# Patient Record
Sex: MALE | Race: WHITE | NOT HISPANIC OR LATINO | Employment: OTHER | URBAN - METROPOLITAN AREA
[De-identification: names, ages, dates, MRNs, and addresses within clinical notes are randomized per-mention and may not be internally consistent; named-entity substitution may affect disease eponyms.]

---

## 2021-03-31 ENCOUNTER — TRANSFERRED RECORDS (OUTPATIENT)
Dept: HEALTH INFORMATION MANAGEMENT | Facility: CLINIC | Age: 66
End: 2021-03-31

## 2021-04-02 ENCOUNTER — TRANSFERRED RECORDS (OUTPATIENT)
Dept: HEALTH INFORMATION MANAGEMENT | Facility: CLINIC | Age: 66
End: 2021-04-02

## 2021-04-02 LAB
CREATININE (EXTERNAL): 6.93 MG/DL (ref 0.73–1.18)
GFR ESTIMATED (EXTERNAL): 8 ML/MIN/1.73M2
GLUCOSE (EXTERNAL): 125 MG/DL (ref 70–100)
POTASSIUM (EXTERNAL): 4.7 MMOL/L (ref 3.5–5.1)

## 2021-04-16 ENCOUNTER — TRANSFERRED RECORDS (OUTPATIENT)
Dept: HEALTH INFORMATION MANAGEMENT | Facility: CLINIC | Age: 66
End: 2021-04-16

## 2021-04-27 ENCOUNTER — TRANSFERRED RECORDS (OUTPATIENT)
Dept: HEALTH INFORMATION MANAGEMENT | Facility: CLINIC | Age: 66
End: 2021-04-27

## 2021-04-27 ENCOUNTER — TRANSFERRED RECORDS (OUTPATIENT)
Dept: MULTI SPECIALTY CLINIC | Facility: CLINIC | Age: 66
End: 2021-04-27

## 2021-04-27 LAB
EJECTION FRACTION: 60 %
EJECTION FRACTION: 60 %

## 2021-05-25 ENCOUNTER — REFERRAL (OUTPATIENT)
Dept: TRANSPLANT | Facility: CLINIC | Age: 66
End: 2021-05-25

## 2021-05-25 DIAGNOSIS — N18.6 END STAGE RENAL DISEASE (H): ICD-10-CM

## 2021-05-25 DIAGNOSIS — E11.9 DIABETES MELLITUS, TYPE 2 (H): Primary | ICD-10-CM

## 2021-05-25 DIAGNOSIS — Z01.818 ENCOUNTER FOR PRE-TRANSPLANT EVALUATION FOR KIDNEY AND PANCREAS TRANSPLANT: ICD-10-CM

## 2021-05-25 DIAGNOSIS — E78.5 HYPERLIPIDEMIA: ICD-10-CM

## 2021-05-25 DIAGNOSIS — E10.9 TYPE 1 DIABETES MELLITUS (H): ICD-10-CM

## 2021-05-25 DIAGNOSIS — I10 ESSENTIAL HYPERTENSION: ICD-10-CM

## 2021-05-25 DIAGNOSIS — Z76.82 ORGAN TRANSPLANT CANDIDATE: ICD-10-CM

## 2021-05-25 DIAGNOSIS — N18.4 CHRONIC KIDNEY DISEASE, STAGE 4, SEVERELY DECREASED GFR (H): ICD-10-CM

## 2021-05-25 NOTE — Clinical Note
Please see smart set orders, pt is a pre-K/P, on dialysis M/W/F, confirmed STD of 8/5/21  Thanks, Rebecca

## 2021-05-25 NOTE — LETTER
Avelina MUNIZ Sanam  [address] [place] [address]                June 2, 2021                                                                                        MEDICAL RECORDS REQUEST    [facility] Records Request                      Thank you for referring your patient to the [facility], in order to process the referral we will need the following information;    1. Demographics  2. 2728 form   3. Immunizations records  4. Providers Progress notes, (last 3 note)      Please call our office at [phone] if you have any questions or concerns.                Please fax all paper records to [phone] within 3-5 business days.      Thank you,   The SOT Referral Intake Team     [facility]  Solid Organ Transplant Office  [address] [place] [address], [address] [address]

## 2021-05-25 NOTE — LETTER
Avelina Marion  [address] [place] [address]      Dear Avelina,    Thank you for your interest in the Transplant Center at [facility]. We look forward to being a part of your care team and assisting you through the transplant process.    As we discussed, your transplant coordinator is Rebecca Christopher (Pancreas, Kidney).  You may call your coordinator at any time with questions or concerns call [phone].    Please complete the following.    1. Fill out and return the enclosed forms    Authorization for Electronic Communication    Authorization to Discuss Protected Health Information    Authorization for Release of Protected Health Information    Authorization for Care Everywhere Release of Information    2. Sign up for:    [place], access to your electronic medical record (see enclosed pamphlet)    [email or web address], a transplant education website    You can use these tools to learn more about your transplant, communicate with your care team, and track your medical details      Sincerely,  Solid Organ Transplant  [facility]    cc: Referring Physician and PCP

## 2021-06-02 ENCOUNTER — DOCUMENTATION ONLY (OUTPATIENT)
Dept: TRANSPLANT | Facility: CLINIC | Age: 66
End: 2021-06-02

## 2021-06-02 VITALS — HEIGHT: 72 IN | WEIGHT: 200 LBS | BODY MASS INDEX: 27.09 KG/M2

## 2021-06-02 SDOH — HEALTH STABILITY: MENTAL HEALTH: HOW OFTEN DO YOU HAVE A DRINK CONTAINING ALCOHOL?: NEVER

## 2021-06-02 SDOH — HEALTH STABILITY: MENTAL HEALTH: HOW OFTEN DO YOU HAVE 6 OR MORE DRINKS ON ONE OCCASION?: NEVER

## 2021-06-02 SDOH — HEALTH STABILITY: MENTAL HEALTH: HOW MANY STANDARD DRINKS CONTAINING ALCOHOL DO YOU HAVE ON A TYPICAL DAY?: NOT ASKED

## 2021-06-02 ASSESSMENT — MIFFLIN-ST. JEOR: SCORE: 1740.19

## 2021-06-02 NOTE — TELEPHONE ENCOUNTER
PCP: Amy Morse   Referring Provider: Dr. Patrice Mitchell   Referring Diagnosis: CKD/Type 2 DM, Insulin use     Is patient under the age of 65? Yes  Is patient diabetic? Yes  Is patient on insulin? Yes  Was patient offered a pancreas transplant referral? Yes    Is patient in a group home/assisted living? No  Does patient have a guardian? No    Referral intake process completed.  Patient is aware that after financial approval is received, medical records will be requested.   Patient confirmed for a callback from transplant coordinator on 6/22/21. (within 2 weeks)  Tentative evaluation date 8/5/21. (within 4 weeks)    Confirmed coordinator will discuss evaluation process in more detail at the time of their call.   Patient is aware of the need to arrange age appropriate cancer screening, vaccinations, and dental care.  Reminded patient to complete questionnaire, complete medical records release, and review packet prior to evaluation visit .  Assessed patient for special needs (ie--wheelchair, assistance, guardian, and ):  None   Patient instructed to call [phone] with questions.     Patient gave verbal consent during intake call to obtain medical records and documents outside of [facility]:  Yes     BRIANA Cote, LPN   Solid Organ Transplant

## 2021-06-04 ENCOUNTER — TRANSFERRED RECORDS (OUTPATIENT)
Dept: HEALTH INFORMATION MANAGEMENT | Facility: CLINIC | Age: 66
End: 2021-06-04

## 2021-06-22 NOTE — TELEPHONE ENCOUNTER
Left VM for pt to return my call for referral.     Contacted patient and introduced myself as their Transplant Coordinator, also introduced the role of the Transplant Coordinator in the transplant process.  Explained the purpose of this call including reviewing next steps and answering questions.    Confirmed Referring Provider, Dialysis Center, and Primary Care Physician. Notified patient of the importance of continued communication with referring providers and primary care physicians.    Reviewed components of transplant evaluation process including necessary appointments, tests, and procedures.    Answered questions for patient regarding evaluation, provided my name and contact information and requested they call with any additional questions.    Determined that patient would like additional information regarding transplant by:     Drop Down choices: Mail, Email, MyChart, Phone Call   Encourage MyChart   Notified patients that they will hear from a Transplant  to schedule evaluation.       Reviewed pt's chart for pre-kidney/pancreas transplant evaluation planning. Pt lives in [place], [place]. Pt has ESRD d/t diabetic nephropathy- recently initiated 4/2021 and follows w/ nephrologist Dr. Mitchell. Pt's chart notes T1 diabetes, however, pt thought he was type 2, he is 20 units long acting insulin x1/day.  He reports his BG levels are usually between 110s-130s.  Other hx includes HTN, HLD, and diabetic neuropathy.  No significant heart hx, but he did have chest tightness following dialysis in April and underwent a DSE (was normal).  No significant lung hx. Surgical hx includes cataracts surgery on both eyes.  BMI 26.  Last colonoscopy was in 2010, pt will work w/ his PCP to schedule updated colonoscopy.  Dental: not UTD.  Pt reports he smoked during the 1980s, but has since quit.  He does not consume alcohol, and does not use recreational drugs. Pt is independent w/ ADLs.  Pt lives w/ brother and sister,  believes he will have adequate support following transplant. Pt has no living donors at this time.     I also introduced [email or web address] and asked pt to create an account and view pre-kidney transplant videos for review with me following evaluation. Confirmed STD of 8/5/21. Informed pt they will hear from scheduling to arrange the evaluation. Smartset orders entered, chart routed to scheduling pool.

## 2021-06-27 ENCOUNTER — HEALTH MAINTENANCE LETTER (OUTPATIENT)
Age: 66
End: 2021-06-27

## 2021-08-02 ENCOUNTER — TELEPHONE (OUTPATIENT)
Dept: TRANSPLANT | Facility: CLINIC | Age: 66
End: 2021-08-02

## 2021-08-02 NOTE — TELEPHONE ENCOUNTER
I /Magdalena Esparza, RN BSN called patient to confirm /verify his coming for PKE on Thursday 8/5/2021  patient Busy line no other number to call could not leave message.  I called Dr. Mitchell's office nephrology they confirmed phone number [phone] is correct,  I called [facility] at [facility] left a voice message for them to confirm if pt is on PD at their unit.   NO Answer, I left a voice message asking them to call Magdalena back at [phone] and leave message.    Qing Fernandez and Ines

## 2021-08-03 NOTE — PROGRESS NOTES
[facility] Solid Organ Transplant  Outpatient MNT: Kidney Pancreas Transplant Evaluation    Current BMI: 27.5 (HT 72 in,  lbs/92 kg)  BMI is within recommendation of <35 for KP transplant    Frailty Assessment -- Not Frail (1/5 points) scored for low activity level      Time Spent: 15 minutes  Visit Type: Initial   Referring Physician: Finger  Pt accompanied by: self    History of previous txp: none   Frequency of BG checks: hasn't been checking as often; reports 'wnl'; barrier to checking more often is dislike of finger poke (has looked into cgm in past but reports issues with insurance coverage)-->encouraged him to look into options again  Dialysis: yes    Dialysis Info: HD 4/2021 MWF 1030 am   Protein supplement: none     Nutrition Assessment  Lives w/ brother and sister; takes turns cooking     Appetite: good/baseline    Vitamins, Supplements, Pertinent Meds: phoslo (takes all the time he eats)  Herbal Medicines/Supplements: none     Edema: none     Weight hx: stable     Food Security: any concerns about having enough money to buy food or access to grocery stores? No     Diet Recall  Breakfast None    Lunch Sweet and sour chicken and fried rice (Chinese food); [employer] or [employer] -->majority of lunch is eating out of the home; at home may have deli meat s/w    Dinner Spaghetti; chicken/steak/burgers + veggies    Snacks None    Beverages Diet clear soda (3/day), water, very occasional milk    Alcohol None    Dining out Several times/week (lunch mostly, less often dinner)     Physical Activity  Walks with friends or at mall- inconsistent schedule      Labs  Per HD records 7/2021 K 5 Phos 4.5      Nutrition Diagnosis  No nutrition diagnosis identified at this time.     Nutrition Intervention  Nutrition education provided:  Discussed sodium intake (low sodium foods and drinks, seasoning food without salt and tips for low sodium diet). Diet recall shows higher sodium intake.   Reviewed k/phos labs and  protein needs w/ dialysis.     Reviewed post txp diet guidelines in brief (will review in further detail post txp):  (1) Review of proper food safety measures d/t immunosuppressant therapy post-op and increased risk for food-borne illness    (2) Avoid the following post txp d/t risk for rejection, unknown effects on the organs, and/or potential interactions with immunosuppressants:  - Herbal, Chinese, holistic, chiropractic, natural, alternative medicines and supplements  - Detoxes and cleanses  - Weight loss pills  - Protein powders or other products with extracts or herbs (ie green tea extract)    (3) Med regimen and possible side effects    Patient Understanding: Pt verbalized understanding of education provided.  Expected Engagement: Good  Follow-Up Plans: PRN     Nutrition Goals  No nutrition goals identified at this time     Suha Stanley, RD, LD, CCTD

## 2021-08-04 ENCOUNTER — TELEPHONE (OUTPATIENT)
Dept: TRANSPLANT | Facility: CLINIC | Age: 66
End: 2021-08-04

## 2021-08-04 NOTE — TELEPHONE ENCOUNTER
[facility] called confirming dialzyed in center MWF. Confirmation complete.  Pt has PKE for 8/5/2021 IN PERSON at [facility]     I called Avelina Santillankert on 8/4/2021 to verify he is coming to [facility] in Person on 8/5/2021.  Phone rang multiple times and I /Magdalena Esparza     Avelina Marion returned call from dialysis on cell [phone].  He confirms he will attend in person to [facility] on 8/5/2021.    I called him and asked him to bring the maroon folder and to sign two forms and give to Coordinator on Thursday 8/5/2021.     jones Christopher

## 2021-08-04 NOTE — PROGRESS NOTES
TRANSPLANT NEPHROLOGY RECIPIENT EVALUATION NOTE    Assessment and Plan:  # Kidney/Pancreas Transplant Evaluation: Patient is a good candidate overall. Benefits of a living donor transplant were discussed.    # ESKD from Type 2 Diabetes: doing OK on dialysis since April 2021, but may benefit from a kidney transplant.    # Type 2 Diabetes: currently controlled with 20 units insulin daily. Given evidence of end organ damage, he may benefit from a pancreas transplant.    # Cardiac Risk: will need risk assessment. He had a normal DSE April 2021. He is asymptomatic with exertion.    # Health Maintenance: Colonoscopy: due and Dental: due    Discussed the risks and benefits of a transplant, including the risk of surgery and immunosuppression medications.  Patient's overall evaluation will be discussed in the Transplant Program's regular meeting with a final recommendation on the patients suitability for transplant to be made at that time.    Pending completion of the full evaluation, patient presently appears to be enough of an acceptable kidney transplant recipient candidate to have any potential kidney donors start the evaluation process.  Patient was seen in conjunction with Dr. Joseph Chung as part of a shared visit.    Evaluation:  Avelina Marion was seen in consultation at the request of Dr. Francesco Farnsworth for evaluation as a potential kidney/pancreas transplant recipient.    Reason for Visit:  Avelina Marion is a 66-year-old male with ESKD from diabetic nephropathy, who presents for kidney/pancreas transplant evaluation.    History of Present Illness:  Mr. Marion is a 66-year-old male with ESKD from presumed diabetic nephropathy on dialysis since April 2021, type 2 diabetes, and hypertension.          Kidney Disease Hx: longstanding CKD and proteinuria (0.5-2.5 g/g, negative SPEP and serum immunofixation) thought 2/2 DM since at least 2015. No kidney biopsy. Started dialysis April 2021, which is going well. Still has urine  output. No sensation of incomplete emptying. Of note, his brother previously underwent kidney/pancreas transplant for diabetic nephropathy, but has since passed away.       Biopsy Proven: No         Primary Nephrologist: Dr. Mitchell       H/o Kidney Stones: No       H/o Recurrent/Frequent UTI: No         Diabetic Hx: Type 2        Diagnosis Date: early 40's, weighed around 300 lbs at the time, since lost weight with diet and exercise, but has continued to need insulin for about 10 years       Medication History: currently on lantus 20 units nightly.       Diabetic Control: Controlled (HbA1c <7%)   Last HbA1c: 5.2%       Hypoglycemic Unawareness: No       End-Organ Damage due to DM: Retinopathy and Nephropathy          Cardiac/Vascular Disease Risk Factors:        - Normal DSE April 2021         Viral Serology Status       CMV IgG Antibody: Unknown       EBV IgG Antibody: Unknown         Volume Status/Weight:        Volume status: Euvolemic       Weight:  Acceptable BMI       BMI: Body mass index is 27.53 kg/m .         Functional Capacity/Frailty:        He doesn't do anything in particular for exercise, but can walk an unlimited distance and go up and down stairs without issue. He denies any physical limitations. No chest pain, SOB, or claudication.    Fatigue/Decreased Energy: [x] No [] Yes    Chest Pain or SOB with Exertion: [x] No [] Yes    Significant Weight Change: [x] No [] Yes    Nausea, Vomiting or Diarrhea: [x] No [] Yes    Fever, Sweats or Chills:  [x] No [] Yes    Leg Swelling [x] No [] Yes        Potential Living Kidney Donors: Not at this time, although he has not started to talking to family or friends about it    Review of Systems:  A comprehensive review of systems was obtained and negative, except as noted in the HPI or PMH.    Past Medical History:   Medical record was reviewed and PMH was discussed with patient and noted below.  Past Medical History:   Diagnosis Date     Anemia in chronic kidney  disease      Diabetic retinopathy of both eyes (H)      End stage renal disease (H)      Hypertension      Secondary renal hyperparathyroidism (H)      Type 2 diabetes mellitus (H)        Past Social History:   Past Surgical History:   Procedure Laterality Date     CREATE FISTULA ARTERIOVENOUS UPPER EXTREMITY Left      Personal history of bleeding or anesthesia problems: No    Family History:  Family History   Problem Relation Age of Onset     Diabetes Brother      Kidney Disease Brother        Personal History:   Social History     Socioeconomic History     Marital status: Single     Spouse name: Not on file     Number of children: Not on file     Years of education: Not on file     Highest education level: Not on file   Occupational History     Not on file   Tobacco Use     Smoking status: Former Smoker     Types: Cigarettes     Smokeless tobacco: Never Used     Tobacco comment: Quit 1984   Substance and Sexual Activity     Alcohol use: Never     Drug use: Never     Sexual activity: Not on file   Other Topics Concern     Parent/sibling w/ CABG, MI or angioplasty before 65F 55M? Not Asked   Social History Narrative     Not on file     Social Determinants of Health     Financial Resource Strain:      Difficulty of Paying Living Expenses:    Food Insecurity:      Worried About Running Out of Food in the Last Year:      Ran Out of Food in the Last Year:    Transportation Needs:      Lack of Transportation (Medical):      Lack of Transportation (Non-Medical):    Physical Activity:      Days of Exercise per Week:      Minutes of Exercise per Session:    Stress:      Feeling of Stress :    Social Connections:      Frequency of Communication with Friends and Family:      Frequency of Social Gatherings with Friends and Family:      Attends Sabianism Services:      Active Member of Clubs or Organizations:      Attends Club or Organization Meetings:      Marital Status:    Intimate Partner Violence:      Fear of Current or  Ex-Partner:      Emotionally Abused:      Physically Abused:      Sexually Abused:        Allergies:  No Known Allergies    Medications:  Current Outpatient Medications   Medication Sig     amLODIPine (NORVASC) 5 MG tablet Take 10 mg by mouth     aspirin (ASA) 81 MG chewable tablet chew and swallow one tablet by mouth every day     atorvastatin (LIPITOR) 20 MG tablet TAKE 1 TABLET BY MOUTH DAILY     Blood Glucose Monitoring Suppl (ACCU-CHEK GUIDE) w/Device KIT Use to test 4 times a day. Pharmacy to dispense brand based on insurance.     calcitRIOL (ROCALTROL) 0.25 MCG capsule Take 0.25 mcg by mouth daily     calcium acetate (PHOSLO) 667 MG CAPS capsule Take 667 mg by mouth     insulin glargine (LANTUS PEN) 100 UNIT/ML pen Inject Subcutaneous At Bedtime     meclizine 25 MG CHEW Take 25 mg by mouth every 6 hours as needed for dizziness     sodium bicarbonate 650 MG tablet      No current facility-administered medications for this visit.       Vitals:  /69   Pulse 74   Ht 1.829 m (6')   Wt 92.1 kg (203 lb)   SpO2 97%   BMI 27.53 kg/m      Exam:  GENERAL APPEARANCE: alert and no distress  HENT: mouth without ulcers or lesions, fair dentition  LYMPHATICS: no cervical or supraclavicular nodes  RESP: lungs clear to auscultation - no rales, rhonchi or wheezes  CV: regular rhythm, normal rate, no rub, no murmur  EDEMA: no LE edema bilaterally  ABDOMEN: soft, nondistended, nontender  MS: extremities normal - no gross deformities noted, no evidence of inflammation in joints, no muscle tenderness  SKIN: no rash    Results:   Recent Results (from the past 336 hour(s))   EKG 12-lead, tracing only [EKG1]    Collection Time: 08/05/21  1:00 PM   Result Value Ref Range    Systolic Blood Pressure  mmHg    Diastolic Blood Pressure  mmHg    Ventricular Rate 63 BPM    Atrial Rate 63 BPM    WA Interval 132 ms    QRS Duration 90 ms     ms    QTc 440 ms    P Axis 51 degrees    R AXIS 2 degrees    T Axis 37 degrees     Interpretation ECG       Sinus rhythm  Normal ECG  No previous ECGs available       Patient was seen by myself, Dr. Joseph Chung, in conjunction with Evelyn Rico PA-C as part of a shared visit.    I personally reviewed past medical and surgical history, vital signs, medications and labs.  Present and past medical history, along with significant physical exam findings were all reviewed with VIVI.    My weathers findings:  Avelina Marion is 66 year old, who presents for Kidney transplant evaluation.    Key management decisions made by me and discussed with VIVI:  1.  End-stage kidney disease secondary to longstanding diabetes.  2.  Diabetes with complication.  3.  Simultaneous kidney and pancreas candidacy evaluation.  4.  Cardiovascular risk stratification.  5.  Age-appropriate cancer screening.  6.  Anatomical assessment.  7.  Formal psychosocial assessment.  Discussion:  Overall Mr. Marion is a 66-year-old gentleman who is interested in simultaneous kidney pancreas transplantation.  He appears to be reasonable candidate need to complete his work-up as delineated above.  We will discuss the case during our multidisciplinary meeting for final candidacy decision thank you for the consultation.

## 2021-08-04 NOTE — TELEPHONE ENCOUNTER
Pt called to let me know he has completed the online learning on MTP.  He did not have any further questions.  Documented on checklist.

## 2021-08-05 ENCOUNTER — DOCUMENTATION ONLY (OUTPATIENT)
Dept: TRANSPLANT | Facility: CLINIC | Age: 66
End: 2021-08-05

## 2021-08-05 ENCOUNTER — ALLIED HEALTH/NURSE VISIT (OUTPATIENT)
Dept: TRANSPLANT | Facility: CLINIC | Age: 66
End: 2021-08-05
Attending: PHYSICIAN ASSISTANT
Payer: COMMERCIAL

## 2021-08-05 ENCOUNTER — ANCILLARY PROCEDURE (OUTPATIENT)
Dept: CARDIOLOGY | Facility: CLINIC | Age: 66
End: 2021-08-05
Attending: PHYSICIAN ASSISTANT
Payer: COMMERCIAL

## 2021-08-05 ENCOUNTER — ANCILLARY PROCEDURE (OUTPATIENT)
Dept: GENERAL RADIOLOGY | Facility: CLINIC | Age: 66
End: 2021-08-05
Attending: PHYSICIAN ASSISTANT
Payer: COMMERCIAL

## 2021-08-05 ENCOUNTER — LAB (OUTPATIENT)
Dept: LAB | Facility: CLINIC | Age: 66
End: 2021-08-05
Attending: PHYSICIAN ASSISTANT
Payer: COMMERCIAL

## 2021-08-05 ENCOUNTER — LAB (OUTPATIENT)
Dept: LAB | Facility: CLINIC | Age: 66
End: 2021-08-05
Payer: COMMERCIAL

## 2021-08-05 VITALS
DIASTOLIC BLOOD PRESSURE: 69 MMHG | SYSTOLIC BLOOD PRESSURE: 136 MMHG | WEIGHT: 203 LBS | BODY MASS INDEX: 27.5 KG/M2 | HEIGHT: 72 IN | OXYGEN SATURATION: 97 % | HEART RATE: 74 BPM

## 2021-08-05 VITALS
WEIGHT: 203 LBS | OXYGEN SATURATION: 97 % | BODY MASS INDEX: 27.5 KG/M2 | DIASTOLIC BLOOD PRESSURE: 69 MMHG | SYSTOLIC BLOOD PRESSURE: 136 MMHG | HEIGHT: 72 IN | HEART RATE: 74 BPM

## 2021-08-05 DIAGNOSIS — E11.9 DIABETES MELLITUS, TYPE 2 (H): ICD-10-CM

## 2021-08-05 DIAGNOSIS — Z76.82 ORGAN TRANSPLANT CANDIDATE: ICD-10-CM

## 2021-08-05 DIAGNOSIS — N18.4 CHRONIC KIDNEY DISEASE, STAGE 4, SEVERELY DECREASED GFR (H): ICD-10-CM

## 2021-08-05 DIAGNOSIS — E78.5 HYPERLIPIDEMIA, UNSPECIFIED HYPERLIPIDEMIA TYPE: ICD-10-CM

## 2021-08-05 DIAGNOSIS — N18.6 END STAGE RENAL DISEASE (H): ICD-10-CM

## 2021-08-05 DIAGNOSIS — E10.9 TYPE 1 DIABETES MELLITUS (H): ICD-10-CM

## 2021-08-05 DIAGNOSIS — Z01.818 ENCOUNTER FOR PRE-TRANSPLANT EVALUATION FOR KIDNEY AND PANCREAS TRANSPLANT: ICD-10-CM

## 2021-08-05 DIAGNOSIS — I10 ESSENTIAL HYPERTENSION: ICD-10-CM

## 2021-08-05 DIAGNOSIS — E11.21 TYPE 2 DIABETES MELLITUS WITH DIABETIC NEPHROPATHY, WITH LONG-TERM CURRENT USE OF INSULIN (H): ICD-10-CM

## 2021-08-05 DIAGNOSIS — E78.5 HYPERLIPIDEMIA: ICD-10-CM

## 2021-08-05 DIAGNOSIS — Z99.2 TYPE 2 DIABETES MELLITUS WITH CHRONIC KIDNEY DISEASE ON CHRONIC DIALYSIS, WITH LONG-TERM CURRENT USE OF INSULIN (H): ICD-10-CM

## 2021-08-05 DIAGNOSIS — Z79.4 TYPE 2 DIABETES MELLITUS WITH DIABETIC NEPHROPATHY, WITH LONG-TERM CURRENT USE OF INSULIN (H): ICD-10-CM

## 2021-08-05 DIAGNOSIS — I10 HYPERTENSION, UNSPECIFIED TYPE: ICD-10-CM

## 2021-08-05 DIAGNOSIS — E11.22 TYPE 2 DIABETES MELLITUS WITH CHRONIC KIDNEY DISEASE ON CHRONIC DIALYSIS, WITH LONG-TERM CURRENT USE OF INSULIN (H): ICD-10-CM

## 2021-08-05 DIAGNOSIS — N25.81 SECONDARY RENAL HYPERPARATHYROIDISM (H): ICD-10-CM

## 2021-08-05 DIAGNOSIS — N18.6 TYPE 2 DIABETES MELLITUS WITH CHRONIC KIDNEY DISEASE ON CHRONIC DIALYSIS, WITH LONG-TERM CURRENT USE OF INSULIN (H): ICD-10-CM

## 2021-08-05 DIAGNOSIS — Z79.4 TYPE 2 DIABETES MELLITUS WITH CHRONIC KIDNEY DISEASE ON CHRONIC DIALYSIS, WITH LONG-TERM CURRENT USE OF INSULIN (H): ICD-10-CM

## 2021-08-05 DIAGNOSIS — Z01.818 ENCOUNTER FOR PRE-TRANSPLANT EVALUATION FOR KIDNEY AND PANCREAS TRANSPLANT: Primary | ICD-10-CM

## 2021-08-05 LAB
A1 AG RBC QL: POSITIVE
ABO/RH(D): NORMAL
ABO/RH(D): NORMAL
ALBUMIN SERPL-MCNC: 4.4 G/DL (ref 3.4–5)
ALBUMIN UR-MCNC: 100 MG/DL
ALP SERPL-CCNC: 71 U/L (ref 40–150)
ALT SERPL W P-5'-P-CCNC: 17 U/L (ref 0–70)
ANION GAP SERPL CALCULATED.3IONS-SCNC: 5 MMOL/L (ref 3–14)
ANTIBODY SCREEN: NEGATIVE
APPEARANCE UR: CLEAR
APTT PPP: 29 SECONDS (ref 22–38)
AST SERPL W P-5'-P-CCNC: 13 U/L (ref 0–45)
BACTERIA #/AREA URNS HPF: ABNORMAL /HPF
BASOPHILS # BLD AUTO: 0.1 10E3/UL (ref 0–0.2)
BASOPHILS NFR BLD AUTO: 1 %
BILIRUB SERPL-MCNC: 0.5 MG/DL (ref 0.2–1.3)
BILIRUB UR QL STRIP: NEGATIVE
BUN SERPL-MCNC: 44 MG/DL (ref 7–30)
CALCIUM SERPL-MCNC: 8.6 MG/DL (ref 8.5–10.1)
CHLORIDE BLD-SCNC: 101 MMOL/L (ref 94–109)
CO2 SERPL-SCNC: 30 MMOL/L (ref 20–32)
COLOR UR AUTO: YELLOW
CREAT SERPL-MCNC: 6.36 MG/DL (ref 0.66–1.25)
EOSINOPHIL # BLD AUTO: 0.1 10E3/UL (ref 0–0.7)
EOSINOPHIL NFR BLD AUTO: 1 %
ERYTHROCYTE [DISTWIDTH] IN BLOOD BY AUTOMATED COUNT: 12.3 % (ref 10–15)
FACTOR 2 INTERPRETATION: NORMAL
FACTOR V INTERPRETATION: NORMAL
GFR SERPL CREATININE-BSD FRML MDRD: 8 ML/MIN/1.73M2
GLUCOSE BLD-MCNC: 136 MG/DL (ref 70–99)
GLUCOSE UR STRIP-MCNC: 50 MG/DL
HBA1C MFR BLD: 6.1 % (ref 0–5.6)
HCT VFR BLD AUTO: 38.3 % (ref 40–53)
HGB BLD-MCNC: 12.8 G/DL (ref 13.3–17.7)
HGB UR QL STRIP: NEGATIVE
HOLD SPECIMEN: NORMAL
HYALINE CASTS: 1 /LPF
IMM GRANULOCYTES # BLD: 0 10E3/UL
IMM GRANULOCYTES NFR BLD: 0 %
INR PPP: 1.07 (ref 0.85–1.15)
KETONES UR STRIP-MCNC: NEGATIVE MG/DL
LAB DIRECTOR COMMENTS: NORMAL
LAB DIRECTOR DISCLAIMER: NORMAL
LAB DIRECTOR INTERPRETATION: NORMAL
LAB DIRECTOR METHODOLOGY: NORMAL
LAB DIRECTOR RESULTS: NORMAL
LEUKOCYTE ESTERASE UR QL STRIP: NEGATIVE
LVEF ECHO: NORMAL
LYMPHOCYTES # BLD AUTO: 2.3 10E3/UL (ref 0.8–5.3)
LYMPHOCYTES NFR BLD AUTO: 25 %
MCH RBC QN AUTO: 32.7 PG (ref 26.5–33)
MCHC RBC AUTO-ENTMCNC: 33.4 G/DL (ref 31.5–36.5)
MCV RBC AUTO: 98 FL (ref 78–100)
MDL NUMBER: NORMAL
MONOCYTES # BLD AUTO: 0.6 10E3/UL (ref 0–1.3)
MONOCYTES NFR BLD AUTO: 6 %
NEUTROPHILS # BLD AUTO: 6 10E3/UL (ref 1.6–8.3)
NEUTROPHILS NFR BLD AUTO: 67 %
NITRATE UR QL: NEGATIVE
NRBC # BLD AUTO: 0 10E3/UL
NRBC BLD AUTO-RTO: 0 /100
PH UR STRIP: 7 [PH] (ref 5–7)
PLATELET # BLD AUTO: 213 10E3/UL (ref 150–450)
POTASSIUM BLD-SCNC: 3.9 MMOL/L (ref 3.4–5.3)
PROT SERPL-MCNC: 8.3 G/DL (ref 6.8–8.8)
PSA SERPL-MCNC: 1.17 UG/L (ref 0–4)
RBC # BLD AUTO: 3.92 10E6/UL (ref 4.4–5.9)
RBC URINE: 2 /HPF
SODIUM SERPL-SCNC: 136 MMOL/L (ref 133–144)
SP GR UR STRIP: 1.02 (ref 1–1.03)
SPECIMEN DESCRIPTION: NORMAL
SPECIMEN EXPIRATION DATE: NORMAL
UROBILINOGEN UR STRIP-MCNC: NORMAL MG/DL
WBC # BLD AUTO: 9.1 10E3/UL (ref 4–11)
WBC URINE: 1 /HPF

## 2021-08-05 PROCEDURE — 83036 HEMOGLOBIN GLYCOSYLATED A1C: CPT | Performed by: PATHOLOGY

## 2021-08-05 PROCEDURE — 86704 HEP B CORE ANTIBODY TOTAL: CPT | Mod: 90 | Performed by: PATHOLOGY

## 2021-08-05 PROCEDURE — 85610 PROTHROMBIN TIME: CPT | Performed by: PATHOLOGY

## 2021-08-05 PROCEDURE — 84681 ASSAY OF C-PEPTIDE: CPT | Mod: 90 | Performed by: PATHOLOGY

## 2021-08-05 PROCEDURE — 71046 X-RAY EXAM CHEST 2 VIEWS: CPT | Mod: GC | Performed by: RADIOLOGY

## 2021-08-05 PROCEDURE — 87340 HEPATITIS B SURFACE AG IA: CPT | Mod: 90 | Performed by: PATHOLOGY

## 2021-08-05 PROCEDURE — 86803 HEPATITIS C AB TEST: CPT | Mod: 90 | Performed by: PATHOLOGY

## 2021-08-05 PROCEDURE — 86665 EPSTEIN-BARR CAPSID VCA: CPT | Mod: 90 | Performed by: PATHOLOGY

## 2021-08-05 PROCEDURE — 86900 BLOOD TYPING SEROLOGIC ABO: CPT

## 2021-08-05 PROCEDURE — 85025 COMPLETE CBC W/AUTO DIFF WBC: CPT | Performed by: PATHOLOGY

## 2021-08-05 PROCEDURE — 81241 F5 GENE: CPT | Mod: 90 | Performed by: PATHOLOGY

## 2021-08-05 PROCEDURE — 93306 TTE W/DOPPLER COMPLETE: CPT | Performed by: INTERNAL MEDICINE

## 2021-08-05 PROCEDURE — 86901 BLOOD TYPING SEROLOGIC RH(D): CPT | Mod: 90 | Performed by: PATHOLOGY

## 2021-08-05 PROCEDURE — 86481 TB AG RESPONSE T-CELL SUSP: CPT | Mod: 90 | Performed by: PATHOLOGY

## 2021-08-05 PROCEDURE — 85670 THROMBIN TIME PLASMA: CPT | Mod: 90 | Performed by: PATHOLOGY

## 2021-08-05 PROCEDURE — 86832 HLA CLASS I HIGH DEFIN QUAL: CPT | Performed by: PATHOLOGY

## 2021-08-05 PROCEDURE — 85730 THROMBOPLASTIN TIME PARTIAL: CPT | Mod: 90 | Performed by: PATHOLOGY

## 2021-08-05 PROCEDURE — 86886 COOMBS TEST INDIRECT TITER: CPT

## 2021-08-05 PROCEDURE — 86787 VARICELLA-ZOSTER ANTIBODY: CPT | Mod: 90 | Performed by: PATHOLOGY

## 2021-08-05 PROCEDURE — 86833 HLA CLASS II HIGH DEFIN QUAL: CPT | Performed by: PATHOLOGY

## 2021-08-05 PROCEDURE — 86900 BLOOD TYPING SEROLOGIC ABO: CPT | Mod: 90 | Performed by: PATHOLOGY

## 2021-08-05 PROCEDURE — G0103 PSA SCREENING: HCPCS | Performed by: PATHOLOGY

## 2021-08-05 PROCEDURE — G0452 MOLECULAR PATHOLOGY INTERPR: HCPCS | Mod: 26 | Performed by: PATHOLOGY

## 2021-08-05 PROCEDURE — 99205 OFFICE O/P NEW HI 60 MIN: CPT

## 2021-08-05 PROCEDURE — 86147 CARDIOLIPIN ANTIBODY EA IG: CPT | Mod: 90 | Performed by: PATHOLOGY

## 2021-08-05 PROCEDURE — 86706 HEP B SURFACE ANTIBODY: CPT | Mod: 90 | Performed by: PATHOLOGY

## 2021-08-05 PROCEDURE — 86644 CMV ANTIBODY: CPT | Mod: 90 | Performed by: PATHOLOGY

## 2021-08-05 PROCEDURE — 81382 HLA II TYPING 1 LOC HR: CPT | Mod: 59 | Performed by: PATHOLOGY

## 2021-08-05 PROCEDURE — 85613 RUSSELL VIPER VENOM DILUTED: CPT | Mod: 90 | Performed by: PATHOLOGY

## 2021-08-05 PROCEDURE — 36415 COLL VENOUS BLD VENIPUNCTURE: CPT | Performed by: PATHOLOGY

## 2021-08-05 PROCEDURE — 81378 HLA I & II TYPING HR: CPT | Performed by: PATHOLOGY

## 2021-08-05 PROCEDURE — 81240 F2 GENE: CPT | Mod: 90 | Performed by: PATHOLOGY

## 2021-08-05 PROCEDURE — 86850 RBC ANTIBODY SCREEN: CPT | Mod: 90 | Performed by: PATHOLOGY

## 2021-08-05 PROCEDURE — 85390 FIBRINOLYSINS SCREEN I&R: CPT | Mod: 26 | Performed by: PATHOLOGY

## 2021-08-05 PROCEDURE — 80053 COMPREHEN METABOLIC PANEL: CPT | Performed by: PATHOLOGY

## 2021-08-05 PROCEDURE — 36415 COLL VENOUS BLD VENIPUNCTURE: CPT

## 2021-08-05 PROCEDURE — 85730 THROMBOPLASTIN TIME PARTIAL: CPT | Mod: 91 | Performed by: PATHOLOGY

## 2021-08-05 PROCEDURE — 86905 BLOOD TYPING RBC ANTIGENS: CPT | Mod: 90 | Performed by: PATHOLOGY

## 2021-08-05 PROCEDURE — 99203 OFFICE O/P NEW LOW 30 MIN: CPT | Performed by: TRANSPLANT SURGERY

## 2021-08-05 PROCEDURE — 81001 URINALYSIS AUTO W/SCOPE: CPT | Performed by: PATHOLOGY

## 2021-08-05 PROCEDURE — 86780 TREPONEMA PALLIDUM: CPT | Mod: 90 | Performed by: PATHOLOGY

## 2021-08-05 RX ORDER — SODIUM BICARBONATE 650 MG/1
TABLET ORAL
Status: ON HOLD | COMMUNITY
End: 2022-08-09

## 2021-08-05 RX ORDER — ASPIRIN 81 MG/1
TABLET, CHEWABLE ORAL
Status: ON HOLD | COMMUNITY
Start: 2019-10-11 | End: 2023-02-20

## 2021-08-05 RX ORDER — MECLIZINE HCL 25MG 25 MG/1
25 TABLET, CHEWABLE ORAL EVERY 6 HOURS PRN
COMMUNITY
End: 2022-10-18

## 2021-08-05 RX ORDER — CALCITRIOL 0.25 UG/1
0.25 CAPSULE, LIQUID FILLED ORAL DAILY
Status: ON HOLD | COMMUNITY
Start: 2021-04-24 | End: 2023-02-07

## 2021-08-05 RX ORDER — BLOOD-GLUCOSE METER
EACH MISCELLANEOUS
COMMUNITY
Start: 2021-04-02

## 2021-08-05 RX ORDER — AMLODIPINE BESYLATE 5 MG/1
5 TABLET ORAL
Status: ON HOLD | COMMUNITY
Start: 2021-04-02 | End: 2023-02-20

## 2021-08-05 RX ORDER — ATORVASTATIN CALCIUM 20 MG/1
TABLET, FILM COATED ORAL
Status: ON HOLD | COMMUNITY
Start: 2021-07-19 | End: 2023-02-20

## 2021-08-05 RX ORDER — CALCIUM ACETATE 667 MG/1
667 CAPSULE ORAL
Status: ON HOLD | COMMUNITY
Start: 2021-01-05 | End: 2023-09-21

## 2021-08-05 ASSESSMENT — MIFFLIN-ST. JEOR
SCORE: 1748.8
SCORE: 1748.8

## 2021-08-05 NOTE — LETTER
2021         RE: Avelina Marion  [facility] [place] [address]        Dear Colleague,    Thank you for referring your patient, Avelina Marion, to the [facility]. Please see a copy of my visit note below.    Transplant Surgery Consult Note    Medical record number: [id number]  YOB: 1955,   Consult requested for evaluation of kidney and pancreas transplant candidacy.    Assessment and Recommendations: Mr. Marion is a good candidate for transplantation and has a good understanding of the risks and benefits of this approach to management of renal failure and diabetes. The following issues should be addressed prior to transplant:     Mr. Marion has End stage renal failure due to diabetes mellitus type 2 whose condition is not expected to resolve, is expected to progress, and is expected to continue to develop related comorbid conditions.  Cardiology consult for cardiac risk stratification to be ordered: Yes  CT abdomen and pelvis without contrast to be ordered for assessment of vascular targets: Yes and iliac US  Transplant listing labs ordered to include HLA, ABOx2, Cr, etc.  Dietician consult ordered: Yes  Social work consult ordered: Yes  Imaging reports reviewed:  CXR, dobutamine stress ECHO  Radiology images reviewed:non avail  Recipient suitable to move forward with work up of living donors:  Yes      The majority of our visit was spent in counselling, discussing the medical and surgical risks of living or  donor kidney and pancreas transplantation, either in a simultaneous or sequential fashion. I contrasted approximate wait time for SPK vs living vs  donor kidneys from normal (0-85%) or higher (%) kidney donor profile index (KDPI) donors and their associated outcomes. I would not recommend this individual to consider kidneys from high KDPI donors. The reason for this decision is best summarized as: multi-organ transplant candidate.   Access to transplant will be impacted by living donor availability and overall candidacy for SPK, as well as the influence of blood type and degree of sensitization. We discussed advantages of preemptive transplant as well as living donor kidney transplant, and graft and patient survival outcomes associated with these options. Potential surgical complications of kidney and pancreas transplantation include bleeding, clotting, infection, wound complications, anastomotic failure and other issues such as cardiac complications, pneumonia, deep venous thrombosis, pulmonary embolism, post transplant diabetes and death. We discussed the need for protocol biopsy of the kidney and the possible need for a ureteral stent (and subsequent removal). We discussed benefits and risks associated with different approaches to exocrine drainage of pancreatic secretions. We also discussed differences in the average length of stay, recovery process, and posttransplant lab and monitoring protocol. We discussed the risk of graft rejection and recurrent diabetic nephropathy in the setting of poor glycemic control. I emphasized the need for strict immunosuppression adherence and the potential for complications of immunosuppression such as skin cancer or lymphoma, as well as a very low but not zero risk of donor-derived disease transmission risks (infection, cancer). Mr. Marion asked good questions and the patient's candidacy will be reviewed at our Multidisciplinary Selection Committee. Thank you for the opportunity to participate in Mr. Marion's care.    Total time: 30 minutes  Counselling time: 20 minutes    Francesco Farnsworth MD, PhD  Associate Professor of Surgery      ---------------------------------------------------------------------------------------------------    HPI: Mr. Marion has End stage renal failure due to diabetes mellitus type 2. The patient has had diabetes for many years. Management is by 20 U insulin.day  Daily blood  glucoses range typically around 110-120.  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  He had a low blood sugar at night and required ambulance/ER visit.   .  The diabetes is controlled.    Complications of diabetes include:    Retinopathy:  Yes   Neuropathy: No  Gastroparesis:  No    The patient is on dialysis.    Has potential kidney donors:  Doesn't know .  Interested in participation in paired exchange if a donor is willing: Doesn't know     ROS:    -  Had one episode of chest tightness at initiation of dialysis.  -      The patient has the following pertinent history:       No    Yes  Dialysis:    []      [x] via:       Blood Transfusion                  [x]      []  Number of units:   Most recently:  Pregnancy:    [x]      [] Number:       Previous Transplant:  [x]      [] Details:    Cancer    [x]      [] Comment:   Kidney stones   [x]      [] Comment:      Recurrent infections  [x]      []  Type:                  Bladder dysfunction  [x]      [] Cause:    Claudication   [x]      [] Distance:    Previous Amputation  [x]      [] Cause:     Chronic anticoagulation  [x]      [] Indication:  Uatsdin  []      []     Past Medical History:   Diagnosis Date     Diabetes (H)     Type 2 DM, Insulin Use      Hypertension      No past surgical history on file.  No family history on file.  Social History     Socioeconomic History     Marital status: Single     Spouse name: Not on file     Number of children: Not on file     Years of education: Not on file     Highest education level: Not on file   Occupational History     Not on file   Tobacco Use     Smoking status: Former Smoker     Types: Cigarettes     Smokeless tobacco: Never Used     Tobacco comment: Quit 1984   Substance and Sexual Activity     Alcohol use: Never     Drug use: Never     Sexual activity: Not on file   Other Topics Concern     Parent/sibling w/ CABG, MI or angioplasty before 65F 55M? Not Asked   Social History Narrative     Not on file     Social  Determinants of Health     Financial Resource Strain:      Difficulty of Paying Living Expenses:    Food Insecurity:      Worried About Running Out of Food in the Last Year:      Ran Out of Food in the Last Year:    Transportation Needs:      Lack of Transportation (Medical):      Lack of Transportation (Non-Medical):    Physical Activity:      Days of Exercise per Week:      Minutes of Exercise per Session:    Stress:      Feeling of Stress :    Social Connections:      Frequency of Communication with Friends and Family:      Frequency of Social Gatherings with Friends and Family:      Attends Zoroastrianism Services:      Active Member of Clubs or Organizations:      Attends Club or Organization Meetings:      Marital Status:    Intimate Partner Violence:      Fear of Current or Ex-Partner:      Emotionally Abused:      Physically Abused:      Sexually Abused:        ROS:   CONSTITUTIONAL:  No fevers or chills  EYES: negative for icterus  ENT:  negative for hearing loss, tinnitus and sore throat  RESPIRATORY:  negative for cough, sputum, dyspnea  CARDIOVASCULAR:  negative for chest pain  GASTROINTESTINAL:  negative for nausea, vomiting, diarrhea or constipation  GENITOURINARY:  negative for incontinence, dysuria, bladder emptying problems  HEME:  No easy bruising  INTEGUMENT:  negative for rash and pruritus  NEURO:  Negative for headache, seizure disorder    Allergies:   No Known Allergies    Medications:  Prescription Medications as of 8/5/2021       Rx Number Disp Refills Start End Last Dispensed Date Next Fill Date Owning Pharmacy    amLODIPine (NORVASC) 5 MG tablet    4/2/2021 4/2/2022   [id number] [place], [place] - [address] AT [facility]    Sig: Take 10 mg by mouth    Class: Historical    Route: Oral    aspirin (ASA) 81 MG chewable tablet    10/11/2019    [facility] #[id number] - [place], [place] - [address] AT [address] [facility]    Sig: chew and swallow one  tablet by mouth every day    Class: Historical    atorvastatin (LIPITOR) 20 MG tablet    7/19/2021    [facility] STORE #[id number] - [place], [place] - [address] AT [address]    Sig: TAKE 1 TABLET BY MOUTH DAILY    Class: Historical    Blood Glucose Monitoring Suppl (ACCU-CHEK GUIDE) w/Device KIT    4/2/2021    [facility] STORE #[id number] - [place], [place] - [address] AT [address]    Sig: Use to test 4 times a day. Pharmacy to dispense brand based on insurance.    Class: Historical    calcitRIOL (ROCALTROL) 0.25 MCG capsule    4/24/2021    [facility] STORE #[id number] - [place], [place] - [address] AT [address]    Sig: Take 0.25 mcg by mouth daily    Class: Historical    Route: Oral    calcium acetate (PHOSLO) 667 MG CAPS capsule    1/5/2021    [facility] #[id number] - [place], [place] - [address] AT [address] [address]    Sig: Take 667 mg by mouth    Class: Historical    Route: Oral    insulin glargine (LANTUS PEN) 100 UNIT/ML pen        [facility] #[id number] - [place], [place] - [address] AT [address]    Sig: Inject Subcutaneous At Bedtime    Class: Historical    Route: Subcutaneous    meclizine 25 MG CHEW        [facility] #[id number] - [place], [place] - [address] AT [address]    Sig: Take 25 mg by mouth every 6 hours as needed for dizziness    Class: Historical    Route: Oral    sodium bicarbonate 650 MG tablet        [facility] #[id number] - [place], [place] - [address] AT [address]    Class: Historical          Exam:   Pulse:  [74] 74  BP: (136)/(69) 136/69  SpO2:  [97 %] 97 %  Appearance: in no apparent distress.   Skin: normal  Head and Neck: Normal, no rashes or jaundice  Respiratory: easy respirations, no audible wheezing.  Abdomen: rounded, No distention   Edema, none  Neuro: without deficit grossly  Psyche:  Normal mentation and affect    Diagnostics:   No results found  for this or any previous visit (from the past 672 hour(s)).  No results found for: CPRA        Again, thank you for allowing me to participate in the care of your patient.        Sincerely,        TY

## 2021-08-05 NOTE — PROGRESS NOTES
Transplant Surgery Consult Note    Medical record number: [id number]  YOB: 1955,   Consult requested for evaluation of kidney and pancreas transplant candidacy.    Assessment and Recommendations: Mr. Marion is a good candidate for transplantation and has a good understanding of the risks and benefits of this approach to management of renal failure and diabetes. The following issues should be addressed prior to transplant:     Mr. Marion has End stage renal failure due to diabetes mellitus type 2 whose condition is not expected to resolve, is expected to progress, and is expected to continue to develop related comorbid conditions.  Cardiology consult for cardiac risk stratification to be ordered: Yes  CT abdomen and pelvis without contrast to be ordered for assessment of vascular targets: Yes and iliac US  Transplant listing labs ordered to include HLA, ABOx2, Cr, etc.  Dietician consult ordered: Yes  Social work consult ordered: Yes  Imaging reports reviewed:  CXR, dobutamine stress ECHO  Radiology images reviewed:non avail  Recipient suitable to move forward with work up of living donors:  Yes      The majority of our visit was spent in counselling, discussing the medical and surgical risks of living or  donor kidney and pancreas transplantation, either in a simultaneous or sequential fashion. I contrasted approximate wait time for SPK vs living vs  donor kidneys from normal (0-85%) or higher (%) kidney donor profile index (KDPI) donors and their associated outcomes. I would not recommend this individual to consider kidneys from high KDPI donors. The reason for this decision is best summarized as: multi-organ transplant candidate.  Access to transplant will be impacted by living donor availability and overall candidacy for SPK, as well as the influence of blood type and degree of sensitization. We discussed advantages of preemptive transplant as well as living donor kidney  transplant, and graft and patient survival outcomes associated with these options. Potential surgical complications of kidney and pancreas transplantation include bleeding, clotting, infection, wound complications, anastomotic failure and other issues such as cardiac complications, pneumonia, deep venous thrombosis, pulmonary embolism, post transplant diabetes and death. We discussed the need for protocol biopsy of the kidney and the possible need for a ureteral stent (and subsequent removal). We discussed benefits and risks associated with different approaches to exocrine drainage of pancreatic secretions. We also discussed differences in the average length of stay, recovery process, and posttransplant lab and monitoring protocol. We discussed the risk of graft rejection and recurrent diabetic nephropathy in the setting of poor glycemic control. I emphasized the need for strict immunosuppression adherence and the potential for complications of immunosuppression such as skin cancer or lymphoma, as well as a very low but not zero risk of donor-derived disease transmission risks (infection, cancer). Mr. Marion asked good questions and the patient's candidacy will be reviewed at our Multidisciplinary Selection Committee. Thank you for the opportunity to participate in Mr. Marion's care.    Total time: 30 minutes  Counselling time: 20 minutes    Francesco Farnsworth MD, PhD  Associate Professor of Surgery      ---------------------------------------------------------------------------------------------------    HPI: Mr. Marion has End stage renal failure due to diabetes mellitus type 2. The patient has had diabetes for many years. Management is by 20 U insulin.day  Daily blood glucoses range typically around 110-120.  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  He had a low blood sugar at night and required ambulance/ER visit.   .  The diabetes is controlled.    Complications of diabetes include:    Retinopathy:  Yes   Neuropathy:  No  Gastroparesis:  No    The patient is on dialysis.    Has potential kidney donors:  Doesn't know .  Interested in participation in paired exchange if a donor is willing: Doesn't know     ROS:    -  Had one episode of chest tightness at initiation of dialysis.  -      The patient has the following pertinent history:       No    Yes  Dialysis:    []      [x] via:       Blood Transfusion                  [x]      []  Number of units:   Most recently:  Pregnancy:    [x]      [] Number:       Previous Transplant:  [x]      [] Details:    Cancer    [x]      [] Comment:   Kidney stones   [x]      [] Comment:      Recurrent infections  [x]      []  Type:                  Bladder dysfunction  [x]      [] Cause:    Claudication   [x]      [] Distance:    Previous Amputation  [x]      [] Cause:     Chronic anticoagulation  [x]      [] Indication:  Yazidism  []      []     Past Medical History:   Diagnosis Date     Diabetes (H)     Type 2 DM, Insulin Use      Hypertension      No past surgical history on file.  No family history on file.  Social History     Socioeconomic History     Marital status: Single     Spouse name: Not on file     Number of children: Not on file     Years of education: Not on file     Highest education level: Not on file   Occupational History     Not on file   Tobacco Use     Smoking status: Former Smoker     Types: Cigarettes     Smokeless tobacco: Never Used     Tobacco comment: Quit 1984   Substance and Sexual Activity     Alcohol use: Never     Drug use: Never     Sexual activity: Not on file   Other Topics Concern     Parent/sibling w/ CABG, MI or angioplasty before 65F 55M? Not Asked   Social History Narrative     Not on file     Social Determinants of Health     Financial Resource Strain:      Difficulty of Paying Living Expenses:    Food Insecurity:      Worried About Running Out of Food in the Last Year:      Ran Out of Food in the Last Year:    Transportation Needs:      Lack of  Transportation (Medical):      Lack of Transportation (Non-Medical):    Physical Activity:      Days of Exercise per Week:      Minutes of Exercise per Session:    Stress:      Feeling of Stress :    Social Connections:      Frequency of Communication with Friends and Family:      Frequency of Social Gatherings with Friends and Family:      Attends Jew Services:      Active Member of Clubs or Organizations:      Attends Club or Organization Meetings:      Marital Status:    Intimate Partner Violence:      Fear of Current or Ex-Partner:      Emotionally Abused:      Physically Abused:      Sexually Abused:        ROS:   CONSTITUTIONAL:  No fevers or chills  EYES: negative for icterus  ENT:  negative for hearing loss, tinnitus and sore throat  RESPIRATORY:  negative for cough, sputum, dyspnea  CARDIOVASCULAR:  negative for chest pain  GASTROINTESTINAL:  negative for nausea, vomiting, diarrhea or constipation  GENITOURINARY:  negative for incontinence, dysuria, bladder emptying problems  HEME:  No easy bruising  INTEGUMENT:  negative for rash and pruritus  NEURO:  Negative for headache, seizure disorder    Allergies:   No Known Allergies    Medications:  Prescription Medications as of 8/5/2021       Rx Number Disp Refills Start End Last Dispensed Date Next Fill Date Owning Pharmacy    amLODIPine (NORVASC) 5 MG tablet    4/2/2021 4/2/2022   [facility] #[id number] - [place], [place] - [address] AT [address] [facility]    Sig: Take 10 mg by mouth    Class: Historical    Route: Oral    aspirin (ASA) 81 MG chewable tablet    10/11/2019    [facility] #[id number] - [place], [place] - [address] AT [address] [place] [address]    Sig: chew and swallow one tablet by mouth every day    Class: Historical    atorvastatin (LIPITOR) 20 MG tablet    7/19/2021    [facility] #[id number] - [place], [place] - [address] AT [address] [place] [address]    Sig: TAKE 1 TABLET BY MOUTH DAILY    Class: Historical     Blood Glucose Monitoring Suppl (ACCU-CHEK GUIDE) w/Device KIT    4/2/2021    [facility] STORE #[id number] - [place], [place] - [address] AT [address] [facility]    Sig: Use to test 4 times a day. Pharmacy to dispense brand based on insurance.    Class: Historical    calcitRIOL (ROCALTROL) 0.25 MCG capsule    4/24/2021    [facility] #[id number] - [place], [place] - [address] AT [address]    Sig: Take 0.25 mcg by mouth daily    Class: Historical    Route: Oral    calcium acetate (PHOSLO) 667 MG CAPS capsule    1/5/2021    [facility] #[id number] - [place], [place] - [address] AT [address] [facility]    Sig: Take 667 mg by mouth    Class: Historical    Route: Oral    insulin glargine (LANTUS PEN) 100 UNIT/ML pen        [facility] #[id number] - [place], [place] - [address] AT [address] [facility]    Sig: Inject Subcutaneous At Bedtime    Class: Historical    Route: Subcutaneous    meclizine 25 MG CHEW        [facility] #[id number] - [place], [place] - [address] AT [address] [facility]    Sig: Take 25 mg by mouth every 6 hours as needed for dizziness    Class: Historical    Route: Oral    sodium bicarbonate 650 MG tablet        [facility] #[id number] - [place], [place] - [address] AT [address] [facility]    Class: Historical          Exam:   Pulse:  [74] 74  BP: (136)/(69) 136/69  SpO2:  [97 %] 97 %  Appearance: in no apparent distress.   Skin: normal  Head and Neck: Normal, no rashes or jaundice  Respiratory: easy respirations, no audible wheezing.  Abdomen: rounded, No distention   Edema, none  Neuro: without deficit grossly  Psyche:  Normal mentation and affect    Diagnostics:   No results found for this or any previous visit (from the past 672 hour(s)).  No results found for: CPRA

## 2021-08-05 NOTE — LETTER
8/5/2021         RE: Avelina Marion  [address] [place] [address]        Dear Colleague,    Thank you for referring your patient, Avelina Marion, to the [facility]. Please see a copy of my visit note below.    TRANSPLANT NEPHROLOGY RECIPIENT EVALUATION NOTE    Assessment and Plan:  # Kidney/Pancreas Transplant Evaluation: Patient is a good candidate overall. Benefits of a living donor transplant were discussed.    # ESKD from Type 2 Diabetes: doing OK on dialysis since April 2021, but may benefit from a kidney transplant.    # Type 2 Diabetes: currently controlled with 20 units insulin daily. Given evidence of end organ damage, he may benefit from a pancreas transplant.    # Cardiac Risk: will need risk assessment. He had a normal DSE April 2021. He is asymptomatic with exertion.    # Health Maintenance: Colonoscopy: due and Dental: due    Discussed the risks and benefits of a transplant, including the risk of surgery and immunosuppression medications.  Patient's overall evaluation will be discussed in the Transplant Program's regular meeting with a final recommendation on the patients suitability for transplant to be made at that time.    Pending completion of the full evaluation, patient presently appears to be enough of an acceptable kidney transplant recipient candidate to have any potential kidney donors start the evaluation process.  Patient was seen in conjunction with Dr. Joseph Chung as part of a shared visit.    Evaluation:  Avelina Marion was seen in consultation at the request of Dr. Francesco Farnsworth for evaluation as a potential kidney/pancreas transplant recipient.    Reason for Visit:  Avelina Marion is a 66-year-old male with ESKD from diabetic nephropathy, who presents for kidney/pancreas transplant evaluation.    History of Present Illness:  Mr. Marion is a 66-year-old male with ESKD from presumed diabetic nephropathy on dialysis since April 2021, type 2 diabetes, and  hypertension.          Kidney Disease Hx: longstanding CKD and proteinuria (0.5-2.5 g/g, negative SPEP and serum immunofixation) thought 2/2 DM since at least 2015. No kidney biopsy. Started dialysis April 2021, which is going well. Still has urine output. No sensation of incomplete emptying. Of note, his brother previously underwent kidney/pancreas transplant for diabetic nephropathy, but has since passed away.       Biopsy Proven: No         Primary Nephrologist: Dr. Mitchell       H/o Kidney Stones: No       H/o Recurrent/Frequent UTI: No         Diabetic Hx: Type 2        Diagnosis Date: early 40's, weighed around 300 lbs at the time, since lost weight with diet and exercise, but has continued to need insulin for about 10 years       Medication History: currently on lantus 20 units nightly.       Diabetic Control: Controlled (HbA1c <7%)   Last HbA1c: 5.2%       Hypoglycemic Unawareness: No       End-Organ Damage due to DM: Retinopathy and Nephropathy          Cardiac/Vascular Disease Risk Factors:        - Normal DSE April 2021         Viral Serology Status       CMV IgG Antibody: Unknown       EBV IgG Antibody: Unknown         Volume Status/Weight:        Volume status: Euvolemic       Weight:  Acceptable BMI       BMI: Body mass index is 27.53 kg/m .         Functional Capacity/Frailty:        He doesn't do anything in particular for exercise, but can walk an unlimited distance and go up and down stairs without issue. He denies any physical limitations. No chest pain, SOB, or claudication.    Fatigue/Decreased Energy: [x] No [] Yes    Chest Pain or SOB with Exertion: [x] No [] Yes    Significant Weight Change: [x] No [] Yes    Nausea, Vomiting or Diarrhea: [x] No [] Yes    Fever, Sweats or Chills:  [x] No [] Yes    Leg Swelling [x] No [] Yes        Potential Living Kidney Donors: Not at this time, although he has not started to talking to family or friends about it    Review of Systems:  A comprehensive review  of systems was obtained and negative, except as noted in the HPI or PMH.    Past Medical History:   Medical record was reviewed and PMH was discussed with patient and noted below.  Past Medical History:   Diagnosis Date     Anemia in chronic kidney disease      Diabetic retinopathy of both eyes (H)      End stage renal disease (H)      Hypertension      Secondary renal hyperparathyroidism (H)      Type 2 diabetes mellitus (H)        Past Social History:   Past Surgical History:   Procedure Laterality Date     CREATE FISTULA ARTERIOVENOUS UPPER EXTREMITY Left      Personal history of bleeding or anesthesia problems: No    Family History:  Family History   Problem Relation Age of Onset     Diabetes Brother      Kidney Disease Brother        Personal History:   Social History     Socioeconomic History     Marital status: Single     Spouse name: Not on file     Number of children: Not on file     Years of education: Not on file     Highest education level: Not on file   Occupational History     Not on file   Tobacco Use     Smoking status: Former Smoker     Types: Cigarettes     Smokeless tobacco: Never Used     Tobacco comment: Quit 1984   Substance and Sexual Activity     Alcohol use: Never     Drug use: Never     Sexual activity: Not on file   Other Topics Concern     Parent/sibling w/ CABG, MI or angioplasty before 65F 55M? Not Asked   Social History Narrative     Not on file     Social Determinants of Health     Financial Resource Strain:      Difficulty of Paying Living Expenses:    Food Insecurity:      Worried About Running Out of Food in the Last Year:      Ran Out of Food in the Last Year:    Transportation Needs:      Lack of Transportation (Medical):      Lack of Transportation (Non-Medical):    Physical Activity:      Days of Exercise per Week:      Minutes of Exercise per Session:    Stress:      Feeling of Stress :    Social Connections:      Frequency of Communication with Friends and Family:       Frequency of Social Gatherings with Friends and Family:      Attends Restoration Services:      Active Member of Clubs or Organizations:      Attends Club or Organization Meetings:      Marital Status:    Intimate Partner Violence:      Fear of Current or Ex-Partner:      Emotionally Abused:      Physically Abused:      Sexually Abused:        Allergies:  No Known Allergies    Medications:  Current Outpatient Medications   Medication Sig     amLODIPine (NORVASC) 5 MG tablet Take 10 mg by mouth     aspirin (ASA) 81 MG chewable tablet chew and swallow one tablet by mouth every day     atorvastatin (LIPITOR) 20 MG tablet TAKE 1 TABLET BY MOUTH DAILY     Blood Glucose Monitoring Suppl (ACCU-CHEK GUIDE) w/Device KIT Use to test 4 times a day. Pharmacy to dispense brand based on insurance.     calcitRIOL (ROCALTROL) 0.25 MCG capsule Take 0.25 mcg by mouth daily     calcium acetate (PHOSLO) 667 MG CAPS capsule Take 667 mg by mouth     insulin glargine (LANTUS PEN) 100 UNIT/ML pen Inject Subcutaneous At Bedtime     meclizine 25 MG CHEW Take 25 mg by mouth every 6 hours as needed for dizziness     sodium bicarbonate 650 MG tablet      No current facility-administered medications for this visit.       Vitals:  /69   Pulse 74   Ht 1.829 m (6')   Wt 92.1 kg (203 lb)   SpO2 97%   BMI 27.53 kg/m      Exam:  GENERAL APPEARANCE: alert and no distress  HENT: mouth without ulcers or lesions, fair dentition  LYMPHATICS: no cervical or supraclavicular nodes  RESP: lungs clear to auscultation - no rales, rhonchi or wheezes  CV: regular rhythm, normal rate, no rub, no murmur  EDEMA: no LE edema bilaterally  ABDOMEN: soft, nondistended, nontender  MS: extremities normal - no gross deformities noted, no evidence of inflammation in joints, no muscle tenderness  SKIN: no rash    Results:   Recent Results (from the past 336 hour(s))   EKG 12-lead, tracing only [EKG1]    Collection Time: 08/05/21  1:00 PM   Result Value Ref Range     Systolic Blood Pressure  mmHg    Diastolic Blood Pressure  mmHg    Ventricular Rate 63 BPM    Atrial Rate 63 BPM    CO Interval 132 ms    QRS Duration 90 ms     ms    QTc 440 ms    P Axis 51 degrees    R AXIS 2 degrees    T Axis 37 degrees    Interpretation ECG       Sinus rhythm  Normal ECG  No previous ECGs available       Patient was seen by myself, Dr. Joseph Chung, in conjunction with Evelyn Rico PA-C as part of a shared visit.    I personally reviewed past medical and surgical history, vital signs, medications and labs.  Present and past medical history, along with significant physical exam findings were all reviewed with VIVI.    My weathers findings:  Avelina Marion is 66 year old, who presents for Kidney transplant evaluation.    Key management decisions made by me and discussed with VIVI:  1.  End-stage kidney disease secondary to longstanding diabetes.  2.  Diabetes with complication.  3.  Simultaneous kidney and pancreas candidacy evaluation.  4.  Cardiovascular risk stratification.  5.  Age-appropriate cancer screening.  6.  Anatomical assessment.  7.  Formal psychosocial assessment.  Discussion:  Overall Mr. Marion is a 66-year-old gentleman who is interested in simultaneous kidney pancreas transplantation.  He appears to be reasonable candidate need to complete his work-up as delineated above.  We will discuss the case during our multidisciplinary meeting for final candidacy decision thank you for the consultation.        Again, thank you for allowing me to participate in the care of your patient.        Sincerely,        TY

## 2021-08-05 NOTE — NURSING NOTE
Chief Complaint   Patient presents with     Consult     PKE Eval     Blood pressure 136/69, pulse 74, height 1.829 m (6'), weight 92.1 kg (203 lb), SpO2 97 %.    Ines Carrera on 8/5/2021 at 7:50 AM

## 2021-08-05 NOTE — PROGRESS NOTES
Kidney, Pancreas Transplant Referral - 5/25/2021  Patient completed all provider appointments. Afternoon appointments time and location reviewed with patient. Patient stated understanding.    Summary    Team s concerns/comments:   1) Cardiac risk assessment  2) PAD assessment  3) Health maintenance    Candidacy category: Yellow    Action/Plan:   1) EKG, Echo today.   Jorge reviewed. Cardiology consult  2) A/P CT, Abd/Il US  3) Colonoscopy, Dental due    Expected Selection Meeting Discussion: 8/11/21

## 2021-08-05 NOTE — PROGRESS NOTES
"Psychosocial Assessment For Kidney/Pancreas Transplantation  Patient Name/ Age: Avelina Marion 66 year old   Medical Record #: [id number]  Duration of Interview: 30 min  Process:   Face-to-Face Interview                (counseling < 50%)   Present at Appointment: Avelina        : VISHAL Antonio LICSW Date:  2021        Type of transplant: Kidney/Pancreas    Donor type:      Cadaver   Prior Transplants:    No Status of Transplant: N/A           Current Living Situation    Location:   [address] [place] [address]  With Whom: lives with their family- His brother Al, his sister Chelsie, and Chelsie's teenage children.        Family/ Social Support:    Avelina has no children. He has seven living siblings. All live in the [facility] area. He reported two of his sisters are not apart of his support system but the others are. His parents are .    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    Income   SS residential   Insurance      At this time, patient can afford medication costs:  Yes  [facility] [place]       Medical Status    Current Mode of Treatment for ESRD Dialysis 2021   Complications- Type 2 diabetes Eyes and Neuropathy       Behavioral    Tobacco Use No Chemical Dependency No   Avelina denied any tobacco use.  Avelina denied any current alcohol or substance use. Avelina reported he stopped drinking alcohol on his own in  as he reported he felt his was \"having too much fun\". He reported he previously smoked marijuana but stopped in . No history of chemical dependency treatment.      Psychiatric Impairment " No  Avelina denied any current or history of anxiety, depression, or other mental health concerns.     Reading Ability: Good  Education Level: GED  Recent Legal History No      Coping Style/Strategies: Talk with family and friends       Ability to Adhere to Complex Medical Regime: Yes     Adherence History: Avelina reported he follows his physician's recommendations, takes his medications as prescribed, and attends his appointments as scheduled.         Education  _X_ Medicare  _X_ Rehabilitation  _X_ Donor issues  _X_ Community resources  _X_ Post discharge housing  _X_ Financial resources  _X_ Medical insurance options  _X_ Psych adjustment  _X_ Family adjustment  _X_ Health Care Directive Provided Education and Declined Completing    Psychosocial Risks of Transplant Reviewed and Discussed:  _X_ Increased stress related to emotional,            family, social, employment or financial           situation  _X_ Effect on work and/or disability benefits  _X_ Effect on future health and life           insurance  _X_ Transplant outcome expectations may           not be met  _X_ Mental Health Risks: anxiety,           depression, PTSD, guilt, grief and           chronic fatigue     Notable Items:   None noted.       Final Evaluation/Assessment   Patient seemed to process information well. Appeared well informed, motivated and able to follow post transplant requirements. Behavior was appropriate during interview. Has adequate income and insurance coverage. Adequate social support. No major contraindications noted for transplant.  At this time patient appears to understand the risks and benefits of transplant.      Recommendation  Acceptable    Selection Criteria Met:  Plan for support Yes   Chemical Dependence Yes   Smoking Yes   Mental Health Yes   Adequate Finances Yes    Signature: VISHAL Antonio LICSW   Title: Clinical

## 2021-08-06 LAB
C PEPTIDE SERPL-MCNC: 7.7 NG/ML (ref 0.9–6.9)
CARDIOLIPIN IGG SER IA-ACNC: <2 GPL-U/ML
CARDIOLIPIN IGG SER IA-ACNC: NEGATIVE
CARDIOLIPIN IGM SER IA-ACNC: <2 MPL-U/ML
CARDIOLIPIN IGM SER IA-ACNC: NEGATIVE
CMV IGG SERPL IA-ACNC: <0.2 U/ML
CMV IGG SERPL IA-ACNC: NORMAL
DRVVT SCREEN RATIO: 0.98
EBV VCA IGG SER IA-ACNC: 68.8 U/ML
EBV VCA IGG SER IA-ACNC: POSITIVE
GAMMA INTERFERON BACKGROUND BLD IA-ACNC: 0 IU/ML
HBV CORE AB SERPL QL IA: NONREACTIVE
HBV SURFACE AB SERPL IA-ACNC: 3.22 M[IU]/ML
HBV SURFACE AG SERPL QL IA: NONREACTIVE
HCV AB SERPL QL IA: NONREACTIVE
HIV 1+2 AB+HIV1 P24 AG SERPL QL IA: NONREACTIVE
LA PPP-IMP: NEGATIVE
LUPUS INTERPRETATION: NORMAL
M TB IFN-G BLD-IMP: NEGATIVE
M TB IFN-G CD4+ BCKGRND COR BLD-ACNC: 10 IU/ML
MITOGEN IGNF BCKGRD COR BLD-ACNC: 0 IU/ML
MITOGEN IGNF BCKGRD COR BLD-ACNC: 0 IU/ML
PTT RATIO: 1
QUANTIFERON MITOGEN: 10 IU/ML
QUANTIFERON NIL TUBE: 0 IU/ML
QUANTIFERON TB1 TUBE: 0 IU/ML
QUANTIFERON TB2 TUBE: 0
T PALLIDUM AB SER QL: NONREACTIVE
THROMBIN TIME: 16.9 SECONDS (ref 13–19)
VZV IGG SER QL IA: >4000 INDEX
VZV IGG SER QL IA: POSITIVE

## 2021-08-09 LAB — XXX BLOOD GROUP AB TITR SERPL: NORMAL {TITER}

## 2021-08-10 LAB
A*: NORMAL
A*LOCUS SEROLOGIC EQUIVALENT: 1
A*LOCUS: NORMAL
A*SEROLOGIC EQUIVALENT: 31
ABTEST METHOD: NORMAL
B*: NORMAL
B*LOCUS SEROLOGIC EQUIVALENT: 8
B*LOCUS: NORMAL
B*SEROLOGIC EQUIVALENT: 56
BW-1: NORMAL
C*: NORMAL
C*LOCUS SEROLOGIC EQUIVALENT: 1
C*LOCUS: NORMAL
C*SEROLOGIC EQUIVALENT: 7
DPA1*: NORMAL
DPB1*: NORMAL
DPB1*LOCUS NMDP: NORMAL
DPB1*LOCUS: NORMAL
DPB1*NMDP: NORMAL
DQA1*: NORMAL
DQA1*LOCUS: NORMAL
DQB1*: NORMAL
DQB1*LOCUS SEROLOGIC EQUIVALENT: 2
DQB1*LOCUS: NORMAL
DQB1*SEROLOGIC EQUIVALENT: 5
DRB1*: NORMAL
DRB1*LOCUS SEROLOGIC EQUIVALENT: 1
DRB1*LOCUS: NORMAL
DRB1*SEROLOGIC EQUIVALENT: 17
DRB3*LOCUS SEROLOGIC EQUIVALENT: 52
DRB3*LOCUS: NORMAL
DRSSO TEST METHOD: NORMAL

## 2021-08-11 ENCOUNTER — COMMITTEE REVIEW (OUTPATIENT)
Dept: TRANSPLANT | Facility: CLINIC | Age: 66
End: 2021-08-11

## 2021-08-11 NOTE — COMMITTEE REVIEW
Abdominal Committee Review Note     Evaluation Date:   Committee Review Date: 8/11/2021    Organ being evaluated for: Kidney/Pancreas    Transplant Phase: Referral  Transplant Status: Active    Transplant Coordinator: Rebecca Christopher  Transplant Surgeon: Dr. Francesco Farnsworth       Referring Physician: Referred Self    Primary Diagnosis: DM1  Secondary Diagnosis: ESRD    Committee Review Members:  Jenniitian, Registered Amy Taveras RD, LD   Nephrology Jose Carlos Moreno, APRN CNP, Joseph Chung MD   Nurse Rebecca Christopher, KARINE, Miki Hill, KARINE, Dedra Luo, RN, Ro Gomez, KARINE   Pharmacist Robson Fountain, [facility]    - Clinical Kady Gama MSJUAN, Faby Hunter, [facility]   Transplant Evelyn Rico PA-C, Gege Altamirano, KARINE, Claudine Stearns, RN   Transplant Surgery Shanon Reeves MD       Transplant Eligibility: Insulin-dependent diabetes mellitus, Irreversible chronic kidney disease treated w/dialysis or expected need for dialysis    Committee Review Decision: Approved    Relative Contraindications: None    Absolute Contraindications: None    Committee Chair Shanon Reeves MD verbally attested to the committee's decision.    Committee Discussion Details: Reviewed pt's medical status and evaluation results to date.  Pt is determined to be an approved candidate for kidney and simultaneous kidney/pancreas transplant. Dr. Reeves recommends the following items be completed as part of the pt's evaluation: cardiac clearance (to include LHC, Chest/Ab/Pelv CT, iliac US, and health maintenance UTD .  Will not list the pt at this time as he is on dialysis.  Pt will be reviewed upon successful completion of eval items.  Pt will be called and summary letter generated.

## 2021-08-12 ENCOUNTER — TELEPHONE (OUTPATIENT)
Dept: TRANSPLANT | Facility: CLINIC | Age: 66
End: 2021-08-12

## 2021-08-12 DIAGNOSIS — Z76.82 ORGAN TRANSPLANT CANDIDATE: ICD-10-CM

## 2021-08-12 DIAGNOSIS — I10 ESSENTIAL HYPERTENSION: ICD-10-CM

## 2021-08-12 DIAGNOSIS — Z01.818 ENCOUNTER FOR PRE-TRANSPLANT EVALUATION FOR KIDNEY AND PANCREAS TRANSPLANT: ICD-10-CM

## 2021-08-12 DIAGNOSIS — E11.9 DIABETES MELLITUS, TYPE 2 (H): ICD-10-CM

## 2021-08-12 DIAGNOSIS — N18.6 END STAGE RENAL DISEASE (H): ICD-10-CM

## 2021-08-12 NOTE — Clinical Note
PLEASE DISREGARD MY LAST MESSAGE.  My apologies- my last message said the pt will do cardiology with his provider- I goofed up and he does not have an established provider. So, he will need the following: cardiac risk assessment, CT Chest/Ab/Pelv, and iliac US. Please let me know if you have questions.  Thanks, Rebecca (who may or may not be losing her mind)

## 2021-08-12 NOTE — LETTER
08/12/21        Avelina Marion  [address] [place] [address]        Dear Avelina,    It was a pleasure to see you recently for consideration of kidney and pancreas transplantation. Your pre-transplant evaluation results were reviewed at our Multidisciplinary Selection Committee on 8/11/21. The Committee has approved you as a candidate and is requesting the following items are completed as part of your evaluation:    1. Chest/Abdomen/Pelvic CT and iliac ultrasound - our schedulers will call you to arrange these tests    2. Cardiac risk assessment likely to include Left Heart Catheterization - our schedulers will call you to arrange this appointment    3. Colonoscopy up to date - please work with your primary care provider to arrange this test    4. Please have all recommended dental work completed, please call me when you have completed this    For any questions, please contact the Transplant Office at [phone] or you may reach me directly at [phone].      Sincerely,  Rebecca Christopher RN, Pre-Kidney/Pancreas Transplant Coordinator    Solid Organ Transplant  [facility], [facility]    CC: Dr. Amy Truong, Dr. Patrice Mitchell

## 2021-08-12 NOTE — TELEPHONE ENCOUNTER
Spoke with the patient and confirmed Illiac Ultrasound, Chest/Abdomen/Pelvis CT on 8/17/21 and Cardiology  appointments on 8/19/21.  Informed patient an itinerary can be accessed on GetQuikt.

## 2021-08-12 NOTE — Clinical Note
Please see orders- pt needs CT Chest/Ab/Pelv, and iliac US. (there is an order for cards but he is going to his established provider).   Thanks, Rebecca

## 2021-08-12 NOTE — TELEPHONE ENCOUNTER
Called pt to review outcome of selection committee.  Explained that he is approved for both K and SPK upon completion of his eval items.  He would like to continue with his cardiologist at [facility] so we will send orders to her for his cardiac work up.  He is seeing his PCP on Tuesday and will be discussing getting a colonoscopy arranged then.  He will call me when his dental is UTD.  He had no further questions for me, orders entered and routed to scheduling.

## 2021-08-13 NOTE — TELEPHONE ENCOUNTER
Action    Action Taken 8-13: Requested echo 4-27-21 from [facility]  8-13: Requested 4 most recent EKGs from   8-16: sent EKGs to scanning  8-16: resolved echo in PACS

## 2021-08-16 PROBLEM — E83.39 HYPERPHOSPHATEMIA: Status: ACTIVE | Noted: 2019-06-04

## 2021-08-16 PROBLEM — N18.5 CKD (CHRONIC KIDNEY DISEASE) STAGE 5, GFR LESS THAN 15 ML/MIN (H): Status: ACTIVE | Noted: 2019-06-04

## 2021-08-16 PROBLEM — N18.6 END STAGE RENAL DISEASE (H): Status: ACTIVE | Noted: 2018-09-20

## 2021-08-16 PROBLEM — N19 UREMIA: Status: ACTIVE | Noted: 2021-03-31

## 2021-08-16 PROBLEM — Z98.42 STATUS POST CATARACT EXTRACTION OF BOTH EYES WITH INSERTION OF INTRAOCULAR LENS: Status: ACTIVE | Noted: 2018-03-23

## 2021-08-16 PROBLEM — Z98.41 STATUS POST CATARACT EXTRACTION OF BOTH EYES WITH INSERTION OF INTRAOCULAR LENS: Status: ACTIVE | Noted: 2018-03-23

## 2021-08-16 PROBLEM — Z96.1 STATUS POST CATARACT EXTRACTION OF BOTH EYES WITH INSERTION OF INTRAOCULAR LENS: Status: ACTIVE | Noted: 2018-03-23

## 2021-08-16 PROBLEM — H40.001 GLAUCOMA SUSPECT OF RIGHT EYE: Status: ACTIVE | Noted: 2018-03-23

## 2021-08-16 PROBLEM — E11.3512: Status: ACTIVE | Noted: 2018-02-08

## 2021-08-16 PROBLEM — Z79.4: Status: ACTIVE | Noted: 2018-02-08

## 2021-08-16 PROBLEM — E87.20 NORMAL ANION GAP METABOLIC ACIDOSIS: Status: ACTIVE | Noted: 2018-09-18

## 2021-08-16 PROBLEM — I95.1 ORTHOSTATIC HYPOTENSION: Status: ACTIVE | Noted: 2019-06-04

## 2021-08-16 PROBLEM — I49.9 CARDIAC ARRHYTHMIA: Status: ACTIVE | Noted: 2021-04-16

## 2021-08-16 PROBLEM — N25.81 SECONDARY HYPERPARATHYROIDISM OF RENAL ORIGIN (H): Status: ACTIVE | Noted: 2017-12-27

## 2021-08-16 LAB
PROTOCOL CUTOFF: NORMAL
SA 1 CELL: NORMAL
SA 1 TEST METHOD: NORMAL
SA 2 CELL: NORMAL
SA 2 TEST METHOD: NORMAL
SA1 HI RISK ABY: NORMAL
SA1 MOD RISK ABY: NORMAL
SA2 HI RISK ABY: NORMAL
SA2 MOD RISK ABY: NORMAL
UNACCEPTABLE ANTIGENS: NORMAL
UNOS CPRA: 0
ZZZSA 1  COMMENTS: NORMAL
ZZZSA 2 COMMENTS: NORMAL

## 2021-08-16 RX ORDER — LISINOPRIL 20 MG/1
20 TABLET ORAL
COMMUNITY
End: 2022-12-19

## 2021-08-17 ENCOUNTER — ANCILLARY PROCEDURE (OUTPATIENT)
Dept: ULTRASOUND IMAGING | Facility: CLINIC | Age: 66
End: 2021-08-17
Attending: PHYSICIAN ASSISTANT
Payer: COMMERCIAL

## 2021-08-17 ENCOUNTER — ANCILLARY PROCEDURE (OUTPATIENT)
Dept: CT IMAGING | Facility: CLINIC | Age: 66
End: 2021-08-17
Attending: PHYSICIAN ASSISTANT
Payer: COMMERCIAL

## 2021-08-17 DIAGNOSIS — Z76.82 ORGAN TRANSPLANT CANDIDATE: ICD-10-CM

## 2021-08-17 DIAGNOSIS — E11.9 DIABETES MELLITUS, TYPE 2 (H): ICD-10-CM

## 2021-08-17 DIAGNOSIS — N18.6 END STAGE RENAL DISEASE (H): ICD-10-CM

## 2021-08-17 DIAGNOSIS — I10 ESSENTIAL HYPERTENSION: ICD-10-CM

## 2021-08-17 DIAGNOSIS — Z01.818 ENCOUNTER FOR PRE-TRANSPLANT EVALUATION FOR KIDNEY AND PANCREAS TRANSPLANT: ICD-10-CM

## 2021-08-17 PROCEDURE — 93978 VASCULAR STUDY: CPT | Performed by: RADIOLOGY

## 2021-08-17 PROCEDURE — 74176 CT ABD & PELVIS W/O CONTRAST: CPT | Performed by: STUDENT IN AN ORGANIZED HEALTH CARE EDUCATION/TRAINING PROGRAM

## 2021-08-17 PROCEDURE — 71250 CT THORAX DX C-: CPT | Performed by: STUDENT IN AN ORGANIZED HEALTH CARE EDUCATION/TRAINING PROGRAM

## 2021-08-18 DIAGNOSIS — J84.9 INTERSTITIAL LUNG DISEASE (H): Primary | ICD-10-CM

## 2021-08-18 DIAGNOSIS — Z76.82 ORGAN TRANSPLANT CANDIDATE: ICD-10-CM

## 2021-08-18 NOTE — PROGRESS NOTES
"Electrophysiology Clinic Video Virtual Visit    Avelina Marion is a 66 year old male who is being evaluated via a billable video visit.      The patient has been notified of following:     \"This video visit will be conducted via a call between you and your physician/provider. We have found that certain health care needs can be provided without the need for an in-person physical exam.  This service lets us provide the care you need with a video conversation.  If a prescription is necessary we can send it directly to your pharmacy.  If lab work is needed we can place an order for that and you can then stop by our lab to have the test done at a later time.    If during the course of the call the physician/provider feels a video visit is not appropriate, you will not be charged for this service.\"     Physician has received verbal consent for a Video Visit from the patient? Yes    Patient would like the video invitation sent by:     Video Start Time: 1:30 pm    Video Stop Time: 1:50 pm    Mode of Communication:  Video Conference via Bizak    Originating Location (pt. Location): Home    Distant Location (provider location): [facility]    Mode of Communication:  Video Conference via Bizak      HPI:   67 yo referred for cardiovascular evaluation prior to possible kidney/pancreas transplant.  He has ESRD due to HTN and DM, on dialysis since April 2021, tolerating well without dizziness or syncope.  He lives at home with his brother, sister and her kids. He is retired, continues to be active, able to do all household activities including climbing stairs without chest discomfort, dyspnea, palpitations. Denies orthopnea/PND/lower extremity swelling.    Cardiac risk factors: +HTN, +DM, +HL, no premature CAD, former smoker quit 1984    Vital signs 8/5/2021: 136/69, 74 bpm, BMI 27.53, 92.1 kg    PAST MEDICAL HISTORY:  ESRD due to HTN and DM, on dialysis since April 2021, AV fistula left arm, " MWF  Hypertension  Type II diabetes       CURRENT MEDICATIONS:  Amlodipine 10 every day  Aspirin 81 every day  Atorvastatin 20 every day  (previously on lisinopril which was stopped due to hyperkalemia)    Meclizine  Sodium bicarb  Calcium  Insulin Lantus 20 U at bedtime     ALLERGIES:   No Known Allergies    FAMILY HISTORY:  Family History   Problem Relation Age of Onset     Diabetes Brother      Kidney Disease Brother        SOCIAL HISTORY:  Social History     Tobacco Use     Smoking status: Former Smoker     Types: Cigarettes     Smokeless tobacco: Never Used     Tobacco comment: Quit 1984   Substance Use Topics     Alcohol use: Never     Drug use: Never       ROS:  10 point ROS neg other than the symptoms noted above in the HPI.    I have personally reviewed the following:    LABS 8/5/2021: cr 6.36, A1C 6.1, Hgb 12.8, plt 213K    ECHOCARDIOGRAM 8/5/2021: EF 55-60%, mild to moderate concentric LVH,  normal RV/IVC, RENALDO 34.7    DOBUTAMINE STRESS ECHO 4/27/2021 at [facility]:  EF 60%, no ischemia    EKG 8/5/2021: sinus 63 bpm, normal intervals      Exam:  The rest of a comprehensive physical examination is deferred due to public health emergency video visit restrictions.  CONSITUTIONAL: no acute distress  HEENT: no icterus, no redness or discharge, neck supple  CV: no visible edema of visualized extremities. No JVD.   RESPIRATORY: respirations nonlabored, no cough  NEURO: AA&Ox3, speech fluent/appropriate, motor grossly nonfocal  PSYCH: cooperative, affect appropriate  DERM: no rashes on visualized face/neck/upper extremities      Assessment and Plan:   Cardiovascular evaluation prior to possible kidney/pancreas transplant.  No prior cardiac history, no ischemic symptoms at a good level of physical activity.  Dobutamine stress echo done at [facility] April 2021 normal.  He is at relatively low risk for major cardiovascular events during surgery.      In addition to video time documented above, I spent an additional 25  minutes today on data review and documentation.      I have reviewed the note as documented above.  This accurately captures the substance of my virtual visit with the patient. The patient states understanding and is agreeable with the plan.     Stephanie Campoverde MD  Cardiology        CC  AMARJIT BAUMANN    Addendum:  Coronary angiogram requested by transplant team, performed 12/30/2021:  Nonobstructive CAD.  No new recommendations.    CAL  1/4/2022

## 2021-08-19 ENCOUNTER — PRE VISIT (OUTPATIENT)
Dept: CARDIOLOGY | Facility: CLINIC | Age: 66
End: 2021-08-19

## 2021-08-19 ENCOUNTER — VIRTUAL VISIT (OUTPATIENT)
Dept: CARDIOLOGY | Facility: CLINIC | Age: 66
End: 2021-08-19
Attending: INTERNAL MEDICINE
Payer: COMMERCIAL

## 2021-08-19 DIAGNOSIS — E11.3512 TYPE 2 DIABETES MELLITUS WITH LEFT EYE AFFECTED BY PROLIFERATIVE RETINOPATHY AND MACULAR EDEMA, WITH LONG-TERM CURRENT USE OF INSULIN (H): ICD-10-CM

## 2021-08-19 DIAGNOSIS — I10 ESSENTIAL HYPERTENSION: ICD-10-CM

## 2021-08-19 DIAGNOSIS — Z01.810 PRE-OPERATIVE CARDIOVASCULAR EXAMINATION: Primary | ICD-10-CM

## 2021-08-19 DIAGNOSIS — N18.6 END STAGE RENAL DISEASE (H): ICD-10-CM

## 2021-08-19 DIAGNOSIS — Z79.4 TYPE 2 DIABETES MELLITUS WITH LEFT EYE AFFECTED BY PROLIFERATIVE RETINOPATHY AND MACULAR EDEMA, WITH LONG-TERM CURRENT USE OF INSULIN (H): ICD-10-CM

## 2021-08-19 PROCEDURE — 99204 OFFICE O/P NEW MOD 45 MIN: CPT | Mod: GT | Performed by: INTERNAL MEDICINE

## 2021-08-19 NOTE — NURSING NOTE
No changes made at this appointment. Follow up with Dr. Almodovar as needed.     Abhi Freed, RN   Cardiology Nurse Coordinator

## 2021-08-19 NOTE — LETTER
"8/19/2021      RE: Avelina Marion  [address] [place] [address]       Dear Colleague,    Thank you for the opportunity to participate in the care of your patient, Avelina Marion, at the [facility]. Please see a copy of my visit note below.    Electrophysiology Clinic Video Virtual Visit    Avelina Marion is a 66 year old male who is being evaluated via a billable video visit.      The patient has been notified of following:     \"This video visit will be conducted via a call between you and your physician/provider. We have found that certain health care needs can be provided without the need for an in-person physical exam.  This service lets us provide the care you need with a video conversation.  If a prescription is necessary we can send it directly to your pharmacy.  If lab work is needed we can place an order for that and you can then stop by our lab to have the test done at a later time.    If during the course of the call the physician/provider feels a video visit is not appropriate, you will not be charged for this service.\"     Physician has received verbal consent for a Video Visit from the patient? Yes    Patient would like the video invitation sent by:     Video Start Time: 1:30 pm    Video Stop Time: 1:50 pm    Mode of Communication:  Video Conference via [employer]    Originating Location (pt. Location): Home    Distant Location (provider location): [facility]    Mode of Communication:  Video Conference via [employer]      HPI:   67 yo referred for cardiovascular evaluation prior to possible kidney/pancreas transplant.  He has ESRD due to HTN and DM, on dialysis since April 2021, tolerating well without dizziness or syncope.  He lives at home with his brother, sister and her kids. He is retired, continues to be active, able to do all household activities including climbing stairs without chest " discomfort, dyspnea, palpitations. Denies orthopnea/PND/lower extremity swelling.    Cardiac risk factors: +HTN, +DM, +HL, no premature CAD, former smoker quit 1984    Vital signs 8/5/2021: 136/69, 74 bpm, BMI 27.53, 92.1 kg    PAST MEDICAL HISTORY:  ESRD due to HTN and DM, on dialysis since April 2021, AV fistula left arm, MWF  Hypertension  Type II diabetes       CURRENT MEDICATIONS:  Amlodipine 10 every day  Aspirin 81 every day  Atorvastatin 20 every day  (previously on lisinopril which was stopped due to hyperkalemia)    Meclizine  Sodium bicarb  Calcium  Insulin Lantus 20 U at bedtime     ALLERGIES:   No Known Allergies    FAMILY HISTORY:  Family History   Problem Relation Age of Onset     Diabetes Brother      Kidney Disease Brother        SOCIAL HISTORY:  Social History     Tobacco Use     Smoking status: Former Smoker     Types: Cigarettes     Smokeless tobacco: Never Used     Tobacco comment: Quit 1984   Substance Use Topics     Alcohol use: Never     Drug use: Never       ROS:  10 point ROS neg other than the symptoms noted above in the HPI.    I have personally reviewed the following:    LABS 8/5/2021: cr 6.36, A1C 6.1, Hgb 12.8, plt 213K    ECHOCARDIOGRAM 8/5/2021: EF 55-60%, mild to moderate concentric LVH,  normal RV/IVC, RENALDO 34.7    DOBUTAMINE STRESS ECHO 4/27/2021 at [facility]:  EF 60%, no ischemia    EKG 8/5/2021: sinus 63 bpm, normal intervals      Exam:  The rest of a comprehensive physical examination is deferred due to public health emergency video visit restrictions.  CONSITUTIONAL: no acute distress  HEENT: no icterus, no redness or discharge, neck supple  CV: no visible edema of visualized extremities. No JVD.   RESPIRATORY: respirations nonlabored, no cough  NEURO: AA&Ox3, speech fluent/appropriate, motor grossly nonfocal  PSYCH: cooperative, affect appropriate  DERM: no rashes on visualized face/neck/upper extremities      Assessment and Plan:   Cardiovascular evaluation prior to possible  kidney/pancreas transplant.  No prior cardiac history, no ischemic symptoms at a good level of physical activity.  Dobutamine stress echo done at [facility] April 2021 normal.  He is at relatively low risk for major cardiovascular events during surgery.      In addition to video time documented above, I spent an additional 25 minutes today on data review and documentation.      I have reviewed the note as documented above.  This accurately captures the substance of my virtual visit with the patient. The patient states understanding and is agreeable with the plan.     Stephanie Campoverde MD  Cardiology        CC  AMARJIT BAUMANN

## 2021-08-19 NOTE — PROGRESS NOTES
Avelina is a 66 year old who is being evaluated via a billable video visit.      How would you like to obtain your AVS? MyChart  If the video visit is dropped, the invitation should be resent by: Text to cell phone: [phone]  Will anyone else be joining your video visit? No        Vitals - Patient Reported  Weight (Patient Reported): 91.2 kg (201 lb)  Height (Patient Reported): 182.9 cm (6')  BMI (Based on Pt Reported Ht/Wt): 27.26  Pain Score: No Pain (0) (No SOB)      Vitals were taken and medications where reconciled. Candido Dejesus, EMT  1:09 PM

## 2021-08-20 ENCOUNTER — TELEPHONE (OUTPATIENT)
Dept: PULMONOLOGY | Facility: CLINIC | Age: 66
End: 2021-08-20

## 2021-08-20 NOTE — TELEPHONE ENCOUNTER
Pt returned my phone call about scheduling pulmonary referral. Next available isn't until October. Pt would like to be put on wait list. Pt has already completed imaging and is scheduled for a full PFT prior to visit with Dr. Caal on 10/29. Details confirmed.

## 2021-08-20 NOTE — TELEPHONE ENCOUNTER
LVM for pt about receiving communication about a referral for pulmonary clinic at [facility]. Dx is interstitial lung diease but per ILD nurse and Dr Perlman, CT was mild and pt can start in general. Left direct call back number to schedule. Pt has already completed imaging, will also need to schedule a full PFT.

## 2021-08-24 NOTE — TELEPHONE ENCOUNTER
RECORDS RECEIVED FROM: Internal    DATE RECEIVED: 10.29.21    NOTES STATUS DETAILS   OFFICE NOTE from referring provider Internal  Jose Carlos Moreno   OFFICE NOTE from other specialist Internal  ILD   SOT   DISCHARGE SUMMARY from hospital na    DISCHARGE REPORT from the ER na    MEDICATION LIST Internal     IMAGING  (NEED IMAGES AND REPORTS)     CT SCAN Internal  8.17.21    CHEST XRAY (CXR) Internal  8/17/21, 8.5.21     TESTS     PULMONARY FUNCTION TESTING (PFT) Internal  Scheduled 10.28.21

## 2021-08-31 ENCOUNTER — TEAM CONFERENCE (OUTPATIENT)
Dept: TRANSPLANT | Facility: CLINIC | Age: 66
End: 2021-08-31

## 2021-08-31 DIAGNOSIS — Z76.82 AWAITING ORGAN TRANSPLANT: Primary | ICD-10-CM

## 2021-08-31 NOTE — TELEPHONE ENCOUNTER
Image Review Meeting    ATTENDEES: Dr. Ascencio    IMAGES REVIEWED: Ab/Pelv CT 8/17/21 and iliac US 8/17/21    DECISION: Vessels suitable to proceed w/ K/P

## 2021-10-15 ASSESSMENT — ENCOUNTER SYMPTOMS
EYE REDNESS: 0
DOUBLE VISION: 0
EYE WATERING: 0
EYE IRRITATION: 0
EYE PAIN: 0

## 2021-10-17 ENCOUNTER — HEALTH MAINTENANCE LETTER (OUTPATIENT)
Age: 66
End: 2021-10-17

## 2021-10-26 ENCOUNTER — TELEPHONE (OUTPATIENT)
Dept: TRANSPLANT | Facility: CLINIC | Age: 66
End: 2021-10-26

## 2021-10-28 ENCOUNTER — LAB (OUTPATIENT)
Dept: LAB | Facility: CLINIC | Age: 66
End: 2021-10-28

## 2021-10-28 DIAGNOSIS — N18.5 CKD (CHRONIC KIDNEY DISEASE) STAGE 5, GFR LESS THAN 15 ML/MIN (H): Primary | ICD-10-CM

## 2021-10-28 DIAGNOSIS — Z76.82 AWAITING ORGAN TRANSPLANT: ICD-10-CM

## 2021-10-28 DIAGNOSIS — R91.8 ABNORMAL CT LUNG SCREENING: ICD-10-CM

## 2021-10-28 PROCEDURE — 94726 PLETHYSMOGRAPHY LUNG VOLUMES: CPT | Performed by: INTERNAL MEDICINE

## 2021-10-28 PROCEDURE — 86832 HLA CLASS I HIGH DEFIN QUAL: CPT | Performed by: PATHOLOGY

## 2021-10-28 PROCEDURE — 86833 HLA CLASS II HIGH DEFIN QUAL: CPT | Performed by: PATHOLOGY

## 2021-10-28 PROCEDURE — 94375 RESPIRATORY FLOW VOLUME LOOP: CPT | Performed by: INTERNAL MEDICINE

## 2021-10-28 PROCEDURE — 94729 DIFFUSING CAPACITY: CPT | Performed by: INTERNAL MEDICINE

## 2021-10-28 PROCEDURE — 36415 COLL VENOUS BLD VENIPUNCTURE: CPT | Performed by: PATHOLOGY

## 2021-10-29 ENCOUNTER — PRE VISIT (OUTPATIENT)
Dept: PULMONOLOGY | Facility: CLINIC | Age: 66
End: 2021-10-29

## 2021-10-29 ENCOUNTER — OFFICE VISIT (OUTPATIENT)
Dept: PULMONOLOGY | Facility: CLINIC | Age: 66
End: 2021-10-29
Attending: NURSE PRACTITIONER
Payer: COMMERCIAL

## 2021-10-29 ENCOUNTER — LAB (OUTPATIENT)
Dept: LAB | Facility: CLINIC | Age: 66
End: 2021-10-29
Payer: COMMERCIAL

## 2021-10-29 VITALS
SYSTOLIC BLOOD PRESSURE: 119 MMHG | BODY MASS INDEX: 27.5 KG/M2 | HEART RATE: 72 BPM | WEIGHT: 203 LBS | OXYGEN SATURATION: 99 % | DIASTOLIC BLOOD PRESSURE: 73 MMHG | HEIGHT: 72 IN

## 2021-10-29 DIAGNOSIS — J84.9 INTERSTITIAL LUNG DISEASE (H): ICD-10-CM

## 2021-10-29 DIAGNOSIS — Z76.82 ORGAN TRANSPLANT CANDIDATE: ICD-10-CM

## 2021-10-29 LAB
CK SERPL-CCNC: 118 U/L (ref 30–300)
CRP SERPL-MCNC: <2.9 MG/L (ref 0–8)
ERYTHROCYTE [SEDIMENTATION RATE] IN BLOOD BY WESTERGREN METHOD: 17 MM/HR (ref 0–20)

## 2021-10-29 PROCEDURE — 86140 C-REACTIVE PROTEIN: CPT | Performed by: PATHOLOGY

## 2021-10-29 PROCEDURE — 86200 CCP ANTIBODY: CPT | Mod: 90 | Performed by: PATHOLOGY

## 2021-10-29 PROCEDURE — 86331 IMMUNODIFFUSION OUCHTERLONY: CPT | Mod: 90 | Performed by: PATHOLOGY

## 2021-10-29 PROCEDURE — 82085 ASSAY OF ALDOLASE: CPT | Mod: 90 | Performed by: PATHOLOGY

## 2021-10-29 PROCEDURE — 99204 OFFICE O/P NEW MOD 45 MIN: CPT | Mod: GC | Performed by: INTERNAL MEDICINE

## 2021-10-29 PROCEDURE — 82550 ASSAY OF CK (CPK): CPT | Performed by: PATHOLOGY

## 2021-10-29 PROCEDURE — 86038 ANTINUCLEAR ANTIBODIES: CPT | Mod: 90 | Performed by: PATHOLOGY

## 2021-10-29 PROCEDURE — 86606 ASPERGILLUS ANTIBODY: CPT | Mod: 90 | Performed by: PATHOLOGY

## 2021-10-29 PROCEDURE — 86256 FLUORESCENT ANTIBODY TITER: CPT | Mod: 90 | Performed by: PATHOLOGY

## 2021-10-29 PROCEDURE — 36415 COLL VENOUS BLD VENIPUNCTURE: CPT | Performed by: PATHOLOGY

## 2021-10-29 PROCEDURE — 85652 RBC SED RATE AUTOMATED: CPT | Performed by: PATHOLOGY

## 2021-10-29 PROCEDURE — 82784 ASSAY IGA/IGD/IGG/IGM EACH: CPT | Mod: 90 | Performed by: PATHOLOGY

## 2021-10-29 PROCEDURE — 86235 NUCLEAR ANTIGEN ANTIBODY: CPT | Mod: 90 | Performed by: PATHOLOGY

## 2021-10-29 PROCEDURE — 82787 IGG 1 2 3 OR 4 EACH: CPT | Mod: 90 | Performed by: PATHOLOGY

## 2021-10-29 PROCEDURE — 86431 RHEUMATOID FACTOR QUANT: CPT | Mod: 90 | Performed by: PATHOLOGY

## 2021-10-29 PROCEDURE — 86255 FLUORESCENT ANTIBODY SCREEN: CPT | Mod: 90 | Performed by: PATHOLOGY

## 2021-10-29 ASSESSMENT — PAIN SCALES - GENERAL: PAINLEVEL: NO PAIN (0)

## 2021-10-29 ASSESSMENT — MIFFLIN-ST. JEOR: SCORE: 1740.86

## 2021-10-29 NOTE — PATIENT INSTRUCTIONS
1. Obtain ILD panel  2. 6 minute walk test  3. Provided these are normal, no further pulmonary evaluation is needed

## 2021-10-29 NOTE — PROGRESS NOTES
"Initial pulmonary evaluation    DOS: 10/29/2021     Reason for consult: pre-transplant evaluation    HPI: 66 year-old male who is currently in the transplant evaluation process. Plan is for kidney or kidney/pancreas due to underlying type 1 diabetes mellitus with stage V  CKD. As part of evaluation had CT chest/abdomen/pelvis which showed interstitial changes. Referred to pulmonary for further evaluation.    ROS:   -- could walk a couple of miles without dyspnea  -- could easily walk several flights of stairs  -- no chest pain  -- no iritis/keratitis  -- no recurrent sinus infections/nose bleeds  -- no dry mouth or dry eyes  -- no aphthous ulcerations  -- no pleuritis  -- no abdominal pain  -- no testicular pain  -- no rashes  -- no myalgias/arthralgias  -- no neuropathy   -- no weight loss, fever, night sweats  -- no exposure to amiodarone, immunosuppression, or treatment TB    PMH/PSH:   1. Type 1 diabetes mellitus  2. Stage V CKD, dialysis-dependent  3. Coronary artery disease (based on imaging)  4. Essential hypertension    Medications:   1. ASA  2. Atorvastatin  3. Amlodipine  4. Calcitriol  5. Insulin glargine  6. 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 Always in urban areas.   -- prior smoker, quit several decades ago    FH: both brothers had type 1 diabetes mellitus. No other autoimmune disease. No lung disease.    Allergies: reviewed    Exam:  /73 (BP Location: Right arm, Patient Position: Chair, Cuff Size: Adult Regular)   Pulse 72   Ht 1.816 m (5' 11.5\")   Wt 92.1 kg (203 lb)   SpO2 99%   BMI 27.92 kg/m     General: no distress  HEENT: " conjunctivae white, no oral lesions  Pulmonary: breathing comfortably on room air, no crackles  Heart: regular rhythm, normal rate  Abdomen: soft  Skin: warm, dry, no rash, no fingernail pitting  MSK: no signs of synovitis  Neurologic: alert and oriented    CT chest reviewed: peripheral reticulations without clear apical or basilar predominance.     PFT: normal spirometry, normal lung volumes, normal diffusing capcity    Assessment: 66 year-old male with type 1 diabetes mellitus found to have fine peripheral reticulations on chest CT as part of kidney transplant evaluation. From pulmonary standpoint is asymptomatic. Review of systems, family history, social history, physical examination, and PFTs are not suggestive of ILD. Radiographic findings may simply be scarring from prior unknown exposure.     Plan: Will complete ILD evaluation with 6-minute walk test and ILD panel. Provided these are both normal do not feel that additional pulmonary evaluation is needed.     Patient seen and discussed with Dr. Marke Caal MD   Pulmonary fellow

## 2021-10-29 NOTE — LETTER
10/29/2021         RE: Avelina Marion  [address] [place] [address]        Dear Colleague,    Thank you for referring your patient, Avelina Marion, to the [facility] [place]. Please see a copy of my visit note below.    Initial pulmonary evaluation    DOS: 10/29/2021     Reason for consult: pre-transplant evaluation    HPI: 66 year-old male who is currently in the transplant evaluation process. Plan is for kidney or kidney/pancreas due to underlying type 1 diabetes mellitus with stage V  CKD. As part of evaluation had CT chest/abdomen/pelvis which showed interstitial changes. Referred to pulmonary for further evaluation.    ROS:   -- could walk a couple of miles without dyspnea  -- could easily walk several flights of stairs  -- no chest pain  -- no iritis/keratitis  -- no recurrent sinus infections/nose bleeds  -- no dry mouth or dry eyes  -- no aphthous ulcerations  -- no pleuritis  -- no abdominal pain  -- no testicular pain  -- no rashes  -- no myalgias/arthralgias  -- no neuropathy   -- no weight loss, fever, night sweats  -- no exposure to amiodarone, immunosuppression, or treatment TB    PMH/PSH:   1. Type 1 diabetes mellitus  2. Stage V CKD, dialysis-dependent  3. Coronary artery disease (based on imaging)  4. Essential hypertension    Medications:   1. ASA  2. Atorvastatin  3. Amlodipine  4. Calcitriol  5. Insulin glargine  6. 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 Always in urban areas.   -- prior smoker, quit several decades ago    FH: both brothers had type 1 diabetes mellitus. No other  "autoimmune disease. No lung disease.    Allergies: reviewed    Exam:  /73 (BP Location: Right arm, Patient Position: Chair, Cuff Size: Adult Regular)   Pulse 72   Ht 1.816 m (5' 11.5\")   Wt 92.1 kg (203 lb)   SpO2 99%   BMI 27.92 kg/m     General: no distress  HEENT: conjunctivae white, no oral lesions  Pulmonary: breathing comfortably on room air, no crackles  Heart: regular rhythm, normal rate  Abdomen: soft  Skin: warm, dry, no rash, no fingernail pitting  MSK: no signs of synovitis  Neurologic: alert and oriented    CT chest reviewed: peripheral reticulations without clear apical or basilar predominance.     PFT: normal spirometry, normal lung volumes, normal diffusing capcity    Assessment: 66 year-old male with type 1 diabetes mellitus found to have fine peripheral reticulations on chest CT as part of kidney transplant evaluation. From pulmonary standpoint is asymptomatic. Review of systems, family history, social history, physical examination, and PFTs are not suggestive of ILD. Radiographic findings may simply be scarring from prior unknown exposure.     Plan: Will complete ILD evaluation with 6-minute walk test and ILD panel. Provided these are both normal do not feel that additional pulmonary evaluation is needed.     Patient seen and discussed with Dr. Marek Caal MD   Pulmonary fellow            Attestation signed by Alyson Jara MD at 11/2/2021  1:13 PM:  Pulmonary Staff:  I have discussed Mr. Marion's case with Dr. Caal-Pulmonary/Critical Care Fellow- reviewed the patient's available radiographic imaging and PFT data and met with him.  I agree with the findings, assessment and recommendations as outlined below by Dr. Caal.      Alyson aJra MD  #[id number]  10/29/2021      Again, thank you for allowing me to participate in the care of your patient.        Sincerely,        Amaury Caal MD    "

## 2021-11-01 LAB
ANA SER QL IF: NEGATIVE
ANCA AB PATTERN SER IF-IMP: NORMAL
C-ANCA TITR SER IF: NORMAL {TITER}
CCP AB SER IA-ACNC: 3.2 U/ML
DLCOUNC-%PRED-PRE: 78 %
DLCOUNC-PRE: 22.18 ML/MIN/MMHG
DLCOUNC-PRED: 28.19 ML/MIN/MMHG
ENA SS-A AB SER IA-ACNC: 0.6 U/ML
ENA SS-A AB SER IA-ACNC: NEGATIVE
ENA SS-B IGG SER IA-ACNC: <0.6 U/ML
ENA SS-B IGG SER IA-ACNC: NEGATIVE
ERV-%PRED-PRE: 46 %
ERV-PRE: 0.57 L
ERV-PRED: 1.22 L
EXPTIME-PRE: 5.99 SEC
FEF2575-%PRED-PRE: 212 %
FEF2575-PRE: 6.09 L/SEC
FEF2575-PRED: 2.86 L/SEC
FEFMAX-%PRED-PRE: 162 %
FEFMAX-PRE: 15.05 L/SEC
FEFMAX-PRED: 9.25 L/SEC
FEV1-%PRED-PRE: 115 %
FEV1-PRE: 4.18 L
FEV1FEV6-PRE: 90 %
FEV1FEV6-PRED: 78 %
FEV1FVC-PRE: 90 %
FEV1FVC-PRED: 77 %
FEV1SVC-PRE: 93 %
FEV1SVC-PRED: 70 %
FIFMAX-PRE: 6.99 L/SEC
FRCPLETH-%PRED-PRE: 58 %
FRCPLETH-PRE: 2.17 L
FRCPLETH-PRED: 3.74 L
FVC-%PRED-PRE: 98 %
FVC-PRE: 4.64 L
FVC-PRED: 4.72 L
IC-%PRED-PRE: 99 %
IC-PRE: 3.92 L
IC-PRED: 3.94 L
RHEUMATOID FACT SER NEPH-ACNC: 10 IU/ML
RVPLETH-%PRED-PRE: 62 %
RVPLETH-PRE: 1.6 L
RVPLETH-PRED: 2.56 L
TLCPLETH-%PRED-PRE: 81 %
TLCPLETH-PRE: 6.09 L
TLCPLETH-PRED: 7.43 L
VA-%PRED-PRE: 87 %
VA-PRE: 5.99 L
VC-%PRED-PRE: 86 %
VC-PRE: 4.49 L
VC-PRED: 5.16 L

## 2021-11-02 LAB
ALDOLASE SERPL-CCNC: 2.8 U/L
ENA JO1 AB SER IA-ACNC: <0.5 U/ML
ENA JO1 IGG SER-ACNC: NEGATIVE
ENA SCL70 IGG SER IA-ACNC: 0.8 U/ML
ENA SCL70 IGG SER IA-ACNC: NEGATIVE
IGG SERPL-MCNC: 1374 MG/DL (ref 610–1616)
IGG1 SER-MCNC: 554 MG/DL (ref 382–929)
IGG2 SER-MCNC: 667 MG/DL (ref 242–700)
IGG3 SER-MCNC: 112 MG/DL (ref 22–176)
IGG4 SER-MCNC: 21 MG/DL (ref 4–86)
SUBCLASSES, PERCENT: 99 %

## 2021-11-07 LAB
A FLAVUS AB SER QL ID: NORMAL
A FUMIGATUS1 AB SER QL ID: ABNORMAL
A FUMIGATUS2 AB SER QL ID: NORMAL
A FUMIGATUS3 AB SER QL ID: NORMAL
A FUMIGATUS6 AB SER QL ID: ABNORMAL
A PULLULANS AB SER QL ID: ABNORMAL
PIGEON SERUM AB QL ID: ABNORMAL
S RECTIVIRGULA AB SER QL ID: ABNORMAL
S VIRIDIS AB SER QL ID: NORMAL
T CANDIDUS AB SER QL: NORMAL
T VULGARIS1 AB SER QL ID: DETECTED

## 2021-11-15 ENCOUNTER — MYC MEDICAL ADVICE (OUTPATIENT)
Dept: PULMONOLOGY | Facility: CLINIC | Age: 66
End: 2021-11-15
Payer: MEDICARE

## 2021-11-15 ENCOUNTER — TEAM CONFERENCE (OUTPATIENT)
Dept: PULMONOLOGY | Facility: CLINIC | Age: 66
End: 2021-11-15
Payer: MEDICARE

## 2021-11-15 DIAGNOSIS — J84.9 ILD (INTERSTITIAL LUNG DISEASE) (H): Primary | ICD-10-CM

## 2021-11-15 NOTE — NURSING NOTE
ILD Conference      Patient Name: Avelina Marion    Pertinent Clinical History: Pt is in the process of kidney and pancreas transplant work-up; had recent CT with ILD changes. No pulmonary sx, no exposure hx, PFTs normal. ILD panel was sent; HP panel (+) for Thermoact Vulgaris 1.    Reason for conference discussion/Specific Question:  CT review; OK to continue txp work-up?    Radiology Interpretation: CT (8/17/21): calcification in lung; periph nodules. Has fibrosis; some (periseptal) emphysema. Fibrosis fine, peripheral. May be burned out DIP/LIP. Slight subpleural sparing appearance. Not a lot of mosaic. Pattern: Indeterm for UIP vs Altern dx.   Sofia Coker MD (radiology)        There was a consensus recommendation for the following actions:     -Ok to continue transplant work-up.  -Should follow up in pulmonary clinic with PFTs in 12 months.    Pulmonary/ILD Provider: Amaury Caal MD (pulm)

## 2021-11-15 NOTE — TELEPHONE ENCOUNTER
Rudy Quan,    I hope you are well. The results of your testing came back and we discussed your case in our interstitial lung disease conference today. The findings on your CT may be chronic changes related to former smoking, although it is impossible to say with certainty. There is a small chance it could be a progressive disease, but we do not feel this should preclude transplant (ie, suspicion for this is lower).     We would like to see you in pulmonary clinic in 12 months with repeat pulmonary function testing and another chest CT to make sure there is no progression.     I will notify your transplant team.     Amaury Caal

## 2021-11-24 ENCOUNTER — COMMITTEE REVIEW (OUTPATIENT)
Dept: TRANSPLANT | Facility: CLINIC | Age: 66
End: 2021-11-24
Payer: MEDICARE

## 2021-11-24 NOTE — COMMITTEE REVIEW
Abdominal Patient Discussion Note Transplant Coordinator: Rebecca Christopher  Transplant Surgeon: Dr. Francesco Farnsworth       Referring Physician: Referred Self    Committee Review Members:  Dietitian, Oscar Stanley, JOSE ANGEL   Nephrology Jeffrey Chaparro MD, Jesu Mosquera MD   Pharmacist Gretchen Ramirez, [facility]    - Clinical Fabyalejandro Hunter, [facility]   Transplant Evelyn Rico PA-C, Rebecca Christopher, KARINE, Claudine Stearns RN, Dedra Luo RN, Ro Gomez RN   Transplant Surgery Louise Barros MD, MD       Additional Discussion Notes and Findings: Reviewed pt's cardiac work up to date- DSE done at [facility] 4/2021, due to diagnosis of diabetes being in 2012 the Committee agrees the pt should undergo a coronary angiogram prior to being listed for a kidney/pancreas transplant.  Will coordinate testing w/ cardiology.

## 2021-11-30 ENCOUNTER — TELEPHONE (OUTPATIENT)
Dept: TRANSPLANT | Facility: CLINIC | Age: 66
End: 2021-11-30
Payer: MEDICARE

## 2021-11-30 ENCOUNTER — TELEPHONE (OUTPATIENT)
Dept: CARDIOLOGY | Facility: CLINIC | Age: 66
End: 2021-11-30
Payer: MEDICARE

## 2021-11-30 DIAGNOSIS — Z01.810 PRE-OPERATIVE CARDIOVASCULAR EXAMINATION: Primary | ICD-10-CM

## 2021-11-30 DIAGNOSIS — I10 ESSENTIAL HYPERTENSION: ICD-10-CM

## 2021-11-30 DIAGNOSIS — E11.9 DIABETES MELLITUS, TYPE 2 (H): ICD-10-CM

## 2021-11-30 DIAGNOSIS — N18.6 END STAGE RENAL DISEASE (H): ICD-10-CM

## 2021-11-30 RX ORDER — ASPIRIN 81 MG/1
243 TABLET, CHEWABLE ORAL ONCE
Status: CANCELLED | OUTPATIENT
Start: 2021-11-30

## 2021-11-30 RX ORDER — POTASSIUM CHLORIDE 1500 MG/1
20 TABLET, EXTENDED RELEASE ORAL
Status: CANCELLED | OUTPATIENT
Start: 2021-11-30

## 2021-11-30 RX ORDER — SODIUM CHLORIDE 9 MG/ML
INJECTION, SOLUTION INTRAVENOUS CONTINUOUS
Status: CANCELLED | OUTPATIENT
Start: 2021-11-30

## 2021-11-30 RX ORDER — ASPIRIN 325 MG
325 TABLET ORAL ONCE
Status: CANCELLED | OUTPATIENT
Start: 2021-11-30 | End: 2021-11-30

## 2021-11-30 RX ORDER — LIDOCAINE 40 MG/G
CREAM TOPICAL
Status: CANCELLED | OUTPATIENT
Start: 2021-11-30

## 2021-11-30 RX ORDER — POTASSIUM CHLORIDE 1500 MG/1
40 TABLET, EXTENDED RELEASE ORAL
Status: CANCELLED | OUTPATIENT
Start: 2021-11-30

## 2021-11-30 NOTE — TELEPHONE ENCOUNTER
Left  for pt explaining that we would like a coronary angiogram as part of his pre-kidney/pancreas transplant evaluation. I let him know cardiology called and left a mychart note, he will need to call them to arrange the angio. Left my direct line if he has questions regarding his transplant work up.

## 2021-11-30 NOTE — TELEPHONE ENCOUNTER
Attempted to call pt to discuss pre procedure instructions and scheduling for coronary angiogram. Pt did not answer but left VM stating he should call us back to discuss. I will also send pt a Homu[employer] message with instructions.     Abhi Freed, RN   Cardiology Nurse Coordinator

## 2021-12-12 ENCOUNTER — HEALTH MAINTENANCE LETTER (OUTPATIENT)
Age: 66
End: 2021-12-12

## 2021-12-14 ENCOUNTER — ANCILLARY PROCEDURE (OUTPATIENT)
Dept: CT IMAGING | Facility: CLINIC | Age: 66
End: 2021-12-14
Payer: COMMERCIAL

## 2021-12-14 DIAGNOSIS — J84.9 ILD (INTERSTITIAL LUNG DISEASE) (H): ICD-10-CM

## 2021-12-14 DIAGNOSIS — J84.9 ILD (INTERSTITIAL LUNG DISEASE) (H): Primary | ICD-10-CM

## 2021-12-14 LAB
6 MIN WALK (FT): 1350 FT
6 MIN WALK (M): 411 M

## 2021-12-14 PROCEDURE — 94618 PULMONARY STRESS TESTING: CPT | Performed by: INTERNAL MEDICINE

## 2021-12-14 PROCEDURE — 94375 RESPIRATORY FLOW VOLUME LOOP: CPT | Performed by: INTERNAL MEDICINE

## 2021-12-14 PROCEDURE — 94729 DIFFUSING CAPACITY: CPT | Performed by: INTERNAL MEDICINE

## 2021-12-14 PROCEDURE — 94726 PLETHYSMOGRAPHY LUNG VOLUMES: CPT | Performed by: INTERNAL MEDICINE

## 2021-12-14 PROCEDURE — 71250 CT THORAX DX C-: CPT | Performed by: RADIOLOGY

## 2021-12-15 LAB
DLCOUNC-%PRED-PRE: 74 %
DLCOUNC-PRE: 20.92 ML/MIN/MMHG
DLCOUNC-PRED: 28.19 ML/MIN/MMHG
ERV-%PRED-PRE: 61 %
ERV-PRE: 0.76 L
ERV-PRED: 1.22 L
EXPTIME-PRE: 8.3 SEC
FEF2575-%PRED-PRE: 176 %
FEF2575-PRE: 5.05 L/SEC
FEF2575-PRED: 2.86 L/SEC
FEFMAX-%PRED-PRE: 158 %
FEFMAX-PRE: 14.65 L/SEC
FEFMAX-PRED: 9.25 L/SEC
FEV1-%PRED-PRE: 109 %
FEV1-PRE: 3.95 L
FEV1FEV6-PRE: 85 %
FEV1FEV6-PRED: 78 %
FEV1FVC-PRE: 86 %
FEV1FVC-PRED: 77 %
FEV1SVC-PRE: 84 %
FEV1SVC-PRED: 70 %
FIFMAX-PRE: 6.14 L/SEC
FRCPLETH-%PRED-PRE: 63 %
FRCPLETH-PRE: 2.37 L
FRCPLETH-PRED: 3.74 L
FVC-%PRED-PRE: 97 %
FVC-PRE: 4.61 L
FVC-PRED: 4.72 L
IC-%PRED-PRE: 100 %
IC-PRE: 3.97 L
IC-PRED: 3.94 L
RVPLETH-%PRED-PRE: 63 %
RVPLETH-PRE: 1.61 L
RVPLETH-PRED: 2.56 L
TLCPLETH-%PRED-PRE: 85 %
TLCPLETH-PRE: 6.34 L
TLCPLETH-PRED: 7.43 L
VA-%PRED-PRE: 81 %
VA-PRE: 5.58 L
VC-%PRED-PRE: 91 %
VC-PRE: 4.73 L
VC-PRED: 5.16 L

## 2021-12-28 ENCOUNTER — LAB (OUTPATIENT)
Dept: LAB | Facility: CLINIC | Age: 66
End: 2021-12-28
Attending: INTERNAL MEDICINE
Payer: COMMERCIAL

## 2021-12-28 DIAGNOSIS — Z11.59 ENCOUNTER FOR SCREENING FOR OTHER VIRAL DISEASES: ICD-10-CM

## 2021-12-28 PROCEDURE — U0003 INFECTIOUS AGENT DETECTION BY NUCLEIC ACID (DNA OR RNA); SEVERE ACUTE RESPIRATORY SYNDROME CORONAVIRUS 2 (SARS-COV-2) (CORONAVIRUS DISEASE [COVID-19]), AMPLIFIED PROBE TECHNIQUE, MAKING USE OF HIGH THROUGHPUT TECHNOLOGIES AS DESCRIBED BY CMS-2020-01-R: HCPCS

## 2021-12-28 PROCEDURE — U0005 INFEC AGEN DETEC AMPLI PROBE: HCPCS

## 2021-12-29 ENCOUNTER — TELEPHONE (OUTPATIENT)
Dept: CARDIOLOGY | Facility: CLINIC | Age: 66
End: 2021-12-29
Payer: MEDICARE

## 2021-12-29 LAB — SARS-COV-2 RNA RESP QL NAA+PROBE: NEGATIVE

## 2021-12-29 NOTE — TELEPHONE ENCOUNTER
Left voicemail for patient to complete Travel Screen for Cardiac Cath Lab appointment on 12/30 and inform patient of updated Visitor Policy.       covid in process

## 2021-12-30 ENCOUNTER — APPOINTMENT (OUTPATIENT)
Dept: LAB | Facility: CLINIC | Age: 66
End: 2021-12-30
Attending: INTERNAL MEDICINE
Payer: COMMERCIAL

## 2021-12-30 ENCOUNTER — HOSPITAL ENCOUNTER (OUTPATIENT)
Facility: CLINIC | Age: 66
Discharge: HOME | End: 2021-12-30
Attending: INTERNAL MEDICINE | Admitting: INTERNAL MEDICINE
Payer: COMMERCIAL

## 2021-12-30 ENCOUNTER — APPOINTMENT (OUTPATIENT)
Dept: MEDSURG UNIT | Facility: CLINIC | Age: 66
End: 2021-12-30
Attending: INTERNAL MEDICINE
Payer: COMMERCIAL

## 2021-12-30 VITALS
RESPIRATION RATE: 15 BRPM | WEIGHT: 204 LBS | HEART RATE: 63 BPM | BODY MASS INDEX: 27.63 KG/M2 | OXYGEN SATURATION: 97 % | HEIGHT: 72 IN | TEMPERATURE: 98.1 F | DIASTOLIC BLOOD PRESSURE: 75 MMHG | SYSTOLIC BLOOD PRESSURE: 139 MMHG

## 2021-12-30 DIAGNOSIS — I25.10 CORONARY ARTERY DISEASE INVOLVING NATIVE CORONARY ARTERY OF NATIVE HEART WITHOUT ANGINA PECTORIS: Primary | ICD-10-CM

## 2021-12-30 DIAGNOSIS — E11.9 DIABETES MELLITUS, TYPE 2 (H): ICD-10-CM

## 2021-12-30 DIAGNOSIS — N18.6 END STAGE RENAL DISEASE (H): ICD-10-CM

## 2021-12-30 DIAGNOSIS — I10 ESSENTIAL HYPERTENSION: ICD-10-CM

## 2021-12-30 DIAGNOSIS — Z01.810 PRE-OPERATIVE CARDIOVASCULAR EXAMINATION: ICD-10-CM

## 2021-12-30 LAB
ANION GAP SERPL CALCULATED.3IONS-SCNC: 4 MMOL/L (ref 3–14)
ATRIAL RATE - MUSE: 74 BPM
BUN SERPL-MCNC: 30 MG/DL (ref 7–30)
CALCIUM SERPL-MCNC: 8.4 MG/DL (ref 8.5–10.1)
CHLORIDE BLD-SCNC: 105 MMOL/L (ref 94–109)
CO2 SERPL-SCNC: 28 MMOL/L (ref 20–32)
CREAT SERPL-MCNC: 5.5 MG/DL (ref 0.66–1.25)
DIASTOLIC BLOOD PRESSURE - MUSE: NORMAL MMHG
ERYTHROCYTE [DISTWIDTH] IN BLOOD BY AUTOMATED COUNT: 12.2 % (ref 10–15)
GFR SERPL CREATININE-BSD FRML MDRD: 11 ML/MIN/1.73M2
GLUCOSE BLD-MCNC: 151 MG/DL (ref 70–99)
GLUCOSE BLDC GLUCOMTR-MCNC: 83 MG/DL (ref 70–99)
HCT VFR BLD AUTO: 39.3 % (ref 40–53)
HGB BLD-MCNC: 12.9 G/DL (ref 13.3–17.7)
INTERPRETATION ECG - MUSE: NORMAL
MCH RBC QN AUTO: 32.8 PG (ref 26.5–33)
MCHC RBC AUTO-ENTMCNC: 32.8 G/DL (ref 31.5–36.5)
MCV RBC AUTO: 100 FL (ref 78–100)
P AXIS - MUSE: 7 DEGREES
PLATELET # BLD AUTO: 198 10E3/UL (ref 150–450)
POTASSIUM BLD-SCNC: 4.4 MMOL/L (ref 3.4–5.3)
PR INTERVAL - MUSE: 100 MS
QRS DURATION - MUSE: 82 MS
QT - MUSE: 388 MS
QTC - MUSE: 430 MS
R AXIS - MUSE: 2 DEGREES
RBC # BLD AUTO: 3.93 10E6/UL (ref 4.4–5.9)
SODIUM SERPL-SCNC: 137 MMOL/L (ref 133–144)
SYSTOLIC BLOOD PRESSURE - MUSE: NORMAL MMHG
T AXIS - MUSE: 24 DEGREES
VENTRICULAR RATE- MUSE: 74 BPM
WBC # BLD AUTO: 7.7 10E3/UL (ref 4–11)

## 2021-12-30 PROCEDURE — 93454 CORONARY ARTERY ANGIO S&I: CPT | Performed by: INTERNAL MEDICINE

## 2021-12-30 PROCEDURE — 82310 ASSAY OF CALCIUM: CPT | Performed by: INTERNAL MEDICINE

## 2021-12-30 PROCEDURE — 258N000003 HC RX IP 258 OP 636: Performed by: INTERNAL MEDICINE

## 2021-12-30 PROCEDURE — C1894 INTRO/SHEATH, NON-LASER: HCPCS | Performed by: INTERNAL MEDICINE

## 2021-12-30 PROCEDURE — 250N000011 HC RX IP 250 OP 636: Performed by: INTERNAL MEDICINE

## 2021-12-30 PROCEDURE — 272N000001 HC OR GENERAL SUPPLY STERILE: Performed by: INTERNAL MEDICINE

## 2021-12-30 PROCEDURE — 999N000054 HC STATISTIC EKG NON-CHARGEABLE

## 2021-12-30 PROCEDURE — 999N000142 HC STATISTIC PROCEDURE PREP ONLY

## 2021-12-30 PROCEDURE — 82962 GLUCOSE BLOOD TEST: CPT

## 2021-12-30 PROCEDURE — 93010 ELECTROCARDIOGRAM REPORT: CPT | Mod: 59 | Performed by: INTERNAL MEDICINE

## 2021-12-30 PROCEDURE — 999N000133 HC STATISTIC PP CARE STAGE 2

## 2021-12-30 PROCEDURE — 250N000013 HC RX MED GY IP 250 OP 250 PS 637: Performed by: INTERNAL MEDICINE

## 2021-12-30 PROCEDURE — 36415 COLL VENOUS BLD VENIPUNCTURE: CPT | Performed by: INTERNAL MEDICINE

## 2021-12-30 PROCEDURE — 250N000009 HC RX 250: Performed by: INTERNAL MEDICINE

## 2021-12-30 PROCEDURE — 99152 MOD SED SAME PHYS/QHP 5/>YRS: CPT | Performed by: INTERNAL MEDICINE

## 2021-12-30 PROCEDURE — 85027 COMPLETE CBC AUTOMATED: CPT | Performed by: INTERNAL MEDICINE

## 2021-12-30 RX ORDER — POTASSIUM CHLORIDE 750 MG/1
20 TABLET, EXTENDED RELEASE ORAL
Status: DISCONTINUED | OUTPATIENT
Start: 2021-12-30 | End: 2021-12-30 | Stop reason: HOSPADM

## 2021-12-30 RX ORDER — IOPAMIDOL 755 MG/ML
INJECTION, SOLUTION INTRAVASCULAR
Status: DISCONTINUED | OUTPATIENT
Start: 2021-12-30 | End: 2021-12-30 | Stop reason: HOSPADM

## 2021-12-30 RX ORDER — FENTANYL CITRATE 50 UG/ML
25 INJECTION, SOLUTION INTRAMUSCULAR; INTRAVENOUS
Status: DISCONTINUED | OUTPATIENT
Start: 2021-12-30 | End: 2021-12-30 | Stop reason: HOSPADM

## 2021-12-30 RX ORDER — NALOXONE HYDROCHLORIDE 0.4 MG/ML
0.4 INJECTION, SOLUTION INTRAMUSCULAR; INTRAVENOUS; SUBCUTANEOUS
Status: DISCONTINUED | OUTPATIENT
Start: 2021-12-30 | End: 2021-12-30 | Stop reason: HOSPADM

## 2021-12-30 RX ORDER — SODIUM CHLORIDE 9 MG/ML
INJECTION, SOLUTION INTRAVENOUS CONTINUOUS
Status: DISCONTINUED | OUTPATIENT
Start: 2021-12-30 | End: 2021-12-30 | Stop reason: HOSPADM

## 2021-12-30 RX ORDER — LIDOCAINE 40 MG/G
CREAM TOPICAL
Status: DISCONTINUED | OUTPATIENT
Start: 2021-12-30 | End: 2021-12-30 | Stop reason: HOSPADM

## 2021-12-30 RX ORDER — OXYCODONE HYDROCHLORIDE 5 MG/1
10 TABLET ORAL EVERY 4 HOURS PRN
Status: DISCONTINUED | OUTPATIENT
Start: 2021-12-30 | End: 2021-12-30 | Stop reason: HOSPADM

## 2021-12-30 RX ORDER — FENTANYL CITRATE 50 UG/ML
INJECTION, SOLUTION INTRAMUSCULAR; INTRAVENOUS
Status: DISCONTINUED | OUTPATIENT
Start: 2021-12-30 | End: 2021-12-30 | Stop reason: HOSPADM

## 2021-12-30 RX ORDER — NALOXONE HYDROCHLORIDE 0.4 MG/ML
0.2 INJECTION, SOLUTION INTRAMUSCULAR; INTRAVENOUS; SUBCUTANEOUS
Status: DISCONTINUED | OUTPATIENT
Start: 2021-12-30 | End: 2021-12-30 | Stop reason: HOSPADM

## 2021-12-30 RX ORDER — ATROPINE SULFATE 0.1 MG/ML
0.5 INJECTION INTRAVENOUS
Status: DISCONTINUED | OUTPATIENT
Start: 2021-12-30 | End: 2021-12-30 | Stop reason: HOSPADM

## 2021-12-30 RX ORDER — POTASSIUM CHLORIDE 750 MG/1
40 TABLET, EXTENDED RELEASE ORAL
Status: DISCONTINUED | OUTPATIENT
Start: 2021-12-30 | End: 2021-12-30 | Stop reason: HOSPADM

## 2021-12-30 RX ORDER — ASPIRIN 81 MG/1
243 TABLET, CHEWABLE ORAL ONCE
Status: COMPLETED | OUTPATIENT
Start: 2021-12-30 | End: 2021-12-30

## 2021-12-30 RX ORDER — ASPIRIN 325 MG
325 TABLET ORAL ONCE
Status: COMPLETED | OUTPATIENT
Start: 2021-12-30 | End: 2021-12-30

## 2021-12-30 RX ORDER — FLUMAZENIL 0.1 MG/ML
0.2 INJECTION, SOLUTION INTRAVENOUS
Status: DISCONTINUED | OUTPATIENT
Start: 2021-12-30 | End: 2021-12-30 | Stop reason: HOSPADM

## 2021-12-30 RX ORDER — OXYCODONE HYDROCHLORIDE 5 MG/1
5 TABLET ORAL EVERY 4 HOURS PRN
Status: DISCONTINUED | OUTPATIENT
Start: 2021-12-30 | End: 2021-12-30 | Stop reason: HOSPADM

## 2021-12-30 RX ADMIN — SODIUM CHLORIDE 500 ML: 9 INJECTION, SOLUTION INTRAVENOUS at 12:26

## 2021-12-30 RX ADMIN — ASPIRIN 325 MG ORAL TABLET 325 MG: 325 PILL ORAL at 12:29

## 2021-12-30 ASSESSMENT — MIFFLIN-ST. JEOR: SCORE: 1753.34

## 2021-12-30 NOTE — H&P
History and Physical: Cardiology Cath Lab Service    Avelina Marion MRN# [id number]   YOB: 1955 Age: 66 year old         Assessment and plan:   66 year old male with PMH of ESRD due to HTN and DM, on dialysis since April 2021 here for coronary angiogram as part of kidney/pancreas transplant evaluation.    # Pre-kidney/pancreas transplant evaluation  # DM  # Hypertension.    - No contraindications noted, will move forward with coronary angiography.  - Patient will be admitted as OP to Obs unit post procedure, with plans to discharge home after post procedure orders have been met    Patient seen and discussed with Dr. Tutu Lamb MD  Cardiovascular disease fellow  [facility]  [phone]  12/30/2021 11:44 AM       HPI: 66 year old male with PMH of ESRD due to HTN and DM, on dialysis since April 2021 here for coronary angiogram as part of kidney/pancreas transplant evaluation.  Patient reports feeling well today, denies recent illness, chest pain, shortness of breath, abdominal pain, headache, vision change, or weakness. No major changes in health history since previous visit.     Past Medical History:   Diagnosis Date     Anemia in chronic kidney disease      Diabetic retinopathy of both eyes (H)      End stage renal disease (H)      Hypertension      Secondary renal hyperparathyroidism (H)      Type 2 diabetes mellitus (H)        Past Surgical History:   Procedure Laterality Date     CREATE FISTULA ARTERIOVENOUS UPPER EXTREMITY Left        No current facility-administered medications on file prior to encounter.  amLODIPine (NORVASC) 5 MG tablet, Take 10 mg by mouth  aspirin (ASA) 81 MG chewable tablet, chew and swallow one tablet by mouth every day  atorvastatin (LIPITOR) 20 MG tablet, TAKE 1 TABLET BY MOUTH DAILY  Blood Glucose Monitoring Suppl (ACCU-CHEK GUIDE) w/Device KIT, Use to test 4 times a day. Pharmacy to dispense brand based on insurance.  calcitRIOL  (ROCALTROL) 0.25 MCG capsule, Take 0.25 mcg by mouth daily  calcium acetate (PHOSLO) 667 MG CAPS capsule, Take 667 mg by mouth  insulin glargine (LANTUS PEN) 100 UNIT/ML pen, Inject Subcutaneous At Bedtime  lisinopril (ZESTRIL) 20 MG tablet, Take 20 mg by mouth  meclizine 25 MG CHEW, Take 25 mg by mouth every 6 hours as needed for dizziness  sodium bicarbonate 650 MG tablet,         Family History   Problem Relation Age of Onset     Diabetes Brother      Kidney Disease Brother        Social History     Tobacco Use     Smoking status: Former Smoker     Types: Cigarettes     Smokeless tobacco: Never Used     Tobacco comment: Quit 1984   Substance Use Topics     Alcohol use: Never       No Known Allergies      ROS:   Skin: negative  Respiratory: No shortness of breath, dyspnea on exertion, cough, or hemoptysis  Cardiovascular: negative besides HPI  Gastrointestinal: negative besides HPI  Genitourinary: negative besides HPI  Musculoskeletal: negative besides HPI  Neurologic: negative besides HPI  Hematologic/Lymphatic/Immunologic:negative besides HPI  Endocrine: negative besides HPI    Physical Examination:  Vitals: BP (!) 150/101 (BP Location: Right arm, Cuff Size: Adult Regular)   Pulse 61   Temp 98  F (36.7  C) (Oral)   Resp 20   Ht 1.829 m (6')   Wt 92.5 kg (204 lb)   SpO2 97%   BMI 27.67 kg/m    BMI= Body mass index is 27.67 kg/m .    GENERAL APPEARANCE: healthy, alert and no distress  HEENT: no icterus, no xanthelasmas, normal pupil size and reaction, normal palate, mucosa moist  NECK: JVP 8 cm H2O, brisk carotid upstroke bilaterally  CHEST: lungs clear to auscultation without rales, rhonchi or wheezes, no use of accessory muscles, no retractions  CARDIOVASCULAR: regular rhythm, normal S1 and S2, no S3 or S4 and no murmur, click or rub.  ABDOMEN: soft, non tender, without hepatosplenomegaly, no masses palpable, bowel sounds normal  EXTREMITIES: warm, no edema, DP/PT pulses 2+ bilaterally, no clubbing or  cyanosis   NEURO: alert and oriented to person/place/time, normal speech, gait and affect  SKIN: no ecchymoses, no rashes      Laboratory:  CMP  Recent Labs   Lab 12/30/21  1002      POTASSIUM 4.4   CHLORIDE 105   CO2 28   ANIONGAP 4   *   BUN 30   CR 5.50*   GFRESTIMATED 11*   DAVID 8.4*     CBC  Recent Labs   Lab 12/30/21  1002   WBC 7.7   RBC 3.93*   HGB 12.9*   HCT 39.3*      MCH 32.8   MCHC 32.8   RDW 12.2          No results found for: TROPI, TROPONIN, TROPR, TROPN      EKG 12/30/2021: sinus rhythm      TTE 8/5/2021:  Mild to moderate concentric wall thickening consistent with left ventricular  hypertrophy is present. Global and regional left ventricular function is  normal with an EF of 55-60%.  Right ventricular function, chamber size, wall motion, and thickness are  normal.  No significant valvular abnormalities were noted.  Pulmonary artery systolic pressure is normal.  Previous study not available for comparison.

## 2021-12-30 NOTE — PRE-PROCEDURE
GENERAL PRE-PROCEDURE:   Procedure:  Coronary angiography, possible percutaneous coronary intervention  Date/Time:  12/30/2021 11:40 AM    Written consent obtained?: Yes    Risks and benefits: Risks, benefits and alternatives were discussed    Consent given by:  Patient  Patient states understanding of procedure being performed: Yes    Patient's understanding of procedure matches consent: Yes    Procedure consent matches procedure scheduled: Yes    Expected level of sedation:  Moderate  Appropriately NPO:  Yes  ASA Class:  2  Mallampati  :  Grade 2- soft palate, base of uvula, tonsillar pillars, and portion of posterior pharyngeal wall visible  Lungs:  Lungs clear with good breath sounds bilaterally  Heart:  Normal heart sounds and rate  History & Physical reviewed:  History and physical reviewed and no updates needed  Statement of review:  I have reviewed the lab findings, diagnostic data, medications, and the plan for sedation

## 2021-12-30 NOTE — DISCHARGE INSTRUCTIONS
Going Home after an Angiogram (Cardiac)  ______________________________________________      After you go home:    Have an adult stay with you for 24 hours.    Drink plenty of fluids.    You may eat your normal diet, unless your doctor tells you otherwise.    For 24 hours:    Relax and take it easy.    Do NOT smoke.    Do NOT make any important or legal decisions.    Do NOT drive or operate machines at home or at work.    Do NOT drink alcohol.    Remove the Band-Aid after 24 hours. If there is minor oozing, apply another Band-aid and remove it after 12 hours.    For 2 days, do NOT have sex or do any heavy exercise.    Do NOT take a bath, or use a hot tub or pool for at least 3 days. You may shower.    Care of groin site  It is normal to have a small bruise or lump at the site.    Do not scrub the site.    For the first 2 days: Do not stoop or squat. When you cough, sneeze or move your bowels, hold your hand over the puncture site and press gently.    Do not lift more than 10 pounds for at least 3 to 5 days.    Do not use lotion or powder near the puncture site for 3 days.    If you start bleeding from the site in your groin, lie down flat and press firmly  on the site. Call your doctor as soon as you can.    Medicines    If you have started taking Plavix or Effient, do not stop taking it until you talk to your heart doctor (cardiologist).    If you are on metformin (Glucophage), do not restart it until you have blood tests (within 2 to 3 days after discharge). When your doctor tells you it is safe, you may restart the metformin.    If you have stopped any other medicines, check with your nurse or provider about when to restart them.    Call 911 right away if you have bleeding that is heavy or does not stop.    Call your doctor if:    You have a large or growing hard lump around the site.    The site is red, swollen, hot or tender.    Blood or fluid is draining from the site.    You have chills or a fever greater  than 101 F (38 C).    Your leg or arm feels numb or cool.    You have hives, a rash or unusual itching.      [facility] [place]:  [phone] (7 days a week)      IP PRE-PROCEDURE - ASSESSMENT

## 2021-12-30 NOTE — Clinical Note
dry, intact, no bleeding and no hematoma. 4fr RFA sheath removed, manual pressure applied, hemostasis achieved, bandage placed.

## 2021-12-30 NOTE — PROGRESS NOTES
D/I/A: Pt roomed on 2a in bay 3.  Arrived via litter and accompanied by RN On/Off: Off monitor.  VSSA.  Rhythm upon arrival SR on monitor.  Denies pain or sob.  Reviewed activity restrictions and when to notify RN, ie-changes to breathing or increased chest pressure or chest pain.  CCL access:  RFA.  P: Continue to monitor status.  Discharge to home once meeting criteria.

## 2021-12-31 ENCOUNTER — TELEPHONE (OUTPATIENT)
Dept: TRANSPLANT | Facility: CLINIC | Age: 66
End: 2021-12-31
Payer: MEDICARE

## 2021-12-31 ENCOUNTER — DOCUMENTATION ONLY (OUTPATIENT)
Dept: HEALTH INFORMATION MANAGEMENT | Facility: CLINIC | Age: 66
End: 2021-12-31
Payer: MEDICARE

## 2021-12-31 NOTE — TELEPHONE ENCOUNTER
Called pt to let him know we need electronic signature of KDPI and KARLIE form.  Also explained that I am awaiting the official report on his heart cath (done yesterday).  The pt reported the cardiologist verbally cleared him, he understands we will need to look at the report.  The pt also needed dental clearance - he goes to [facility] clinic, he has been referred to an oral surgeon to fix a chipped tooth but believes he has no medical concerns with his dental status- just cosmetic.  I will fax them a dental clearance letter.  I explained we will review his case at the next selection committee meeting and follow up with him, he had no further questions.

## 2022-01-04 ENCOUNTER — TELEPHONE (OUTPATIENT)
Dept: TRANSPLANT | Facility: CLINIC | Age: 67
End: 2022-01-04
Payer: MEDICARE

## 2022-01-04 NOTE — TELEPHONE ENCOUNTER
Spoke w/ Angelica at  Dental clinic in the [place].  I explained that the pt will need dental clearance for K/P transplant.  She stated that he is coming in for a cleaning today and the dentist will let me know his thoughts.  The pt has an upcoming appt w/ a dental surgeon on 2/11/22 to have multiple teeth extracted.  She will have his dentist f/u regarding dental clearance.  Provided my direct line for follow up.

## 2022-01-05 ENCOUNTER — COMMITTEE REVIEW (OUTPATIENT)
Dept: TRANSPLANT | Facility: CLINIC | Age: 67
End: 2022-01-05
Payer: MEDICARE

## 2022-01-05 NOTE — COMMITTEE REVIEW
Abdominal Committee Review Note     Evaluation Date: 8/5/2021  Committee Review Date: 1/5/2022    Organ being evaluated for: Kidney/Pancreas    Transplant Phase: Evaluation  Transplant Status: Active    Transplant Coordinator: Rebecca Christopher  Transplant Surgeon: Dr. Francesco Farnsworth       Referring Physician: Referred Self    Primary Diagnosis: ESKD  Secondary Diagnosis: DM2/HTN    Committee Review Members:  Nephrology Jose Carlos Moreno, APRN CNP, Sharath Cottrell MD, Joseph Chung MD   Pharmacy Robson Fountain, [facility]    - Clinical Fabyalejandro Hunter, [facility], Elana Colón, [facility]   Transplant Evelyn Rico PA-C, Francesco Farnsworth MD, Rebecca Christopher, RN, Elana Brar, RN, Claudine Stearns, RN, Dedra Luo, RN, Ro Gomez, RN   Transplant Surgery Francesco Farnsworth MD, Louise Barros MD, MD       Transplant Eligibility: Insulin-dependent diabetes mellitus, Irreversible chronic kidney disease treated w/dialysis or expected need for dialysis    Committee Review Decision: Approved    Relative Contraindications: None    Absolute Contraindications: None    Committee Chair Francesco Farnsworth MD verbally attested to the committee's decision.    Committee Discussion Details: Reviewed pt's medical status and evaluation results to date.  Pt is determined to be an approved candidate for SPK transplant. The committee reviewed the pt's cardiac w/u and angio done on 12/30/21, he is cleared to proceed from a cardiac stand point.  Also reviewed the pt's pulmonary w/u, pt is cleared and is to continue w/ annual f/u w/ pulm per their recommendation.  The pt's abdominal/pelvic CT and iliac US have been approved by surgery. The pt has an upcoming dental extraction in February, the Committee agree he can be listed following the extraction.  Will list the pt Active status once dental clearance is obtained. Pt will be called and summary letter generated.

## 2022-01-06 ENCOUNTER — TELEPHONE (OUTPATIENT)
Dept: TRANSPLANT | Facility: CLINIC | Age: 67
End: 2022-01-06
Payer: MEDICARE

## 2022-01-06 DIAGNOSIS — Z76.82 AWAITING ORGAN TRANSPLANT: Primary | ICD-10-CM

## 2022-01-06 NOTE — TELEPHONE ENCOUNTER
Called pt and left him a VM stating that he is approved listing following his upcoming extraction in February.  Also mentioned I will send a PRA order to dialysis.  I requested he let me know who his oral surgeon is so that I can get a note of clearance.  Provided direct line for call back. Will send summary letter.

## 2022-01-06 NOTE — TELEPHONE ENCOUNTER
Pt returned my call, he will be seeing an oral surgeon on 2/10/22 at  [facility] [place] clinic: [phone].  Will send letter of clearance to his provider.  The pt understood once we have dental clearance he will be ready for listing and had no further questions for me at this time.

## 2022-01-06 NOTE — LETTER
01/06/22        Avelina Marion  [address] [place] [address]        Dear Avelina,    It is a pleasure to work with you toward the goal of kidney and pancreas transplantation. Your pre-transplant evaluation results were reviewed at our Multidisciplinary Selection Committee on 1/5/22. The Committee has approved you for active status on the wait list once the following items are completed:    1. Dental clearance from your oral surgeon- please let me know where you will be seen for your extraction so that I may request of letter of clearance from your oral surgeon    2. Updated PRA/ALA antibody level - I will send an order and lab kit to your dialysis unit for collection, we will need an updated sample to place you on the wait list    For any questions, please contact the Transplant Office at [phone] or you may reach me directly at [phone].      Sincerely,  Rebecca Christopher RN, Pre-Kidney/Pancreas Transplant Coordinator  Solid Organ Transplant  [facility], [facility]    CC: Dr. Amy Truong, [place]

## 2022-01-06 NOTE — LETTER
PHYSICIAN ORDER   ALA/PRA BLOOD    DATE & TIME ISSUED: 2022 9:43 AM  PATIENT NAME: Avelina Marion   : 1955     [facility] MR# [if applicable]: [id number]     DIAGNOSIS/ICD-10 CODE: Awaiting Organ transplant [Z76.82}  EXPIRES: (1 YEAR AFTER DATE ISSUED)  Every 3 months: Due next on 22, then every 3 months    1. Please draw 20ml of blood in red top (plain) tube for Antileukocyte Antibody (ALA or PRA).  2. Label tubes with the patient s name, complete lab slip.    3. Mailers, lab slips with instructions are sent to patient separately.  4. Call the Outreach Lab at [phone] to reorder mailers.  5. Mail blood to (this address is also on the mailers):    IMMUNOLOGY LABORATORY   [facility]    [address] [address]      .

## 2022-02-18 ENCOUNTER — TELEPHONE (OUTPATIENT)
Dept: TRANSPLANT | Facility: CLINIC | Age: 67
End: 2022-02-18
Payer: MEDICARE

## 2022-02-18 NOTE — TELEPHONE ENCOUNTER
Received call from pt, he completed his dental work up and states his dentist will be sending a letter of clearance.  He is going to dialysis today and will get a new PRA sample drawn.  I informed him we will obtain PA from his insurance and he will be ready for active listing.

## 2022-02-18 NOTE — LETTER
PHYSICIAN ORDER   ALA/PRA BLOOD    DATE & TIME ISSUED: 2022 9:43 AM  PATIENT NAME: Avelina Marion   : 1955     [facility] MR# [if applicable]: [id number]     DIAGNOSIS/ICD-10 CODE: Awaiting Organ transplant [Z76.82}  EXPIRES: (1 YEAR AFTER DATE ISSUED)  Every 3 months: Due immediately, then every 3 months    1. Please draw 20ml of blood in red top (plain) tube for Antileukocyte Antibody (ALA or PRA).  2. Label tubes with the patient s name, complete lab slip.    3. Mailers, lab slips with instructions are sent to patient separately.  4. Call the Outreach Lab at [phone] to reorder mailers.  5. Mail blood to (this address is also on the mailers):    IMMUNOLOGY LABORATORY   [facility]    [address] [place] [address]      .

## 2022-02-18 NOTE — LETTER
PHYSICIAN ORDERS      PHYSICIAN ORDER   ALA/PRA BLOOD    DATE & TIME ISSUED: 2022 9:43 AM  PATIENT NAME: Avelina Marion   : 1955     [facility] MR# [if applicable]: [id number]     DIAGNOSIS/ICD-10 CODE: Awaiting Organ transplant [Z76.82}  EXPIRES: (1 YEAR AFTER DATE ISSUED)  Every 3 months: Due immediately, then every 3 months    1. Please draw 20ml of blood in red top (plain) tube for Antileukocyte Antibody (ALA or PRA).  2. Label tubes with the patient s name, complete lab slip.    3. Mailers, lab slips with instructions are sent to patient separately.  4. Call the Outreach Lab at [phone] to reorder mailers.  5. Mail blood to (this address is also on the mailers):    IMMUNOLOGY LABORATORY   [facility]    [address] [address]      .

## 2022-02-21 ENCOUNTER — LAB (OUTPATIENT)
Dept: LAB | Facility: CLINIC | Age: 67
End: 2022-02-21
Payer: MEDICARE

## 2022-02-21 DIAGNOSIS — Z76.82 AWAITING ORGAN TRANSPLANT: ICD-10-CM

## 2022-02-21 PROCEDURE — 86833 HLA CLASS II HIGH DEFIN QUAL: CPT

## 2022-02-21 PROCEDURE — 86832 HLA CLASS I HIGH DEFIN QUAL: CPT

## 2022-02-22 ENCOUNTER — TELEPHONE (OUTPATIENT)
Dept: TRANSPLANT | Facility: CLINIC | Age: 67
End: 2022-02-22
Payer: MEDICARE

## 2022-02-22 NOTE — LETTER
2022    Avelina Marion  [address] [place] [address]      Dear Mr. Mairon,    This letter is sent to confirm that you have completed your transplant work-up and you are a candidate in the kidney and pancreas transplant program at the [facility].  You were placed on the kidney and pancreas active waitlist on 22.      When you are active on the waitlist and an organ becomes available, a coordinator will need to speak to you immediately.  You could be contacted at any time during the day or night as an organ could become available at any time.  Please make certain our office always has your current telephone numbers and address.      Items we will need from you:      We have received approval from your insurance company for the transplant procedure.  It is critical that you notify us if there is any change in your insurance.  It is also important that you familiarize yourself with the details of your specific insurance policy.  Our patient  is available to assist you if you should have any questions regarding your coverage.      An ALA or PRA blood sample will need to be sent here every 3 months to match you with  donors or any potential living donors.  Special mailing boxes (called mailers) have been sent to your dialysis unit directly from the Outreach Department.  Additional mailers can be obtained by calling the Transplant Office and asking to speak to a kidney and pancreas .  Your next sample is due on 22.       During this waiting period, we may request additional periodic laboratory tests with your primary physician.  It will be your responsibility to remind your physician to forward your results to the Transplant Office.      We need to be kept informed of any changes in your medical condition such as:    o changes in your medications,   o significant changes in your  health  o significant infections (such as pneumonia or abscesses)  o blood transfusions  o any condition which requires hospitalization  o any surgery      Remember to complete any routine cancer screening tests required before your transplant.  This includes colonoscopy; prostate screening for men, and mammogram and gynecologic testing for women, as well as dental work.  Your primary care clinic can assist you with arranging for these exams.  Remind your caregivers to forward copies of the records and final reports.    We want you to know that our program has physician and surgeon coverage 24 hours a day, 365 days a year. If this coverage changes or there are substantial program changes, you will be notified in writing by letter.     Attached is a letter from the United Network for Organ Sharing (UNOS). It describes the services and information offered to patients by UNOS and the Organ Procurement and Transplantation Network.    Per our normal office workflow, your new wait list coordinator will be Gege Altamirano RN with the assistance of Alanna Mcdermott LPN.  Gege can be reached at [phone]. We appreciate having had the opportunity to participate in your care.  If you have questions, please feel free to call the Transplant Office at [phone] or [phone].      Sincerely,  Rebecca Christopher RN, Pre-Kidney/Pancreas Transplant Coordinator   Solid Organ Transplant  [facility], [facility]      Enclosures: ALA/PRA Physician Order, Telephone Contact List, Travel Resources, UNOS Letter, Waitlist Information Update and While You Are Waiting  cc: Care Team                          The Organ Procurement and Transplantation Network  Toll-free patient services line:     Your resource for organ transplant information    If you have a question regarding your own medical care, you always should call your transplant hospital first.  However, for general organ transplant-related information, you can call the Organ Procurement and Transplantation Network (OPTN) toll-free patient services line at [phone]- [phone]. Anyone, including potential transplant candidates, candidates, recipients, family members, friends, living donors, and donor family members, can call this number to:          Talk about organ donation, living donation, the transplant process, the donation process, and transplant policies.    Get a free patient information kit with helpful booklets, waiting list and transplant information, and a list of all transplant hospitals.    Ask questions about the OPTN website (https://optn.transplant.hrsa.gov/), the United Network for Organ Sharing s (UNOS) website (https://unos.org/), or the UNOS website for living donors and transplant recipients. (https://www.transplantliving.org/).    Learn how the OPTN can help you.    Talk about any concerns that you may have with a transplant hospital.    The [facility] transplant system, the OPTN, is managed under federal contract by the [employer] Network for Organ Sharing (UNOS), which is a non-profit charitable organization. The OPTN helps create and define organ sharing policies that make the best use of donated organs. This process continuously evaluating new advances and discoveries so policies can be adapted to best serve patients waiting for transplants. To do so, the OPTN works closely with transplant professionals, transplant patients, transplant candidates, donor families, living donors, and the public. All transplant programs and organ procurement organizations throughout the country are OPTN members and are obligated to follow the policies the OPTN creates for allocating organs.    The OPTN also is responsible for:      Providing educational material for patients, the public, and professionals.    Raising awareness of the need for donated organs and tissue.    Coordinating organ procurement,  matching, and placement.    Collecting information about every organ transplant and donation that occurs in the [place].    Remember, you should contact your transplant hospital directly if you have questions or concerns about your own medical care including medical records, work-up progress, and test results.    We are not your transplant hospital, and our staff will not be able to answer questions about your case, so please keep your transplant hospital s phone number handy.    However, while you research your transplant needs and learn as much as you can about transplantation and donation, we welcome your call to our toll-free patient services line at [phone].          Updated 4/1/2019

## 2022-02-22 NOTE — TELEPHONE ENCOUNTER
Called pt and informed him that he will be listed ACTIVE status on the SPK and K alone wait list as of today (2/22/22).  Verified pt's contact information, confirmed and updated phone contact priorities.  Explained to pt they will need to keep their phone on, charged, and answer calls from 24/7 (even if the number is not familiar).  Explained the pre-transplant process from [facility] to receiving an organ offer.  Informed the pt their new coordinator will be Gege Altamirano RN and provided her contact information.  Informed the pt to notify the [facility] coordinator if they plan to travel internationally, or are having any symptoms of an active infection.  Explained that the pt will need to provide PRA samples every 3 months, generated PRA order, letter, and routed to admin pool to send mailers to pt.  Pt had no further questions for me and expressed good understanding of the plan.

## 2022-02-23 ENCOUNTER — DOCUMENTATION ONLY (OUTPATIENT)
Dept: TRANSPLANT | Facility: CLINIC | Age: 67
End: 2022-02-23

## 2022-02-24 ENCOUNTER — TELEPHONE (OUTPATIENT)
Dept: TRANSPLANT | Facility: CLINIC | Age: 67
End: 2022-02-24
Payer: MEDICARE

## 2022-03-21 ENCOUNTER — LAB (OUTPATIENT)
Dept: LAB | Facility: CLINIC | Age: 67
End: 2022-03-21
Payer: MEDICARE

## 2022-03-21 ENCOUNTER — ORGAN (OUTPATIENT)
Dept: TRANSPLANT | Facility: CLINIC | Age: 67
End: 2022-03-21

## 2022-03-21 DIAGNOSIS — Z76.82 AWAITING ORGAN TRANSPLANT: ICD-10-CM

## 2022-03-21 DIAGNOSIS — Z76.82 AWAITING ORGAN TRANSPLANT: Primary | ICD-10-CM

## 2022-03-21 LAB — SARS-COV-2 RNA RESP QL NAA+PROBE: NEGATIVE

## 2022-03-21 PROCEDURE — 86833 HLA CLASS II HIGH DEFIN QUAL: CPT | Performed by: SURGERY

## 2022-03-21 PROCEDURE — 86825 HLA X-MATH NON-CYTOTOXIC: CPT | Performed by: SURGERY

## 2022-03-21 PROCEDURE — U0003 INFECTIOUS AGENT DETECTION BY NUCLEIC ACID (DNA OR RNA); SEVERE ACUTE RESPIRATORY SYNDROME CORONAVIRUS 2 (SARS-COV-2) (CORONAVIRUS DISEASE [COVID-19]), AMPLIFIED PROBE TECHNIQUE, MAKING USE OF HIGH THROUGHPUT TECHNOLOGIES AS DESCRIBED BY CMS-2020-01-R: HCPCS | Performed by: SURGERY

## 2022-03-21 PROCEDURE — 36415 COLL VENOUS BLD VENIPUNCTURE: CPT | Performed by: PATHOLOGY

## 2022-03-21 PROCEDURE — 86832 HLA CLASS I HIGH DEFIN QUAL: CPT | Performed by: SURGERY

## 2022-03-22 NOTE — TELEPHONE ENCOUNTER
Organ Offer Encounter Information    Organ Offer Information  Organ offer date & time: 3/21/2022  9:52 AM  Coordinator/Fellow/Attending name: Lore Eldridge RN   Organ(s):  Organ UNOS ID Match Run ID Comment Organ Laterality   Pancreas [id number] [place]OP    Kidney [id number] MNOP       Recent infections?: No    New medications?: No Recent pregnancy?: No   Angicoagulation medications?: Yes (Comment: ASA 81mg) Recent vaccinations?: No   Recent blood transfusions?: No Recent hospitalizations?: No   Has your insurance changed in the last 6-12 months?: Neg    Patient last dialyzed: 3/21/2022 10:00 AM  Dialysis type: Hemo  Discussed organ offer with: Patient  Patient/Caregiver name: Avelina  Discussed risk category with Patient/Other: DCD  Did not understand donor criteria, questions answered, verbalized understanding  Patient/Other asked to speak to a surgeon?: No  Discussed program-specific outcomes: Did not have questions regarding SRTR  Right to decline organ offer without penalty, Patient/Other: Aware of option to decline without penalty  Organ offer decision status Patient/Other: Accepted Offer  Organ disposition: Case Cancelled - Other (specify)  Additional Comments: 3/21/2022 9:54 AM   Called patient to discuss possible KP offer.  No answer, LM requesting call back ASAP.  Jamee Eldridge RN   Transplant Coordinator      March 21, 2022. 10:10 AM  KP/Panc: KP, Local, DCD  MD: Silva  OPO Contact: Sierra BOUDREAUX Results: Bernard - Compatible, no DSA  XM Plan (FXM must be done with serum no older than 10 days from transplant):  Will come to [facility] now for draw  Plan (Admission, NPO, Donor OR): Called patient for possible KP offer.  Health assessment as above, has HD this morning.  Discussed donor details, including DCD status.  Will come to [facility] lab now for XM and COVID (on his way to HD).  Orders placed.  Appt made, Evelyn in immunology aware.  Donor blood ETA 1030.  - - -   COVID Screening  In the past month,  have you had:  Any close contact with a suspect or laboratory-confirmed COVID-19 patient: No  Travel anywhere: No  COVID Symptoms (Fever, Cough, Short of Breath, Loss of Taste/Smell, Rash): No  Jamee Eldridge RN   Transplant Coordinator      March 21, 2022 3:49 PM  Admissions: 1549 - [place], ETA 1730  Unit: 1650 - Bri 7A, has bed available  - - -   Donor OR Time: 1800 CST   Procuring MD: Richard/Lucian   Contact in the OR: Sierra/Noemi   Organs Being Procured: HR, ADINA MORLEY, KI   Flush Solution: UW   Biopsy: No   Pump: N/A   Special Requests (Special blood tubes, nodes, waivers):  N/A   MD for Visualization: Angel   Transportation Details:  at 1515 at ER, tail #[id number]   Jamee Eldridge RN   Transplant Coordinator      March 21, 2022 5:09 PM  Final XM negative.  Reviewed with Dr. Ascencio, will have patient at home until WIT determined.  Updated patient.  He remains NPO at home.  Jamee Eldridge RN   Transplant Coordinator      March 21, 2022 6:54 PM  Case cancelled, updated patient.  Updated Bri on 7A and admission cancelled (Jaleesa).    Jamee Eldridge RN   Transplant Coordinator          Attestation I have discussed all of the above with the Patient/Legal Guardian/Caregiver regarding this organ offer.: Yes  Coordinator/Fellow/Attending name: Lore Eldridge RN

## 2022-03-23 LAB
CELL TYPE ALLO: NORMAL
CELL TYPE AUTO: NORMAL
CHANNELSHIFTALLOB1: -18
CHANNELSHIFTALLOT1: -6
CHANNELSHIFTAUTOB1: -12
CHANNELSHIFTAUTOT1: -13
CROSSMATCHDATEALLO: NORMAL
CROSSMATCHDATEAUTO: NORMAL
DONOR ALLO: NORMAL
DONOR AUTO: NORMAL
DONORCELLDATE ALLO: NORMAL
DONORCELLDATE AUTO: NORMAL
POS CUT OFF ALLO B: >93
POS CUT OFF ALLO T: >79
POS CUT OFF AUTO B: >93
POS CUT OFF AUTO T: >79
RESULT ALLO B1: NORMAL
RESULT ALLO T1: NORMAL
RESULT AUTO B1: NORMAL
RESULT AUTO T1: NORMAL
SERUM DATE ALLO B1: NORMAL
SERUM DATE ALLO T1: NORMAL
SERUM DATE AUTO B1: NORMAL
SERUM DATE AUTO T1: NORMAL
TESTMETHODALLO: NORMAL
TESTMETHODAUTO: NORMAL
TREATMENT ALLO B1: NORMAL
TREATMENT ALLO T1: NORMAL
TREATMENT AUTO B1: NORMAL
TREATMENT AUTO T1: NORMAL
ZZZCOMMENT ALLOB1: NORMAL

## 2022-03-28 DIAGNOSIS — Z76.82 AWAITING ORGAN TRANSPLANT: Primary | ICD-10-CM

## 2022-03-30 ENCOUNTER — ORGAN (OUTPATIENT)
Dept: TRANSPLANT | Facility: CLINIC | Age: 67
End: 2022-03-30
Payer: MEDICARE

## 2022-03-30 DIAGNOSIS — Z76.82 AWAITING ORGAN TRANSPLANT: Primary | ICD-10-CM

## 2022-03-31 LAB
CELL TYPE ALLO: NORMAL
CHANNELSHIFTALLOB1: -22
CHANNELSHIFTALLOT1: -50
CROSSMATCHDATEALLO: NORMAL
DONOR ALLO: NORMAL
DONORCELLDATE ALLO: NORMAL
POS CUT OFF ALLO B: >93
POS CUT OFF ALLO T: >79
RESULT ALLO B1: NORMAL
RESULT ALLO T1: NORMAL
SERUM DATE ALLO B1: NORMAL
SERUM DATE ALLO T1: NORMAL
TESTMETHODALLO: NORMAL
TREATMENT ALLO B1: NORMAL
TREATMENT ALLO T1: NORMAL
ZZZCOMMENT ALLOB1: NORMAL

## 2022-03-31 NOTE — TELEPHONE ENCOUNTER
Organ Offer Encounter Information    Organ Offer Information  Organ offer date & time: 3/30/2022  2:53 PM  Coordinator/Fellow/Attending name: Haylee Silav RN   Organ(s):  Organ UNOS ID Match Run ID Comment Organ Laterality   Pancreas [id number] MNOP    Kidney [id number] MNOP       Recent infections?: No    New medications?: No Recent pregnancy?: No   Angicoagulation medications?: Yes (Comment: 81mg ) Recent vaccinations?: No   Recent blood transfusions?: No Recent hospitalizations?: No   Has your insurance changed in the last 6-12 months?: Neg    Patient last dialyzed: 3/30/2022  3:00 PM  Dialysis type: Hemo  Discussed organ offer with: Patient  Discussed risk category with Patient/Other: N/A  Understood donor criteria, verbalized understanding  Patient/Other asked to speak to a surgeon?: No  Discussed program-specific outcomes: Did not have questions regarding SRTR  Right to decline organ offer without penalty, Patient/Other: Aware of option to decline without penalty  Organ offer decision status Patient/Other: Accepted Offer  Organ disposition: Case Cancelled - Donor quality - Other (specify)  Additional Comments: 3/30/2022 2:57 PM  KP/Panc: KP offer, local  MD: Dr. Reeves  OPO Contact: Delicia [phone]  VXM Results: Compatible, no DSA per Dr. Wagner  XM Plan (FXM must be done with serum no older than 10 days from transplant): Will use in lab serum from 3/21, per Dr. Ramos.  Plan (Admission, NPO, Donor OR): Donor OR set for 0430, no need for NPO at this time. Called patient at 1505 with no answer, left voicemail. Pt called back at 1510, discussed the offer, he is interested in moving forward. Will do FXM using in-lab serum dated on 3/21 and then keep Avelina NPO at midnight. All questions answered. Pt knows to call anytime with questions, leave his phone on and he will hear from our team early tomorrow morning.    - - -   COVID Screening  In the past month, have you had:  Any close contact with  a suspect or laboratory-confirmed COVID-19 patient: No  Travel anywhere: No  COVID Symptoms (Fever, Cough, Short of Breath, Loss of Taste/Smell, Rash): No    Donor OR Time: 0430 3/31  Procuring MD: Dr. Carvajal  Contact in the OR: Danae  Organs Being Procured: Heart, liver, panc & kidneys  Flush Solution: UW  Biopsy: On standby for liver, not requested for Kidneys (doesn't meet criteria)  Pump: Depends on timing, not requested at this time  Special Requests (Special blood tubes, nodes, waivers): N/A  MD for Visualization: Dr. Reeves  Transportation Details: Surgeons will be driven to and from [place], pickup time at the [facility] @ 3am    3/31/2022 6:08 AM:   Per Danae at  the pancreas is not going to be used, confirmed with Dr. Ramos. Called patient to notify him, answered all questions.   Haylee Silva  Transplant Coordinator    Attestation I have discussed all of the above with the Patient/Legal Guardian/Caregiver regarding this organ offer.: Yes  Coordinator/Fellow/Attending name: Halyee Silva RN

## 2022-04-03 ENCOUNTER — HEALTH MAINTENANCE LETTER (OUTPATIENT)
Age: 67
End: 2022-04-03

## 2022-04-11 ENCOUNTER — LAB (OUTPATIENT)
Dept: LAB | Facility: CLINIC | Age: 67
End: 2022-04-11
Payer: MEDICARE

## 2022-04-11 DIAGNOSIS — Z76.82 AWAITING ORGAN TRANSPLANT: ICD-10-CM

## 2022-04-11 PROCEDURE — 86832 HLA CLASS I HIGH DEFIN QUAL: CPT

## 2022-04-11 PROCEDURE — 86833 HLA CLASS II HIGH DEFIN QUAL: CPT

## 2022-04-14 LAB
PROTOCOL CUTOFF: NORMAL
SA 1 CELL: NORMAL
SA 1 TEST METHOD: NORMAL
SA 2 CELL: NORMAL
SA 2 TEST METHOD: NORMAL
SA1 HI RISK ABY: NORMAL
SA1 MOD RISK ABY: NORMAL
SA2 HI RISK ABY: NORMAL
SA2 MOD RISK ABY: NORMAL
UNACCEPTABLE ANTIGENS: NORMAL
UNOS CPRA: 7
ZZZSA 1  COMMENTS: NORMAL
ZZZSA 2 COMMENTS: NORMAL

## 2022-05-12 DIAGNOSIS — Z76.82 AWAITING ORGAN TRANSPLANT: Primary | ICD-10-CM

## 2022-05-13 ENCOUNTER — ORGAN (OUTPATIENT)
Dept: TRANSPLANT | Facility: CLINIC | Age: 67
End: 2022-05-13
Payer: MEDICARE

## 2022-06-03 DIAGNOSIS — Z76.82 AWAITING ORGAN TRANSPLANT: Primary | ICD-10-CM

## 2022-06-06 DIAGNOSIS — Z76.82 AWAITING ORGAN TRANSPLANT: Primary | ICD-10-CM

## 2022-06-07 DIAGNOSIS — Z76.82 AWAITING ORGAN TRANSPLANT: Primary | ICD-10-CM

## 2022-07-01 ENCOUNTER — DOCUMENTATION ONLY (OUTPATIENT)
Dept: TRANSPLANT | Facility: CLINIC | Age: 67
End: 2022-07-01

## 2022-07-01 DIAGNOSIS — Z76.82 AWAITING ORGAN TRANSPLANT: Primary | ICD-10-CM

## 2022-07-01 NOTE — PROGRESS NOTES
PATHOLOGY HLA CROSSMATCH CONSULTATION: DONOR/RECIPIENT  VIRTUAL CROSSMATCH-Kidney-pancreas  Consultation Date: 2022  Consultation Requested by: Dr. Jay  Regarding: Compatibility of  donor organ UNOS #[id number] with Avelina Marion    Findings: Regarding a virtual crossmatch between Avelina Marion and  donor listed above (match ID [id number]):  The most recent and historic antisera  were analyzed.  The patient has no antibodies listed with specificity against the donor organ.  Moderate risk antibodies against the C locus were not considered as these are not usually clinically relevant.       Record Review Indicates: I personally reviewed the most recent serum and historic  sera.  The patient has no antibodies against the donor organ.  Based on historical data from this \[facility]\""'s histocompatibility lab, the probability of an incompatible B cell crossmatch is near 0%.     The results of this virtual XM are:   -most recent serum: compatible   -peak #1: compatible          Disclaimer: Clinical judgement must take into account other factors, such as non-HLA antibodies not detected in the assay. The VXM gives probabilities only.  The probability does not account for the potential for auto-antibodies that may be present in the patient's serum.  These autoantibodies may render the physical crossmatch falsely positive, and would be detected by an autologous crossmatch.  When possible, confirm findings with prospective allogeneic and autologous flow crossmatches before going to transplant.         Mary Lou Peralta MD, PhD  Transfusion Medicine Attending  Medical Director, Blood Bank Laboratory  Associate Medical Director, HLA Lab  Pager [phone]

## 2022-07-10 DIAGNOSIS — Z76.82 AWAITING ORGAN TRANSPLANT: Primary | ICD-10-CM

## 2022-07-11 ENCOUNTER — ORGAN (OUTPATIENT)
Dept: TRANSPLANT | Facility: CLINIC | Age: 67
End: 2022-07-11

## 2022-07-11 ENCOUNTER — LAB (OUTPATIENT)
Dept: LAB | Facility: CLINIC | Age: 67
End: 2022-07-11
Payer: MEDICARE

## 2022-07-11 DIAGNOSIS — Z76.82 AWAITING ORGAN TRANSPLANT: ICD-10-CM

## 2022-07-11 DIAGNOSIS — N18.6 ESRD (END STAGE RENAL DISEASE) (H): ICD-10-CM

## 2022-07-11 DIAGNOSIS — E11.9 DIABETES MELLITUS, TYPE 2 (H): ICD-10-CM

## 2022-07-11 DIAGNOSIS — Z01.810 PRE-OPERATIVE CARDIOVASCULAR EXAMINATION: ICD-10-CM

## 2022-07-11 DIAGNOSIS — Z12.5 PROSTATE CANCER SCREENING: ICD-10-CM

## 2022-07-11 DIAGNOSIS — Z76.82 ORGAN TRANSPLANT CANDIDATE: ICD-10-CM

## 2022-07-11 PROCEDURE — 86833 HLA CLASS II HIGH DEFIN QUAL: CPT

## 2022-07-11 PROCEDURE — 86832 HLA CLASS I HIGH DEFIN QUAL: CPT

## 2022-07-11 NOTE — TELEPHONE ENCOUNTER
Organ Offer Encounter Information    Organ Offer Information  Organ offer date & time: 7/11/2022  9:27 AM  Coordinator/Fellow/Attending name: Lucero Griffin RN   Organ(s):  Organ UNOS ID Match Run ID Comment Organ Laterality   Pancreas [id number] [facility]OP ([facility] [place])    Kidney [id number] MNOP ([facility])       Recent infections?: No    New medications?: No Recent pregnancy?: No   Angicoagulation medications?: Yes (Comment: 81mg asa) Recent vaccinations?: Yes (Comment: COVID booster 7/1, pnuemonia 7/1)   Recent blood transfusions?: No Recent hospitalizations?: No   Has your insurance changed in the last 6-12 months?: Neg    Patient last dialyzed: 7/11/2022  9:39 AM  Dialysis type: Hemo  Discussed organ offer with: Patient  Patient/Caregiver name: Avelina Marion  Discussed risk category with Patient/Other: N/A  Understood donor criteria, verbalized understanding  Patient/Other asked to speak to a surgeon?: No  Discussed program-specific outcomes: Did not have questions regarding SRTR  Right to decline organ offer without penalty, Patient/Other: Aware of option to decline without penalty  Organ offer decision status Patient/Other: Accepted Offer  Organ disposition: Case Cancelled - Other (specify)  Additional Comments: 7/11/2022 9:29 AM- py called & left message  KP/Panc: KP backup  MD: Fiorella/Richard  OPO Contact: Danae [phone]  VXM Results: neg  XM Plan (FXM must be done with serum no older than 10 days from transplant): TBD  Plan (Admission, NPO, Donor OR): Pt called with back up offer. Aware that he is NOT primary at this time. Pt is going to HD now & should be done by 1400. Pt is aware of donor OR at 1300 & he will be contacted this afternoon with more information. Pt to be NPO now. All questions answered. Pt has Donor  contact information for any questions.   - - -   COVID Screening  In the past month, have you:  Or anyone close to you had a positive COVID test or suspected to  have COVID: no  Had any COVID symptoms (Fever, Cough, Short of Breath, Loss of Taste/Smell, Rash): no      7/11/2022 3:36 PM  Per MNOP Lucy, KP is placed before [facility]NUM. Dr. Ramos and Pt updated. Pt understanding and will await the next offer.   Lucero Griffin RN  Donor     Attestation I have discussed all of the above with the Patient/Legal Guardian/Caregiver regarding this organ offer.: Yes  Coordinator/Fellow/Attending name: Lucero Griffin, RN

## 2022-07-15 ENCOUNTER — DOCUMENTATION ONLY (OUTPATIENT)
Dept: TRANSPLANT | Facility: CLINIC | Age: 67
End: 2022-07-15

## 2022-07-15 NOTE — CONFIDENTIAL NOTE
PATHOLOGY HLA CROSSMATCH CONSULTATION: DONOR/RECIPIENT  VIRTUAL CROSSMATCH - Kidney-Pancreas  Consultation Date: 07/10/2022  Consultation Requested by: Dr. Ramos    Regarding: Compatibility of  donor organ UNOS #[id number] from OPO: MNOP  with Avelina Marion    Findings: Regarding a virtual crossmatch between Avelina Marion and  donor listed above (match ID [id number]):  The most recent (2022) and 4 additional patient sera  were analyzed.  The patient has no antibodies listed with HLA specificity against the donor organ.      Record Review Indicates: I personally reviewed the most recent serum, the historic peak sera, and all other sera with solid-phase HLA Single Antigen test results:  The patient has no HLA antibodies against the donor organ.     The results of this virtual XM are:   -most recent serum: compatible   -peak #1: compatible  -peak #2: compatible    Disclaimer: Clinical judgement must take into account other factors, such as non-HLA antibodies not detected in the assay. The VXM gives probabilities only.  The probability does not account for the potential for auto-antibodies that may be present in the patient's serum.  These autoantibodies may render the physical crossmatch falsely positive, and would be detected by an autologous crossmatch.  When possible, confirm findings with prospective allogeneic and autologous flow crossmatches before going to transplant as clinically indicated.     This virtual crossmatch took 20 minutes for data collection, analysis and charting time      Stan Bullock, PhD, DABCC, A([facility])  Immunology/Histocompatibility Laboratory  Pager [phone]

## 2022-07-16 DIAGNOSIS — Z76.82 AWAITING ORGAN TRANSPLANT: Primary | ICD-10-CM

## 2022-07-24 ENCOUNTER — HEALTH MAINTENANCE LETTER (OUTPATIENT)
Age: 67
End: 2022-07-24

## 2022-08-07 DIAGNOSIS — Z76.82 AWAITING ORGAN TRANSPLANT: Primary | ICD-10-CM

## 2022-08-08 ENCOUNTER — ORGAN (OUTPATIENT)
Dept: TRANSPLANT | Facility: CLINIC | Age: 67
End: 2022-08-08

## 2022-08-08 ENCOUNTER — LAB (OUTPATIENT)
Dept: LAB | Facility: CLINIC | Age: 67
End: 2022-08-08
Payer: MEDICARE

## 2022-08-08 DIAGNOSIS — Z76.82 AWAITING ORGAN TRANSPLANT: ICD-10-CM

## 2022-08-08 DIAGNOSIS — Z76.82 AWAITING ORGAN TRANSPLANT: Primary | ICD-10-CM

## 2022-08-08 LAB — SARS-COV-2 RNA RESP QL NAA+PROBE: NEGATIVE

## 2022-08-08 PROCEDURE — 36415 COLL VENOUS BLD VENIPUNCTURE: CPT | Performed by: PATHOLOGY

## 2022-08-08 PROCEDURE — 86825 HLA X-MATH NON-CYTOTOXIC: CPT | Performed by: SURGERY

## 2022-08-08 PROCEDURE — U0005 INFEC AGEN DETEC AMPLI PROBE: HCPCS | Performed by: SURGERY

## 2022-08-08 PROCEDURE — 86833 HLA CLASS II HIGH DEFIN QUAL: CPT | Performed by: SURGERY

## 2022-08-08 PROCEDURE — 86832 HLA CLASS I HIGH DEFIN QUAL: CPT | Performed by: SURGERY

## 2022-08-09 ENCOUNTER — ANESTHESIA (OUTPATIENT)
Dept: MEDSURG UNIT | Facility: CLINIC | Age: 67
End: 2022-08-09
Payer: MEDICARE

## 2022-08-09 ENCOUNTER — APPOINTMENT (OUTPATIENT)
Dept: GENERAL RADIOLOGY | Facility: CLINIC | Age: 67
End: 2022-08-09
Attending: SURGERY
Payer: MEDICARE

## 2022-08-09 ENCOUNTER — ANESTHESIA EVENT (OUTPATIENT)
Dept: MEDSURG UNIT | Facility: CLINIC | Age: 67
End: 2022-08-09
Payer: MEDICARE

## 2022-08-09 ENCOUNTER — DOCUMENTATION ONLY (OUTPATIENT)
Dept: TRANSPLANT | Facility: CLINIC | Age: 67
End: 2022-08-09

## 2022-08-09 ENCOUNTER — HOSPITAL ENCOUNTER (OUTPATIENT)
Facility: CLINIC | Age: 67
Discharge: HOME | End: 2022-08-09
Attending: SURGERY | Admitting: SURGERY
Payer: MEDICARE

## 2022-08-09 VITALS
BODY MASS INDEX: 28 KG/M2 | OXYGEN SATURATION: 99 % | TEMPERATURE: 98.9 F | HEART RATE: 62 BPM | SYSTOLIC BLOOD PRESSURE: 129 MMHG | HEIGHT: 72 IN | RESPIRATION RATE: 18 BRPM | DIASTOLIC BLOOD PRESSURE: 69 MMHG | WEIGHT: 206.7 LBS

## 2022-08-09 PROBLEM — C44.310 BCC (BASAL CELL CARCINOMA), FACE: Status: ACTIVE | Noted: 2022-08-09

## 2022-08-09 PROBLEM — Z76.82 PANCREAS TRANSPLANT CANDIDATE: Status: ACTIVE | Noted: 2022-08-09

## 2022-08-09 LAB
ABO/RH(D): NORMAL
ALBUMIN MFR UR ELPH: 89.7 MG/DL
ALBUMIN SERPL BCG-MCNC: 4.5 G/DL (ref 3.5–5.2)
ALBUMIN UR-MCNC: 70 MG/DL
ALP SERPL-CCNC: 64 U/L (ref 40–129)
ALT SERPL W P-5'-P-CCNC: 7 U/L (ref 10–50)
AMYLASE SERPL-CCNC: 65 U/L (ref 28–100)
ANION GAP SERPL CALCULATED.3IONS-SCNC: 13 MMOL/L (ref 7–15)
ANTIBODY SCREEN: NEGATIVE
APPEARANCE UR: CLEAR
APTT PPP: 29 SECONDS (ref 22–38)
AST SERPL W P-5'-P-CCNC: 24 U/L (ref 10–50)
ATRIAL RATE - MUSE: 80 BPM
BILIRUB SERPL-MCNC: 0.5 MG/DL
BILIRUB UR QL STRIP: NEGATIVE
BLD PROD TYP BPU: NORMAL
BLD PROD TYP BPU: NORMAL
BLOOD COMPONENT TYPE: NORMAL
BLOOD COMPONENT TYPE: NORMAL
BUN SERPL-MCNC: 34.2 MG/DL (ref 8–23)
CALCIUM SERPL-MCNC: 8.3 MG/DL (ref 8.8–10.2)
CHLORIDE SERPL-SCNC: 97 MMOL/L (ref 98–107)
CMV IGG SERPL IA-ACNC: <0.2 U/ML
CMV IGG SERPL IA-ACNC: NORMAL
CODING SYSTEM: NORMAL
CODING SYSTEM: NORMAL
COLOR UR AUTO: ABNORMAL
CREAT SERPL-MCNC: 5.66 MG/DL (ref 0.67–1.17)
CREAT UR-MCNC: 164 MG/DL
CROSSMATCH: NORMAL
CROSSMATCH: NORMAL
DEPRECATED HCO3 PLAS-SCNC: 27 MMOL/L (ref 22–29)
DIASTOLIC BLOOD PRESSURE - MUSE: NORMAL MMHG
EBV VCA IGG SER IA-ACNC: 59.4 U/ML
EBV VCA IGG SER IA-ACNC: POSITIVE
ERYTHROCYTE [DISTWIDTH] IN BLOOD BY AUTOMATED COUNT: 12.7 % (ref 10–15)
GFR SERPL CREATININE-BSD FRML MDRD: 10 ML/MIN/1.73M2
GLUCOSE BLDC GLUCOMTR-MCNC: 121 MG/DL (ref 70–99)
GLUCOSE BLDC GLUCOMTR-MCNC: 147 MG/DL (ref 70–99)
GLUCOSE SERPL-MCNC: 153 MG/DL (ref 70–99)
GLUCOSE UR STRIP-MCNC: 70 MG/DL
HBA1C MFR BLD: 7 %
HBV CORE AB SERPL QL IA: NONREACTIVE
HBV SURFACE AB SERPL IA-ACNC: 93.38 M[IU]/ML
HBV SURFACE AG SERPL QL IA: NONREACTIVE
HCT VFR BLD AUTO: 41 % (ref 40–53)
HCV AB SERPL QL IA: NONREACTIVE
HGB BLD-MCNC: 13.7 G/DL (ref 13.3–17.7)
HGB UR QL STRIP: ABNORMAL
HIV 1+2 AB+HIV1 P24 AG SERPL QL IA: NONREACTIVE
HYALINE CASTS: 1 /LPF
INR PPP: 0.99 (ref 0.85–1.15)
INTERPRETATION ECG - MUSE: NORMAL
KETONES UR STRIP-MCNC: NEGATIVE MG/DL
LEUKOCYTE ESTERASE UR QL STRIP: NEGATIVE
LIPASE SERPL-CCNC: 38 U/L (ref 13–60)
MCH RBC QN AUTO: 32.1 PG (ref 26.5–33)
MCHC RBC AUTO-ENTMCNC: 33.4 G/DL (ref 31.5–36.5)
MCV RBC AUTO: 96 FL (ref 78–100)
NITRATE UR QL: NEGATIVE
P AXIS - MUSE: -1 DEGREES
PH UR STRIP: 6.5 [PH] (ref 5–7)
PLATELET # BLD AUTO: 230 10E3/UL (ref 150–450)
POTASSIUM SERPL-SCNC: 3.6 MMOL/L (ref 3.4–5.3)
PR INTERVAL - MUSE: 162 MS
PROT SERPL-MCNC: 7.7 G/DL (ref 6.4–8.3)
PROT/CREAT 24H UR: 0.55 MG/MG CR (ref 0–0.2)
PROTOCOL CUTOFF: NORMAL
QRS DURATION - MUSE: 96 MS
QT - MUSE: 416 MS
QTC - MUSE: 445 MS
R AXIS - MUSE: -5 DEGREES
RBC # BLD AUTO: 4.27 10E6/UL (ref 4.4–5.9)
RBC URINE: 6 /HPF
SA 1 CELL: NORMAL
SA 1 TEST METHOD: NORMAL
SA 2 CELL: NORMAL
SA 2 TEST METHOD: NORMAL
SA1 HI RISK ABY: NORMAL
SA1 MOD RISK ABY: NORMAL
SA2 HI RISK ABY: NORMAL
SA2 MOD RISK ABY: NORMAL
SODIUM SERPL-SCNC: 137 MMOL/L (ref 136–145)
SP GR UR STRIP: 1.02 (ref 1–1.03)
SPECIMEN EXPIRATION DATE: NORMAL
SPERM #/AREA URNS HPF: PRESENT /HPF
SQUAMOUS EPITHELIAL: <1 /HPF
SYSTOLIC BLOOD PRESSURE - MUSE: NORMAL MMHG
T AXIS - MUSE: 32 DEGREES
UNACCEPTABLE ANTIGENS: NORMAL
UNIT ABO/RH: NORMAL
UNIT ABO/RH: NORMAL
UNIT NUMBER: NORMAL
UNIT NUMBER: NORMAL
UNIT STATUS: NORMAL
UNIT STATUS: NORMAL
UNIT TYPE ISBT: 600
UNIT TYPE ISBT: 600
UNOS CPRA: 7
UROBILINOGEN UR STRIP-MCNC: NORMAL MG/DL
VENTRICULAR RATE- MUSE: 69 BPM
WBC # BLD AUTO: 8.2 10E3/UL (ref 4–11)
WBC URINE: 1 /HPF
ZZZSA 1  COMMENTS: NORMAL
ZZZSA 2 COMMENTS: NORMAL

## 2022-08-09 PROCEDURE — 83036 HEMOGLOBIN GLYCOSYLATED A1C: CPT | Performed by: NURSE PRACTITIONER

## 2022-08-09 PROCEDURE — 82962 GLUCOSE BLOOD TEST: CPT

## 2022-08-09 PROCEDURE — 86665 EPSTEIN-BARR CAPSID VCA: CPT | Performed by: STUDENT IN AN ORGANIZED HEALTH CARE EDUCATION/TRAINING PROGRAM

## 2022-08-09 PROCEDURE — 83690 ASSAY OF LIPASE: CPT | Performed by: STUDENT IN AN ORGANIZED HEALTH CARE EDUCATION/TRAINING PROGRAM

## 2022-08-09 PROCEDURE — 87389 HIV-1 AG W/HIV-1&-2 AB AG IA: CPT | Performed by: STUDENT IN AN ORGANIZED HEALTH CARE EDUCATION/TRAINING PROGRAM

## 2022-08-09 PROCEDURE — 82150 ASSAY OF AMYLASE: CPT | Performed by: STUDENT IN AN ORGANIZED HEALTH CARE EDUCATION/TRAINING PROGRAM

## 2022-08-09 PROCEDURE — 87340 HEPATITIS B SURFACE AG IA: CPT | Performed by: STUDENT IN AN ORGANIZED HEALTH CARE EDUCATION/TRAINING PROGRAM

## 2022-08-09 PROCEDURE — 93010 ELECTROCARDIOGRAM REPORT: CPT | Mod: 59 | Performed by: INTERNAL MEDICINE

## 2022-08-09 PROCEDURE — 86645 CMV ANTIBODY IGM: CPT | Performed by: STUDENT IN AN ORGANIZED HEALTH CARE EDUCATION/TRAINING PROGRAM

## 2022-08-09 PROCEDURE — 71046 X-RAY EXAM CHEST 2 VIEWS: CPT | Mod: 26 | Performed by: RADIOLOGY

## 2022-08-09 PROCEDURE — 86706 HEP B SURFACE ANTIBODY: CPT | Performed by: STUDENT IN AN ORGANIZED HEALTH CARE EDUCATION/TRAINING PROGRAM

## 2022-08-09 PROCEDURE — 81001 URINALYSIS AUTO W/SCOPE: CPT | Performed by: STUDENT IN AN ORGANIZED HEALTH CARE EDUCATION/TRAINING PROGRAM

## 2022-08-09 PROCEDURE — 87521 HEPATITIS C PROBE&RVRS TRNSC: CPT | Performed by: STUDENT IN AN ORGANIZED HEALTH CARE EDUCATION/TRAINING PROGRAM

## 2022-08-09 PROCEDURE — 86901 BLOOD TYPING SEROLOGIC RH(D): CPT | Performed by: STUDENT IN AN ORGANIZED HEALTH CARE EDUCATION/TRAINING PROGRAM

## 2022-08-09 PROCEDURE — 85610 PROTHROMBIN TIME: CPT | Performed by: STUDENT IN AN ORGANIZED HEALTH CARE EDUCATION/TRAINING PROGRAM

## 2022-08-09 PROCEDURE — 85027 COMPLETE CBC AUTOMATED: CPT | Performed by: STUDENT IN AN ORGANIZED HEALTH CARE EDUCATION/TRAINING PROGRAM

## 2022-08-09 PROCEDURE — 86803 HEPATITIS C AB TEST: CPT | Performed by: STUDENT IN AN ORGANIZED HEALTH CARE EDUCATION/TRAINING PROGRAM

## 2022-08-09 PROCEDURE — 80053 COMPREHEN METABOLIC PANEL: CPT | Performed by: STUDENT IN AN ORGANIZED HEALTH CARE EDUCATION/TRAINING PROGRAM

## 2022-08-09 PROCEDURE — 86850 RBC ANTIBODY SCREEN: CPT | Performed by: STUDENT IN AN ORGANIZED HEALTH CARE EDUCATION/TRAINING PROGRAM

## 2022-08-09 PROCEDURE — 86644 CMV ANTIBODY: CPT | Performed by: STUDENT IN AN ORGANIZED HEALTH CARE EDUCATION/TRAINING PROGRAM

## 2022-08-09 PROCEDURE — 93005 ELECTROCARDIOGRAM TRACING: CPT

## 2022-08-09 PROCEDURE — 99207 PR NO BILLABLE SERVICE THIS VISIT: CPT | Mod: GC | Performed by: DERMATOLOGY

## 2022-08-09 PROCEDURE — 999N000128 HC STATISTIC PERIPHERAL IV START W/O US GUIDANCE

## 2022-08-09 PROCEDURE — 85730 THROMBOPLASTIN TIME PARTIAL: CPT | Performed by: STUDENT IN AN ORGANIZED HEALTH CARE EDUCATION/TRAINING PROGRAM

## 2022-08-09 PROCEDURE — 36415 COLL VENOUS BLD VENIPUNCTURE: CPT | Performed by: STUDENT IN AN ORGANIZED HEALTH CARE EDUCATION/TRAINING PROGRAM

## 2022-08-09 PROCEDURE — 84156 ASSAY OF PROTEIN URINE: CPT | Performed by: STUDENT IN AN ORGANIZED HEALTH CARE EDUCATION/TRAINING PROGRAM

## 2022-08-09 PROCEDURE — 86790 VIRUS ANTIBODY NOS: CPT | Performed by: STUDENT IN AN ORGANIZED HEALTH CARE EDUCATION/TRAINING PROGRAM

## 2022-08-09 PROCEDURE — 86923 COMPATIBILITY TEST ELECTRIC: CPT | Performed by: SURGERY

## 2022-08-09 PROCEDURE — 71046 X-RAY EXAM CHEST 2 VIEWS: CPT

## 2022-08-09 PROCEDURE — 86704 HEP B CORE ANTIBODY TOTAL: CPT | Performed by: STUDENT IN AN ORGANIZED HEALTH CARE EDUCATION/TRAINING PROGRAM

## 2022-08-09 RX ORDER — PIPERACILLIN SODIUM, TAZOBACTAM SODIUM 3; .375 G/15ML; G/15ML
3.38 INJECTION, POWDER, LYOPHILIZED, FOR SOLUTION INTRAVENOUS ONCE
Status: DISCONTINUED | OUTPATIENT
Start: 2022-08-09 | End: 2022-08-09

## 2022-08-09 RX ORDER — FENTANYL CITRATE 50 UG/ML
25 INJECTION, SOLUTION INTRAMUSCULAR; INTRAVENOUS EVERY 5 MIN PRN
Status: CANCELLED | OUTPATIENT
Start: 2022-08-09

## 2022-08-09 RX ORDER — SODIUM CHLORIDE, SODIUM LACTATE, POTASSIUM CHLORIDE, CALCIUM CHLORIDE 600; 310; 30; 20 MG/100ML; MG/100ML; MG/100ML; MG/100ML
INJECTION, SOLUTION INTRAVENOUS CONTINUOUS
Status: CANCELLED | OUTPATIENT
Start: 2022-08-09

## 2022-08-09 RX ORDER — ONDANSETRON 2 MG/ML
4 INJECTION INTRAMUSCULAR; INTRAVENOUS EVERY 30 MIN PRN
Status: CANCELLED | OUTPATIENT
Start: 2022-08-09

## 2022-08-09 RX ORDER — OXYCODONE HYDROCHLORIDE 5 MG/1
5 TABLET ORAL EVERY 4 HOURS PRN
Status: CANCELLED | OUTPATIENT
Start: 2022-08-09

## 2022-08-09 RX ORDER — ACETAMINOPHEN 325 MG/1
975 TABLET ORAL ONCE
Status: CANCELLED | OUTPATIENT
Start: 2022-08-09 | End: 2022-08-09

## 2022-08-09 RX ORDER — KETOROLAC TROMETHAMINE 15 MG/ML
15 INJECTION, SOLUTION INTRAMUSCULAR; INTRAVENOUS EVERY 6 HOURS PRN
Status: CANCELLED | OUTPATIENT
Start: 2022-08-09 | End: 2022-08-14

## 2022-08-09 RX ORDER — NICOTINE POLACRILEX 4 MG
15-30 LOZENGE BUCCAL
Status: DISCONTINUED | OUTPATIENT
Start: 2022-08-09 | End: 2022-08-09 | Stop reason: HOSPADM

## 2022-08-09 RX ORDER — HYDROMORPHONE HCL IN WATER/PF 6 MG/30 ML
0.2 PATIENT CONTROLLED ANALGESIA SYRINGE INTRAVENOUS EVERY 5 MIN PRN
Status: CANCELLED | OUTPATIENT
Start: 2022-08-09

## 2022-08-09 RX ORDER — FENTANYL CITRATE 50 UG/ML
25 INJECTION, SOLUTION INTRAMUSCULAR; INTRAVENOUS
Status: CANCELLED | OUTPATIENT
Start: 2022-08-09

## 2022-08-09 RX ORDER — ONDANSETRON 4 MG/1
4 TABLET, ORALLY DISINTEGRATING ORAL EVERY 30 MIN PRN
Status: CANCELLED | OUTPATIENT
Start: 2022-08-09

## 2022-08-09 RX ORDER — DEXTROSE MONOHYDRATE 25 G/50ML
25-50 INJECTION, SOLUTION INTRAVENOUS
Status: DISCONTINUED | OUTPATIENT
Start: 2022-08-09 | End: 2022-08-09 | Stop reason: HOSPADM

## 2022-08-09 RX ORDER — LIDOCAINE 40 MG/G
CREAM TOPICAL
Status: DISCONTINUED | OUTPATIENT
Start: 2022-08-09 | End: 2022-08-09 | Stop reason: HOSPADM

## 2022-08-09 RX ORDER — OCTREOTIDE ACETATE 100 UG/ML
100 INJECTION, SOLUTION INTRAVENOUS; SUBCUTANEOUS ONCE
Status: DISCONTINUED | OUTPATIENT
Start: 2022-08-09 | End: 2022-08-09

## 2022-08-09 ASSESSMENT — ACTIVITIES OF DAILY LIVING (ADL)
ADLS_ACUITY_SCORE: 35

## 2022-08-09 ASSESSMENT — LIFESTYLE VARIABLES: TOBACCO_USE: 1

## 2022-08-09 NOTE — LETTER
August 9, 2022    Avelina Marion  [address] [place] [address]      Dear Mr. Marion,    This letter is sent to notify you that your listing status was changed from Active to Inactive on the kidneyand kidney and pancreas transplant waitlists at the [facility].  Your status was changed because you need to have a skin cancer lesion completely removed.    During this waiting period, we may still request periodic laboratory tests with your primary physician.  It will be your responsibility to remind your physician to forward your results to the Transplant Office.    We still need to be kept informed of any changes such as:    o changes in your insurance coverage  o change in your phone number, address or emergency contact  o significant changes in your health  o significant infections (such as pneumonia or abscesses)  o blood transfusions  o any condition which requires hospitalization or surgery    If you have any questions regarding this letter, please contact your transplant coordinator directly at [phone] or the Transplant Office at [phone].    Sincerely,      Kidney and Pancreas Transplant Program  [facility]/[place]    Enclosures: UNOS Letter    CC: Care Team                        The Organ Procurement and Transplantation Network  Toll-free patient services line:     Your resource for organ transplant information    If you have a question regarding your own medical care, you always should call your transplant hospital first. However, for general organ transplant-related information, you can call the Organ Procurement and Transplantation Network (OPTN) toll-free patient services line at [phone]- [phone]. Anyone, including potential transplant candidates, candidates, recipients, family members, friends, living donors, and donor family members, can call this number to:          Talk about organ donation, living donation, the transplant process, the donation process, and transplant  policies.    Get a free patient information kit with helpful booklets, waiting list and transplant information, and a list of all transplant hospitals.    Ask questions about the OPTN website (https://optn.transplant.hrsa.gov/), the United Network for Organ Sharing s (UNOS) website (https://unos.org/), or the UNOS website for living donors and transplant recipients. (https://www.transplantliving.org/).    Learn how the OPTN can help you.    Talk about any concerns that you may have with a transplant hospital.    The [facility] transplant system, the OPTN, is managed under federal contract by the United Network for Organ Sharing (UNOS), which is a non-profit charitable organization. The OPTN helps create and define organ sharing policies that make the best use of donated organs. This process continuously evaluating new advances and discoveries so policies can be adapted to best serve patients waiting for transplants. To do so, the OPTN works closely with transplant professionals, transplant patients, transplant candidates, donor families, living donors, and the public. All transplant programs and organ procurement organizations throughout the country are OPTN members and are obligated to follow the policies the OPTN creates for allocating organs.    The OPTN also is responsible for:      Providing educational material for patients, the public, and professionals.    Raising awareness of the need for donated organs and tissue.    Coordinating organ procurement, matching, and placement.    Collecting information about every organ transplant and donation that occurs in the [place].    Remember, you should contact your transplant hospital directly if you have questions or concerns about your own medical care including medical records, work-up progress, and test results.    We are not your transplant hospital, and our staff will not be able to answer questions about your case, so please keep your transplant hospital s  phone number handy.    However, while you research your transplant needs and learn as much as you can about transplantation and donation, we welcome your call to our toll-free patient services line at [phone].          Updated 4/1/2019

## 2022-08-09 NOTE — Clinical Note
Rudy Huitron, Just changed patient from Active to Inactive on the K and KP lists r/t incomplete work-up .  Was admitted today for organ offer, needs basal cell skin lesion removed before can move forward with transplant.  Please verify status changes in Epic and UNOS.  Thanks, Gege

## 2022-08-09 NOTE — PLAN OF CARE
/69 (BP Location: Right arm)   Pulse 62   Temp 98.9  F (37.2  C)   Resp 18   Ht 1.829 m (6')   Wt 93.8 kg (206 lb 11.2 oz)   SpO2 99%   BMI 28.03 kg/m       Neuro: A/Ox3  VS: VSS on RA  Pain: Denies  GI: NPO most of shift now Regular diet. Denies nausea. No BM  : Oliguric on HD  BG: WNL  Skin: Intact  PIV removed  Activity - Denies  Education - Discharge instruction reviewed and signed by pt  Plan of Care - Discharge to his home, no transplant today

## 2022-08-09 NOTE — TELEPHONE ENCOUNTER
Organ Offer Encounter Information    Organ Offer Information  Organ offer date & time: 8/8/2022  8:07 AM  Coordinator/Fellow/Attending name: Lore Eldridge RN   Organ(s):  Organ UNOS ID Match Run ID Comment Organ Laterality   Pancreas [id number] [facility]OP    Kidney [id number] [facility]OP       Recent infections?: No    New medications?: No Recent pregnancy?: No   Angicoagulation medications?: No Recent vaccinations?: Yes (Comment: COVID booster, Pneumonia vaccine)   Recent blood transfusions?: No Recent hospitalizations?: No   Has your insurance changed in the last 6-12 months?: Neg    Patient last dialyzed: 8/8/2022  8:09 AM  Dialysis type: Hemo  Discussed organ offer with: Patient  Discussed risk category with Patient/Other: N/A  Understood donor criteria, verbalized understanding  Patient/Other asked to speak to a surgeon?: No  Discussed program-specific outcomes: Did not have questions regarding SRTR  Right to decline organ offer without penalty, Patient/Other: Aware of option to decline without penalty  Organ offer decision status Patient/Other: Accepted Offer  Organ disposition: Case Cancelled - Recipient medical condition  Additional Comments: 8/8/2022 8:14 AM  KP/Panc: Local, DBD  MD: Lala/Richard  OPO Contact:   VXM Results: Bernard  XM Plan (FXM must be done with serum no older than 10 days from transplant): Come to [facility] for blood draw this AM   Plan (Admission, NPO, Donor OR): Called patient with possible KP offer, going to dialysis today at 0930.  Donor OR likely tomorrow mid morning; patient will come to [facility] for blood draw and COVID test this AM.  Orders placed, lab appointment made, Chery in immunology notified.  - - -   COVID Screening  In the past month, have you:  Or anyone close to you had a positive COVID test or suspected to have COVID: No   Had any COVID symptoms (Fever, Cough, Short of Breath, Loss of Taste/Smell, Rash): No  Jamee Eldridge RN   Transplant Coordinator      August 8, 2022  5:23 PM  Final XM negative, reported to Dr. Ramos.  Will admit at 0700 for pre-op per Dr. Ramos.  Updated patient, admission instructions given.  Admissions: 1738 - [place], ETA 0600  Unit: [place]3 - Madai FONSECA notified  Immunology: 1746 - Evelyn notified of admission  Inpatient Lab (COVID Testing [phone], Option 2): Done outpatient  - - -   Donor OR Time: 1100 (8/9)  Procuring MD: Richard  Contact in the OR: Abby [phone]  Organs Being Procured: HR/LI/PA/KI  Flush Solution: UW  Biopsy: No  Pump: N/A  Special Requests (Special blood tubes, nodes, waivers): No  MD for Visualization: Lala  Transportation Details:  at 1040, Richard aware  Jamee Eldridge, KARINE   Transplant Coordinator      August 8, 2022 9:01 PM  Update Provider Entering Orders (XM Plan & COVID Testing):  2151 - BARAK LAINEZ [ Msg Id [id number] ] - does NOT need final XM or COVID testing  Book OR: 2103 - Ramona, booked for 1300  Vessel Storage Confirmation (PA/KP/LI): Ok to Bank, confirmed with Ramona  Blood Bank: 2110 - Zyad notified, need to update with  laterality  Research: ON HOLD  Jamee Eldridge RN   Transplant Coordinator      TBD  Update OR and Blood Bank with  laterality  TransNet/ABO Verification: 0805- LKI printed- verified by OR Ramona  Add Organ:     8/9/2022 1:09 PM  Case cancelled r/t BCC. OR MARIAN Evans , Blood Bank Patrice Leonard Immunology aware.   Lucero Griffin RN  Donor           Attestation I have discussed all of the above with the Patient/Legal Guardian/Caregiver regarding this organ offer.: Yes  Coordinator/Fellow/Attending name: Lore Eldridge, KARINE

## 2022-08-09 NOTE — CONSULTS
Dermatology Consult Note    67 M admitted for SPK transplant. 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 Transplant team wondering if this is a contraindication for surgery. From our perspective this is NOT a contraindication and they can proceed with SPK as planned. It is also okay to delay tx of this BCC for a few months after transplant if necessary. Follow up with  dermatologist for treatment.    Case discussed with on-call attending Dr. Hung and dermatologic surgeon Dr. Nixon.      Eunice Medina MD  Dermatology Resident PGY3    Plan of care discussed with me.    Panda Hung MD  Dermatology Attending

## 2022-08-09 NOTE — TELEPHONE ENCOUNTER
"TRANSPLANT OR REPORT    Organ: KP  Laterality (if known): TBD  Organ Location: Local    UNOS ID: [id number]  Donor OR Time: 1100  Expected/Actual Cross Clamp Time: 1230  Expected Organ Arrival Time: 1300    Surgeon: Lala  Time in OR: 1300  Time in 3C (N/A for MILLI): 1200    Recipient Details  Admission ETA: 0600  Unit: 7A  Isolation: No   Latex Allergy: No  : No  Diagnosis: ESRD/DM    Liver Transplants  Bypass/Perfusion: N/A  Cellsaver: N/A  Hemodialysis: N/A  ~ \"RENAL STAFF TEACHING SERVICE MEDICINE\" : Remind LI Fellow to discuss with nephrology on call.  ~ CRRT Resource Nurse: N/A  (Telephone Number for CRRT [phone])    Kidney/Panc Transplants  XM Status (Need to wait for XM?): Done, Negative    Liver or KP/PA Recipients - Vessel Banking:  Donor has positive serologies for HIV/HCV/HBV: No  Donor has risk criteria for HIV/HCV/HBV: No      Transplant Coordinator Contact Info: Jamee Morales      Vessel Bank Information  Transplant hospitals must not store a donor s extra vessels if the donor has tested positive for any of the following:   - HIV by antibody, antigen, or nucleic acid test (DON)   - Hepatitis B surface antigen (HBsAg)   - Hepatitis B (HBV) by DON   - Hepatitis C (HCV) by antibody or DON     Extra vessels from donors that do not test positive for HIV, HBV, or HCV as above may be stored      "

## 2022-08-09 NOTE — DISCHARGE SUMMARY
[facility]    Discharge Summary  Transplant Surgery    Date of Admission:  2022  Date of Discharge:  2022  Discharging Provider: Mary Perez NP / Shanon Reeves MD    Discharge Diagnoses   Active Problems:    Pancreas transplant candidate    BCC (basal cell carcinoma), face    History of Present Illness   Aevlina Marion is a 67 year old male with a past medical history of Type II DM, CKD, recent skin biopsy +BCC who presents for possible SPK.  Patient was notified as an acceptable  donor organ became available and presented for further pre-operative work-up.  Patient was informed of the risks and benefits regarding  donor organ transplantation, and has elected to proceed with possible SPK transplant.      Hospital Course   Avelina Marion was admitted on 2022.  The following problems were addressed during his hospitalization:    Patient admitted for possible simultaneous kidney pancreas transplant. On exam, noted to have skin lesion on nasal supratip. Patient was seen by dermatology at [facility] in July. Positive pathology of basal cell carcinoma involving margins from nasal skin biopsy on 22. Dermatology consulted. Plan to put patient on hold on the transplant list until complete excision and wound has healed. Patient in agreement with plan. Transplant coordinator notified.     Transplant coordinator: [facility] [phone]    Discharge Disposition   Discharged to home   Condition at discharge: Stable    Pending Results   These results will be followed up by transplant team  Unresulted Labs Ordered in the Past 30 Days of this Admission     Date and Time Order Name Status Description    2022  8:02 AM Prepare red blood cells (unit) Preliminary     2022  8:02 AM Prepare red blood cells (unit) Preliminary     2022  6:15 AM Hepatitis B surface antigen In process     2022  6:15 AM HIV Antigen Antibody Combo In  process     8/9/2022  6:15 AM HBV HCV HIV by DON In process     8/9/2022  6:15 AM Hepatitis B Surface Antibody In process     8/9/2022  6:15 AM EBV Capsid Antibody IgM In process     8/9/2022  6:15 AM BK Virus IgG Antibody In process     8/9/2022  6:15 AM CMV antibody IgM In process         Primary Care Physician   Amy Truong    Physical Exam   Temp: 98.9  F (37.2  C) Temp src: Oral BP: 129/69 Pulse: 62   Resp: 18 SpO2: 99 % O2 Device: None (Room air)    Vitals:    08/09/22 0622   Weight: 93.8 kg (206 lb 11.2 oz)     Vital Signs with Ranges  Temp:  [97.5  F (36.4  C)-98.9  F (37.2  C)] 98.9  F (37.2  C)  Pulse:  [62-82] 62  Resp:  [18] 18  BP: (129-142)/(69-72) 129/69  SpO2:  [98 %-99 %] 99 %  No intake/output data recorded.    Constitutional: awake, alert, sitting up in bed, NAD  HEENT: EOMI, MMM, no cervical lymphadenopathy  Respiratory: nonlabored breathing on room air, CTABL  Cardiovascular: RRR, no murmur.   GI: abdomen soft, nondistended, nontender  Skin: No rash  Musculoskeletal: moving all extremities; Femoral pulses +2/2  Neurologic: AOx3, no focal deficits  Neuropsychiatric: appropriate mood and affect    Consultations This Hospital Stay   NEPHROLOGY KIDNEY/PANCREAS TRANSPLANT ADULT IP CONSULT  DERMATOLOGY IP CONSULT  NURSING TO CONSULT FOR VASCULAR ACCESS CARE IP CONSULT    Time Spent on this Encounter   I have spent greater than 30 minutes on this discharge.    Discharge Orders   Discharge Medications   Current Discharge Medication List      CONTINUE these medications which have NOT CHANGED    Details   amLODIPine (NORVASC) 5 MG tablet Take 10 mg by mouth      aspirin (ASA) 81 MG chewable tablet chew and swallow one tablet by mouth every day      atorvastatin (LIPITOR) 20 MG tablet TAKE 1 TABLET BY MOUTH DAILY      Blood Glucose Monitoring Suppl (ACCU-CHEK GUIDE) w/Device KIT Use to test 4 times a day. Pharmacy to dispense brand based on insurance.      calcitRIOL (ROCALTROL) 0.25 MCG capsule Take  0.25 mcg by mouth daily      calcium acetate (PHOSLO) 667 MG CAPS capsule Take 667 mg by mouth      insulin glargine (LANTUS PEN) 100 UNIT/ML pen Inject Subcutaneous At Bedtime      lisinopril (ZESTRIL) 20 MG tablet Take 20 mg by mouth      meclizine 25 MG CHEW Take 25 mg by mouth every 6 hours as needed for dizziness         STOP taking these medications       sodium bicarbonate 650 MG tablet Comments:   Reason for Stopping:                  Reason for your hospital stay    You were admitted for a possible pancreas/kidney transplant.  Unfortunately, given your recent diagnoses on basal cell carcinoma of nasal skin, we discussed putting you on hold on the transplant list until this is removed and wound has healed.     Activity    Your activity upon discharge: activity as tolerated     When to contact your care team    Notify your coordinator when basal cell carcinoma is excised and wound has healed.     Transplant coordinator: Gege Altamirano [phone]     Follow Up and recommended labs and tests    Follow up with [facility] for ongoing management of basal cell carcinoma.     Diet    Follow this diet upon discharge: Dialysis diet         Data   Most Recent 3 CBC's:  Recent Labs   Lab Test 08/09/22  0626 12/30/21  1002 08/05/21 0223   WBC 8.2 7.7 9.1   HGB 13.7 12.9* 12.8*   MCV 96 100 98    198 213      Most Recent 3 BMP's:  Recent Labs   Lab Test 08/09/22  1115 08/09/22  0626 08/09/22  0616 12/30/21  1528 12/30/21  1002 08/05/21 0223   NA  --  137  --   --  137 136   POTASSIUM  --  3.6  --   --  4.4 3.9   CHLORIDE  --  97*  --   --  105 101   CO2  --  27  --   --  28 30   BUN  --  34.2*  --   --  30 44*   CR  --  5.66*  --   --  5.50* 6.36*   ANIONGAP  --  13  --   --  4 5   DAVID  --  8.3*  --   --  8.4* 8.6   * 153* 147*   < > 151* 136*    < > = values in this interval not displayed.     Most Recent 2 LFT's:  Recent Labs   Lab Test 08/09/22 0626 08/05/21 0223   AST 24 13   ALT 7*  17   ALKPHOS 64 71   BILITOTAL 0.5 0.5     Most Recent INR's and Anticoagulation Dosing History:  Anticoagulation Dose History     Recent Dosing and Labs Latest Ref Rng & Units 8/5/2021 8/9/2022    INR 0.85 - 1.15 1.07 0.99        Most Recent 3 Troponin's:No lab results found.  Most Recent Cholesterol Panel:No lab results found.  Most Recent 6 Bacteria Isolates From Any Culture (See EPIC Reports for Culture Details):No lab results found.  Most Recent TSH, T4 and A1c Labs:  Recent Labs   Lab Test 08/09/22  0626   A1C 7.0*     Results for orders placed or performed during the hospital encounter of 08/09/22   XR Chest 2 Views    Narrative    Exam:  Chest 2 view    History: End-stage renal disease, diabetes, hypertension, anemia.  Hemodialysis.    Indication: Preoperative image for kidney pancreas transplant.998    Comparison: CT chest 12/14/2021.    Findings: PA and lateral views of the chest. The trachea is midline.  Cardiac mediastinal silhouette is partially obscured by hilar  markings. Pulmonary vasculature is indistinct. Diffuse bilateral  interstitial thickening most prominent in the bilateral bases. No  focal airspace opacity or areas of consolidation. No pleural effusion,  no convincing pneumothorax. Known right upper lobe pulmonary nodule  identified on prior chest CT is not well-visualized on this  radiograph. The visualized bones and upper abdomen are unremarkable.      Impression    Impression:    1. No acute focal airspace disease  2. Redemonstration of diffuse bilateral interstitial fibrosis, most  significant in the bilateral lung bases, as seen on chest CT  12/14/2021,  mildly worsened as compared to prior chest x-ray dated  5/20/2021.    I have personally reviewed the examination and initial interpretation  and I agree with the findings.    KATHY BALLARD MD         SYSTEM ID:  [id number]   I have reviewed history, examined patient and discussed plan with the fellow/resident/VIVI.    I concur with the  findings in this note.    Time spent on discharge activities: 45 minutes.

## 2022-08-09 NOTE — PLAN OF CARE
DISCHARGE:  Patient with orders to discharge to his home   Pt left 7A around 1500 to meet with his ride at the [facility]

## 2022-08-09 NOTE — PLAN OF CARE
Admitted/transferred from: Home  Time of arrival on unit 6am  2 RN skin assessment completed by Jenn ALMANZA  Skin assessment finding: skin intact   Interventions/actions: n/a     Will continue to monitor.

## 2022-08-09 NOTE — PLAN OF CARE
VS: BP (!) 142/72 (BP Location: Right arm, Patient Position: Sitting, Cuff Size: Adult Regular)   Pulse 82   Temp 97.5  F (36.4  C) (Oral)   Resp 18   Ht 1.829 m (6')   Wt 93.8 kg (206 lb 11.2 oz)   SpO2 98%   BMI 28.03 kg/m      Cares: 0600 - 0730     Current condition: VSS and sitting on bed   Neuro: Aox4   Cardio: BP slightly elevated 142/72  Respiratory: WDL on RA   GI/: pt hasnt voided or had a BM since arriving   Skin: WDL  Diet: NPO for surgery today   Labs: waiting on morning lab results  BG: none (143 on arrival)   LDA:  no IV access  Mobility: Ind.   Pain: denies pain or nausea   PRN medications: none given   Plan of Care: plan OR time for 1300, continue with current POC and update MD with any changes

## 2022-08-09 NOTE — ANESTHESIA PREPROCEDURE EVALUATION
Anesthesia Pre-Procedure Evaluation    Patient: Avelina Marion   MRN: [id number] : 1955        Procedure : Procedure(s):  TRANSPLANT, KIDNEY AND PANCREAS,  DONOR          Past Medical History:   Diagnosis Date     Anemia in chronic kidney disease      Diabetic retinopathy of both eyes (H)      End stage renal disease (H)      Hypertension      Secondary renal hyperparathyroidism (H)      Type 2 diabetes mellitus (H)       Past Surgical History:   Procedure Laterality Date     CREATE FISTULA ARTERIOVENOUS UPPER EXTREMITY Left      CV CORONARY ANGIOGRAM N/A 2021    Procedure: CV CORONARY ANGIOGRAM;  Surgeon: Milton Cam MD;  Location:  [facility]      No Known Allergies   Social History     Tobacco Use     Smoking status: Former Smoker     Types: Cigarettes     Smokeless tobacco: Never Used     Tobacco comment: Quit    Substance Use Topics     Alcohol use: Never      Wt Readings from Last 1 Encounters:   21 92.5 kg (204 lb)        Anesthesia Evaluation   Pt has had prior anesthetic.         ROS/MED HX  ENT/Pulmonary:     (+) tobacco use (quit ), Past use,     Neurologic: Comment: Right-sided hearing loss  Right eye glaucoma       Cardiovascular:     (+) Dyslipidemia hypertension-----Previous cardiac testing   Echo: Date: 2021 Results:  Mild to moderate concentric wall thickening consistent with left ventricular hypertrophy is present. Global and regional left ventricular function is normal with an EF of 55-60%.  Right ventricular function, chamber size, wall motion, and thickness are normal.  No significant valvular abnormalities were noted.  Pulmonary artery systolic pressure is normal.  Stress Test: Date: Results:    ECG Reviewed: Date: Results:    Cath: Date: 2021 Results:  Mild non obstructive coronary artery disease.  - Prox Cx 20% / Mid Cx 40%  - Prox RCA 30%  - Ramus 30%  - Prox LAD 30%    METS/Exercise Tolerance:     Hematologic:     (+) anemia,      Musculoskeletal:       GI/Hepatic:  - neg GI/hepatic ROS     Renal/Genitourinary:     (+) renal disease, type: ESRD, Pt requires dialysis, type: Hemodialysis,     Endo: Comment: Secondary renal hyperparathyroidism    (+) type II DM, Last HgA1c: 6.1, date: 8/5/2022, Using insulin, Diabetic complications: nephropathy retinopathy.     Psychiatric/Substance Use:  - neg psychiatric ROS     Infectious Disease:  - neg infectious disease ROS     Malignancy:  - neg malignancy ROS     Other:               OUTSIDE LABS:  CBC:   Lab Results   Component Value Date    WBC 7.7 12/30/2021    WBC 9.1 08/05/2021    HGB 12.9 (L) 12/30/2021    HGB 12.8 (L) 08/05/2021    HCT 39.3 (L) 12/30/2021    HCT 38.3 (L) 08/05/2021     12/30/2021     08/05/2021     BMP:   Lab Results   Component Value Date     12/30/2021     08/05/2021    POTASSIUM 4.4 12/30/2021    POTASSIUM 3.9 08/05/2021    CHLORIDE 105 12/30/2021    CHLORIDE 101 08/05/2021    CO2 28 12/30/2021    CO2 30 08/05/2021    BUN 30 12/30/2021    BUN 44 (H) 08/05/2021    CR 5.50 (H) 12/30/2021    CR 6.36 (H) 08/05/2021    GLC 83 12/30/2021     (H) 12/30/2021     COAGS:   Lab Results   Component Value Date    PTT 29 08/05/2021    INR 1.07 08/05/2021     POC: No results found for: BGM, HCG, HCGS  HEPATIC:   Lab Results   Component Value Date    ALBUMIN 4.4 08/05/2021    PROTTOTAL 8.3 08/05/2021    ALT 17 08/05/2021    AST 13 08/05/2021    ALKPHOS 71 08/05/2021    BILITOTAL 0.5 08/05/2021     OTHER:   Lab Results   Component Value Date    A1C 6.1 (H) 08/05/2021    DAVID 8.4 (L) 12/30/2021    CRP <2.9 10/29/2021    SED 17 10/29/2021     ESRD due to hypertension and diabetes, diabetes on insulin, dialysis since April 2021, EF 55 to 60%, Hemoglobin 12.9, diabetic neuropathy cardiac cath on 12/20/2021 shows mild nonobstructive coronary disease.      Plan    Artery line in the contralateral side of AV graft/fistula for strict control of blood pressure  Central  line for immunosuppressant and CVP measurement  Insulin drip for strict blood glucose control  CMAC for intubation            Anesthesia Plan    ASA Status:  3   NPO Status:  NPO Appropriate    Anesthesia Type: General.     - Airway: ETT      Maintenance: Balanced.   Techniques and Equipment:     - Airway: Video-Laryngoscope     - Lines/Monitors: 2nd IV, Arterial Line, Central Line     Drips/Meds: Insulin.     Consents            Postoperative Care    Pain management: Multi-modal analgesia.   PONV prophylaxis: Ondansetron (or other 5HT-3)     Comments:                Marcus Egan MD

## 2022-08-09 NOTE — H&P
"[facility]    History and Physical  Solid Organ Transplant     Date of Admission:  2022    Assessment & Plan   Avelina Marion is a 67 year old male with a past medical history of Type II DM, CKD, recent skin biopsy +BCC who presents for possible SPK.  Patient was notified as an acceptable  donor organ became available and presented for further pre-operative work-up.  Patient was informed of the risks and benefits regarding  donor organ transplantation, and has elected to proceed with possible SPK transplant.      Plan:   -Pre-op labs including final crossmatch  -CXR  -EKG  -NPO status  -Surgeon to obtain formal consent  -COVID PCR  -[facility] Dermatology consult for +BCC on nasal skin biopsy    Mary Perez, NP    Code Status   Full Code     Primary Care Physician   Amy Truong    Chief Complaint   Potential kidney and pancreas transplant    History is obtained from the patient    History of Present Illness   Avelina Marion is a 67 year old male with PMH significant for HTN, Type II DM c/b retinopathy, neuropathy, and nephropathy who presents for a potential kidney/pancreas transplant on Insulin. Last QqmI9c=3.9 in . He currently dialyzes via a fistula on MWF with last session 22. He does make \"normal amounts\" of urine.      Preop evaluation CT chest  with interstitial fibrotic changes. He was seen by Pulmonology 10/21 who felt CT findings were related to chronic interstitial lung disease related to smoking. Recommend follow up in a year. Cardiac evaluation Dobutamine stress echo 2021, LVEF 60%, no WMA, negative stress.  Angio 21 Mild non obstructive coronary artery disease. Colonscopy 2021 (CE)Multiple small and large-mouthed diverticula were found in the sigmoid colon, descending colon and ascending colon. Repeat in 5 yrs. Last COVID-19 vaccine booster 2022.     At presentation, he reports he feels he is in his usual state " of health. Denies recent illness. Denies sore throat, loss of taste/smell, chest pain, SOB, N/V/D, constipation, swelling, open sores, unhealing wounds.     Recent skin biopsy 7/5/22 of skin on nasal supratip with pathology positive for basal cell carcinoma for which he is receiving treatment in September through [facility]. 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  He is scheduled for excision in September.    Past Medical History    I have reviewed this patient's medical history and updated it with pertinent information if needed.   Past Medical History:   Diagnosis Date     Anemia in chronic kidney disease      Diabetic retinopathy of both eyes (H)      End stage renal disease (H)      Hypertension      Secondary renal hyperparathyroidism (H)      Type 2 diabetes mellitus (H)        Past Surgical History   I have reviewed this patient's surgical history and updated it with pertinent information if needed.  Past Surgical History:   Procedure Laterality Date     CREATE FISTULA ARTERIOVENOUS UPPER EXTREMITY Left      CV CORONARY ANGIOGRAM N/A 12/30/2021    Procedure: CV CORONARY ANGIOGRAM;  Surgeon: Milton Cam MD;  Location:  [facility]       Prior to Admission Medications   Prior to Admission Medications   Prescriptions Last Dose Informant Patient Reported? Taking?   Blood Glucose Monitoring Suppl (ACCU-CHEK GUIDE) w/Device KIT   Yes No   Sig: Use to test 4 times a day. Pharmacy to dispense brand based on insurance.   amLODIPine (NORVASC) 5 MG tablet   Yes No   Sig: Take 10 mg by mouth   aspirin (ASA) 81 MG chewable tablet   Yes No   Sig: chew and swallow one tablet by mouth every day   atorvastatin (LIPITOR) 20 MG tablet   Yes No   Sig: TAKE 1 TABLET BY MOUTH DAILY   calcitRIOL (ROCALTROL) 0.25 MCG capsule   Yes No   Sig: Take 0.25 mcg by mouth daily   calcium acetate (PHOSLO) 667 MG CAPS capsule   Yes No   Sig:  Take 667 mg by mouth   insulin glargine (LANTUS PEN) 100 UNIT/ML pen   Yes No   Sig: Inject Subcutaneous At Bedtime   lisinopril (ZESTRIL) 20 MG tablet   Yes No   Sig: Take 20 mg by mouth   meclizine 25 MG CHEW   Yes No   Sig: Take 25 mg by mouth every 6 hours as needed for dizziness   sodium bicarbonate 650 MG tablet   Yes No   Patient not taking: Reported on 8/19/2021      Facility-Administered Medications: None     Allergies   No Known Allergies    Social History   I have reviewed this patient's social history and updated it with pertinent information if needed. Avelina Marion  reports that he has quit smoking. His smoking use included cigarettes. He has never used smokeless tobacco. He reports that he does not drink alcohol and does not use drugs.    Family History   I have reviewed this patient's family history and updated it with pertinent information if needed.   Family History   Problem Relation Age of Onset     Diabetes Brother      Kidney Disease Brother        Review of Systems   The 10 point Review of Systems is negative other than noted in the HPI.    Physical Exam   Temp: 97.5  F (36.4  C) Temp src: Oral BP: (!) 142/72 Pulse: 82   Resp: 18 SpO2: 98 % O2 Device: None (Room air)    Vital Signs with Ranges  Temp:  [97.5  F (36.4  C)] 97.5  F (36.4  C)  Pulse:  [82] 82  Resp:  [18] 18  BP: (142)/(72) 142/72  SpO2:  [98 %] 98 %  206 lbs 11.2 oz    Constitutional: awake, alert, sitting up in bed, NAD  HEENT: EOMI, MMM, no cervical lymphadenopathy  Respiratory: nonlabored breathing on room air, CTABL  Cardiovascular: RRR, no murmur.   GI: abdomen soft, nondistended, nontender  Skin: No rash  Musculoskeletal: moving all extremities; Femoral pulses +2/2  Neurologic: AOx3, no focal deficits  Neuropsychiatric: appropriate mood and affect    Data   I personally reviewed the EKG tracing showing NSR with occasional PVCs.  Results for orders placed or performed during the hospital encounter of 08/09/22 (from the past  24 hour(s))   CBC with platelets   Result Value Ref Range    WBC Count 8.2 4.0 - 11.0 10e3/uL    RBC Count 4.27 (L) 4.40 - 5.90 10e6/uL    Hemoglobin 13.7 13.3 - 17.7 g/dL    Hematocrit 41.0 40.0 - 53.0 %    MCV 96 78 - 100 fL    MCH 32.1 26.5 - 33.0 pg    MCHC 33.4 31.5 - 36.5 g/dL    RDW 12.7 10.0 - 15.0 %    Platelet Count 230 150 - 450 10e3/uL   INR   Result Value Ref Range    INR 0.99 0.85 - 1.15   Partial thromboplastin time   Result Value Ref Range    aPTT 29 22 - 38 Seconds   Comprehensive metabolic panel   Result Value Ref Range    Sodium 137 136 - 145 mmol/L    Potassium 3.6 3.4 - 5.3 mmol/L    Creatinine 5.66 (H) 0.67 - 1.17 mg/dL    Urea Nitrogen 34.2 (H) 8.0 - 23.0 mg/dL    Chloride 97 (L) 98 - 107 mmol/L    Carbon Dioxide (CO2) 27 22 - 29 mmol/L    Anion Gap 13 7 - 15 mmol/L    Glucose 153 (H) 70 - 99 mg/dL    Calcium 8.3 (L) 8.8 - 10.2 mg/dL    Protein Total 7.7 6.4 - 8.3 g/dL    Albumin 4.5 3.5 - 5.2 g/dL    Bilirubin Total 0.5 <=1.2 mg/dL    Alkaline Phosphatase 64 40 - 129 U/L    AST 24 10 - 50 U/L    ALT 7 (L) 10 - 50 U/L    GFR Estimate 10 (L) >60 mL/min/1.73m2   Amylase   Result Value Ref Range    Amylase 65 28 - 100 U/L   Lipase   Result Value Ref Range    Lipase 38 13 - 60 U/L   ABO/Rh type and screen    Narrative    The following orders were created for panel order ABO/Rh type and screen.  Procedure                               Abnormality         Status                     ---------                               -----------         ------                     Adult Type and Screen[[id number]]                            Final result                 Please view results for these tests on the individual orders.   Adult Type and Screen   Result Value Ref Range    ABO/RH(D) A NEG     Antibody Screen Negative Negative    SPECIMEN EXPIRATION DATE [id number]    Hemoglobin A1c   Result Value Ref Range    Hemoglobin A1C 7.0 (H) <5.7 %   EKG 12-lead, tracing only   Result Value Ref Range    Systolic  Blood Pressure  mmHg    Diastolic Blood Pressure  mmHg    Ventricular Rate 69 BPM    Atrial Rate 80 BPM    NC Interval 162 ms    QRS Duration 96 ms     ms    QTc 445 ms    P Axis -1 degrees    R AXIS -5 degrees    T Axis 32 degrees    Interpretation ECG       Sinus rhythm with marked sinus arrhythmia with occasional Premature ventricular complexes  Otherwise normal ECG  When compared with ECG of 30-DEC-2021 11:30,  No significant change was found     Prepare red blood cells (unit)   Result Value Ref Range    CROSSMATCH Compatible     UNIT ABO/RH A Neg     Unit Number [id number]     Unit Status Ready     Blood Component Type Red Blood Cells     Product Code M4981D41     CODING SYSTEM [id number]     UNIT TYPE ISBT 0600    Prepare red blood cells (unit)   Result Value Ref Range    CROSSMATCH Compatible     UNIT ABO/RH A Neg     Unit Number [id number]     Unit Status Ready     Blood Component Type Red Blood Cells     Product Code X1351R30     CODING SYSTEM [id number]     UNIT TYPE ISBT 0600    I have reviewed history, examined patient and discussed plan with the fellow/resident/VIVI.    I concur with the findings in this note.    Time spent on admission activities: 45 minutes.

## 2022-08-10 ENCOUNTER — COMMITTEE REVIEW (OUTPATIENT)
Dept: TRANSPLANT | Facility: CLINIC | Age: 67
End: 2022-08-10

## 2022-08-10 LAB
CMV IGM SERPL IA-ACNC: <8 AU/ML
CMV IGM SERPL IA-ACNC: NEGATIVE
EBV VCA IGM SER IA-ACNC: <10 U/ML
EBV VCA IGM SER IA-ACNC: NORMAL

## 2022-08-10 NOTE — PROGRESS NOTES
Verified pts listing is inactive on kidney pancrease and isolated kidney waitlist in Epic and [facility]. -Elana ALMANZA RN

## 2022-08-11 LAB
CELL TYPE ALLO: NORMAL
CELL TYPE AUTO: NORMAL
CHANNELSHIFTALLOB1: -15
CHANNELSHIFTALLOB2: -10
CHANNELSHIFTALLOT1: -46
CHANNELSHIFTALLOT2: -42
CHANNELSHIFTAUTOB1: -21
CHANNELSHIFTAUTOB2: -15
CHANNELSHIFTAUTOT1: -24
CHANNELSHIFTAUTOT2: -21
CROSSMATCHDATEALLO: NORMAL
CROSSMATCHDATEAUTO: NORMAL
DONOR ALLO: NORMAL
DONOR AUTO: NORMAL
DONORCELLDATE ALLO: NORMAL
DONORCELLDATE AUTO: NORMAL
HBV DNA SERPL QL NAA+PROBE: NORMAL
HCV RNA SERPL QL NAA+PROBE: NORMAL
HIV1+2 RNA SERPL QL NAA+PROBE: NORMAL
POS CUT OFF ALLO B: >93
POS CUT OFF ALLO T: >79
POS CUT OFF AUTO B: >93
POS CUT OFF AUTO T: >79
RESULT ALLO B1: NORMAL
RESULT ALLO B2: NORMAL
RESULT ALLO T1: NORMAL
RESULT ALLO T2: NORMAL
RESULT AUTO B1: NORMAL
RESULT AUTO B2: NORMAL
RESULT AUTO T1: NORMAL
RESULT AUTO T2: NORMAL
SERUM DATE ALLO B1: NORMAL
SERUM DATE ALLO B2: NORMAL
SERUM DATE ALLO T1: NORMAL
SERUM DATE ALLO T2: NORMAL
SERUM DATE AUTO B1: NORMAL
SERUM DATE AUTO B2: NORMAL
SERUM DATE AUTO T1: NORMAL
SERUM DATE AUTO T2: NORMAL
TESTMETHODALLO: NORMAL
TESTMETHODAUTO: NORMAL
TREATMENT ALLO B1: NORMAL
TREATMENT ALLO B2: NORMAL
TREATMENT ALLO T1: NORMAL
TREATMENT ALLO T2: NORMAL
TREATMENT AUTO B1: NORMAL
TREATMENT AUTO B2: NORMAL
TREATMENT AUTO T1: NORMAL
TREATMENT AUTO T2: NORMAL
ZZZCOMMENT ALLOB2: NORMAL

## 2022-08-11 NOTE — COMMITTEE REVIEW
Abdominal Patient Discussion Note Transplant Coordinator: Gege Altamirano  Transplant Surgeon:       Referring Physician: Referred Self    Committee Review Members:  Nephrology Jose Carlos Moreno, APRN CNP, Sharath Cottrell MD, Leia Ruiz MD, Jesu Mosquera MD   Pharmacist Gretchen Ramirez, [facility]    - Clinical Fabyalejandro Hunter, [facility], Elana Zamora, [facility]   Transplant Evelyn Rico PA-C, Gege Altamirano, KARINE, Rebecca Christopher RN, Tk Ascencio MD, Adia Mccormack, FORREST, Miki Hill, KARINE, Claudine Stearns, RN, Dedra Luo, RN, Ro Gomez, KARINE       Additional Discussion Notes and Findings:   Patient remains inactive status on the kidney and kidney/pancreas; need complete removal of skin cancer lesion (nose).

## 2022-08-17 LAB — BK VIRUS IGG ANTIBODY: ABNORMAL

## 2022-09-16 ENCOUNTER — TELEPHONE (OUTPATIENT)
Dept: TRANSPLANT | Facility: CLINIC | Age: 67
End: 2022-09-16

## 2022-09-16 NOTE — TELEPHONE ENCOUNTER
Pt called and stated he had the cancerous mole on his nose removed and his dermatologist has deemed him cancer-free.  He was seen by Dr. Chirag Jones at  on 9/9/22.  I explained that his coordinator is now KARINE De Guzman but that I will let her know and if she needs any further information/records she will reach out.  He had no further questions. Will alert Gege via staff message.

## 2022-09-19 ENCOUNTER — TELEPHONE (OUTPATIENT)
Dept: TRANSPLANT | Facility: CLINIC | Age: 67
End: 2022-09-19

## 2022-09-22 ENCOUNTER — TELEPHONE (OUTPATIENT)
Dept: TRANSPLANT | Facility: CLINIC | Age: 67
End: 2022-09-22

## 2022-09-22 NOTE — TELEPHONE ENCOUNTER
Update to patient that I have sent a communication to have patient contacted as soon as possible to schedule return waitlist appointments.  Requested patient contact me the middle of next week if he hasn't received a call from our scheduling team.  Patient verbalizes agreement with this plan.

## 2022-10-03 ENCOUNTER — HEALTH MAINTENANCE LETTER (OUTPATIENT)
Age: 67
End: 2022-10-03

## 2022-10-10 ENCOUNTER — LAB (OUTPATIENT)
Dept: LAB | Facility: CLINIC | Age: 67
End: 2022-10-10
Payer: MEDICARE

## 2022-10-10 DIAGNOSIS — Z76.82 AWAITING ORGAN TRANSPLANT: ICD-10-CM

## 2022-10-10 PROCEDURE — 86833 HLA CLASS II HIGH DEFIN QUAL: CPT

## 2022-10-10 PROCEDURE — 86832 HLA CLASS I HIGH DEFIN QUAL: CPT

## 2022-10-13 ENCOUNTER — TELEPHONE (OUTPATIENT)
Dept: PULMONOLOGY | Facility: CLINIC | Age: 67
End: 2022-10-13

## 2022-10-13 DIAGNOSIS — J84.9 INTERSTITIAL LUNG DISEASE (H): Primary | ICD-10-CM

## 2022-10-18 ENCOUNTER — OFFICE VISIT (OUTPATIENT)
Dept: TRANSPLANT | Facility: CLINIC | Age: 67
End: 2022-10-18
Attending: PHYSICIAN ASSISTANT
Payer: COMMERCIAL

## 2022-10-18 ENCOUNTER — OFFICE VISIT (OUTPATIENT)
Dept: NEPHROLOGY | Facility: CLINIC | Age: 67
End: 2022-10-18
Attending: PHYSICIAN ASSISTANT
Payer: MEDICARE

## 2022-10-18 ENCOUNTER — ANCILLARY PROCEDURE (OUTPATIENT)
Dept: CT IMAGING | Facility: CLINIC | Age: 67
End: 2022-10-18
Attending: INTERNAL MEDICINE
Payer: MEDICARE

## 2022-10-18 ENCOUNTER — ALLIED HEALTH/NURSE VISIT (OUTPATIENT)
Dept: TRANSPLANT | Facility: CLINIC | Age: 67
End: 2022-10-18
Attending: PHYSICIAN ASSISTANT
Payer: MEDICARE

## 2022-10-18 ENCOUNTER — LAB (OUTPATIENT)
Dept: LAB | Facility: CLINIC | Age: 67
End: 2022-10-18
Payer: MEDICARE

## 2022-10-18 VITALS
HEART RATE: 75 BPM | WEIGHT: 205.9 LBS | OXYGEN SATURATION: 97 % | SYSTOLIC BLOOD PRESSURE: 131 MMHG | HEIGHT: 71 IN | BODY MASS INDEX: 28.82 KG/M2 | DIASTOLIC BLOOD PRESSURE: 71 MMHG

## 2022-10-18 VITALS
BODY MASS INDEX: 28.41 KG/M2 | WEIGHT: 205.14 LBS | OXYGEN SATURATION: 97 % | SYSTOLIC BLOOD PRESSURE: 131 MMHG | TEMPERATURE: 98.9 F | HEART RATE: 75 BPM | DIASTOLIC BLOOD PRESSURE: 71 MMHG

## 2022-10-18 DIAGNOSIS — N18.6 ESRD (END STAGE RENAL DISEASE) (H): ICD-10-CM

## 2022-10-18 DIAGNOSIS — Z76.82 ORGAN TRANSPLANT CANDIDATE: ICD-10-CM

## 2022-10-18 DIAGNOSIS — E11.22 TYPE 2 DIABETES MELLITUS WITH CHRONIC KIDNEY DISEASE ON CHRONIC DIALYSIS, WITH LONG-TERM CURRENT USE OF INSULIN (H): ICD-10-CM

## 2022-10-18 DIAGNOSIS — J84.9 INTERSTITIAL LUNG DISEASE (H): ICD-10-CM

## 2022-10-18 DIAGNOSIS — E11.21 TYPE 2 DIABETES MELLITUS WITH DIABETIC NEPHROPATHY, WITH LONG-TERM CURRENT USE OF INSULIN (H): ICD-10-CM

## 2022-10-18 DIAGNOSIS — Z12.5 PROSTATE CANCER SCREENING: ICD-10-CM

## 2022-10-18 DIAGNOSIS — Z01.810 PRE-OPERATIVE CARDIOVASCULAR EXAMINATION: ICD-10-CM

## 2022-10-18 DIAGNOSIS — Z79.4 TYPE 2 DIABETES MELLITUS WITH DIABETIC NEPHROPATHY, WITH LONG-TERM CURRENT USE OF INSULIN (H): ICD-10-CM

## 2022-10-18 DIAGNOSIS — N18.6 TYPE 2 DIABETES MELLITUS WITH CHRONIC KIDNEY DISEASE ON CHRONIC DIALYSIS, WITH LONG-TERM CURRENT USE OF INSULIN (H): ICD-10-CM

## 2022-10-18 DIAGNOSIS — Z79.4 TYPE 2 DIABETES MELLITUS WITH CHRONIC KIDNEY DISEASE ON CHRONIC DIALYSIS, WITH LONG-TERM CURRENT USE OF INSULIN (H): ICD-10-CM

## 2022-10-18 DIAGNOSIS — Z99.2 TYPE 2 DIABETES MELLITUS WITH CHRONIC KIDNEY DISEASE ON CHRONIC DIALYSIS, WITH LONG-TERM CURRENT USE OF INSULIN (H): ICD-10-CM

## 2022-10-18 DIAGNOSIS — Z76.82 ORGAN TRANSPLANT CANDIDATE: Primary | ICD-10-CM

## 2022-10-18 DIAGNOSIS — E11.9 DIABETES MELLITUS, TYPE 2 (H): ICD-10-CM

## 2022-10-18 LAB — PSA SERPL-MCNC: 1.09 NG/ML (ref 0–4.5)

## 2022-10-18 PROCEDURE — 99204 OFFICE O/P NEW MOD 45 MIN: CPT | Performed by: NURSE PRACTITIONER

## 2022-10-18 PROCEDURE — 71250 CT THORAX DX C-: CPT | Mod: MG | Performed by: RADIOLOGY

## 2022-10-18 PROCEDURE — G1010 CDSM STANSON: HCPCS | Performed by: RADIOLOGY

## 2022-10-18 PROCEDURE — G0103 PSA SCREENING: HCPCS | Performed by: PHYSICIAN ASSISTANT

## 2022-10-18 PROCEDURE — 99215 OFFICE O/P EST HI 40 MIN: CPT | Performed by: SURGERY

## 2022-10-18 PROCEDURE — G0463 HOSPITAL OUTPT CLINIC VISIT: HCPCS

## 2022-10-18 PROCEDURE — 36415 COLL VENOUS BLD VENIPUNCTURE: CPT | Performed by: PATHOLOGY

## 2022-10-18 ASSESSMENT — PAIN SCALES - GENERAL: PAINLEVEL: NO PAIN (0)

## 2022-10-18 NOTE — PROGRESS NOTES
Transplant Surgery Consult Note    Medical record number: [id number]  YOB: 1955,   Consult requested by Dr. Mitchell for evaluation of kidney and pancreas transplant candidacy.    Assessment and Recommendations: Mr. Marion is a good candidate for transplantation and has a good understanding of the risks and benefits of this approach to management of renal failure and diabetes. The following issues should be addressed prior to transplant:     Mr. Marion has End stage renal failure due to diabetes mellitus type 2 whose condition is not expected to resolve, is expected to progress, and is expected to continue to develop related comorbid conditions.  Cardiology consult for cardiac risk stratification to be ordered: Yes. [facility] 2021 with mild non-obstructive dz  CT abdomen and pelvis without contrast to be ordered for assessment of vascular targets: No  Transplant listing labs ordered to include HLA, ABOx2, Cr, etc.  Dietician consult ordered: Yes  Social work consult ordered: Yes  Imaging reports reviewed:   CT form 2021  IMPRESSION:   1. No significant calcifications of the iliac vasculature.  2. Moderate coronary artery calcifications.  3. Chronic-appearing pulmonary opacities suggesting interstitial lung  disease versus scarring.   Radiology images reviewed: B iliac system suitable for KP  Recipient suitable to move forward with work up of living donors:  Yes  Repeat chest CT done today. Will review read  Had BCC in July (made inactive due to this) s/p Mohs. Follow up scheduled 10/25/2022    The majority of our visit was spent in counselling, discussing the medical and surgical risks of living or  donor kidney and pancreas transplantation, either in a simultaneous or sequential fashion. I contrasted approximate wait time for SPK vs living vs  donor kidneys from normal (0-85%) or higher (%) kidney donor profile index (KDPI) donors and their associated outcomes. I would not recommend  this individual to consider kidneys from high KDPI donors. The reason for this decision is best summarized as: multi-organ transplant candidate.  Access to transplant will be impacted by living donor availability and overall candidacy for SPK, as well as the influence of blood type and degree of sensitization. We discussed advantages of preemptive transplant as well as living donor kidney transplant, and graft and patient survival outcomes associated with these options. Potential surgical complications of kidney and pancreas transplantation include bleeding, clotting, infection, wound complications, anastomotic failure and other issues such as cardiac complications, pneumonia, deep venous thrombosis, pulmonary embolism, post transplant diabetes and death. We discussed the need for protocol biopsy of the kidney and the possible need for a ureteral stent (and subsequent removal). We discussed benefits and risks associated with different approaches to exocrine drainage of pancreatic secretions. We also discussed differences in the average length of stay, recovery process, and posttransplant lab and monitoring protocol. We discussed the risk of graft rejection and recurrent diabetic nephropathy in the setting of poor glycemic control. I emphasized the need for strict immunosuppression adherence and the potential for complications of immunosuppression such as skin cancer or lymphoma, as well as a very low but not zero risk of donor-derived disease transmission risks (infection, cancer). Mr. Marion asked good questions and the patient's candidacy will be reviewed at our Multidisciplinary Selection Committee. Thank you for the opportunity to participate in Mr. Marion's care.    Total time: 60 minutes        Louise Barros MD FACS  Assistant Professor of Surgery  Director, Living Kidney Donor Program.  ---------------------------------------------------------------------------------------------------    HPI:   Sanam has End stage renal failure due to diabetes mellitus type 2. The patient has had diabetes for 25 years. Management is by Lantus 20 units at bedtime. The patient usually checks his blood sugar 0 times/day.   Hypoglyemic unawareness is not an issue.  The diabetes is controlled.   Last hemoglobin A1c was 7% in august 2022.  Complications of diabetes include:    Retinopathy:  Yes   Neuropathy: No  Gastroparesis:  No    The patient is on dialysis since 4/2021    Has potential kidney donors:  No.  Interested in participation in paired exchange if a donor is willing: Doesn't know     The patient has the following pertinent history:       No    Yes  Dialysis:    []      [x] via:       Blood Transfusion                  [x]      []  Number of units:   Most recently:  Pregnancy:    [x]      [] Number:       Previous Transplant:  [x]      [] Details:    Cancer    []      [x] Comment: BCC on nose s/p Mohs  Kidney stones   [x]      [] Comment:      Recurrent infections  [x]      []  Type:                  Bladder dysfunction  [x]      [] Cause:    Claudication   [x]      [] Distance:    Previous Amputation  [x]      [] Cause:     Chronic anticoagulation  [x]      [] Indication:  Congregational  [x]      []     Past Medical History:   Diagnosis Date     Anemia in chronic kidney disease      BCC (basal cell carcinoma), face 8/9/2022     Diabetic retinopathy of both eyes (H)      End stage renal disease (H)      Hypertension      Secondary renal hyperparathyroidism (H)      Type 2 diabetes mellitus (H)      Past Surgical History:   Procedure Laterality Date     CREATE FISTULA ARTERIOVENOUS UPPER EXTREMITY Left      CV CORONARY ANGIOGRAM N/A 12/30/2021    Procedure: CV CORONARY ANGIOGRAM;  Surgeon: Milton Cam MD;  Location:  [facility]     Family History   Problem Relation Age of Onset     Diabetes Brother      Kidney Disease Brother      Social History     Socioeconomic History     Marital status:  Single     Spouse name: Not on file     Number of children: Not on file     Years of education: Not on file     Highest education level: Not on file   Occupational History     Not on file   Tobacco Use     Smoking status: Former     Types: Cigarettes     Smokeless tobacco: Never     Tobacco comments:     Quit 1984   Substance and Sexual Activity     Alcohol use: Never     Drug use: Never     Sexual activity: Not on file   Other Topics Concern     Parent/sibling w/ CABG, MI or angioplasty before 65F 55M? Not Asked   Social History Narrative     Not on file     Social Determinants of Health     Financial Resource Strain: Not on file   Food Insecurity: Not on file   Transportation Needs: Not on file   Physical Activity: Not on file   Stress: Not on file   Social Connections: Not on file   Intimate Partner Violence: Not on file   Housing Stability: Not on file       ROS:   CONSTITUTIONAL:  No fevers or chills  EYES: negative for icterus  ENT:  negative for hearing loss, tinnitus and sore throat  RESPIRATORY:  negative for cough, sputum, dyspnea  CARDIOVASCULAR:  negative for chest pain Fatigue  GASTROINTESTINAL:  negative for nausea, vomiting, diarrhea or constipation  GENITOURINARY:  negative for incontinence, dysuria, bladder emptying problems  HEME:  No easy bruising  INTEGUMENT:  negative for rash and pruritus  NEURO:  Negative for headache, seizure disorder    Allergies:   No Known Allergies    Medications:  Prescription Medications as of 10/18/2022       Rx Number Disp Refills Start End Last Dispensed Date Next Fill Date Owning Pharmacy    amLODIPine (NORVASC) 5 MG tablet    4/2/2021 10/18/2022   [facility] #[id number] - [place], [place] - [address] [facility]    Sig: Take 10 mg by mouth    Class: Historical    Route: Oral    aspirin (ASA) 81 MG chewable tablet    10/11/2019    [facility] #[id number] - [place], [place] [address] [facility]    Sig: chew and  swallow one tablet by mouth every day    Class: Historical    atorvastatin (LIPITOR) 20 MG tablet    7/19/2021    [facility] #[id number] - [place], [place] - [address] AT [address]    Sig: TAKE 1 TABLET BY MOUTH DAILY    Class: Historical    Blood Glucose Monitoring Suppl (ACCU-CHEK GUIDE) w/Device KIT    4/2/2021    [facility] ST[facility] #[id number] - [place], [place] - [address]    Sig: Use to test 4 times a day. Pharmacy to dispense brand based on insurance.    Class: Historical    calcitRIOL (ROCALTROL) 0.25 MCG capsule    4/24/2021    [facility] #[id number] - [place], [place] - [address]    Sig: Take 0.25 mcg by mouth daily    Class: Historical    Route: Oral    calcium acetate (PHOSLO) 667 MG CAPS capsule    1/5/2021    [facility] #[id number] - [place], [place] - [address]    Sig: Take 667 mg by mouth    Class: Historical    Route: Oral    insulin glargine (LANTUS PEN) 100 UNIT/ML pen        [facility] #[id number] - [place], [place] - [address]    Sig: Inject Subcutaneous At Bedtime    Class: Historical    Route: Subcutaneous    lisinopril (ZESTRIL) 20 MG tablet            Sig: Take 20 mg by mouth    Class: Historical    Route: Oral    meclizine 25 MG CHEW        [facility] #[id number] - [place], [place] - [address]    Sig: Take 25 mg by mouth every 6 hours as needed for dizziness    Class: Historical    Route: Oral          Exam:   Pulse:  [75] 75  BP: (131)/(71) 131/71  SpO2:  [97 %] 97 %  Appearance: in no apparent distress.   Skin: normal  Head and Neck: Normal, no rashes or jaundice  Respiratory: easy respirations, no audible wheezing.  Cardiovascular: RRR  Abdomen: rounded and obese, No distention and no surgical scars. ASIS palpable with slight effort.  Extremeties: femoral 2+/2+, Edema, none  Neuro: without  deficit     Diagnostics:   Recent Results (from the past 672 hour(s))   HLA Олег Class I, Single Antigen    Collection Time: 10/10/22 11:00 AM   Result Value Ref Range    SA 1 TEST METHOD SA EDTA FCS     SA 1 CELL Class I     SA1 HI RISK ОЛЕГ None     SA1 MOD RISK ОЛЕГ None     SA 1  COMMENTS        HLA PRA Test performed by modified testing procedure that may also include pretreatment of serum. Pretreatment may be the addition of fetal calf serum, EDTA, and/or adsorption.  High-risk, MFI > 3,000.  Mod-risk, -3,000.   HLA Олег Class II, Single Antigen    Collection Time: 10/10/22 11:00 AM   Result Value Ref Range    SA 2 TEST METHOD SA EDTA FCS     SA 2 CELL Class II     SA2 HI RISK ОЛЕГ None     SA2 MOD RISK ОЛЕГ None     SA 2 COMMENTS        HLA PRA Test performed by modified testing procedure that may also include pretreatment of serum. Pretreatment may be the addition of fetal calf serum, EDTA, and/or adsorption.  High-risk, MFI > 3,000.  Mod-risk, -3,000.   HLA Олег, CPRA    Collection Time: 10/10/22 11:00 AM   Result Value Ref Range    PROTOCOL CUTOFF Plan A, 500 mfi cumulative     UNOS CPRA 7     UNACCEPTABLE ANTIGENS A:23      No results found for: CPRA

## 2022-10-18 NOTE — PROGRESS NOTES
TRANSPLANT NEPHROLOGY WAITLIST VISIT    Assessment and Plan:  # Kidney/Pancreas Transplant Wait List Evaluation: Patient is a good candidate overall. Patient should be inactive on the wait list.    # ESKD from Type 2 Diabetes: doing OK on dialysis since 2021, but may benefit from a kidney transplant.    # Type 2 Diabetes: currently controlled with 20 units insulin daily. Given evidence of end organ damage, he may benefit from a pancreas transplant.    # Cardiac Risk: S/p LHC (2021) with nonobstructive disease.    # PAD: CT and iliacs reviewed by surgery in 2021 and vessels suitable for transplant.     # Evidence of interstitial changes: On  CT c/a/p. Thought to possibly be scarring from prior unknown exposure, per last pulmonary visit in 10/2021. PFTs normal. Updated CT chest today pending.    # BCC s/p Mohs (2022): derm clearance pending.      # Health Maintenance: 2021 Colonoscopy (repeat in 5 years): Up to date and Dental: should be UTD.      Discussed the risks and benefits of a transplant, including the risk of surgery and immunosuppression medications.    KDPI: We discussed approximate remaining wait time and how that is influenced by issues such as blood type and sensitization (PRA) and access to a living donor. I contrasted potential waiting time for living vs  donor kidneys from  normal (0-85%) or higher (%) kidney donor profile index (KDPI) donors and their associated outcomes. I would not recommend Mr. Marion to consider kidneys from high KDPI donors. The reason for this decision is best summarized as: multi-organ transplant candidate.    Patient presently appears to be enough of an acceptable kidney transplant recipient candidate to have any potential kidney donors start the evaluation process.  Patient s overall re-evaluation may require further discussion in the Transplant Program s multidisciplinary selection committee for a final recommendation on the patient s  suitability for transplant.      Reason for Visit:  Avelina Marion is a 67 year old male with ESKD from diabetes mellitus type 2, who presents for kidney/pancreas transplant wait list evaluation.     Date of Initial Transplant Evaluation:  8/2021        Current Transplant Phase: Waitlist: Inactive  Official UNOS Listing Date: 4/1/2021  Blood Type: A        cPRA: 7%       Date of cPRA: 10/2022  Transplant Coordinator: Gege Altamirano Transplant Office phone number [phone]          History of Present Illness:   Avelina Marion is a 67-year-old gentleman with history of ESRD secondary to type 2 diabetes, on dialysis since April 2021, CAD, BCC.            Interim Events:        Patient was active status and called in for organ offer but declined due to recent basal cell cancer diagnosed in July 2022.  He later underwent Mohs procedure in 8/2022 and has since had still has derm clearance pending.          Kidney Disease:        Kidney Disease Dx: Diabetes mellitus type 2        On Dialysis: Yes, Date initiated: April 2021 and Dialysis Type: [facility];       Primary Nephrologist: Dr. Mitchell          Diabetes: 20 units Lantus HS       Diabetic Control: Borderline control (HbA1c 7-9%)      Last HbA1c: 7.0%       Hypoglycemic Unawareness: No, not checking blood sugars, working on getting CGM,        New Issues: No           Cardiac/Vascular Disease History:       Known CAD: Yes S/p LHC (12/2021) with nonobstructive disease.       Last Cardiac Risk Assessment: 8/2021        Last Cardiac Stress Test/Coronary Angiogram: 12/2021       Known PAD/Claudication Symptoms: No significant calcifications of the iliac vasculature.on 8/2021 CT        Known Heart Failure: No       Arrhythmia:  No       Pulmonary Hypertension: No        Valvular Disease: No       Other: None       New Cardiac/Vascular Events: No         Functional Capacity/Frailty:        Stays active with yard work (cutting grass), walking and shopping with his nephew.  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  Ableto do stairs without chest pain or shortness of breath.       Allergy Testing Questions:   Medication that caused a reaction None   Antibiotics used that didn't give an allergic reaction?  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, with long-term current use of insulin (H)     Hypertension     Proliferative diabetic retinopathy (H)     Anemia in stage 5 chronic kidney disease, not on chronic dialysis (H)     Secondary hyperparathyroidism of renal origin (H)     Cardiac arrhythmia     CKD (chronic kidney disease) stage 5, GFR less than 15 ml/min (H)     Diabetes mellitus type II, uncontrolled     Glaucoma suspect of right eye     Hearing loss on right     Hyperlipidemia with target LDL less than 100     Hyperphosphatemia     Normal anion gap metabolic acidosis     Orthostatic hypotension     Status post cataract extraction of both eyes with insertion of intraocular lens     Type II diabetes mellitus with neurological manifestations (H)     Uremia     Vitamin D deficiency     Status post coronary angiogram     Pancreas transplant candidate     BCC (basal cell carcinoma), face       Personal History:  Former smoker      Allergies:  No Known Allergies     Medications:  Current Outpatient Medications   Medication Sig     amLODIPine (NORVASC) 5 MG tablet Take 10 mg by mouth     aspirin (ASA) 81 MG chewable tablet chew and swallow one tablet by mouth every day     atorvastatin (LIPITOR) 20 MG tablet TAKE 1 TABLET  BY MOUTH DAILY     Blood Glucose Monitoring Suppl (ACCU-CHEK GUIDE) w/Device KIT Use to test 4 times a day. Pharmacy to dispense brand based on insurance.     calcitRIOL (ROCALTROL) 0.25 MCG capsule Take 0.25 mcg by mouth daily     calcium acetate (PHOSLO) 667 MG CAPS capsule Take 667 mg by mouth     insulin glargine (LANTUS PEN) 100 UNIT/ML pen Inject Subcutaneous At Bedtime     lisinopril (ZESTRIL) 20 MG tablet Take 20 mg by mouth     meclizine 25 MG CHEW Take 25 mg by mouth every 6 hours as needed for dizziness     No current facility-administered medications for this visit.        Vitals:  There were no vitals taken for this visit.     Exam:  GENERAL APPEARANCE: alert and no distress  HENT: mouth without ulcers or lesions  LYMPHATICS: no cervical or supraclavicular nodes  RESP: lungs clear to auscultation - no rales, rhonchi or wheezes  CV: regular rhythm, normal rate, no rub, no murmur  FEMORAL PULSES: normal  EDEMA: no LE edema bilaterally  ABDOMEN: soft, nondistended, nontender, bowel sounds normal  MS: extremities normal - no gross deformities noted, no evidence of inflammation in joints, no muscle tenderness  SKIN: no rash

## 2022-10-18 NOTE — LETTER
10/18/2022         RE: Avelina Marion  [facility], [facility] [place] [address]        Dear Colleague,    Thank you for referring your patient, Avelina Marion, to the [facility] TRANSPLANT [facility]INIC. Please see a copy of my visit note below.    Transplant Surgery Consult Note    Medical record number: [id number]  YOB: 1955,   Consult requested by Dr. Mitchell for evaluation of kidney and pancreas transplant candidacy.    Assessment and Recommendations: Mr. Marion is a good candidate for transplantation and has a good understanding of the risks and benefits of this approach to management of renal failure and diabetes. The following issues should be addressed prior to transplant:     Mr. Marion has End stage renal failure due to diabetes mellitus type 2 whose condition is not expected to resolve, is expected to progress, and is expected to continue to develop related comorbid conditions.  Cardiology consult for cardiac risk stratification to be ordered: Yes. [facility] 2021 with mild non-obstructive dz  CT abdomen and pelvis without contrast to be ordered for assessment of vascular targets: No  Transplant listing labs ordered to include HLA, ABOx2, Cr, etc.  Dietician consult ordered: Yes  Social work consult ordered: Yes  Imaging reports reviewed:   CT form 2021  IMPRESSION:   1. No significant calcifications of the iliac vasculature.  2. Moderate coronary artery calcifications.  3. Chronic-appearing pulmonary opacities suggesting interstitial lung  disease versus scarring.   Radiology images reviewed: B iliac system suitable for KP  Recipient suitable to move forward with work up of living donors:  Yes  Repeat chest CT done today. Will review read  Had BCC in July (made inactive due to this) s/p Mohs. Follow up scheduled 10/25/2022    The majority of our visit was spent in counselling, discussing the medical and surgical risks of living or  donor kidney and pancreas transplantation, either in  a simultaneous or sequential fashion. I contrasted approximate wait time for SPK vs living vs  donor kidneys from normal (0-85%) or higher (%) kidney donor profile index (KDPI) donors and their associated outcomes. I would not recommend this individual to consider kidneys from high KDPI donors. The reason for this decision is best summarized as: multi-organ transplant candidate.  Access to transplant will be impacted by living donor availability and overall candidacy for SPK, as well as the influence of blood type and degree of sensitization. We discussed advantages of preemptive transplant as well as living donor kidney transplant, and graft and patient survival outcomes associated with these options. Potential surgical complications of kidney and pancreas transplantation include bleeding, clotting, infection, wound complications, anastomotic failure and other issues such as cardiac complications, pneumonia, deep venous thrombosis, pulmonary embolism, post transplant diabetes and death. We discussed the need for protocol biopsy of the kidney and the possible need for a ureteral stent (and subsequent removal). We discussed benefits and risks associated with different approaches to exocrine drainage of pancreatic secretions. We also discussed differences in the average length of stay, recovery process, and posttransplant lab and monitoring protocol. We discussed the risk of graft rejection and recurrent diabetic nephropathy in the setting of poor glycemic control. I emphasized the need for strict immunosuppression adherence and the potential for complications of immunosuppression such as skin cancer or lymphoma, as well as a very low but not zero risk of donor-derived disease transmission risks (infection, cancer). Mr. Marion asked good questions and the patient's candidacy will be reviewed at our Multidisciplinary Selection Committee. Thank you for the opportunity to participate in Mr. Marion's  care.    Total time: 60 minutes        Louise Barros MD FACS  Assistant Professor of Surgery  Director, Living Kidney Donor Program.  ---------------------------------------------------------------------------------------------------    HPI: Mr. Marion has End stage renal failure due to diabetes mellitus type 2. The patient has had diabetes for 25 years. Management is by Lantus 20 units at bedtime. The patient usually checks his blood sugar 0 times/day.   Hypoglyemic unawareness is not an issue.  The diabetes is controlled.   Last hemoglobin A1c was 7% in august 2022.  Complications of diabetes include:    Retinopathy:  Yes   Neuropathy: No  Gastroparesis:  No    The patient is on dialysis since 4/2021    Has potential kidney donors:  No.  Interested in participation in paired exchange if a donor is willing: Doesn't know     The patient has the following pertinent history:       No    Yes  Dialysis:    []      [x] via:       Blood Transfusion                  [x]      []  Number of units:   Most recently:  Pregnancy:    [x]      [] Number:       Previous Transplant:  [x]      [] Details:    Cancer    []      [x] Comment: BCC on nose s/p Mohs  Kidney stones   [x]      [] Comment:      Recurrent infections  [x]      []  Type:                  Bladder dysfunction  [x]      [] Cause:    Claudication   [x]      [] Distance:    Previous Amputation  [x]      [] Cause:     Chronic anticoagulation  [x]      [] Indication:  [place]avian  [x]      []     Past Medical History:   Diagnosis Date     Anemia in chronic kidney disease      BCC (basal cell carcinoma), face 8/9/2022     Diabetic retinopathy of both eyes (H)      End stage renal disease (H)      Hypertension      Secondary renal hyperparathyroidism (H)      Type 2 diabetes mellitus (H)      Past Surgical History:   Procedure Laterality Date     CREATE FISTULA ARTERIOVENOUS UPPER EXTREMITY Left      CV CORONARY ANGIOGRAM N/A 12/30/2021    Procedure: CV  CORONARY ANGIOGRAM;  Surgeon: Milton Cam MD;  Location:  [facility]     Family History   Problem Relation Age of Onset     Diabetes Brother      Kidney Disease Brother      Social History     Socioeconomic History     Marital status: Single     Spouse name: Not on file     Number of children: Not on file     Years of education: Not on file     Highest education level: Not on file   Occupational History     Not on file   Tobacco Use     Smoking status: Former     Types: Cigarettes     Smokeless tobacco: Never     Tobacco comments:     Quit 1984   Substance and Sexual Activity     Alcohol use: Never     Drug use: Never     Sexual activity: Not on file   Other Topics Concern     Parent/sibling w/ CABG, MI or angioplasty before 65F 55M? Not Asked   Social History Narrative     Not on file     Social Determinants of Health     Financial Resource Strain: Not on file   Food Insecurity: Not on file   Transportation Needs: Not on file   Physical Activity: Not on file   Stress: Not on file   Social Connections: Not on file   Intimate Partner Violence: Not on file   Housing Stability: Not on file       ROS:   CONSTITUTIONAL:  No fevers or chills  EYES: negative for icterus  ENT:  negative for hearing loss, tinnitus and sore throat  RESPIRATORY:  negative for cough, sputum, dyspnea  CARDIOVASCULAR:  negative for chest pain Fatigue  GASTROINTESTINAL:  negative for nausea, vomiting, diarrhea or constipation  GENITOURINARY:  negative for incontinence, dysuria, bladder emptying problems  HEME:  No easy bruising  INTEGUMENT:  negative for rash and pruritus  NEURO:  Negative for headache, seizure disorder    Allergies:   No Known Allergies    Medications:  Prescription Medications as of 10/18/2022       Rx Number Disp Refills Start End Last Dispensed Date Next Fill Date Owning Pharmacy    amLODIPine (NORVASC) 5 MG tablet    4/2/2021 10/18/2022   [id number] - [place], [place] - [address] AT [address] [facility]    Sig: Take 10 mg by mouth    Class: Historical    Route: Oral    aspirin (ASA) 81 MG chewable tablet    10/11/2019    [facility] #[id number] - [place], [place] - [address] AT [address] [facility]    Sig: chew and swallow one tablet by mouth every day    Class: Historical    atorvastatin (LIPITOR) 20 MG tablet    7/19/2021    [facility] #[id number] - [place], [place] - [address] AT [address] [facility]    Sig: TAKE 1 TABLET BY MOUTH DAILY    Class: Historical    Blood Glucose Monitoring Suppl (ACCU-CHEK GUIDE) w/Device KIT    4/2/2021    [facility] #[id number] - [place], [place] - [address] AT [address] [facility]    Sig: Use to test 4 times a day. Pharmacy to dispense brand based on insurance.    Class: Historical    calcitRIOL (ROCALTROL) 0.25 MCG capsule    4/24/2021    [facility] #[id number] - [place], [place] - [address] AT [address] [facility]    Sig: Take 0.25 mcg by mouth daily    Class: Historical    Route: Oral    calcium acetate (PHOSLO) 667 MG CAPS capsule    1/5/2021    [facility] #[id number] - [place], [place] - [address] AT [address] [facility]    Sig: Take 667 mg by mouth    Class: Historical    Route: Oral    insulin glargine (LANTUS PEN) 100 UNIT/ML pen        [facility] #[id number] - [place], [place] - [address] AT [address] [facility]    Sig: Inject Subcutaneous At Bedtime    Class: Historical    Route: Subcutaneous    lisinopril (ZESTRIL) 20 MG tablet            Sig: Take 20 mg by mouth    Class: Historical    Route: Oral    meclizine 25 MG CHEW        [facility] #[id number] - [place], [place] - [address] AT [address] [facility]    Sig: Take 25 mg by mouth every 6 hours as needed for dizziness    Class: Historical    Route: Oral          Exam:   Pulse:  [75] 75  BP: (131)/(71) 131/71  SpO2:  [97 %] 97 %  Appearance: in no apparent distress.   Skin: normal  Head and Neck: Normal, no rashes or  jaundice  Respiratory: easy respirations, no audible wheezing.  Cardiovascular: RRR  Abdomen: rounded and obese, No distention and no surgical scars. ASIS palpable with slight effort.  Extremeties: femoral 2+/2+, Edema, none  Neuro: without deficit     Diagnostics:   Recent Results (from the past 672 hour(s))   HLA Олег Class I, Single Antigen    Collection Time: 10/10/22 11:00 AM   Result Value Ref Range    SA 1 TEST METHOD SA EDTA FCS     SA 1 CELL Class I     SA1 HI RISK ОЛЕГ None     SA1 MOD RISK ОЛЕГ None     SA 1  COMMENTS        HLA PRA Test performed by modified testing procedure that may also include pretreatment of serum. Pretreatment may be the addition of fetal calf serum, EDTA, and/or adsorption.  High-risk, MFI > 3,000.  Mod-risk, -3,000.   HLA Олег Class II, Single Antigen    Collection Time: 10/10/22 11:00 AM   Result Value Ref Range    SA 2 TEST METHOD SA EDTA FCS     SA 2 CELL Class II     SA2 HI RISK ОЛЕГ None     SA2 MOD RISK ОЛЕГ None     SA 2 COMMENTS        HLA PRA Test performed by modified testing procedure that may also include pretreatment of serum. Pretreatment may be the addition of fetal calf serum, EDTA, and/or adsorption.  High-risk, MFI > 3,000.  Mod-risk, -3,000.   HLA Олег, CPRA    Collection Time: 10/10/22 11:00 AM   Result Value Ref Range    PROTOCOL CUTOFF Plan A, 500 mfi cumulative     UNOS CPRA 7     UNACCEPTABLE ANTIGENS A:23      No results found for: CPRA          Sincerely,    Louise Barros MD, MD

## 2022-10-18 NOTE — NURSING NOTE
Chief Complaint   Patient presents with     RECHECK       /71   Pulse 75   Temp 98.9  F (37.2  C) (Oral)   Wt 93.1 kg (205 lb 2.2 oz)   SpO2 97%   BMI 28.41 kg/m      Ludin Rodríguez on 10/18/2022 at 1:36 PM

## 2022-10-18 NOTE — LETTER
10/18/2022       RE: Avelina Marion  [address] [place] [address]     Dear Colleague,    Thank you for referring your patient, Avelina Marion, to the [facility] NEPHROLOGY [facility]IC [place] [facility]. Please see a copy of my visit note below.    TRANSPLANT NEPHROLOGY WAITLIST VISIT    Assessment and Plan:  # Kidney/Pancreas Transplant Wait List Evaluation: Patient is a good candidate overall. Patient should be inactive on the wait list.    # ESKD from Type 2 Diabetes: doing OK on dialysis since 2021, but may benefit from a kidney transplant.    # Type 2 Diabetes: currently controlled with 20 units insulin daily. Given evidence of end organ damage, he may benefit from a pancreas transplant.    # Cardiac Risk: S/p LHC (2021) with nonobstructive disease.    # PAD: CT and iliacs reviewed by surgery in 2021 and vessels suitable for transplant.     # Evidence of interstitial changes: On  CT c/a/p. Thought to possibly be scarring from prior unknown exposure, per last pulmonary visit in 10/2021. PFTs normal. Updated CT chest today pending.    # BCC s/p Mohs (2022): derm clearance pending.      # Health Maintenance: 2021 Colonoscopy (repeat in 5 years): Up to date and Dental: should be UTD.      Discussed the risks and benefits of a transplant, including the risk of surgery and immunosuppression medications.    KDPI: We discussed approximate remaining wait time and how that is influenced by issues such as blood type and sensitization (PRA) and access to a living donor. I contrasted potential waiting time for living vs  donor kidneys from  normal (0-85%) or higher (%) kidney donor profile index (KDPI) donors and their associated outcomes. I would not recommend Mr. Marion to consider kidneys from high KDPI donors. The reason for this decision is best summarized as: multi-organ transplant candidate.    Patient presently appears  to be enough of an acceptable kidney transplant recipient candidate to have any potential kidney donors start the evaluation process.  Patient s overall re-evaluation may require further discussion in the Transplant Program s multidisciplinary selection committee for a final recommendation on the patient s suitability for transplant.      Reason for Visit:  Avelina Marion is a 67 year old male with ESKD from diabetes mellitus type 2, who presents for kidney/pancreas transplant wait list evaluation.     Date of Initial Transplant Evaluation:  8/2021        Current Transplant Phase: Waitlist: Inactive  Official UNOS Listing Date: 4/1/2021  Blood Type: A        cPRA: 7%       Date of cPRA: 10/2022  Transplant Coordinator: Gege Altamirano Transplant Office phone number [phone]          History of Present Illness:   Avelina Marion is a 67-year-old gentleman with history of ESRD secondary to type 2 diabetes, on dialysis since April 2021, CAD, BCC.            Interim Events:        Patient was active status and called in for organ offer but declined due to recent basal cell cancer diagnosed in July 2022.  He later underwent Mohs procedure in 8/2022 and has since had still has derm clearance pending.          Kidney Disease:        Kidney Disease Dx: Diabetes mellitus type 2        On Dialysis: Yes, Date initiated: April 2021 and Dialysis Type: [facility];       Primary Nephrologist: Dr. Mitchell          Diabetes: 20 units Lantus HS       Diabetic Control: Borderline control (HbA1c 7-9%)      Last HbA1c: 7.0%       Hypoglycemic Unawareness: No, not checking blood sugars, working on getting CGM,        New Issues: No           Cardiac/Vascular Disease History:       Known CAD: Yes S/p LHC (12/2021) with nonobstructive disease.       Last Cardiac Risk Assessment: 8/2021        Last Cardiac Stress Test/Coronary Angiogram: 12/2021       Known PAD/Claudication Symptoms: No significant calcifications of the iliac vasculature.on  8/2021 CT        Known Heart Failure: No       Arrhythmia:  No       Pulmonary Hypertension: No        Valvular Disease: No       Other: None       New Cardiac/Vascular Events: No         Functional Capacity/Frailty:        Stays active with yard work (cutting grass), walking and shopping with his nephew. 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  Ableto do stairs without chest pain or shortness of breath.       Allergy Testing Questions:   Medication that caused a reaction None   Antibiotics used that didn't give an allergic reaction?  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, with long-term current use of insulin (H)     Hypertension     Proliferative diabetic retinopathy (H)     Anemia in stage 5 chronic kidney disease, not on chronic dialysis (H)     Secondary hyperparathyroidism of renal origin (H)     Cardiac arrhythmia     CKD (chronic kidney disease) stage 5, GFR less than 15 ml/min (H)     Diabetes mellitus type II, uncontrolled     Glaucoma suspect of right eye     Hearing loss on right     Hyperlipidemia with target LDL less than 100     Hyperphosphatemia     Normal anion gap metabolic acidosis     Orthostatic hypotension     Status post cataract extraction of both eyes with insertion of intraocular lens     Type II diabetes mellitus with neurological manifestations (H)     Uremia     Vitamin D deficiency     Status post coronary angiogram     Pancreas transplant candidate     BCC (basal cell carcinoma), face       Personal History:  Former  smoker      Allergies:  No Known Allergies     Medications:  Current Outpatient Medications   Medication Sig     amLODIPine (NORVASC) 5 MG tablet Take 10 mg by mouth     aspirin (ASA) 81 MG chewable tablet chew and swallow one tablet by mouth every day     atorvastatin (LIPITOR) 20 MG tablet TAKE 1 TABLET BY MOUTH DAILY     Blood Glucose Monitoring Suppl (ACCU-CHEK GUIDE) w/Device KIT Use to test 4 times a day. Pharmacy to dispense brand based on insurance.     calcitRIOL (ROCALTROL) 0.25 MCG capsule Take 0.25 mcg by mouth daily     calcium acetate (PHOSLO) 667 MG CAPS capsule Take 667 mg by mouth     insulin glargine (LANTUS PEN) 100 UNIT/ML pen Inject Subcutaneous At Bedtime     lisinopril (ZESTRIL) 20 MG tablet Take 20 mg by mouth     meclizine 25 MG CHEW Take 25 mg by mouth every 6 hours as needed for dizziness     No current facility-administered medications for this visit.        Vitals:  There were no vitals taken for this visit.     Exam:  GENERAL APPEARANCE: alert and no distress  HENT: mouth without ulcers or lesions  LYMPHATICS: no cervical or supraclavicular nodes  RESP: lungs clear to auscultation - no rales, rhonchi or wheezes  CV: regular rhythm, normal rate, no rub, no murmur  FEMORAL PULSES: normal  EDEMA: no LE edema bilaterally  ABDOMEN: soft, nondistended, nontender, bowel sounds normal  MS: extremities normal - no gross deformities noted, no evidence of inflammation in joints, no muscle tenderness  SKIN: no rash    Sincerely,    KWASI Oakes CNP

## 2022-10-18 NOTE — PROGRESS NOTES
"Patient Name: Avelina Marion  : 1955  Age: 67 year old  MRN: [id number]  Date of Initial Social Work Evaluation: 2021    Patient on transplant kidney/pancreas wait list inactive due to incomplete work up.  Saw today to update psychosocial assessment.      Presenting Information   Living Situation: in [place], [place] with sister Jasmyn, brother Al and Jasmyn's teenage children.  If not local, plans for short term stay:  N/A  Functional Status: Independent  Cultural/Language/Spiritual Considerations: None indicated at this time.    Support System  Primary Support Person Jasmyn  Other support:  Al  Plan for support in immediate post-transplant period: Jasmyn    Health Care Directive  Decision Maker: Self  Alternate Decision Maker: Siblings  Health Care Directive: Provided education and Declined completing    Mental Health/Coping:   History of Mental Health: Avelina denied any current or history of mental health issues.  History of Chemical Health: Avelina denied any current alcohol or substance use. Avelina reported he stopped drinking alcohol on his own in  as he reported he felt his was \"having too much fun\". He reported he previously smoked marijuana but stopped in . No history of chemical dependency treatment.   Current status: Appropriate  Coping: Avelina indicated when under stress he will talk with family and friends.  Services Needed/Recommended: None indicated at this time.    Financial   Income: sliding scale correction and works at the [employer] every year.  Impact of transplant on income: N/A  Insurance and medication coverage: Medicare Parts A and B and [employer] [place]  Financial concerns: None indicated at this time.  Resources needed: None indicated at this time.    Assessment and recommendations and plan:  Avelina continues to be on dialysis.  He continues to be an appropriate transplant candidate.  Reviewed transplant education (Medicare, rehabilitation, donor issues, community/financial resources, and " psych/family adjustment) as well as psychosocial risks of transplant. Provided patient with a copy of post-transplant informational sheet that includes information on potential costs of medications, Medicare ESRD, post-transplant lodging, etc. Patient seemed to process information well. Appeared well informed, motivated, and able to follow post transplant requirements. Behavior was appropriate during interview. Has adequate income and insurance coverage. Adequate social support. No major contraindications noted for transplant. At this time, patient appears to understand the risks and benefits of transplant.

## 2022-10-19 ENCOUNTER — TELEPHONE (OUTPATIENT)
Dept: TRANSPLANT | Facility: CLINIC | Age: 67
End: 2022-10-19

## 2022-10-19 ENCOUNTER — TELEPHONE (OUTPATIENT)
Dept: PULMONOLOGY | Facility: CLINIC | Age: 67
End: 2022-10-19

## 2022-10-19 NOTE — TELEPHONE ENCOUNTER
Called pt 2x no answer lvm sent my chart to scheduled with any avail Doctor via call center [phone]

## 2022-10-19 NOTE — TELEPHONE ENCOUNTER
Patient reports he received a message from Dr. Caal that chest CT was unchanged, but reports he is unable to schedule a clinic appointment until 12/28/2022 and does not want to hold up being activated on the transplant waitlist.  Noted once 10/25/22 dermatology assessment notes available, will plan to review the notes and Chest CT report with multi-disciplinary transplant committee meeting for consideration to activate patient on the kidney and kidney/pancreas lists .  Noted patient may send Exploretrip message to Dr. Caal to review his concerns.  Patient verbalizes agreement with current plan.

## 2022-10-24 ENCOUNTER — TELEPHONE (OUTPATIENT)
Dept: PULMONOLOGY | Facility: CLINIC | Age: 67
End: 2022-10-24

## 2022-10-24 NOTE — TELEPHONE ENCOUNTER
Called pt 2x lvm sent my chart no response to schedule follow up per in basket request from nurse DEANDRE mailed lung letter

## 2022-10-26 ENCOUNTER — TELEPHONE (OUTPATIENT)
Dept: TRANSPLANT | Facility: CLINIC | Age: 67
End: 2022-10-26

## 2022-10-26 NOTE — TELEPHONE ENCOUNTER
Left voice message for patient the transplant committee has okayed patient to move forward with Active status on the transplant waitlists; that I am awaiting insurance/financial clearance; also noted patient should schedule the return appointment with [facility] lung specialist and patient will need to have a heart stress test in December 2022, but this will not delay patient being activated on the transplant waitlists.  Will contact patient after insurance clearance is in place.  Requested return call for questions.

## 2022-10-27 ENCOUNTER — TELEPHONE (OUTPATIENT)
Dept: TRANSPLANT | Facility: CLINIC | Age: 67
End: 2022-10-27

## 2022-10-27 ENCOUNTER — DOCUMENTATION ONLY (OUTPATIENT)
Dept: TRANSPLANT | Facility: CLINIC | Age: 67
End: 2022-10-27

## 2022-10-27 NOTE — TELEPHONE ENCOUNTER
Called patient to inform them of status change to ACTIVE  on 10/27/22. Status change letter and PRA orders to be mailed. Patient is in agreement with listing ACTIVE status.      Discussed:   - Pt is eligible for  donor offers and pt can receive calls 24 hours a day, 7 days a week, and on call staff need to be able to reach pt or one of emergency contacts within one hour or they might skip over to next recipient on list.   - Reviewed organ offer process including request to accept or deny offer, without penalty  - Expected length of surgical procedure, expected inpatient length of stay post transplant, and potential to stay locally post transplant.    - Verified contact information as documented in Epic   -Instructed patient about importance of having PRAs drawn every 3 months via: (Mailers/[facility]).   -Reminded pt to stay up on health maintenance and to call with any updates on their health status, insurance or contact information.   -Reminded pt to inform RNCC as soon as possible if they test positive for COVID.     -Provided name and contact information for additional questions or concerns.  Pt expressed excellent understanding of all and was in good agreement with the plan.     10/31/2022 Verified Kidney and Kidney/Pancreas list status as ACTIVE in UNOS And Epic.  Gege Altamirano RN

## 2022-11-01 DIAGNOSIS — Z76.82 AWAITING ORGAN TRANSPLANT: Primary | ICD-10-CM

## 2022-11-02 ENCOUNTER — ORGAN (OUTPATIENT)
Dept: TRANSPLANT | Facility: CLINIC | Age: 67
End: 2022-11-02

## 2022-11-02 NOTE — TELEPHONE ENCOUNTER
Some donors have risk factors or behaviors that increase their chance of having an infection such as human immunodeficiency virus (HIV), hepatitis B virus (HBV), and/or hepatitis C virus (HCV). This donor has met one or more of the risk criteria in the last 30 days for these infections.  This donor was tested for HIV, HBV, and HCV, which resulted as negative, but as with every test, there is a small chance that the test result was negative even if the virus is present.     A negative test might happen if the donor had a very recent infection.  For this reason, we identify and discuss donors who have potential exposures for HIV, HBV, and/or HCV in the last month that might be missed by testing. The risk for undetected infections is very low but not zero.      Studies show that transplant candidates may have a higher chance of survival by accepting organs from donors with risk factors for these infections compared to waiting for an organ from a donor without these risk factors.      All transplant recipients are tested for HIV, HCV, and HBV after transplant.  In the very rare event that transmission would occur, there are effective therapies available, your medical team would collaborate with a team of infectious disease experts to treat you accordingly.    List of Risk Criteria:    Person who has had sex (including vaginal, anal, and oral) with a person known or suspected to have HIV, HBV, or HCV infections in the preceding 30 days.    Men ho have had sex with men in the preceding 30 days    Per who has had sex in exchange for money or drugs in the preceding 30 days    Person who has had sex with a person who has sex in exchange for money or drugs in the preceding 30 days    Person who has injected drugs for non-medical reasons in the preceding 30 days    Person who has had sex with a person that has injected drugs for non-medical reasons in the preceding 30 days    Person who has been incarcerated (confined in shelter,  FPC, or juvenile correctional facility) for greater than/equal to 72 consecutive hours in the preceding 30 days    Child who has been  within the preceding 30 days by a mother with HIV infection    Child born in the preceding 30 days to a mother known to be infected with HIV, HBV, or HCV    Person with an unknown medical or social history for the preceding 30 days (a Donor Risk Assessment Interview - DRAI - cannot be obtained or risk factors cannot be determined)    Dedra Ashby RN

## 2022-11-02 NOTE — TELEPHONE ENCOUNTER
Organ Offer Encounter Information    Organ Offer Information  Organ offer date & time: 11/2/2022 12:51 AM  Coordinator/Fellow/Attending name: Dedra Ashby RN   Organ(s):  Organ UNOS ID Match Run ID Comment Organ Laterality   Kidney [id number] TXSB    Pancreas [id number] TXSB       Recent infections?: No    New medications?: No Recent pregnancy?: No   Angicoagulation medications?: No Recent vaccinations?: Yes (Comment: Flu vaccine 2 weeks ago)   Recent blood transfusions?: No Recent hospitalizations?: No   Has your insurance changed in the last 6-12 months?: Pos (Comment: additional medicare added, part C)    Patient last dialyzed: 10/31/2022 10:00 AM  Dialysis type: Hemo  Discussed organ offer with: Patient  Patient/Caregiver name: Avelina  Discussed risk category with Patient/Other: PHS Risk Criteria Met  Understood donor criteria, verbalized understanding  Patient/Other asked to speak to a surgeon?: No  Discussed program-specific outcomes: Asked questions regarding SRTR, verbalized understanding  Right to decline organ offer without penalty, Patient/Other: Aware of option to decline without penalty  Organ offer decision status Patient/Other: Declined Offer  UNOS decline reason: 801 - Candidate ill, unavailable, refused, or temporarily unsuitable  Detail: declined due to multiple factors  Organ disposition: Case Cancelled - Other (specify) (Comment: recipient declined)  Additional Comments: 11/2/2022 12:53 AM  Kidney/pancreas: primary import KP offer  Called to discuss primary KP offer. Donor details and offer discussed along with PHS risk factors. Patient has not been feeling well with a cold. He also has several life events in the next week. He also was slightly concerned with the increased risk factors. Due to all of these things, patient ultimately decided to decline offer.   MD: Silva  OPO Contact:   VXM Results: no DSA, compatible  XM Plan (FXM must be done with serum no older than 10 days from  transplant): pending organ arrival  Plan (Admission, NPO, Donor OR): N/a, patient declined  - - -   COVID Screening  In the past month, have you:  Or anyone close to you had a positive COVID test or suspected to have COVID: no  Had any COVID symptoms (Fever, Cough, Short of Breath, Loss of Taste/Smell, Rash): cough and dry throat x 4-5 days. Patient has not taken covid test.       Attestation I have discussed all of the above with the Patient/Legal Guardian/Caregiver regarding this organ offer.: Yes  Coordinator/Fellow/Attending name: Dedra Ashby, RN       Dedra Ashby RN

## 2022-11-08 DIAGNOSIS — Z76.82 ORGAN TRANSPLANT CANDIDATE: ICD-10-CM

## 2022-11-08 DIAGNOSIS — Z01.810 PRE-OPERATIVE CARDIOVASCULAR EXAMINATION: ICD-10-CM

## 2022-11-08 DIAGNOSIS — N18.6 ESRD (END STAGE RENAL DISEASE) (H): ICD-10-CM

## 2022-11-08 DIAGNOSIS — E11.9 DIABETES MELLITUS, TYPE 2 (H): ICD-10-CM

## 2022-12-16 NOTE — PROGRESS NOTES
[place] [place]      Referring provider: Jose Carlos Moreno APRN CNP    HPI: Mr. Avelina Marion is a 67 year old  male with PMH significant for    -Diabetes type 2 on dialysis since 4/2021 (on insulin)  -Nonobstructive CAD (coronary angiogram 12/2021)  -Former smoker    Patient is being seen today for preoperative cardiovascular evaluation prior to kidney/pancreas transplantation.    Patient reports feeling well.  No chest pain, shortness of breath, dizziness, syncope, palpitations or lower extremity edema.  He tells me that he can showel his driveway with no exertional symptoms.  He walks up the stairs almost all day in his house.  He quit smoking in 1985.  Patient has been diabetic for at least 20 years.  Patient underwent coronary angiogram in December 2021 which showed mild CAD with no obstructive disease.  Echocardiogram a year ago showed normal biventricular function and LVH.  No significant valve disease.    Current cardiac medications amlodipine 10 mg, aspirin 81, atorvastatin 20 mg    Medications, personal, family, and social history reviewed with patient and revised.    PAST MEDICAL HISTORY:  Past Medical History:   Diagnosis Date     Anemia in chronic kidney disease      BCC (basal cell carcinoma), face 8/9/2022     Diabetic retinopathy of both eyes (H)      End stage renal disease (H)      Hypertension      Secondary renal hyperparathyroidism (H)      Type 2 diabetes mellitus (H)        CURRENT MEDICATIONS:  Current Outpatient Medications   Medication Sig Dispense Refill     amLODIPine (NORVASC) 5 MG tablet Take 10 mg by mouth       aspirin (ASA) 81 MG chewable tablet chew and swallow one tablet by mouth every day       atorvastatin (LIPITOR) 20 MG tablet TAKE 1 TABLET BY MOUTH DAILY       Blood Glucose Monitoring Suppl (ACCU-CHEK GUIDE) w/Device KIT Use to       [facility] Cardiology IP Progress Note    Pat Morel  1939  [id number]  02/12/20    Subjective:   Mrs. Pat Morel is a 81 y.o. female seen in follow-up for multivessel coronary artery disease and acute on chronic diastolic heart failure.  No acute overnight events.  No recurrent episodes of RVR on review of telemetry.  This morning, the patient reports her shortness of breath has improved.  Her supplemental O2 has been weaned to 2 L nasal cannula.  Appetite is improving.  Continues to have bilateral lower extremity swelling.  Reports she is currently contemplating dialysis.  Denies chest pain or chest discomfort.  No other new complaints.    Review of Systems:   Review of Systems   Constitutional: Negative for chills, diaphoresis, fatigue, fever and unexpected weight loss.   Respiratory: Positive for shortness of breath. Negative for cough, chest tightness and wheezing.    Cardiovascular: Positive for leg swelling. Negative for chest pain and palpitations.   Gastrointestinal: Negative for abdominal pain and GERD.   Neurological: Negative for dizziness, syncope, weakness and light-headedness.       I have reviewed and/or updated the patient's past medical, past surgical, family history, social history, problem list and allergies as appropriate in the chart.     Physical Exam:  Vital Signs:   Vitals:    02/12/20 0545 02/12/20 0654 02/12/20 0722 02/12/20 0939   BP: 107/58 106/68  113/97   BP Location:    Right arm   Patient Position:    Lying   Pulse: 82 73 80 74   Resp:   17 18   Temp:    97.8 °F (36.6 °C)   TempSrc:    Oral   SpO2:   98% 93%   Weight:       Height:           Physical Exam   Constitutional: She is oriented to person, place, and time. She appears well-developed and well-nourished.   Lying in bed in no acute distress.   HENT:   Head: Normocephalic and atraumatic.   Moist Mucous Membranes.    Eyes: Pupils are equal, round, and reactive to light. EOM are normal. No  "test 4 times a day. Pharmacy to dispense brand based on insurance.       calcitRIOL (ROCALTROL) 0.25 MCG capsule Take 0.25 mcg by mouth daily       calcium acetate (PHOSLO) 667 MG CAPS capsule Take 667 mg by mouth       insulin glargine (LANTUS PEN) 100 UNIT/ML pen Inject Subcutaneous At Bedtime       lisinopril (ZESTRIL) 20 MG tablet Take 20 mg by mouth         PAST SURGICAL HISTORY:  Past Surgical History:   Procedure Laterality Date     CREATE FISTULA ARTERIOVENOUS UPPER EXTREMITY Left      CV CORONARY ANGIOGRAM N/A 12/30/2021    Procedure: CV CORONARY ANGIOGRAM;  Surgeon: Milton Cam MD;  Location:  [facility]       ALLERGIES:   No Known Allergies    FAMILY HISTORY:  Family History   Problem Relation Age of Onset     Diabetes Brother      Kidney Disease Brother          SOCIAL HISTORY:  Social History     Tobacco Use     Smoking status: Former     Types: Cigarettes     Smokeless tobacco: Never     Tobacco comments:     Quit 1984   Substance Use Topics     Alcohol use: Never     Drug use: Never       ROS:   Constitutional: No fever, chills, or sweats. Weight stable.   Cardiovascular: As per HPI.       Exam:  /75 (BP Location: Right arm, Patient Position: Chair, Cuff Size: Adult Regular)   Pulse 72   Ht 1.8 m (5' 10.87\")   Wt 93.2 kg (205 lb 8 oz)   SpO2 99%   BMI 28.77 kg/m    GENERAL APPEARANCE: alert and no distress  HEENT: no icterus, no central cyanosis  LYMPH/NECK: no adenopathy, no asymmetry, JVP not elevated, no carotid bruits.  RESPIRATORY: lungs clear to auscultation - no rales, rhonchi or wheezes, no use of accessory muscles, no retractions, respirations are unlabored, normal respiratory rate  CARDIOVASCULAR: regular rhythm, normal S1, S2, no S3 or S4 and no murmur, click or rub, precordium quiet with normal PMI.  GI: soft, non tender  EXTREMITIES: no edema  NEURO: alert, normal speech,and affect  SKIN: no ecchymoses, no rashes     I have reviewed the labs and personally " scleral icterus.   Cardiovascular: Normal rate, normal heart sounds and intact distal pulses. Exam reveals no gallop and no friction rub.   No murmur heard.  Irregularly irregular rhythm.  1+ bilateral lower extremity pitting edema.   Pulmonary/Chest: Effort normal and breath sounds normal. No stridor. No respiratory distress. She has no wheezes. She has no rales. She exhibits no tenderness.   On 2 L nasal cannula.   Abdominal: Soft. Bowel sounds are normal. She exhibits no distension. There is no tenderness. There is no rebound and no guarding.   Musculoskeletal: Normal range of motion. She exhibits edema.   Neurological: She is alert and oriented to person, place, and time.   Nursing note and vitals reviewed.      Results Review:   Results from last 7 days   Lab Units 02/12/20  0558   SODIUM mmol/L 139   POTASSIUM mmol/L 4.2   CHLORIDE mmol/L 97*   CO2 mmol/L 21.2*   BUN mg/dL 119*   CREATININE mg/dL 3.93*   CALCIUM mg/dL 9.0   BILIRUBIN mg/dL 1.5*   ALK PHOS U/L 275*   ALT (SGPT) U/L 129*   AST (SGOT) U/L 102*   GLUCOSE mg/dL 133*     Results from last 7 days   Lab Units 02/08/20  0624 02/07/20  0546 02/06/20  1148   TROPONIN T ng/mL 0.100* 0.103* 0.106*     Results from last 7 days   Lab Units 02/12/20  0558 02/11/20  0640 02/10/20  0625   WBC 10*3/mm3 8.34 8.22 7.88   HEMOGLOBIN g/dL 7.8* 8.5* 8.0*   HEMATOCRIT % 25.6* 27.0* 25.5*   PLATELETS 10*3/mm3 109* 119* 120*     Results from last 7 days   Lab Units 02/07/20  0546   INR  1.71*     Results from last 7 days   Lab Units 02/12/20  0558   MAGNESIUM mg/dL 2.8*           I personally viewed and interpreted the patient's EKG/Telemetry data     Medications:   )  Current Facility-Administered Medications:   •  acetaminophen (TYLENOL) tablet 650 mg, 650 mg, Oral, Q4H PRN, 650 mg at 02/07/20 2302 **OR** acetaminophen (TYLENOL) 160 MG/5ML solution 650 mg, 650 mg, Oral, Q4H PRN **OR** acetaminophen (TYLENOL) suppository 650 mg, 650 mg, Rectal, Q4H PRN, Denis Mercado,  MD  •  aspirin EC tablet 81 mg, 81 mg, Oral, Daily, Yomi Higgins MD, 81 mg at 02/12/20 0937  •  atorvastatin (LIPITOR) tablet 40 mg, 40 mg, Oral, Nightly, Denis Mercado MD, 40 mg at 02/11/20 2106  •  bumetanide (BUMEX) injection 2 mg, 2 mg, Intravenous, Q6H, Sanford Tsang MD, FASN, 2 mg at 02/12/20 0545  •  [START ON 2/13/2020] bumetanide (BUMEX) tablet 2 mg, 2 mg, Oral, BID, Sanford Tsang MD, FASN  •  calcium acetate (PHOS BINDER)) capsule 1,334 mg, 1,334 mg, Oral, TID With Meals, Sanford Tsang MD, FASN, 1,334 mg at 02/12/20 0805  •  dilTIAZem (CARDIZEM) tablet 30 mg, 30 mg, Oral, Q6H, Yomi Higgins MD, 30 mg at 02/12/20 0654  •  ipratropium-albuterol (DUO-NEB) nebulizer solution 3 mL, 3 mL, Nebulization, Q4H - RT, Tania Gupta DO, 3 mL at 02/12/20 0722  •  iron polysaccharides (NIFEREX) capsule 150 mg, 150 mg, Oral, Daily, Sanford Tsang MD, FASN, 150 mg at 02/12/20 0936  •  isosorbide mononitrate (IMDUR) 24 hr tablet 60 mg, 60 mg, Oral, Daily, Denis Mercado MD, 60 mg at 02/12/20 0941  •  levothyroxine (SYNTHROID, LEVOTHROID) tablet 75 mcg, 75 mcg, Oral, Daily, Denis Mercado MD, 75 mcg at 02/12/20 0939  •  metoprolol succinate XL (TOPROL-XL) 24 hr tablet 25 mg, 25 mg, Oral, Q24H, Yomi Higgins MD, 25 mg at 02/12/20 0939  •  ondansetron (ZOFRAN) injection 4 mg, 4 mg, Intravenous, Q6H PRN, Denis Mercado MD  •  pantoprazole (PROTONIX) EC tablet 40 mg, 40 mg, Oral, Daily, Denis Mercado MD, 40 mg at 02/12/20 0941  •  Pharmacy to Dose Zosyn, , Does not apply, Continuous PRN, Tamara Tomlin DO  •  piperacillin-tazobactam (ZOSYN) 3.375 g in iso-osmotic dextrose 50 ml (premix), 3.375 g, Intravenous, Q12H, Tamara Tomlin DO, 3.375 g at 02/12/20 0140  •  predniSONE (DELTASONE) tablet 10 mg, 10 mg, Oral, Daily, Tamara Tomlin DO, 10 mg at 02/12/20 0941  •  ranolazine (RANEXA) 12 hr tablet 500 mg, 500 mg, Oral, Q12H, Breeding, Nahid MODI MD, 500 mg at 02/12/20 0942  •  sodium chloride 0.9  reviewed the imaging below and made my comment in the assessment and plan.    Labs:  CBC RESULTS:   Lab Results   Component Value Date    WBC 8.2 08/09/2022    RBC 4.27 (L) 08/09/2022    HGB 13.7 08/09/2022    HCT 41.0 08/09/2022    MCV 96 08/09/2022    MCH 32.1 08/09/2022    MCHC 33.4 08/09/2022    RDW 12.7 08/09/2022     08/09/2022       BMP RESULTS:  Lab Results   Component Value Date     08/09/2022    POTASSIUM 3.6 08/09/2022    POTASSIUM 4.4 12/30/2021    CHLORIDE 97 (L) 08/09/2022    CHLORIDE 105 12/30/2021    CO2 27 08/09/2022    CO2 28 12/30/2021    ANIONGAP 13 08/09/2022    ANIONGAP 4 12/30/2021     (H) 08/09/2022     (H) 08/09/2022     (H) 12/30/2021    BUN 34.2 (H) 08/09/2022    BUN 30 12/30/2021    CR 5.66 (H) 08/09/2022    GFRESTIMATED 10 (L) 08/09/2022    DAVID 8.3 (L) 08/09/2022      Coronary angiogram 12/30/2021  Mild non obstructive coronary artery disease.    Echocardiogram 8/5/2021  Mild to moderate concentric wall thickening consistent with left ventricular  hypertrophy is present. Global and regional left ventricular function is  normal with an EF of 55-60%.  Right ventricular function, chamber size, wall motion, and thickness are  normal.  No significant valvular abnormalities were noted.  Pulmonary artery systolic pressure is normal.  Previous study not available for comparison.      EKG 8/9/2022 sinus rhythm, PVC          Assessment and Plan:     #End-stage renal disease on dialysis  #Diabetes type 2 on insulin  #Kidney transplant candidate  #Preoperative cardiovascular examination  #Mild nonobstructive CAD by coronary angiogram 12/2021  -Patient is asymptomatic from cardiac standpoint.  He reports normal functional capacity.   -Recommended exercise stress echocardiogram for cardiac evaluation  -Continue current medications with aspirin, atorvastatin 20 and amlodipine 10 mg.    #Hyperlipidemia  -Patient has mild nonobstructive CAD.  He is on atorvastatin 20 mg.   We will recheck lipids.  If LDL is over 70 he will benefit from higher dose of statin.    Return to clinic as needed.    Total time spent today for this visit is 40 minutes including precharting, face-to-face clinic visit, review of labs/imaging and medical documentation.    Please donot hesitate to contact me if you have any questions or concerns. Again, thank you for allowing me to participate in the care of your patient.    Bertha PATEL MD  [facility] Division of Cardiology  Pager [phone]    Addendum note 12/29/2022    Exercise stress is abnormal (primarily severe ECG changes rather than WMA). Will do CTA.        EXERCISE ECHO 12/27/2022  Abnormal exercise echocardiogram. Extensive (inferior and anterolateral 1mm ST  depressions with aVR ST elevation) EKG changes without any obvious echo  abnormality with stress. Consider further evaluation for CAD.  Target heart rate was achieved. Heart rate and blood pressure response to  exercise were normal. Normal LV function and wall motion at rest. With stress,  the left ventricular ejection fraction increased from 55-60% to greater than  65% and the left ventricular size decreased appropriately. No regional wall  motion abnormality with stress.  Intermediate functional capacity. No subjective symptoms to suggest ischemia.  No significant valve disease on screening doppler evaluation. The aortic root  and visualized ascending aorta are normal.           % flush 10 mL, 10 mL, Intravenous, PRN, Denis Mercado MD  •  sodium chloride 0.9 % flush 10 mL, 10 mL, Intravenous, Q12H, Denis Mercado MD, 10 mL at 02/12/20 0900  •  sodium chloride 0.9 % flush 10 mL, 10 mL, Intravenous, PRN, Denis Mercado MD    Assessment / Plan:       1.  Acute on chronic diastolic heart failure   --Echocardiogram  menstruates 2 diastolic dysfunction with left atrial enlargement and elevated left atrial pressures  --Remains volume overloaded  --BP is well controlled  --Poor response to IV Bumex yesterday, with worsening renal function after attempted diuresis  --Continuing on oral Bumex today to attempt further diuresis  --Nephrology discussing initiation of HD given worsening renal function and persistent volume overload    2.  Multivessel coronary artery disease  --History of three-vessel CABG 2012 including LIMA-LAD, SVG-diagonal, and SVG-PDA  --Known occlusion of the SVG-diagonal graft with severe in-stent stenosis in the native diagonal branch, unattractive for PCI due to small caliber vessel  --Echocardiogram shows preserved LV systolic function  --Anginal symptoms on presentation likely exacerbation of chronic stable angina secondary to anemia, volume overload, etc.  --Plan to continue medical management, with no plans for additional invasive evaluation at this time  --Continue bisoprolol, isosorbide, and Ranexa as antianginals  --Continue daily aspirin    3.  Persistent atrial fibrillation  --Known history of atrial fibrillation, previously failed several antiarrhythmics and declined Tikosyn initiation  --Poor candidate for amiodarone due to interstitial lung disease  --Will continue to pursue rate control rather than rhythm control strategy  --Continue current dose of metoprolol, and will change diltiazem to daily dosing tomorrow morning  --Anticoagulation being held due to concern for possible GI bleed     4.  Pulmonary arterial hypertension   --In the setting of interstitial lung  disease  --RVSP on echocardiogram 60 mmHg  --Monitor hemodynamics closely with diuresis as the patient is somewhat preload dependent     5.  Acute on chronic kidney disease  --Management per nephrology  --Considering possible initiation of HD     6.  Dyslipidemia  --Continue statin       Thank you for allowing me to participate in the care of your patient. Please to not hesitate to contact me with additional questions or concerns.     LA NENA Higgins MD  Interventional Cardiology   02/12/20  12:57 PM

## 2022-12-19 ENCOUNTER — LAB (OUTPATIENT)
Dept: LAB | Facility: CLINIC | Age: 67
End: 2022-12-19
Payer: COMMERCIAL

## 2022-12-19 ENCOUNTER — OFFICE VISIT (OUTPATIENT)
Dept: CARDIOLOGY | Facility: CLINIC | Age: 67
End: 2022-12-19
Attending: NURSE PRACTITIONER
Payer: COMMERCIAL

## 2022-12-19 VITALS
BODY MASS INDEX: 28.77 KG/M2 | OXYGEN SATURATION: 99 % | SYSTOLIC BLOOD PRESSURE: 132 MMHG | WEIGHT: 205.5 LBS | HEIGHT: 71 IN | HEART RATE: 72 BPM | DIASTOLIC BLOOD PRESSURE: 75 MMHG

## 2022-12-19 DIAGNOSIS — Z99.2 TYPE 2 DIABETES MELLITUS WITH CHRONIC KIDNEY DISEASE ON CHRONIC DIALYSIS, WITH LONG-TERM CURRENT USE OF INSULIN (H): ICD-10-CM

## 2022-12-19 DIAGNOSIS — N18.6 ESRD (END STAGE RENAL DISEASE) (H): ICD-10-CM

## 2022-12-19 DIAGNOSIS — Z76.82 ORGAN TRANSPLANT CANDIDATE: ICD-10-CM

## 2022-12-19 DIAGNOSIS — Z01.810 PRE-OPERATIVE CARDIOVASCULAR EXAMINATION: Primary | ICD-10-CM

## 2022-12-19 DIAGNOSIS — I25.10 MILD CORONARY ARTERY DISEASE: ICD-10-CM

## 2022-12-19 DIAGNOSIS — E11.22 TYPE 2 DIABETES MELLITUS WITH CHRONIC KIDNEY DISEASE ON CHRONIC DIALYSIS, WITH LONG-TERM CURRENT USE OF INSULIN (H): ICD-10-CM

## 2022-12-19 DIAGNOSIS — Z79.4 TYPE 2 DIABETES MELLITUS WITH CHRONIC KIDNEY DISEASE ON CHRONIC DIALYSIS, WITH LONG-TERM CURRENT USE OF INSULIN (H): ICD-10-CM

## 2022-12-19 DIAGNOSIS — N18.6 TYPE 2 DIABETES MELLITUS WITH CHRONIC KIDNEY DISEASE ON CHRONIC DIALYSIS, WITH LONG-TERM CURRENT USE OF INSULIN (H): ICD-10-CM

## 2022-12-19 LAB
CHOLEST SERPL-MCNC: 116 MG/DL
HDLC SERPL-MCNC: 51 MG/DL
LDLC SERPL CALC-MCNC: 43 MG/DL
NONHDLC SERPL-MCNC: 65 MG/DL
TRIGL SERPL-MCNC: 109 MG/DL

## 2022-12-19 PROCEDURE — 80061 LIPID PANEL: CPT | Performed by: PATHOLOGY

## 2022-12-19 PROCEDURE — G0463 HOSPITAL OUTPT CLINIC VISIT: HCPCS

## 2022-12-19 PROCEDURE — 99215 OFFICE O/P EST HI 40 MIN: CPT | Performed by: INTERNAL MEDICINE

## 2022-12-19 PROCEDURE — 36415 COLL VENOUS BLD VENIPUNCTURE: CPT | Performed by: PATHOLOGY

## 2022-12-19 RX ORDER — CALCIUM CARBONATE 750 MG/1
1500 TABLET, CHEWABLE ORAL 2 TIMES DAILY
Status: ON HOLD | COMMUNITY
End: 2023-09-21

## 2022-12-19 ASSESSMENT — PAIN SCALES - GENERAL: PAINLEVEL: NO PAIN (0)

## 2022-12-19 NOTE — LETTER
12/19/2022      RE: Avelina Marion  [address] [place] [address]       Dear Colleague,    Thank you for the opportunity to participate in the care of your patient, Avelina Marion, at the [facility]. Please see a copy of my visit note below.                                                                                                                                          General Cardiology Clinic-[facility] [place]      Referring provider: Jose Carlos Moreno APRN CNP    HPI: Mr. Avelina Marion is a 67 year old  male with PMH significant for    -Diabetes type 2 on dialysis since 4/2021 (on insulin)  -Nonobstructive CAD (coronary angiogram 12/2021)  -Former smoker    Patient is being seen today for preoperative cardiovascular evaluation prior to kidney/pancreas transplantation.    Patient reports feeling well.  No chest pain, shortness of breath, dizziness, syncope, palpitations or lower extremity edema.  He tells me that he can showel his driveway with no exertional symptoms.  He walks up the stairs almost all day in his house.  He quit smoking in 1985.  Patient has been diabetic for at least 20 years.  Patient underwent coronary angiogram in December 2021 which showed mild CAD with no obstructive disease.  Echocardiogram a year ago showed normal biventricular function and LVH.  No significant valve disease.    Current cardiac medications amlodipine 10 mg, aspirin 81, atorvastatin 20 mg    Medications, personal, family, and social history reviewed with patient and revised.    PAST MEDICAL HISTORY:  Past Medical History:   Diagnosis Date     Anemia in chronic kidney disease      BCC (basal cell carcinoma), face 8/9/2022     Diabetic retinopathy of both eyes (H)      End stage renal disease (H)      Hypertension      Secondary renal hyperparathyroidism (H)      Type 2 diabetes mellitus (H)        CURRENT MEDICATIONS:  Current  "Outpatient Medications   Medication Sig Dispense Refill     amLODIPine (NORVASC) 5 MG tablet Take 10 mg by mouth       aspirin (ASA) 81 MG chewable tablet chew and swallow one tablet by mouth every day       atorvastatin (LIPITOR) 20 MG tablet TAKE 1 TABLET BY MOUTH DAILY       Blood Glucose Monitoring Suppl (ACCU-CHEK GUIDE) w/Device KIT Use to test 4 times a day. Pharmacy to dispense brand based on insurance.       calcitRIOL (ROCALTROL) 0.25 MCG capsule Take 0.25 mcg by mouth daily       calcium acetate (PHOSLO) 667 MG CAPS capsule Take 667 mg by mouth       insulin glargine (LANTUS PEN) 100 UNIT/ML pen Inject Subcutaneous At Bedtime       lisinopril (ZESTRIL) 20 MG tablet Take 20 mg by mouth         PAST SURGICAL HISTORY:  Past Surgical History:   Procedure Laterality Date     CREATE FISTULA ARTERIOVENOUS UPPER EXTREMITY Left      CV CORONARY ANGIOGRAM N/A 12/30/2021    Procedure: CV CORONARY ANGIOGRAM;  Surgeon: Milton Cam MD;  Location:  HEART CARDIAC CATH LAB       ALLERGIES:   No Known Allergies    FAMILY HISTORY:  Family History   Problem Relation Age of Onset     Diabetes Brother      Kidney Disease Brother          SOCIAL HISTORY:  Social History     Tobacco Use     Smoking status: Former     Types: Cigarettes     Smokeless tobacco: Never     Tobacco comments:     Quit 1984   Substance Use Topics     Alcohol use: Never     Drug use: Never       ROS:   Constitutional: No fever, chills, or sweats. Weight stable.   Cardiovascular: As per HPI.       Exam:  /75 (BP Location: Right arm, Patient Position: Chair, Cuff Size: Adult Regular)   Pulse 72   Ht 1.8 m (5' 10.87\")   Wt 93.2 kg (205 lb 8 oz)   SpO2 99%   BMI 28.77 kg/m    GENERAL APPEARANCE: alert and no distress  HEENT: no icterus, no central cyanosis  LYMPH/NECK: no adenopathy, no asymmetry, JVP not elevated, no carotid bruits.  RESPIRATORY: lungs clear to auscultation - no rales, rhonchi or wheezes, no use of accessory muscles, no " retractions, respirations are unlabored, normal respiratory rate  CARDIOVASCULAR: regular rhythm, normal S1, S2, no S3 or S4 and no murmur, click or rub, precordium quiet with normal PMI.  GI: soft, non tender  EXTREMITIES: no edema  NEURO: alert, normal speech,and affect  SKIN: no ecchymoses, no rashes     I have reviewed the labs and personally reviewed the imaging below and made my comment in the assessment and plan.    Labs:  CBC RESULTS:   Lab Results   Component Value Date    WBC 8.2 08/09/2022    RBC 4.27 (L) 08/09/2022    HGB 13.7 08/09/2022    HCT 41.0 08/09/2022    MCV 96 08/09/2022    MCH 32.1 08/09/2022    MCHC 33.4 08/09/2022    RDW 12.7 08/09/2022     08/09/2022       BMP RESULTS:  Lab Results   Component Value Date     08/09/2022    POTASSIUM 3.6 08/09/2022    POTASSIUM 4.4 12/30/2021    CHLORIDE 97 (L) 08/09/2022    CHLORIDE 105 12/30/2021    CO2 27 08/09/2022    CO2 28 12/30/2021    ANIONGAP 13 08/09/2022    ANIONGAP 4 12/30/2021     (H) 08/09/2022     (H) 08/09/2022     (H) 12/30/2021    BUN 34.2 (H) 08/09/2022    BUN 30 12/30/2021    CR 5.66 (H) 08/09/2022    GFRESTIMATED 10 (L) 08/09/2022    DAVID 8.3 (L) 08/09/2022      Coronary angiogram 12/30/2021  Mild non obstructive coronary artery disease.    Echocardiogram 8/5/2021  Mild to moderate concentric wall thickening consistent with left ventricular  hypertrophy is present. Global and regional left ventricular function is  normal with an EF of 55-60%.  Right ventricular function, chamber size, wall motion, and thickness are  normal.  No significant valvular abnormalities were noted.  Pulmonary artery systolic pressure is normal.  Previous study not available for comparison.      EKG 8/9/2022 sinus rhythm, PVC          Assessment and Plan:     #End-stage renal disease on dialysis  #Diabetes type 2 on insulin  #Kidney transplant candidate  #Preoperative cardiovascular examination  #Mild nonobstructive CAD by coronary  angiogram 12/2021  -Patient is asymptomatic from cardiac standpoint.  He reports normal functional capacity.   -Recommended exercise stress echocardiogram for cardiac evaluation  -Continue current medications with aspirin, atorvastatin 20 and amlodipine 10 mg.    #Hyperlipidemia  -Patient has mild nonobstructive CAD.  He is on atorvastatin 20 mg.  We will recheck lipids.  If LDL is over 70 he will benefit from higher dose of statin.    Return to clinic as needed.    Total time spent today for this visit is 40 minutes including precharting, face-to-face clinic visit, review of labs/imaging and medical documentation.    Please donot hesitate to contact me if you have any questions or concerns. Again, thank you for allowing me to participate in the care of your patient.    Bertha PATEL MD  [facility] Division of Cardiology  Pager [phone]

## 2022-12-19 NOTE — NURSING NOTE
Chief Complaint   Patient presents with     New Patient     kidney transplant eval        Vitals were taken and medications reconciled.    Handy Edwards, EMT  5:01 PM

## 2022-12-20 DIAGNOSIS — Z76.82 AWAITING ORGAN TRANSPLANT: Primary | ICD-10-CM

## 2022-12-21 ENCOUNTER — VIRTUAL VISIT (OUTPATIENT)
Dept: PULMONOLOGY | Facility: CLINIC | Age: 67
End: 2022-12-21
Attending: INTERNAL MEDICINE
Payer: COMMERCIAL

## 2022-12-21 DIAGNOSIS — Z76.82 ORGAN TRANSPLANT CANDIDATE: Primary | ICD-10-CM

## 2022-12-21 PROCEDURE — G0463 HOSPITAL OUTPT CLINIC VISIT: HCPCS | Mod: PN,GT | Performed by: INTERNAL MEDICINE

## 2022-12-21 PROCEDURE — 99213 OFFICE O/P EST LOW 20 MIN: CPT | Mod: 95 | Performed by: INTERNAL MEDICINE

## 2022-12-21 NOTE — LETTER
12/21/2022         RE: Avelina Marion  [address] [place] [address]        Dear Colleague,    Thank you for referring your patient, Avelina Marion, to the [facility] AND [facility]. Please see a copy of my visit note below.    Avelina is a 67 year old who is being evaluated via a billable video visit.      How would you like to obtain your AVS? MyChart  If the video visit is dropped, the invitation should be resent by: Send to e-mail at: [email or web address]  Will anyone else be joining your video visit? No     Video-Visit Details    Type of service:  Video Visit   Video Start Time: 2:45  Video End Time: 3:15     Originating Location (pt. Location): Home    Distant Location (provider location):  On-site  Platform used for Video Visit: [employer]     Pulmonary follow-up    DOS: 12/21/2022     Reason for consult: pre-transplant evaluation    HPI: 66 year-old male who is currently on the transplant wait list. Plan is for kidney or kidney/pancreas due to underlying type 1 diabetes mellitus with stage V  CKD. As part of evaluation had CT chest/abdomen/pelvis in 2021 which showed interstitial changes. Referred to pulmonary for further evaluation in October of 2021. At that time he had no pulmonary symptoms and pulmonary function testing was normal. ILD panel was negative. Discussed case in ILD conference and findings were felt to be potentially consistent with changes of prior DIP or RB-ILD. Recommended repeat CT in 12 months, which has been performed and was normal.     Since that time has been doing well. No dyspnea. No cough. Recovering from Mohs surgery on his nose.     Exam: Today's visit was by video. Patient appeared comfortable and was speaking in complete sentences.     CT chest from October 2022 reviewed and compared to August 2021: subpleural reticulations with more basilar predominance. No honeycombing or traction bronchiectasis. No nodules or ground-glass.  Monitor.     PFT from 2021: normal spirometry, normal lung volumes (mildly reduced RV), normal diffusing capcity    Assessment: 66 year-old male with type 1 diabetes mellitus found to have fine subpleural reticulations on chest CT as part of kidney transplant evaluation. Pulmonary function testing is reassuring and he is asymptomatic. Findings may be scarring from prior exposure--given peripheral and basilar predominance cannot rule-out UIP, but no evidence of radiographic progression over 14 month period.    Will repeat pulmonary function testing, but provided there has been no significant changes do not feel this is a contraindication to transplant. Will notify his transplant team.     Patient seen and discussed with Dr. Prabhjot Caal MD   Pulmonary fellow      Attending note:  Patient seen, examined, and discussed with Dr. Caal.  All data reviewed. Agree with the assessment and plan as outlined in the above note.  Abnormal CT chest but no progression by imaging or symptoms. Will repeat full PFT's to assess for restriction.  No contraindication for renal transplant from a pulmonary prespecitve.    Megha Rick MD  [phone]

## 2022-12-21 NOTE — PROGRESS NOTES
Avelina is a 67 year old who is being evaluated via a billable video visit.      How would you like to obtain your AVS? MyChart  If the video visit is dropped, the invitation should be resent by: Send to e-mail at: [email or web address]  Will anyone else be joining your video visit? No     Video-Visit Details    Type of service:  Video Visit   Video Start Time: 2:45  Video End Time: 3:15     Originating Location (pt. Location): Home    Distant Location (provider location):  On-site  Platform used for Video Visit: [facility]     Pulmonary follow-up    DOS: 12/21/2022     Reason for consult: pre-transplant evaluation    HPI: 66 year-old male who is currently on the transplant wait list. Plan is for kidney or kidney/pancreas due to underlying type 1 diabetes mellitus with stage V  CKD. As part of evaluation had CT chest/abdomen/pelvis in 2021 which showed interstitial changes. Referred to pulmonary for further evaluation in October of 2021. At that time he had no pulmonary symptoms and pulmonary function testing was normal. ILD panel was negative. Discussed case in ILD conference and findings were felt to be potentially consistent with changes of prior DIP or RB-ILD. Recommended repeat CT in 12 months, which has been performed and was normal.     Since that time has been doing well. No dyspnea. No cough. Recovering from Mohs surgery on his nose.     Exam: Today's visit was by video. Patient appeared comfortable and was speaking in complete sentences.     CT chest from October 2022 reviewed and compared to August 2021: subpleural reticulations with more basilar predominance. No honeycombing or traction bronchiectasis. No nodules or ground-glass.     PFT from 2021: normal spirometry, normal lung volumes (mildly reduced RV), normal diffusing capcity    Assessment: 66 year-old male with type 1 diabetes mellitus found to have fine subpleural reticulations on chest CT as part of kidney transplant evaluation. Pulmonary function  testing is reassuring and he is asymptomatic. Findings may be scarring from prior exposure--given peripheral and basilar predominance cannot rule-out UIP, but no evidence of radiographic progression over 14 month period.    Will repeat pulmonary function testing, but provided there has been no significant changes do not feel this is a contraindication to transplant. Will notify his transplant team.     Patient seen and discussed with Dr. Prabhjot Caal MD   Pulmonary fellow      Attending note:  Patient seen, examined, and discussed with Dr. Caal.  All data reviewed. Agree with the assessment and plan as outlined in the above note.  Abnormal CT chest but no progression by imaging or symptoms. Will repeat full PFT's to assess for restriction.  No contraindication for renal transplant from a pulmonary prespecitve.    Megha Rick MD  [phone]

## 2022-12-26 DIAGNOSIS — Z76.82 AWAITING ORGAN TRANSPLANT: Primary | ICD-10-CM

## 2022-12-27 ENCOUNTER — HOSPITAL ENCOUNTER (OUTPATIENT)
Dept: CARDIOLOGY | Facility: CLINIC | Age: 67
Discharge: HOME | End: 2022-12-27
Attending: INTERNAL MEDICINE | Admitting: INTERNAL MEDICINE
Payer: COMMERCIAL

## 2022-12-27 DIAGNOSIS — Z01.810 PRE-OPERATIVE CARDIOVASCULAR EXAMINATION: ICD-10-CM

## 2022-12-27 PROCEDURE — 93016 CV STRESS TEST SUPVJ ONLY: CPT | Performed by: INTERNAL MEDICINE

## 2022-12-27 PROCEDURE — 93350 STRESS TTE ONLY: CPT | Mod: TC

## 2022-12-27 PROCEDURE — 93325 DOPPLER ECHO COLOR FLOW MAPG: CPT | Mod: TC

## 2022-12-27 PROCEDURE — 93350 STRESS TTE ONLY: CPT | Mod: 26 | Performed by: INTERNAL MEDICINE

## 2022-12-27 PROCEDURE — 93018 CV STRESS TEST I&R ONLY: CPT | Performed by: INTERNAL MEDICINE

## 2022-12-27 PROCEDURE — 93321 DOPPLER ECHO F-UP/LMTD STD: CPT | Mod: 26 | Performed by: INTERNAL MEDICINE

## 2022-12-27 PROCEDURE — 255N000002 HC RX 255 OP 636: Performed by: INTERNAL MEDICINE

## 2022-12-27 PROCEDURE — 93017 CV STRESS TEST TRACING ONLY: CPT

## 2022-12-27 PROCEDURE — 93325 DOPPLER ECHO COLOR FLOW MAPG: CPT | Mod: 26 | Performed by: INTERNAL MEDICINE

## 2022-12-27 RX ADMIN — PERFLUTREN 5 ML: 6.52 INJECTION, SUSPENSION INTRAVENOUS at 13:14

## 2022-12-29 DIAGNOSIS — R94.39 ABNORMAL CARDIOVASCULAR STRESS TEST: Primary | ICD-10-CM

## 2023-01-03 ENCOUNTER — TELEPHONE (OUTPATIENT)
Dept: TRANSPLANT | Facility: CLINIC | Age: 68
End: 2023-01-03

## 2023-01-03 NOTE — LETTER
January 3, 2023    Avelina Marion  [address] [place] [address]      Dear Mr. Marion,    This letter is sent to notify you that your listing status was changed from Active to Inactive on the kidney and kidney and pancreas transplant waitlists at the [facility].  Your status was changed because had an abnormal heart stress test result and are scheduled for further testing on January 26, 2023.     During this waiting period, we may still request periodic laboratory tests with your primary physician.  It will be your responsibility to remind your physician to forward your results to the Transplant Office.    We still need to be kept informed of any changes such as:    o changes in your insurance coverage  o change in your phone number, address or emergency contact  o significant changes in your health  o significant infections (such as pneumonia or abscesses)  o blood transfusions  o any condition which requires hospitalization or surgery    We want you to know that our program has physician and surgeon coverage 24 hours a day, 365 days a year. In addition, our transplant surgeons and physicians will not be on call for two or more transplant programs more than 30 miles apart unless the circumstances have been reviewed and approved by the [employer]aring (UN[employer]) Membership and Professional Standards Committee (MPSC). Finally, our primary physician and primary surgeons are not designated as the primary surgeon or primary physician at more than 1 transplant hospital. If this coverage changes or there are substantial program changes, you will be notified in writing by letter.     Attached is a letter from UNOS that describes the services and information offered to patients by UNOS and the Organ Procurement and Transplantation Network (OPTN).    Should have any questions, please contact me at [phone] or the Transplant Office at [phone].      Sincerely,    Gege Altamirano RN,  BRIANAN  Transplant Coordinator   On Behalf of the Kidney and Pancreas Transplant Program      Enclosures: [facility] Letter    CC: Care Team                                                                                      The Organ Procurement and Transplantation Network  Toll-free patient services line:     Your resource for organ transplant information    If you have a question regarding your own medical care, you always should call your transplant hospital first. However, for general organ transplant-related information, you can call the Organ Procurement and Transplantation Network (OPTN) toll-free patient services line at [phone]. Anyone, including potential transplant candidates, candidates, recipients, family members, friends, living donors, and donor family members, can call this number to:          Talk about organ donation, living donation, the transplant process, the donation process, and transplant policies.    Get a free patient information kit with helpful booklets, waiting list and transplant information, and a list of all transplant hospitals.    Ask questions about the OPTN website (https://optn.transplant.hrsa.gov/), the [employer] for Organ [employer]aring s (UNOS) website (https://unos.org/), or the UNOS website for living donors and transplant recipients. (https://www.transplantliving.org/).    Learn how the OPTN can help you.    Talk about any concerns that you may have with a transplant hospital.    The nation s transplant system, the OPTN, is managed under federal contract by the [employer]aring (UNOS), which is a non-profit charitable organization. The OPTN helps create and define organ sharing policies that make the best use of donated organs. This process continuously evaluating new advances and discoveries so policies can be adapted to best serve patients waiting for transplants. To do so, the OPTN works closely with transplant professionals, transplant patients,  transplant candidates, donor families, living donors, and the public. All transplant programs and organ procurement organizations throughout the country are OPTN members and are obligated to follow the policies the OPTN creates for allocating organs.    The OPTN also is responsible for:      Providing educational material for patients, the public, and professionals.    Raising awareness of the need for donated organs and tissue.    Coordinating organ procurement, matching, and placement.    Collecting information about every organ transplant and donation that occurs in the [place].    Remember, you should contact your transplant hospital directly if you have questions or concerns about your own medical care including medical records, work-up progress, and test results.    We are not your transplant hospital, and our staff will not be able to answer questions about your case, so please keep your transplant hospital s phone number handy.    However, while you research your transplant needs and learn as much as you can about transplantation and donation, we welcome your call to our toll-free patient services line at [phone].          Updated 4/1/2019

## 2023-01-03 NOTE — TELEPHONE ENCOUNTER
Reviewed with patient since had abnormal stress test result from 12/30/2022 and is scheduled for CTA on 01/26/2023, I am changing his status to inactive on the kidney and kidney/pancreas lists.  This means he will continue to accumulate wait time, but not be eligible for organ offer calls.  Explained I will watch for the test results and plan to contact patient the week of 01/30/2023 for an update.  Sending inactive status letter to patient and to his providers.  Patient verbalizes he understands the information presented and agrees with this plan.     1/3/23 addendum: [facility] and [facility]. -Elana ALMANZA RN

## 2023-01-16 ENCOUNTER — LAB (OUTPATIENT)
Dept: LAB | Facility: CLINIC | Age: 68
End: 2023-01-16
Payer: COMMERCIAL

## 2023-01-16 DIAGNOSIS — Z76.82 AWAITING ORGAN TRANSPLANT: ICD-10-CM

## 2023-01-16 PROCEDURE — 86833 HLA CLASS II HIGH DEFIN QUAL: CPT

## 2023-01-16 PROCEDURE — 86832 HLA CLASS I HIGH DEFIN QUAL: CPT

## 2023-01-26 ENCOUNTER — HOSPITAL ENCOUNTER (OUTPATIENT)
Dept: CT IMAGING | Facility: CLINIC | Age: 68
Discharge: HOME | End: 2023-01-26
Attending: INTERNAL MEDICINE
Payer: COMMERCIAL

## 2023-01-26 VITALS — SYSTOLIC BLOOD PRESSURE: 129 MMHG | DIASTOLIC BLOOD PRESSURE: 65 MMHG | RESPIRATION RATE: 17 BRPM | HEART RATE: 65 BPM

## 2023-01-26 DIAGNOSIS — I49.9 CARDIAC ARRHYTHMIA, UNSPECIFIED CARDIAC ARRHYTHMIA TYPE: Primary | ICD-10-CM

## 2023-01-26 DIAGNOSIS — I49.9 CARDIAC ARRHYTHMIA: Primary | ICD-10-CM

## 2023-01-26 DIAGNOSIS — R94.39 ABNORMAL CARDIOVASCULAR STRESS TEST: ICD-10-CM

## 2023-01-26 PROCEDURE — 75574 CT ANGIO HRT W/3D IMAGE: CPT

## 2023-01-26 PROCEDURE — 250N000013 HC RX MED GY IP 250 OP 250 PS 637: Performed by: INTERNAL MEDICINE

## 2023-01-26 PROCEDURE — 250N000011 HC RX IP 250 OP 636: Performed by: INTERNAL MEDICINE

## 2023-01-26 PROCEDURE — 75574 CT ANGIO HRT W/3D IMAGE: CPT | Mod: 26 | Performed by: INTERNAL MEDICINE

## 2023-01-26 PROCEDURE — 93000 ELECTROCARDIOGRAM COMPLETE: CPT | Performed by: INTERNAL MEDICINE

## 2023-01-26 RX ORDER — DIPHENHYDRAMINE HYDROCHLORIDE 50 MG/ML
25-50 INJECTION INTRAMUSCULAR; INTRAVENOUS
Status: DISCONTINUED | OUTPATIENT
Start: 2023-01-26 | End: 2023-01-27 | Stop reason: HOSPADM

## 2023-01-26 RX ORDER — ACYCLOVIR 200 MG/1
0-1 CAPSULE ORAL
Status: DISCONTINUED | OUTPATIENT
Start: 2023-01-26 | End: 2023-01-27 | Stop reason: HOSPADM

## 2023-01-26 RX ORDER — ONDANSETRON 2 MG/ML
4 INJECTION INTRAMUSCULAR; INTRAVENOUS
Status: DISCONTINUED | OUTPATIENT
Start: 2023-01-26 | End: 2023-01-27 | Stop reason: HOSPADM

## 2023-01-26 RX ORDER — IVABRADINE 5 MG/1
5-15 TABLET, FILM COATED ORAL
Status: DISCONTINUED | OUTPATIENT
Start: 2023-01-26 | End: 2023-01-27 | Stop reason: HOSPADM

## 2023-01-26 RX ORDER — DIPHENHYDRAMINE HCL 25 MG
25 CAPSULE ORAL
Status: DISCONTINUED | OUTPATIENT
Start: 2023-01-26 | End: 2023-01-27 | Stop reason: HOSPADM

## 2023-01-26 RX ORDER — NITROGLYCERIN 0.4 MG/1
.4-.8 TABLET SUBLINGUAL
Status: DISCONTINUED | OUTPATIENT
Start: 2023-01-26 | End: 2023-01-27 | Stop reason: HOSPADM

## 2023-01-26 RX ORDER — IOPAMIDOL 755 MG/ML
120 INJECTION, SOLUTION INTRAVASCULAR ONCE
Status: COMPLETED | OUTPATIENT
Start: 2023-01-26 | End: 2023-01-26

## 2023-01-26 RX ORDER — METHYLPREDNISOLONE SODIUM SUCCINATE 125 MG/2ML
125 INJECTION, POWDER, LYOPHILIZED, FOR SOLUTION INTRAMUSCULAR; INTRAVENOUS
Status: DISCONTINUED | OUTPATIENT
Start: 2023-01-26 | End: 2023-01-27 | Stop reason: HOSPADM

## 2023-01-26 RX ORDER — METOPROLOL TARTRATE 25 MG/1
25-100 TABLET, FILM COATED ORAL
Status: COMPLETED | OUTPATIENT
Start: 2023-01-26 | End: 2023-01-26

## 2023-01-26 RX ORDER — METOPROLOL TARTRATE 1 MG/ML
5-15 INJECTION, SOLUTION INTRAVENOUS
Status: DISCONTINUED | OUTPATIENT
Start: 2023-01-26 | End: 2023-01-27 | Stop reason: HOSPADM

## 2023-01-26 RX ADMIN — METOPROLOL TARTRATE 50 MG: 50 TABLET, FILM COATED ORAL at 12:02

## 2023-01-26 RX ADMIN — IOPAMIDOL 120 ML: 755 INJECTION, SOLUTION INTRAVENOUS at 13:03

## 2023-01-26 RX ADMIN — NITROGLYCERIN 0.8 MG: 0.4 TABLET SUBLINGUAL at 13:08

## 2023-01-26 NOTE — PROGRESS NOTES
Pt arrived for Coronary CT angiogram. Test, meds and side effects reviewed with pt.  Resting HR 65 bpm. Given 25 mg PO Metoprolol per verbal order. Administered 0.8 mg SL nitro on CTA table per order. CTA completed.  Patient tolerated procedure well and denies symptoms of allergic reaction.  Post monitoring completed and VSS.  D/C instructions reviewed with pt whom verbalized understanding of need to increase PO fluids today. D/C to gold waiting room accompanied by staff.

## 2023-01-27 ENCOUNTER — MYC MEDICAL ADVICE (OUTPATIENT)
Dept: CARDIOLOGY | Facility: CLINIC | Age: 68
End: 2023-01-27
Payer: COMMERCIAL

## 2023-01-27 DIAGNOSIS — Z01.810 PRE-OPERATIVE CARDIOVASCULAR EXAMINATION: Primary | ICD-10-CM

## 2023-01-27 DIAGNOSIS — Z76.82 ORGAN TRANSPLANT CANDIDATE: ICD-10-CM

## 2023-01-27 DIAGNOSIS — Z01.818 ENCOUNTER FOR PRE-TRANSPLANT EVALUATION FOR KIDNEY AND PANCREAS TRANSPLANT: ICD-10-CM

## 2023-01-27 LAB
ATRIAL RATE - MUSE: 58 BPM
DIASTOLIC BLOOD PRESSURE - MUSE: NORMAL MMHG
INTERPRETATION ECG - MUSE: NORMAL
P AXIS - MUSE: 27 DEGREES
PR INTERVAL - MUSE: 150 MS
QRS DURATION - MUSE: 92 MS
QT - MUSE: 416 MS
QTC - MUSE: 408 MS
R AXIS - MUSE: 10 DEGREES
SYSTOLIC BLOOD PRESSURE - MUSE: NORMAL MMHG
T AXIS - MUSE: 20 DEGREES
VENTRICULAR RATE- MUSE: 58 BPM

## 2023-01-27 RX ORDER — SODIUM CHLORIDE 9 MG/ML
INJECTION, SOLUTION INTRAVENOUS CONTINUOUS
Status: CANCELLED | OUTPATIENT
Start: 2023-01-27

## 2023-01-27 RX ORDER — ASPIRIN 325 MG
325 TABLET ORAL ONCE
Status: CANCELLED | OUTPATIENT
Start: 2023-01-27 | End: 2023-01-27

## 2023-01-27 RX ORDER — POTASSIUM CHLORIDE 1500 MG/1
40 TABLET, EXTENDED RELEASE ORAL
Status: CANCELLED | OUTPATIENT
Start: 2023-01-27

## 2023-01-27 RX ORDER — ASPIRIN 81 MG/1
243 TABLET, CHEWABLE ORAL ONCE
Status: CANCELLED | OUTPATIENT
Start: 2023-01-27

## 2023-01-27 RX ORDER — POTASSIUM CHLORIDE 1500 MG/1
20 TABLET, EXTENDED RELEASE ORAL
Status: CANCELLED | OUTPATIENT
Start: 2023-01-27

## 2023-01-27 RX ORDER — LIDOCAINE 40 MG/G
CREAM TOPICAL
Status: CANCELLED | OUTPATIENT
Start: 2023-01-27

## 2023-02-07 ENCOUNTER — APPOINTMENT (OUTPATIENT)
Dept: CT IMAGING | Facility: CLINIC | Age: 68
DRG: 233 | End: 2023-02-07
Attending: SURGERY
Payer: COMMERCIAL

## 2023-02-07 ENCOUNTER — APPOINTMENT (OUTPATIENT)
Dept: ULTRASOUND IMAGING | Facility: CLINIC | Age: 68
DRG: 233 | End: 2023-02-07
Attending: SURGERY
Payer: COMMERCIAL

## 2023-02-07 ENCOUNTER — APPOINTMENT (OUTPATIENT)
Dept: LAB | Facility: CLINIC | Age: 68
DRG: 233 | End: 2023-02-07
Attending: INTERNAL MEDICINE
Payer: COMMERCIAL

## 2023-02-07 ENCOUNTER — APPOINTMENT (OUTPATIENT)
Dept: MEDSURG UNIT | Facility: CLINIC | Age: 68
DRG: 233 | End: 2023-02-07
Attending: INTERNAL MEDICINE
Payer: COMMERCIAL

## 2023-02-07 ENCOUNTER — TRANSFERRED RECORDS (OUTPATIENT)
Dept: HEALTH INFORMATION MANAGEMENT | Facility: CLINIC | Age: 68
End: 2023-02-07

## 2023-02-07 ENCOUNTER — PREP FOR PROCEDURE (OUTPATIENT)
Dept: CARDIOLOGY | Facility: CLINIC | Age: 68
End: 2023-02-07

## 2023-02-07 ENCOUNTER — HOSPITAL ENCOUNTER (INPATIENT)
Facility: CLINIC | Age: 68
LOS: 13 days | Discharge: HOME | DRG: 233 | End: 2023-02-20
Attending: INTERNAL MEDICINE | Admitting: INTERNAL MEDICINE
Payer: COMMERCIAL

## 2023-02-07 DIAGNOSIS — Z01.818 ENCOUNTER FOR PRE-TRANSPLANT EVALUATION FOR KIDNEY AND PANCREAS TRANSPLANT: ICD-10-CM

## 2023-02-07 DIAGNOSIS — I25.10 2-VESSEL CORONARY ARTERY DISEASE: Primary | ICD-10-CM

## 2023-02-07 DIAGNOSIS — Z95.1 S/P CABG (CORONARY ARTERY BYPASS GRAFT): ICD-10-CM

## 2023-02-07 DIAGNOSIS — Z01.810 PRE-OPERATIVE CARDIOVASCULAR EXAMINATION: ICD-10-CM

## 2023-02-07 DIAGNOSIS — R94.39 ABNORMAL CARDIOVASCULAR STRESS TEST: Primary | ICD-10-CM

## 2023-02-07 DIAGNOSIS — I25.84 CORONARY ATHEROSCLEROSIS DUE TO SEVERELY CALCIFIED CORONARY LESION: ICD-10-CM

## 2023-02-07 DIAGNOSIS — I25.10 CAD (CORONARY ARTERY DISEASE): ICD-10-CM

## 2023-02-07 DIAGNOSIS — Z76.82 ORGAN TRANSPLANT CANDIDATE: ICD-10-CM

## 2023-02-07 PROBLEM — Z98.890 STATUS POST CORONARY ANGIOGRAM: Status: ACTIVE | Noted: 2021-12-30

## 2023-02-07 LAB
ABO/RH(D): NORMAL
ACT BLD: 288 SECONDS (ref 74–150)
ANION GAP SERPL CALCULATED.3IONS-SCNC: 13 MMOL/L (ref 7–15)
ANTIBODY SCREEN: NEGATIVE
APTT PPP: 27 SECONDS (ref 22–38)
BUN SERPL-MCNC: 38.9 MG/DL (ref 8–23)
CALCIUM SERPL-MCNC: 9 MG/DL (ref 8.8–10.2)
CHLORIDE SERPL-SCNC: 101 MMOL/L (ref 98–107)
CREAT SERPL-MCNC: 5.77 MG/DL (ref 0.67–1.17)
DEPRECATED HCO3 PLAS-SCNC: 26 MMOL/L (ref 22–29)
ERYTHROCYTE [DISTWIDTH] IN BLOOD BY AUTOMATED COUNT: 12.5 % (ref 10–15)
GFR SERPL CREATININE-BSD FRML MDRD: 10 ML/MIN/1.73M2
GLUCOSE BLDC GLUCOMTR-MCNC: 164 MG/DL (ref 70–99)
GLUCOSE BLDC GLUCOMTR-MCNC: 169 MG/DL (ref 70–99)
GLUCOSE BLDC GLUCOMTR-MCNC: 202 MG/DL (ref 70–99)
GLUCOSE SERPL-MCNC: 197 MG/DL (ref 70–99)
HBA1C MFR BLD: 7.1 %
HCT VFR BLD AUTO: 38.9 % (ref 40–53)
HGB BLD-MCNC: 13 G/DL (ref 13.3–17.7)
INR PPP: 1.03 (ref 0.85–1.15)
MCH RBC QN AUTO: 32.5 PG (ref 26.5–33)
MCHC RBC AUTO-ENTMCNC: 33.4 G/DL (ref 31.5–36.5)
MCV RBC AUTO: 97 FL (ref 78–100)
PLATELET # BLD AUTO: 209 10E3/UL (ref 150–450)
POTASSIUM SERPL-SCNC: 4.4 MMOL/L (ref 3.4–5.3)
RBC # BLD AUTO: 4 10E6/UL (ref 4.4–5.9)
SODIUM SERPL-SCNC: 140 MMOL/L (ref 136–145)
SPECIMEN EXPIRATION DATE: NORMAL
WBC # BLD AUTO: 9.5 10E3/UL (ref 4–11)

## 2023-02-07 PROCEDURE — 99153 MOD SED SAME PHYS/QHP EA: CPT | Performed by: INTERNAL MEDICINE

## 2023-02-07 PROCEDURE — 86923 COMPATIBILITY TEST ELECTRIC: CPT | Performed by: PHYSICIAN ASSISTANT

## 2023-02-07 PROCEDURE — 85610 PROTHROMBIN TIME: CPT | Performed by: INTERNAL MEDICINE

## 2023-02-07 PROCEDURE — 99152 MOD SED SAME PHYS/QHP 5/>YRS: CPT | Performed by: INTERNAL MEDICINE

## 2023-02-07 PROCEDURE — 86901 BLOOD TYPING SEROLOGIC RH(D): CPT | Performed by: ANESTHESIOLOGY

## 2023-02-07 PROCEDURE — 86850 RBC ANTIBODY SCREEN: CPT | Performed by: ANESTHESIOLOGY

## 2023-02-07 PROCEDURE — 82962 GLUCOSE BLOOD TEST: CPT

## 2023-02-07 PROCEDURE — 93454 CORONARY ARTERY ANGIO S&I: CPT | Mod: 26 | Performed by: INTERNAL MEDICINE

## 2023-02-07 PROCEDURE — 214N000001 HC R&B CCU UMMC

## 2023-02-07 PROCEDURE — 93970 EXTREMITY STUDY: CPT

## 2023-02-07 PROCEDURE — B2111ZZ FLUOROSCOPY OF MULTIPLE CORONARY ARTERIES USING LOW OSMOLAR CONTRAST: ICD-10-PCS | Performed by: INTERNAL MEDICINE

## 2023-02-07 PROCEDURE — 93880 EXTRACRANIAL BILAT STUDY: CPT | Mod: 26 | Performed by: RADIOLOGY

## 2023-02-07 PROCEDURE — 92979 ENDOLUMINL IVUS OCT C EA: CPT | Mod: LM | Performed by: INTERNAL MEDICINE

## 2023-02-07 PROCEDURE — C1894 INTRO/SHEATH, NON-LASER: HCPCS | Performed by: INTERNAL MEDICINE

## 2023-02-07 PROCEDURE — 93571 IV DOP VEL&/PRESS C FLO 1ST: CPT | Mod: 26 | Performed by: INTERNAL MEDICINE

## 2023-02-07 PROCEDURE — 90937 HEMODIALYSIS REPEATED EVAL: CPT

## 2023-02-07 PROCEDURE — 85730 THROMBOPLASTIN TIME PARTIAL: CPT | Performed by: INTERNAL MEDICINE

## 2023-02-07 PROCEDURE — 71250 CT THORAX DX C-: CPT | Mod: 26 | Performed by: RADIOLOGY

## 2023-02-07 PROCEDURE — 999N000134 HC STATISTIC PP CARE STAGE 3

## 2023-02-07 PROCEDURE — 250N000013 HC RX MED GY IP 250 OP 250 PS 637

## 2023-02-07 PROCEDURE — 93571 IV DOP VEL&/PRESS C FLO 1ST: CPT | Performed by: INTERNAL MEDICINE

## 2023-02-07 PROCEDURE — 71250 CT THORAX DX C-: CPT

## 2023-02-07 PROCEDURE — C1769 GUIDE WIRE: HCPCS | Performed by: INTERNAL MEDICINE

## 2023-02-07 PROCEDURE — 93799 UNLISTED CV SVC/PROCEDURE: CPT | Mod: RC | Performed by: INTERNAL MEDICINE

## 2023-02-07 PROCEDURE — 96372 THER/PROPH/DIAG INJ SC/IM: CPT

## 2023-02-07 PROCEDURE — 96372 THER/PROPH/DIAG INJ SC/IM: CPT | Performed by: CASE MANAGER/CARE COORDINATOR

## 2023-02-07 PROCEDURE — 250N000012 HC RX MED GY IP 250 OP 636 PS 637: Performed by: CASE MANAGER/CARE COORDINATOR

## 2023-02-07 PROCEDURE — C1887 CATHETER, GUIDING: HCPCS | Performed by: INTERNAL MEDICINE

## 2023-02-07 PROCEDURE — 93005 ELECTROCARDIOGRAM TRACING: CPT

## 2023-02-07 PROCEDURE — 99222 1ST HOSP IP/OBS MODERATE 55: CPT | Performed by: SURGERY

## 2023-02-07 PROCEDURE — 92978 ENDOLUMINL IVUS OCT C 1ST: CPT | Mod: 26 | Performed by: INTERNAL MEDICINE

## 2023-02-07 PROCEDURE — 93010 ELECTROCARDIOGRAM REPORT: CPT | Mod: 59 | Performed by: INTERNAL MEDICINE

## 2023-02-07 PROCEDURE — 36415 COLL VENOUS BLD VENIPUNCTURE: CPT | Performed by: ANESTHESIOLOGY

## 2023-02-07 PROCEDURE — 258N000003 HC RX IP 258 OP 636: Performed by: INTERNAL MEDICINE

## 2023-02-07 PROCEDURE — 250N000011 HC RX IP 250 OP 636: Performed by: INTERNAL MEDICINE

## 2023-02-07 PROCEDURE — C1753 CATH, INTRAVAS ULTRASOUND: HCPCS | Performed by: INTERNAL MEDICINE

## 2023-02-07 PROCEDURE — 93970 EXTREMITY STUDY: CPT | Mod: 26 | Performed by: RADIOLOGY

## 2023-02-07 PROCEDURE — C1760 CLOSURE DEV, VASC: HCPCS | Performed by: INTERNAL MEDICINE

## 2023-02-07 PROCEDURE — 250N000009 HC RX 250: Performed by: INTERNAL MEDICINE

## 2023-02-07 PROCEDURE — 80048 BASIC METABOLIC PNL TOTAL CA: CPT | Performed by: INTERNAL MEDICINE

## 2023-02-07 PROCEDURE — 99222 1ST HOSP IP/OBS MODERATE 55: CPT | Mod: 25

## 2023-02-07 PROCEDURE — 272N000001 HC OR GENERAL SUPPLY STERILE: Performed by: INTERNAL MEDICINE

## 2023-02-07 PROCEDURE — 5A1D70Z PERFORMANCE OF URINARY FILTRATION, INTERMITTENT, LESS THAN 6 HOURS PER DAY: ICD-10-PCS | Performed by: CLINICAL NURSE SPECIALIST

## 2023-02-07 PROCEDURE — 999N000142 HC STATISTIC PROCEDURE PREP ONLY

## 2023-02-07 PROCEDURE — 85027 COMPLETE CBC AUTOMATED: CPT | Performed by: INTERNAL MEDICINE

## 2023-02-07 PROCEDURE — 83036 HEMOGLOBIN GLYCOSYLATED A1C: CPT | Performed by: CASE MANAGER/CARE COORDINATOR

## 2023-02-07 PROCEDURE — 85347 COAGULATION TIME ACTIVATED: CPT

## 2023-02-07 PROCEDURE — 93880 EXTRACRANIAL BILAT STUDY: CPT

## 2023-02-07 PROCEDURE — 36415 COLL VENOUS BLD VENIPUNCTURE: CPT | Performed by: INTERNAL MEDICINE

## 2023-02-07 PROCEDURE — 93454 CORONARY ARTERY ANGIO S&I: CPT | Performed by: INTERNAL MEDICINE

## 2023-02-07 PROCEDURE — 258N000003 HC RX IP 258 OP 636: Performed by: STUDENT IN AN ORGANIZED HEALTH CARE EDUCATION/TRAINING PROGRAM

## 2023-02-07 PROCEDURE — 99152 MOD SED SAME PHYS/QHP 5/>YRS: CPT | Mod: GC | Performed by: INTERNAL MEDICINE

## 2023-02-07 PROCEDURE — 250N000012 HC RX MED GY IP 250 OP 636 PS 637

## 2023-02-07 PROCEDURE — 250N000013 HC RX MED GY IP 250 OP 250 PS 637: Performed by: INTERNAL MEDICINE

## 2023-02-07 DEVICE — CLOSURE ANGIOSEAL 6FR 610130: Type: IMPLANTABLE DEVICE | Status: FUNCTIONAL

## 2023-02-07 RX ORDER — SODIUM CHLORIDE 9 MG/ML
INJECTION, SOLUTION INTRAVENOUS CONTINUOUS
Status: DISCONTINUED | OUTPATIENT
Start: 2023-02-07 | End: 2023-02-07 | Stop reason: HOSPADM

## 2023-02-07 RX ORDER — ACETAMINOPHEN 325 MG/1
975 TABLET ORAL ONCE
Status: CANCELLED | OUTPATIENT
Start: 2023-02-07 | End: 2023-02-07

## 2023-02-07 RX ORDER — NALOXONE HYDROCHLORIDE 0.4 MG/ML
0.2 INJECTION, SOLUTION INTRAMUSCULAR; INTRAVENOUS; SUBCUTANEOUS
Status: CANCELLED | OUTPATIENT
Start: 2023-02-07

## 2023-02-07 RX ORDER — NALOXONE HYDROCHLORIDE 0.4 MG/ML
0.4 INJECTION, SOLUTION INTRAMUSCULAR; INTRAVENOUS; SUBCUTANEOUS
Status: ACTIVE | OUTPATIENT
Start: 2023-02-07 | End: 2023-02-07

## 2023-02-07 RX ORDER — FLUMAZENIL 0.1 MG/ML
0.2 INJECTION, SOLUTION INTRAVENOUS
Status: ACTIVE | OUTPATIENT
Start: 2023-02-07 | End: 2023-02-07

## 2023-02-07 RX ORDER — MAGNESIUM HYDROXIDE/ALUMINUM HYDROXICE/SIMETHICONE 120; 1200; 1200 MG/30ML; MG/30ML; MG/30ML
30 SUSPENSION ORAL EVERY 4 HOURS PRN
Status: DISCONTINUED | OUTPATIENT
Start: 2023-02-07 | End: 2023-02-13

## 2023-02-07 RX ORDER — PROCHLORPERAZINE MALEATE 5 MG
5 TABLET ORAL EVERY 6 HOURS PRN
Status: DISCONTINUED | OUTPATIENT
Start: 2023-02-07 | End: 2023-02-13

## 2023-02-07 RX ORDER — ACETAMINOPHEN 650 MG/1
650 SUPPOSITORY RECTAL EVERY 4 HOURS PRN
Status: DISCONTINUED | OUTPATIENT
Start: 2023-02-07 | End: 2023-02-13

## 2023-02-07 RX ORDER — ASPIRIN 81 MG/1
81 TABLET, CHEWABLE ORAL DAILY
Status: DISCONTINUED | OUTPATIENT
Start: 2023-02-08 | End: 2023-02-13

## 2023-02-07 RX ORDER — PROCHLORPERAZINE 25 MG
12.5 SUPPOSITORY, RECTAL RECTAL EVERY 12 HOURS PRN
Status: DISCONTINUED | OUTPATIENT
Start: 2023-02-07 | End: 2023-02-13

## 2023-02-07 RX ORDER — ATORVASTATIN CALCIUM 10 MG/1
20 TABLET, FILM COATED ORAL AT BEDTIME
Status: DISCONTINUED | OUTPATIENT
Start: 2023-02-07 | End: 2023-02-15

## 2023-02-07 RX ORDER — IOPAMIDOL 755 MG/ML
INJECTION, SOLUTION INTRAVASCULAR
Status: DISCONTINUED | OUTPATIENT
Start: 2023-02-07 | End: 2023-02-07 | Stop reason: HOSPADM

## 2023-02-07 RX ORDER — BISACODYL 5 MG
5 TABLET, DELAYED RELEASE (ENTERIC COATED) ORAL DAILY PRN
Status: DISCONTINUED | OUTPATIENT
Start: 2023-02-07 | End: 2023-02-13

## 2023-02-07 RX ORDER — NALOXONE HYDROCHLORIDE 0.4 MG/ML
0.4 INJECTION, SOLUTION INTRAMUSCULAR; INTRAVENOUS; SUBCUTANEOUS
Status: CANCELLED | OUTPATIENT
Start: 2023-02-07

## 2023-02-07 RX ORDER — LIDOCAINE 40 MG/G
CREAM TOPICAL
Status: CANCELLED | OUTPATIENT
Start: 2023-02-07

## 2023-02-07 RX ORDER — FAMOTIDINE 20 MG/1
20 TABLET, FILM COATED ORAL
Status: CANCELLED | OUTPATIENT
Start: 2023-02-07

## 2023-02-07 RX ORDER — POTASSIUM CHLORIDE 750 MG/1
40 TABLET, EXTENDED RELEASE ORAL
Status: DISCONTINUED | OUTPATIENT
Start: 2023-02-07 | End: 2023-02-07 | Stop reason: HOSPADM

## 2023-02-07 RX ORDER — CALCIUM CARBONATE 500 MG/1
1500 TABLET, CHEWABLE ORAL 2 TIMES DAILY
Status: DISCONTINUED | OUTPATIENT
Start: 2023-02-07 | End: 2023-02-13

## 2023-02-07 RX ORDER — CEFAZOLIN SODIUM 2 G/100ML
2 INJECTION, SOLUTION INTRAVENOUS SEE ADMIN INSTRUCTIONS
Status: CANCELLED | OUTPATIENT
Start: 2023-02-07

## 2023-02-07 RX ORDER — DEXTROSE MONOHYDRATE 25 G/50ML
25-50 INJECTION, SOLUTION INTRAVENOUS
Status: DISCONTINUED | OUTPATIENT
Start: 2023-02-07 | End: 2023-02-13

## 2023-02-07 RX ORDER — HEPARIN SODIUM 1000 [USP'U]/ML
INJECTION, SOLUTION INTRAVENOUS; SUBCUTANEOUS
Status: DISCONTINUED | OUTPATIENT
Start: 2023-02-07 | End: 2023-02-07 | Stop reason: HOSPADM

## 2023-02-07 RX ORDER — POLYETHYLENE GLYCOL 3350 17 G/17G
17 POWDER, FOR SOLUTION ORAL DAILY
Status: DISCONTINUED | OUTPATIENT
Start: 2023-02-08 | End: 2023-02-13

## 2023-02-07 RX ORDER — CEFAZOLIN SODIUM 2 G/100ML
2 INJECTION, SOLUTION INTRAVENOUS
Status: CANCELLED | OUTPATIENT
Start: 2023-02-07

## 2023-02-07 RX ORDER — DIPHENHYDRAMINE HYDROCHLORIDE 50 MG/ML
INJECTION INTRAMUSCULAR; INTRAVENOUS
Status: DISCONTINUED | OUTPATIENT
Start: 2023-02-07 | End: 2023-02-07 | Stop reason: HOSPADM

## 2023-02-07 RX ORDER — ACETAMINOPHEN 325 MG/1
650 TABLET ORAL EVERY 4 HOURS PRN
Status: DISCONTINUED | OUTPATIENT
Start: 2023-02-07 | End: 2023-02-13

## 2023-02-07 RX ORDER — FENTANYL CITRATE 50 UG/ML
25-50 INJECTION, SOLUTION INTRAMUSCULAR; INTRAVENOUS
Status: CANCELLED | OUTPATIENT
Start: 2023-02-07

## 2023-02-07 RX ORDER — NICOTINE POLACRILEX 4 MG
15-30 LOZENGE BUCCAL
Status: DISCONTINUED | OUTPATIENT
Start: 2023-02-07 | End: 2023-02-13

## 2023-02-07 RX ORDER — NALOXONE HYDROCHLORIDE 0.4 MG/ML
0.2 INJECTION, SOLUTION INTRAMUSCULAR; INTRAVENOUS; SUBCUTANEOUS
Status: ACTIVE | OUTPATIENT
Start: 2023-02-07 | End: 2023-02-07

## 2023-02-07 RX ORDER — ONDANSETRON 4 MG/1
4 TABLET, ORALLY DISINTEGRATING ORAL EVERY 6 HOURS PRN
Status: DISCONTINUED | OUTPATIENT
Start: 2023-02-07 | End: 2023-02-13

## 2023-02-07 RX ORDER — FLUMAZENIL 0.1 MG/ML
0.2 INJECTION, SOLUTION INTRAVENOUS
Status: CANCELLED | OUTPATIENT
Start: 2023-02-07

## 2023-02-07 RX ORDER — FENTANYL CITRATE 50 UG/ML
25 INJECTION, SOLUTION INTRAMUSCULAR; INTRAVENOUS
Status: DISCONTINUED | OUTPATIENT
Start: 2023-02-07 | End: 2023-02-13

## 2023-02-07 RX ORDER — ASPIRIN 81 MG/1
324 TABLET, CHEWABLE ORAL ONCE
Status: DISCONTINUED | OUTPATIENT
Start: 2023-02-07 | End: 2023-02-07

## 2023-02-07 RX ORDER — ATROPINE SULFATE 0.1 MG/ML
0.5 INJECTION INTRAVENOUS
Status: ACTIVE | OUTPATIENT
Start: 2023-02-07 | End: 2023-02-07

## 2023-02-07 RX ORDER — ONDANSETRON 2 MG/ML
4 INJECTION INTRAMUSCULAR; INTRAVENOUS EVERY 6 HOURS PRN
Status: DISCONTINUED | OUTPATIENT
Start: 2023-02-07 | End: 2023-02-13

## 2023-02-07 RX ORDER — PHENYLEPHRINE HCL IN 0.9% NACL 50MG/250ML
.1-6 PLASTIC BAG, INJECTION (ML) INTRAVENOUS CONTINUOUS
Status: CANCELLED | OUTPATIENT
Start: 2023-02-08

## 2023-02-07 RX ORDER — CHLORHEXIDINE GLUCONATE ORAL RINSE 1.2 MG/ML
10 SOLUTION DENTAL ONCE
Status: CANCELLED | OUTPATIENT
Start: 2023-02-07 | End: 2023-02-07

## 2023-02-07 RX ORDER — CALCIUM ACETATE 667 MG/1
667 CAPSULE ORAL
Status: DISCONTINUED | OUTPATIENT
Start: 2023-02-07 | End: 2023-02-13

## 2023-02-07 RX ORDER — NITROGLYCERIN 0.4 MG/1
0.4 TABLET SUBLINGUAL EVERY 5 MIN PRN
Status: DISCONTINUED | OUTPATIENT
Start: 2023-02-07 | End: 2023-02-13

## 2023-02-07 RX ORDER — POTASSIUM CHLORIDE 750 MG/1
20 TABLET, EXTENDED RELEASE ORAL
Status: DISCONTINUED | OUTPATIENT
Start: 2023-02-07 | End: 2023-02-07 | Stop reason: HOSPADM

## 2023-02-07 RX ORDER — BISACODYL 5 MG
15 TABLET, DELAYED RELEASE (ENTERIC COATED) ORAL DAILY PRN
Status: DISCONTINUED | OUTPATIENT
Start: 2023-02-07 | End: 2023-02-13

## 2023-02-07 RX ORDER — ASPIRIN 81 MG/1
243 TABLET, CHEWABLE ORAL ONCE
Status: COMPLETED | OUTPATIENT
Start: 2023-02-07 | End: 2023-02-07

## 2023-02-07 RX ORDER — OXYCODONE HYDROCHLORIDE 10 MG/1
10 TABLET ORAL EVERY 4 HOURS PRN
Status: DISCONTINUED | OUTPATIENT
Start: 2023-02-07 | End: 2023-02-13

## 2023-02-07 RX ORDER — FENTANYL CITRATE 50 UG/ML
INJECTION, SOLUTION INTRAMUSCULAR; INTRAVENOUS
Status: DISCONTINUED | OUTPATIENT
Start: 2023-02-07 | End: 2023-02-07 | Stop reason: HOSPADM

## 2023-02-07 RX ORDER — AMLODIPINE BESYLATE 10 MG/1
10 TABLET ORAL DAILY
Status: DISCONTINUED | OUTPATIENT
Start: 2023-02-08 | End: 2023-02-08

## 2023-02-07 RX ORDER — NICOTINE POLACRILEX 4 MG
15-30 LOZENGE BUCCAL
Status: DISCONTINUED | OUTPATIENT
Start: 2023-02-07 | End: 2023-02-07

## 2023-02-07 RX ORDER — LIDOCAINE 40 MG/G
CREAM TOPICAL
Status: DISCONTINUED | OUTPATIENT
Start: 2023-02-07 | End: 2023-02-07 | Stop reason: HOSPADM

## 2023-02-07 RX ORDER — ASPIRIN 81 MG/1
162 TABLET, CHEWABLE ORAL
Status: CANCELLED | OUTPATIENT
Start: 2023-02-07

## 2023-02-07 RX ORDER — BISACODYL 10 MG
10 SUPPOSITORY, RECTAL RECTAL DAILY PRN
Status: DISCONTINUED | OUTPATIENT
Start: 2023-02-07 | End: 2023-02-13

## 2023-02-07 RX ORDER — BISACODYL 5 MG
10 TABLET, DELAYED RELEASE (ENTERIC COATED) ORAL DAILY PRN
Status: DISCONTINUED | OUTPATIENT
Start: 2023-02-07 | End: 2023-02-13

## 2023-02-07 RX ORDER — OXYCODONE HYDROCHLORIDE 5 MG/1
5 TABLET ORAL EVERY 4 HOURS PRN
Status: DISCONTINUED | OUTPATIENT
Start: 2023-02-07 | End: 2023-02-13

## 2023-02-07 RX ORDER — LIDOCAINE 40 MG/G
CREAM TOPICAL
Status: DISCONTINUED | OUTPATIENT
Start: 2023-02-07 | End: 2023-02-08

## 2023-02-07 RX ORDER — GABAPENTIN 100 MG/1
100 CAPSULE ORAL
Status: CANCELLED | OUTPATIENT
Start: 2023-02-07

## 2023-02-07 RX ORDER — CALCITRIOL 0.25 UG/1
0.25 CAPSULE, LIQUID FILLED ORAL DAILY
Status: DISCONTINUED | OUTPATIENT
Start: 2023-02-08 | End: 2023-02-13

## 2023-02-07 RX ORDER — ASPIRIN 81 MG/1
81 TABLET, CHEWABLE ORAL
Status: CANCELLED | OUTPATIENT
Start: 2023-02-07

## 2023-02-07 RX ORDER — DEXMEDETOMIDINE HYDROCHLORIDE 4 UG/ML
.1-1.2 INJECTION, SOLUTION INTRAVENOUS CONTINUOUS
Status: CANCELLED | OUTPATIENT
Start: 2023-02-08

## 2023-02-07 RX ORDER — LIDOCAINE 40 MG/G
CREAM TOPICAL
Status: DISCONTINUED | OUTPATIENT
Start: 2023-02-07 | End: 2023-02-13

## 2023-02-07 RX ORDER — NOREPINEPHRINE BITARTRATE 0.06 MG/ML
.01-.1 INJECTION, SOLUTION INTRAVENOUS CONTINUOUS
Status: CANCELLED | OUTPATIENT
Start: 2023-02-08

## 2023-02-07 RX ORDER — ASPIRIN 325 MG
325 TABLET ORAL ONCE
Status: COMPLETED | OUTPATIENT
Start: 2023-02-07 | End: 2023-02-07

## 2023-02-07 RX ORDER — DEXTROSE MONOHYDRATE 25 G/50ML
25-50 INJECTION, SOLUTION INTRAVENOUS
Status: DISCONTINUED | OUTPATIENT
Start: 2023-02-07 | End: 2023-02-07

## 2023-02-07 RX ADMIN — CALCIUM CARBONATE (ANTACID) CHEW TAB 500 MG 1500 MG: 500 CHEW TAB at 21:54

## 2023-02-07 RX ADMIN — SODIUM CHLORIDE 300 ML: 9 INJECTION, SOLUTION INTRAVENOUS at 18:49

## 2023-02-07 RX ADMIN — SODIUM CHLORIDE: 9 INJECTION, SOLUTION INTRAVENOUS at 08:38

## 2023-02-07 RX ADMIN — Medication 12.5 MG: at 21:55

## 2023-02-07 RX ADMIN — ATORVASTATIN CALCIUM 20 MG: 20 TABLET, FILM COATED ORAL at 21:55

## 2023-02-07 RX ADMIN — CALCIUM ACETATE 667 MG: 667 CAPSULE ORAL at 23:17

## 2023-02-07 RX ADMIN — INSULIN ASPART 2 UNITS: 100 INJECTION, SOLUTION INTRAVENOUS; SUBCUTANEOUS at 18:03

## 2023-02-07 RX ADMIN — CALCIUM ACETATE 667 MG: 667 CAPSULE ORAL at 18:02

## 2023-02-07 RX ADMIN — ASPIRIN 325 MG ORAL TABLET 325 MG: 325 PILL ORAL at 08:41

## 2023-02-07 RX ADMIN — INSULIN GLARGINE 20 UNITS: 100 INJECTION, SOLUTION SUBCUTANEOUS at 23:18

## 2023-02-07 RX ADMIN — SODIUM CHLORIDE 250 ML: 9 INJECTION, SOLUTION INTRAVENOUS at 18:49

## 2023-02-07 ASSESSMENT — COLUMBIA-SUICIDE SEVERITY RATING SCALE - C-SSRS
3. HAVE YOU BEEN THINKING ABOUT HOW YOU MIGHT KILL YOURSELF?: NO
1. IN THE PAST MONTH, HAVE YOU WISHED YOU WERE DEAD OR WISHED YOU COULD GO TO SLEEP AND NOT WAKE UP?: NO
4. HAVE YOU HAD THESE THOUGHTS AND HAD SOME INTENTION OF ACTING ON THEM?: NO
6. HAVE YOU EVER DONE ANYTHING, STARTED TO DO ANYTHING, OR PREPARED TO DO ANYTHING TO END YOUR LIFE?: NO
2. HAVE YOU ACTUALLY HAD ANY THOUGHTS OF KILLING YOURSELF IN THE PAST MONTH?: NO
5. HAVE YOU STARTED TO WORK OUT OR WORKED OUT THE DETAILS OF HOW TO KILL YOURSELF? DO YOU INTEND TO CARRY OUT THIS PLAN?: NO

## 2023-02-07 ASSESSMENT — ACTIVITIES OF DAILY LIVING (ADL)
ADLS_ACUITY_SCORE: 35

## 2023-02-07 NOTE — H&P
[facility]   Cardiology Service  History and Physical      Avelina Marion MRN# [id number]   YOB: 1955 Age: 67 year old       Admission Date: 2/7/2023      Assessment and Plan:   Avelina Marion is a 67 year old male with a PMH significant for DM2, HTN, HLD, ESRD on dialysis, coronary artery disease, and tobacco use who is admitted after planned angiogram for evaluation for of CABG.     # Coronary Artery Disease  # Hypertension   # Hyperlipidemia  Patient underwent coronary angiogram in December 2021 which showed mild CAD with no obstructive disease.  Echocardiogram a year ago showed normal biventricular function and LVH. He underwent stress testing on 12/27 as part of transplant work up which was positive for ST depression. EF was normal 55-60% with no significant valvular disease or WMA's.  Planned outpatient coronary angiogram on 2/7/23 showed 80% LM disease. Most recent lipids on 12/19/22 LDL 43, total 116.  - Echocardiogram  - CVTS consult for consideration of CABG  - Continue PTA Amlodipine 10mg for now   - Continue PTA Lipitor 20mg q HS     # Diabetes Mellitus Type 2  Insulin dependent. Most recent A1c 7.0% on 8/9/22. Home regimen includes Lantus. Under consideration for pancreatic transplant (and renal).   - Continue PTA Lantus 20 units at bedtime  - QID AC&HS blood sugar checks  - Hypoglycemia protocol     # End Stage Renal Disease  # Kidney Transplant Candidate  On dialysis since April 2021. Currently on transplant list. Primary nephrologist Dr. Mitchell. Was offered a kidney in 7/2022 but had to decline due to BCC diagnosis. Underwent Mohs procedure 8/2022 and now has derm clearance.   - Nephrology consult for dialysis and transplant; appreciate recommendations   - Continue PTA Calcitrol  - Continue PTA PhosLo  - Continue PTA CaCarb    # Hx of Tobacco Use   Former smoker. Quit in 1985.   - Encourage ongoing cessation     Present on Admission:    Pre-operative  cardiovascular examination    CAD (coronary artery disease)    Abnormal cardiovascular stress test    Organ transplant candidate    Coronary atherosclerosis due to severely calcified coronary lesion    Encounter for pre-transplant evaluation for kidney and pancreas transplant    Status post coronary angiogram      FEN: Low Sat Fat  Code Status: Full  DVT Prophylaxis:  Mechanical  Isolation: None  Disposition: Pending surgery    Patient discussed with Dr. Sena Bhatia, APRN, CNP  [facility] Cardiology      Chief Complaint: Abnormal coronary angiogram    HPI: Avelina Marion is a 67 year old male with a PMH significant for DM2, HTN, HLD, ESRD on HD, coronary artery disease, and tobacco use. Patient underwent coronary angiogram in December 2021 which showed mild CAD with no obstructive disease. His echocardiogram a year ago showed normal biventricular function and LVH. He underwent stress testing on 12/27 as part of kidney/pancreas transplant work up which was positive for ST depression with EF normal 55-60% and no significant valvular disease or WMA's. He came in for planned outpatient coronary angiogram on 2/7/23 which demonstrated 80% ostial left main disease.      Past Medical History:   Diagnosis Date     Anemia in chronic kidney disease      BCC (basal cell carcinoma), face 8/9/2022     Diabetic retinopathy of both eyes (H)      End stage renal disease (H)      Hypertension      Secondary renal hyperparathyroidism (H)      Type 2 diabetes mellitus (H)        Past Surgical History:   Procedure Laterality Date     CREATE FISTULA ARTERIOVENOUS UPPER EXTREMITY Left      CV CORONARY ANGIOGRAM N/A 12/30/2021    Procedure: CV CORONARY ANGIOGRAM;  Surgeon: Milton Cam MD;  Location:  HEART CARDIAC CATH LAB       No current facility-administered medications on file prior to encounter.  aspirin (ASA) 81 MG chewable tablet, chew and swallow one tablet by mouth every day  atorvastatin (LIPITOR) 20 MG tablet,  "TAKE 1 TABLET BY MOUTH DAILY  calcitRIOL (ROCALTROL) 0.25 MCG capsule, Take 0.25 mcg by mouth daily  calcium acetate (PHOSLO) 667 MG CAPS capsule, Take 667 mg by mouth  calcium carbonate 750 MG CHEW, Take 750 mg by mouth 4 times daily  insulin glargine (LANTUS PEN) 100 UNIT/ML pen, Inject Subcutaneous At Bedtime  amLODIPine (NORVASC) 5 MG tablet, Take 10 mg by mouth  Blood Glucose Monitoring Suppl (ACCU-CHEK GUIDE) w/Device KIT, Use to test 4 times a day. Pharmacy to dispense brand based on insurance.        Family History   Problem Relation Age of Onset     Diabetes Brother      Kidney Disease Brother        Social History     Tobacco Use     Smoking status: Former     Types: Cigarettes     Smokeless tobacco: Never     Tobacco comments:     Quit 1984   Substance Use Topics     Alcohol use: Never       No Known Allergies      ROS:   CONSTITUTIONAL: No report of fevers or chills  RESPIRATORY: No cough, wheezing, SOB, or hemoptysis  CARDIOVASCULAR: see HPI  MUSCULO-SKELETAL: No joint pain/swelling, no muscle pain  NEURO: No paresthesias, syncope, pre-syncope, lightheadness, dizziness or vertigo  ENDOCRINE: No temperature intolerance, no skin/hair changes  PSYCHIATRIC: No change in mood, feeling down/anxious, no change in sleep or appetite  GI: no melena or hematochezia, no change in bowel habits  : no hematuria or dysuria, no hesitancy, dribbling or incontinence  HEME: no easy bruising or bleeding, no history of anemia, no history of blood clots  SKIN: no rashes or sores, no unusual itching      Physical Examination:  Vitals: BP (!) 148/70 (BP Location: Right arm, Cuff Size: Adult Regular)   Pulse 68   Temp 98.2  F (36.8  C) (Oral)   Resp 16   Ht 1.803 m (5' 11\")   Wt 93.1 kg (205 lb 4 oz)   SpO2 99%   BMI 28.63 kg/m    BMI= Body mass index is 28.63 kg/m .    GENERAL APPEARANCE: healthy, alert and no distress  HEENT: no icterus, no xanthelasmas, normal pupil size and reaction, normal palate, mucosa " moist  NECK: JVP not elevated, brisk carotid upstroke bilaterally  CHEST: lungs clear to auscultation without rales, rhonchi or wheezes, no use of accessory muscles, no retractions  CARDIOVASCULAR: regular rhythm, normal S1 and S2, no S3 or S4 and no murmur, click or rub.  ABDOMEN: soft, non tender, without hepatosplenomegaly, no masses palpable, bowel sounds normal  EXTREMITIES: warm, no edema, DP/PT pulses 2+ bilaterally, no clubbing or cyanosis   NEURO: alert and oriented to person/place/time, normal speech, gait and affect  SKIN: no ecchymoses, no rashes      Laboratory:  CMP  Recent Labs   Lab 23  0747      POTASSIUM 4.4   CHLORIDE 101   CO2 26   ANIONGAP 13   *   BUN 38.9*   CR 5.77*   GFRESTIMATED 10*   DAVID 9.0     CBC  Recent Labs   Lab 23  0747   WBC 9.5   RBC 4.00*   HGB 13.0*   HCT 38.9*   MCV 97   MCH 32.5   MCHC 33.4   RDW 12.5          No results found for: TROPI, TROPONIN, TROPR, TROPN      23 EK/7/23 Coronary Angiogram:  Pending    Medical Decision Making     55 MINUTES SPENT BY ME on the date of service doing chart review, history, exam, documentation & further activities per the note.       Jessica Bhatia, APRN CNP on 2023 at 12:56 PM    Physician Attestation   I have reviewed and discussed with the advanced practice provider their history, physical and plan for Avelina Marion. I did not participate in a shared visit by interviewing or examining the patient and this should be billed as an advanced practice provider only visit.    Lefty Shetty MD  Interventional Cardiology

## 2023-02-07 NOTE — Clinical Note
Potential access sites were evaluated for patency using ultrasound.   The right femoral artery was selected. Access was obtained under with Sonosite and Fluoroscopic guidance using a standard 18 guage needle with direct visualization of needle entry.

## 2023-02-07 NOTE — PROGRESS NOTES
Brief Nephrology Note:    Pt is being admitted s/p angiogram for CABG evaluation. The patient dialyzes MWF at [facility] [place] unit and had full run there yesterday. Plan to continue per MWF schedule. Full consult tomorrow. Please page with any concerns. Thanks much.     Bharati Toscano PA-C  [phone]

## 2023-02-07 NOTE — PROGRESS NOTES
Transferred to: 6C  Via:   Reason for transfer: CAB eval  Belongings: Sent with pt  Chart: Sent with pt  Medications: N/a  Report called to: KARINE Wright

## 2023-02-07 NOTE — PROGRESS NOTES
Pt arrived via cart with RN from cath lab s/p CORS. VSS. R groin site CDI, no hematoma. Patient denied pain. Flat bedrest until 1:15PM per MD orders

## 2023-02-07 NOTE — Clinical Note
Report called to Catherine MILTON on 3C. VSS. Pt to be admitted from 3C to inpatient bed due to findings in CORS. All paperwork and belongings sent up with pt. Patient is aware of inpatient status

## 2023-02-07 NOTE — PROGRESS NOTES
CVTS   Patient seen and examined.  Cath shows critical left main lesion on angio for transplant workup.  Plan is CABG x 2 tomorrow.    Patrice Yepze  Cardiothoracic surgery  [phone]

## 2023-02-07 NOTE — PROGRESS NOTES
Got call of from Cardiac thoracic surgery that they plan to do a CABG on him tomorrow morning and would like to do dialysis on him tonight as he would be made  cardioplegia for cabg and potassium might increase

## 2023-02-07 NOTE — Clinical Note
dry, intact, no bleeding and no hematoma. 6Fr angioseal placed in RFA. CDI with tegaderm intact. X2 hours bedrest

## 2023-02-07 NOTE — PROGRESS NOTES
Pt arrived for CORS. VSS on RA ex HTN. Denies pain. PIV infusing NS @ 10 mL/hr due to dialysis. Fistula to Left arm. H&P current. Labs resulted. Consent signed. Groin area prepped. Pedal pulses marked. 325 mg of ASA given this AM. Sister Jasmyn available for  post-procedure.

## 2023-02-07 NOTE — CONSULTS
Cardiothoracic Surgery Consult Note    Consult Reason: CAD with left main disease    HPI: Avelina Marion is a very pleasant gentleman who was referred to the CVTS service following his coronary angio that showed severe left main disease. His PMH is notable for DM2, HTN, and ESRD on iHD.  He has had ongoing monitoring of his CAD in order to be considered for renal transplant and as such, has not had anginal or other symptoms.    Pertinent Positives:  Hx of DM2, ESRD on iHD via LUE AV fistula, does make some urine daily  Pertinent Negatives:  No hx of bleeding or clotting disorders, no diabetic neuropathy, no LE wounds, no hx of chest trauma or surgery, no hx of abdominal surgery, no dizziness/lightheadedness/CP, no hx of COPD/asthma/SCARLET  Activity:  Tolerates activity >4 mets, reports working every other day at the [place] [employer], walking about the grounds    PMH:  Past Medical History:   Diagnosis Date     Anemia in chronic kidney disease      BCC (basal cell carcinoma), face 8/9/2022     Diabetic retinopathy of both eyes (H)      End stage renal disease (H)      Hypertension      Secondary renal hyperparathyroidism (H)      Type 2 diabetes mellitus (H)        PSH:  Past Surgical History:   Procedure Laterality Date     CREATE FISTULA ARTERIOVENOUS UPPER EXTREMITY Left      CV CORONARY ANGIOGRAM N/A 12/30/2021    Procedure: CV CORONARY ANGIOGRAM;  Surgeon: Milton Cam MD;  Location:  HEART CARDIAC CATH LAB       FH:  Family History   Problem Relation Age of Onset     Diabetes Brother      Kidney Disease Brother        SH:  Social History     Socioeconomic History     Marital status: Single     Spouse name: None     Number of children: None     Years of education: None     Highest education level: None   Tobacco Use     Smoking status: Former     Types: Cigarettes     Smokeless tobacco: Never     Tobacco comments:     Quit 1984   Substance and Sexual Activity     Alcohol use: Never     Drug use: Never        Home Meds:  Medications Prior to Admission   Medication Sig Dispense Refill Last Dose     aspirin (ASA) 81 MG chewable tablet chew and swallow one tablet by mouth every day   2/6/2023 at 2130     atorvastatin (LIPITOR) 20 MG tablet TAKE 1 TABLET BY MOUTH DAILY   Unknown     calcitRIOL (ROCALTROL) 0.25 MCG capsule Take 0.25 mcg by mouth daily   2/6/2023 at 2130     calcium acetate (PHOSLO) 667 MG CAPS capsule Take 667 mg by mouth   2/6/2023 at 2130     calcium carbonate 750 MG CHEW Take 750 mg by mouth 4 times daily   2/6/2023 at 2130     insulin glargine (LANTUS PEN) 100 UNIT/ML pen Inject Subcutaneous At Bedtime   2/6/2023 at 2130     amLODIPine (NORVASC) 5 MG tablet Take 10 mg by mouth        Blood Glucose Monitoring Suppl (ACCU-CHEK GUIDE) w/Device KIT Use to test 4 times a day. Pharmacy to dispense brand based on insurance.          Allergies:  No Known Allergies    ROS: Negative except as noted above, under HPI.    Physical Exam:  Temp:  [98.2  F (36.8  C)-98.3  F (36.8  C)] 98.3  F (36.8  C)  Pulse:  [58-68] 64  Resp:  [14-20] 16  BP: (121-154)/(62-78) 145/74  SpO2:  [90 %-99 %] 96 %    Gen: NAD, resting comfortably in bed, conversational, pleasant, calm, cooperative on exam  HEENT: normocephalic, atraumatic cranium, EOMI, sclerae anicteric, midline trachea  Lungs: unlabored breathing on RA  CV: WWP, no dependent edema  Abd: no scars, SNDNT, no obvious hernias/masses/guarding/rebound tenderness.   Musculoskeletal: grossly intact, strength 5/5 upper and lower extremities  Neuro: A&O, CN II-VII grossly intact, KENNEDY  Mental: normal mood and affect, regular rate of speech    Labs:  ABG No lab results found in last 7 days.  CBC  Recent Labs   Lab 02/07/23  0747   WBC 9.5   HGB 13.0*        BMP  Recent Labs   Lab 02/07/23  1119 02/07/23  0747   NA  --  140   POTASSIUM  --  4.4   CHLORIDE  --  101   CO2  --  26   BUN  --  38.9*   CR  --  5.77*   * 197*     LFT  Recent Labs   Lab 02/07/23  0707    INR 1.03     PancreasNo lab results found in last 7 days.    Imaging:  Cardiac Catheterization         US Lower Extremity Venous Mapping Bilateral    (Results Pending)   CT Chest w/o Contrast    (Results Pending)   US Carotid Bilateral    (Results Pending)       A/P: Patient is a 67 year old male with HTN, DM2, ESRD on HD via LUE AV fistuala, and CAD including tight LM disease.    - Plan for surgery this admission; depending on timing to surgery, may need to plan extra HD run ahead of surgery so agree with early involvement of Nephrology.  Anticipate he will likely need temporary HD line post-operatively for CRRT until stable enough to return to running iHD.  - Pre-op labs ordered:    * Type & screen  - Pre-op imaging ordered as follows:    * US bilat carotid    * US BLE vein mapping    * CT chest non-con  - Thank you for the opportunity to participate in the care of this patient.    Patient and plan discussed with attending, Dr. Yepez.     Jose A Contreras, CNP, [facility] [place]-  Cardiothoracic Surgery  Pager [phone]    3:38 PM  February 7, 2023    Patient seen and examined. Investigations reviewed. I agree with the findings outlined in the VIVI note. Will plan CABG this admission. Risks and benefits of surgery discussed with patient who understands and is willing to proceed with surgery.

## 2023-02-08 ENCOUNTER — PREP FOR PROCEDURE (OUTPATIENT)
Dept: CARDIOLOGY | Facility: CLINIC | Age: 68
End: 2023-02-08
Payer: COMMERCIAL

## 2023-02-08 DIAGNOSIS — I25.10 CAD (CORONARY ARTERY DISEASE): Primary | ICD-10-CM

## 2023-02-08 LAB
ANION GAP SERPL CALCULATED.3IONS-SCNC: 11 MMOL/L (ref 7–15)
ATRIAL RATE - MUSE: 69 BPM
BUN SERPL-MCNC: 33.4 MG/DL (ref 8–23)
CALCIUM SERPL-MCNC: 8.4 MG/DL (ref 8.8–10.2)
CHLORIDE SERPL-SCNC: 99 MMOL/L (ref 98–107)
CREAT SERPL-MCNC: 5.35 MG/DL (ref 0.67–1.17)
DEPRECATED HCO3 PLAS-SCNC: 27 MMOL/L (ref 22–29)
DIASTOLIC BLOOD PRESSURE - MUSE: NORMAL MMHG
ERYTHROCYTE [DISTWIDTH] IN BLOOD BY AUTOMATED COUNT: 12.6 % (ref 10–15)
GFR SERPL CREATININE-BSD FRML MDRD: 11 ML/MIN/1.73M2
GLUCOSE BLDC GLUCOMTR-MCNC: 117 MG/DL (ref 70–99)
GLUCOSE BLDC GLUCOMTR-MCNC: 134 MG/DL (ref 70–99)
GLUCOSE BLDC GLUCOMTR-MCNC: 174 MG/DL (ref 70–99)
GLUCOSE BLDC GLUCOMTR-MCNC: 192 MG/DL (ref 70–99)
GLUCOSE BLDC GLUCOMTR-MCNC: 221 MG/DL (ref 70–99)
GLUCOSE SERPL-MCNC: 153 MG/DL (ref 70–99)
HCT VFR BLD AUTO: 37.2 % (ref 40–53)
HGB BLD-MCNC: 12.8 G/DL (ref 13.3–17.7)
INTERPRETATION ECG - MUSE: NORMAL
MCH RBC QN AUTO: 33.5 PG (ref 26.5–33)
MCHC RBC AUTO-ENTMCNC: 34.4 G/DL (ref 31.5–36.5)
MCV RBC AUTO: 97 FL (ref 78–100)
P AXIS - MUSE: -4 DEGREES
PLATELET # BLD AUTO: 204 10E3/UL (ref 150–450)
POTASSIUM SERPL-SCNC: 4.3 MMOL/L (ref 3.4–5.3)
PR INTERVAL - MUSE: 128 MS
QRS DURATION - MUSE: 84 MS
QT - MUSE: 398 MS
QTC - MUSE: 426 MS
R AXIS - MUSE: -2 DEGREES
RBC # BLD AUTO: 3.82 10E6/UL (ref 4.4–5.9)
SARS-COV-2 RNA RESP QL NAA+PROBE: NEGATIVE
SODIUM SERPL-SCNC: 137 MMOL/L (ref 136–145)
SYSTOLIC BLOOD PRESSURE - MUSE: NORMAL MMHG
T AXIS - MUSE: 14 DEGREES
VENTRICULAR RATE- MUSE: 69 BPM
WBC # BLD AUTO: 9.2 10E3/UL (ref 4–11)

## 2023-02-08 PROCEDURE — U0005 INFEC AGEN DETEC AMPLI PROBE: HCPCS

## 2023-02-08 PROCEDURE — 99232 SBSQ HOSP IP/OBS MODERATE 35: CPT

## 2023-02-08 PROCEDURE — 250N000013 HC RX MED GY IP 250 OP 250 PS 637

## 2023-02-08 PROCEDURE — 214N000001 HC R&B CCU UMMC

## 2023-02-08 PROCEDURE — 80048 BASIC METABOLIC PNL TOTAL CA: CPT

## 2023-02-08 PROCEDURE — 99221 1ST HOSP IP/OBS SF/LOW 40: CPT | Mod: 24 | Performed by: CLINICAL NURSE SPECIALIST

## 2023-02-08 PROCEDURE — 36415 COLL VENOUS BLD VENIPUNCTURE: CPT

## 2023-02-08 PROCEDURE — 85027 COMPLETE CBC AUTOMATED: CPT

## 2023-02-08 PROCEDURE — 82962 GLUCOSE BLOOD TEST: CPT

## 2023-02-08 RX ORDER — METOPROLOL SUCCINATE 25 MG/1
25 TABLET, EXTENDED RELEASE ORAL DAILY
Status: DISCONTINUED | OUTPATIENT
Start: 2023-02-09 | End: 2023-02-10

## 2023-02-08 RX ORDER — AMLODIPINE BESYLATE 2.5 MG/1
5 TABLET ORAL DAILY
Status: DISCONTINUED | OUTPATIENT
Start: 2023-02-09 | End: 2023-02-13

## 2023-02-08 RX ADMIN — CALCIUM ACETATE 667 MG: 667 CAPSULE ORAL at 11:18

## 2023-02-08 RX ADMIN — Medication 12.5 MG: at 07:54

## 2023-02-08 RX ADMIN — Medication 12.5 MG: at 20:10

## 2023-02-08 RX ADMIN — CALCIUM CARBONATE (ANTACID) CHEW TAB 500 MG 1500 MG: 500 CHEW TAB at 20:10

## 2023-02-08 RX ADMIN — ASPIRIN 81 MG: 81 TABLET, CHEWABLE ORAL at 07:53

## 2023-02-08 RX ADMIN — CALCIUM CARBONATE (ANTACID) CHEW TAB 500 MG 1500 MG: 500 CHEW TAB at 07:53

## 2023-02-08 RX ADMIN — CALCIUM ACETATE 667 MG: 667 CAPSULE ORAL at 07:54

## 2023-02-08 RX ADMIN — CALCITRIOL CAPSULES 0.25 MCG 0.25 MCG: 0.25 CAPSULE ORAL at 10:01

## 2023-02-08 RX ADMIN — ATORVASTATIN CALCIUM 20 MG: 20 TABLET, FILM COATED ORAL at 21:26

## 2023-02-08 RX ADMIN — CALCIUM ACETATE 667 MG: 667 CAPSULE ORAL at 17:00

## 2023-02-08 RX ADMIN — INSULIN ASPART 1 UNITS: 100 INJECTION, SOLUTION INTRAVENOUS; SUBCUTANEOUS at 11:18

## 2023-02-08 RX ADMIN — INSULIN GLARGINE 20 UNITS: 100 INJECTION, SOLUTION SUBCUTANEOUS at 21:25

## 2023-02-08 ASSESSMENT — ACTIVITIES OF DAILY LIVING (ADL)
DRESSING/BATHING_DIFFICULTY: NO
ADLS_ACUITY_SCORE: 31
CONCENTRATING,_REMEMBERING_OR_MAKING_DECISIONS_DIFFICULTY: NO
ADLS_ACUITY_SCORE: 35
ADLS_ACUITY_SCORE: 18
ADLS_ACUITY_SCORE: 31
TOILETING_ISSUES: NO
ADLS_ACUITY_SCORE: 18
ADLS_ACUITY_SCORE: 18
FALL_HISTORY_WITHIN_LAST_SIX_MONTHS: NO
WALKING_OR_CLIMBING_STAIRS_DIFFICULTY: NO
ADLS_ACUITY_SCORE: 35
ADLS_ACUITY_SCORE: 35
DOING_ERRANDS_INDEPENDENTLY_DIFFICULTY: NO
WEAR_GLASSES_OR_BLIND: NO
CHANGE_IN_FUNCTIONAL_STATUS_SINCE_ONSET_OF_CURRENT_ILLNESS/INJURY: NO
DIFFICULTY_EATING/SWALLOWING: NO
ADLS_ACUITY_SCORE: 35
ADLS_ACUITY_SCORE: 18
ADLS_ACUITY_SCORE: 18
ADLS_ACUITY_SCORE: 31

## 2023-02-08 NOTE — CONSULTS
Nephrology Initial Consult  February 8, 2023      Avelina Marion MRN:[id number] YOB: 1955  Date of Admission:2/7/2023  Primary care provider: Amy Truong  Requesting physician: Lefty Shetty MD    ASSESSMENT AND RECOMMENDATIONS:   ESRD-Since 4/2021, runs MWF under care of Dr Kummer [facility].  L arm fistula access.  Accepted for renal tx and actually has had some offers but turned down due to higher risk then needed mohs surgery and now CABG as part of transplant workup.  Had HD yesterday in anticipation of CABG today but it has been delayed until tomorrow, no need for a run today (would be 3rd day in a row) particularly as he still makes UOP so should not have a volume or K issue going into surgery.  I did let him know that there is a chance we need to place catheter for CRRT should he be unstable and need RRT post-op which he understood and agreed with.      -Holding on run today, still makes UOP.   -Access is L arm fistula, would need catheter if CRRT is needed post-op.    -No need for new dialysis consent as he is ESRD on HD as outpt.          Volume-Appears euvolemic, still makes UOP and had 1.5L of UF yesterday.     Electrolytes-K 4.3, bicarb 27, no issues.     BMD-Ca 8.4, Mg and Phos not checked today.     Anemia-Hgb 12.8, holding MCKENNA unless Hgb <11.      Nutrition-No issues, good appetite.      Time spent: 20 minutes on this date of encounter for chart review, physical exam, medical decision making and co-ordination of care.     Discussed with Dr Swann    Recommendations were communicated to primary team via note      KWASI Lara CNS  Clinical Nurse Specialist  [phone]    REASON FOR CONSULT: Requested to evaluate 67 yom with ESRD for management of dialysis.      HISTORY OF PRESENT ILLNESS:  Avelina Marion is a 67 yom with hx of ESRD since 4/2021 who is admitted to [facility] for CABG as part of kidney transplant workup.  Had angiogram with only mild CAD 12/2021 but had  stress test in Dec 2022 showing ST depression, updated angio showed 80% LM (unclear why such rapid progression) and is now planned for CABG. Still makes UOP, runs MWF dialysis and had run 2/6 prior to admission.  Nephrology consulted for management of RRT with plan for CABG on 2/9.      PAST MEDICAL HISTORY:    Past Medical History:   Diagnosis Date     Anemia in chronic kidney disease      BCC (basal cell carcinoma), face 8/9/2022     Diabetic retinopathy of both eyes (H)      End stage renal disease (H)      Hypertension      Secondary renal hyperparathyroidism (H)      Type 2 diabetes mellitus (H)        Past Surgical History:   Procedure Laterality Date     CREATE FISTULA ARTERIOVENOUS UPPER EXTREMITY Left      CV CORONARY ANGIOGRAM N/A 12/30/2021    Procedure: CV CORONARY ANGIOGRAM;  Surgeon: Milton Cam MD;  Location: [facility] CARDIAC CATH LAB     CV CORONARY ANGIOGRAM N/A 2/7/2023    Procedure: Coronary Angiogram;  Surgeon: Milton Cam MD;  Location: [facility] CARDIAC CATH LAB     CV PCI N/A 2/7/2023    Procedure: Percutaneous Coronary Intervention;  Surgeon: Milton Cam MD;  Location: [facility] CARDIAC CATH LAB        MEDICATIONS:  PTA Meds  Prior to Admission medications    Medication Sig Last Dose Taking? Auth Provider Long Term End Date   amLODIPine (NORVASC) 5 MG tablet Take 5 mg by mouth 2/6/2023 at 2130 Yes Reported, Patient Yes 10/18/22   aspirin (ASA) 81 MG chewable tablet chew and swallow one tablet by mouth every day 2/6/2023 at 2130 Yes Reported, Patient     atorvastatin (LIPITOR) 20 MG tablet TAKE 1 TABLET BY MOUTH DAILY 2/6/2023 at 2130 Yes Reported, Patient Yes    calcium acetate (PHOSLO) 667 MG CAPS capsule Take 667 mg by mouth 3 times daily (with meals) 2/6/2023 at 2130 Yes Reported, Patient     calcium carbonate 750 MG CHEW Take 750 mg by mouth 4 times daily 2/6/2023 at 2130 Yes Reported, Patient     insulin glargine (LANTUS PEN) 100 UNIT/ML pen Inject 20 Units Subcutaneous At  Bedtime 2/6/2023 at 2130 Yes Reported, Patient Yes    Blood Glucose Monitoring Suppl (ACCU-CHEK GUIDE) w/Device KIT Use to test 4 times a day. Pharmacy to dispense brand based on insurance.   Reported, Patient        Current Meds    [START ON 2/9/2023] amLODIPine  5 mg Oral Daily     aspirin  81 mg Oral Daily     atorvastatin  20 mg Oral At Bedtime     calcitRIOL  0.25 mcg Oral Daily     calcium acetate  667 mg Oral TID w/meals     calcium carbonate  1,500 mg Oral BID     insulin aspart  1-7 Units Subcutaneous TID AC     insulin aspart  1-5 Units Subcutaneous At Bedtime     insulin glargine  20 Units Subcutaneous At Bedtime     metoprolol tartrate  12.5 mg Oral BID     polyethylene glycol  17 g Oral Daily     sodium chloride (PF)  3 mL Intracatheter Q8H     Infusion Meds    - MEDICATION INSTRUCTIONS -         ALLERGIES:    No Known Allergies    REVIEW OF SYSTEMS:  A 10 point review of systems was negative except as noted above.    SOCIAL HISTORY:   Social History     Socioeconomic History     Marital status: Single     Spouse name: Not on file     Number of children: Not on file     Years of education: Not on file     Highest education level: Not on file   Occupational History     Not on file   Tobacco Use     Smoking status: Former     Types: Cigarettes     Smokeless tobacco: Never     Tobacco comments:     Quit 1984   Substance and Sexual Activity     Alcohol use: Never     Drug use: Never     Sexual activity: Not on file   Other Topics Concern     Parent/sibling w/ CABG, MI or angioplasty before 65F 55M? Not Asked   Social History Narrative     Not on file     Social Determinants of Health     Financial Resource Strain: Not on file   Food Insecurity: Not on file   Transportation Needs: Not on file   Physical Activity: Not on file   Stress: Not on file   Social Connections: Not on file   Intimate Partner Violence: Not on file   Housing Stability: Not on file     Reviewed with patient, noncontributory.     FAMILY  "MEDICAL HISTORY:   Family History   Problem Relation Age of Onset     Diabetes Brother      Kidney Disease Brother      Reviewed with patient, noncontributory.     PHYSICAL EXAM:   Temp  Av.9  F (36.6  C)  Min: 97.6  F (36.4  C)  Max: 98.3  F (36.8  C)      Pulse  Av.3  Min: 58  Max: 87 Resp  Avg: 15.9  Min: 9  Max: 25  SpO2  Av.7 %  Min: 90 %  Max: 99 %       /61 (BP Location: Right arm)   Pulse 64   Temp 97.9  F (36.6  C) (Oral)   Resp 12   Ht 1.803 m (5' 11\")   Wt 91.4 kg (201 lb 9.6 oz)   SpO2 96%   BMI 28.12 kg/m     Date 23 0700 - 23 0659   Shift 2658-0725 4325-6733 0622-1463 24 Hour Total   INTAKE   P.O. 880   880   Shift Total(mL/kg) 880(9.62)   880(9.62)   OUTPUT   Urine 125   125   Shift Total(mL/kg) 125(1.37)   125(1.37)   Weight (kg) 91.44 91.44 91.44 91.44      Admit Weight: 93.1 kg (205 lb 4 oz)     GENERAL APPEARANCE: alert and no distress  EYES: no scleral icterus  NECK:  no JVD  Pulmonary: lungs clear to auscultation with equal breath sounds bilaterally, no clubbing or cyanosis  CV: Regular rhythm, normal rate, no rub   - Edema none  GI: soft, nontender, normal bowel sounds  MS: no evidence of inflammation in joints, no muscle tenderness  :  Uribe  SKIN: no rash, warm, dry  NEURO: mentation intact and speech normal  ACCESS: L arm fistula    LABS:   CMP  Recent Labs   Lab 23  1102 23  0733 23  0625 23  0254 23  1119 23  0747   NA  --   --  137  --   --  140   POTASSIUM  --   --  4.3  --   --  4.4   CHLORIDE  --   --  99  --   --  101   CO2  --   --  27  --   --  26   ANIONGAP  --   --  11  --   --  13   * 117* 153* 192*   < > 197*   BUN  --   --  33.4*  --   --  38.9*   CR  --   --  5.35*  --   --  5.77*   GFRESTIMATED  --   --  11*  --   --  10*   DAVID  --   --  8.4*  --   --  9.0    < > = values in this interval not displayed.     CBC  Recent Labs   Lab 23  0625 23  0747   HGB 12.8* 13.0*   WBC 9.2 9.5 "   RBC 3.82* 4.00*   HCT 37.2* 38.9*   MCV 97 97   MCH 33.5* 32.5   MCHC 34.4 33.4   RDW 12.6 12.5    209     INR  Recent Labs   Lab 02/07/23  0747   INR 1.03   PTT 27     ABGNo lab results found in last 7 days.   URINE STUDIES  Recent Labs   Lab Test 08/09/22  1017 08/05/21  1426   COLOR Light Yellow Yellow   APPEARANCE Clear Clear   URINEGLC 70* 50*   URINEBILI Negative Negative   URINEKETONE Negative Negative   SG 1.017 1.016   UBLD Small* Negative   URINEPH 6.5 7.0   PROTEIN 70* 100*   NITRITE Negative Negative   LEUKEST Negative Negative   RBCU 6* 2   WBCU 1 1     No lab results found.  PTH  No lab results found.  IRON STUDIES  No lab results found.

## 2023-02-08 NOTE — PROGRESS NOTES
HEMODIALYSIS TREATMENT NOTE    Date: 2/7/2023  Time: 9:11 PM    Data:  Pre Wt:   93.3 Kg  Desired Wt: 91.8 kg   Post Wt:  91.8 kg  Ultrafiltration - Post Run Net Total Removed (mL): 1500 mL  Vascular Access Status: patent  Dialyzer Rinse: Clear  Total Blood Volume Processed: 44.83 L Liters  Total Dialysis (Treatment) Time: 2hrs Hours  Haparin: none  Dialysate bath: 3K/2.25cal      Lab:   No    Assessment/Interventions:  Patient ran 2hrs via AVF with BFR of 400ml/min as ordered. Net fluid removal 1.5kg. stable HD run. Vital signs stable. Post dialysis hand off report given to primary floor RN.        Plan:    Next HD run per renal team.

## 2023-02-08 NOTE — PROGRESS NOTES
Interventional Cardiology Progress Note      Assessment & Plan:  Avelina Marion is a 67 year old male with a PMH significant for DM2, HTN, HLD, ESRD on dialysis, coronary artery disease, and tobacco use who is admitted after planned angiogram for evaluation for of CABG.     Interval History: No acute events overnight. Patient hemodynamically stable, on room air. Had 2 hour dialysis run yesterday after procedure; 1.5L pulled. Patient tolerated well without issue. Carotid US, vein mapping, and chest CT all completed yesterday. Surgery date pending per CVTS. Patient denies chest pain, shortness of breath, edema, orthopnea. Groin site flat, c/d/i. Denies pain.     Changes Today:  - Carotid US, vein mapping, chest CT done yesterday per CVTS  - Awaiting surgery date     # Coronary Artery Disease  # Hypertension   # Hyperlipidemia  Patient underwent coronary angiogram in December 2021 which showed mild CAD with no obstructive disease.  Echocardiogram a year ago showed normal biventricular function and LVH. He underwent stress testing on 12/27 as part of transplant work up which was positive for ST depression. EF was normal 55-60% with no significant valvular disease or WMA's.  Planned outpatient coronary angiogram on 2/7/23 showed 80% LM disease. Most recent lipids on 12/19/22 LDL 43, total 116.  - CVTS consult for consideration of CABG; vein mapping, chest CT, carotid US ordered and complete  - Continue PTA Amlodipine 5mg for now   - Continue PTA Lipitor 20mg q HS   - Lopressor 12.5mg BID started 2/7; continue      # Diabetes Mellitus Type 2  Insulin dependent. Most recent A1c 7.0% on 8/9/22. Home regimen includes Lantus. Under consideration for pancreatic transplant (and renal).   - Continue PTA Lantus 20 units at bedtime  - QID AC&HS blood sugar checks  - Hypoglycemia protocol      # End Stage Renal Disease  # Kidney Transplant Candidate  On dialysis since April 2021 (M-W-F). Currently on transplant list. Primary  "nephrologist Dr. Mitchell. Was offered a kidney in 7/2022 but had to decline due to BCC diagnosis. Underwent Mohs procedure 8/2022 and now has derm clearance.   - Nephrology consult for dialysis and transplant; appreciate recommendations.   - Continue dialysis M-W-F schedule per nephrology  - Continue PTA Calcitrol  - Continue PTA PhosLo  - Continue PTA CaCarb     # Hx of Tobacco Use   Former smoker. Quit in 1985.   - Encourage ongoing cessation     FEN: Low Sat Fat  Code Status: Full  DVT Prophylaxis:  Mechanical  Isolation: None  Disposition: Pending surgery    Patient seen and discussed w/ Dr. Shetty.      Jessica VILLALPANDO, CNP  [facility]  Interventional Cardiology      Most recent vital signs:  /63 (BP Location: Right arm)   Pulse 58   Temp 97.8  F (36.6  C) (Oral)   Resp 12   Ht 1.803 m (5' 11\")   Wt 91.4 kg (201 lb 9.6 oz)   SpO2 94%   BMI 28.12 kg/m    Temp:  [97.6  F (36.4  C)-98.3  F (36.8  C)] 97.8  F (36.6  C)  Pulse:  [58-87] 58  Resp:  [9-25] 12  BP: (112-162)/(59-92) 121/63  SpO2:  [90 %-99 %] 94 %  Wt Readings from Last 2 Encounters:   02/08/23 91.4 kg (201 lb 9.6 oz)   12/19/22 93.2 kg (205 lb 8 oz)       Intake/Output Summary (Last 24 hours) at 2/8/2023 0743  Last data filed at 2/8/2023 0600  Gross per 24 hour   Intake 240 ml   Output 2325 ml   Net -2085 ml       Physical exam:  GENERAL APPEARANCE: healthy, alert and no distress  HEENT: no icterus, no xanthelasmas, normal pupil size and reaction, normal palate, mucosa moist  NECK: JVP not elevated, brisk carotid upstroke bilaterally  CHEST: lungs clear to auscultation without rales, rhonchi or wheezes, no use of accessory muscles, no retractions  CARDIOVASCULAR: regular rhythm, normal S1 and S2, no S3 or S4 and no murmur, click or rub.  ABDOMEN: soft, non tender, without hepatosplenomegaly, no masses palpable, bowel sounds normal  EXTREMITIES: warm, no edema, DP/PT pulses 2+ bilaterally, no clubbing " or cyanosis   NEURO: alert and oriented to person/place/time, normal speech, gait and affect  SKIN: no ecchymoses, no rashes. Right groin site flat, clear dressing in place; c/d/i.     Labs (Past three days):  CBC  Recent Labs   Lab 02/08/23  0625 02/07/23  0747   WBC 9.2 9.5   RBC 3.82* 4.00*   HGB 12.8* 13.0*   HCT 37.2* 38.9*   MCV 97 97   MCH 33.5* 32.5   MCHC 34.4 33.4   RDW 12.6 12.5    209     BMP  Recent Labs   Lab 02/08/23  0733 02/08/23  0625 02/08/23  0254 02/07/23  2315 02/07/23  1119 02/07/23  0747   NA  --  137  --   --   --  140   POTASSIUM  --  4.3  --   --   --  4.4   CHLORIDE  --  99  --   --   --  101   CO2  --  27  --   --   --  26   ANIONGAP  --  11  --   --   --  13   * 153* 192* 169*   < > 197*   BUN  --  33.4*  --   --   --  38.9*   CR  --  5.35*  --   --   --  5.77*   GFRESTIMATED  --  11*  --   --   --  10*   DAVID  --  8.4*  --   --   --  9.0    < > = values in this interval not displayed.     Troponins: No results found for: CTROPT, TROPONINIS     INR  Recent Labs   Lab 02/07/23  0747   INR 1.03     Liver panelNo lab results found in last 7 days.    Imaging/procedure results:    2/7/23 EKG 12 Lead:       PTA 12/27/22 Stress Echocardiogram:  Abnormal exercise echocardiogram. Extensive (inferior and anterolateral 1mm ST  depressions with aVR ST elevation) EKG changes without any obvious echo  abnormality with stress. Consider further evaluation for CAD.  Target heart rate was achieved. Heart rate and blood pressure response to  exercise were normal. Normal LV function and wall motion at rest. With stress,  the left ventricular ejection fraction increased from 55-60% to greater than  65% and the left ventricular size decreased appropriately. No regional wall  motion abnormality with stress.  Intermediate functional capacity. No subjective symptoms to suggest ischemia.  No significant valve disease on screening doppler evaluation. The aortic root  and visualized ascending aorta  are normal.    2/7/23 Coronary Angiogram:  1. Severe 2 vessel obstructive coronary artery disease with left main involvement.   2. IVUS of Ostial LM disease is 80% stenosed (MLA: 5.0 mm^2). Mid to distal LM has 50% stenosis.   3. Proximal RCA not hemodynamically significant as assessed by dPR (0.95).  4. Recommend CVTS evaluation for consideration of CABG (LIMA to LAD and SVG to OM).  5. Hemostatis of RCFA was achieved with 6Fr angioseal.     2/7/23 Carotid US:  1. RIGHT ICA: Less than 50% diameter narrowing by grayscale imaging  and sonographic velocity criteria.     2. LEFT ICA:  Less than 50% diameter narrowing by grayscale imaging  and sonographic velocity criteria.    2/7/23 Chest CT:  1. Atherosclerosis including multivessel coronary artery  calcification.  2. Mildly prominent subcarinal lymph node measuring 13 x 12 mm  significance uncertain. Other benign-appearing mediastinal lymph  nodes.  3. Subpleural fibrotic-type interstitial lung disease.    Medical Decision Making     35 MINUTES SPENT BY ME on the date of service doing chart review, history, exam, documentation & further activities per the note.      Jessica Bhatia, KWASI CNP on 2/8/2023 at 9:22 AM

## 2023-02-08 NOTE — PLAN OF CARE
Temp: 97.9  F (36.6  C) Temp src: Oral BP: 115/59 Pulse: 65   Resp: 12 SpO2: 96 % O2 Device: None (Room air)      This shift: Pt stable, awaiting CABG.      I: Monitored vitals and assessed pt status.      Neuro: A&O x4. Able to make needs known.  Cardiac: Tele in place, SR-sinus yaa. Afebrile. Denies chest pain.  Resp: VSS on RA overnight. Denies SOB.   GI/: Able to ambulate to toilet. Uses urinal to allow measurement. No BM this shift.    Skin: R groin site c/d/i   LDA's: R PIV in place SL. L fistula in place & bruit auscultated, thrill palpated.   Pain: Denies.      Plan: Continue to monitor and follow POC. Notify with changes. CABG expected date Thursday.

## 2023-02-08 NOTE — PLAN OF CARE
"Care from: 1500 - 1930    Admitted: following his coronary angio that showed severe left main disease. Planned CABGx2 tomorrow 2/8  PmHx: DM2, HTN, and ESRD on iHD    Pt is A&Ox4. VSS except for SBPS - 150s - 160s - orders to notify provider for SBPS >180. Independent ad juan daniel. Sats >% on RA, denies SOB/chest pains. LS clear. Strict I/Os with adequate urine out put and LBM prior to admission. Tolerates cardiac diet, eating well.  BG ACHS with insulin coverage. Denies pain. 1 R PIV - SL. R HD - dialysis MWF - has dialysis today - left at 1840.    BP (!) 159/76   Pulse 64   Temp 97.6  F (36.4  C) (Oral)   Resp 25   Ht 1.803 m (5' 11\")   Wt 93.3 kg (205 lb 11 oz)   SpO2 99%   BMI 28.69 kg/m       Plan: Continue to monitor Pt status and report changes to CSI treatment team. Plan for CABG x2 tomorrow - NPO at midnight 2/8. Currently down in dialysis  "

## 2023-02-08 NOTE — UTILIZATION REVIEW
[facility]  Admission Status; Secondary Review Determination   Admission date:  2/7/2023  7:35 AM    Under the authority of the Utilization Management Committee, the utilization review process indicated a secondary review on the above patient. The review outcome is based on review of the medical records, discussions with staff, and applying clinical experience noted on the date of the review.     (x) Inpatient Status Appropriate - This patient's medical care is consistent with medical management for inpatient care and reasonable inpatient medical practice.     RATIONALE FOR DETERMINATION   67-year-old male with a history of diabetes, hypertension, end-stage renal disease on dialysis, and coronary artery disease was admitted for a coronary bypass.  Patient had an outpatient coronary angiography on 2/7 showing 80% LM disease.  He has been admitted and will be seen by CV surgery and is being worked up for his bypass which will happen during this hospitalization.  Nephrology is involved for dialysis arrangements.  This patient is complicated, has advanced and unstable coronary disease requiring CABG, will be hospice several days, it is clearly appropriate for inpatient hospitalization.  Case was discussed with his primary team.  The severity of illness, intensity of service provided, expected LOS and risk for adverse outcome make the care complex, high risk and appropriate for hospital admission.    At the time of admission with the information available to the attending physician more than 2 nights Hospital complex care was anticipated, based on patient risk of adverse outcome if treated as outpatient and complex care required.  Inpatient admission is appropriate based on the Medicare guidelines.      The information on this document is developed by the utilization review team in order for the business office to ensure compliance. This only denotes the appropriateness of proper admission status and does not reflect the  quality of care rendered.   The definitions of Inpatient Status and Observation Status used in making the determination above are those provided in the CMS Coverage Manual, Chapter 1 and Chapter 6, section 70.4.     Sincerely,   Amaury Rahman DO, MPH   Physician Advisor  Utilization Review  [facility]

## 2023-02-08 NOTE — PHARMACY-ADMISSION MEDICATION HISTORY
Admission Medication History Completed by Pharmacy    See [facility] Admission Navigator for allergy information, preferred outpatient pharmacy, prior to admission medications and immunization status.     Medication History Sources:     Patient, dispense report    Changes made to PTA medication list (reason):    Added: None    Deleted:   o Calcitriol 0.25 mcg capsule. Take 1 capsule (0.25 mcg) by mouth daily (Patient reported no longer taking, no recent dispenses)    Changed:   o Amlodipine 5 mg tablet. Take 10 mg by mouth daily -> Take 5 mg by mouth daily (patient reported, confirmed in dispense report)  o Calcium acetate 667 mg capsules. Take 667 mg by mouth -> Take 1 capsule by mouth 3 times daily (with meals) (patient reported, confirmed in dispense report)  o Insulin glargine. Inject subQ at bedtime-> Inject 20 units subQ at bedtime (patient reported, confirmed in dispense report)    Additional Information:    None    Prior to Admission medications    Medication Sig Last Dose Taking? Auth Provider Long Term End Date   amLODIPine (NORVASC) 5 MG tablet Take 5 mg by mouth 2/6/2023 at 2130 Yes Reported, Patient Yes 10/18/22   aspirin (ASA) 81 MG chewable tablet chew and swallow one tablet by mouth every day 2/6/2023 at 2130 Yes Reported, Patient     atorvastatin (LIPITOR) 20 MG tablet TAKE 1 TABLET BY MOUTH DAILY 2/6/2023 at 2130 Yes Reported, Patient Yes    calcium acetate (PHOSLO) 667 MG CAPS capsule Take 667 mg by mouth 3 times daily (with meals) 2/6/2023 at 2130 Yes Reported, Patient     calcium carbonate 750 MG CHEW Take 750 mg by mouth 4 times daily 2/6/2023 at 2130 Yes Reported, Patient     insulin glargine (LANTUS PEN) 100 UNIT/ML pen Inject 20 Units Subcutaneous At Bedtime 2/6/2023 at 2130 Yes Reported, Patient Yes    Blood Glucose Monitoring Suppl (ACCU-CHEK GUIDE) w/Device KIT Use to test 4 times a day. Pharmacy to dispense brand based on insurance.   Reported, Patient         Date completed:  02/07/23    Medication history completed by: Tobias Renee, Pharmacy Intern

## 2023-02-09 LAB
ANION GAP SERPL CALCULATED.3IONS-SCNC: 13 MMOL/L (ref 7–15)
BUN SERPL-MCNC: 54.1 MG/DL (ref 8–23)
CALCIUM SERPL-MCNC: 8.9 MG/DL (ref 8.8–10.2)
CHLORIDE SERPL-SCNC: 100 MMOL/L (ref 98–107)
CREAT SERPL-MCNC: 7.17 MG/DL (ref 0.67–1.17)
DEPRECATED HCO3 PLAS-SCNC: 23 MMOL/L (ref 22–29)
GFR SERPL CREATININE-BSD FRML MDRD: 8 ML/MIN/1.73M2
GLUCOSE BLDC GLUCOMTR-MCNC: 129 MG/DL (ref 70–99)
GLUCOSE BLDC GLUCOMTR-MCNC: 141 MG/DL (ref 70–99)
GLUCOSE BLDC GLUCOMTR-MCNC: 218 MG/DL (ref 70–99)
GLUCOSE BLDC GLUCOMTR-MCNC: 233 MG/DL (ref 70–99)
GLUCOSE SERPL-MCNC: 149 MG/DL (ref 70–99)
MAGNESIUM SERPL-MCNC: 1.8 MG/DL (ref 1.7–2.3)
PHOSPHATE SERPL-MCNC: 3.5 MG/DL (ref 2.5–4.5)
POTASSIUM SERPL-SCNC: 5.1 MMOL/L (ref 3.4–5.3)
SODIUM SERPL-SCNC: 136 MMOL/L (ref 136–145)

## 2023-02-09 PROCEDURE — 84100 ASSAY OF PHOSPHORUS: CPT | Performed by: INTERNAL MEDICINE

## 2023-02-09 PROCEDURE — U0003 INFECTIOUS AGENT DETECTION BY NUCLEIC ACID (DNA OR RNA); SEVERE ACUTE RESPIRATORY SYNDROME CORONAVIRUS 2 (SARS-COV-2) (CORONAVIRUS DISEASE [COVID-19]), AMPLIFIED PROBE TECHNIQUE, MAKING USE OF HIGH THROUGHPUT TECHNOLOGIES AS DESCRIBED BY CMS-2020-01-R: HCPCS | Performed by: STUDENT IN AN ORGANIZED HEALTH CARE EDUCATION/TRAINING PROGRAM

## 2023-02-09 PROCEDURE — 250N000013 HC RX MED GY IP 250 OP 250 PS 637: Performed by: CLINICAL NURSE SPECIALIST

## 2023-02-09 PROCEDURE — 214N000001 HC R&B CCU UMMC

## 2023-02-09 PROCEDURE — 99232 SBSQ HOSP IP/OBS MODERATE 35: CPT | Mod: 24 | Performed by: CLINICAL NURSE SPECIALIST

## 2023-02-09 PROCEDURE — 250N000013 HC RX MED GY IP 250 OP 250 PS 637

## 2023-02-09 PROCEDURE — 99232 SBSQ HOSP IP/OBS MODERATE 35: CPT

## 2023-02-09 PROCEDURE — 83735 ASSAY OF MAGNESIUM: CPT | Performed by: INTERNAL MEDICINE

## 2023-02-09 PROCEDURE — 36415 COLL VENOUS BLD VENIPUNCTURE: CPT

## 2023-02-09 PROCEDURE — 80048 BASIC METABOLIC PNL TOTAL CA: CPT

## 2023-02-09 RX ORDER — NALOXONE HYDROCHLORIDE 0.4 MG/ML
0.4 INJECTION, SOLUTION INTRAMUSCULAR; INTRAVENOUS; SUBCUTANEOUS
Status: DISCONTINUED | OUTPATIENT
Start: 2023-02-09 | End: 2023-02-13

## 2023-02-09 RX ORDER — FUROSEMIDE 40 MG
80 TABLET ORAL ONCE
Status: COMPLETED | OUTPATIENT
Start: 2023-02-09 | End: 2023-02-09

## 2023-02-09 RX ORDER — NALOXONE HYDROCHLORIDE 0.4 MG/ML
0.2 INJECTION, SOLUTION INTRAMUSCULAR; INTRAVENOUS; SUBCUTANEOUS
Status: DISCONTINUED | OUTPATIENT
Start: 2023-02-09 | End: 2023-02-13

## 2023-02-09 RX ORDER — FUROSEMIDE 40 MG
80 TABLET ORAL DAILY
Status: DISCONTINUED | OUTPATIENT
Start: 2023-02-09 | End: 2023-02-09

## 2023-02-09 RX ADMIN — ATORVASTATIN CALCIUM 20 MG: 20 TABLET, FILM COATED ORAL at 22:11

## 2023-02-09 RX ADMIN — CALCIUM ACETATE 667 MG: 667 CAPSULE ORAL at 18:04

## 2023-02-09 RX ADMIN — CALCIUM CARBONATE (ANTACID) CHEW TAB 500 MG 1500 MG: 500 CHEW TAB at 08:29

## 2023-02-09 RX ADMIN — INSULIN GLARGINE 20 UNITS: 100 INJECTION, SOLUTION SUBCUTANEOUS at 22:11

## 2023-02-09 RX ADMIN — ASPIRIN 81 MG: 81 TABLET, CHEWABLE ORAL at 08:29

## 2023-02-09 RX ADMIN — CALCIUM CARBONATE (ANTACID) CHEW TAB 500 MG 1500 MG: 500 CHEW TAB at 18:04

## 2023-02-09 RX ADMIN — METOPROLOL SUCCINATE 25 MG: 25 TABLET, EXTENDED RELEASE ORAL at 08:29

## 2023-02-09 RX ADMIN — INSULIN ASPART 2 UNITS: 100 INJECTION, SOLUTION INTRAVENOUS; SUBCUTANEOUS at 18:04

## 2023-02-09 RX ADMIN — CALCITRIOL CAPSULES 0.25 MCG 0.25 MCG: 0.25 CAPSULE ORAL at 08:30

## 2023-02-09 RX ADMIN — AMLODIPINE BESYLATE 5 MG: 5 TABLET ORAL at 08:30

## 2023-02-09 RX ADMIN — CALCIUM ACETATE 667 MG: 667 CAPSULE ORAL at 12:17

## 2023-02-09 RX ADMIN — CALCIUM ACETATE 667 MG: 667 CAPSULE ORAL at 08:29

## 2023-02-09 RX ADMIN — FUROSEMIDE 80 MG: 40 TABLET ORAL at 12:17

## 2023-02-09 RX ADMIN — INSULIN ASPART 2 UNITS: 100 INJECTION, SOLUTION INTRAVENOUS; SUBCUTANEOUS at 12:17

## 2023-02-09 ASSESSMENT — ACTIVITIES OF DAILY LIVING (ADL)
ADLS_ACUITY_SCORE: 18

## 2023-02-09 NOTE — PROGRESS NOTES
CVTS update:    Tentative OR date for CABG of 2/13/23 with Dr. Yepez. Would ask that patient undergo dialysis prior to surgery, either Saturday or Sunday this coming weekend. Nephrology can reach out to us if they have questions.

## 2023-02-09 NOTE — PROGRESS NOTES
[id number] 2/9/2023    Patient is a 67 year old male admitted for CAD with a PMH of DM2, HTN, HLD, ESRD on dialysis (MWF), coronary artery disease, and tobacco use who is admitted after planned angiogram for evaluation for of CABG. Team is CVTS.    Neuro: A&Ox4  Cardiac: Sinus rhythm; denies chest pain  Respiratory: WDL; on room air; clear lungs; no cough  GI/: WDL; BM today; produces urine still; abdomen flat  Endocrine: BS ACHS  Diet/Appetite: Low fat low sodium diet  Skin: Right groin access site  LDA: Left arm fistula; right PIV  Activity: Independent  Pain: No complaints of pain  Plan: Awaiting CABG, planned for Monday 2/13 (signed and held orders in)

## 2023-02-09 NOTE — PROGRESS NOTES
Nephrology Progress Note  02/09/2023       Avelina Marion is a 67 yom with hx of ESRD since 4/2021 who is admitted to [facility] for CABG as part of kidney transplant workup.  Had angiogram with only mild CAD 12/2021 but had stress test in Dec 2022 showing ST depression, updated angio showed 80% LM (unclear why such rapid progression) and is now planned for CABG. Still makes UOP, runs MWF dialysis and had run 2/6 prior to admission.  Nephrology consulted for management of RRT with plan for CABG on 2/9.      Interval History :   Mr Marion had day off of HD yesterday, had been planned for CABG today but due to OR logistics it has been delayed and now planned for 2/13.  Considered run today but should work better logistically to run tomorrow and 2/12 to be ready for CABG the next day.  Labs only notable for K of 5.1, will give 80mg lasix to excrete more K as he makes robust UOP.      Assessment & Recommendations:   ESRD-Since 4/2021, runs MWF under care of Dr Kummer Davita Phalen.  L arm fistula access.  Accepted for renal tx and actually has had some offers but turned down due to higher risk then needed mohs surgery and now CABG as part of transplant workup.  Had HD 2/7 in anticipation of CABG on 2/8 but has been delayed and now planned for 2/13.  I did let him know that there is a chance we need to place catheter for CRRT should he be unstable and need RRT post-op which he understood and agreed with.                   -Holding on run today to logistically run tomorrow and 2/12 (day before CABG), still makes UOP.                -Access is L arm fistula, would need catheter if CRRT is needed post-op.                 -No need for new dialysis consent as he is ESRD on HD as outpt.           Volume-Appears euvolemic, still makes UOP and had 1.4L of UOP yesterday, 850cc so far today.  Giving some PO lasix to help with K excretion.       Electrolytes-K 5.1, bicarb 23, no issues.      BMD-Ca 8.9, Mg and Phos not checked today.  "     Anemia-Hgb 12.8, holding MCKENNA unless Hgb <11.       Nutrition-No issues, good appetite.       Time spent: 20 minutes on this date of encounter for chart review, physical exam, medical decision making and co-ordination of care.      Discussed with Dr Swann     Recommendations were communicated to primary team via note         KWASI Lara CNS  Clinical Nurse Specialist  [phone]    Review of Systems:   I reviewed the following systems:  Gen: No fevers or chills  CV: No CP at rest  Resp: No SOB at rest  GI: No N/V    Physical Exam:   I/O last 3 completed shifts:  In: 1600 [P.O.:1600]  Out: 1225 [Urine:1225]   BP (!) 146/78 (BP Location: Right arm, Cuff Size: Adult Regular)   Pulse 63   Temp 97.8  F (36.6  C) (Oral)   Resp 18   Ht 1.803 m (5' 11\")   Wt 91.9 kg (202 lb 9.6 oz)   SpO2 96%   BMI 28.26 kg/m       GENERAL APPEARANCE: alert and no distress  EYES: no scleral icterus  NECK:  no JVD  Pulmonary: lungs clear to auscultation with equal breath sounds bilaterally, no clubbing or cyanosis  CV: Regular rhythm, normal rate, no rub   - Edema none  GI: soft, nontender, normal bowel sounds  MS: no evidence of inflammation in joints, no muscle tenderness  :  Uribe  SKIN: no rash, warm, dry  NEURO: mentation intact and speech normal  ACCESS: L arm fistula    Labs:   All labs reviewed by me  Electrolytes/Renal - Recent Labs   Lab Test 02/09/23  0834 02/09/23  0656 02/08/23  2115 02/08/23  0733 02/08/23  0625 02/07/23  1119 02/07/23  0747   NA  --  136  --   --  137  --  140   POTASSIUM  --  5.1  --   --  4.3  --  4.4   CHLORIDE  --  100  --   --  99  --  101   CO2  --  23  --   --  27  --  26   BUN  --  54.1*  --   --  33.4*  --  38.9*   CR  --  7.17*  --   --  5.35*  --  5.77*   * 149* 221*   < > 153*   < > 197*   DAVID  --  8.9  --   --  8.4*  --  9.0   MAG  --  1.8  --   --   --   --   --    PHOS  --  3.5  --   --   --   --   --     < > = values in this interval not displayed.       CBC - "   Recent Labs   Lab Test 02/08/23  0625 02/07/23  0747 08/09/22  0626   WBC 9.2 9.5 8.2   HGB 12.8* 13.0* 13.7    209 230       LFTs -   Recent Labs   Lab Test 08/09/22  0626 08/05/21  0223   ALKPHOS 64 71   BILITOTAL 0.5 0.5   ALT 7* 17   AST 24 13   PROTTOTAL 7.7 8.3   ALBUMIN 4.5 4.4       Iron Panel - No lab results found.        Current Medications:    amLODIPine  5 mg Oral Daily     aspirin  81 mg Oral Daily     atorvastatin  20 mg Oral At Bedtime     calcitRIOL  0.25 mcg Oral Daily     calcium acetate  667 mg Oral TID w/meals     calcium carbonate  1,500 mg Oral BID     insulin aspart  1-7 Units Subcutaneous TID AC     insulin aspart  1-5 Units Subcutaneous At Bedtime     insulin glargine  20 Units Subcutaneous At Bedtime     metoprolol succinate ER  25 mg Oral Daily     polyethylene glycol  17 g Oral Daily     sodium chloride (PF)  3 mL Intracatheter Q8H       - MEDICATION INSTRUCTIONS -

## 2023-02-09 NOTE — PLAN OF CARE
"/70 (BP Location: Right arm)   Pulse 64   Temp 97.9  F (36.6  C) (Oral)   Resp 12   Ht 1.803 m (5' 11\")   Wt 91.4 kg (201 lb 9.6 oz)   SpO2 96%   BMI 28.12 kg/m      Shift: [id number]  Reason for Admission: Coronary angio showed severe left main artery disease. Awaiting surgery date.  VS/Tele: VSS. Afebrile. SR to SB  Neuros: A/Ox4, able to make needs known.  Respiratory: Sats >95% on RA.   GI/: 1 BM this shift, voiding adequately   Nutrition: Tolerating cardiac diet  Drains/Lines: R. PIV SL. L. AVF- on HD MWF schedule  Activity: Pt up ad juan daniel  Pain/Nausea: Denies both  Skin: Intact. R. Groin site- CDI  Labs: BG ACHS  Plan of care: Awaiting surgery (CABG) date. Will continue to monitor and follow POC.     "

## 2023-02-09 NOTE — PLAN OF CARE
"Neuro: A&Ox4.Calls appropritely  Cardiac: SB/SR. VSS.Blood pressure 137/81, pulse 80, temperature 97.6  F (36.4  C), temperature source Oral, resp. rate 16, height 1.803 m (5' 11\"), weight 91.9 kg (202 lb 9.6 oz), SpO2 95 %.  Respiratory: Sating 95% on RA.  GI/: Voiding spontaneously without difficulty  Diet/appetite: low fat  NA < 2400 Mg diet.   Activity:  Independent  Pain: At acceptable level on current regimen.   Skin: No new deficits noted.  LDA's: Right PIV SL    Plan: Continue with POC. Notify primary team with changes.    Problem: Plan of Care - These are the overarching goals to be used throughout the patient stay.    Goal: Plan of Care Review  Description: The Plan of Care Review/Shift note should be completed every shift.  The Outcome Evaluation is a brief statement about your assessment that the patient is improving, declining, or no change.  This information will be displayed automatically on your shift note.  Outcome: Not Progressing  Goal: Patient-Specific Goal (Individualized)  Description: You can add care plan individualizations to a care plan. Examples of Individualization might be:  \"Parent requests to be called daily at 9am for status\", \"I have a hard time hearing out of my right ear\", or \"Do not touch me to wake me up as it startles me\".  Outcome: Not Progressing  Goal: Absence of Hospital-Acquired Illness or Injury  Outcome: Not Progressing  Intervention: Identify and Manage Fall Risk  Recent Flowsheet Documentation  Taken 2/9/2023 0012 by Daniel Sena, RN  Safety Promotion/Fall Prevention:    assistive device/personal items within reach    clutter free environment maintained    fall prevention program maintained    lighting adjusted    mobility aid in reach    nonskid shoes/slippers when out of bed    patient and family education    room door open    room near nurse's station    room organization consistent  Taken 2/8/2023 2112 by Daniel Sena, RN  Safety " Promotion/Fall Prevention:    assistive device/personal items within reach    clutter free environment maintained    fall prevention program maintained    lighting adjusted    mobility aid in reach    nonskid shoes/slippers when out of bed    patient and family education    room door open    room near nurse's station    room organization consistent  Intervention: Prevent Skin Injury  Recent Flowsheet Documentation  Taken 2/9/2023 0012 by Daniel Sena RN  Body Position: position changed independently  Taken 2/8/2023 2112 by Daniel Sena RN  Body Position: position changed independently  Intervention: Prevent and Manage VTE (Venous Thromboembolism) Risk  Recent Flowsheet Documentation  Taken 2/9/2023 0012 by Daniel Sena RN  VTE Prevention/Management:    SCDs (sequential compression devices) off    patient refused intervention  Taken 2/8/2023 2112 by Daniel Sena RN  VTE Prevention/Management:    SCDs (sequential compression devices) off    patient refused intervention  Goal: Optimal Comfort and Wellbeing  Outcome: Not Progressing  Goal: Readiness for Transition of Care  Outcome: Not Progressing     Problem: Diabetes Comorbidity  Goal: Blood Glucose Level Within Targeted Range  Outcome: Not Progressing     Problem: Hypertension Comorbidity  Goal: Blood Pressure in Desired Range  Outcome: Not Progressing  Intervention: Maintain Blood Pressure Management  Recent Flowsheet Documentation  Taken 2/9/2023 0012 by Daniel Sena RN  Medication Review/Management: medications reviewed  Taken 2/8/2023 2112 by Daniel Sena RN  Medication Review/Management: medications reviewed     Problem: Heart Failure Comorbidity  Goal: Maintenance of Heart Failure Symptom Control  Outcome: Not Progressing  Intervention: Maintain Heart Failure Management  Recent Flowsheet Documentation  Taken 2/9/2023 0012 by Daniel Sena RN  Medication Review/Management:  medications reviewed  Taken 2/8/2023 2112 by Daniel Sena RN  Medication Review/Management: medications reviewed     Problem: Cardiovascular Surgery  Goal: Improved Activity Tolerance  Outcome: Not Progressing  Goal: Optimal Coping with Heart Surgery  Outcome: Not Progressing  Goal: Absence of Bleeding  Outcome: Not Progressing  Goal: Effective Bowel Elimination  Outcome: Not Progressing  Goal: Effective Cardiac Function  Outcome: Not Progressing  Goal: Optimal Cerebral Tissue Perfusion  Outcome: Not Progressing  Intervention: Protect and Optimize Cerebral Perfusion  Recent Flowsheet Documentation  Taken 2/9/2023 0012 by Daniel Sena RN  Head of Bed (HOB) Positioning: HOB at 20-30 degrees  Taken 2/8/2023 2112 by Daniel Sena RN  Head of Bed (HOB) Positioning: HOB at 20-30 degrees  Goal: Fluid and Electrolyte Balance  Outcome: Not Progressing  Goal: Blood Glucose Level Within Targeted Range  Outcome: Not Progressing  Goal: Absence of Infection Signs and Symptoms  Outcome: Not Progressing  Goal: Anesthesia/Sedation Recovery  Outcome: Not Progressing  Intervention: Optimize Anesthesia Recovery  Recent Flowsheet Documentation  Taken 2/9/2023 0012 by Daniel Sena RN  Safety Promotion/Fall Prevention:    assistive device/personal items within reach    clutter free environment maintained    fall prevention program maintained    lighting adjusted    mobility aid in reach    nonskid shoes/slippers when out of bed    patient and family education    room door open    room near nurse's station    room organization consistent  Taken 2/8/2023 2112 by Daniel Sena RN  Safety Promotion/Fall Prevention:    assistive device/personal items within reach    clutter free environment maintained    fall prevention program maintained    lighting adjusted    mobility aid in reach    nonskid shoes/slippers when out of bed    patient and family education    room door open    room near  nurse's station    room organization consistent  Goal: Acceptable Pain Control  Outcome: Not Progressing  Goal: Nausea and Vomiting Relief  Outcome: Not Progressing  Goal: Effective Urinary Elimination  Outcome: Not Progressing  Goal: Effective Oxygenation and Ventilation  Outcome: Not Progressing  Intervention: Promote Airway Secretion Clearance  Recent Flowsheet Documentation  Taken 2/9/2023 0012 by Daniel Sena RN  Cough And Deep Breathing: done independently per patient  Taken 2/8/2023 2112 by Daniel Sena RN  Cough And Deep Breathing: done independently per patient   Goal Outcome Evaluation:

## 2023-02-09 NOTE — PROGRESS NOTES
Interventional Cardiology Progress Note      Assessment & Plan:  Avelina Marion is a 67 year old male with a PMH significant for DM2, HTN, HLD, ESRD on dialysis, coronary artery disease, and tobacco use who is admitted after planned angiogram for evaluation for of CABG.     Interval History: No acute events overnight. Hemodynamically stable, on room air. Denies chest pain, shortness of breath, dizziness, or lightheadedness. Ambulating in room and hallway without symptoms. Plan for surgery Monday morning. Awaiting to hear from CVTS is IABP is needed prior to surgery.     Changes Today:  - Transition to Toprol XL 25mg    # Coronary Artery Disease  # Hypertension   # Hyperlipidemia  Patient underwent coronary angiogram in December 2021 which showed mild CAD with no obstructive disease.  Echocardiogram a year ago showed normal biventricular function and LVH. He underwent stress testing on 12/27 as part of transplant work up which was positive for ST depression. EF was normal 55-60% with no significant valvular disease or WMA's.  Planned outpatient coronary angiogram on 2/7/23 showed 80% LM disease. Most recent lipids on 12/19/22 LDL 43, total 116.  - CVTS consult for consideration of CABG; vein mapping, chest CT, carotid US ordered and complete  - Continue PTA Amlodipine 5mg for now   - Continue PTA Lipitor 20mg q HS   - Transition to Toprol XL 25mg       # Diabetes Mellitus Type 2  Insulin dependent. Most recent A1c 7.0% on 8/9/22. Home regimen includes Lantus. Under consideration for pancreatic transplant (and renal).   - Continue PTA Lantus 20 units at bedtime  - QID AC&HS blood sugar checks  - Hypoglycemia protocol      # End Stage Renal Disease  # Kidney Transplant Candidate  On dialysis since April 2021 (M-W-F). Currently on transplant list. Primary nephrologist Dr. Mitchell. Was offered a kidney in 7/2022 but had to decline due to BCC diagnosis. Underwent Mohs procedure 8/2022 and now has derm clearance.   -  "Nephrology consult for dialysis and transplant; appreciate recommendations.   - Dialysis days determined by nephrology   - Continue PTA Calcitrol  - Continue PTA PhosLo  - Continue PTA CaCarb     # Hx of Tobacco Use   Former smoker. Quit in 1985.   - Encourage ongoing cessation      FEN: Low Sat Fat  Code Status: Full  DVT Prophylaxis:  Mechanical  Isolation: None  Disposition: Pending surgery     Patient seen and discussed w/ Dr. Cam.       Jessica VILLALPANDO, CNP  [facility]  Interventional Cardiology      Most recent vital signs:  BP (!) 146/78 (BP Location: Right arm, Cuff Size: Adult Regular)   Pulse 63   Temp 97.8  F (36.6  C) (Oral)   Resp 18   Ht 1.803 m (5' 11\")   Wt 91.9 kg (202 lb 9.6 oz)   SpO2 96%   BMI 28.26 kg/m    Temp:  [97.6  F (36.4  C)-98.1  F (36.7  C)] 97.8  F (36.6  C)  Pulse:  [60-80] 63  Resp:  [12-18] 18  BP: (120-147)/(61-81) 146/78  SpO2:  [95 %-96 %] 96 %  Wt Readings from Last 2 Encounters:   02/09/23 91.9 kg (202 lb 9.6 oz)   12/19/22 93.2 kg (205 lb 8 oz)       Intake/Output Summary (Last 24 hours) at 2/9/2023 0910  Last data filed at 2/9/2023 0412  Gross per 24 hour   Intake 1080 ml   Output 1225 ml   Net -145 ml       Physical exam:  GENERAL APPEARANCE: healthy, alert and no distress  HEENT: no icterus, no xanthelasmas, normal pupil size and reaction, normal palate, mucosa moist  NECK: JVP not elevated, brisk carotid upstroke bilaterally  CHEST: lungs clear to auscultation without rales, rhonchi or wheezes, no use of accessory muscles, no retractions  CARDIOVASCULAR: regular rhythm, normal S1 and S2, no S3 or S4 and no murmur, click or rub.  ABDOMEN: soft, non tender, without hepatosplenomegaly, no masses palpable, bowel sounds normal  EXTREMITIES: warm, no edema, DP/PT pulses 2+ bilaterally, no clubbing or cyanosis   NEURO: alert and oriented to person/place/time, normal speech, gait and affect  SKIN: no ecchymoses, no rashes. Right groin " site flat, clear dressing in place; c/d/i.     Labs (Past three days):  CBC  Recent Labs   Lab 02/08/23  0625 02/07/23  0747   WBC 9.2 9.5   RBC 3.82* 4.00*   HGB 12.8* 13.0*   HCT 37.2* 38.9*   MCV 97 97   MCH 33.5* 32.5   MCHC 34.4 33.4   RDW 12.6 12.5    209     BMP  Recent Labs   Lab 02/09/23  0834 02/09/23  0656 02/08/23  2115 02/08/23  1644 02/08/23  0733 02/08/23  0625 02/07/23  1119 02/07/23  0747   NA  --  136  --   --   --  137  --  140   POTASSIUM  --  5.1  --   --   --  4.3  --  4.4   CHLORIDE  --  100  --   --   --  99  --  101   CO2  --  23  --   --   --  27  --  26   ANIONGAP  --  13  --   --   --  11  --  13   * 149* 221* 134*   < > 153*   < > 197*   BUN  --  54.1*  --   --   --  33.4*  --  38.9*   CR  --  7.17*  --   --   --  5.35*  --  5.77*   GFRESTIMATED  --  8*  --   --   --  11*  --  10*   DAVID  --  8.9  --   --   --  8.4*  --  9.0   MAG  --  1.8  --   --   --   --   --   --    PHOS  --  3.5  --   --   --   --   --   --     < > = values in this interval not displayed.     Troponins: No results found for: CTROPT, TROPONINIS     INR  Recent Labs   Lab 02/07/23  0747   INR 1.03     Liver panelNo lab results found in last 7 days.    Imaging/procedure results:    2/7/23 EKG 12 Lead:      PTA 12/27/22 Stress Echocardiogram:  Abnormal exercise echocardiogram. Extensive (inferior and anterolateral 1mm ST  depressions with aVR ST elevation) EKG changes without any obvious echo  abnormality with stress. Consider further evaluation for CAD.  Target heart rate was achieved. Heart rate and blood pressure response to  exercise were normal. Normal LV function and wall motion at rest. With stress,  the left ventricular ejection fraction increased from 55-60% to greater than  65% and the left ventricular size decreased appropriately. No regional wall  motion abnormality with stress.  Intermediate functional capacity. No subjective symptoms to suggest ischemia.  No significant valve disease on  screening doppler evaluation. The aortic root  and visualized ascending aorta are normal.     2/7/23 Coronary Angiogram:  1. Severe 2 vessel obstructive coronary artery disease with left main involvement.   2. IVUS of Ostial LM disease is 80% stenosed (MLA: 5.0 mm^2). Mid to distal LM has 50% stenosis.   3. Proximal RCA not hemodynamically significant as assessed by dPR (0.95).  4. Recommend CVTS evaluation for consideration of CABG (LIMA to LAD and SVG to OM).  5. Hemostatis of RCFA was achieved with 6Fr angioseal.      2/7/23 Carotid US:  1. RIGHT ICA: Less than 50% diameter narrowing by grayscale imaging  and sonographic velocity criteria.     2. LEFT ICA:  Less than 50% diameter narrowing by grayscale imaging  and sonographic velocity criteria.     2/7/23 Chest CT:  1. Atherosclerosis including multivessel coronary artery  calcification.  2. Mildly prominent subcarinal lymph node measuring 13 x 12 mm  significance uncertain. Other benign-appearing mediastinal lymph  nodes.  3. Subpleural fibrotic-type interstitial lung disease.    Medical Decision Making     35 MINUTES SPENT BY ME on the date of service doing chart review, history, exam, documentation & further activities per the note.      {Jessica Bhatia, APRN CNP on 2/9/2023 at 9:17 AM

## 2023-02-10 ENCOUNTER — ANESTHESIA EVENT (OUTPATIENT)
Dept: SURGERY | Facility: CLINIC | Age: 68
DRG: 233 | End: 2023-02-10
Payer: COMMERCIAL

## 2023-02-10 ENCOUNTER — ANCILLARY PROCEDURE (OUTPATIENT)
Dept: ULTRASOUND IMAGING | Facility: CLINIC | Age: 68
End: 2023-02-10
Payer: COMMERCIAL

## 2023-02-10 LAB
ANION GAP SERPL CALCULATED.3IONS-SCNC: 15 MMOL/L (ref 7–15)
BLD PROD TYP BPU: NORMAL
BLOOD COMPONENT TYPE: NORMAL
BUN SERPL-MCNC: 66.5 MG/DL (ref 8–23)
CALCIUM SERPL-MCNC: 8.7 MG/DL (ref 8.8–10.2)
CHLORIDE SERPL-SCNC: 100 MMOL/L (ref 98–107)
CODING SYSTEM: NORMAL
CREAT SERPL-MCNC: 7.96 MG/DL (ref 0.67–1.17)
CROSSMATCH: NORMAL
DEPRECATED HCO3 PLAS-SCNC: 22 MMOL/L (ref 22–29)
GFR SERPL CREATININE-BSD FRML MDRD: 7 ML/MIN/1.73M2
GLUCOSE BLDC GLUCOMTR-MCNC: 126 MG/DL (ref 70–99)
GLUCOSE BLDC GLUCOMTR-MCNC: 141 MG/DL (ref 70–99)
GLUCOSE BLDC GLUCOMTR-MCNC: 214 MG/DL (ref 70–99)
GLUCOSE SERPL-MCNC: 142 MG/DL (ref 70–99)
HOLD SPECIMEN: NORMAL
POTASSIUM SERPL-SCNC: 4.7 MMOL/L (ref 3.4–5.3)
SARS-COV-2 RNA RESP QL NAA+PROBE: NEGATIVE
SODIUM SERPL-SCNC: 137 MMOL/L (ref 136–145)
UNIT ABO/RH: NORMAL
UNIT NUMBER: NORMAL
UNIT STATUS: NORMAL
UNIT TYPE ISBT: 600

## 2023-02-10 PROCEDURE — 634N000001 HC RX 634: Performed by: CLINICAL NURSE SPECIALIST

## 2023-02-10 PROCEDURE — 80048 BASIC METABOLIC PNL TOTAL CA: CPT

## 2023-02-10 PROCEDURE — 250N000011 HC RX IP 250 OP 636: Performed by: CLINICAL NURSE SPECIALIST

## 2023-02-10 PROCEDURE — 258N000003 HC RX IP 258 OP 636: Performed by: CLINICAL NURSE SPECIALIST

## 2023-02-10 PROCEDURE — 90937 HEMODIALYSIS REPEATED EVAL: CPT

## 2023-02-10 PROCEDURE — 250N000013 HC RX MED GY IP 250 OP 250 PS 637

## 2023-02-10 PROCEDURE — 36415 COLL VENOUS BLD VENIPUNCTURE: CPT

## 2023-02-10 PROCEDURE — 214N000001 HC R&B CCU UMMC

## 2023-02-10 PROCEDURE — 99232 SBSQ HOSP IP/OBS MODERATE 35: CPT | Mod: 24 | Performed by: CLINICAL NURSE SPECIALIST

## 2023-02-10 PROCEDURE — 99232 SBSQ HOSP IP/OBS MODERATE 35: CPT

## 2023-02-10 RX ORDER — METOPROLOL SUCCINATE 25 MG/1
25 TABLET, EXTENDED RELEASE ORAL DAILY
Status: COMPLETED | OUTPATIENT
Start: 2023-02-11 | End: 2023-02-12

## 2023-02-10 RX ORDER — HEPARIN SODIUM 1000 [USP'U]/ML
500 INJECTION, SOLUTION INTRAVENOUS; SUBCUTANEOUS CONTINUOUS
Status: DISCONTINUED | OUTPATIENT
Start: 2023-02-10 | End: 2023-02-11

## 2023-02-10 RX ADMIN — INSULIN ASPART 1 UNITS: 100 INJECTION, SOLUTION INTRAVENOUS; SUBCUTANEOUS at 08:45

## 2023-02-10 RX ADMIN — ASPIRIN 81 MG: 81 TABLET, CHEWABLE ORAL at 08:44

## 2023-02-10 RX ADMIN — CALCIUM CARBONATE (ANTACID) CHEW TAB 500 MG 1500 MG: 500 CHEW TAB at 19:58

## 2023-02-10 RX ADMIN — SODIUM CHLORIDE 250 ML: 9 INJECTION, SOLUTION INTRAVENOUS at 13:20

## 2023-02-10 RX ADMIN — HEPARIN SODIUM 500 UNITS/HR: 1000 INJECTION INTRAVENOUS; SUBCUTANEOUS at 13:35

## 2023-02-10 RX ADMIN — ATORVASTATIN CALCIUM 20 MG: 20 TABLET, FILM COATED ORAL at 19:59

## 2023-02-10 RX ADMIN — HEPARIN SODIUM 500 UNITS: 1000 INJECTION INTRAVENOUS; SUBCUTANEOUS at 13:35

## 2023-02-10 RX ADMIN — CALCIUM CARBONATE (ANTACID) CHEW TAB 500 MG 1500 MG: 500 CHEW TAB at 08:44

## 2023-02-10 RX ADMIN — SODIUM CHLORIDE 300 ML: 9 INJECTION, SOLUTION INTRAVENOUS at 13:20

## 2023-02-10 RX ADMIN — CALCITRIOL CAPSULES 0.25 MCG 0.25 MCG: 0.25 CAPSULE ORAL at 08:44

## 2023-02-10 RX ADMIN — EPOETIN ALFA-EPBX 6000 UNITS: 10000 INJECTION, SOLUTION INTRAVENOUS; SUBCUTANEOUS at 13:35

## 2023-02-10 RX ADMIN — CALCIUM ACETATE 667 MG: 667 CAPSULE ORAL at 08:44

## 2023-02-10 RX ADMIN — CALCIUM ACETATE 667 MG: 667 CAPSULE ORAL at 18:38

## 2023-02-10 ASSESSMENT — ACTIVITIES OF DAILY LIVING (ADL)
ADLS_ACUITY_SCORE: 18

## 2023-02-10 NOTE — PROGRESS NOTES
HEMODIALYSIS TREATMENT NOTE    Date: 2/10/2023  Time: 3:28 PM    Data:  Pre Wt:   91.9 kg  Desired Wt:  To be established  Post Wt:   89.9 kg (estimated)  Weight change:  - 2.0 kg  Ultrafiltration - Post Run Net Total Removed (mL): 2000 mL  Vascular Access Status: patent  Dialyzer Rinse: mild streaks  Total Blood Volume Processed: 70.2 Liters  Total Dialysis (Treatment) Time: 3.5 Hours    Lab:   No    Interventions:  Left upper AVF cannulated using 15g dialysis needles  Dialyzed for 3.5 Hrs w UF of 2.3 Liters  K2, Ca2.5  ,   EPO 6000 units given  Heparin 500 units bolus, 500 units hourly for 3.5 Hrs run  Handover report given to PCN     Assessment:  A/O x4, asymptomatic. Pt was calm and cooperative  Left upper arm AV Fistula +T/B  Lung sounds clear, diminished BLL, no c/o SOB     Plan:    Next HD per renal team

## 2023-02-10 NOTE — PLAN OF CARE
Temp: 98.6  F (37  C) Temp src: Oral BP: 135/83 Pulse: 66   Resp: 20 SpO2: 100 % O2 Device: None (Room air)       D: 67 year old male with a PMH significant for DM2, HTN, HLD, ESRD on dialysis, coronary artery disease, and tobacco use who is admitted 2/7 after planned angiogram for evaluation for of CABG.     I/A: Avelina (he/him) is A/Ox4. Tele in place, SR. VSS on RA. PIV and fistula in place SL. Currently in dialysis. Up independently.     P: Continue to monitor and follow POC. Plan for CABG on Monday. Notify CSI with changes.     Louise Rivero RN

## 2023-02-10 NOTE — PROGRESS NOTES
Nephrology Progress Note  02/11/2023       Avelina Marion is a 67 yom with hx of ESRD since 4/2021 who is admitted to [facility] for CABG as part of kidney transplant workup.  Had angiogram with only mild CAD 12/2021 but had stress test in Dec 2022 showing ST depression, updated angio showed 80% LM (unclear why such rapid progression) and is now planned for CABG. Still makes UOP, runs MWF dialysis and had run 2/6 prior to admission.  Nephrology consulted for management of RRT with plan for CABG on 2/9.      Interval History :   HD session yesterday w/ 2L UF  Planning for CABG on Monday  Next HD session planned for 2/12/2023 in preparation of CABG Monday  K 3.9, Bicarb 22, Calcium 8.6  Phos 3.5 at last check    Assessment & Recommendations:   ESRD-Since 4/2021, runs MWF under care of Dr Kummer Davita Phalen.  L arm fistula access.  Accepted for renal tx and actually has had some offers but turned down due to higher risk then needed mohs surgery and now CABG as part of transplant workup.  Had HD 2/7 in anticipation of CABG on 2/8 but has been delayed and now planned for 2/13.  I did let him know that there is a chance we need to place catheter for CRRT should he be unstable and need RRT post-op which he understood and agreed with.                   -Next HD session planned for 2/12 in preparation for CABG.                  -Access is L arm fistula, would need catheter if CRRT is needed post-op.                 -No need for new dialysis consent as he is ESRD on HD as outpt.           Volume- Appears euvolemic, still makes UOP and had 0.8L of UOP yesterday.  BP's stable so pulling 1-2L of UF on run today.       Electrolytes-K 3.9, bicarb 22, no issues.      BMD-Ca 8.6, Mg and Phos not checked today.      Anemia-Hgb 12.8, holding MCKENNA unless Hgb <11.       Nutrition-No issues, good appetite.       Time spent: 20 minutes on this date of encounter for chart review, physical exam, medical decision making and co-ordination of  "care.     Review of Systems:   I reviewed the following systems:  Gen: No fevers or chills  CV: No CP at rest  Resp: No SOB at rest  GI: No N/V    Physical Exam:   I/O last 3 completed shifts:  In: 400 [P.O.:400]  Out: 2675 [Urine:375; Other:2300]   /67   Pulse 63   Temp 97.4  F (36.3  C) (Oral)   Resp 18   Ht 1.803 m (5' 11\")   Wt 88.2 kg (194 lb 7.1 oz)   SpO2 95%   BMI 27.12 kg/m       GENERAL APPEARANCE: alert and no distress  EYES: no scleral icterus  NECK:  no JVD  Pulmonary: lungs clear to auscultation with equal breath sounds bilaterally, no clubbing or cyanosis  CV: Regular rhythm, normal rate, no rub   - Edema none  GI: soft, nontender, normal bowel sounds  MS: no evidence of inflammation in joints, no muscle tenderness  :  [place]  SKIN: no rash, warm, dry  NEURO: mentation intact and speech normal  ACCESS: L arm fistula    Labs:   All labs reviewed by me  Electrolytes/Renal -   Recent Labs   Lab Test 02/11/23  0705 02/10/23  2225 02/10/23  1751 02/10/23  0841 02/10/23  0606 02/09/23  0834 02/09/23  0656     --   --   --  137  --  136   POTASSIUM 3.9  --   --   --  4.7  --  5.1   CHLORIDE 97*  --   --   --  100  --  100   CO2 22  --   --   --  22 -- 23   BUN 42.4*  --   --   --  66.5*  --  54.1*   CR 6.16*  --   --   --  7.96*  --  7.17*   * 214* 126*   < > 142*   < > 149*   DAVID 8.6*  --   --   --  8.7*  --  8.9   MAG  --   --   --   --   --   --  1.8   PHOS  --   --   --   --   --   --  3.5    < > = values in this interval not displayed.       CBC -   Recent Labs   Lab Test 02/11/23  0705 02/08/23  0625 02/07/23  0747   WBC 10.0 9.2 9.5   HGB 13.0* 12.8* 13.0*    204 209       LFTs -   Recent Labs   Lab Test 08/09/22  0626 08/05/21  0223   ALKPHOS 64 71   BILITOTAL 0.5 0.5   ALT 7* 17   AST 24 13   PROTTOTAL 7.7 8.3   ALBUMIN 4.5 4.4       Iron Panel - No lab results found.        Current Medications:    amLODIPine  5 mg Oral Daily     aspirin  81 mg Oral Daily     " atorvastatin  20 mg Oral At Bedtime     calcitRIOL  0.25 mcg Oral Daily     calcium acetate  667 mg Oral TID w/meals     calcium carbonate  1,500 mg Oral BID     insulin aspart  1-7 Units Subcutaneous TID AC     insulin aspart  1-5 Units Subcutaneous At Bedtime     [START ON 2/12/2023] insulin glargine  10 Units Subcutaneous At Bedtime     insulin glargine  20 Units Subcutaneous At Bedtime     metoprolol succinate ER  25 mg Oral Daily     [START ON 2/13/2023] metoprolol tartrate  12.5 mg Oral Pre-Op/Pre-procedure x 1 dose     polyethylene glycol  17 g Oral Daily     sodium chloride (PF)  3 mL Intracatheter Q8H       - MEDICATION INSTRUCTIONS -

## 2023-02-10 NOTE — PROGRESS NOTES
Nephrology Progress Note  02/10/2023       Avelina Marion is a 67 yom with hx of ESRD since 4/2021 who is admitted to [facility] for CABG as part of kidney transplant workup.  Had angiogram with only mild CAD 12/2021 but had stress test in Dec 2022 showing ST depression, updated angio showed 80% LM (unclear why such rapid progression) and is now planned for CABG. Still makes UOP, runs MWF dialysis and had run 2/6 prior to admission.  Nephrology consulted for management of RRT with plan for CABG on 2/9.      Interval History :   Mr Marion had past 2 days off of HD, running today then will run 2/12 to be ready for CABG on 2/13.  Still makes significant UOP but pulling 1-2L on run to challenge EDW.      Assessment & Recommendations:   ESRD-Since 4/2021, runs MWF under care of Dr Kummer Davita Phalen.  L arm fistula access.  Accepted for renal tx and actually has had some offers but turned down due to higher risk then needed mohs surgery and now CABG as part of transplant workup.  Had HD 2/7 in anticipation of CABG on 2/8 but has been delayed and now planned for 2/13.  I did let him know that there is a chance we need to place catheter for CRRT should he be unstable and need RRT post-op which he understood and agreed with.                   -HD today, next run planned for 2/12 in preparation for CABG.                  -Access is L arm fistula, would need catheter if CRRT is needed post-op.                 -No need for new dialysis consent as he is ESRD on HD as outpt.           Volume-Appears euvolemic, still makes UOP and had 1.8L of UOP yesterday.  BP's stable so pulling 1-2L of UF on run today.       Electrolytes-K 5.1, bicarb 23, no issues.      BMD-Ca 8.7, Mg and Phos not checked today.      Anemia-Hgb 12.8, holding MCKENNA unless Hgb <11.       Nutrition-No issues, good appetite.       Time spent: 20 minutes on this date of encounter for chart review, physical exam, medical decision making and co-ordination of  "care.      Discussed with Dr Swann     Recommendations were communicated to primary team via note         KWASI Lara CNS  Clinical Nurse Specialist  [phone]    Review of Systems:   I reviewed the following systems:  Gen: No fevers or chills  CV: No CP at rest  Resp: No SOB at rest  GI: No N/V    Physical Exam:   I/O last 3 completed shifts:  In: 480 [P.O.:480]  Out: 1950 [Urine:1950]   /70 (BP Location: Right arm)   Pulse 62   Temp 97.9  F (36.6  C) (Oral)   Resp 18   Ht 1.803 m (5' 11\")   Wt 91.9 kg (202 lb 11.2 oz)   SpO2 94%   BMI 28.27 kg/m       GENERAL APPEARANCE: alert and no distress  EYES: no scleral icterus  NECK:  no JVD  Pulmonary: lungs clear to auscultation with equal breath sounds bilaterally, no clubbing or cyanosis  CV: Regular rhythm, normal rate, no rub   - Edema none  GI: soft, nontender, normal bowel sounds  MS: no evidence of inflammation in joints, no muscle tenderness  :  Uribe  SKIN: no rash, warm, dry  NEURO: mentation intact and speech normal  ACCESS: L arm fistula    Labs:   All labs reviewed by me  Electrolytes/Renal -   Recent Labs   Lab Test 02/10/23  0841 02/10/23  0606 02/09/23 2119 02/09/23  0834 02/09/23  0656 02/08/23  0733 02/08/23  0625   NA  --  137  --   --  136  --  137   POTASSIUM  --  4.7  --   --  5.1  --  4.3   CHLORIDE  --  100  --   --  100  --  99   CO2  --  22  --   --  23  --  27   BUN  --  66.5*  --   --  54.1*  --  33.4*   CR  --  7.96*  --   --  7.17*  --  5.35*   * 142* 141*   < > 149*   < > 153*   DAVID  --  8.7*  --   --  8.9  --  8.4*   MAG  --   --   --   --  1.8  --   --    PHOS  --   --   --   --  3.5  --   --     < > = values in this interval not displayed.       CBC -   Recent Labs   Lab Test 02/08/23  0625 02/07/23  0747 08/09/22  0626   WBC 9.2 9.5 8.2   HGB 12.8* 13.0* 13.7    209 230       LFTs -   Recent Labs   Lab Test 08/09/22  0626 08/05/21  0223   ALKPHOS 64 71   BILITOTAL 0.5 0.5   ALT 7* 17   AST 24 13 "   PROTTOTAL 7.7 8.3   ALBUMIN 4.5 4.4       Iron Panel - No lab results found.        Current Medications:    sodium chloride 0.9%  250 mL Intravenous Once in dialysis/CRRT     sodium chloride 0.9%  300 mL Hemodialysis Machine Once     amLODIPine  5 mg Oral Daily     aspirin  81 mg Oral Daily     atorvastatin  20 mg Oral At Bedtime     calcitRIOL  0.25 mcg Oral Daily     calcium acetate  667 mg Oral TID w/meals     calcium carbonate  1,500 mg Oral BID     epoetin martin-epbx  6,000 Units Intravenous Once in dialysis/CRRT     heparin  500 Units Hemodialysis Machine OR IV Push Once in dialysis/CRRT     insulin aspart  1-7 Units Subcutaneous TID AC     insulin aspart  1-5 Units Subcutaneous At Bedtime     insulin glargine  20 Units Subcutaneous At Bedtime     [START ON 2/11/2023] metoprolol succinate ER  25 mg Oral Daily     [START ON 2/13/2023] metoprolol tartrate  12.5 mg Oral Pre-Op/Pre-procedure x 1 dose     polyethylene glycol  17 g Oral Daily     sodium chloride (PF)  3 mL Intracatheter Q8H       heparin (porcine)       - MEDICATION INSTRUCTIONS -       - MEDICATION INSTRUCTIONS -

## 2023-02-10 NOTE — PROGRESS NOTES
Interventional Cardiology Progress Note         Assessment & Plan:  Avelina Marion is a 67 year old male with a PMH significant for DM2, HTN, HLD, ESRD on dialysis, coronary artery disease, and tobacco use who is admitted after planned angiogram for evaluation for of CABG.      Interval History: No acute events overnight. Hemodynamically stable, on room air. Denies any chest pain, shortness of breath, dizziness, or palpitations. Plan for dialysis today and Sunday in preparation for surgery on Monday.      Changes Today:  - None; pending surgery Monday      # Coronary Artery Disease  # Hypertension   # Hyperlipidemia  Patient underwent coronary angiogram in December 2021 which showed mild CAD with no obstructive disease.  Echocardiogram a year ago showed normal biventricular function and LVH. He underwent stress testing on 12/27 as part of transplant work up which was positive for ST depression. EF was normal 55-60% with no significant valvular disease or WMA's.  Planned outpatient coronary angiogram on 2/7/23 showed 80% LM disease. Most recent lipids on 12/19/22 LDL 43, total 116.  - CVTS consult for consideration of CABG; surgery Monday   - Continue PTA Amlodipine 5mg for now   - Continue PTA Lipitor 20mg q HS   - ContinueToprol XL 25mg       # Diabetes Mellitus Type 2  Insulin dependent. Most recent A1c 7.0% on 8/9/22. Home regimen includes Lantus. Under consideration for pancreatic transplant (and renal).   - Continue PTA Lantus 20 units at bedtime  - QID AC&HS blood sugar checks  - Hypoglycemia protocol      # End Stage Renal Disease  # Kidney Transplant Candidate  On dialysis since April 2021 (M-W-F). Currently on transplant list. Primary nephrologist Dr. Mitchell. Was offered a kidney in 7/2022 but had to decline due to BCC diagnosis. Underwent Mohs procedure 8/2022 and now has derm clearance.   - Nephrology consult for dialysis and transplant; appreciate recommendations.   - Dialysis days determined by  "nephrology; plan for run today and Sunday.    - Continue PTA Calcitrol  - Continue PTA PhosLo  - Continue PTA CaCarb     # Hx of Tobacco Use   Former smoker. Quit in 1985.   - Encourage ongoing cessation      FEN: Low Sat Fat  Code Status: Full  DVT Prophylaxis:  Mechanical  Isolation: None  Disposition: Pending surgery     Patient seen and discussed w/ Dr. Shetty.      Jessica VILLALPANDO, CNP  [facility]  Interventional Cardiology      Most recent vital signs:  /61 (BP Location: Right arm)   Pulse 67   Temp 98  F (36.7  C) (Oral)   Resp 18   Ht 1.803 m (5' 11\")   Wt 91.9 kg (202 lb 11.2 oz)   SpO2 94%   BMI 28.27 kg/m    Temp:  [97  F (36.1  C)-98  F (36.7  C)] 98  F (36.7  C)  Pulse:  [59-67] 67  Resp:  [17-20] 18  BP: (127-136)/(61-73) 132/61  SpO2:  [94 %-99 %] 94 %  Wt Readings from Last 2 Encounters:   02/10/23 91.9 kg (202 lb 11.2 oz)   12/19/22 93.2 kg (205 lb 8 oz)       Intake/Output Summary (Last 24 hours) at 2/10/2023 0820  Last data filed at 2/10/2023 0500  Gross per 24 hour   Intake 480 ml   Output 1950 ml   Net -1470 ml       Physical exam:  General: In bed, in NAD  HEENT: EOMI, PERRLA, no scleral icterus or injection  CARDIAC: RRR, no m/r/g appreciated. Peripheral pulses 2+  RESP: CTAB, no wheezes, rhonchi or crackles appreciated.  GI: NABS, NT/ND, no guarding or rebound  EXTREMITIES: NO LE edema, pulses 2+. Femoral access site w/o bleeding. No bruits appreciated.   SKIN: No acute lesions appreciated  NEURO: Alert and oriented X3, CN II-XII grossly intact, no focal neurological deficits noted    Labs (Past three days):  CBC  Recent Labs   Lab 02/08/23  0625 02/07/23  0747   WBC 9.2 9.5   RBC 3.82* 4.00*   HGB 12.8* 13.0*   HCT 37.2* 38.9*   MCV 97 97   MCH 33.5* 32.5   MCHC 34.4 33.4   RDW 12.6 12.5    209     BMP  Recent Labs   Lab 02/10/23  0606 02/09/23  2119 02/09/23  1803 02/09/23  1155 02/09/23  0834 02/09/23  0656 02/08/23  0733 " 02/08/23  0625 02/07/23  1119 02/07/23  0747     --   --   --   --  136  --  137  --  140   POTASSIUM 4.7  --   --   --   --  5.1  --  4.3  --  4.4   CHLORIDE 100  --   --   --   --  100  --  99  --  101   CO2 22  --   --   --   --  23  --  27  --  26   ANIONGAP 15  --   --   --   --  13  --  11  --  13   * 141* 218* 233*   < > 149*   < > 153*   < > 197*   BUN 66.5*  --   --   --   --  54.1*  --  33.4*  --  38.9*   CR 7.96*  --   --   --   --  7.17*  --  5.35*  --  5.77*   GFRESTIMATED 7*  --   --   --   --  8*  --  11*  --  10*   DAVID 8.7*  --   --   --   --  8.9  --  8.4*  --  9.0   MAG  --   --   --   --   --  1.8  --   --   --   --    PHOS  --   --   --   --   --  3.5  --   --   --   --     < > = values in this interval not displayed.     Troponins: No results found for: CTROPT, TROPONINIS     INR  Recent Labs   Lab 02/07/23  0747   INR 1.03     Liver panelNo lab results found in last 7 days.    Imaging/procedure results:    2/7/23 EKG 12 Lead:      PTA 12/27/22 Stress Echocardiogram:  Abnormal exercise echocardiogram. Extensive (inferior and anterolateral 1mm ST  depressions with aVR ST elevation) EKG changes without any obvious echo  abnormality with stress. Consider further evaluation for CAD.  Target heart rate was achieved. Heart rate and blood pressure response to  exercise were normal. Normal LV function and wall motion at rest. With stress,  the left ventricular ejection fraction increased from 55-60% to greater than  65% and the left ventricular size decreased appropriately. No regional wall  motion abnormality with stress.  Intermediate functional capacity. No subjective symptoms to suggest ischemia.  No significant valve disease on screening doppler evaluation. The aortic root  and visualized ascending aorta are normal.     2/7/23 Coronary Angiogram:  1. Severe 2 vessel obstructive coronary artery disease with left main involvement.   2. IVUS of Ostial LM disease is 80% stenosed (MLA: 5.0  mm^2). Mid to distal LM has 50% stenosis.   3. Proximal RCA not hemodynamically significant as assessed by dPR (0.95).  4. Recommend CVTS evaluation for consideration of CABG (LIMA to LAD and SVG to OM).  5. Hemostatis of RCFA was achieved with 6Fr angioseal.      2/7/23 Carotid US:  1. RIGHT ICA: Less than 50% diameter narrowing by grayscale imaging  and sonographic velocity criteria.     2. LEFT ICA:  Less than 50% diameter narrowing by grayscale imaging  and sonographic velocity criteria.     2/7/23 Chest CT:  1. Atherosclerosis including multivessel coronary artery  calcification.  2. Mildly prominent subcarinal lymph node measuring 13 x 12 mm  significance uncertain. Other benign-appearing mediastinal lymph  nodes.  3. Subpleural fibrotic-type interstitial lung disease.    Medical Decision Making     35 MINUTES SPENT BY ME on the date of service doing chart review, history, exam, documentation & further activities per the note.      Jessica Bhatia, KWASI CNP on 2/10/2023 at 8:22 AM

## 2023-02-11 LAB
ANION GAP SERPL CALCULATED.3IONS-SCNC: 17 MMOL/L (ref 7–15)
BUN SERPL-MCNC: 42.4 MG/DL (ref 8–23)
CALCIUM SERPL-MCNC: 8.6 MG/DL (ref 8.8–10.2)
CHLORIDE SERPL-SCNC: 97 MMOL/L (ref 98–107)
CREAT SERPL-MCNC: 6.16 MG/DL (ref 0.67–1.17)
DEPRECATED HCO3 PLAS-SCNC: 22 MMOL/L (ref 22–29)
ERYTHROCYTE [DISTWIDTH] IN BLOOD BY AUTOMATED COUNT: 12.3 % (ref 10–15)
GFR SERPL CREATININE-BSD FRML MDRD: 9 ML/MIN/1.73M2
GLUCOSE BLDC GLUCOMTR-MCNC: 139 MG/DL (ref 70–99)
GLUCOSE BLDC GLUCOMTR-MCNC: 161 MG/DL (ref 70–99)
GLUCOSE BLDC GLUCOMTR-MCNC: 188 MG/DL (ref 70–99)
GLUCOSE BLDC GLUCOMTR-MCNC: 197 MG/DL (ref 70–99)
GLUCOSE BLDC GLUCOMTR-MCNC: 250 MG/DL (ref 70–99)
GLUCOSE SERPL-MCNC: 145 MG/DL (ref 70–99)
HCT VFR BLD AUTO: 38.1 % (ref 40–53)
HGB BLD-MCNC: 13 G/DL (ref 13.3–17.7)
MCH RBC QN AUTO: 32.3 PG (ref 26.5–33)
MCHC RBC AUTO-ENTMCNC: 34.1 G/DL (ref 31.5–36.5)
MCV RBC AUTO: 95 FL (ref 78–100)
PLATELET # BLD AUTO: 184 10E3/UL (ref 150–450)
POTASSIUM SERPL-SCNC: 3.9 MMOL/L (ref 3.4–5.3)
RBC # BLD AUTO: 4.02 10E6/UL (ref 4.4–5.9)
SODIUM SERPL-SCNC: 136 MMOL/L (ref 136–145)
WBC # BLD AUTO: 10 10E3/UL (ref 4–11)

## 2023-02-11 PROCEDURE — 80048 BASIC METABOLIC PNL TOTAL CA: CPT

## 2023-02-11 PROCEDURE — 85027 COMPLETE CBC AUTOMATED: CPT

## 2023-02-11 PROCEDURE — 99232 SBSQ HOSP IP/OBS MODERATE 35: CPT | Performed by: STUDENT IN AN ORGANIZED HEALTH CARE EDUCATION/TRAINING PROGRAM

## 2023-02-11 PROCEDURE — 250N000013 HC RX MED GY IP 250 OP 250 PS 637

## 2023-02-11 PROCEDURE — 214N000001 HC R&B CCU UMMC

## 2023-02-11 PROCEDURE — 99232 SBSQ HOSP IP/OBS MODERATE 35: CPT | Performed by: NURSE PRACTITIONER

## 2023-02-11 PROCEDURE — 250N000013 HC RX MED GY IP 250 OP 250 PS 637: Performed by: PHYSICIAN ASSISTANT

## 2023-02-11 PROCEDURE — 36415 COLL VENOUS BLD VENIPUNCTURE: CPT

## 2023-02-11 RX ADMIN — CALCIUM CARBONATE (ANTACID) CHEW TAB 500 MG 1500 MG: 500 CHEW TAB at 07:58

## 2023-02-11 RX ADMIN — CALCIUM ACETATE 667 MG: 667 CAPSULE ORAL at 18:13

## 2023-02-11 RX ADMIN — CALCIUM ACETATE 667 MG: 667 CAPSULE ORAL at 07:58

## 2023-02-11 RX ADMIN — ATORVASTATIN CALCIUM 20 MG: 20 TABLET, FILM COATED ORAL at 20:04

## 2023-02-11 RX ADMIN — AMLODIPINE BESYLATE 5 MG: 5 TABLET ORAL at 07:59

## 2023-02-11 RX ADMIN — INSULIN ASPART 3 UNITS: 100 INJECTION, SOLUTION INTRAVENOUS; SUBCUTANEOUS at 16:51

## 2023-02-11 RX ADMIN — CALCIUM ACETATE 667 MG: 667 CAPSULE ORAL at 11:37

## 2023-02-11 RX ADMIN — CALCITRIOL CAPSULES 0.25 MCG 0.25 MCG: 0.25 CAPSULE ORAL at 07:59

## 2023-02-11 RX ADMIN — CALCIUM CARBONATE (ANTACID) CHEW TAB 500 MG 1500 MG: 500 CHEW TAB at 20:04

## 2023-02-11 RX ADMIN — POLYETHYLENE GLYCOL 3350 17 G: 17 POWDER, FOR SOLUTION ORAL at 07:58

## 2023-02-11 RX ADMIN — METOPROLOL SUCCINATE 25 MG: 25 TABLET, EXTENDED RELEASE ORAL at 07:59

## 2023-02-11 RX ADMIN — INSULIN ASPART 1 UNITS: 100 INJECTION, SOLUTION INTRAVENOUS; SUBCUTANEOUS at 11:37

## 2023-02-11 RX ADMIN — ASPIRIN 81 MG: 81 TABLET, CHEWABLE ORAL at 07:58

## 2023-02-11 ASSESSMENT — ACTIVITIES OF DAILY LIVING (ADL)
ADLS_ACUITY_SCORE: 18

## 2023-02-11 NOTE — PROGRESS NOTES
D/at dialysis, ate supper shortly after return, voided post dialysis  A/okay for now  P/monitor for changes

## 2023-02-11 NOTE — PROGRESS NOTES
Interventional Cardiology Progress Note      Assessment & Plan:  Avelina Marion is a 67 year old male with a history of DM2, HTN, HLD, ESRD on dialysis, coronary artery disease, and tobacco use who is admitted after planned angiogram for evaluation for of CABG.    Today's Update:  - Awaiting surgery 2/13/23    # Coronary Artery Disease  # Hypertension   # Hyperlipidemia  Patient underwent coronary angiogram in December 2021 which showed mild CAD with no obstructive disease.  Echocardiogram a year ago showed normal biventricular function and LVH. He underwent stress testing on 12/27 as part of transplant work up which was positive for ST depression. EF was normal 55-60% with no significant valvular disease or WMA's.  Planned outpatient coronary angiogram on 2/7/23 showed 80% LM disease. Most recent lipids on 12/19/22 LDL 43, total 116.  - CVTS consult for consideration of CABG; surgery Monday   - Continue PTA Amlodipine 5mg for now   - Continue PTA Lipitor 20mg q HS   - ContinueToprol XL 25mg       # Diabetes Mellitus Type 2  Insulin dependent. Most recent A1c 7.0% on 8/9/22. Home regimen includes Lantus. Under consideration for pancreatic transplant (and renal).   - Continue PTA Lantus 20 units at bedtime  - QID AC&HS blood sugar checks  - Hypoglycemia protocol      # End Stage Renal Disease  # Kidney Transplant Candidate  On dialysis since April 2021 (M-W-F). Currently on transplant list. Primary nephrologist Dr. Mitchell. Was offered a kidney in 7/2022 but had to decline due to BCC diagnosis. Underwent Mohs procedure 8/2022 and now has derm clearance.   - Nephrology consult for dialysis and transplant; appreciate recommendations.   - Dialysis days determined by nephrology; plan for run today and Sunday.    - Continue PTA Calcitrol  - Continue PTA PhosLo  - Continue PTA CaCarb     # Hx of Tobacco Use   Former smoker. Quit in 1985.   - Encourage ongoing cessation      FEN: Low Sat Fat  Code Status: Full  DVT  "Prophylaxis:  Mechanical  Isolation: None  Disposition: Pending surgery    KWASI Bonilla, CNP  [facility]  Interventional Cardiology  [phone]    Medical Decision Making       40 MINUTES SPENT BY ME on the date of service doing chart review, history, exam, documentation & further activities per the note.      Interval History:  NAEO  Private room due to roommate testing positive for COVID  Patient denies any questions regarding upcoming surgery plans.    Most recent vital signs:  /61 (BP Location: Right arm)   Pulse 63   Temp 97.6  F (36.4  C) (Oral)   Resp 16   Ht 1.803 m (5' 11\")   Wt 88.2 kg (194 lb 7.1 oz)   SpO2 95%   BMI 27.12 kg/m    Temp:  [97.4  F (36.3  C)-98.6  F (37  C)] 97.6  F (36.4  C)  Pulse:  [60-74] 63  Resp:  [15-20] 16  BP: (111-142)/(60-96) 112/61  Cuff Mean (mmHg):  [78-87] 78  SpO2:  [94 %-100 %] 95 %  Wt Readings from Last 2 Encounters:   02/11/23 88.2 kg (194 lb 7.1 oz)   12/19/22 93.2 kg (205 lb 8 oz)       Intake/Output Summary (Last 24 hours) at 2/11/2023 0656  Last data filed at 2/11/2023 0030  Gross per 24 hour   Intake 400 ml   Output 2675 ml   Net -2275 ml       Physical exam:  General: Pleasant elderly male. Appears comfortable and in no acute distress. Alert and interactive  HEENT: Normocephalic, atraumatic. No scleral icterus or injection  Neck: JVP not elevated  CARDIAC: Regular rate and rhythm, no m/r/g appreciated. Peripheral pulses 2+  RESP: Normal work of breathing on room air without use of accessory breathing muscles. Clear to auscultation in all fields. No wheezes, rhonchi or crackles appreciated.  GI: No abdominal distention. Soft and nontender.   EXTREMITIES: Without lower extremity edema. No cyanosis or clubbing. Warm and well perfused. No venous stasis changes.  SKIN: No acute lesions appreciated. Warm and dry to touch  NEURO: Alert and oriented X3, CN II-XII grossly intact, no focal neurological deficits noted, " normal speech  PSYCH: Mood and affect are appropriate    Labs (Past three days):  CBC  Recent Labs   Lab 02/08/23  0625 02/07/23  0747   WBC 9.2 9.5   RBC 3.82* 4.00*   HGB 12.8* 13.0*   HCT 37.2* 38.9*   MCV 97 97   MCH 33.5* 32.5   MCHC 34.4 33.4   RDW 12.6 12.5    209     BMP  Recent Labs   Lab 02/10/23  2225 02/10/23  1751 02/10/23  0841 02/10/23  0606 02/09/23  0834 02/09/23  0656 02/08/23  0733 02/08/23  0625 02/07/23  1119 02/07/23  0747   NA  --   --   --  137  --  136  --  137  --  140   POTASSIUM  --   --   --  4.7  --  5.1  --  4.3  --  4.4   CHLORIDE  --   --   --  100  --  100  --  99  --  101   CO2  --   --   --  22  --  23  --  27  --  26   ANIONGAP  --   --   --  15  --  13  --  11  --  13   * 126* 141* 142*   < > 149*   < > 153*   < > 197*   BUN  --   --   --  66.5*  --  54.1*  --  33.4*  --  38.9*   CR  --   --   --  7.96*  --  7.17*  --  5.35*  --  5.77*   GFRESTIMATED  --   --   --  7*  --  8*  --  11*  --  10*   DAVID  --   --   --  8.7*  --  8.9  --  8.4*  --  9.0   MAG  --   --   --   --   --  1.8  --   --   --   --    PHOS  --   --   --   --   --  3.5  --   --   --   --     < > = values in this interval not displayed.     Troponins: No results found for: TROPI, TROPONIN, TROPR, TROPN     INR  Recent Labs   Lab 02/07/23  0747   INR 1.03     2/7/23 EKG 12 Lead:      PTA 12/27/22 Stress Echocardiogram:  Abnormal exercise echocardiogram. Extensive (inferior and anterolateral 1mm ST  depressions with aVR ST elevation) EKG changes without any obvious echo  abnormality with stress. Consider further evaluation for CAD.  Target heart rate was achieved. Heart rate and blood pressure response to  exercise were normal. Normal LV function and wall motion at rest. With stress,  the left ventricular ejection fraction increased from 55-60% to greater than  65% and the left ventricular size decreased appropriately. No regional wall  motion abnormality with stress.  Intermediate functional  capacity. No subjective symptoms to suggest ischemia.  No significant valve disease on screening doppler evaluation. The aortic root  and visualized ascending aorta are normal.     2/7/23 Coronary Angiogram:  1. Severe 2 vessel obstructive coronary artery disease with left main involvement.   2. IVUS of Ostial LM disease is 80% stenosed (MLA: 5.0 mm^2). Mid to distal LM has 50% stenosis.   3. Proximal RCA not hemodynamically significant as assessed by dPR (0.95).  4. Recommend CVTS evaluation for consideration of CABG (LIMA to LAD and SVG to OM).  5. Hemostatis of RCFA was achieved with 6Fr angioseal.      2/7/23 Carotid US:  1. RIGHT ICA: Less than 50% diameter narrowing by grayscale imaging  and sonographic velocity criteria.     2. LEFT ICA:  Less than 50% diameter narrowing by grayscale imaging  and sonographic velocity criteria.     2/7/23 Chest CT:  1. Atherosclerosis including multivessel coronary artery  calcification.  2. Mildly prominent subcarinal lymph node measuring 13 x 12 mm  significance uncertain. Other benign-appearing mediastinal lymph  nodes.  3. Subpleural fibrotic-type interstitial lung disease.

## 2023-02-12 LAB
ABO/RH(D): NORMAL
ALBUMIN UR-MCNC: 100 MG/DL
ANION GAP SERPL CALCULATED.3IONS-SCNC: 17 MMOL/L (ref 7–15)
ANTIBODY SCREEN: NEGATIVE
APPEARANCE UR: ABNORMAL
BACTERIA #/AREA URNS HPF: ABNORMAL /HPF
BILIRUB UR QL STRIP: NEGATIVE
BUN SERPL-MCNC: 61.8 MG/DL (ref 8–23)
CALCIUM SERPL-MCNC: 8.9 MG/DL (ref 8.8–10.2)
CHLORIDE SERPL-SCNC: 98 MMOL/L (ref 98–107)
COLOR UR AUTO: YELLOW
CREAT SERPL-MCNC: 7.64 MG/DL (ref 0.67–1.17)
DEPRECATED HCO3 PLAS-SCNC: 20 MMOL/L (ref 22–29)
GFR SERPL CREATININE-BSD FRML MDRD: 7 ML/MIN/1.73M2
GLUCOSE BLDC GLUCOMTR-MCNC: 144 MG/DL (ref 70–99)
GLUCOSE BLDC GLUCOMTR-MCNC: 184 MG/DL (ref 70–99)
GLUCOSE BLDC GLUCOMTR-MCNC: 207 MG/DL (ref 70–99)
GLUCOSE BLDC GLUCOMTR-MCNC: 211 MG/DL (ref 70–99)
GLUCOSE SERPL-MCNC: 111 MG/DL (ref 70–99)
GLUCOSE UR STRIP-MCNC: 100 MG/DL
HGB UR QL STRIP: ABNORMAL
HYALINE CASTS: 7 /LPF
KETONES UR STRIP-MCNC: NEGATIVE MG/DL
LEUKOCYTE ESTERASE UR QL STRIP: NEGATIVE
MUCOUS THREADS #/AREA URNS LPF: PRESENT /LPF
NITRATE UR QL: NEGATIVE
PH UR STRIP: 5.5 [PH] (ref 5–7)
POTASSIUM SERPL-SCNC: 4.4 MMOL/L (ref 3.4–5.3)
RBC URINE: 3 /HPF
SODIUM SERPL-SCNC: 135 MMOL/L (ref 136–145)
SP GR UR STRIP: 1.01 (ref 1–1.03)
SPECIMEN EXPIRATION DATE: NORMAL
SQUAMOUS EPITHELIAL: <1 /HPF
UROBILINOGEN UR STRIP-MCNC: NORMAL MG/DL
WBC URINE: 2 /HPF

## 2023-02-12 PROCEDURE — 86923 COMPATIBILITY TEST ELECTRIC: CPT | Performed by: STUDENT IN AN ORGANIZED HEALTH CARE EDUCATION/TRAINING PROGRAM

## 2023-02-12 PROCEDURE — 86850 RBC ANTIBODY SCREEN: CPT | Performed by: PHYSICIAN ASSISTANT

## 2023-02-12 PROCEDURE — 214N000001 HC R&B CCU UMMC

## 2023-02-12 PROCEDURE — 86901 BLOOD TYPING SEROLOGIC RH(D): CPT | Performed by: PHYSICIAN ASSISTANT

## 2023-02-12 PROCEDURE — 250N000011 HC RX IP 250 OP 636: Performed by: CLINICAL NURSE SPECIALIST

## 2023-02-12 PROCEDURE — 90937 HEMODIALYSIS REPEATED EVAL: CPT

## 2023-02-12 PROCEDURE — 81001 URINALYSIS AUTO W/SCOPE: CPT | Performed by: PHYSICIAN ASSISTANT

## 2023-02-12 PROCEDURE — 99232 SBSQ HOSP IP/OBS MODERATE 35: CPT | Performed by: STUDENT IN AN ORGANIZED HEALTH CARE EDUCATION/TRAINING PROGRAM

## 2023-02-12 PROCEDURE — 250N000013 HC RX MED GY IP 250 OP 250 PS 637: Performed by: PHYSICIAN ASSISTANT

## 2023-02-12 PROCEDURE — 258N000003 HC RX IP 258 OP 636: Performed by: CLINICAL NURSE SPECIALIST

## 2023-02-12 PROCEDURE — 36415 COLL VENOUS BLD VENIPUNCTURE: CPT | Performed by: PHYSICIAN ASSISTANT

## 2023-02-12 PROCEDURE — 99232 SBSQ HOSP IP/OBS MODERATE 35: CPT | Performed by: NURSE PRACTITIONER

## 2023-02-12 PROCEDURE — 250N000013 HC RX MED GY IP 250 OP 250 PS 637

## 2023-02-12 PROCEDURE — 80048 BASIC METABOLIC PNL TOTAL CA: CPT

## 2023-02-12 RX ORDER — HEPARIN SODIUM 1000 [USP'U]/ML
500 INJECTION, SOLUTION INTRAVENOUS; SUBCUTANEOUS CONTINUOUS
Status: DISCONTINUED | OUTPATIENT
Start: 2023-02-12 | End: 2023-02-12

## 2023-02-12 RX ORDER — CHLORHEXIDINE GLUCONATE ORAL RINSE 1.2 MG/ML
10 SOLUTION DENTAL ONCE
Status: DISCONTINUED | OUTPATIENT
Start: 2023-02-12 | End: 2023-02-13 | Stop reason: HOSPADM

## 2023-02-12 RX ADMIN — CALCIUM ACETATE 667 MG: 667 CAPSULE ORAL at 12:36

## 2023-02-12 RX ADMIN — INSULIN ASPART 2 UNITS: 100 INJECTION, SOLUTION INTRAVENOUS; SUBCUTANEOUS at 17:56

## 2023-02-12 RX ADMIN — SODIUM CHLORIDE 300 ML: 9 INJECTION, SOLUTION INTRAVENOUS at 09:05

## 2023-02-12 RX ADMIN — SODIUM CHLORIDE 250 ML: 9 INJECTION, SOLUTION INTRAVENOUS at 09:05

## 2023-02-12 RX ADMIN — INSULIN ASPART 1 UNITS: 100 INJECTION, SOLUTION INTRAVENOUS; SUBCUTANEOUS at 08:29

## 2023-02-12 RX ADMIN — HEPARIN SODIUM 500 UNITS: 1000 INJECTION INTRAVENOUS; SUBCUTANEOUS at 09:06

## 2023-02-12 RX ADMIN — AMLODIPINE BESYLATE 5 MG: 5 TABLET ORAL at 12:36

## 2023-02-12 RX ADMIN — ATORVASTATIN CALCIUM 20 MG: 20 TABLET, FILM COATED ORAL at 20:43

## 2023-02-12 RX ADMIN — CALCIUM CARBONATE (ANTACID) CHEW TAB 500 MG 1500 MG: 500 CHEW TAB at 08:28

## 2023-02-12 RX ADMIN — CALCIUM ACETATE 667 MG: 667 CAPSULE ORAL at 17:57

## 2023-02-12 RX ADMIN — ASPIRIN 81 MG: 81 TABLET, CHEWABLE ORAL at 12:36

## 2023-02-12 RX ADMIN — INSULIN ASPART 2 UNITS: 100 INJECTION, SOLUTION INTRAVENOUS; SUBCUTANEOUS at 13:51

## 2023-02-12 RX ADMIN — CALCIUM CARBONATE (ANTACID) CHEW TAB 500 MG 1500 MG: 500 CHEW TAB at 20:43

## 2023-02-12 RX ADMIN — CALCITRIOL CAPSULES 0.25 MCG 0.25 MCG: 0.25 CAPSULE ORAL at 12:35

## 2023-02-12 RX ADMIN — HEPARIN SODIUM 500 UNITS/HR: 1000 INJECTION INTRAVENOUS; SUBCUTANEOUS at 09:06

## 2023-02-12 RX ADMIN — METOPROLOL SUCCINATE 25 MG: 25 TABLET, EXTENDED RELEASE ORAL at 12:36

## 2023-02-12 RX ADMIN — CALCIUM ACETATE 667 MG: 667 CAPSULE ORAL at 08:38

## 2023-02-12 ASSESSMENT — ACTIVITIES OF DAILY LIVING (ADL)
ADLS_ACUITY_SCORE: 18

## 2023-02-12 NOTE — PROGRESS NOTES
D: pt admitted on 2/7/2023 afterplanned angiogram for evaluation for of CABG. PMH of of DM2, HTN, HLD, ESRD on dialysis, coronary artery disease, and tobacco      I: Monitored vitals and assessed pt status.   Changes during this shift:   Pt went to dialysis this morning, total 1.5L removed.        Vitals:  Temp: 97.6  F (36.4  C) Temp src: Oral BP: (!) 115/90 Pulse: 69   Resp: 16 SpO2: 99 % O2 Device: None (Room air)      A:   Neuro: A&O x 4. Neurologically intact.  Denies Headache, dizziness, lightheadedness,  numbness and tingling.  calls appropriately   Vitals/Cardiac: Vital sign stable, Afebrile. Sinus Rhythm.    Denies Chest pain.   Respiratory: sating > 90 %  on room air. denies SOB. LS clear.  Diet/appetite: Cardiac diet . good appetite  Endocrine: BS check AC&HS  GI/:  No BM this shift. Denies abdominal pain and nausea. Good  urine output, Voids without difficulties  Activity:  independent in the room   Pain: denies pain  Skin: no new skin deficit noted, dialysis site covered, dressing CDI  LDAs: Right PIV        P: Continue to monitor Pt status and report changes to treatment team.

## 2023-02-12 NOTE — PROGRESS NOTES
Nephrology Progress Note  02/12/2023       Avelina Marion is a 67 yom with hx of ESRD since 4/2021 who is admitted to [facility] for CABG as part of kidney transplant workup.  Had angiogram with only mild CAD 12/2021 but had stress test in Dec 2022 showing ST depression, updated angio showed 80% LM (unclear why such rapid progression) and is now planned for CABG. Still makes UOP, runs MWF dialysis and had run 2/6 prior to admission.  Nephrology consulted for management of RRT with plan for CABG on 2/9.      Interval History :   HD session today for clearance prior to CABG tomorrow  K 4.4, Bicarb 20, Calcium 8.9  Phos 3.5 at last check    Assessment & Recommendations:   ESRD-Since 4/2021, runs MWF under care of Dr Kummer Davita Phalen.  L arm fistula access.  Accepted for renal tx and actually has had some offers but turned down due to higher risk then needed mohs surgery and now CABG as part of transplant workup.  Had HD 2/7 in anticipation of CABG on 2/8 but has been delayed and now planned for 2/13.  I did let him know that there is a chance we need to place catheter for CRRT should he be unstable and need RRT post-op which he understood and agreed with.                   -iHD 2/12. Follow up post op course for iHD vs CRRT needs.                 -Access is L arm fistula, would need catheter if CRRT is needed post-op.                 -No need for new dialysis consent as he is ESRD on HD as outpt.           Volume- Appears euvolemic     Electrolytes-K 4.4, bicarb 20, no issues.      BMD-  --Ca 8.9,   --Phos - Please check with next routine lab draw     Anemia-Hgb 13, holding MCKENNA unless Hgb <11.       Nutrition-No issues, good appetite.       Time spent: 20 minutes on this date of encounter for chart review, physical exam, medical decision making and co-ordination of care.     Review of Systems:   I reviewed the following systems:  Gen: No fevers or chills  CV: No CP at rest  Resp: No SOB at rest  GI: No N/V    Physical  "Exam:   I/O last 3 completed shifts:  In: 320 [P.O.:320]  Out: 900 [Urine:900]   /65   Pulse 60   Temp 97.5  F (36.4  C) (Oral)   Resp 16   Ht 1.803 m (5' 11\")   Wt 91.2 kg (201 lb)   SpO2 98%   BMI 28.03 kg/m       GENERAL APPEARANCE: alert and no distress  EYES: no scleral icterus  NECK:  no JVD  Pulmonary: lungs clear to auscultation with equal breath sounds bilaterally, no clubbing or cyanosis  CV: Regular rhythm, normal rate, no rub   - Edema none  GI: soft, nontender, normal bowel sounds  MS: no evidence of inflammation in joints, no muscle tenderness  :  Uribe  SKIN: no rash, warm, dry  NEURO: mentation intact and speech normal  ACCESS: L arm fistula    Labs:   All labs reviewed by me  Electrolytes/Renal -   Recent Labs   Lab Test 02/12/23  0825 02/12/23  0703 02/11/23  2131 02/11/23  0811 02/11/23  0705 02/10/23  0841 02/10/23  0606 02/09/23  0834 02/09/23  0656   NA  --  135*  --   --  136  --  137  --  136   POTASSIUM  --  4.4  --   --  3.9  --  4.7  --  5.1   CHLORIDE  --  98  --   --  97*  --  100  --  100   CO2  --  20*  --   --  22  --  22  --  23   BUN  --  61.8*  --   --  42.4*  --  66.5*  --  54.1*   CR  --  7.64*  --   --  6.16*  --  7.96*  --  7.17*   * 111* 161*   < > 145*   < > 142*   < > 149*   DAVID  --  8.9  --   --  8.6*  --  8.7*  --  8.9   MAG  --   --   --   --   --   --   --   --  1.8   PHOS  --   --   --   --   --   --   --   --  3.5    < > = values in this interval not displayed.       CBC -   Recent Labs   Lab Test 02/11/23  0705 02/08/23  0625 02/07/23  0747   WBC 10.0 9.2 9.5   HGB 13.0* 12.8* 13.0*    204 209       LFTs -   Recent Labs   Lab Test 08/09/22  0626 08/05/21  0223   ALKPHOS 64 71   BILITOTAL 0.5 0.5   ALT 7* 17   AST 24 13   PROTTOTAL 7.7 8.3   ALBUMIN 4.5 4.4       Iron Panel - No lab results found.        Current Medications:    amLODIPine  5 mg Oral Daily     aspirin  81 mg Oral Daily     atorvastatin  20 mg Oral At Bedtime     calcitRIOL  " 0.25 mcg Oral Daily     calcium acetate  667 mg Oral TID w/meals     calcium carbonate  1,500 mg Oral BID     insulin aspart  1-7 Units Subcutaneous TID AC     insulin aspart  1-5 Units Subcutaneous At Bedtime     insulin glargine  10 Units Subcutaneous At Bedtime     metoprolol succinate ER  25 mg Oral Daily     [START ON 2/13/2023] metoprolol tartrate  12.5 mg Oral Pre-Op/Pre-procedure x 1 dose     polyethylene glycol  17 g Oral Daily     sodium chloride (PF)  3 mL Intracatheter Q8H       heparin (porcine) 500 Units/hr (02/12/23 0906)     - MEDICATION INSTRUCTIONS -       - MEDICATION INSTRUCTIONS -

## 2023-02-12 NOTE — PROGRESS NOTES
HEMODIALYSIS TREATMENT NOTE    Date: 2/12/2023  Time: 9:16 AM    Data:  Pre Wt: 91.2 kg     Desired Wt: 89.7 kg  Post Wt: 89.7 kg  Weight change: 1.5 kg  Ultrafiltration - Post Run Net Total Removed (mL): 1500 mL  Vascular Access Status: patent  Dialyzer Rinse: Moderate  Total Blood Volume Processed: 62.5  Liters  Total Dialysis (Treatment) Time: 3 Hours    Lab:   No    Interventions/Assessment:   Pt. Received on bed, A/Ox4  Spo2:99%, No SOB  Denied pain, N/V/D  Afebrile  AVF: (+) Thrill/Bruit  Pt. dialyzed for 3hrs, UF set for 2L via AVF  Dialysate bath K:3,Ca:2.25  Hand off report given to YUERN     Plan:    Per renal team

## 2023-02-12 NOTE — PLAN OF CARE
"V/S: VSS, afebrile. SBP 120s. HR 80-90s.  Neuro: Pt is A&O x4, No c/o headache & lightheadedness. No c/o numbness & tingling, calls appropriately.  Resp: RA. Sats > 95, No c/o SOB. Lung sounds clear.  Cardiac: Tele SR. No c/o chest pain & palpitations.  GI/: BG checks ACHS. Low fat/low sodium diet, tolerating well. No FL. No c/o n/v/d. Voiding via urinal in bathroom (oliguric d/t ESRD, on hemodialysis). Last BM 2/11/2023.  Skin: Skin WDL. No new deficits noted.  Pain: No c/o pain.  Activity: Independent in room & in hallways.  Electrolytes: Not on electrolyte protocols.  LDAs: L fistula WDL. R groin site (angioseal) WDL w/ bruising around it (no new deficits noted). R PIV SL & WDL.     Plan to have hemodialysis in am, CABG procedure planned for Monday 2/14. Will continue to monitor and report changes to team.  Patient currently resting in bed with call light in reach.     Goal Outcome Evaluation:    Problem: Plan of Care - These are the overarching goals to be used throughout the patient stay.    Goal: Plan of Care Review  Description: The Plan of Care Review/Shift note should be completed every shift.  The Outcome Evaluation is a brief statement about your assessment that the patient is improving, declining, or no change.  This information will be displayed automatically on your shift note.  Outcome: Progressing  Goal: Patient-Specific Goal (Individualized)  Description: You can add care plan individualizations to a care plan. Examples of Individualization might be:  \"Parent requests to be called daily at 9am for status\", \"I have a hard time hearing out of my right ear\", or \"Do not touch me to wake me up as it startles me\".  Outcome: Progressing  Goal: Absence of Hospital-Acquired Illness or Injury  Outcome: Progressing  Intervention: Identify and Manage Fall Risk  Recent Flowsheet Documentation  Taken 2/11/2023 1958 by Danna Mcconnell RN  Safety Promotion/Fall Prevention:    nonskid shoes/slippers when out of " bed    clutter free environment maintained    assistive device/personal items within reach  Intervention: Prevent Skin Injury  Recent Flowsheet Documentation  Taken 2/11/2023 1958 by Danna Mcconnell RN  Body Position:    position changed independently    foot of bed elevated    heels elevated    legs elevated    upper extremity elevated  Intervention: Prevent and Manage VTE (Venous Thromboembolism) Risk  Recent Flowsheet Documentation  Taken 2/11/2023 1958 by Danna Mcconnell RN  VTE Prevention/Management: SCDs (sequential compression devices) off  Goal: Optimal Comfort and Wellbeing  Outcome: Progressing  Intervention: Monitor Pain and Promote Comfort  Recent Flowsheet Documentation  Taken 2/11/2023 2320 by Danna Mcconnell RN  Pain Management Interventions: declines  Taken 2/11/2023 1958 by Danna Mcconnell RN  Pain Management Interventions: declines  Goal: Readiness for Transition of Care  Outcome: Progressing     Problem: Diabetes Comorbidity  Goal: Blood Glucose Level Within Targeted Range  Outcome: Progressing     Problem: Hypertension Comorbidity  Goal: Blood Pressure in Desired Range  Outcome: Progressing  Intervention: Maintain Blood Pressure Management  Recent Flowsheet Documentation  Taken 2/11/2023 1958 by Danna Mcconnell RN  Medication Review/Management: medications reviewed     Problem: Heart Failure Comorbidity  Goal: Maintenance of Heart Failure Symptom Control  Outcome: Progressing  Intervention: Maintain Heart Failure Management  Recent Flowsheet Documentation  Taken 2/11/2023 1958 by Danna Mcconnell RN  Medication Review/Management: medications reviewed     Problem: Cardiovascular Surgery  Goal: Improved Activity Tolerance  Outcome: Progressing  Goal: Optimal Coping with Heart Surgery  Outcome: Progressing  Goal: Absence of Bleeding  Outcome: Progressing  Goal: Effective Bowel Elimination  Outcome: Progressing  Goal: Effective Cardiac Function  Outcome: Progressing  Goal: Optimal Cerebral  Tissue Perfusion  Outcome: Progressing  Intervention: Protect and Optimize Cerebral Perfusion  Recent Flowsheet Documentation  Taken 2/11/2023 1958 by Danna Mcconnell RN  Head of Bed (HOB) Positioning: HOB at 20-30 degrees  Goal: Fluid and Electrolyte Balance  Outcome: Progressing  Goal: Blood Glucose Level Within Targeted Range  Outcome: Progressing  Goal: Absence of Infection Signs and Symptoms  Outcome: Progressing  Goal: Anesthesia/Sedation Recovery  Outcome: Progressing  Intervention: Optimize Anesthesia Recovery  Recent Flowsheet Documentation  Taken 2/11/2023 1958 by Danna Mcconnell RN  Safety Promotion/Fall Prevention:    nonskid shoes/slippers when out of bed    clutter free environment maintained    assistive device/personal items within reach  Goal: Acceptable Pain Control  Outcome: Progressing  Intervention: Prevent or Manage Pain  Recent Flowsheet Documentation  Taken 2/11/2023 2320 by Danna Mcconnell RN  Pain Management Interventions: declines  Taken 2/11/2023 1958 by Danna Mcconnell RN  Pain Management Interventions: declines  Goal: Nausea and Vomiting Relief  Outcome: Progressing  Goal: Effective Urinary Elimination  Outcome: Progressing  Goal: Effective Oxygenation and Ventilation  Outcome: Progressing  Intervention: Promote Airway Secretion Clearance  Recent Flowsheet Documentation  Taken 2/11/2023 1958 by Danna Mcconnell RN  Cough And Deep Breathing: (not performed at this time) done independently per patient     Problem: Hemodialysis  Goal: Safe, Effective Therapy Delivery  Outcome: Progressing  Intervention: Optimize Device Care and Function  Recent Flowsheet Documentation  Taken 2/11/2023 1958 by Danna Mcconnell RN  Medication Review/Management: medications reviewed  Goal: Effective Tissue Perfusion  Outcome: Progressing  Goal: Absence of Infection Signs and Symptoms  Outcome: Progressing

## 2023-02-12 NOTE — PLAN OF CARE
Neuro: A&Ox4.   Cardiac: Afebrile, VSS. SR   Respiratory: RA, lung sounds clear, denies SOB  GI/: Voiding spontaneously, pt on HD every other day but still makes a small amount of urine. 1 BM this shift.  Diet/appetite: Tolerating cardiac diet . Denies nausea. BG checks ACHS  Activity: Up independently   Pain: Denies   Skin: No new deficits noted.  Lines: R PIV. Fistula on L side.  Drains: none  Replacements: none given    Plan: Continue with current POC. Report changes to primary team.

## 2023-02-12 NOTE — PROGRESS NOTES
Interventional Cardiology Progress Note      Assessment & Plan:  Avelina Marion is a 67 year old male with a history of DM2, HTN, HLD, ESRD on dialysis, coronary artery disease, and tobacco use who is admitted after planned angiogram for evaluation for of CABG.     Today's Update:  - Awaiting surgery 2/13/23     # Coronary Artery Disease  # Hypertension   # Hyperlipidemia  Patient underwent coronary angiogram in December 2021 which showed mild CAD with no obstructive disease.  Echocardiogram a year ago showed normal biventricular function and LVH. He underwent stress testing on 12/27 as part of transplant work up which was positive for ST depression. EF was normal 55-60% with no significant valvular disease or WMA's.  Planned outpatient coronary angiogram on 2/7/23 showed 80% LM disease. Most recent lipids on 12/19/22 LDL 43, total 116.  - CVTS consult for consideration of CABG; surgery Monday   - Continue PTA Amlodipine 5mg for now   - Continue PTA Lipitor 20mg q HS   - ContinueToprol XL 25mg       # Diabetes Mellitus Type 2  Insulin dependent. Most recent A1c 7.0% on 8/9/22. Home regimen includes Lantus. Under consideration for pancreatic transplant (and renal).   - Continue PTA Lantus 20 units at bedtime  - QID AC&HS blood sugar checks  - Hypoglycemia protocol      # End Stage Renal Disease  # Kidney Transplant Candidate  On dialysis since April 2021 (M-W-F). Currently on transplant list. Primary nephrologist Dr. Mitchell. Was offered a kidney in 7/2022 but had to decline due to BCC diagnosis. Underwent Mohs procedure 8/2022 and now has derm clearance.   - Nephrology consult for dialysis and transplant; appreciate recommendations.   - Dialysis days determined by nephrology; plan for run today and Sunday.    - Continue PTA Calcitrol  - Continue PTA PhosLo  - Continue PTA CaCarb     # Hx of Tobacco Use   Former smoker. Quit in 1985.   - Encourage ongoing cessation      FEN: Low Sat Fat  Code Status: Full  DVT  "Prophylaxis:  Mechanical  Isolation: None  Disposition: Pending surgery    Patient discussed w/ Dr. Shetty.    KWASI Bonilla, CNP  [facility]  Interventional Cardiology  [phone]    Medical Decision Making       30 MINUTES SPENT BY ME on the date of service doing chart review, history, exam, documentation & further activities per the note.      Interval History:  NAEO, feeling well  Plan for HD today and CAB tomorrow  Will touch base with CVTS to make sure pre-surg orders are placed (    Most recent vital signs:  /58 (BP Location: Right arm)   Pulse 59   Temp 97.7  F (36.5  C) (Oral)   Resp 18   Ht 1.803 m (5' 11\")   Wt 91.2 kg (201 lb)   SpO2 96%   BMI 28.03 kg/m    Temp:  [97.2  F (36.2  C)-98.1  F (36.7  C)] 97.7  F (36.5  C)  Pulse:  [59-71] 59  Resp:  [18-20] 18  BP: (112-137)/(58-72) 112/58  SpO2:  [95 %-98 %] 96 %  Wt Readings from Last 2 Encounters:   02/12/23 91.2 kg (201 lb)   12/19/22 93.2 kg (205 lb 8 oz)       Intake/Output Summary (Last 24 hours) at 2/12/2023 0657  Last data filed at 2/12/2023 0330  Gross per 24 hour   Intake 320 ml   Output 900 ml   Net -580 ml       Physical exam:  General: Pleasant elderly male. Appears comfortable and in no acute distress. Alert and interactive  HEENT: Normocephalic, atraumatic. No scleral icterus or injection  Neck: JVP not elevated  CARDIAC: Regular rate and rhythm, no m/r/g appreciated. Peripheral pulses 2+  RESP: Normal work of breathing on room air without use of accessory breathing muscles. Clear to auscultation in all fields. No wheezes, rhonchi or crackles appreciated.  GI: No abdominal distention. Soft and nontender.   EXTREMITIES: Without lower extremity edema. No cyanosis or clubbing. Warm and well perfused. No venous stasis changes.   SKIN: No acute lesions appreciated. Warm and dry to touch  NEURO: Alert and oriented X3, CN II-XII grossly intact, no focal neurological deficits noted, normal " speech  PSYCH: Mood and affect are appropriate    Labs (Past three days):  CBC  Recent Labs   Lab 02/11/23  0705 02/08/23  0625 02/07/23  0747   WBC 10.0 9.2 9.5   RBC 4.02* 3.82* 4.00*   HGB 13.0* 12.8* 13.0*   HCT 38.1* 37.2* 38.9*   MCV 95 97 97   MCH 32.3 33.5* 32.5   MCHC 34.1 34.4 33.4   RDW 12.3 12.6 12.5    204 209     BMP  Recent Labs   Lab 02/11/23  2131 02/11/23  2004 02/11/23  1648 02/11/23  1131 02/11/23  0811 02/11/23  0705 02/10/23  0841 02/10/23  0606 02/09/23  0834 02/09/23  0656 02/08/23  0733 02/08/23  0625   NA  --   --   --   --   --  136  --  137  --  136  --  137   POTASSIUM  --   --   --   --   --  3.9  --  4.7  --  5.1  --  4.3   CHLORIDE  --   --   --   --   --  97*  --  100  --  100  --  99   CO2  --   --   --   --   --  22  --  22  --  23  --  27   ANIONGAP  --   --   --   --   --  17*  --  15  --  13  --  11   * 197* 250* 188*   < > 145*   < > 142*   < > 149*   < > 153*   BUN  --   --   --   --   --  42.4*  --  66.5*  --  54.1*  --  33.4*   CR  --   --   --   --   --  6.16*  --  7.96*  --  7.17*  --  5.35*   GFRESTIMATED  --   --   --   --   --  9*  --  7*  --  8*  --  11*   DAVID  --   --   --   --   --  8.6*  --  8.7*  --  8.9  --  8.4*   MAG  --   --   --   --   --   --   --   --   --  1.8  --   --    PHOS  --   --   --   --   --   --   --   --   --  3.5  --   --     < > = values in this interval not displayed.     Troponins: No results found for: TROPI, TROPONIN, TROPR, TROPN     INR  Recent Labs   Lab 02/07/23  0747   INR 1.03     2/7/23 EKG 12 Lead:      PTA 12/27/22 Stress Echocardiogram:  Abnormal exercise echocardiogram. Extensive (inferior and anterolateral 1mm ST  depressions with aVR ST elevation) EKG changes without any obvious echo  abnormality with stress. Consider further evaluation for CAD.  Target heart rate was achieved. Heart rate and blood pressure response to  exercise were normal. Normal LV function and wall motion at rest. With stress,  the left  ventricular ejection fraction increased from 55-60% to greater than  65% and the left ventricular size decreased appropriately. No regional wall  motion abnormality with stress.  Intermediate functional capacity. No subjective symptoms to suggest ischemia.  No significant valve disease on screening doppler evaluation. The aortic root  and visualized ascending aorta are normal.     2/7/23 Coronary Angiogram:  1. Severe 2 vessel obstructive coronary artery disease with left main involvement.   2. IVUS of Ostial LM disease is 80% stenosed (MLA: 5.0 mm^2). Mid to distal LM has 50% stenosis.   3. Proximal RCA not hemodynamically significant as assessed by dPR (0.95).  4. Recommend CVTS evaluation for consideration of CABG (LIMA to LAD and SVG to OM).  5. Hemostatis of RCFA was achieved with 6Fr angioseal.      2/7/23 Carotid US:  1. RIGHT ICA: Less than 50% diameter narrowing by grayscale imaging  and sonographic velocity criteria.     2. LEFT ICA:  Less than 50% diameter narrowing by grayscale imaging  and sonographic velocity criteria.     2/7/23 Chest CT:  1. Atherosclerosis including multivessel coronary artery  calcification.  2. Mildly prominent subcarinal lymph node measuring 13 x 12 mm  significance uncertain. Other benign-appearing mediastinal lymph  nodes.  3. Subpleural fibrotic-type interstitial lung disease.

## 2023-02-13 ENCOUNTER — APPOINTMENT (OUTPATIENT)
Dept: GENERAL RADIOLOGY | Facility: CLINIC | Age: 68
DRG: 233 | End: 2023-02-13
Attending: STUDENT IN AN ORGANIZED HEALTH CARE EDUCATION/TRAINING PROGRAM
Payer: COMMERCIAL

## 2023-02-13 ENCOUNTER — ANESTHESIA (OUTPATIENT)
Dept: SURGERY | Facility: CLINIC | Age: 68
DRG: 233 | End: 2023-02-13
Payer: COMMERCIAL

## 2023-02-13 LAB
ALBUMIN SERPL BCG-MCNC: 3.6 G/DL (ref 3.5–5.2)
ALLEN'S TEST: ABNORMAL
ALLEN'S TEST: ABNORMAL
ALP SERPL-CCNC: 47 U/L (ref 40–129)
ALT SERPL W P-5'-P-CCNC: 12 U/L (ref 10–50)
ANION GAP SERPL CALCULATED.3IONS-SCNC: 16 MMOL/L (ref 7–15)
ANION GAP SERPL CALCULATED.3IONS-SCNC: 18 MMOL/L (ref 7–15)
ANION GAP SERPL CALCULATED.3IONS-SCNC: 20 MMOL/L (ref 7–15)
APTT PPP: 33 SECONDS (ref 22–38)
AST SERPL W P-5'-P-CCNC: 36 U/L (ref 10–50)
BASE EXCESS BLDA CALC-SCNC: -1.5 MMOL/L (ref -9–1.8)
BASE EXCESS BLDA CALC-SCNC: -1.6 MMOL/L (ref -9.6–2)
BASE EXCESS BLDA CALC-SCNC: -1.8 MMOL/L (ref -9.6–2)
BASE EXCESS BLDA CALC-SCNC: -2 MMOL/L (ref -9.6–2)
BASE EXCESS BLDA CALC-SCNC: -2.3 MMOL/L (ref -9.6–2)
BASE EXCESS BLDA CALC-SCNC: -3.7 MMOL/L (ref -9.6–2)
BASE EXCESS BLDA CALC-SCNC: -7.1 MMOL/L (ref -9–1.8)
BASE EXCESS BLDA CALC-SCNC: 1.1 MMOL/L (ref -9.6–2)
BASE EXCESS BLDV CALC-SCNC: -1.4 MMOL/L (ref -7.7–1.9)
BASE EXCESS BLDV CALC-SCNC: -1.4 MMOL/L (ref -8.1–1.9)
BASE EXCESS BLDV CALC-SCNC: -7.6 MMOL/L (ref -7.7–1.9)
BILIRUB SERPL-MCNC: 0.4 MG/DL
BLD PROD TYP BPU: NORMAL
BLOOD COMPONENT TYPE: NORMAL
BUN SERPL-MCNC: 45.5 MG/DL (ref 8–23)
BUN SERPL-MCNC: 45.9 MG/DL (ref 8–23)
BUN SERPL-MCNC: 52.1 MG/DL (ref 8–23)
CA-I BLD-MCNC: 3.3 MG/DL (ref 4.4–5.2)
CA-I BLD-MCNC: 3.6 MG/DL (ref 4.4–5.2)
CA-I BLD-MCNC: 4.1 MG/DL (ref 4.4–5.2)
CA-I BLD-MCNC: 4.2 MG/DL (ref 4.4–5.2)
CALCIUM SERPL-MCNC: 7.5 MG/DL (ref 8.8–10.2)
CALCIUM SERPL-MCNC: 7.6 MG/DL (ref 8.8–10.2)
CALCIUM SERPL-MCNC: 8.8 MG/DL (ref 8.8–10.2)
CHLORIDE SERPL-SCNC: 95 MMOL/L (ref 98–107)
CHLORIDE SERPL-SCNC: 97 MMOL/L (ref 98–107)
CHLORIDE SERPL-SCNC: 99 MMOL/L (ref 98–107)
CLOT INIT KAOL IND TO POST HEP NEUT TRTO: 1 {RATIO}
CLOT INIT KAOL IND TO POST HEP NEUT TRTO: 1.2 {RATIO}
CLOT INIT KAOLIN IND BLD US: 118 SEC (ref 113–166)
CLOT INIT KAOLIN IND BLD US: 142 SEC (ref 113–166)
CLOT INIT KAOLIN IND P HEP NEUT BLD US: 123 SEC (ref 103–153)
CLOT INIT KAOLIN IND P HEP NEUT BLD US: 123 SEC (ref 103–153)
CLOT STIFF PLT CONT BLD CALC: 15.3 HPA (ref 11.9–29.8)
CLOT STIFF PLT CONT BLD CALC: 21.9 HPA (ref 11.9–29.8)
CLOT STIFF TF IND P HEP NEUT BLD US: 17.4 HPA (ref 13–33.2)
CLOT STIFF TF IND P HEP NEUT BLD US: 24.8 HPA (ref 13–33.2)
CLOT STIFF TF IND+IIB-IIIA INH P HEP NEU: 2.1 HPA (ref 1–3.7)
CLOT STIFF TF IND+IIB-IIIA INH P HEP NEU: 2.9 HPA (ref 1–3.7)
CODING SYSTEM: NORMAL
CREAT SERPL-MCNC: 5.66 MG/DL (ref 0.67–1.17)
CREAT SERPL-MCNC: 5.79 MG/DL (ref 0.67–1.17)
CREAT SERPL-MCNC: 6.65 MG/DL (ref 0.67–1.17)
CROSSMATCH: NORMAL
DEPRECATED HCO3 PLAS-SCNC: 16 MMOL/L (ref 22–29)
DEPRECATED HCO3 PLAS-SCNC: 22 MMOL/L (ref 22–29)
DEPRECATED HCO3 PLAS-SCNC: 22 MMOL/L (ref 22–29)
ERYTHROCYTE [DISTWIDTH] IN BLOOD BY AUTOMATED COUNT: 12.4 % (ref 10–15)
ERYTHROCYTE [DISTWIDTH] IN BLOOD BY AUTOMATED COUNT: 12.4 % (ref 10–15)
GFR SERPL CREATININE-BSD FRML MDRD: 10 ML/MIN/1.73M2
GFR SERPL CREATININE-BSD FRML MDRD: 10 ML/MIN/1.73M2
GFR SERPL CREATININE-BSD FRML MDRD: 9 ML/MIN/1.73M2
GLUCOSE BLD-MCNC: 123 MG/DL (ref 70–99)
GLUCOSE BLD-MCNC: 135 MG/DL (ref 70–99)
GLUCOSE BLD-MCNC: 149 MG/DL (ref 70–99)
GLUCOSE BLD-MCNC: 185 MG/DL (ref 70–99)
GLUCOSE BLD-MCNC: 187 MG/DL (ref 70–99)
GLUCOSE BLD-MCNC: 201 MG/DL (ref 70–99)
GLUCOSE BLD-MCNC: 206 MG/DL (ref 70–99)
GLUCOSE BLDC GLUCOMTR-MCNC: 136 MG/DL (ref 70–99)
GLUCOSE BLDC GLUCOMTR-MCNC: 146 MG/DL (ref 70–99)
GLUCOSE BLDC GLUCOMTR-MCNC: 153 MG/DL (ref 70–99)
GLUCOSE BLDC GLUCOMTR-MCNC: 180 MG/DL (ref 70–99)
GLUCOSE BLDC GLUCOMTR-MCNC: 193 MG/DL (ref 70–99)
GLUCOSE BLDC GLUCOMTR-MCNC: 222 MG/DL (ref 70–99)
GLUCOSE BLDC GLUCOMTR-MCNC: 226 MG/DL (ref 70–99)
GLUCOSE BLDC GLUCOMTR-MCNC: 243 MG/DL (ref 70–99)
GLUCOSE BLDC GLUCOMTR-MCNC: 260 MG/DL (ref 70–99)
GLUCOSE SERPL-MCNC: 138 MG/DL (ref 70–99)
GLUCOSE SERPL-MCNC: 166 MG/DL (ref 70–99)
GLUCOSE SERPL-MCNC: 255 MG/DL (ref 70–99)
HCO3 BLD-SCNC: 18 MMOL/L (ref 21–28)
HCO3 BLD-SCNC: 24 MMOL/L (ref 21–28)
HCO3 BLDA-SCNC: 21 MMOL/L (ref 21–28)
HCO3 BLDA-SCNC: 24 MMOL/L (ref 21–28)
HCO3 BLDA-SCNC: 25 MMOL/L (ref 21–28)
HCO3 BLDA-SCNC: 27 MMOL/L (ref 21–28)
HCO3 BLDV-SCNC: 18 MMOL/L (ref 21–28)
HCO3 BLDV-SCNC: 25 MMOL/L (ref 21–28)
HCO3 BLDV-SCNC: 26 MMOL/L (ref 21–28)
HCT VFR BLD AUTO: 29.8 % (ref 40–53)
HCT VFR BLD AUTO: 32.2 % (ref 40–53)
HGB BLD-MCNC: 10 G/DL (ref 13.3–17.7)
HGB BLD-MCNC: 10.2 G/DL (ref 13.3–17.7)
HGB BLD-MCNC: 10.3 G/DL (ref 13.3–17.7)
HGB BLD-MCNC: 10.3 G/DL (ref 13.3–17.7)
HGB BLD-MCNC: 10.6 G/DL (ref 13.3–17.7)
HGB BLD-MCNC: 10.9 G/DL (ref 13.3–17.7)
HGB BLD-MCNC: 12.5 G/DL (ref 13.3–17.7)
HGB BLD-MCNC: 12.8 G/DL (ref 13.3–17.7)
HGB BLD-MCNC: 9.4 G/DL (ref 13.3–17.7)
HGB BLD-MCNC: 9.5 G/DL (ref 13.3–17.7)
HOLD SPECIMEN: NORMAL
INR PPP: 1.33 (ref 0.85–1.15)
ISSUE DATE AND TIME: NORMAL
LACTATE BLD-SCNC: 0.8 MMOL/L
LACTATE BLD-SCNC: 0.8 MMOL/L
LACTATE BLD-SCNC: 1.3 MMOL/L
LACTATE BLD-SCNC: 1.4 MMOL/L
LACTATE BLD-SCNC: 1.8 MMOL/L
LACTATE SERPL-SCNC: 2.2 MMOL/L (ref 0.7–2)
LACTATE SERPL-SCNC: 2.7 MMOL/L (ref 0.7–2)
LACTATE SERPL-SCNC: 4.1 MMOL/L (ref 0.7–2)
LACTATE SERPL-SCNC: 4.8 MMOL/L (ref 0.7–2)
LACTATE SERPL-SCNC: 5.4 MMOL/L (ref 0.7–2)
MAGNESIUM SERPL-MCNC: 2.4 MG/DL (ref 1.7–2.3)
MCH RBC QN AUTO: 32.4 PG (ref 26.5–33)
MCH RBC QN AUTO: 32.7 PG (ref 26.5–33)
MCHC RBC AUTO-ENTMCNC: 33.6 G/DL (ref 31.5–36.5)
MCHC RBC AUTO-ENTMCNC: 33.9 G/DL (ref 31.5–36.5)
MCV RBC AUTO: 96 FL (ref 78–100)
MCV RBC AUTO: 97 FL (ref 78–100)
O2/TOTAL GAS SETTING VFR VENT: 100 %
O2/TOTAL GAS SETTING VFR VENT: 2 %
O2/TOTAL GAS SETTING VFR VENT: 2 %
O2/TOTAL GAS SETTING VFR VENT: 52 %
O2/TOTAL GAS SETTING VFR VENT: 8 %
O2/TOTAL GAS SETTING VFR VENT: 8 %
O2/TOTAL GAS SETTING VFR VENT: 80 %
OXYHGB MFR BLD: 94 % (ref 92–100)
OXYHGB MFR BLD: 96 % (ref 92–100)
OXYHGB MFR BLDV: 78 % (ref 70–75)
OXYHGB MFR BLDV: 82 % (ref 70–75)
PCO2 BLD: 36 MM HG (ref 35–45)
PCO2 BLD: 44 MM HG (ref 35–45)
PCO2 BLDA: 38 MM HG (ref 35–45)
PCO2 BLDA: 44 MM HG (ref 35–45)
PCO2 BLDA: 45 MM HG (ref 35–45)
PCO2 BLDA: 47 MM HG (ref 35–45)
PCO2 BLDA: 48 MM HG (ref 35–45)
PCO2 BLDA: 49 MM HG (ref 35–45)
PCO2 BLDV: 36 MM HG (ref 40–50)
PCO2 BLDV: 49 MM HG (ref 40–50)
PCO2 BLDV: 52 MM HG (ref 40–50)
PH BLD: 7.31 [PH] (ref 7.35–7.45)
PH BLD: 7.35 [PH] (ref 7.35–7.45)
PH BLDA: 7.32 [PH] (ref 7.35–7.45)
PH BLDA: 7.33 [PH] (ref 7.35–7.45)
PH BLDA: 7.34 [PH] (ref 7.35–7.45)
PH BLDA: 7.34 [PH] (ref 7.35–7.45)
PH BLDA: 7.35 [PH] (ref 7.35–7.45)
PH BLDA: 7.36 [PH] (ref 7.35–7.45)
PH BLDV: 7.3 [PH] (ref 7.32–7.43)
PH BLDV: 7.31 [PH] (ref 7.32–7.43)
PH BLDV: 7.32 [PH] (ref 7.32–7.43)
PHOSPHATE SERPL-MCNC: 2.4 MG/DL (ref 2.5–4.5)
PLATELET # BLD AUTO: 187 10E3/UL (ref 150–450)
PLATELET # BLD AUTO: 221 10E3/UL (ref 150–450)
PO2 BLD: 136 MM HG (ref 80–105)
PO2 BLD: 84 MM HG (ref 80–105)
PO2 BLDA: 135 MM HG (ref 80–105)
PO2 BLDA: 226 MM HG (ref 80–105)
PO2 BLDA: 233 MM HG (ref 80–105)
PO2 BLDA: 278 MM HG (ref 80–105)
PO2 BLDA: 404 MM HG (ref 80–105)
PO2 BLDA: 411 MM HG (ref 80–105)
PO2 BLDV: 47 MM HG (ref 25–47)
PO2 BLDV: 53 MM HG (ref 25–47)
PO2 BLDV: 55 MM HG (ref 25–47)
POTASSIUM BLD-SCNC: 3.6 MMOL/L (ref 3.5–5)
POTASSIUM BLD-SCNC: 4 MMOL/L (ref 3.5–5)
POTASSIUM BLD-SCNC: 4.6 MMOL/L (ref 3.5–5)
POTASSIUM BLD-SCNC: 5.7 MMOL/L (ref 3.5–5)
POTASSIUM BLD-SCNC: 5.8 MMOL/L (ref 3.5–5)
POTASSIUM BLD-SCNC: 6.5 MMOL/L (ref 3.5–5)
POTASSIUM BLD-SCNC: 6.7 MMOL/L (ref 3.5–5)
POTASSIUM SERPL-SCNC: 4.3 MMOL/L (ref 3.4–5.3)
POTASSIUM SERPL-SCNC: 4.4 MMOL/L (ref 3.4–5.3)
POTASSIUM SERPL-SCNC: 4.6 MMOL/L (ref 3.4–5.3)
PROT SERPL-MCNC: 5.5 G/DL (ref 6.4–8.3)
RBC # BLD AUTO: 3.06 10E6/UL (ref 4.4–5.9)
RBC # BLD AUTO: 3.36 10E6/UL (ref 4.4–5.9)
SODIUM BLD-SCNC: 130 MMOL/L (ref 133–144)
SODIUM BLD-SCNC: 131 MMOL/L (ref 133–144)
SODIUM BLD-SCNC: 133 MMOL/L (ref 133–144)
SODIUM BLD-SCNC: 135 MMOL/L (ref 133–144)
SODIUM BLD-SCNC: 136 MMOL/L (ref 133–144)
SODIUM SERPL-SCNC: 135 MMOL/L (ref 136–145)
UNIT ABO/RH: NORMAL
UNIT NUMBER: NORMAL
UNIT STATUS: NORMAL
UNIT TYPE ISBT: 600
WBC # BLD AUTO: 23.2 10E3/UL (ref 4–11)
WBC # BLD AUTO: 23.4 10E3/UL (ref 4–11)

## 2023-02-13 PROCEDURE — 410N000004: Performed by: THORACIC SURGERY (CARDIOTHORACIC VASCULAR SURGERY)

## 2023-02-13 PROCEDURE — 80053 COMPREHEN METABOLIC PANEL: CPT

## 2023-02-13 PROCEDURE — 85730 THROMBOPLASTIN TIME PARTIAL: CPT | Performed by: STUDENT IN AN ORGANIZED HEALTH CARE EDUCATION/TRAINING PROGRAM

## 2023-02-13 PROCEDURE — 999N000141 HC STATISTIC PRE-PROCEDURE NURSING ASSESSMENT: Performed by: THORACIC SURGERY (CARDIOTHORACIC VASCULAR SURGERY)

## 2023-02-13 PROCEDURE — 83605 ASSAY OF LACTIC ACID: CPT | Performed by: THORACIC SURGERY (CARDIOTHORACIC VASCULAR SURGERY)

## 2023-02-13 PROCEDURE — 82805 BLOOD GASES W/O2 SATURATION: CPT | Performed by: STUDENT IN AN ORGANIZED HEALTH CARE EDUCATION/TRAINING PROGRAM

## 2023-02-13 PROCEDURE — 85027 COMPLETE CBC AUTOMATED: CPT | Performed by: STUDENT IN AN ORGANIZED HEALTH CARE EDUCATION/TRAINING PROGRAM

## 2023-02-13 PROCEDURE — 84155 ASSAY OF PROTEIN SERUM: CPT | Performed by: STUDENT IN AN ORGANIZED HEALTH CARE EDUCATION/TRAINING PROGRAM

## 2023-02-13 PROCEDURE — 258N000003 HC RX IP 258 OP 636: Performed by: STUDENT IN AN ORGANIZED HEALTH CARE EDUCATION/TRAINING PROGRAM

## 2023-02-13 PROCEDURE — 84132 ASSAY OF SERUM POTASSIUM: CPT | Performed by: INTERNAL MEDICINE

## 2023-02-13 PROCEDURE — 82805 BLOOD GASES W/O2 SATURATION: CPT | Performed by: THORACIC SURGERY (CARDIOTHORACIC VASCULAR SURGERY)

## 2023-02-13 PROCEDURE — 84100 ASSAY OF PHOSPHORUS: CPT | Performed by: STUDENT IN AN ORGANIZED HEALTH CARE EDUCATION/TRAINING PROGRAM

## 2023-02-13 PROCEDURE — 5A1221Z PERFORMANCE OF CARDIAC OUTPUT, CONTINUOUS: ICD-10-PCS | Performed by: THORACIC SURGERY (CARDIOTHORACIC VASCULAR SURGERY)

## 2023-02-13 PROCEDURE — 272N000001 HC OR GENERAL SUPPLY STERILE: Performed by: THORACIC SURGERY (CARDIOTHORACIC VASCULAR SURGERY)

## 2023-02-13 PROCEDURE — 250N000013 HC RX MED GY IP 250 OP 250 PS 637: Performed by: STUDENT IN AN ORGANIZED HEALTH CARE EDUCATION/TRAINING PROGRAM

## 2023-02-13 PROCEDURE — 250N000009 HC RX 250: Performed by: PHYSICIAN ASSISTANT

## 2023-02-13 PROCEDURE — 36415 COLL VENOUS BLD VENIPUNCTURE: CPT

## 2023-02-13 PROCEDURE — 85018 HEMOGLOBIN: CPT | Performed by: THORACIC SURGERY (CARDIOTHORACIC VASCULAR SURGERY)

## 2023-02-13 PROCEDURE — 250N000011 HC RX IP 250 OP 636: Performed by: PHYSICIAN ASSISTANT

## 2023-02-13 PROCEDURE — 250N000013 HC RX MED GY IP 250 OP 250 PS 637: Performed by: PHYSICIAN ASSISTANT

## 2023-02-13 PROCEDURE — 93010 ELECTROCARDIOGRAM REPORT: CPT | Mod: 59 | Performed by: INTERNAL MEDICINE

## 2023-02-13 PROCEDURE — 82803 BLOOD GASES ANY COMBINATION: CPT

## 2023-02-13 PROCEDURE — 82330 ASSAY OF CALCIUM: CPT | Performed by: THORACIC SURGERY (CARDIOTHORACIC VASCULAR SURGERY)

## 2023-02-13 PROCEDURE — 33533 CABG ARTERIAL SINGLE: CPT | Mod: GC | Performed by: THORACIC SURGERY (CARDIOTHORACIC VASCULAR SURGERY)

## 2023-02-13 PROCEDURE — 258N000003 HC RX IP 258 OP 636: Performed by: INTERNAL MEDICINE

## 2023-02-13 PROCEDURE — 250N000024 HC ISOFLURANE, PER MIN: Performed by: THORACIC SURGERY (CARDIOTHORACIC VASCULAR SURGERY)

## 2023-02-13 PROCEDURE — 999N000065 XR CHEST PORT 1 VIEW

## 2023-02-13 PROCEDURE — 250N000011 HC RX IP 250 OP 636: Performed by: STUDENT IN AN ORGANIZED HEALTH CARE EDUCATION/TRAINING PROGRAM

## 2023-02-13 PROCEDURE — 250N000009 HC RX 250: Performed by: NURSE ANESTHETIST, CERTIFIED REGISTERED

## 2023-02-13 PROCEDURE — 06BQ4ZZ EXCISION OF LEFT SAPHENOUS VEIN, PERCUTANEOUS ENDOSCOPIC APPROACH: ICD-10-PCS | Performed by: THORACIC SURGERY (CARDIOTHORACIC VASCULAR SURGERY)

## 2023-02-13 PROCEDURE — 85610 PROTHROMBIN TIME: CPT | Performed by: STUDENT IN AN ORGANIZED HEALTH CARE EDUCATION/TRAINING PROGRAM

## 2023-02-13 PROCEDURE — 258N000003 HC RX IP 258 OP 636: Performed by: PHYSICIAN ASSISTANT

## 2023-02-13 PROCEDURE — 71045 X-RAY EXAM CHEST 1 VIEW: CPT | Mod: 26 | Performed by: RADIOLOGY

## 2023-02-13 PROCEDURE — 93005 ELECTROCARDIOGRAM TRACING: CPT

## 2023-02-13 PROCEDURE — 250N000011 HC RX IP 250 OP 636: Performed by: ANESTHESIOLOGY

## 2023-02-13 PROCEDURE — 83605 ASSAY OF LACTIC ACID: CPT | Performed by: STUDENT IN AN ORGANIZED HEALTH CARE EDUCATION/TRAINING PROGRAM

## 2023-02-13 PROCEDURE — 200N000002 HC R&B ICU UMMC

## 2023-02-13 PROCEDURE — 99223 1ST HOSP IP/OBS HIGH 75: CPT | Mod: 24 | Performed by: INTERNAL MEDICINE

## 2023-02-13 PROCEDURE — 250N000011 HC RX IP 250 OP 636: Performed by: NURSE ANESTHETIST, CERTIFIED REGISTERED

## 2023-02-13 PROCEDURE — 02100Z9 BYPASS CORONARY ARTERY, ONE ARTERY FROM LEFT INTERNAL MAMMARY, OPEN APPROACH: ICD-10-PCS | Performed by: THORACIC SURGERY (CARDIOTHORACIC VASCULAR SURGERY)

## 2023-02-13 PROCEDURE — 250N000009 HC RX 250: Performed by: STUDENT IN AN ORGANIZED HEALTH CARE EDUCATION/TRAINING PROGRAM

## 2023-02-13 PROCEDURE — 410N000003 HC PER-PERFUSION 1ST 30 MIN: Performed by: THORACIC SURGERY (CARDIOTHORACIC VASCULAR SURGERY)

## 2023-02-13 PROCEDURE — 250N000009 HC RX 250: Performed by: THORACIC SURGERY (CARDIOTHORACIC VASCULAR SURGERY)

## 2023-02-13 PROCEDURE — 85027 COMPLETE CBC AUTOMATED: CPT | Performed by: INTERNAL MEDICINE

## 2023-02-13 PROCEDURE — 84132 ASSAY OF SERUM POTASSIUM: CPT | Performed by: STUDENT IN AN ORGANIZED HEALTH CARE EDUCATION/TRAINING PROGRAM

## 2023-02-13 PROCEDURE — 84132 ASSAY OF SERUM POTASSIUM: CPT

## 2023-02-13 PROCEDURE — 370N000017 HC ANESTHESIA TECHNICAL FEE, PER MIN: Performed by: THORACIC SURGERY (CARDIOTHORACIC VASCULAR SURGERY)

## 2023-02-13 PROCEDURE — 272N000085 HC PACK CELL SAVER CSP: Performed by: THORACIC SURGERY (CARDIOTHORACIC VASCULAR SURGERY)

## 2023-02-13 PROCEDURE — 85396 CLOTTING ASSAY WHOLE BLOOD: CPT

## 2023-02-13 PROCEDURE — 258N000001 HC RX 258: Performed by: NURSE ANESTHETIST, CERTIFIED REGISTERED

## 2023-02-13 PROCEDURE — 258N000003 HC RX IP 258 OP 636: Performed by: NURSE ANESTHETIST, CERTIFIED REGISTERED

## 2023-02-13 PROCEDURE — 82330 ASSAY OF CALCIUM: CPT

## 2023-02-13 PROCEDURE — 33517 CABG ARTERY-VEIN SINGLE: CPT | Mod: GC | Performed by: THORACIC SURGERY (CARDIOTHORACIC VASCULAR SURGERY)

## 2023-02-13 PROCEDURE — 83735 ASSAY OF MAGNESIUM: CPT | Performed by: STUDENT IN AN ORGANIZED HEALTH CARE EDUCATION/TRAINING PROGRAM

## 2023-02-13 PROCEDURE — 271N000002 HC RX 271: Performed by: ANESTHESIOLOGY

## 2023-02-13 PROCEDURE — 272N000088 HC PUMP APP ADULT PERFUSION: Performed by: THORACIC SURGERY (CARDIOTHORACIC VASCULAR SURGERY)

## 2023-02-13 PROCEDURE — P9016 RBC LEUKOCYTES REDUCED: HCPCS | Performed by: STUDENT IN AN ORGANIZED HEALTH CARE EDUCATION/TRAINING PROGRAM

## 2023-02-13 PROCEDURE — 258N000003 HC RX IP 258 OP 636: Performed by: THORACIC SURGERY (CARDIOTHORACIC VASCULAR SURGERY)

## 2023-02-13 PROCEDURE — 250N000011 HC RX IP 250 OP 636: Performed by: THORACIC SURGERY (CARDIOTHORACIC VASCULAR SURGERY)

## 2023-02-13 PROCEDURE — 250N000012 HC RX MED GY IP 250 OP 636 PS 637: Performed by: NURSE ANESTHETIST, CERTIFIED REGISTERED

## 2023-02-13 PROCEDURE — 021009W BYPASS CORONARY ARTERY, ONE ARTERY FROM AORTA WITH AUTOLOGOUS VENOUS TISSUE, OPEN APPROACH: ICD-10-PCS | Performed by: THORACIC SURGERY (CARDIOTHORACIC VASCULAR SURGERY)

## 2023-02-13 PROCEDURE — 99232 SBSQ HOSP IP/OBS MODERATE 35: CPT | Mod: 24 | Performed by: CLINICAL NURSE SPECIALIST

## 2023-02-13 PROCEDURE — 250N000009 HC RX 250: Performed by: INTERNAL MEDICINE

## 2023-02-13 PROCEDURE — 250N000011 HC RX IP 250 OP 636: Performed by: INTERNAL MEDICINE

## 2023-02-13 PROCEDURE — 82330 ASSAY OF CALCIUM: CPT | Performed by: STUDENT IN AN ORGANIZED HEALTH CARE EDUCATION/TRAINING PROGRAM

## 2023-02-13 PROCEDURE — 250N000012 HC RX MED GY IP 250 OP 636 PS 637: Performed by: THORACIC SURGERY (CARDIOTHORACIC VASCULAR SURGERY)

## 2023-02-13 PROCEDURE — 360N000079 HC SURGERY LEVEL 6, PER MIN: Performed by: THORACIC SURGERY (CARDIOTHORACIC VASCULAR SURGERY)

## 2023-02-13 RX ORDER — NALOXONE HYDROCHLORIDE 0.4 MG/ML
0.4 INJECTION, SOLUTION INTRAMUSCULAR; INTRAVENOUS; SUBCUTANEOUS
Status: DISCONTINUED | OUTPATIENT
Start: 2023-02-13 | End: 2023-02-20 | Stop reason: HOSPADM

## 2023-02-13 RX ORDER — DESMOPRESSIN ACETATE 4 UG/ML
28 INJECTION, SOLUTION INTRAVENOUS; SUBCUTANEOUS ONCE
Status: DISCONTINUED | OUTPATIENT
Start: 2023-02-13 | End: 2023-02-13

## 2023-02-13 RX ORDER — METHOCARBAMOL 500 MG/1
500 TABLET, FILM COATED ORAL EVERY 6 HOURS PRN
Status: DISCONTINUED | OUTPATIENT
Start: 2023-02-13 | End: 2023-02-20 | Stop reason: HOSPADM

## 2023-02-13 RX ORDER — SODIUM CHLORIDE, SODIUM LACTATE, POTASSIUM CHLORIDE, CALCIUM CHLORIDE 600; 310; 30; 20 MG/100ML; MG/100ML; MG/100ML; MG/100ML
INJECTION, SOLUTION INTRAVENOUS CONTINUOUS PRN
Status: DISCONTINUED | OUTPATIENT
Start: 2023-02-13 | End: 2023-02-13

## 2023-02-13 RX ORDER — NOREPINEPHRINE BITARTRATE 0.06 MG/ML
.01-.1 INJECTION, SOLUTION INTRAVENOUS CONTINUOUS
Status: DISCONTINUED | OUTPATIENT
Start: 2023-02-13 | End: 2023-02-13 | Stop reason: HOSPADM

## 2023-02-13 RX ORDER — ASPIRIN 81 MG/1
162 TABLET, CHEWABLE ORAL DAILY
Status: DISCONTINUED | OUTPATIENT
Start: 2023-02-14 | End: 2023-02-20 | Stop reason: HOSPADM

## 2023-02-13 RX ORDER — PANTOPRAZOLE SODIUM 40 MG/1
40 TABLET, DELAYED RELEASE ORAL DAILY
Status: DISCONTINUED | OUTPATIENT
Start: 2023-02-14 | End: 2023-02-20 | Stop reason: HOSPADM

## 2023-02-13 RX ORDER — ACETAMINOPHEN 325 MG/1
975 TABLET ORAL ONCE
Status: COMPLETED | OUTPATIENT
Start: 2023-02-13 | End: 2023-02-13

## 2023-02-13 RX ORDER — GABAPENTIN 100 MG/1
200 CAPSULE ORAL AT BEDTIME
Status: DISCONTINUED | OUTPATIENT
Start: 2023-02-13 | End: 2023-02-20 | Stop reason: HOSPADM

## 2023-02-13 RX ORDER — HYDROMORPHONE HCL IN WATER/PF 6 MG/30 ML
0.2 PATIENT CONTROLLED ANALGESIA SYRINGE INTRAVENOUS
Status: DISCONTINUED | OUTPATIENT
Start: 2023-02-13 | End: 2023-02-15

## 2023-02-13 RX ORDER — MUPIROCIN 20 MG/G
0.5 OINTMENT TOPICAL 2 TIMES DAILY
Status: COMPLETED | OUTPATIENT
Start: 2023-02-13 | End: 2023-02-18

## 2023-02-13 RX ORDER — NALOXONE HYDROCHLORIDE 0.4 MG/ML
0.2 INJECTION, SOLUTION INTRAMUSCULAR; INTRAVENOUS; SUBCUTANEOUS
Status: DISCONTINUED | OUTPATIENT
Start: 2023-02-13 | End: 2023-02-20 | Stop reason: HOSPADM

## 2023-02-13 RX ORDER — DEXTROSE MONOHYDRATE 100 MG/ML
INJECTION, SOLUTION INTRAVENOUS CONTINUOUS PRN
Status: DISCONTINUED | OUTPATIENT
Start: 2023-02-13 | End: 2023-02-20 | Stop reason: HOSPADM

## 2023-02-13 RX ORDER — ASPIRIN 81 MG/1
81 TABLET, CHEWABLE ORAL
Status: COMPLETED | OUTPATIENT
Start: 2023-02-13 | End: 2023-02-13

## 2023-02-13 RX ORDER — DEXMEDETOMIDINE HYDROCHLORIDE 4 UG/ML
.2-.7 INJECTION, SOLUTION INTRAVENOUS CONTINUOUS
Status: DISCONTINUED | OUTPATIENT
Start: 2023-02-13 | End: 2023-02-15

## 2023-02-13 RX ORDER — PROCHLORPERAZINE MALEATE 5 MG
5 TABLET ORAL EVERY 6 HOURS PRN
Status: DISCONTINUED | OUTPATIENT
Start: 2023-02-13 | End: 2023-02-20 | Stop reason: HOSPADM

## 2023-02-13 RX ORDER — NOREPINEPHRINE BITARTRATE 0.06 MG/ML
.01-.4 INJECTION, SOLUTION INTRAVENOUS CONTINUOUS PRN
Status: DISCONTINUED | OUTPATIENT
Start: 2023-02-13 | End: 2023-02-13

## 2023-02-13 RX ORDER — CALCIUM GLUCONATE 20 MG/ML
2 INJECTION, SOLUTION INTRAVENOUS
Status: DISCONTINUED | OUTPATIENT
Start: 2023-02-13 | End: 2023-02-18

## 2023-02-13 RX ORDER — NOREPINEPHRINE BITARTRATE 0.06 MG/ML
.01-.6 INJECTION, SOLUTION INTRAVENOUS CONTINUOUS
Status: DISCONTINUED | OUTPATIENT
Start: 2023-02-13 | End: 2023-02-16

## 2023-02-13 RX ORDER — FENTANYL CITRATE 50 UG/ML
25-50 INJECTION, SOLUTION INTRAMUSCULAR; INTRAVENOUS
Status: DISCONTINUED | OUTPATIENT
Start: 2023-02-13 | End: 2023-02-13 | Stop reason: HOSPADM

## 2023-02-13 RX ORDER — PHENYLEPHRINE HCL IN 0.9% NACL 50MG/250ML
.1-6 PLASTIC BAG, INJECTION (ML) INTRAVENOUS CONTINUOUS
Status: DISCONTINUED | OUTPATIENT
Start: 2023-02-13 | End: 2023-02-13 | Stop reason: HOSPADM

## 2023-02-13 RX ORDER — HEPARIN SODIUM 1000 [USP'U]/ML
INJECTION, SOLUTION INTRAVENOUS; SUBCUTANEOUS PRN
Status: DISCONTINUED | OUTPATIENT
Start: 2023-02-13 | End: 2023-02-13

## 2023-02-13 RX ORDER — AMOXICILLIN 250 MG
1 CAPSULE ORAL 2 TIMES DAILY
Status: DISCONTINUED | OUTPATIENT
Start: 2023-02-13 | End: 2023-02-20 | Stop reason: HOSPADM

## 2023-02-13 RX ORDER — OXYCODONE HYDROCHLORIDE 5 MG/1
5 TABLET ORAL EVERY 4 HOURS PRN
Status: DISCONTINUED | OUTPATIENT
Start: 2023-02-13 | End: 2023-02-20 | Stop reason: HOSPADM

## 2023-02-13 RX ORDER — SODIUM CHLORIDE, SODIUM GLUCONATE, SODIUM ACETATE, POTASSIUM CHLORIDE AND MAGNESIUM CHLORIDE 526; 502; 368; 37; 30 MG/100ML; MG/100ML; MG/100ML; MG/100ML; MG/100ML
INJECTION, SOLUTION INTRAVENOUS CONTINUOUS PRN
Status: DISCONTINUED | OUTPATIENT
Start: 2023-02-13 | End: 2023-02-13

## 2023-02-13 RX ORDER — LIDOCAINE 40 MG/G
CREAM TOPICAL
Status: DISCONTINUED | OUTPATIENT
Start: 2023-02-13 | End: 2023-02-18

## 2023-02-13 RX ORDER — NICOTINE POLACRILEX 4 MG
15-30 LOZENGE BUCCAL
Status: DISCONTINUED | OUTPATIENT
Start: 2023-02-13 | End: 2023-02-20 | Stop reason: HOSPADM

## 2023-02-13 RX ORDER — NALOXONE HYDROCHLORIDE 0.4 MG/ML
0.2 INJECTION, SOLUTION INTRAMUSCULAR; INTRAVENOUS; SUBCUTANEOUS
Status: DISCONTINUED | OUTPATIENT
Start: 2023-02-13 | End: 2023-02-13 | Stop reason: HOSPADM

## 2023-02-13 RX ORDER — VASOPRESSIN IN 0.9 % NACL 2 UNIT/2ML
SYRINGE (ML) INTRAVENOUS PRN
Status: DISCONTINUED | OUTPATIENT
Start: 2023-02-13 | End: 2023-02-13

## 2023-02-13 RX ORDER — BISACODYL 10 MG
10 SUPPOSITORY, RECTAL RECTAL DAILY PRN
Status: DISCONTINUED | OUTPATIENT
Start: 2023-02-13 | End: 2023-02-20 | Stop reason: HOSPADM

## 2023-02-13 RX ORDER — DEXMEDETOMIDINE HYDROCHLORIDE 4 UG/ML
.1-1.2 INJECTION, SOLUTION INTRAVENOUS CONTINUOUS
Status: DISCONTINUED | OUTPATIENT
Start: 2023-02-13 | End: 2023-02-13 | Stop reason: HOSPADM

## 2023-02-13 RX ORDER — ONDANSETRON 2 MG/ML
4 INJECTION INTRAMUSCULAR; INTRAVENOUS EVERY 6 HOURS PRN
Status: DISCONTINUED | OUTPATIENT
Start: 2023-02-13 | End: 2023-02-20 | Stop reason: HOSPADM

## 2023-02-13 RX ORDER — GABAPENTIN 100 MG/1
100 CAPSULE ORAL
Status: COMPLETED | OUTPATIENT
Start: 2023-02-13 | End: 2023-02-13

## 2023-02-13 RX ORDER — DEXTROSE MONOHYDRATE 25 G/50ML
25-50 INJECTION, SOLUTION INTRAVENOUS
Status: DISCONTINUED | OUTPATIENT
Start: 2023-02-13 | End: 2023-02-20 | Stop reason: HOSPADM

## 2023-02-13 RX ORDER — CEFAZOLIN SODIUM/WATER 2 G/20 ML
2 SYRINGE (ML) INTRAVENOUS
Status: COMPLETED | OUTPATIENT
Start: 2023-02-13 | End: 2023-02-13

## 2023-02-13 RX ORDER — HYDROMORPHONE HCL IN WATER/PF 6 MG/30 ML
0.1 PATIENT CONTROLLED ANALGESIA SYRINGE INTRAVENOUS
Status: DISCONTINUED | OUTPATIENT
Start: 2023-02-13 | End: 2023-02-15

## 2023-02-13 RX ORDER — PROTAMINE SULFATE 10 MG/ML
INJECTION, SOLUTION INTRAVENOUS PRN
Status: DISCONTINUED | OUTPATIENT
Start: 2023-02-13 | End: 2023-02-13

## 2023-02-13 RX ORDER — PROPOFOL 10 MG/ML
INJECTION, EMULSION INTRAVENOUS PRN
Status: DISCONTINUED | OUTPATIENT
Start: 2023-02-13 | End: 2023-02-13

## 2023-02-13 RX ORDER — FLUMAZENIL 0.1 MG/ML
0.2 INJECTION, SOLUTION INTRAVENOUS
Status: DISCONTINUED | OUTPATIENT
Start: 2023-02-13 | End: 2023-02-13 | Stop reason: HOSPADM

## 2023-02-13 RX ORDER — DEXTROSE MONOHYDRATE 25 G/50ML
25-50 INJECTION, SOLUTION INTRAVENOUS
Status: DISCONTINUED | OUTPATIENT
Start: 2023-02-13 | End: 2023-02-13

## 2023-02-13 RX ORDER — ACETAMINOPHEN 325 MG/1
650 TABLET ORAL EVERY 4 HOURS PRN
Status: DISCONTINUED | OUTPATIENT
Start: 2023-02-16 | End: 2023-02-20 | Stop reason: HOSPADM

## 2023-02-13 RX ORDER — LIDOCAINE HYDROCHLORIDE 20 MG/ML
INJECTION, SOLUTION INFILTRATION; PERINEURAL PRN
Status: DISCONTINUED | OUTPATIENT
Start: 2023-02-13 | End: 2023-02-13

## 2023-02-13 RX ORDER — HEPARIN SODIUM 5000 [USP'U]/.5ML
5000 INJECTION, SOLUTION INTRAVENOUS; SUBCUTANEOUS EVERY 8 HOURS
Status: DISCONTINUED | OUTPATIENT
Start: 2023-02-14 | End: 2023-02-20 | Stop reason: HOSPADM

## 2023-02-13 RX ORDER — NALOXONE HYDROCHLORIDE 0.4 MG/ML
0.4 INJECTION, SOLUTION INTRAMUSCULAR; INTRAVENOUS; SUBCUTANEOUS
Status: DISCONTINUED | OUTPATIENT
Start: 2023-02-13 | End: 2023-02-13 | Stop reason: HOSPADM

## 2023-02-13 RX ORDER — ASPIRIN 81 MG/1
162 TABLET, CHEWABLE ORAL
Status: COMPLETED | OUTPATIENT
Start: 2023-02-13 | End: 2023-02-13

## 2023-02-13 RX ORDER — POLYETHYLENE GLYCOL 3350 17 G/17G
17 POWDER, FOR SOLUTION ORAL DAILY
Status: DISCONTINUED | OUTPATIENT
Start: 2023-02-14 | End: 2023-02-20 | Stop reason: HOSPADM

## 2023-02-13 RX ORDER — FENTANYL CITRATE 50 UG/ML
INJECTION, SOLUTION INTRAMUSCULAR; INTRAVENOUS PRN
Status: DISCONTINUED | OUTPATIENT
Start: 2023-02-13 | End: 2023-02-13

## 2023-02-13 RX ORDER — HYDRALAZINE HYDROCHLORIDE 20 MG/ML
10 INJECTION INTRAMUSCULAR; INTRAVENOUS EVERY 30 MIN PRN
Status: DISCONTINUED | OUTPATIENT
Start: 2023-02-13 | End: 2023-02-18

## 2023-02-13 RX ORDER — CEFAZOLIN SODIUM/WATER 2 G/20 ML
2 SYRINGE (ML) INTRAVENOUS SEE ADMIN INSTRUCTIONS
Status: DISCONTINUED | OUTPATIENT
Start: 2023-02-13 | End: 2023-02-13 | Stop reason: HOSPADM

## 2023-02-13 RX ORDER — SODIUM CHLORIDE, SODIUM GLUCONATE, SODIUM ACETATE, POTASSIUM CHLORIDE AND MAGNESIUM CHLORIDE 526; 502; 368; 37; 30 MG/100ML; MG/100ML; MG/100ML; MG/100ML; MG/100ML
250 INJECTION, SOLUTION INTRAVENOUS EVERY 10 MIN PRN
Status: DISCONTINUED | OUTPATIENT
Start: 2023-02-13 | End: 2023-02-13 | Stop reason: HOSPADM

## 2023-02-13 RX ORDER — CEFAZOLIN SODIUM 2 G/100ML
2 INJECTION, SOLUTION INTRAVENOUS EVERY 24 HOURS
Status: COMPLETED | OUTPATIENT
Start: 2023-02-14 | End: 2023-02-14

## 2023-02-13 RX ORDER — CALCIUM GLUCONATE 20 MG/ML
1 INJECTION, SOLUTION INTRAVENOUS
Status: DISCONTINUED | OUTPATIENT
Start: 2023-02-13 | End: 2023-02-18

## 2023-02-13 RX ORDER — ACETAMINOPHEN 325 MG/1
975 TABLET ORAL EVERY 8 HOURS
Status: COMPLETED | OUTPATIENT
Start: 2023-02-13 | End: 2023-02-16

## 2023-02-13 RX ORDER — NICOTINE POLACRILEX 4 MG
15-30 LOZENGE BUCCAL
Status: DISCONTINUED | OUTPATIENT
Start: 2023-02-13 | End: 2023-02-13

## 2023-02-13 RX ORDER — PHENYLEPHRINE HCL IN 0.9% NACL 50MG/250ML
.1-4 PLASTIC BAG, INJECTION (ML) INTRAVENOUS CONTINUOUS
Status: DISCONTINUED | OUTPATIENT
Start: 2023-02-13 | End: 2023-02-14

## 2023-02-13 RX ORDER — DEXTROSE MONOHYDRATE 25 G/50ML
INJECTION, SOLUTION INTRAVENOUS PRN
Status: DISCONTINUED | OUTPATIENT
Start: 2023-02-13 | End: 2023-02-13

## 2023-02-13 RX ORDER — ONDANSETRON 4 MG/1
4 TABLET, ORALLY DISINTEGRATING ORAL EVERY 6 HOURS PRN
Status: DISCONTINUED | OUTPATIENT
Start: 2023-02-13 | End: 2023-02-20 | Stop reason: HOSPADM

## 2023-02-13 RX ORDER — FAMOTIDINE 20 MG/1
20 TABLET, FILM COATED ORAL
Status: COMPLETED | OUTPATIENT
Start: 2023-02-13 | End: 2023-02-13

## 2023-02-13 RX ADMIN — Medication 2 G: at 08:28

## 2023-02-13 RX ADMIN — Medication 1 G: at 12:50

## 2023-02-13 RX ADMIN — EPINEPHRINE 10 MCG: 1 INJECTION INTRAMUSCULAR; INTRAVENOUS; SUBCUTANEOUS at 12:22

## 2023-02-13 RX ADMIN — NOREPINEPHRINE BITARTRATE 6.4 MCG: 1 INJECTION, SOLUTION, CONCENTRATE INTRAVENOUS at 11:06

## 2023-02-13 RX ADMIN — SODIUM CHLORIDE, POTASSIUM CHLORIDE, SODIUM LACTATE AND CALCIUM CHLORIDE 1000 ML: 600; 310; 30; 20 INJECTION, SOLUTION INTRAVENOUS at 18:48

## 2023-02-13 RX ADMIN — FENTANYL CITRATE 200 MCG: 50 INJECTION, SOLUTION INTRAMUSCULAR; INTRAVENOUS at 07:53

## 2023-02-13 RX ADMIN — NOREPINEPHRINE BITARTRATE 6.4 MCG: 1 INJECTION, SOLUTION, CONCENTRATE INTRAVENOUS at 10:37

## 2023-02-13 RX ADMIN — GABAPENTIN 100 MG: 100 CAPSULE ORAL at 06:45

## 2023-02-13 RX ADMIN — AMINOCAPROIC ACID 5 G: 250 INJECTION, SOLUTION INTRAVENOUS at 08:24

## 2023-02-13 RX ADMIN — SODIUM CHLORIDE, SODIUM LACTATE, POTASSIUM CHLORIDE, CALCIUM CHLORIDE: 600; 310; 30; 20 INJECTION, SOLUTION INTRAVENOUS at 07:36

## 2023-02-13 RX ADMIN — SODIUM PHOSPHATE, MONOBASIC, MONOHYDRATE AND SODIUM PHOSPHATE, DIBASIC, ANHYDROUS 9 MMOL: 276; 142 INJECTION, SOLUTION INTRAVENOUS at 17:39

## 2023-02-13 RX ADMIN — CALCIUM GLUCONATE 1 G: 20 INJECTION, SOLUTION INTRAVENOUS at 15:40

## 2023-02-13 RX ADMIN — SENNOSIDES AND DOCUSATE SODIUM 1 TABLET: 50; 8.6 TABLET ORAL at 20:07

## 2023-02-13 RX ADMIN — HEPARIN SODIUM 2000 UNITS: 1000 INJECTION INTRAVENOUS; SUBCUTANEOUS at 09:25

## 2023-02-13 RX ADMIN — HEPARIN SODIUM 35000 UNITS: 1000 INJECTION INTRAVENOUS; SUBCUTANEOUS at 10:36

## 2023-02-13 RX ADMIN — ASPIRIN 162 MG: 81 TABLET, CHEWABLE ORAL at 06:44

## 2023-02-13 RX ADMIN — SODIUM CHLORIDE, POTASSIUM CHLORIDE, SODIUM LACTATE AND CALCIUM CHLORIDE 500 ML: 600; 310; 30; 20 INJECTION, SOLUTION INTRAVENOUS at 22:17

## 2023-02-13 RX ADMIN — ATORVASTATIN CALCIUM 20 MG: 20 TABLET, FILM COATED ORAL at 20:07

## 2023-02-13 RX ADMIN — NOREPINEPHRINE BITARTRATE 6.4 MCG: 1 INJECTION, SOLUTION, CONCENTRATE INTRAVENOUS at 08:18

## 2023-02-13 RX ADMIN — SODIUM CHLORIDE, POTASSIUM CHLORIDE, SODIUM LACTATE AND CALCIUM CHLORIDE 500 ML: 600; 310; 30; 20 INJECTION, SOLUTION INTRAVENOUS at 16:52

## 2023-02-13 RX ADMIN — SODIUM CHLORIDE, POTASSIUM CHLORIDE, SODIUM LACTATE AND CALCIUM CHLORIDE 500 ML: 600; 310; 30; 20 INJECTION, SOLUTION INTRAVENOUS at 20:32

## 2023-02-13 RX ADMIN — GABAPENTIN 200 MG: 100 CAPSULE ORAL at 21:52

## 2023-02-13 RX ADMIN — NOREPINEPHRINE BITARTRATE 6.4 MCG: 1 INJECTION, SOLUTION, CONCENTRATE INTRAVENOUS at 11:05

## 2023-02-13 RX ADMIN — HYDROMORPHONE HYDROCHLORIDE 0.5 MG: 1 INJECTION, SOLUTION INTRAMUSCULAR; INTRAVENOUS; SUBCUTANEOUS at 12:51

## 2023-02-13 RX ADMIN — HUMAN INSULIN 3 UNITS/HR: 100 INJECTION, SOLUTION SUBCUTANEOUS at 21:28

## 2023-02-13 RX ADMIN — Medication 20 MG: at 10:48

## 2023-02-13 RX ADMIN — FENTANYL CITRATE 200 MCG: 50 INJECTION, SOLUTION INTRAMUSCULAR; INTRAVENOUS at 08:57

## 2023-02-13 RX ADMIN — Medication 6 ML/HR: at 12:07

## 2023-02-13 RX ADMIN — Medication 50 MG: at 11:18

## 2023-02-13 RX ADMIN — NOREPINEPHRINE BITARTRATE 6.4 MCG: 1 INJECTION, SOLUTION, CONCENTRATE INTRAVENOUS at 13:13

## 2023-02-13 RX ADMIN — ONDANSETRON 4 MG: 2 INJECTION INTRAMUSCULAR; INTRAVENOUS at 22:04

## 2023-02-13 RX ADMIN — HUMAN INSULIN 5 UNITS/HR: 100 INJECTION, SOLUTION SUBCUTANEOUS at 11:33

## 2023-02-13 RX ADMIN — HUMAN INSULIN 2 UNITS/HR: 100 INJECTION, SOLUTION SUBCUTANEOUS at 16:26

## 2023-02-13 RX ADMIN — FENTANYL CITRATE 50 MCG: 50 INJECTION, SOLUTION INTRAMUSCULAR; INTRAVENOUS at 07:21

## 2023-02-13 RX ADMIN — ACETAMINOPHEN 975 MG: 325 TABLET ORAL at 06:44

## 2023-02-13 RX ADMIN — ACETAMINOPHEN 975 MG: 325 TABLET, FILM COATED ORAL at 20:07

## 2023-02-13 RX ADMIN — Medication 1 UNITS: at 12:21

## 2023-02-13 RX ADMIN — NOREPINEPHRINE BITARTRATE 6.4 MCG: 1 INJECTION, SOLUTION, CONCENTRATE INTRAVENOUS at 08:42

## 2023-02-13 RX ADMIN — SODIUM CHLORIDE, SODIUM GLUCONATE, SODIUM ACETATE, POTASSIUM CHLORIDE AND MAGNESIUM CHLORIDE: 526; 502; 368; 37; 30 INJECTION, SOLUTION INTRAVENOUS at 07:36

## 2023-02-13 RX ADMIN — AMINOCAPROIC ACID 1 G/HR: 250 INJECTION, SOLUTION INTRAVENOUS at 09:19

## 2023-02-13 RX ADMIN — EPINEPHRINE 0.03 MCG/KG/MIN: 1 INJECTION INTRAMUSCULAR; INTRAVENOUS; SUBCUTANEOUS at 12:19

## 2023-02-13 RX ADMIN — CALCIUM GLUCONATE 2 G: 20 INJECTION, SOLUTION INTRAVENOUS at 21:43

## 2023-02-13 RX ADMIN — PROPOFOL 100 MG: 10 INJECTION, EMULSION INTRAVENOUS at 07:53

## 2023-02-13 RX ADMIN — DESMOPRESSIN ACETATE 28 MCG: 4 INJECTION, SOLUTION INTRAVENOUS; SUBCUTANEOUS at 15:02

## 2023-02-13 RX ADMIN — PROTAMINE SULFATE 200 MG: 10 INJECTION, SOLUTION INTRAVENOUS at 12:26

## 2023-02-13 RX ADMIN — MUPIROCIN 0.5 G: 20 OINTMENT TOPICAL at 21:52

## 2023-02-13 RX ADMIN — SODIUM CHLORIDE, POTASSIUM CHLORIDE, SODIUM LACTATE AND CALCIUM CHLORIDE: 600; 310; 30; 20 INJECTION, SOLUTION INTRAVENOUS at 08:00

## 2023-02-13 RX ADMIN — MIDAZOLAM 1 MG: 1 INJECTION INTRAMUSCULAR; INTRAVENOUS at 07:21

## 2023-02-13 RX ADMIN — INSULIN HUMAN 8 UNITS: 100 INJECTION, SOLUTION PARENTERAL at 10:48

## 2023-02-13 RX ADMIN — FAMOTIDINE 20 MG: 20 TABLET ORAL at 06:44

## 2023-02-13 RX ADMIN — Medication 20 MG: at 09:54

## 2023-02-13 RX ADMIN — DEXTROSE 50 % IN WATER (D50W) INTRAVENOUS SYRINGE 12.5 G: at 10:51

## 2023-02-13 RX ADMIN — Medication 0.03 MCG/KG/MIN: at 08:02

## 2023-02-13 RX ADMIN — LIDOCAINE HYDROCHLORIDE 100 MG: 20 INJECTION, SOLUTION INFILTRATION; PERINEURAL at 07:53

## 2023-02-13 RX ADMIN — SODIUM CHLORIDE, POTASSIUM CHLORIDE, SODIUM LACTATE AND CALCIUM CHLORIDE 500 ML: 600; 310; 30; 20 INJECTION, SOLUTION INTRAVENOUS at 15:06

## 2023-02-13 RX ADMIN — SODIUM CHLORIDE, SODIUM GLUCONATE, SODIUM ACETATE, POTASSIUM CHLORIDE AND MAGNESIUM CHLORIDE: 526; 502; 368; 37; 30 INJECTION, SOLUTION INTRAVENOUS at 12:21

## 2023-02-13 RX ADMIN — Medication 12.5 MG: at 05:55

## 2023-02-13 RX ADMIN — NOREPINEPHRINE BITARTRATE 6.4 MCG: 1 INJECTION, SOLUTION, CONCENTRATE INTRAVENOUS at 08:20

## 2023-02-13 RX ADMIN — Medication 80 MG: at 07:53

## 2023-02-13 ASSESSMENT — ACTIVITIES OF DAILY LIVING (ADL)
ADLS_ACUITY_SCORE: 18
ADLS_ACUITY_SCORE: 20
ADLS_ACUITY_SCORE: 18
ADLS_ACUITY_SCORE: 26
ADLS_ACUITY_SCORE: 20
ADLS_ACUITY_SCORE: 18
ADLS_ACUITY_SCORE: 20

## 2023-02-13 NOTE — OP NOTE
OPERATIVE DATE: 2/13/2023    PRE-OPERATIVE DIAGNOSIS:  1. 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, with long-term current use of insulin (H)     Hypertension     Proliferative diabetic retinopathy (H)     Anemia in stage 5 chronic kidney disease, not on chronic dialysis (H)     Secondary hyperparathyroidism of renal origin (H)     Cardiac arrhythmia     CKD (chronic kidney disease) stage 5, GFR less than 15 ml/min (H)     Diabetes mellitus type II, uncontrolled     Glaucoma suspect of right eye     Hearing loss on right     Hyperlipidemia with target LDL less than 100     Hyperphosphatemia     Normal anion gap metabolic acidosis     Orthostatic hypotension     Status post cataract extraction of both eyes with insertion of intraocular lens     Type II diabetes mellitus with neurological manifestations (H)     Uremia     Vitamin D deficiency     Status post coronary angiogram     Pancreas transplant candidate     BCC (basal cell carcinoma), face     Pre-operative cardiovascular examination     CAD (coronary artery disease)     Abnormal cardiovascular stress test     Organ transplant candidate     Coronary atherosclerosis due to severely calcified coronary lesion     Encounter for pre-transplant evaluation for kidney and pancreas transplant     POST-OPERATIVE DIAGNOSIS:  1. 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, with long-term current use of insulin (H)     Hypertension     Proliferative diabetic retinopathy (H)     Anemia in stage 5 chronic kidney disease, not on chronic dialysis (H)     Secondary hyperparathyroidism of renal origin (H)     Cardiac arrhythmia     CKD (chronic kidney disease) stage 5, GFR less  than 15 ml/min (H)     Diabetes mellitus type II, uncontrolled     Glaucoma suspect of right eye     Hearing loss on right     Hyperlipidemia with target LDL less than 100     Hyperphosphatemia     Normal anion gap metabolic acidosis     Orthostatic hypotension     Status post cataract extraction of both eyes with insertion of intraocular lens     Type II diabetes mellitus with neurological manifestations (H)     Uremia     Vitamin D deficiency     Status post coronary angiogram     Pancreas transplant candidate     BCC (basal cell carcinoma), face     Pre-operative cardiovascular examination     CAD (coronary artery disease)     Abnormal cardiovascular stress test     Organ transplant candidate     Coronary atherosclerosis due to severely calcified coronary lesion     Encounter for pre-transplant evaluation for kidney and pancreas transplant     PROCEDURE PERFORMED:  1.  Coronary artery bypass grafting x 2    - reversed saphenous vein graft to the obtuse marginal branch of the left circumflex coronary artery   - pedicled left internal mammary artery to left anterior descending coronary artery  2.  Endoscopic vein harvest of the greater saphenous vein from the left lower extremity.  3. Transesophageal echocardiogram  4. Bilateral intercostal nerve block with AtriCure cryo probe     SURGEON: Patrice Yepez MD    ASSISTANT: Andres Brown MD; Fabiana Jha PA-C; Yenifer Shore PA-C    ANESTHESIA: GETA    ESTIMATED BLOOD LOSS: 1000cc    OPERATIVE FINDINGS:    1) The left internal mammary artery was 3 mm in diameter and had excellent flow.    2) The greater saphenous vein from the left lower extremity was 4-5 mm in diameter and suitable for conduit.    3) The ascending aorta was free of calcified plaque.    4) The obtuse marginal branch of the left circumflex coronary artery was 1.5 mm in diameter and free of disease at the site of anastomosis.   5) The left anterior descending coronary artery 2 mm in  diameter and free of disease at the site of anastomosis. 6) After reperfusion and defibrillation, sinus rhythm resumed.    7) Left ventricular function was 60% preoperatively and unchanged after bypass on low-dose inotropic support.    INDICATIONS:  Mr. MARCIA HEBERT is a 67 year old male admitted with coronary artery disease after elective angiogram for transplant workup.  We were asked to evaluate for CABG.  Risks and benefits of the operation were explained to the patient and their family including, but not limited to, bleeding, infection, stroke and even death.  They understood these risks and agreed to proceed electively.    OPERATIVE REPORT:  The patient was transferred to the operating room and positioned supine on the OR table.  General anesthesia was initiated by the anesthesia team.  Endotracheal intubation and central venous access was performed by anesthesia.  The patients neck, chest, abdomen and bilateral lower extremities were clipped, prepped and draped in sterile fashion.  A pre-procedure time-out was performed confirming the correct patient, correct site and correct procedure.    Median sternotomy and left lower extremity skin incisions were made.  The left internal mammary artery and the left greater saphenous vein were mobilized for use as conduit.  Bilateral intercostal nerve block was performed using the AtriCure cryo probe.      The patient was given full dose heparin, and after cannulation of the distal ascending aorta and the right atrium, cardiopulmonary bypass was commenced.  Antegrade and retrograde cardioplegia cannulae were placed, and the heart was arrested with 1L of cold antegrade blood cardioplegia.  Subsequent maintenance doses of 300 mL of cold retrograde blood cardioplegia were given approximately every 20 minutes or after each distal anastomosis.       The following grafts were constructed in end-to-side fashion using running 7-0 Prolene:    - A reversed saphenous vein graft  was sewn to the mid obtuse marginal branch of the left circumflex coronary artery    The pedicled left internal mammary artery was sewn to the mid left anterior descending coronary artery in end-to-side fashion using running 8-0 Prolene.      The proximal anastomoses of the vein graft was constructed on the ascending aorta in end-to-side fashion using running 6-0 Prolene under a single crossclamp.      The crossclamp was released, and the heart was resuscitated.       All bleeding points were controlled.  A bipolar ventricular pacing lead was placed and brought out through a separate stab incision. A bipolar right atrial pacing lead was placed and brought out through a separate stab incision.  The patient weaned from cardiopulmonary bypass, was given protamine and decannulated.  Two 32-Grenadian chest tubes were placed in the anterior mediastinum and brought out through a separate stab incisions.  Bilateral 28-Grenadian right angle chest tube was placed in the pleural space and brought out through a separate stab incision.  The sternal edges were reapposed with stainless steel sternal wires.  The muscle, fascia, and skin were closed in layers with absorbable suture.  Dermabond was applied.  All sponge, needle and instrument counts were correct at the end of the case.        The patient was then transferred from the operating bed to an ICU bed and transferred to the ICU in critical, but stable, condition.    All needle, sponge and instrument counts were correct at the end of the case.    Patrice Yepez  Cardiothoracic Surgery  Pager: [phone]

## 2023-02-13 NOTE — OR NURSING
Temp:  [97.5  F (36.4  C)-97.9  F (36.6  C)] 97.6  F (36.4  C)  Pulse:  [59-79] 59  Resp:  [14-18] 14  BP: (100-139)/() 122/72  SpO2:  [95 %-100 %] 100 %    Erector spinae block performed without complications, 1 mg versed, 50 mcg fentanyl given.  NSR, VSS, 2L O2 via NC.  Pt tolerated well.  Will continue to monitor.

## 2023-02-13 NOTE — PLAN OF CARE
Admitted 2/7 for workup for CABG. Pre-op checklist completed. CABG scheduled for today.     Neuro: A&O x 4. Afebrile. Pleasant affect. Calls appropriately. Slept well overnight.  Cardiac: SR 60s-70s. SBP 100s. Denies dizziness, chest pain, or palpitations.   Respiratory: Sating well on RA. LS clear. Denies cough.   GI/: Good UOP. Denies nausea.   Endocrine: ACHS  Diet/Appetite: NPO  Skin: No deficits noted.  LDA: R PIV SL  Activity: Up ad juan daniel. Second pre-op scrub completed.   Pain: Denies      Pt sent to OR at 0600.

## 2023-02-13 NOTE — ANESTHESIA PROCEDURE NOTES
Airway       Patient location during procedure: OR       Procedure Start/Stop Times: 2/13/2023 7:58 AM  Staff -        CRNA: Amy Zuñiga APRN CRNA       Performed By: CRNA  Consent for Airway        Urgency: elective  Indications and Patient Condition       Indications for airway management: laure-procedural       Induction type:intravenous       Mask difficulty assessment: 2 - vent by mask + OA or adjuvant +/- NMBA    Final Airway Details       Final airway type: endotracheal airway       Successful airway: ETT - single  Endotracheal Airway Details        ETT size (mm): 8.0       Cuffed: yes       Grade View of Cords: 1       Adjucts: stylet       Position: Right       Measured from: lips       Bite block used: None    Post intubation assessment        Placement verified by: capnometry, equal breath sounds and chest rise        Number of attempts at approach: 1       Secured with: cloth tape       Ease of procedure: easy       Dentition: Intact and Unchanged    Medication(s) Administered   Medication Administration Time: 2/13/2023 7:58 AM

## 2023-02-13 NOTE — ANESTHESIA PROCEDURE NOTES
"Paravertebral Procedure Note    Pre-Procedure   Staff -        Anesthesiologist:  Salvador Buckner MD       Performed By: anesthesiologist       Location: pre-op       Procedure Start/Stop Times: 2/13/2023 7:15 AM and 2/13/2023 7:30 AM       Pre-Anesthestic Checklist: patient identified, IV checked, site marked, risks and benefits discussed, informed consent, monitors and equipment checked, pre-op evaluation, at physician/surgeon's request and post-op pain management  Timeout:       Correct Patient: Yes        Correct Procedure: Yes        Correct Site: Yes        Correct Position: Yes        Correct Laterality: Yes        Site Marked: Yes  Procedure Documentation  Procedure: Paravertebral       Laterality: bilateral       Patient Position: prone       Patient Prep/Sterile Barriers: sterile gloves, mask, patient draped       Skin prep: Chloraprep       Local skin infiltrated with mL of 2% lidocaine.        Insertion Site: T4-5.       Needle Type: Touhy needle       Needle Gauge: 17.        Catheter: 19 G.          Catheter threaded easily.             Ultrasound guided       1. Ultrasound was used to identify targeted nerve, plexus, vascular marker, or fascial plane and place a needle adjacent to it in real-time.       2. Ultrasound was used to visualize the spread of anesthetic in close proximity to the above referenced structure.    Assessment/Narrative         The placement was negative for: blood aspirated, painful injection and site bleeding       Paresthesias: No.       Bolus given via catheter. no blood aspirated via catheter.        Secured via Tegaderm and Dermabond.        Complications: none    Medication(s) Administered   Medication Administration Time: 2/13/2023 7:15 AM      FOR [facility] ([facility] [place]) ONLY:   Pain Team Contact information: please page the Pain Team Via [employer]. Search \"Pain\". During daytime hours, please page the attending first. At night please page the resident first.    "

## 2023-02-13 NOTE — ANESTHESIA CARE TRANSFER NOTE
Patient: Avelina Marion    Procedure: Procedure(s):  TRANSESPHAGEAL ECHOCARDIOGRAM, MEDIAN STERNOTOMY, TAKE DOWN OF LEFT INTERNAL MAMMARY, LEFT ENDOSCOPIC GREATER SAPHENOUS VEIN HARVEST, CRYO NERVE BLOCK, CARDIOPULMONARY BYPASS, CORONARY ARTERY BYPASS GRAFT X 2.       Diagnosis: CAD (coronary artery disease) [I25.10]  Diagnosis Additional Information: No value filed.    Anesthesia Type:   No value filed.     Note:    Oropharynx: oropharynx clear of all foreign objects  Level of Consciousness: awake  Oxygen Supplementation: face mask    Independent Airway: airway patency satisfactory and stable  Dentition: dentition unchanged  Vital Signs Stable: post-procedure vital signs reviewed and stable  Report to RN Given: handoff report given  Patient transferred to: PACU    Handoff Report: Identifed the Patient, Identified the Reponsible Provider, Reviewed the pertinent medical history, Discussed the surgical course, Reviewed Intra-OP anesthesia mangement and issues during anesthesia, Set expectations for post-procedure period and Allowed opportunity for questions and acknowledgement of understanding      Vitals:  Vitals Value Taken Time   BP     Temp     Pulse 81 02/13/23 1419   Resp 0 02/13/23 1419   SpO2 99 % 02/13/23 1419   Vitals shown include unvalidated device data.    Electronically Signed By: KWASI Santoyo CRNA  February 13, 2023  2:21 PM

## 2023-02-13 NOTE — PROGRESS NOTES
Nephrology Progress Note  02/13/2023       Avelina Marion is a 67 yom with hx of ESRD since 4/2021 who is admitted to [facility] for CABG as part of kidney transplant workup.  Had angiogram with only mild CAD 12/2021 but had stress test in Dec 2022 showing ST depression, updated angio showed 80% LM (unclear why such rapid progression) and is now planned for CABG. Still makes UOP, runs MWF dialysis and had run 2/6 prior to admission.  Nephrology consulted for management of RRT with plan for CABG on 2/9.      Interval History :   Mr Marino had HD yesterday to be ready for CABG early this am.  Seen on 4E post-op, already extubated and nearly off of pressors.  K ran a bit high intra-op but now is 3.6 so can hold on RRT for now.  I am hopeful given his status currently that he will be able to do iHD tomorrow am, does also make UOP still so could give lasix overnight if K trends up, last CVP 13.        Assessment & Recommendations:   ESRD-Since 4/2021, runs MWF under care of Dr Kummer Davita Phalen.  L arm fistula access.  Accepted for renal tx and actually has had some offers but turned down due to higher risk then needed mohs surgery and now CABG as part of transplant workup.  Had HD 2/12 in preparation for CABG.  I did previously let him know that there is a chance we need to place catheter for CRRT should he be unstable and need RRT post-op which he understood and agreed with.                   -Holding on run today post-op, likely iHD tomorrow morning as long as BP's remain stable.                  -Access is L arm fistula, would need catheter if CRRT is needed post-op but so far appears that we will be able to manage with iHD.                 -No need for new dialysis consent as he is ESRD on HD as outpt.           Volume-Appears euvolemic, still makes UOP.  Last CVP 13.       Electrolytes-K 3.6 last check but was high at times during surgery.  Can monitor, does make UOP so could use lasix for more K excretion if K starts  "to rise overnight, will run tomorrow.      BMD-Ca 8.8, Mg and Phos not checked today.      Anemia-Hgb 9.5, baseline is ~12, will check iron and consider MCKENNA as course progresses.  .       Nutrition-No issues, good appetite.       Time spent: 20 minutes on this date of encounter for chart review, physical exam, medical decision making and co-ordination of care.      Discussed with Dr Leonard     Recommendations were communicated to primary team via note         KWASI Lara CNS  Clinical Nurse Specialist  [phone]    Review of Systems:   I reviewed the following systems:  Gen: No fevers or chills  CV: No CP at rest  Resp: No SOB at rest  GI: No N/V    Physical Exam:   I/O last 3 completed shifts:  In: 0   Out: 1920 [Urine:420; Other:1500]   /72   Pulse 59   Temp 97.6  F (36.4  C) (Oral)   Resp 14   Ht 1.803 m (5' 11\")   Wt 90.4 kg (199 lb 4.8 oz)   SpO2 100%   BMI 27.80 kg/m       GENERAL APPEARANCE: Extubated post surgery but still sedated.   EYES: no scleral icterus  NECK:  no JVD  Pulmonary: lungs clear to auscultation with equal breath sounds bilaterally, no clubbing or cyanosis  CV: Regular rhythm, normal rate, no rub   - Edema none  GI: soft, nontender, normal bowel sounds  MS: no evidence of inflammation in joints, no muscle tenderness  :  Uribe  SKIN: no rash, warm, dry  NEURO: mentation intact and speech normal  ACCESS: L arm fistula    Labs:   All labs reviewed by me  Electrolytes/Renal -   Recent Labs   Lab Test 02/13/23  1324 02/13/23  1234 02/13/23  1159 02/13/23  1043 02/13/23  0710 02/12/23  0825 02/12/23  0703 02/11/23  0811 02/11/23  0705 02/09/23  0834 02/09/23  0656    133 135   < > 135*  --  135*  --  136   < > 136   POTASSIUM 3.6 4.0 4.6   < > 4.6  --  4.4  --  3.9   < > 5.1   CHLORIDE  --   --   --   --  95*  --  98  --  97*   < > 100   CO2  --   --   --   --  22  --  20*  --  22   < > 23   BUN  --   --   --   --  52.1*  --  61.8*  --  42.4*   < > 54.1*   CR  --   " --   --   --  6.65*  --  7.64*  --  6.16*   < > 7.17*   * 135* 149*   < > 166*   < > 111*   < > 145*   < > 149*   DAVID  --   --   --   --  8.8  --  8.9  --  8.6*   < > 8.9   MAG  --   --   --   --   --   --   --   --   --   --  1.8   PHOS  --   --   --   --   --   --   --   --   --   --  3.5    < > = values in this interval not displayed.       CBC -   Recent Labs   Lab Test 02/13/23  1324 02/13/23  1323 02/13/23  1234 02/13/23  1043 02/11/23  0705 02/08/23  0625   WBC  --  23.4*  --   --  10.0 9.2   HGB 9.5* 10.0* 9.4*   < > 13.0* 12.8*   PLT  --  187  --   --  184 204    < > = values in this interval not displayed.       LFTs -   Recent Labs   Lab Test 08/09/22  0626 08/05/21  0223   ALKPHOS 64 71   BILITOTAL 0.5 0.5   ALT 7* 17   AST 24 13   PROTTOTAL 7.7 8.3   ALBUMIN 4.5 4.4       Iron Panel - No lab results found.        Current Medications:    acetaminophen  975 mg Oral Q8H     [START ON 2/14/2023] aspirin  162 mg Oral or NG Tube Daily     atorvastatin  20 mg Oral At Bedtime     ceFAZolin  2 g Intravenous Q8H     desmopressin  28 mcg Intravenous Once     gabapentin  200 mg Oral TID     [START ON 2/14/2023] heparin ANTICOAGULANT  5,000 Units Subcutaneous Q8H     mupirocin  0.5 g Both Nostrils BID     pantoprazole  40 mg Oral or NG Tube Daily    Or     pantoprazole  40 mg Oral Daily     [START ON 2/14/2023] polyethylene glycol  17 g Oral Daily     senna-docusate  1 tablet Oral BID     sodium chloride (PF)  3 mL Intracatheter Q8H       dexmedetomidine       dextrose       EPINEPHrine       insulin regular       insulin regular Stopped (02/13/23 1436)     norepinephrine       norepinephrine       phenylephrine Stopped (02/13/23 1437)     BETA BLOCKER NOT PRESCRIBED

## 2023-02-13 NOTE — ANESTHESIA PROCEDURE NOTES
Central Line/PA Catheter Placement    Pre-Procedure   Staff -        Anesthesiologist:  Chirag Roper MD       Resident/Fellow: Unique Zarate MD       Performed By: with residents       Procedure performed by resident/fellow/CRNA in presence of a teaching physician.         Location: OR       Pre-Anesthestic Checklist: patient identified, IV checked, site marked, risks and benefits discussed, informed consent, monitors and equipment checked, pre-op evaluation and at physician/surgeon's request  Timeout:       Correct Patient: Yes        Correct Procedure: Yes        Correct Site: Yes        Correct Position: Yes        Correct Laterality: Yes   Line Placement:   This line was placed Post Induction    Procedure   Procedure: central line       Laterality: right       Insertion Site: internal jugular.       Patient Position: Trendelenburg  Sterile Prep        All elements of maximal sterile barrier technique followed       Patient Prep/Sterile Barriers: draped, hand hygiene, gloves , hat , mask , draped, gown, sterile gel and probe cover       Skin prep: Chloraprep  Insertion/Injection        Technique: ultrasound guided and Seldinger Technique        1. Ultrasound was used to evaluate the access site.       2. Vein evaluated via ultrasound for patency/adequacy.       3. Using real-time ultrasound the needle/catheter was observed entering the artery/vein.       Introducer Type: 9 Fr, 2-lumen MAC        Type: PA/CVC with Introducer       Catheter Size: 9 Fr       Catheter Length: 11.5       Number of Lumens: double lumen       PA Catheter Type: CCO         Appropriate RV, RA and PA waveforms noted:  Yes            Withdrawn and Locked at cm: 50  Narrative         Secured by: suture       Tegaderm and Biopatch dressing used.       Complications: None apparent,        blood aspirated from all lumens,        All lumens flushed: Yes       Verification method: Placement to be verified post-op, KATE and Ultrasound

## 2023-02-13 NOTE — H&P
CV ICU H&P    ASSESSMENT:  Avelina Marion is a 67 year old male with a history of DM2, HTN, HLD, ESRD on dialysis, coronary artery disease, and tobacco use who is admitted after angiogram showed 80% LM disease, undergoing CABGx2 with Dr. Yepez on 2/13.    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, with long-term current use of insulin (H)     Hypertension     Proliferative diabetic retinopathy (H)     Anemia in stage 5 chronic kidney disease, not on chronic dialysis (H)     Secondary hyperparathyroidism of renal origin (H)     Cardiac arrhythmia     CKD (chronic kidney disease) stage 5, GFR less than 15 ml/min (H)     Diabetes mellitus type II, uncontrolled     Glaucoma suspect of right eye     Hearing loss on right     Hyperlipidemia with target LDL less than 100     Hyperphosphatemia     Normal anion gap metabolic acidosis     Orthostatic hypotension     Status post cataract extraction of both eyes with insertion of intraocular lens     Type II diabetes mellitus with neurological manifestations (H)     Uremia     Vitamin D deficiency     Status post coronary angiogram     Pancreas transplant candidate     BCC (basal cell carcinoma), face     Pre-operative cardiovascular examination     CAD (coronary artery disease)     Abnormal cardiovascular stress test     Organ transplant candidate     Coronary atherosclerosis due to severely calcified coronary lesion     Encounter for pre-transplant evaluation for kidney and pancreas transplant       AVF in left arm, right A-line  Crystalloid 1400, 270 Cell Saver  Epi 0.01, NE 0.03, insulin gtt  400 fentanyl, 0.5 Dilaudid, paravertebrals, intercostal nerves ablated  , EBL 1000  Quantra looked good  CRRT tonight, nephrology following. Dialyzed yesterday. K 6.5, got insulin, 3.7 in ICU. Can't use AVF so will need line  4  CTs (2 meds, 2 pleurals), V wires  Extubated in OR, on facemask    PLAN:  Neuro/ pain/ sedation:  - Monitor neurological status. Notify the MD for any acute changes in exam.  #Acute postoperative pain  - Scheduled: Tylenol  - PRN: Tylenol, oxycodone, Dilaudid  - Regional anesthesia: B/L paravertebrals  #Sedation: none    Pulmonary care:   #Remote tobacco use, quit 1985  Resp: 14  - Extubated in OR, on facemask   - Goal SpO2 > 92%  - Encourage IS q15-30 minutes when awake.    Cardiovascular:    #Cardiogenic shock  #CAD  #HTN  - Epi 0.01, NE 0.03, insulin gtt wean as able  - Goal MAP >65, SBP <140, monitor hemodynamics  - Hold PTA amlodipine 5 mg, Lipitor 20 mg  - Hold BB   -  mg: start tomorrow    GI care / Nutrition:   - NPO, bedside swallow eval once extubated  - PPI  - Bowel regimen: MiraLAX, senna    Renal / Fluids / Electrolytes:   #ESRD, HD since 5/2021 ([facility])  #Kidney Transplant Candidate  BL creat appears to be ~5-6  - Strict I/O, daily weights, avoid/limit nephrotoxins  - Replete lytes PRN per protocol  - Has PTA Calcitrol, PhosLo, CaCarb  - Nephro consult: Makes urine, nephrology following and was dialyzed yesterday, may need CRRT tonight   - Dialyzed yesterday. K 6.5, got insulin, 3.7 in ICU. Can't use AVF so may need line    Endocrine:    #Stress hyperglycemia  #DM2, insulin dependent  Preop A1c 7.0  - Insulin gtt at 1 unit/hr  - Goal BG <180 for optimal healing    ID / Antibiotics:  #Stress induced leukocytosis  - To complete perioperative regimen  - Monitor fever curve, WBC, and inflammatory markers as appropriate    Heme:     #Acute blood loss anemia  #Acute blood loss thrombocytopenia  No s/sx active bleeding  - CBC   - Hgb goal > 7.0  - Hemoglobin 10  - Ordered DDAVP 28 mcg given high CT output    MSK / Skin:  #Sternotomy  #Surgical Incision  - Sternal precautions, postop incision management protocol  - PT/OT/CR    Prophylaxis:     - Mechanical DVT ppx  - Chemical DVT ppx: start SQH tomorrow  -  PPI    Lines / Tubes / Drains:  - RIJ CVC  - Arterial Line  - CTs x4 (2 meds, 2 pleural), V-epicardial wires  - Uribe  - NG    Disposition:  - CVICU      Patient seen, findings and plan discussed with CVICU staff and cardiothoracic surgeons and fellow.    Bruno Condon MD  Anesthesiology PGY-2 / CA-1  Ascom *[phone]       ====================================    HPI:   Avelina Marion is a 67 year old male with a history of DM2, HTN, HLD, ESRD on dialysis, coronary artery disease, and tobacco use who is admitted after angiogram showed 80% LM disease, had CABG with Dr. Yepez on 2/13. Presents to CVICU breathing on facemask.      PAST MEDICAL HISTORY:   Past Medical History:   Diagnosis Date     Anemia in chronic kidney disease      BCC (basal cell carcinoma), face 8/9/2022     Diabetic retinopathy of both eyes (H)      End stage renal disease (H)      Hypertension      Secondary renal hyperparathyroidism (H)      Type 2 diabetes mellitus (H)        PAST SURGICAL HISTORY:   Past Surgical History:   Procedure Laterality Date     CREATE FISTULA ARTERIOVENOUS UPPER EXTREMITY Left      CV CORONARY ANGIOGRAM N/A 12/30/2021    Procedure: CV CORONARY ANGIOGRAM;  Surgeon: Milton Cam MD;  Location: [facility] CARDIAC CATH LAB     CV CORONARY ANGIOGRAM N/A 2/7/2023    Procedure: Coronary Angiogram;  Surgeon: Milton Cam MD;  Location: [facility] CARDIAC CATH LAB     CV PCI N/A 2/7/2023    Procedure: Percutaneous Coronary Intervention;  Surgeon: Milton Cam MD;  Location: [facility] CARDIAC CATH LAB       FAMILY HISTORY:   Family History   Problem Relation Age of Onset     Diabetes Brother      Kidney Disease Brother        SOCIAL HISTORY:   Social History     Tobacco Use     Smoking status: Former     Types: Cigarettes     Smokeless tobacco: Never     Tobacco comments:     Quit 1984   Substance Use Topics     Alcohol use: Never         OBJECTIVE:  1. VITAL SIGNS:   Temp:  [97.6  F (36.4  C)-97.9  F (36.6  C)] 97.6  F  (36.4  C)  Pulse:  [59-77] 59  Resp:  [14-18] 14  BP: (101-139)/() 122/72  SpO2:  [95 %-100 %] 100 %    Resp: 14        2. INTAKE/ OUTPUT:   I/O last 3 completed shifts:  In: 0   Out: 1920 [Urine:420; Other:1500]      3. PHYSICAL EXAMINATION:   General: awake, NAD  Neuro: alert & oriented  Resp: on facemask, non-labored breathing  CV: S1, S2, RRR, no m/r/g   Abdomen: Soft, non-distended, non-tender  Incisions: c/d/i  Extremities: warm and well perfused  CT: To suction, serosang output, no airleak, crepitus      4. INVESTIGATIONS:   Arterial Blood Gases   Recent Labs   Lab 02/13/23  1324 02/13/23  1234 02/13/23  1159 02/13/23  1127   PH 7.36 7.32* 7.35 7.33*   PCO2 38 48* 49* 47*   PO2 278* 135* 411* 404*   HCO3 21 25 27 24     Complete Blood Count   Recent Labs   Lab 02/13/23  1324 02/13/23  1323 02/13/23  1234 02/13/23  1159 02/13/23  1043 02/11/23  0705 02/08/23  0625 02/07/23  0747   WBC  --  23.4*  --   --   --  10.0 9.2 9.5   HGB 9.5* 10.0* 9.4* 10.2*   < > 13.0* 12.8* 13.0*   PLT  --  187  --   --   --  184 204 209    < > = values in this interval not displayed.     Basic Metabolic Panel  Recent Labs   Lab 02/13/23  1324 02/13/23  1234 02/13/23  1159 02/13/23  1128 02/13/23  1043 02/13/23  0710 02/12/23  0825 02/12/23  0703 02/11/23  0811 02/11/23  0705 02/10/23  0841 02/10/23  0606    133 135 131*   < > 135*  --  135*  --  136  --  137   POTASSIUM 3.6 4.0 4.6 5.7*   < > 4.6  --  4.4  --  3.9  --  4.7   CHLORIDE  --   --   --   --   --  95*  --  98  --  97*  --  100   CO2  --   --   --   --   --  22  --  20*  --  22  --  22   BUN  --   --   --   --   --  52.1*  --  61.8*  --  42.4*  --  66.5*   CR  --   --   --   --   --  6.65*  --  7.64*  --  6.16*  --  7.96*   * 135* 149* 185*   < > 166*   < > 111*   < > 145*   < > 142*    < > = values in this interval not displayed.     Liver Function Tests  Recent Labs   Lab 02/07/23  0747   INR 1.03     Pancreatic Enzymes  No lab results found in last 7  days.  Coagulation Profile  Recent Labs   Lab 02/07/23  0747   INR 1.03   PTT 27         5. RADIOLOGY:   No results found for this or any previous visit (from the past 24 hour(s)).    =========================================     04-Aug-2018 11:07

## 2023-02-13 NOTE — BRIEF OP NOTE
[facility]    Brief Operative Note    Pre-operative diagnosis: CAD (coronary artery disease) [I25.10]  Post-operative diagnosis Same as pre-operative diagnosis    Procedure: Procedure(s):  TRANSESPHAGEAL ECHOCARDIOGRAM, MEDIAN STERNOTOMY, TAKE DOWN OF LEFT INTERNAL MAMMARY, LEFT ENDOSCOPIC GREATER SAPHENOUS VEIN HARVEST, CRYO NERVE BLOCK, CARDIOPULMONARY BYPASS, CORONARY ARTERY BYPASS GRAFT X 2.  Surgeon: Surgeon(s) and Role:     * Patrice Yepez MD - Primary     * Andres Brown MD - Assisting     * Fabiana Jha PA-C - Assisting     * Yenifer Oro PA-C - Assisting  Anesthesia: General with Block   Estimated Blood Loss: 1000 ml  Drains: 2 med chest tubes, bilateral pleural chest tubes  Specimens: * No specimens in log *  Findings:   see op note.  Complications: None.  Implants: * No implants in log *

## 2023-02-13 NOTE — PROGRESS NOTES
CLINICAL NUTRITION SERVICES - BRIEF NOTE    Received provider consult for nutrition education with comments post op cardiovascular surgery (automatic consult on post-op order set). S/p TRANSESPHAGEAL ECHOCARDIOGRAM, MEDIAN STERNOTOMY, TAKE DOWN OF LEFT INTERNAL MAMMARY, LEFT ENDOSCOPIC GREATER SAPHENOUS VEIN HARVEST, CRYO NERVE BLOCK, CARDIOPULMONARY BYPASS, CORONARY ARTERY BYPASS GRAFT X 2 on 2/13. Nutrition education to be completed as able/appropriate (as pt s/p CABG).    RD will follow per LOS protocol or if re-consulted.     Sabine Erazo, MS, RD, LD, CNSC   6C Pgr: [phone]      4E RD pgr: [phone]

## 2023-02-13 NOTE — ANESTHESIA CARE TRANSFER NOTE
Patient: Avelina Marion    Procedure: Procedure(s):  TRANSESPHAGEAL ECHOCARDIOGRAM, MEDIAN STERNOTOMY, TAKE DOWN OF LEFT INTERNAL MAMMARY, LEFT ENDOSCOPIC GREATER SAPHENOUS VEIN HARVEST, CRYO NERVE BLOCK, CARDIOPULMONARY BYPASS, CORONARY ARTERY BYPASS GRAFT X 2.       Diagnosis: CAD (coronary artery disease) [I25.10]  Diagnosis Additional Information: No value filed.    Anesthesia Type:   No value filed.     Note:                    Patient transferred to: ICU    ICU Handoff: Call for PAUSE to initiate/utilize ICU HANDOFF, Identified Patient, Identified Responsible Provider, Reviewed the Pertinent Medical History, Discussed Surgical Course, Reviewed Intra-OP Anesthesia Management and Issues during Anesthesia, Set Expectations for Post Procedure Period and Allowed Opportunity for Questions and Acknowledgement of Understanding      Vitals:  Vitals Value Taken Time   BP     Temp     Pulse 82 02/13/23 1420   Resp 0 02/13/23 1420   SpO2 100 % 02/13/23 1420   Vitals shown include unvalidated device data.    Electronically Signed By: KWASI Santoyo CRNA  February 13, 2023  2:21 PM

## 2023-02-13 NOTE — ANESTHESIA PROCEDURE NOTES
Arterial Line Procedure Note    Pre-Procedure   Staff -        Anesthesiologist:  Chirag Roper MD       Performed By: anesthesiologist       Location: OR       Pre-Anesthestic Checklist: patient identified, IV checked, risks and benefits discussed, informed consent, monitors and equipment checked, pre-op evaluation and at physician/surgeon's request  Timeout:       Correct Patient: Yes        Correct Procedure: Yes        Correct Site: Yes        Correct Position: Yes   Line Placement:   This line was placed Pre Induction starting at 2/13/2023 7:45 AM and ending at 2/13/2023 7:50 AM  Procedure   Procedure: arterial line       Insertion Site: radial.  Sterile Prep        Standard elements of sterile barrier followed       Skin prep: Chloraprep  Insertion/Injection        Technique: ultrasound guided        1. Ultrasound was used to evaluate the access site.       2. Artery evaluated via ultrasound for patency/adequacy.       3. Using real-time ultrasound the needle/catheter was observed entering the artery/vein.       Catheter Type/Size: 20 G, 12 cm  Narrative         Secured by: suture       Tegaderm dressing used.       Complications: None apparent,        Arterial waveform: Yes        IBP within 10% of NIBP: Yes

## 2023-02-14 ENCOUNTER — APPOINTMENT (OUTPATIENT)
Dept: PHYSICAL THERAPY | Facility: CLINIC | Age: 68
DRG: 233 | End: 2023-02-14
Attending: STUDENT IN AN ORGANIZED HEALTH CARE EDUCATION/TRAINING PROGRAM
Payer: COMMERCIAL

## 2023-02-14 ENCOUNTER — APPOINTMENT (OUTPATIENT)
Dept: GENERAL RADIOLOGY | Facility: CLINIC | Age: 68
DRG: 233 | End: 2023-02-14
Attending: STUDENT IN AN ORGANIZED HEALTH CARE EDUCATION/TRAINING PROGRAM
Payer: COMMERCIAL

## 2023-02-14 DIAGNOSIS — Z95.1 S/P CABG (CORONARY ARTERY BYPASS GRAFT): Primary | ICD-10-CM

## 2023-02-14 LAB
ALBUMIN SERPL BCG-MCNC: 2.9 G/DL (ref 3.5–5.2)
ALBUMIN SERPL BCG-MCNC: 3 G/DL (ref 3.5–5.2)
ALLEN'S TEST: ABNORMAL
ALP SERPL-CCNC: 35 U/L (ref 40–129)
ALP SERPL-CCNC: 36 U/L (ref 40–129)
ALT SERPL W P-5'-P-CCNC: 10 U/L (ref 10–50)
ALT SERPL W P-5'-P-CCNC: 11 U/L (ref 10–50)
ANION GAP SERPL CALCULATED.3IONS-SCNC: 12 MMOL/L (ref 7–15)
ANION GAP SERPL CALCULATED.3IONS-SCNC: 14 MMOL/L (ref 7–15)
ANION GAP SERPL CALCULATED.3IONS-SCNC: 14 MMOL/L (ref 7–15)
ANION GAP SERPL CALCULATED.3IONS-SCNC: 15 MMOL/L (ref 7–15)
ANION GAP SERPL CALCULATED.3IONS-SCNC: 17 MMOL/L (ref 7–15)
AST SERPL W P-5'-P-CCNC: 46 U/L (ref 10–50)
AST SERPL W P-5'-P-CCNC: 51 U/L (ref 10–50)
ATRIAL RATE - MUSE: 83 BPM
ATRIAL RATE - MUSE: 87 BPM
BASE EXCESS BLDA CALC-SCNC: -2.4 MMOL/L (ref -9–1.8)
BASE EXCESS BLDV CALC-SCNC: -1.5 MMOL/L (ref -7.7–1.9)
BASE EXCESS BLDV CALC-SCNC: -2.1 MMOL/L (ref -7.7–1.9)
BASE EXCESS BLDV CALC-SCNC: -5.7 MMOL/L (ref -7.7–1.9)
BASE EXCESS BLDV CALC-SCNC: 4.8 MMOL/L (ref -7.7–1.9)
BILIRUB SERPL-MCNC: 0.3 MG/DL
BILIRUB SERPL-MCNC: 0.3 MG/DL
BUN SERPL-MCNC: 29.8 MG/DL (ref 8–23)
BUN SERPL-MCNC: 44.6 MG/DL (ref 8–23)
BUN SERPL-MCNC: 46.8 MG/DL (ref 8–23)
BUN SERPL-MCNC: 48.6 MG/DL (ref 8–23)
BUN SERPL-MCNC: 51.6 MG/DL (ref 8–23)
CA-I BLD-MCNC: 4.1 MG/DL (ref 4.4–5.2)
CA-I BLD-MCNC: 4.4 MG/DL (ref 4.4–5.2)
CALCIUM SERPL-MCNC: 7.1 MG/DL (ref 8.8–10.2)
CALCIUM SERPL-MCNC: 7.7 MG/DL (ref 8.8–10.2)
CALCIUM SERPL-MCNC: 7.9 MG/DL (ref 8.8–10.2)
CALCIUM SERPL-MCNC: 8 MG/DL (ref 8.8–10.2)
CALCIUM SERPL-MCNC: 8.6 MG/DL (ref 8.8–10.2)
CHLORIDE SERPL-SCNC: 101 MMOL/L (ref 98–107)
CHLORIDE SERPL-SCNC: 99 MMOL/L (ref 98–107)
CREAT SERPL-MCNC: 4.32 MG/DL (ref 0.67–1.17)
CREAT SERPL-MCNC: 5.48 MG/DL (ref 0.67–1.17)
CREAT SERPL-MCNC: 5.87 MG/DL (ref 0.67–1.17)
CREAT SERPL-MCNC: 6.17 MG/DL (ref 0.67–1.17)
CREAT SERPL-MCNC: 6.66 MG/DL (ref 0.67–1.17)
DEPRECATED HCO3 PLAS-SCNC: 17 MMOL/L (ref 22–29)
DEPRECATED HCO3 PLAS-SCNC: 18 MMOL/L (ref 22–29)
DEPRECATED HCO3 PLAS-SCNC: 19 MMOL/L (ref 22–29)
DEPRECATED HCO3 PLAS-SCNC: 19 MMOL/L (ref 22–29)
DEPRECATED HCO3 PLAS-SCNC: 25 MMOL/L (ref 22–29)
DIASTOLIC BLOOD PRESSURE - MUSE: NORMAL MMHG
DIASTOLIC BLOOD PRESSURE - MUSE: NORMAL MMHG
ERYTHROCYTE [DISTWIDTH] IN BLOOD BY AUTOMATED COUNT: 12.8 % (ref 10–15)
GFR SERPL CREATININE-BSD FRML MDRD: 10 ML/MIN/1.73M2
GFR SERPL CREATININE-BSD FRML MDRD: 11 ML/MIN/1.73M2
GFR SERPL CREATININE-BSD FRML MDRD: 14 ML/MIN/1.73M2
GFR SERPL CREATININE-BSD FRML MDRD: 9 ML/MIN/1.73M2
GFR SERPL CREATININE-BSD FRML MDRD: 9 ML/MIN/1.73M2
GLUCOSE BLDC GLUCOMTR-MCNC: 108 MG/DL (ref 70–99)
GLUCOSE BLDC GLUCOMTR-MCNC: 113 MG/DL (ref 70–99)
GLUCOSE BLDC GLUCOMTR-MCNC: 118 MG/DL (ref 70–99)
GLUCOSE BLDC GLUCOMTR-MCNC: 120 MG/DL (ref 70–99)
GLUCOSE BLDC GLUCOMTR-MCNC: 143 MG/DL (ref 70–99)
GLUCOSE BLDC GLUCOMTR-MCNC: 144 MG/DL (ref 70–99)
GLUCOSE BLDC GLUCOMTR-MCNC: 158 MG/DL (ref 70–99)
GLUCOSE BLDC GLUCOMTR-MCNC: 159 MG/DL (ref 70–99)
GLUCOSE BLDC GLUCOMTR-MCNC: 163 MG/DL (ref 70–99)
GLUCOSE BLDC GLUCOMTR-MCNC: 170 MG/DL (ref 70–99)
GLUCOSE BLDC GLUCOMTR-MCNC: 170 MG/DL (ref 70–99)
GLUCOSE BLDC GLUCOMTR-MCNC: 179 MG/DL (ref 70–99)
GLUCOSE BLDC GLUCOMTR-MCNC: 193 MG/DL (ref 70–99)
GLUCOSE BLDC GLUCOMTR-MCNC: 197 MG/DL (ref 70–99)
GLUCOSE BLDC GLUCOMTR-MCNC: 197 MG/DL (ref 70–99)
GLUCOSE BLDC GLUCOMTR-MCNC: 211 MG/DL (ref 70–99)
GLUCOSE BLDC GLUCOMTR-MCNC: 224 MG/DL (ref 70–99)
GLUCOSE BLDC GLUCOMTR-MCNC: 226 MG/DL (ref 70–99)
GLUCOSE BLDC GLUCOMTR-MCNC: 91 MG/DL (ref 70–99)
GLUCOSE BLDC GLUCOMTR-MCNC: 94 MG/DL (ref 70–99)
GLUCOSE BLDC GLUCOMTR-MCNC: 95 MG/DL (ref 70–99)
GLUCOSE SERPL-MCNC: 150 MG/DL (ref 70–99)
GLUCOSE SERPL-MCNC: 184 MG/DL (ref 70–99)
GLUCOSE SERPL-MCNC: 191 MG/DL (ref 70–99)
GLUCOSE SERPL-MCNC: 201 MG/DL (ref 70–99)
GLUCOSE SERPL-MCNC: 207 MG/DL (ref 70–99)
HBV SURFACE AB SERPL IA-ACNC: 17.33 M[IU]/ML
HBV SURFACE AB SERPL IA-ACNC: REACTIVE M[IU]/ML
HBV SURFACE AG SERPL QL IA: NONREACTIVE
HCO3 BLD-SCNC: 22 MMOL/L (ref 21–28)
HCO3 BLDV-SCNC: 19 MMOL/L (ref 21–28)
HCO3 BLDV-SCNC: 23 MMOL/L (ref 21–28)
HCO3 BLDV-SCNC: 24 MMOL/L (ref 21–28)
HCO3 BLDV-SCNC: 29 MMOL/L (ref 21–28)
HCT VFR BLD AUTO: 28.6 % (ref 40–53)
HGB BLD-MCNC: 9.7 G/DL (ref 13.3–17.7)
INTERPRETATION ECG - MUSE: NORMAL
INTERPRETATION ECG - MUSE: NORMAL
LACTATE SERPL-SCNC: 1.4 MMOL/L (ref 0.7–2)
LACTATE SERPL-SCNC: 2.6 MMOL/L (ref 0.7–2)
MAGNESIUM SERPL-MCNC: 1.8 MG/DL (ref 1.7–2.3)
MCH RBC QN AUTO: 32.6 PG (ref 26.5–33)
MCHC RBC AUTO-ENTMCNC: 33.9 G/DL (ref 31.5–36.5)
MCV RBC AUTO: 96 FL (ref 78–100)
O2/TOTAL GAS SETTING VFR VENT: 1 %
O2/TOTAL GAS SETTING VFR VENT: 1 %
O2/TOTAL GAS SETTING VFR VENT: 2 %
OXYHGB MFR BLD: 94 % (ref 92–100)
OXYHGB MFR BLDV: 67 % (ref 70–75)
OXYHGB MFR BLDV: 69 % (ref 70–75)
OXYHGB MFR BLDV: 71 % (ref 70–75)
OXYHGB MFR BLDV: 74 % (ref 70–75)
P AXIS - MUSE: 32 DEGREES
P AXIS - MUSE: 51 DEGREES
PCO2 BLD: 36 MM HG (ref 35–45)
PCO2 BLDV: 36 MM HG (ref 40–50)
PCO2 BLDV: 39 MM HG (ref 40–50)
PCO2 BLDV: 41 MM HG (ref 40–50)
PCO2 BLDV: 42 MM HG (ref 40–50)
PH BLD: 7.39 [PH] (ref 7.35–7.45)
PH BLDV: 7.34 [PH] (ref 7.32–7.43)
PH BLDV: 7.37 [PH] (ref 7.32–7.43)
PH BLDV: 7.38 [PH] (ref 7.32–7.43)
PH BLDV: 7.45 [PH] (ref 7.32–7.43)
PHOSPHATE SERPL-MCNC: 2.4 MG/DL (ref 2.5–4.5)
PLATELET # BLD AUTO: 184 10E3/UL (ref 150–450)
PO2 BLD: 78 MM HG (ref 80–105)
PO2 BLDV: 34 MM HG (ref 25–47)
PO2 BLDV: 37 MM HG (ref 25–47)
PO2 BLDV: 38 MM HG (ref 25–47)
PO2 BLDV: 42 MM HG (ref 25–47)
POTASSIUM SERPL-SCNC: 4.1 MMOL/L (ref 3.4–5.3)
POTASSIUM SERPL-SCNC: 4.7 MMOL/L (ref 3.4–5.3)
POTASSIUM SERPL-SCNC: 5 MMOL/L (ref 3.4–5.3)
POTASSIUM SERPL-SCNC: 5.1 MMOL/L (ref 3.4–5.3)
POTASSIUM SERPL-SCNC: 6.1 MMOL/L (ref 3.4–5.3)
PR INTERVAL - MUSE: 138 MS
PR INTERVAL - MUSE: 144 MS
PROT SERPL-MCNC: 4.5 G/DL (ref 6.4–8.3)
PROT SERPL-MCNC: 4.8 G/DL (ref 6.4–8.3)
QRS DURATION - MUSE: 86 MS
QRS DURATION - MUSE: 92 MS
QT - MUSE: 346 MS
QT - MUSE: 392 MS
QTC - MUSE: 416 MS
QTC - MUSE: 460 MS
R AXIS - MUSE: 12 DEGREES
R AXIS - MUSE: 2 DEGREES
RBC # BLD AUTO: 2.98 10E6/UL (ref 4.4–5.9)
SODIUM SERPL-SCNC: 132 MMOL/L (ref 136–145)
SODIUM SERPL-SCNC: 133 MMOL/L (ref 136–145)
SODIUM SERPL-SCNC: 136 MMOL/L (ref 136–145)
SYSTOLIC BLOOD PRESSURE - MUSE: NORMAL MMHG
SYSTOLIC BLOOD PRESSURE - MUSE: NORMAL MMHG
T AXIS - MUSE: 11 DEGREES
T AXIS - MUSE: 50 DEGREES
VENTRICULAR RATE- MUSE: 83 BPM
VENTRICULAR RATE- MUSE: 87 BPM
WBC # BLD AUTO: 17.9 10E3/UL (ref 4–11)

## 2023-02-14 PROCEDURE — 250N000013 HC RX MED GY IP 250 OP 250 PS 637: Performed by: STUDENT IN AN ORGANIZED HEALTH CARE EDUCATION/TRAINING PROGRAM

## 2023-02-14 PROCEDURE — 250N000009 HC RX 250: Performed by: STUDENT IN AN ORGANIZED HEALTH CARE EDUCATION/TRAINING PROGRAM

## 2023-02-14 PROCEDURE — 90937 HEMODIALYSIS REPEATED EVAL: CPT

## 2023-02-14 PROCEDURE — 250N000011 HC RX IP 250 OP 636: Performed by: STUDENT IN AN ORGANIZED HEALTH CARE EDUCATION/TRAINING PROGRAM

## 2023-02-14 PROCEDURE — 99232 SBSQ HOSP IP/OBS MODERATE 35: CPT | Mod: 24 | Performed by: INTERNAL MEDICINE

## 2023-02-14 PROCEDURE — 71045 X-RAY EXAM CHEST 1 VIEW: CPT | Mod: 26 | Performed by: STUDENT IN AN ORGANIZED HEALTH CARE EDUCATION/TRAINING PROGRAM

## 2023-02-14 PROCEDURE — 258N000003 HC RX IP 258 OP 636: Performed by: STUDENT IN AN ORGANIZED HEALTH CARE EDUCATION/TRAINING PROGRAM

## 2023-02-14 PROCEDURE — 84100 ASSAY OF PHOSPHORUS: CPT | Performed by: STUDENT IN AN ORGANIZED HEALTH CARE EDUCATION/TRAINING PROGRAM

## 2023-02-14 PROCEDURE — 82330 ASSAY OF CALCIUM: CPT | Performed by: STUDENT IN AN ORGANIZED HEALTH CARE EDUCATION/TRAINING PROGRAM

## 2023-02-14 PROCEDURE — 250N000011 HC RX IP 250 OP 636: Performed by: INTERNAL MEDICINE

## 2023-02-14 PROCEDURE — 71045 X-RAY EXAM CHEST 1 VIEW: CPT

## 2023-02-14 PROCEDURE — 97530 THERAPEUTIC ACTIVITIES: CPT | Mod: GP | Performed by: STUDENT IN AN ORGANIZED HEALTH CARE EDUCATION/TRAINING PROGRAM

## 2023-02-14 PROCEDURE — 97162 PT EVAL MOD COMPLEX 30 MIN: CPT | Mod: GP | Performed by: STUDENT IN AN ORGANIZED HEALTH CARE EDUCATION/TRAINING PROGRAM

## 2023-02-14 PROCEDURE — 82805 BLOOD GASES W/O2 SATURATION: CPT | Performed by: THORACIC SURGERY (CARDIOTHORACIC VASCULAR SURGERY)

## 2023-02-14 PROCEDURE — 86706 HEP B SURFACE ANTIBODY: CPT | Performed by: CLINICAL NURSE SPECIALIST

## 2023-02-14 PROCEDURE — 93010 ELECTROCARDIOGRAM REPORT: CPT | Mod: 59 | Performed by: INTERNAL MEDICINE

## 2023-02-14 PROCEDURE — 82805 BLOOD GASES W/O2 SATURATION: CPT | Performed by: INTERNAL MEDICINE

## 2023-02-14 PROCEDURE — 258N000003 HC RX IP 258 OP 636: Performed by: CLINICAL NURSE SPECIALIST

## 2023-02-14 PROCEDURE — 99233 SBSQ HOSP IP/OBS HIGH 50: CPT | Mod: 24 | Performed by: INTERNAL MEDICINE

## 2023-02-14 PROCEDURE — 83735 ASSAY OF MAGNESIUM: CPT | Performed by: STUDENT IN AN ORGANIZED HEALTH CARE EDUCATION/TRAINING PROGRAM

## 2023-02-14 PROCEDURE — 3E043XZ INTRODUCTION OF VASOPRESSOR INTO CENTRAL VEIN, PERCUTANEOUS APPROACH: ICD-10-PCS | Performed by: THORACIC SURGERY (CARDIOTHORACIC VASCULAR SURGERY)

## 2023-02-14 PROCEDURE — 80048 BASIC METABOLIC PNL TOTAL CA: CPT | Performed by: INTERNAL MEDICINE

## 2023-02-14 PROCEDURE — 82805 BLOOD GASES W/O2 SATURATION: CPT | Performed by: STUDENT IN AN ORGANIZED HEALTH CARE EDUCATION/TRAINING PROGRAM

## 2023-02-14 PROCEDURE — 999N000045 HC STATISTIC DAILY SWAN MONITORING

## 2023-02-14 PROCEDURE — 93005 ELECTROCARDIOGRAM TRACING: CPT

## 2023-02-14 PROCEDURE — 271N000002 HC RX 271: Performed by: INTERNAL MEDICINE

## 2023-02-14 PROCEDURE — 87340 HEPATITIS B SURFACE AG IA: CPT | Performed by: CLINICAL NURSE SPECIALIST

## 2023-02-14 PROCEDURE — 999N000015 HC STATISTIC ARTERIAL MONITORING DAILY

## 2023-02-14 PROCEDURE — 85027 COMPLETE CBC AUTOMATED: CPT | Performed by: STUDENT IN AN ORGANIZED HEALTH CARE EDUCATION/TRAINING PROGRAM

## 2023-02-14 PROCEDURE — 999N000155 HC STATISTIC RAPCV CVP MONITORING

## 2023-02-14 PROCEDURE — 83605 ASSAY OF LACTIC ACID: CPT | Performed by: STUDENT IN AN ORGANIZED HEALTH CARE EDUCATION/TRAINING PROGRAM

## 2023-02-14 PROCEDURE — 80053 COMPREHEN METABOLIC PANEL: CPT | Performed by: STUDENT IN AN ORGANIZED HEALTH CARE EDUCATION/TRAINING PROGRAM

## 2023-02-14 PROCEDURE — 99232 SBSQ HOSP IP/OBS MODERATE 35: CPT | Performed by: NURSE PRACTITIONER

## 2023-02-14 PROCEDURE — 200N000002 HC R&B ICU UMMC

## 2023-02-14 RX ADMIN — HEPARIN SODIUM 5000 UNITS: 5000 INJECTION, SOLUTION INTRAVENOUS; SUBCUTANEOUS at 19:52

## 2023-02-14 RX ADMIN — SODIUM PHOSPHATE, MONOBASIC, MONOHYDRATE AND SODIUM PHOSPHATE, DIBASIC, ANHYDROUS 9 MMOL: 276; 142 INJECTION, SOLUTION INTRAVENOUS at 06:51

## 2023-02-14 RX ADMIN — SENNOSIDES AND DOCUSATE SODIUM 1 TABLET: 50; 8.6 TABLET ORAL at 09:41

## 2023-02-14 RX ADMIN — ASPIRIN 81 MG 162 MG: 81 TABLET ORAL at 09:41

## 2023-02-14 RX ADMIN — Medication 0.04 MCG/KG/MIN: at 22:37

## 2023-02-14 RX ADMIN — Medication 500 MG: at 22:46

## 2023-02-14 RX ADMIN — ACETAMINOPHEN 975 MG: 325 TABLET, FILM COATED ORAL at 04:13

## 2023-02-14 RX ADMIN — SODIUM CHLORIDE 300 ML: 9 INJECTION, SOLUTION INTRAVENOUS at 11:37

## 2023-02-14 RX ADMIN — CALCIUM GLUCONATE 1 G: 20 INJECTION, SOLUTION INTRAVENOUS at 02:12

## 2023-02-14 RX ADMIN — MUPIROCIN 0.5 G: 20 OINTMENT TOPICAL at 19:57

## 2023-02-14 RX ADMIN — OXYCODONE HYDROCHLORIDE 5 MG: 5 TABLET ORAL at 18:50

## 2023-02-14 RX ADMIN — PANTOPRAZOLE SODIUM 40 MG: 40 TABLET, DELAYED RELEASE ORAL at 09:41

## 2023-02-14 RX ADMIN — POLYETHYLENE GLYCOL 3350 17 G: 17 POWDER, FOR SOLUTION ORAL at 09:41

## 2023-02-14 RX ADMIN — ACETAMINOPHEN 975 MG: 325 TABLET, FILM COATED ORAL at 13:19

## 2023-02-14 RX ADMIN — SODIUM CHLORIDE 250 ML: 9 INJECTION, SOLUTION INTRAVENOUS at 11:37

## 2023-02-14 RX ADMIN — GABAPENTIN 200 MG: 100 CAPSULE ORAL at 21:22

## 2023-02-14 RX ADMIN — Medication 500 MG: at 22:45

## 2023-02-14 RX ADMIN — ACETAMINOPHEN 975 MG: 325 TABLET, FILM COATED ORAL at 19:51

## 2023-02-14 RX ADMIN — MUPIROCIN 0.5 G: 20 OINTMENT TOPICAL at 09:42

## 2023-02-14 RX ADMIN — ATORVASTATIN CALCIUM 20 MG: 20 TABLET, FILM COATED ORAL at 19:51

## 2023-02-14 RX ADMIN — OXYCODONE HYDROCHLORIDE 5 MG: 5 TABLET ORAL at 09:41

## 2023-02-14 RX ADMIN — CEFAZOLIN SODIUM 2 G: 2 INJECTION, SOLUTION INTRAVENOUS at 14:22

## 2023-02-14 RX ADMIN — HEPARIN SODIUM 5000 UNITS: 5000 INJECTION, SOLUTION INTRAVENOUS; SUBCUTANEOUS at 13:19

## 2023-02-14 ASSESSMENT — ACTIVITIES OF DAILY LIVING (ADL)
ADLS_ACUITY_SCORE: 26
DEPENDENT_IADLS:: INDEPENDENT
ADLS_ACUITY_SCORE: 26

## 2023-02-14 NOTE — PROGRESS NOTES
Nephrology Progress Note  02/14/2023       Avelina Marion is a 67 yom with hx of ESRD since 4/2021 who is admitted to [facility] for CABG as part of kidney transplant workup.  Had angiogram with only mild CAD 12/2021 but had stress test in Dec 2022 showing ST depression, updated angio showed 80% LM (unclear why such rapid progression) and is now planned for CABG. Still makes UOP, runs MWF dialysis and had run 2/6 prior to admission.  Nephrology consulted for management of RRT with plan for CABG on 2/9.      Interval History :   Mr Marion had CABG yesterday, extubated, still on one pressor.  Needs HD for hyperkalemia today with K of 6.1, will not pull UF as CVP is 7.  Will decide daily, trying to use iHD modality as he has fistula and is overall improving.          Assessment & Recommendations:   ESRD-Since 4/2021, runs MWF under care of Dr Kummer Davita Phalen.  L arm fistula access.  Accepted for renal tx and actually has had some offers but turned down due to higher risk then needed mohs surgery and now CABG as part of transplant workup.  Had HD 2/12 in preparation for CABG.  I did previously let him know that there is a chance we need to place catheter for CRRT should he be unstable and need RRT post-op which he understood and agreed with but will try to stay with iHD modality.                   -HD today for hyperkalemia, still on one pressor but not needing to pull any fluid with CVP of 7.                  -Access is L arm fistula, would need catheter if CRRT is needed post-op but so far appears that we will be able to manage with iHD.                 -No need for new dialysis consent as he is ESRD on HD as outpt.           Volume-Appears euvolemic, still makes UOP.  Last CVP 7, not pulling UF on run today.       Electrolytes-K 6.1 this am, running HD today to clear, likely up related to surgery yesterday.  Still making UOP.      BMD-Ca 8.8, Mg and Phos not checked today.      Anemia-Hgb 9.7, baseline is ~12, will  "check iron and consider MCKENNA as course progresses.       Nutrition-No issues, good appetite prior to surgery.       Time spent: 20 minutes on this date of encounter for chart review, physical exam, medical decision making and co-ordination of care.      Discussed with Dr Leonard     Recommendations were communicated to primary team via note     KWASI Lara CNS  Clinical Nurse Specialist  [phone]    Review of Systems:   I reviewed the following systems:  Gen: No fevers or chills  CV: No CP at rest  Resp: No SOB at rest  GI: No N/V    Physical Exam:   I/O last 3 completed shifts:  In: 7132.51 [P.O.:240; I.V.:3272.51; Other:270; IV Piggyback:3000]  Out: 1725 [Urine:673; Chest Tube:1052]   /72   Pulse 68   Temp 99.1  F (37.3  C) (Axillary)   Resp 15   Ht 1.803 m (5' 11\")   Wt 97.2 kg (214 lb 4.6 oz)   SpO2 97%   BMI 29.89 kg/m       GENERAL APPEARANCE: Extubated post surgery, still on pressor.    EYES: no scleral icterus  NECK:  no JVD  Pulmonary: lungs clear to auscultation with equal breath sounds bilaterally, no clubbing or cyanosis  CV: Regular rhythm, normal rate, no rub   - Edema none  GI: soft, nontender, normal bowel sounds  MS: no evidence of inflammation in joints, no muscle tenderness  :  Uribe  SKIN: no rash, warm, dry  NEURO: mentation intact and speech normal  ACCESS: L arm fistula    Labs:   All labs reviewed by me  Electrolytes/Renal -   Recent Labs   Lab Test 02/14/23  1108 02/14/23  0947 02/14/23  0811 02/14/23  0801 02/14/23  0456 02/14/23  0358 02/14/23  0157 02/14/23  0111 02/13/23  1624 02/13/23  1432 02/09/23  0834 02/09/23  0656   NA  --   --   --  132*  --  133*  --  133*   < > 135*   < > 136   POTASSIUM  --   --   --  6.1*  --  5.1  --  4.7   < > 4.4   < > 5.1   CHLORIDE  --   --   --  99  --  99  --  101   < > 97*   < > 100   CO2  --   --   --  19*  --  19*  --  18*   < > 22   < > 23   BUN  --   --   --  51.6*  --  48.6*  --  44.6*   < > 45.5*   < > 54.1*   CR  --   " --   --  6.66*  --  6.17*  --  5.48*   < > 5.79*   < > 7.17*   * 226* 211* 207*   < > 170*  184*   < > 191*   < > 146*  138*   < > 149*   DAVID  --   --   --  8.0*  --  7.9*  --  7.1*   < > 7.6*   < > 8.9   MAG  --   --   --   --   --  1.8  --   --   --  2.4*  --  1.8   PHOS  --   --   --   --   --  2.4*  --   --   --  2.4*  --  3.5    < > = values in this interval not displayed.       CBC -   Recent Labs   Lab Test 02/14/23  0358 02/13/23  1945 02/13/23  1432 02/13/23  1324 02/13/23  1323   WBC 17.9*  --  23.2*  --  23.4*   HGB 9.7* 10.6* 10.9*   < > 10.0*     --  221  --  187    < > = values in this interval not displayed.       LFTs -   Recent Labs   Lab Test 02/14/23  0111 02/13/23  2356 02/13/23  1432   ALKPHOS 35* 36* 47   BILITOTAL 0.3 0.3 0.4   ALT 10 11 12   AST 46 51* 36   PROTTOTAL 4.5* 4.8* 5.5*   ALBUMIN 2.9* 3.0* 3.6       Iron Panel - No lab results found.        Current Medications:    acetaminophen  975 mg Oral Q8H     aspirin  162 mg Oral or NG Tube Daily     atorvastatin  20 mg Oral At Bedtime     ceFAZolin  2 g Intravenous Q24H     gabapentin  200 mg Oral or Feeding Tube At Bedtime     heparin ANTICOAGULANT  5,000 Units Subcutaneous Q8H     lactated ringers  500 mL Intravenous Once     lactated ringers  500 mL Intravenous Once     mupirocin  0.5 g Both Nostrils BID     - MEDICATION INSTRUCTIONS -   Does not apply Once     pantoprazole  40 mg Oral or NG Tube Daily    Or     pantoprazole  40 mg Oral Daily     polyethylene glycol  17 g Oral Daily     senna-docusate  1 tablet Oral BID     sodium chloride (PF)  3 mL Intracatheter Q8H       dexmedetomidine Stopped (02/13/23 1443)     dextrose       EPINEPHrine Stopped (02/14/23 0900)     insulin regular 6 Units/hr (02/14/23 1200)     norepinephrine 0.09 mcg/kg/min (02/14/23 1200)     BETA BLOCKER NOT PRESCRIBED       ropivacaine 7 mL/hr at 02/13/23 1444     ropivacaine 7 mL/hr at 02/13/23 1445     - MEDICATION INSTRUCTIONS -        vasopressin Stopped (02/14/23 1107)

## 2023-02-14 NOTE — PROGRESS NOTES
"CLINICAL NUTRITION SERVICES - ASSESSMENT NOTE     Nutrition Prescription    RECOMMENDATIONS FOR MDs/PROVIDERS TO ORDER:  None at this time.    Malnutrition Status:    Patient does not meet two of the established criteria necessary for diagnosing malnutrition    Recommendations already ordered by Registered Dietitian (RD):  None at this time.    Future/Additional Recommendations:  Monitor PO and weights.     REASON FOR ASSESSMENT  Avelina Marion is a/an 67 year old male assessed by the dietitian for LOS x7.    PMH:  - DM2, HTN, HLD, ESRD, on dialysis, coronary artery disease, and tobacco use.  - Admitted post angiogram for CABG evaluation.    NUTRITION HISTORY  Unable to attain as the patient was asleep when I visited. Spoke with the RN and they reported that the diet is to advance today.  Prior to surgery 100% PO intake per RN documentation.    CURRENT NUTRITION ORDERS  Diet: Clear Liquid  Intake/Tolerance:   - Intake was about 1,300 kcal per day up until 2/13 for surgery.  No intake since surgery.    LABS  K trending up (H).  BUN and Cr (H)  BG (H)    MEDICATIONS  Medications reviewed    ANTHROPOMETRICS  Height: 180.3 cm (5' 11\")  Most Recent Weight: 97.2 kg (214 lb 4.6 oz)    IBW: 76 kg (120%)  BMI: Overweight BMI 25-29.9  Weight History:   02/14/23 97.2 kg (214 lb 4.6 oz) Bed scale   02/13/23  90.4 kg (199 lb 4.8 oz) Standing scale   02/12/23  91.2 kg (201 lb) Standing scale   02/11/23  88.2 kg (194 lb 7.1 oz) Bed scale   02/10/23  91.9 kg (202 lb 11.2 oz) Standing scale   02/09/23  91.9 kg (202 lb 9.6 oz) Standing scale   02/08/23  91.4 kg (201 lb 9.6 oz) Standing scale   02/07/23  91.8 kg (202 lb 6.1 oz) --   02/07/23  93.3 kg (205 lb 11 oz) --   02/07/23  93.1 kg (205 lb 4 oz)    12/19/22 93.2 kg (205 lb 8 oz)    10/18/22 93.1 kg (205 lb 2.2 oz)    08/09/22 93.8 kg (206 lb 11.2 oz)    12/30/21 92.5 kg (204 lb)    08/05/21 92.1 kg (203 lb)    06/02/21 90.7 kg (200 lb)    2/14 wt likely outlier.    Dosing " Weight: 80 kg (adjusted BW based on IBW and wt on 2/13)    ASSESSED NUTRITION NEEDS  Estimated Energy Needs: 2,000 - 2,400 kcals/day (25 - 30 kcals/kg)  Justification: Maintenance  Estimated Protein Needs: 120 - 160 grams protein/day (1.5 - 2 grams of pro/kg)  Justification: Maintenance and Post-op  Estimated Fluid Needs: 1 mL/kcal  Justification: Per provider pending fluid status    PHYSICAL FINDINGS  See malnutrition section below.  No abnormal nutrition-related physical findings observed.     MALNUTRITION  % Intake: Decreased intake does not meet criteria but patient has not eaten since 2/14.  % Weight Loss: Unable to assess due to fluctuating weight.  Subcutaneous Fat Loss: None observed.  Muscle Loss: Unable to assess as patient was asleep.  Fluid Accumulation/Edema: None noted  Malnutrition Diagnosis: Patient does not meet two of the established criteria necessary for diagnosing malnutrition    NUTRITION DIAGNOSIS  Inadequate oral intake related to lethargy as evidenced by 0% intake since 2/12.    INTERVENTIONS  Implementation  Collaboration with other providers     Goals  Diet adv v nutrition support within 2-3 days.     Monitoring/Evaluation  Progress toward goals will be monitored and evaluated per protocol.    Dalila Zaragoza MS  Dietetic Intern

## 2023-02-14 NOTE — PROGRESS NOTES
Admitted/transferred from: OR  Reason for admission/transfer: s/o CABG x 2  Patient status upon admission/transfer: extubated, on pressors  Interventions: fluids, hemodynamic monitoring, pressors  Plan: continue current therapies, decrease pressors as able.   2 RN skin assessment: completed by Joya MILTON  Result of skin assessment and interventions/actions: WDL  Height, weight, drug calc weight: done  Patient belongings: in badge room on 4E  MDRO education (if applicable):  NA     Major Shift Events: Pt arrived on 4E around 1445. Pt extubated in OR, lethargic but arouses to voice. HR 80-100s, frequent bigeminy w/ low MAPs. MAP labile with movement, SVR 400s. Increased doses of pressors, see flowsheets. 2L LR given. 1 unit PRBCs given for increased chest tube output. Desmopressin given x 1. Lactic bump to 2.7. Talked to pt's sister about plan of care.     Plan: continue to monitor hemodynamics, give fluids as needed. Decrease pressors as able. Pain management.     For vital signs and complete assessments, please see documentation flowsheets.      Problem: Cardiovascular Surgery  Goal: Anesthesia/Sedation Recovery  2/13/2023 1758 by Jaleesa Agarwal, RN  Outcome: Progressing  2/13/2023 1758 by Jaleesa Agarwal RN  Outcome: Progressing  Intervention: Optimize Anesthesia Recovery  Recent Flowsheet Documentation  Taken 2/13/2023 1600 by Jaleesa Agarwal, RN  Safety Promotion/Fall Prevention: safety round/check completed   Goal Outcome Evaluation:

## 2023-02-14 NOTE — PLAN OF CARE
Pt has been cooperative with staff. Afebrile. PERRL. PRN oxycodone as needed. SR with occasional ectopy. MAPs stable. See flowsheets. Levo gtt to time. 2L via NC. Lungs clear/dim. See flowsheets for CT output and UOP. HD run completed. Ronnell pulled. Up to chair to time.

## 2023-02-14 NOTE — PROGRESS NOTES
02/14/23 1000   Appointment Info   Signing Clinician's Name / Credentials (PT) Breana Gama   Rehab Comments (PT) sternal precautions   Living Environment   People in Home sibling(s);child(florence), dependent  (brother and sister)   Current Living Arrangements house   Home Accessibility stairs to enter home;stairs within home   Number of Stairs, Main Entrance 5   Number of Stairs, Within Home, Primary greater than 10 stairs   Living Environment Comments Patient reports he lives with his brother and sister and their children in a multiple level home. His room is upstairs and the only bathroom is on the main floor. He goes to HD 3x per week and drives himself   Self-Care   Usual Activity Tolerance good   Current Activity Tolerance fair   Equipment Currently Used at Home none   Fall history within last six months no   Activity/Exercise/Self-Care Comment Independent community ambulator at baseline and does all own ADLS and IADLS. Has help as needed from family members. Reports he spends most of his time in his room on the 2nd floor.   General Information   Onset of Illness/Injury or Date of Surgery 02/13/23   Referring Physician Andres Brown   Patient/Family Therapy Goals Statement (PT) Get back home   Pertinent History of Current Problem (include personal factors and/or comorbidities that impact the POC) 67 year old male with a history of DM2, HTN, HLD, ESRD on dialysis, coronary artery disease, and tobacco use who is admitted after angiogram showed 80% LM disease, undergoing CABGx2 with Dr. Yepez on 2/13.   Existing Precautions/Restrictions sternal   General Observations On 1 pressor Levophed .18 on attempt   Cognition   Affect/Mental Status (Cognition) WNL   Follows Commands (Cognition) WNL   Safety Deficit (Cognition) safety precautions awareness   Cognitive Status Comments Patient demonstrates excellent adherence to precautions and asking appropriate questions throughout   Integumentary/Edema    Integumentary/Edema no deficits were identifed   Integumentary/Edema Comments Incisions covered and dry   Range of Motion (ROM)   ROM Comment WFL in BLEs   Strength (Manual Muscle Testing)   Strength Comments At least 3/5 demonstrates throughout LEs. Good functional strength   Bed Mobility   Comment, (Bed Mobility) Patient rolls with HOB ~30 degrees and rolls to R sidelying assisting with LLE- requires min A for sidelying to sit.   Transfers   Comment, (Transfers) Sit to stand with min A x2 for lines and balance.   Gait/Stairs (Locomotion)   Comment, (Gait/Stairs) NT d/t BP response   Balance   Balance Comments Requires min A for sitting EOB   Sensory Examination   Sensory Perception Comments NT   Coordination   Coordination Comments NT   Clinical Impression   Criteria for Skilled Therapeutic Intervention Yes, treatment indicated   PT Diagnosis (PT) Impaired functional mobility   Influenced by the following impairments Impaired BP response to vertical, fatigue, pain, weakness   Functional limitations due to impairments bed mobility, transfers, gait and stairs   Clinical Presentation (PT Evaluation Complexity) Evolving/Changing   Clinical Presentation Rationale Clinical judgment   Clinical Decision Making (Complexity) moderate complexity   Planned Therapy Interventions (PT) balance training;bed mobility training;gait training;neuromuscular re-education;ROM (range of motion);stair training;strengthening;transfer training;progressive activity/exercise;risk factor education;home program guidelines   Risk & Benefits of therapy have been explained evaluation/treatment results reviewed;care plan/treatment goals reviewed;risks/benefits reviewed;current/potential barriers reviewed;participants voiced agreement with care plan;participants included;patient   Clinical Impression Comments Patient presents with good functional strength but limited today by BP response in vertical positions. Anticipate with stabilization of BP  and LOS, patient will be able to go home with assistance and OP Cardiac Rehab though will need to trial stairs as lives in a multiple level home.   PT Total Evaluation Time   PT Scot, Moderate Complexity Minutes (46125) 7   Physical Therapy Goals   PT Frequency Daily   PT Predicted Duration/Target Date for Goal Attainment 02/21/23   PT Goals Bed Mobility;Transfers;Gait;Stairs;Aerobic Activity;Cardiac Phase 1   PT: Bed Mobility Independent;Supine to/from sit;Within precautions   PT: Transfers Independent;Sit to/from stand;Within precautions   PT: Gait Independent;Within precautions;Greater than 200 feet   PT: Stairs Modified independent;Greater than 10 stairs   PT: Perform aerobic activity with stable cardiovascular response continuous activity;10 minutes   PT: Understanding of cardiac education to maximize quality of life, condition management, and health outcomes Patient   PT: Functional/aerobic ambulation tolerance with stable cardiovascular response in order to return to home and community environment Independent;Greater than 300 feet   Interventions   Interventions Quick Adds Therapeutic Activity   Therapeutic Activity   Therapeutic Activities: dynamic activities to improve functional performance Minutes (39226) 24   Symptoms Noted During/After Treatment Significant change in vital signs   Treatment Detail/Skilled Intervention PT: Patient greeted supine in bed on 2 L via NC agreeable to session. On .18 of Levophed today with stable BP in supine. Patient educated on role of PT in the hospital and introduced to expectations for cardiac rehab following discharge- verbalizes is really close to [facility] hospital for OP CR. Patient instructed in sternal precautions in regards to mobility and and educated on need to follow for several months- verbalized understanding with excellent adherence throughout session. Patient rolls with min A assisting with LLE to roll to R sidelying following cues well. patient then performs  supine to sit with min A x1 at trunk with HOB ~40 degrees. Requires mod A initially to gain balance and scoot to EOB. Patient's BP stable in sitting following ~5 minutes to accommodate to position (no lower than MAP 60 and rebounds to 80s with sitting). Patient reports feeling good- PT, RN and NST all present to allow safely of lines- Attempted standing x1 requiring only min A x2 for balance and another for lines. Stood ~30 seconds but BP dropped to 69/43 with MAP of 49- RN reports team does not want to increase pressor thus returned to supine with mod A x2 then repositioned for skin integrity and pt comfort with dependent boost.VSS following transition to supine.   PT Discharge Planning   PT Plan PT: Progress to chair, pre-gait, gait if BP stablizes   PT Discharge Recommendation (DC Rec) home with outpatient cardiac rehab   PT Rationale for DC Rec Patient presents with good functional strength but limited today by BP response in vertical positions. Anticipate with stabilization of BP and LOS, patient will be able to go home with assistance and OP Cardiac Rehab though will need to trial stairs as lives in a multiple level home.   PT Brief overview of current status Min A x2 for lines and BP management   Total Session Time   Timed Code Treatment Minutes 24   Total Session Time (sum of timed and untimed services) 31

## 2023-02-14 NOTE — PROGRESS NOTES
"Pain Service Progress Note  [facility]  Date: 02/14/2023       Patient Name: Avelina Marion  MRN: [id number]  Age: 67 year old  Sex: male      Assessment:  Avelina Marion is a 67 year old male with PMH significant for T2DM with long term use of insulin, proliferative retinopathy, HTN, HLD, ESRD on dialysis, CAD, tobacco use, admitted after angiogram showed 80% LM disease    Procedure: s/p CABG x 2    Date of Surgery: 2/13/23     Date of Catheter Placement: 2/13/23     Plan/Recommendations:  1. Regional Anesthesia/Analgesia  -Continuous Catheter Type/Site: bilateral paravertebral (PV) T4-5  Infusate: Ropivacaine 0.2%  Programmed Intermittent Bolus (PIB) at 7 mL Q60 min via each catheter, total infusion rate of 14 mL/hr    Plan to maintain catheters for max of 7 days    2. Anticoagulation  Okay to give heparin SC per primary service orders  -Please contact Inpatient Pain Service before ordering or making any anticoagulation changes       3. Multimodal Analgesia  - per primary service    Pain Service will continue to follow.    Discussed with attending anesthesiologist    Please Page the Pain Team Via [employer]: \"PAIN MANAGEMENT ACUTE INPATIENT/ [facility]\"    Gloria Rodriguez, APRN CNP  02/14/2023     Overnight Events: None    Tubes/Drains: Yes  4 chest tubes  urethral catheter    Subjective:  I don't have much pain   Nausea: No  Vomiting: No  Symptoms of LAST: No    Pain Location:  Anterior chest wall    Pain Intensity:    Pain at Rest: 1/10   Pain with Activity: 3/10  Baseline Pain: NA   Satisfied with your level of pain control: Yes    Diet: Advance Diet as Tolerated: Clear Liquid Diet    Relevant Labs:  Recent Labs   Lab Test 02/14/23  0801 02/14/23  0358 02/13/23  1945 02/13/23  1432   INR  --   --   --  1.33*   PLT  --  184  --  221   PTT  --   --   --  33   BUN 51.6* 48.6*   < > 45.5*    < > = values in this interval not displayed.       Physical Exam:  Vitals: /72   Pulse " "69   Temp 99.1  F (37.3  C) (Axillary)   Resp 24   Ht 1.803 m (5' 11\")   Wt 97.2 kg (214 lb 4.6 oz)   SpO2 97%   BMI 29.89 kg/m      Physical Exam:   Orientation:  Alert, oriented, and in no acute distress: Yes  Sedation: No    Motor Examination:  5/5 Strength in lower extremities: Yes    Catheter Site:   Catheter entry site is clean/dry/intact: Yes    Tender: No      Relevant Medications:  Current Pain Medications:  Medications related to Pain Management (From now, onward)    Start     Dose/Rate Route Frequency Ordered Stop    02/16/23 0000  acetaminophen (TYLENOL) tablet 650 mg         650 mg Oral EVERY 4 HOURS PRN 02/13/23 1431 02/14/23 1007  lidocaine 1 % 0.5 mL         0.5 mL Intradermal ONCE PRN 02/14/23 1007 02/14/23 1007  lidocaine 1 % 0.5 mL         0.5 mL Intradermal ONCE PRN 02/14/23 1007 02/14/23 0800  polyethylene glycol (MIRALAX) Packet 17 g         17 g Oral DAILY 02/13/23 1431 02/14/23 0800  aspirin (ASA) chewable tablet 162 mg         162 mg Oral or NG Tube DAILY 02/13/23 1431 02/13/23 2200  gabapentin (NEURONTIN) capsule 200 mg         200 mg Oral or Feeding Tube AT BEDTIME 02/13/23 1431 02/13/23 2000  senna-docusate (SENOKOT-S/PERICOLACE) 8.6-50 MG per tablet 1 tablet         1 tablet Oral 2 TIMES DAILY 02/13/23 1431 02/13/23 1500  acetaminophen (TYLENOL) tablet 975 mg         975 mg Oral EVERY 8 HOURS 02/13/23 1431 02/16/23 1159    02/13/23 1500  dexmedetomidine (PRECEDEX) 400 mcg in 0.9% sodium chloride 100 mL         0.2-0.7 mcg/kg/hr × 90.4 kg  4.5-15.8 mL/hr  Intravenous CONTINUOUS 02/13/23 1431 02/13/23 1500  ropivacaine 0.2% in NS perineural infusion (simple)          Perineural Continuous Nerve Block 02/13/23 1442      02/13/23 1500  ropivacaine 0.2% in NS perineural infusion (simple)          Perineural Continuous Nerve Block 02/13/23 1442      02/13/23 1431  HYDROmorphone (DILAUDID) injection 0.1 mg        See Hyperspace for full Linked " Orders Report.    0.1 mg Intravenous EVERY 2 HOURS PRN 02/13/23 1431      02/13/23 1431  HYDROmorphone (DILAUDID) injection 0.2 mg        See Hyperspace for full Linked Orders Report.    0.2 mg Intravenous EVERY 2 HOURS PRN 02/13/23 1431      02/13/23 1431  oxyCODONE IR (ROXICODONE) half-tab 2.5 mg        See Hyperspace for full Linked Orders Report.    2.5 mg Oral EVERY 4 HOURS PRN 02/13/23 1431      02/13/23 1431  oxyCODONE (ROXICODONE) tablet 5 mg        See Hyperspace for full Linked Orders Report.    5 mg Oral EVERY 4 HOURS PRN 02/13/23 1431      02/13/23 1431  methocarbamol (ROBAXIN) tablet 500 mg         500 mg Oral or Feeding Tube EVERY 6 HOURS PRN 02/13/23 1431      02/13/23 1431  lidocaine 1 % 0.1-1 mL         0.1-1 mL Other EVERY 1 HOUR PRN 02/13/23 1431      02/13/23 1431  lidocaine (LMX4) cream          Topical EVERY 1 HOUR PRN 02/13/23 1431      02/13/23 1431  magnesium hydroxide (MILK OF MAGNESIA) suspension 30 mL         30 mL Oral DAILY PRN 02/13/23 1431      02/13/23 1431  bisacodyl (DULCOLAX) suppository 10 mg         10 mg Rectal DAILY PRN 02/13/23 1431            Primary Service Contacted with Recommendations? No      Please see A&P for additional details of medical decision making.

## 2023-02-14 NOTE — CONSULTS
Care Management Initial Consult    General Information  Assessment completed with: Patient,    Type of CM/SW Visit: Initial Assessment    Primary Care Provider verified and updated as needed: Yes   Readmission within the last 30 days: no previous admission in last 30 days      Reason for Consult: discharge planning  Advance Care Planning: Advance Care Planning Reviewed: no concerns identified          Communication Assessment  Patient's communication style: spoken language (English or Bilingual)    Hearing Difficulty or Deaf: no   Wear Glasses or Blind: no    Cognitive  Cognitive/Neuro/Behavioral: .WDL except  Level of Consciousness: alert  Arousal Level: opens eyes spontaneously  Orientation: oriented x 4  Mood/Behavior: calm, cooperative  Best Language: 0 - No aphasia  Speech: spontaneous, clear, logical    Living Environment:   People in home: sibling(s)     Current living Arrangements: house      Able to return to prior arrangements: yes   Patient reports he lives with his brother and sister and their children in a multiple level home. His room is upstairs and the only bathroom is on the main floor. He goes to  3x per week and drives himself    Family/Social Support:  Care provided by: self  Provides care for: no one  Marital Status: Single  Sibling(s)          Description of Support System: Supportive, Involved         Current Resources:   Patient receiving home care services: No     Community Resources: None  Equipment currently used at home: none  Supplies currently used at home: None    Employment/Financial:  Employment Status: retired        Financial Concerns: No concerns identified           Lifestyle & Psychosocial Needs:  Social Determinants of Health     Tobacco Use: Medium Risk     Smoking Tobacco Use: Former     Smokeless Tobacco Use: Never     Passive Exposure: Not on file   Alcohol Use: Not At Risk     Frequency of Alcohol Consumption: Never     Average Number of Drinks: Not asked     Frequency  of Binge Drinking: Never   Financial Resource Strain: Not on file   Food Insecurity: Not on file   Transportation Needs: Not on file   Physical Activity: Not on file   Stress: Not on file   Social Connections: Not on file   Intimate Partner Violence: Not on file   Depression: Not at risk     PHQ-2 Score: 0   Housing Stability: Not on file       Functional Status:  Prior to admission patient needed assistance:   Dependent ADLs:: Independent  Dependent IADLs:: Independent   Independent community ambulator at baseline and does all own ADLS and IADLS. Has help as needed from family members. Reports he spends most of his time in his room on the 2nd floor.    Mental Health Status:  Mental Health Status: No Current Concerns       Chemical Dependency Status:  Chemical Dependency Status: No Current Concerns             Values/Beliefs:  Spiritual, Cultural Beliefs, Shinto Practices, Values that affect care: no               Additional Information:  67 year old male with a history of DM2, HTN, HLD, ESRD on dialysis, coronary artery disease, and tobacco use who is admitted after angiogram showed 80% LM disease, undergoing CABGx2 with Dr. Yepez on 2/13.    SW reviewed chart and met with pt at bedside. Pt was agreeable to SW visit and was A&Ox4. Patient reports he lives with his brother and sister and their children in a multiple level home. His room is upstairs and the only bathroom is on the main floor. He goes to HD 3x per week and drives himself. He is retired from [employer]. He was driving prior to admission but cannot for a month so his sister can transport him to/from appointments. Reviewed PT recommendation for home with outpt rehab and pt is agreeable to this.    No discharge needs identified at this time. Pt denied any questions or concerns for SW.     Vira Otto, [facility]  Casual Adult Acute Care   4A and 4E Coverage  Ph: [phone]

## 2023-02-14 NOTE — PLAN OF CARE
Goal Outcome Evaluation:  Plan of Care Reviewed With: patient  Overall Patient Progress: improving    Major Shift Events: A total of 1L LR given for low CVP and high lactate. Epi and Norepi titrated to maintain MAP >65. Insulin gtt. Calcium replaced. Pain well controlled.     Plan: Wean pressors as able.   For vital signs and complete assessments, please see documentation flowsheets.     Mary Krishna RN  Hours cared for: [id number]

## 2023-02-14 NOTE — PROGRESS NOTES
HEMODIALYSIS TREATMENT NOTE    Date: 2/14/2023  Time: 1:58 PM    Data:  Pre Wt:     Desired Wt: 91.8 kg   Post Wt:    Weight change:   kg  Ultrafiltration - Post Run Net Total Removed (mL): 0 mL  Vascular Access Status: patent  Dialyzer Rinse: Light  Total Blood Volume Processed: 50 L Liters  Total Dialysis (Treatment) Time: 2HR Hours    Lab:   Yes  Hep B  Interventions:  TX ended, PT tolerated well. @HR no fluid removal    Assessment:  See flowsheet, MAR, and MD orders for further details     Plan:    Per quyen

## 2023-02-14 NOTE — PROGRESS NOTES
CV ICU PROGRESS NOTE  February 14, 2023   Avelina Marion  [id number]  Admitted: 2/7/2023  7:35 AM      ASSESSMENT: Avelina Marion is a 67 year old male with a history of DM2, HTN, HLD, ESRD on dialysis, coronary artery disease, and tobacco use who is admitted after angiogram showed 80% LM disease, undergoing CABGx2 with Dr. Yepez on 2/13.    CO-MORBIDITIES:   Abnormal cardiovascular stress test  (primary encounter diagnosis)  Pre-operative cardiovascular examination  Organ transplant candidate  Encounter for pre-transplant evaluation for kidney and pancreas transplant  Coronary atherosclerosis due to severely calcified coronary lesion  CAD (coronary artery disease)    24-Hour Events:  Arrived to unit yesterday PM. Extubated in OR. DDAVP given for CT output immediately postop, output slowed overnight. 1L LR given overnight for low CVP and lactate 2.4. Epi and norepi titrated up for MAP >65. Pain well controlled.     TODAY'S PROGRESS:   - Keep NE, wean Epi to off  - Keep insulin gtt  - iHD this AM per nephrology for hyperkalemia    PLAN:  Neuro/ pain/ sedation:  - Monitor neurological status. Notify the MD for any acute changes in exam.  #Acute postoperative pain  - Scheduled: Tylenol  - PRN: Tylenol, oxycodone, Dilaudid, Gabapentine  - Regional anesthesia: B/L paravertebrals     Pulmonary care:   #Remote tobacco use, quit 1985  Resp: 14  - Extubated in OR, on facemask   - Goal SpO2 > 92%  - Encourage IS q15-30 minutes when awake.     Cardiovascular:  #CABG x2, 2/14  #Cardiogenic shock  #CAD  #HTN  - Epi and norepi to maintained MAP>65  - Epi now off, continue NE due to vasoplegia, wean as able  - Goal MAP >65, SBP <140, monitor hemodynamics  - Hold PTA amlodipine 5 mg, Lipitor 20 mg  - Hold BB   -  mg: start today     GI care / Nutrition:   - Clear liquid diet, advance as tolerated  - PPI ppx  - Bowel regimen: MiraLAX, senna     Renal / Fluids / Electrolytes:   #ESRD, HD since 5/2021 ([facility])  #Kidney  Transplant Candidate  BL creat appears to be ~5-6  - Strict I/O, daily weights, avoid/limit nephrotoxins  - Replete lytes PRN per protocol  - Has PTA Calcitrol, PhosLo, CaCarb  - Makes urine, last dialyzed 2/12 preoperatively  - Nephrology initiated iHD this AM - if hemodynamically not tolerated, will consider CRRT line placement     Endocrine:    #Stress hyperglycemia  #DM2, insulin dependent  Preop A1c 7.0  - Insulin gtt > keep today  - Goal BG <180 for optimal healing     ID / Antibiotics:  #Stress induced leukocytosis  - To complete perioperative regimen  - Monitor fever curve, WBC, and inflammatory markers as appropriate     Heme:     #Acute blood loss anemia  #Acute blood loss thrombocytopenia  No s/sx active bleeding  - DDAVP + 1u pRBC given post op 2/2 high CT output. Output slowed overnight   - CBC   - Hgb goal > 7.0  - Hgb 9.7 this AM     MSK / Skin:  #Sternotomy  #Surgical Incision  - Sternal precautions, postop incision management protocol  - PT/OT/CR     Prophylaxis:     - Mechanical DVT ppx  - Chemical DVT ppx: start SQH today  - PPI     Lines / Tubes / Drains:  - RIJ CVC, PA catheter  - Arterial Line  - CTs x4 (2 meds, 2 pleural), V-epicardial wires  - Left AVF  - Uribe    Disposition:  - CVICU      Patient seen, findings and plan discussed with CVICU staff and cardiothoracic surgeons.    Guy Ruff, MS4   2/14/2023 at 6:34 AM      ====================================    TODAY'S PROGRESS  SUBJECTIVE  Pain well tolerated overnight. Comfortable on room air.  No other complaints per pt.    OBJECTIVE  1. VITAL SIGNS  Temp:  [98.1  F (36.7  C)-99.9  F (37.7  C)] 99.1  F (37.3  C)  Pulse:  [] 66  Resp:  [10-32] 20  MAP:  [57 mmHg-87 mmHg] 69 mmHg  Arterial Line BP: ()/(39-60) 119/48  SpO2:  [91 %-100 %] 97 %  Resp: 20      2. INTAKE/ OUTPUT  I/O last 3 completed shifts:  In: 7132.51 [P.O.:240; I.V.:3272.51; Other:270; IV Piggyback:3000]  Out: 1725 [Urine:673; Chest Tube:1052]    3. PHYSICAL  EXAMINATION    General: Upright in bed, NAD.  Neuro: grossly intact, A&Ox4  Resp: NC.Course to clear lung sounds  CV: sinus rhythm  Abdomen: Soft, non-distended, non-tender  Incisions: c/d/i  Extremities: warm and well perfused. edematous  CT: To suction, serosang output, no airleak      4. INVESTIGATIONS  Arterial Blood Gases   Recent Labs   Lab 02/14/23  0802 02/13/23  1946 02/13/23  1434 02/13/23  1324   PH 7.39 7.31* 7.35 7.36   PCO2 36 36 44 38   PO2 78* 84 136* 278*   HCO3 22 18* 24 21     Complete Blood Count   Recent Labs   Lab 02/14/23  0358 02/13/23  1945 02/13/23  1432 02/13/23  1324 02/13/23  1323 02/13/23  1043 02/11/23  0705   WBC 17.9*  --  23.2*  --  23.4*  --  10.0   HGB 9.7* 10.6* 10.9* 9.5* 10.0*   < > 13.0*     --  221  --  187  --  184    < > = values in this interval not displayed.     Basic Metabolic Panel  Recent Labs   Lab 02/14/23  1108 02/14/23  0947 02/14/23  0811 02/14/23  0801 02/14/23  0456 02/14/23  0358 02/14/23  0157 02/14/23  0111 02/14/23  0058 02/13/23  2356   NA  --   --   --  132*  --  133*  --  133*  --  133*   POTASSIUM  --   --   --  6.1*  --  5.1  --  4.7  --  5.0   CHLORIDE  --   --   --  99  --  99  --  101  --  99   CO2  --   --   --  19*  --  19*  --  18*  --  17*   BUN  --   --   --  51.6*  --  48.6*  --  44.6*  --  46.8*   CR  --   --   --  6.66*  --  6.17*  --  5.48*  --  5.87*   * 226* 211* 207*   < > 170*  184*   < > 191*   < > 201*    < > = values in this interval not displayed.     Liver Function Tests  Recent Labs   Lab 02/14/23  0111 02/13/23  2356 02/13/23  1432   AST 46 51* 36   ALT 10 11 12   ALKPHOS 35* 36* 47   BILITOTAL 0.3 0.3 0.4   ALBUMIN 2.9* 3.0* 3.6   INR  --   --  1.33*     Pancreatic Enzymes  No lab results found in last 7 days.  Coagulation Profile  Recent Labs   Lab 02/13/23  1432   INR 1.33*   PTT 33     Lactate  Invalid input(s): LACTATE    5. RADIOLOGY  Recent Results (from the past 24 hour(s))   XR Chest Port 1 View     Narrative    Portable chest    INDICATION: Postoperative CVTS surgery    COMPARISON: Chest CT 2/7/2023. Most recent plain film 8/9/2022    FINDINGS: Postoperative chest CABG procedure with interim placement of  cerclage wires from median sternotomy. Right IJ catheter tip is in the  interlobar pulmonary artery on the right. Heart size normal. Scattered  patchy opacities in the lungs likely indicate edema and/or atelectasis  typical of postoperative status. No visible pneumothorax.      Impression    IMPRESSION: Postoperative CABG procedure chest with mild  atelectasis/edema. [place]-Concha catheter tip appearing in the interlobar  pulmonary artery on the right.    BETZAIDA MUJICA MD         SYSTEM ID:  [id number]

## 2023-02-15 ENCOUNTER — APPOINTMENT (OUTPATIENT)
Dept: PHYSICAL THERAPY | Facility: CLINIC | Age: 68
DRG: 233 | End: 2023-02-15
Attending: INTERNAL MEDICINE
Payer: COMMERCIAL

## 2023-02-15 ENCOUNTER — APPOINTMENT (OUTPATIENT)
Dept: GENERAL RADIOLOGY | Facility: CLINIC | Age: 68
DRG: 233 | End: 2023-02-15
Attending: INTERNAL MEDICINE
Payer: COMMERCIAL

## 2023-02-15 LAB
ANION GAP SERPL CALCULATED.3IONS-SCNC: 11 MMOL/L (ref 7–15)
ANION GAP SERPL CALCULATED.3IONS-SCNC: 12 MMOL/L (ref 7–15)
ANION GAP SERPL CALCULATED.3IONS-SCNC: 16 MMOL/L (ref 7–15)
BUN SERPL-MCNC: 20.3 MG/DL (ref 8–23)
BUN SERPL-MCNC: 32 MG/DL (ref 8–23)
BUN SERPL-MCNC: 36.9 MG/DL (ref 8–23)
BUN SERPL-MCNC: 38.3 MG/DL (ref 8–23)
BUN SERPL-MCNC: 40.8 MG/DL (ref 8–23)
BURR CELLS BLD QL SMEAR: SLIGHT
CA-I BLD-MCNC: 4.3 MG/DL (ref 4.4–5.2)
CALCIUM SERPL-MCNC: 7.3 MG/DL (ref 8.8–10.2)
CALCIUM SERPL-MCNC: 7.5 MG/DL (ref 8.8–10.2)
CALCIUM SERPL-MCNC: 7.6 MG/DL (ref 8.8–10.2)
CALCIUM SERPL-MCNC: 7.9 MG/DL (ref 8.8–10.2)
CALCIUM SERPL-MCNC: 8.2 MG/DL (ref 8.8–10.2)
CHLORIDE SERPL-SCNC: 100 MMOL/L (ref 98–107)
CHLORIDE SERPL-SCNC: 101 MMOL/L (ref 98–107)
CHLORIDE SERPL-SCNC: 101 MMOL/L (ref 98–107)
CHLORIDE SERPL-SCNC: 102 MMOL/L (ref 98–107)
CHLORIDE SERPL-SCNC: 98 MMOL/L (ref 98–107)
CREAT SERPL-MCNC: 3.43 MG/DL (ref 0.67–1.17)
CREAT SERPL-MCNC: 4.84 MG/DL (ref 0.67–1.17)
CREAT SERPL-MCNC: 5.34 MG/DL (ref 0.67–1.17)
CREAT SERPL-MCNC: 5.5 MG/DL (ref 0.67–1.17)
CREAT SERPL-MCNC: 5.73 MG/DL (ref 0.67–1.17)
DEPRECATED HCO3 PLAS-SCNC: 20 MMOL/L (ref 22–29)
DEPRECATED HCO3 PLAS-SCNC: 22 MMOL/L (ref 22–29)
ERYTHROCYTE [DISTWIDTH] IN BLOOD BY AUTOMATED COUNT: 13.2 % (ref 10–15)
GFR SERPL CREATININE-BSD FRML MDRD: 10 ML/MIN/1.73M2
GFR SERPL CREATININE-BSD FRML MDRD: 11 ML/MIN/1.73M2
GFR SERPL CREATININE-BSD FRML MDRD: 11 ML/MIN/1.73M2
GFR SERPL CREATININE-BSD FRML MDRD: 13 ML/MIN/1.73M2
GFR SERPL CREATININE-BSD FRML MDRD: 19 ML/MIN/1.73M2
GLUCOSE BLDC GLUCOMTR-MCNC: 104 MG/DL (ref 70–99)
GLUCOSE BLDC GLUCOMTR-MCNC: 135 MG/DL (ref 70–99)
GLUCOSE BLDC GLUCOMTR-MCNC: 147 MG/DL (ref 70–99)
GLUCOSE BLDC GLUCOMTR-MCNC: 149 MG/DL (ref 70–99)
GLUCOSE BLDC GLUCOMTR-MCNC: 149 MG/DL (ref 70–99)
GLUCOSE BLDC GLUCOMTR-MCNC: 150 MG/DL (ref 70–99)
GLUCOSE BLDC GLUCOMTR-MCNC: 152 MG/DL (ref 70–99)
GLUCOSE BLDC GLUCOMTR-MCNC: 173 MG/DL (ref 70–99)
GLUCOSE BLDC GLUCOMTR-MCNC: 189 MG/DL (ref 70–99)
GLUCOSE BLDC GLUCOMTR-MCNC: 85 MG/DL (ref 70–99)
GLUCOSE SERPL-MCNC: 141 MG/DL (ref 70–99)
GLUCOSE SERPL-MCNC: 149 MG/DL (ref 70–99)
GLUCOSE SERPL-MCNC: 170 MG/DL (ref 70–99)
GLUCOSE SERPL-MCNC: 175 MG/DL (ref 70–99)
GLUCOSE SERPL-MCNC: 178 MG/DL (ref 70–99)
HCT VFR BLD AUTO: 22.1 % (ref 40–53)
HGB BLD-MCNC: 7.4 G/DL (ref 13.3–17.7)
HGB BLD-MCNC: 7.7 G/DL (ref 13.3–17.7)
MAGNESIUM SERPL-MCNC: 1.8 MG/DL (ref 1.7–2.3)
MCH RBC QN AUTO: 32.2 PG (ref 26.5–33)
MCHC RBC AUTO-ENTMCNC: 33.5 G/DL (ref 31.5–36.5)
MCV RBC AUTO: 96 FL (ref 78–100)
PHOSPHATE SERPL-MCNC: 4.1 MG/DL (ref 2.5–4.5)
PLAT MORPH BLD: ABNORMAL
PLATELET # BLD AUTO: 100 10E3/UL (ref 150–450)
POLYCHROMASIA BLD QL SMEAR: SLIGHT
POTASSIUM SERPL-SCNC: 3.7 MMOL/L (ref 3.4–5.3)
POTASSIUM SERPL-SCNC: 4.2 MMOL/L (ref 3.4–5.3)
POTASSIUM SERPL-SCNC: 4.6 MMOL/L (ref 3.4–5.3)
POTASSIUM SERPL-SCNC: 4.8 MMOL/L (ref 3.4–5.3)
POTASSIUM SERPL-SCNC: 5 MMOL/L (ref 3.4–5.3)
RBC # BLD AUTO: 2.3 10E6/UL (ref 4.4–5.9)
RBC MORPH BLD: ABNORMAL
SARS-COV-2 RNA RESP QL NAA+PROBE: NEGATIVE
SODIUM SERPL-SCNC: 132 MMOL/L (ref 136–145)
SODIUM SERPL-SCNC: 134 MMOL/L (ref 136–145)
SODIUM SERPL-SCNC: 134 MMOL/L (ref 136–145)
SODIUM SERPL-SCNC: 135 MMOL/L (ref 136–145)
SODIUM SERPL-SCNC: 138 MMOL/L (ref 136–145)
WBC # BLD AUTO: 12.1 10E3/UL (ref 4–11)

## 2023-02-15 PROCEDURE — 99232 SBSQ HOSP IP/OBS MODERATE 35: CPT | Mod: 24 | Performed by: CLINICAL NURSE SPECIALIST

## 2023-02-15 PROCEDURE — 80048 BASIC METABOLIC PNL TOTAL CA: CPT | Performed by: INTERNAL MEDICINE

## 2023-02-15 PROCEDURE — 250N000012 HC RX MED GY IP 250 OP 636 PS 637: Performed by: STUDENT IN AN ORGANIZED HEALTH CARE EDUCATION/TRAINING PROGRAM

## 2023-02-15 PROCEDURE — 250N000011 HC RX IP 250 OP 636: Performed by: STUDENT IN AN ORGANIZED HEALTH CARE EDUCATION/TRAINING PROGRAM

## 2023-02-15 PROCEDURE — 71045 X-RAY EXAM CHEST 1 VIEW: CPT

## 2023-02-15 PROCEDURE — 99231 SBSQ HOSP IP/OBS SF/LOW 25: CPT | Performed by: NURSE PRACTITIONER

## 2023-02-15 PROCEDURE — 250N000013 HC RX MED GY IP 250 OP 250 PS 637: Performed by: NURSE PRACTITIONER

## 2023-02-15 PROCEDURE — 200N000002 HC R&B ICU UMMC

## 2023-02-15 PROCEDURE — 97530 THERAPEUTIC ACTIVITIES: CPT | Mod: GP

## 2023-02-15 PROCEDURE — 99233 SBSQ HOSP IP/OBS HIGH 50: CPT | Mod: 24 | Performed by: INTERNAL MEDICINE

## 2023-02-15 PROCEDURE — 250N000013 HC RX MED GY IP 250 OP 250 PS 637: Performed by: STUDENT IN AN ORGANIZED HEALTH CARE EDUCATION/TRAINING PROGRAM

## 2023-02-15 PROCEDURE — 85018 HEMOGLOBIN: CPT | Performed by: NURSE PRACTITIONER

## 2023-02-15 PROCEDURE — 85027 COMPLETE CBC AUTOMATED: CPT | Performed by: INTERNAL MEDICINE

## 2023-02-15 PROCEDURE — 83735 ASSAY OF MAGNESIUM: CPT | Performed by: STUDENT IN AN ORGANIZED HEALTH CARE EDUCATION/TRAINING PROGRAM

## 2023-02-15 PROCEDURE — 82330 ASSAY OF CALCIUM: CPT | Performed by: STUDENT IN AN ORGANIZED HEALTH CARE EDUCATION/TRAINING PROGRAM

## 2023-02-15 PROCEDURE — 258N000003 HC RX IP 258 OP 636: Performed by: CLINICAL NURSE SPECIALIST

## 2023-02-15 PROCEDURE — 71045 X-RAY EXAM CHEST 1 VIEW: CPT | Mod: 26 | Performed by: RADIOLOGY

## 2023-02-15 PROCEDURE — 84100 ASSAY OF PHOSPHORUS: CPT | Performed by: STUDENT IN AN ORGANIZED HEALTH CARE EDUCATION/TRAINING PROGRAM

## 2023-02-15 PROCEDURE — U0005 INFEC AGEN DETEC AMPLI PROBE: HCPCS | Performed by: INTERNAL MEDICINE

## 2023-02-15 PROCEDURE — 634N000001 HC RX 634: Performed by: CLINICAL NURSE SPECIALIST

## 2023-02-15 PROCEDURE — 90937 HEMODIALYSIS REPEATED EVAL: CPT

## 2023-02-15 RX ORDER — BUMETANIDE 0.25 MG/ML
2 INJECTION INTRAMUSCULAR; INTRAVENOUS ONCE
Status: DISCONTINUED | OUTPATIENT
Start: 2023-02-15 | End: 2023-02-16

## 2023-02-15 RX ORDER — ATORVASTATIN CALCIUM 40 MG/1
40 TABLET, FILM COATED ORAL AT BEDTIME
Status: DISCONTINUED | OUTPATIENT
Start: 2023-02-15 | End: 2023-02-20 | Stop reason: HOSPADM

## 2023-02-15 RX ADMIN — EPOETIN ALFA-EPBX 4000 UNITS: 10000 INJECTION, SOLUTION INTRAVENOUS; SUBCUTANEOUS at 15:49

## 2023-02-15 RX ADMIN — ACETAMINOPHEN 975 MG: 325 TABLET, FILM COATED ORAL at 03:44

## 2023-02-15 RX ADMIN — INSULIN ASPART 1 UNITS: 100 INJECTION, SOLUTION INTRAVENOUS; SUBCUTANEOUS at 12:58

## 2023-02-15 RX ADMIN — SENNOSIDES AND DOCUSATE SODIUM 1 TABLET: 50; 8.6 TABLET ORAL at 20:29

## 2023-02-15 RX ADMIN — OXYCODONE HYDROCHLORIDE 2.5 MG: 5 TABLET ORAL at 18:10

## 2023-02-15 RX ADMIN — HEPARIN SODIUM 5000 UNITS: 5000 INJECTION, SOLUTION INTRAVENOUS; SUBCUTANEOUS at 03:44

## 2023-02-15 RX ADMIN — HEPARIN SODIUM 5000 UNITS: 5000 INJECTION, SOLUTION INTRAVENOUS; SUBCUTANEOUS at 20:29

## 2023-02-15 RX ADMIN — CALCIUM GLUCONATE 1 G: 20 INJECTION, SOLUTION INTRAVENOUS at 04:40

## 2023-02-15 RX ADMIN — SODIUM CHLORIDE 250 ML: 9 INJECTION, SOLUTION INTRAVENOUS at 15:46

## 2023-02-15 RX ADMIN — PANTOPRAZOLE SODIUM 40 MG: 40 TABLET, DELAYED RELEASE ORAL at 08:11

## 2023-02-15 RX ADMIN — SENNOSIDES AND DOCUSATE SODIUM 1 TABLET: 50; 8.6 TABLET ORAL at 08:11

## 2023-02-15 RX ADMIN — Medication: at 15:47

## 2023-02-15 RX ADMIN — ASPIRIN 81 MG 162 MG: 81 TABLET ORAL at 08:11

## 2023-02-15 RX ADMIN — MUPIROCIN 0.5 G: 20 OINTMENT TOPICAL at 08:12

## 2023-02-15 RX ADMIN — MUPIROCIN 0.5 G: 20 OINTMENT TOPICAL at 20:29

## 2023-02-15 RX ADMIN — INSULIN ASPART 1 UNITS: 100 INJECTION, SOLUTION INTRAVENOUS; SUBCUTANEOUS at 18:10

## 2023-02-15 RX ADMIN — OXYCODONE HYDROCHLORIDE 5 MG: 5 TABLET ORAL at 03:52

## 2023-02-15 RX ADMIN — ATORVASTATIN CALCIUM 40 MG: 40 TABLET, FILM COATED ORAL at 20:29

## 2023-02-15 RX ADMIN — OXYCODONE HYDROCHLORIDE 2.5 MG: 5 TABLET ORAL at 09:52

## 2023-02-15 RX ADMIN — SODIUM CHLORIDE 300 ML: 9 INJECTION, SOLUTION INTRAVENOUS at 15:46

## 2023-02-15 RX ADMIN — GABAPENTIN 200 MG: 100 CAPSULE ORAL at 22:08

## 2023-02-15 RX ADMIN — ACETAMINOPHEN 975 MG: 325 TABLET, FILM COATED ORAL at 20:29

## 2023-02-15 RX ADMIN — HEPARIN SODIUM 5000 UNITS: 5000 INJECTION, SOLUTION INTRAVENOUS; SUBCUTANEOUS at 11:33

## 2023-02-15 RX ADMIN — ACETAMINOPHEN 975 MG: 325 TABLET, FILM COATED ORAL at 11:33

## 2023-02-15 ASSESSMENT — ACTIVITIES OF DAILY LIVING (ADL)
ADLS_ACUITY_SCORE: 26
ADLS_ACUITY_SCORE: 25
ADLS_ACUITY_SCORE: 26
ADLS_ACUITY_SCORE: 26
ADLS_ACUITY_SCORE: 25
ADLS_ACUITY_SCORE: 25
ADLS_ACUITY_SCORE: 26
ADLS_ACUITY_SCORE: 25

## 2023-02-15 NOTE — PROGRESS NOTES
"Pain Service Progress Note  [facility]  Date: 02/15/2023       Patient Name: Avelina Marion  MRN: [id number]  Age: 67 year old  Sex: male      Assessment:  Avelina Marion is a 67 year old male with PMH significant for T2DM with long term use of insulin, proliferative retinopathy, HTN, HLD, ESRD on dialysis, CAD, tobacco use, admitted after angiogram showed 80% LM disease    Procedure: s/p CABG x 2    Date of Surgery: 2/13/23     Date of Catheter Placement: 2/13/23     Plan/Recommendations:  1. Regional Anesthesia/Analgesia  -Continuous Catheter Type/Site: bilateral paravertebral (PV) T4-5  Infusate: Ropivacaine 0.2%  Programmed Intermittent Bolus (PIB) at 7 mL Q60 min via each catheter, total infusion rate of 14 mL/hr    Plan to maintain catheters for max of 7 days    2. Anticoagulation  Okay to give heparin SC per primary service orders  -Please contact Inpatient Pain Service before ordering or making any anticoagulation changes       3. Multimodal Analgesia  - per primary service    Pain Service will continue to follow.    Discussed with attending anesthesiologist    Please Page the Pain Team Via Amcom: \"PAIN MANAGEMENT ACUTE INPATIENT/ [facility]\"    Gloria Rodriguez, APRN CNP  02/15/2023     Overnight Events: No acute events    Tubes/Drains: Yes  4 chest tubes    Subjective:  I'm not really having any pain  Reports incisional \"not really noticeable\" and has mild soreness at chest tube sites.    Nausea: No  Vomiting: No  Symptoms of LAST: No    Pain Location:  Incision, anterior chest wall, chest tube sites    Pain Intensity:    Pain at Rest: 0/10   Pain with Activity: 2-3/10  Comfort Goal: 3/10   Baseline Pain: 0/10   Satisfied with your level of pain control: Yes    Diet: Consistent Carbohydrate Diet Moderate Consistent Carb (60 g CHO per Meal) Diet    Relevant Labs:  Recent Labs   Lab Test 02/15/23  1143 02/15/23  0757 02/15/23  0342 02/13/23  1945 02/13/23  1432   INR  --   " "--   --   --  1.33*   PLT  --   --  100*   < > 221   PTT  --   --   --   --  33   BUN 40.8*   < > 36.9*   < > 45.5*    < > = values in this interval not displayed.       Physical Exam:  Vitals: /55   Pulse 69   Temp 98.3  F (36.8  C) (Oral)   Resp 20   Ht 1.803 m (5' 11\")   Wt 97.2 kg (214 lb 4.6 oz)   SpO2 94%   BMI 29.89 kg/m      Physical Exam:   Orientation:  Alert, oriented, and in no acute distress: Yes  Sedation: No    Motor Examination:  5/5 Strength in lower extremities: Yes    Catheter Site:   Catheter entry site is clean/dry/intact: Yes    Tender: No      Relevant Medications:  Current Pain Medications:  Medications related to Pain Management (From now, onward)    Start     Dose/Rate Route Frequency Ordered Stop    02/16/23 0000  acetaminophen (TYLENOL) tablet 650 mg         650 mg Oral EVERY 4 HOURS PRN 02/13/23 1431      02/15/23 1002  lidocaine 1 % 0.5 mL         0.5 mL Intradermal ONCE PRN 02/15/23 1002      02/15/23 1002  lidocaine 1 % 0.5 mL         0.5 mL Intradermal ONCE PRN 02/15/23 1002      02/14/23 0800  polyethylene glycol (MIRALAX) Packet 17 g         17 g Oral DAILY 02/13/23 1431 02/14/23 0800  aspirin (ASA) chewable tablet 162 mg         162 mg Oral or NG Tube DAILY 02/13/23 1431 02/13/23 2200  gabapentin (NEURONTIN) capsule 200 mg         200 mg Oral or Feeding Tube AT BEDTIME 02/13/23 1431 02/13/23 2000  senna-docusate (SENOKOT-S/PERICOLACE) 8.6-50 MG per tablet 1 tablet         1 tablet Oral 2 TIMES DAILY 02/13/23 1431 02/13/23 1500  acetaminophen (TYLENOL) tablet 975 mg         975 mg Oral EVERY 8 HOURS 02/13/23 1431 02/16/23 1159    02/13/23 1500  ropivacaine 0.2% in NS perineural infusion (simple)          Perineural Continuous Nerve Block 02/13/23 1442      02/13/23 1500  ropivacaine 0.2% in NS perineural infusion (simple)          Perineural Continuous Nerve Block 02/13/23 1442      02/13/23 1431  oxyCODONE IR (ROXICODONE) half-tab 2.5 mg        See " Hyperspace for full Linked Orders Report.    2.5 mg Oral EVERY 4 HOURS PRN 02/13/23 1431      02/13/23 1431  oxyCODONE (ROXICODONE) tablet 5 mg        See Hyperspace for full Linked Orders Report.    5 mg Oral EVERY 4 HOURS PRN 02/13/23 1431      02/13/23 1431  methocarbamol (ROBAXIN) tablet 500 mg         500 mg Oral or Feeding Tube EVERY 6 HOURS PRN 02/13/23 1431      02/13/23 1431  lidocaine 1 % 0.1-1 mL         0.1-1 mL Other EVERY 1 HOUR PRN 02/13/23 1431      02/13/23 1431  lidocaine (LMX4) cream          Topical EVERY 1 HOUR PRN 02/13/23 1431      02/13/23 1431  magnesium hydroxide (MILK OF MAGNESIA) suspension 30 mL         30 mL Oral DAILY PRN 02/13/23 1431      02/13/23 1431  bisacodyl (DULCOLAX) suppository 10 mg         10 mg Rectal DAILY PRN 02/13/23 1431            Primary Service Contacted with Recommendations? Yes      Please see A&P for additional details of medical decision making.

## 2023-02-15 NOTE — PROGRESS NOTES
ICU End of Shift Summary. See flowsheets for vital signs and detailed assessment.    Changes this shift: A/Ox4, pleasant, moving all extremities independently, pupils PERRLA. Taking scheduled Tylenol for pain. Remains on Levo gtt for MAPs >65, sinus rhythm. Continues on 2 L NC for sleeping. No BM, having low urine output via lim - team aware.     Plan: Continue to wean vasopressors. Wean oxygen once more awake.

## 2023-02-15 NOTE — PROGRESS NOTES
Infection Prevention Medical Director Note:     This patient resides or resided on a unit that has demonstrated an increased incidence of hospital-onset COVID-19. The response to this increase includes COVID testing of patients on the same unit that are not known to have had COVID-19 in the last 90 days.   The baseline test for this patient was negative on 2/9, which prompts re-testing 5-7 days from baseline test. I have placed the COVID re-test.     Please reach out to me with any questions.     Indy Roland MD   of Medicine, Division of Infectious Diseases  Contact me on the [place] corazon or console  [facility] [phone]

## 2023-02-15 NOTE — PROGRESS NOTES
Care Management Brief Note:    Care Management received message to call Danielle @ [phone] who is a [employer] care coordinator for pt. Danielle asked what the recommendations are for pt upon discharge. VAN shared that home with outpatient cardiac rehab is currently being recommended. Danielel asked if there will be a need for any services in the home. VAN could not think of any but shared direct line and asked that Danielle check back in 2-3 days. Danielle shared she would check back and would appreciate a phone update if pt discharges from the hospital before her next call.    FLOR Perez   [facility]   Phone: [phone]

## 2023-02-15 NOTE — PROGRESS NOTES
Nephrology Progress Note  02/15/2023       Avelina Marion is a 67 yom with hx of ESRD since 4/2021 who is admitted to [facility] for CABG as part of kidney transplant workup.  Had angiogram with only mild CAD 12/2021 but had stress test in Dec 2022 showing ST depression, updated angio showed 80% LM (unclear why such rapid progression) and is now planned for CABG. Still makes UOP, runs MWF dialysis and had run 2/6 prior to admission.  Nephrology consulted for management of RRT with plan for CABG on 2/9.      Interval History :   Mr Marion continues to recover POD #2 CABG, short run yesterday for clearance particularly since K was ~6.  Had planned for day off today but has some pulm edema on CXR so planning on running today and he will now be on MWF schedule.  Off of pressors this am and actually a bit hypertensive so will try to pull 2-3L.       Assessment & Recommendations:   ESRD-Since 4/2021, runs MWF under care of Dr Kummer Davita Phalen.  L arm fistula access.  Accepted for renal tx and actually has had some offers but turned down due to higher risk then needed mohs surgery and now CABG as part of transplant workup.  Had HD 2/12 in preparation for CABG.  I did previously let him know that there is a chance we need to place catheter for CRRT should he be unstable and need RRT post-op which he understood and agreed with but will try to stay with iHD modality.                   -HD today for volume with some pulm edema, 3h/2-3L.                  -Access is L arm fistula, would need catheter if CRRT is needed but so far appears that we will be able to manage with iHD.                 -No need for new dialysis consent as he is ESRD on HD as outpt.           Volume-Some pulm edema on CXR, makes UOP but will plan to run HD to get him back on MWF schedule and help pulm standpoint.       Electrolytes-K 4.8, bicarb 22, Na 134, no acute issues.     BMD-Ca 7.6, Mg and Phos not checked today.      Anemia-Hgb 7.4, baseline is ~12,  "will check iron and consider MCKENNA as course progresses.       Nutrition-No issues, good appetite prior to surgery.       Time spent: 20 minutes on this date of encounter for chart review, physical exam, medical decision making and co-ordination of care.      Discussed with Dr Leonard     Recommendations were communicated to primary team via note     KWASI Lara CNS  Clinical Nurse Specialist  [phone]    Review of Systems:   I reviewed the following systems:  Gen: No fevers or chills  CV: No CP at rest  Resp: No SOB at rest  GI: No N/V    Physical Exam:   I/O last 3 completed shifts:  In: 1272.64 [P.O.:440; I.V.:832.64]  Out: 1743 [Urine:885; Chest Tube:858]   /51   Pulse 73   Temp 98.3  F (36.8  C) (Oral)   Resp 29   Ht 1.803 m (5' 11\")   Wt 97.2 kg (214 lb 4.6 oz)   SpO2 96%   BMI 29.89 kg/m       GENERAL APPEARANCE: Extubated post surgery, still on pressor.    EYES: no scleral icterus  NECK:  no JVD  Pulmonary: lungs clear to auscultation with equal breath sounds bilaterally, no clubbing or cyanosis  CV: Regular rhythm, normal rate, no rub   - Edema none  GI: soft, nontender, normal bowel sounds  MS: no evidence of inflammation in joints, no muscle tenderness  :  Uribe  SKIN: no rash, warm, dry  NEURO: mentation intact and speech normal  ACCESS: L arm fistula    Labs:   All labs reviewed by me  Electrolytes/Renal -   Recent Labs   Lab Test 02/15/23  1147 02/15/23  1019 02/15/23  0801 02/15/23  0757 02/15/23  0344 02/15/23  0342 02/14/23  2004 02/14/23  1959 02/14/23  0456 02/14/23  0358 02/13/23  1624 02/13/23  1432   NA  --   --   --  134*  --  135*  --  138   < > 133*   < > 135*   POTASSIUM  --   --   --  4.8  --  4.6  --  4.2   < > 5.1   < > 4.4   CHLORIDE  --   --   --  101  --  101  --  102   < > 99   < > 97*   CO2  --   --   --  22  --  22  --  20*   < > 19*   < > 22   BUN  --   --   --  38.3*  --  36.9*  --  32.0*   < > 48.6*   < > 45.5*   CR  --   --   --  5.50*  --  5.34*  --  " 4.84*   < > 6.17*   < > 5.79*   * 147* 152* 149*   < > 141*   < > 170*   < > 170*  184*   < > 146*  138*   DAVID  --   --   --  7.6*  --  7.3*  --  7.9*   < > 7.9*   < > 7.6*   MAG  --   --   --   --   --  1.8  --   --   --  1.8  --  2.4*   PHOS  --   --   --   --   --  4.1  --   --   --  2.4*  --  2.4*    < > = values in this interval not displayed.       CBC -   Recent Labs   Lab Test 02/15/23  0342 02/14/23  0358 02/13/23  1945 02/13/23  1432   WBC 12.1* 17.9*  --  23.2*   HGB 7.4* 9.7* 10.6* 10.9*   * 184  --  221       LFTs -   Recent Labs   Lab Test 02/14/23  0111 02/13/23  2356 02/13/23  1432   ALKPHOS 35* 36* 47   BILITOTAL 0.3 0.3 0.4   ALT 10 11 12   AST 46 51* 36   PROTTOTAL 4.5* 4.8* 5.5*   ALBUMIN 2.9* 3.0* 3.6       Iron Panel - No lab results found.        Current Medications:    sodium chloride 0.9%  250 mL Intravenous Once in dialysis/CRRT     sodium chloride 0.9%  300 mL Hemodialysis Machine Once     acetaminophen  975 mg Oral Q8H     aspirin  162 mg Oral or NG Tube Daily     atorvastatin  40 mg Oral At Bedtime     bumetanide  2 mg Intravenous Once     epoetin martin-epbx  4,000 Units Intravenous Once in dialysis/CRRT     gabapentin  200 mg Oral or Feeding Tube At Bedtime     heparin ANTICOAGULANT  5,000 Units Subcutaneous Q8H     lactated ringers  500 mL Intravenous Once     lactated ringers  500 mL Intravenous Once     mupirocin  0.5 g Both Nostrils BID     - MEDICATION INSTRUCTIONS -   Does not apply Once     pantoprazole  40 mg Oral or NG Tube Daily    Or     pantoprazole  40 mg Oral Daily     polyethylene glycol  17 g Oral Daily     senna-docusate  1 tablet Oral BID     sodium chloride (PF)  3 mL Intracatheter Q8H       dextrose       insulin regular 1 Units/hr (02/15/23 1200)     norepinephrine Stopped (02/15/23 1015)     BETA BLOCKER NOT PRESCRIBED       ropivacaine 500 mg (02/14/23 2246)     ropivacaine 500 mg (02/14/23 2245)     - MEDICATION INSTRUCTIONS -

## 2023-02-15 NOTE — PROGRESS NOTES
HEMODIALYSIS TREATMENT NOTE    Date: 2/15/2023  Time: 5:47 PM    Data:  Pre Wt:    97.2 kg est (2/14/2023)  Desired Wt: less than 2-3 kg   Post Wt:  -3 kg  Weight change: 3  kg  Ultrafiltration - Post Run Net Total Removed (mL): 3000 mL  Vascular Access Status: patent  Dialyzer Rinse: light streaked  Total Blood Volume Processed:75.5 Liters  Total Dialysis (Treatment) Time:3Hours  Run with K2Ca3 bath via AVF on the left upper arm, used 15g.  and .  No issues on both lines.   Bleeding time:held arterial for 7 mins and venous 5 mins    Lab:   No    AssessmentInterventions:  Received pt A & O x 4, calm, cooperative, denies pain.     Meds given: Epoetin 4,000 units IV.     See MAR and flowsheet for further details.    Completed and tolerated the HD tx without complication.     Handoff report given to Sherron MARTÍNEZ RN. Endorsed pt with stable condition.      Plan:    Per renal team

## 2023-02-15 NOTE — PLAN OF CARE
Goal Outcome Evaluation:      Plan of Care Reviewed With: patient    Overall Patient Progress: improvingOverall Patient Progress: improving    Outcome Evaluation: Levo titrated off this am. Walked in acevedo with nursing staff.    Major Shift Events: Levo titrated off this am. When standing to transfer to the chair with therapy, MAPs dropped to low 50s, levo needed to be restarted briefly until patient was sitting. Walked with nursing staff from room to nurse's station. Stopped due to MAPs dropping to the low 50s and patient feeling lightheaded. iHD run this afternoon, 3L pulled. Uribe out, due to void. Pacer wires pulled by CVTS. Transitioned from insulin gtt to ACHS sliding scale insulin.     Plan: Monitor for hypotension and work with therapies. Possibly get transfer orders tomorrow.     For vital signs and complete assessments, please see documentation flowsheets.

## 2023-02-15 NOTE — PROGRESS NOTES
CV ICU PROGRESS NOTE  February 14, 2023   Avelina Marion  [id number]  Admitted: 2/7/2023  7:35 AM      ASSESSMENT: Avelina Marion is a 67 year old male with a history of DM2, HTN, HLD, ESRD on dialysis, coronary artery disease, and tobacco use who is admitted after angiogram showed 80% LM disease, undergoing CABGx2 with Dr. Yepez on 2/13.    CO-MORBIDITIES:   Abnormal cardiovascular stress test  (primary encounter diagnosis)  Pre-operative cardiovascular examination  Organ transplant candidate  Encounter for pre-transplant evaluation for kidney and pancreas transplant  Coronary atherosclerosis due to severely calcified coronary lesion  CAD (coronary artery disease)    24-Hour Events:  2hr dialysis run yesterday, no fluid removal and well tolerated. Lima pulled. Remains on norepi overnight for MAPs >65. Continued low UOP (pt's baseline). Pain well controlled.     TODAY'S PROGRESS:   Norepi off this AM  IS, OOB, PT / encourage movement  Nephrology recs re next dialysis - maybe today   iHD vs Bumex challenge today w/ 1-2L net neg goal 2/2 congestion on CXR this AM   Switch to high dose SSI  Recheck Hgb this afternoon, will need 1-2u pRBC overnight  Consider pleural CT removal tomorrow  Lipitor 20 > 40mg daily  S/p Bumax 2mg 2/15  S/p Epoetin 4000unit today    PLAN:  Neuro/ pain/ sedation:  - Monitor neurological status. Notify the MD for any acute changes in exam.  #Acute postoperative pain  - Scheduled: Tylenol, gabapentin HS  - PRN: Tylenol, oxycodone  - Regional anesthesia: B/L paravertebrals     Pulmonary care:   #Remote tobacco use, quit 1985  Resp: 14  - Extubated in OR, on NC since  - Goal SpO2 > 92%  - Encourage IS q15-30 minutes when awake  - OOB, PT / encourage movement     Cardiovascular:  #CABG x2, 2/14  #Cardiogenic shock  #Hx CAD  #Hx HTN  - Norepi overnight to maintain MAP>65, off this AM  - Goal MAP >65, SBP <140, monitor hemodynamics  -  mg  - Lipitor 20 mg > 40 mg today  - Hold BB   - Hold  PTA amlodipine 5 mg       GI care / Nutrition:   - Low carb diet today 2/2 DMII  - PPI ppx  - Passing gas, no BM since surgery  - Bowel regimen: MiraLAX, senna      Renal / Fluids / Electrolytes:   #ESRD, HD since 5/2021 ([facility])  #Kidney Transplant Candidate  BL creat appears to be ~5-6  - Nephrology following: iHD vs Bumex challenge today 2/2 congestion on CXR this AM  - 2 hr iHD run 2/14, no fluid pulled. Well tolerated.  - Strict I/O, daily weights, avoid/limit nephrotoxins  - Replete lytes PRN per protocol  - Has PTA Calcitrol, PhosLo, CaCarb  - Makes minimal urine baseline  - Pull lim today     Endocrine:    #Stress hyperglycemia  #DM2, insulin dependent  Preop A1c 7.0  - Insulin gtt: 70u yesterday, 3u since midnight  - Stop insulin gtt > HDSSI today  - 20mg Lantus daily at baseline, consider restarting if high HDSSI usage  - Goal BG <180 for optimal healing     ID / Antibiotics:  #Stress induced leukocytosis  - Completed perioperative regimen  - Monitor fever curve, WBC, and inflammatory markers as appropriate     Heme:     #Acute blood loss anemia  #Acute blood loss thrombocytopenia  Baseline hgb ~13  - Hgb 9.7 > 7.4, likely 2/2 combination CT output yesterday AM/iHD/SQH initiation  - No s/sx active bleeding   - 1600 hgb recheck today  - Hgb goal > 7.0  - S/p DDAVP + 1u pRBC post op 2/2 high CT output     MSK / Skin:  #Sternotomy  #Surgical Incision  - Sternal precautions, postop incision management protocol  - PT/OT/CR     Prophylaxis:     - Mechanical DVT ppx  - Chemical DVT ppx: SQH   - PPI     Lines / Tubes / Drains:  - RIJ CVC  - Arterial Line (PULL TODAY)  - CTs x4 (2 meds, 2 pleural)  - V-epicardial wires (PULL TODAY)  - Lim (PULL TODAY)  - Left AVF    Disposition:  - CVICU, potential transfer to floor      Patient seen, findings and plan discussed with CVICU staff and cardiothoracic surgeons.    Guy Ruff, MS4   2/15/2023 at 7:09 AM      ====================================    SUBJECTIVE  Pain  well tolerated overnight. Tolerated iHD yesterday without complication. Pt requesting lines out.     OBJECTIVE  1. VITAL SIGNS  Temp:  [97.7  F (36.5  C)-99.1  F (37.3  C)] 98.2  F (36.8  C)  Pulse:  [63-83] 72  Resp:  [5-42] 23  MAP:  [62 mmHg-92 mmHg] 74 mmHg  Arterial Line BP: (105-163)/(31-60) 127/49  SpO2:  [90 %-100 %] 94 %  Resp: 23      2. INTAKE/ OUTPUT  I/O last 3 completed shifts:  In: 1542.3 [P.O.:530; I.V.:1012.3]  Out: 1865 [Urine:815; Chest Tube:1050]    3. PHYSICAL EXAMINATION    General: Upright in bed, NAD.  Neuro: grossly intact, A&Ox4  Resp: NC. Course to clear lung sounds,   CV: sinus rhythm  Abdomen: Soft, non-distended, non-tender  Incisions: c/d/i  Extremities: warm and well perfused. edematous  CT: To suction, serosang output, no airleak      4. INVESTIGATIONS  Arterial Blood Gases   Recent Labs   Lab 02/14/23  0802 02/13/23  1946 02/13/23  1434 02/13/23  1324   PH 7.39 7.31* 7.35 7.36   PCO2 36 36 44 38   PO2 78* 84 136* 278*   HCO3 22 18* 24 21     Complete Blood Count   Recent Labs   Lab 02/15/23  0342 02/14/23  0358 02/13/23  1945 02/13/23  1432 02/13/23  1324 02/13/23  1323   WBC 12.1* 17.9*  --  23.2*  --  23.4*   HGB 7.4* 9.7* 10.6* 10.9*   < > 10.0*   * 184  --  221  --  187    < > = values in this interval not displayed.     Basic Metabolic Panel  Recent Labs   Lab 02/15/23  0531 02/15/23  0344 02/15/23  0342 02/15/23  0206 02/14/23  2004 02/14/23  1959 02/14/23  1413 02/14/23  1402 02/14/23  0811 02/14/23  0801   NA  --   --  135*  --   --  138  --  136  --  132*   POTASSIUM  --   --  4.6  --   --  4.2  --  4.1  --  6.1*   CHLORIDE  --   --  101  --   --  102  --  99  --  99   CO2  --   --  22  --   --  20*  --  25  --  19*   BUN  --   --  36.9*  --   --  32.0*  --  29.8*  --  51.6*   CR  --   --  5.34*  --   --  4.84*  --  4.32*  --  6.66*   * 149* 141* 85   < > 170*   < > 150*   < > 207*    < > = values in this interval not displayed.     Liver Function  Tests  Recent Labs   Lab 02/14/23  0111 02/13/23  2356 02/13/23  1432   AST 46 51* 36   ALT 10 11 12   ALKPHOS 35* 36* 47   BILITOTAL 0.3 0.3 0.4   ALBUMIN 2.9* 3.0* 3.6   INR  --   --  1.33*     Pancreatic Enzymes  No lab results found in last 7 days.  Coagulation Profile  Recent Labs   Lab 02/13/23  1432   INR 1.33*   PTT 33     Lactate  Invalid input(s): LACTATE    5. RADIOLOGY  Recent Results (from the past 24 hour(s))   XR Chest Port 1 View    Narrative    Portable chest    INDICATION: Postoperative CVTS surgery    COMPARISON: Chest CT 2/7/2023. Most recent plain film 8/9/2022    FINDINGS: Postoperative chest CABG procedure with interim placement of  cerclage wires from median sternotomy. Right IJ catheter tip is in the  interlobar pulmonary artery on the right. Heart size normal. Scattered  patchy opacities in the lungs likely indicate edema and/or atelectasis  typical of postoperative status. No visible pneumothorax.      Impression    IMPRESSION: Postoperative CABG procedure chest with mild  atelectasis/edema. Willis-Concha catheter tip appearing in the interlobar  pulmonary artery on the right.    BETZAIDA MUJICA MD         SYSTEM ID:  [id number]

## 2023-02-16 ENCOUNTER — APPOINTMENT (OUTPATIENT)
Dept: OCCUPATIONAL THERAPY | Facility: CLINIC | Age: 68
DRG: 233 | End: 2023-02-16
Attending: STUDENT IN AN ORGANIZED HEALTH CARE EDUCATION/TRAINING PROGRAM
Payer: COMMERCIAL

## 2023-02-16 ENCOUNTER — APPOINTMENT (OUTPATIENT)
Dept: GENERAL RADIOLOGY | Facility: CLINIC | Age: 68
DRG: 233 | End: 2023-02-16
Attending: INTERNAL MEDICINE
Payer: COMMERCIAL

## 2023-02-16 ENCOUNTER — APPOINTMENT (OUTPATIENT)
Dept: PHYSICAL THERAPY | Facility: CLINIC | Age: 68
DRG: 233 | End: 2023-02-16
Attending: INTERNAL MEDICINE
Payer: COMMERCIAL

## 2023-02-16 LAB
ABO/RH(D): NORMAL
ANION GAP SERPL CALCULATED.3IONS-SCNC: 10 MMOL/L (ref 7–15)
ANION GAP SERPL CALCULATED.3IONS-SCNC: 11 MMOL/L (ref 7–15)
ANION GAP SERPL CALCULATED.3IONS-SCNC: 11 MMOL/L (ref 7–15)
ANTIBODY SCREEN: NEGATIVE
BLD PROD TYP BPU: NORMAL
BLOOD COMPONENT TYPE: NORMAL
BUN SERPL-MCNC: 24.3 MG/DL (ref 8–23)
BUN SERPL-MCNC: 25.9 MG/DL (ref 8–23)
BUN SERPL-MCNC: 29.8 MG/DL (ref 8–23)
CA-I BLD-MCNC: 3.9 MG/DL (ref 4.4–5.2)
CA-I BLD-MCNC: 4.6 MG/DL (ref 4.4–5.2)
CALCIUM SERPL-MCNC: 7.8 MG/DL (ref 8.8–10.2)
CALCIUM SERPL-MCNC: 8 MG/DL (ref 8.8–10.2)
CALCIUM SERPL-MCNC: 8.4 MG/DL (ref 8.8–10.2)
CHLORIDE SERPL-SCNC: 97 MMOL/L (ref 98–107)
CHLORIDE SERPL-SCNC: 98 MMOL/L (ref 98–107)
CHLORIDE SERPL-SCNC: 98 MMOL/L (ref 98–107)
CODING SYSTEM: NORMAL
CREAT SERPL-MCNC: 3.87 MG/DL (ref 0.67–1.17)
CREAT SERPL-MCNC: 4.1 MG/DL (ref 0.67–1.17)
CREAT SERPL-MCNC: 4.66 MG/DL (ref 0.67–1.17)
CROSSMATCH: NORMAL
DEPRECATED HCO3 PLAS-SCNC: 25 MMOL/L (ref 22–29)
ERYTHROCYTE [DISTWIDTH] IN BLOOD BY AUTOMATED COUNT: 12.9 % (ref 10–15)
FERRITIN SERPL-MCNC: 1461 NG/ML (ref 31–409)
GFR SERPL CREATININE-BSD FRML MDRD: 13 ML/MIN/1.73M2
GFR SERPL CREATININE-BSD FRML MDRD: 15 ML/MIN/1.73M2
GFR SERPL CREATININE-BSD FRML MDRD: 16 ML/MIN/1.73M2
GLUCOSE BLDC GLUCOMTR-MCNC: 148 MG/DL (ref 70–99)
GLUCOSE BLDC GLUCOMTR-MCNC: 172 MG/DL (ref 70–99)
GLUCOSE BLDC GLUCOMTR-MCNC: 179 MG/DL (ref 70–99)
GLUCOSE BLDC GLUCOMTR-MCNC: 234 MG/DL (ref 70–99)
GLUCOSE SERPL-MCNC: 149 MG/DL (ref 70–99)
GLUCOSE SERPL-MCNC: 160 MG/DL (ref 70–99)
GLUCOSE SERPL-MCNC: 173 MG/DL (ref 70–99)
HCT VFR BLD AUTO: 21.8 % (ref 40–53)
HGB BLD-MCNC: 7 G/DL (ref 13.3–17.7)
IRON BINDING CAPACITY (ROCHE): 121 UG/DL (ref 240–430)
IRON SATN MFR SERPL: 20 % (ref 15–46)
IRON SERPL-MCNC: 24 UG/DL (ref 61–157)
ISSUE DATE AND TIME: NORMAL
MAGNESIUM SERPL-MCNC: 1.4 MG/DL (ref 1.7–2.3)
MCH RBC QN AUTO: 32 PG (ref 26.5–33)
MCHC RBC AUTO-ENTMCNC: 32.1 G/DL (ref 31.5–36.5)
MCV RBC AUTO: 100 FL (ref 78–100)
PHOSPHATE SERPL-MCNC: 3.1 MG/DL (ref 2.5–4.5)
PLATELET # BLD AUTO: 99 10E3/UL (ref 150–450)
POTASSIUM SERPL-SCNC: 4 MMOL/L (ref 3.4–5.3)
POTASSIUM SERPL-SCNC: 4.1 MMOL/L (ref 3.4–5.3)
POTASSIUM SERPL-SCNC: 4.3 MMOL/L (ref 3.4–5.3)
RBC # BLD AUTO: 2.19 10E6/UL (ref 4.4–5.9)
SODIUM SERPL-SCNC: 133 MMOL/L (ref 136–145)
SODIUM SERPL-SCNC: 133 MMOL/L (ref 136–145)
SODIUM SERPL-SCNC: 134 MMOL/L (ref 136–145)
SPECIMEN EXPIRATION DATE: NORMAL
UNIT ABO/RH: NORMAL
UNIT NUMBER: NORMAL
UNIT STATUS: NORMAL
UNIT TYPE ISBT: 600
WBC # BLD AUTO: 9.2 10E3/UL (ref 4–11)

## 2023-02-16 PROCEDURE — 250N000011 HC RX IP 250 OP 636: Performed by: STUDENT IN AN ORGANIZED HEALTH CARE EDUCATION/TRAINING PROGRAM

## 2023-02-16 PROCEDURE — 80048 BASIC METABOLIC PNL TOTAL CA: CPT | Performed by: INTERNAL MEDICINE

## 2023-02-16 PROCEDURE — 99231 SBSQ HOSP IP/OBS SF/LOW 25: CPT | Performed by: NURSE PRACTITIONER

## 2023-02-16 PROCEDURE — 85027 COMPLETE CBC AUTOMATED: CPT | Performed by: STUDENT IN AN ORGANIZED HEALTH CARE EDUCATION/TRAINING PROGRAM

## 2023-02-16 PROCEDURE — 99232 SBSQ HOSP IP/OBS MODERATE 35: CPT | Mod: 24 | Performed by: INTERNAL MEDICINE

## 2023-02-16 PROCEDURE — 82330 ASSAY OF CALCIUM: CPT | Performed by: STUDENT IN AN ORGANIZED HEALTH CARE EDUCATION/TRAINING PROGRAM

## 2023-02-16 PROCEDURE — 97165 OT EVAL LOW COMPLEX 30 MIN: CPT | Mod: GO

## 2023-02-16 PROCEDURE — 71045 X-RAY EXAM CHEST 1 VIEW: CPT | Mod: 26 | Performed by: RADIOLOGY

## 2023-02-16 PROCEDURE — 271N000002 HC RX 271: Performed by: INTERNAL MEDICINE

## 2023-02-16 PROCEDURE — 250N000011 HC RX IP 250 OP 636: Performed by: INTERNAL MEDICINE

## 2023-02-16 PROCEDURE — 99233 SBSQ HOSP IP/OBS HIGH 50: CPT | Mod: 24 | Performed by: INTERNAL MEDICINE

## 2023-02-16 PROCEDURE — P9016 RBC LEUKOCYTES REDUCED: HCPCS | Performed by: PHYSICIAN ASSISTANT

## 2023-02-16 PROCEDURE — 999N000248 HC STATISTIC IV INSERT WITH US BY RN

## 2023-02-16 PROCEDURE — 250N000013 HC RX MED GY IP 250 OP 250 PS 637: Performed by: STUDENT IN AN ORGANIZED HEALTH CARE EDUCATION/TRAINING PROGRAM

## 2023-02-16 PROCEDURE — 97530 THERAPEUTIC ACTIVITIES: CPT | Mod: GP

## 2023-02-16 PROCEDURE — 84100 ASSAY OF PHOSPHORUS: CPT | Performed by: INTERNAL MEDICINE

## 2023-02-16 PROCEDURE — 250N000013 HC RX MED GY IP 250 OP 250 PS 637: Performed by: PHYSICIAN ASSISTANT

## 2023-02-16 PROCEDURE — 86923 COMPATIBILITY TEST ELECTRIC: CPT | Performed by: PHYSICIAN ASSISTANT

## 2023-02-16 PROCEDURE — 71045 X-RAY EXAM CHEST 1 VIEW: CPT

## 2023-02-16 PROCEDURE — 200N000002 HC R&B ICU UMMC

## 2023-02-16 PROCEDURE — 86901 BLOOD TYPING SEROLOGIC RH(D): CPT | Performed by: STUDENT IN AN ORGANIZED HEALTH CARE EDUCATION/TRAINING PROGRAM

## 2023-02-16 PROCEDURE — 97530 THERAPEUTIC ACTIVITIES: CPT | Mod: GO

## 2023-02-16 PROCEDURE — 82728 ASSAY OF FERRITIN: CPT | Performed by: CLINICAL NURSE SPECIALIST

## 2023-02-16 PROCEDURE — 83735 ASSAY OF MAGNESIUM: CPT | Performed by: INTERNAL MEDICINE

## 2023-02-16 PROCEDURE — 83550 IRON BINDING TEST: CPT | Performed by: CLINICAL NURSE SPECIALIST

## 2023-02-16 PROCEDURE — 250N000013 HC RX MED GY IP 250 OP 250 PS 637: Performed by: NURSE PRACTITIONER

## 2023-02-16 RX ORDER — MAGNESIUM SULFATE HEPTAHYDRATE 40 MG/ML
2 INJECTION, SOLUTION INTRAVENOUS ONCE
Status: COMPLETED | OUTPATIENT
Start: 2023-02-16 | End: 2023-02-16

## 2023-02-16 RX ORDER — ACETAMINOPHEN 325 MG/1
975 TABLET ORAL EVERY 8 HOURS
Status: DISCONTINUED | OUTPATIENT
Start: 2023-02-16 | End: 2023-02-18

## 2023-02-16 RX ADMIN — ACETAMINOPHEN 975 MG: 325 TABLET, FILM COATED ORAL at 03:51

## 2023-02-16 RX ADMIN — GABAPENTIN 200 MG: 100 CAPSULE ORAL at 21:00

## 2023-02-16 RX ADMIN — INSULIN ASPART 1 UNITS: 100 INJECTION, SOLUTION INTRAVENOUS; SUBCUTANEOUS at 08:23

## 2023-02-16 RX ADMIN — INSULIN ASPART 5 UNITS: 100 INJECTION, SOLUTION INTRAVENOUS; SUBCUTANEOUS at 12:10

## 2023-02-16 RX ADMIN — HEPARIN SODIUM 5000 UNITS: 5000 INJECTION, SOLUTION INTRAVENOUS; SUBCUTANEOUS at 20:56

## 2023-02-16 RX ADMIN — ACETAMINOPHEN 975 MG: 325 TABLET, FILM COATED ORAL at 12:05

## 2023-02-16 RX ADMIN — INSULIN ASPART 2 UNITS: 100 INJECTION, SOLUTION INTRAVENOUS; SUBCUTANEOUS at 18:22

## 2023-02-16 RX ADMIN — MAGNESIUM SULFATE IN WATER 2 G: 40 INJECTION, SOLUTION INTRAVENOUS at 05:43

## 2023-02-16 RX ADMIN — ASPIRIN 81 MG 162 MG: 81 TABLET ORAL at 08:20

## 2023-02-16 RX ADMIN — CALCIUM GLUCONATE 2 G: 20 INJECTION, SOLUTION INTRAVENOUS at 06:40

## 2023-02-16 RX ADMIN — POLYETHYLENE GLYCOL 3350 17 G: 17 POWDER, FOR SOLUTION ORAL at 08:20

## 2023-02-16 RX ADMIN — OXYCODONE HYDROCHLORIDE 2.5 MG: 5 TABLET ORAL at 20:55

## 2023-02-16 RX ADMIN — ATORVASTATIN CALCIUM 40 MG: 40 TABLET, FILM COATED ORAL at 20:55

## 2023-02-16 RX ADMIN — HEPARIN SODIUM 5000 UNITS: 5000 INJECTION, SOLUTION INTRAVENOUS; SUBCUTANEOUS at 03:51

## 2023-02-16 RX ADMIN — ACETAMINOPHEN 975 MG: 325 TABLET, FILM COATED ORAL at 20:55

## 2023-02-16 RX ADMIN — PANTOPRAZOLE SODIUM 40 MG: 40 TABLET, DELAYED RELEASE ORAL at 08:20

## 2023-02-16 RX ADMIN — MUPIROCIN 0.5 G: 20 OINTMENT TOPICAL at 08:33

## 2023-02-16 RX ADMIN — Medication 500 MG: at 11:05

## 2023-02-16 RX ADMIN — MUPIROCIN 0.5 G: 20 OINTMENT TOPICAL at 20:57

## 2023-02-16 RX ADMIN — SENNOSIDES AND DOCUSATE SODIUM 1 TABLET: 50; 8.6 TABLET ORAL at 08:20

## 2023-02-16 RX ADMIN — HEPARIN SODIUM 5000 UNITS: 5000 INJECTION, SOLUTION INTRAVENOUS; SUBCUTANEOUS at 12:05

## 2023-02-16 RX ADMIN — HYDRALAZINE HYDROCHLORIDE 10 MG: 20 INJECTION INTRAMUSCULAR; INTRAVENOUS at 21:24

## 2023-02-16 ASSESSMENT — ACTIVITIES OF DAILY LIVING (ADL)
ADLS_ACUITY_SCORE: 25

## 2023-02-16 NOTE — PROGRESS NOTES
"Pain Service Progress Note  [facility]  Date: 02/16/2023       Patient Name: Avelina Marion  MRN: [id number]  Age: 67 year old  Sex: male      Assessment:  Avelina Marion is a 67 year old male with PMH significant for T2DM with long term use of insulin, proliferative retinopathy, HTN, HLD, ESRD on dialysis, CAD, tobacco use, admitted after angiogram showed 80% LM disease    Procedure: s/p CABG x 2    Date of Surgery: 2/13/23     Date of Catheter Placement: 2/13/23     Plan/Recommendations:  1. Regional Anesthesia/Analgesia  -Continuous Catheter Type/Site: bilateral paravertebral (PV) T4-5  Infusate: Ropivacaine 0.2%  Programmed Intermittent Bolus (PIB) at 7 mL Q60 min via each catheter, total infusion rate of 14 mL/hr    Plan to maintain catheters for max of 7 days    2. Anticoagulation  Okay to give heparin SC per primary service orders  -Please contact Inpatient Pain Service before ordering or making any anticoagulation changes       3. Multimodal Analgesia  - per primary service    Pain Service will continue to follow.    Discussed with attending anesthesiologist    Please Page the Pain Team Via Amcom: \"PAIN MANAGEMENT ACUTE INPATIENT/ [facility]\"    Gloria Rodriguez, APRN CNP  02/16/2023     Overnight Events: None    Tubes/Drains: Yes  Chest tubes x 4    Subjective:  I don't have much pain, maybe one and one half to two   Nausea: No  Symptoms of LAST: No    Pain Location:  Anterior chest wall, sternotomy incision and chest tube sites    Pain Intensity:    Pain at Rest: 1.5/10   Pain with Activity: 2/10  Comfort Goal: 3/10   Baseline Pain: 0/10   Satisfied with your level of pain control: Yes    Diet: Consistent Carbohydrate Diet Moderate Consistent Carb (60 g CHO per Meal) Diet    Relevant Labs:  Recent Labs   Lab Test 02/16/23  0834 02/16/23  0347 02/13/23  1945 02/13/23  1432   INR  --   --   --  1.33*   PLT  --  99*   < > 221   PTT  --   --   --  33   BUN 29.8* 25.9*   < > " "45.5*    < > = values in this interval not displayed.       Physical Exam:  Vitals: /61   Pulse 85   Temp 97.8  F (36.6  C) (Oral)   Resp 27   Ht 1.803 m (5' 11\")   Wt 96.3 kg (212 lb 4.9 oz)   SpO2 94%   BMI 29.61 kg/m      Physical Exam:   Orientation:  Alert, oriented, and in no acute distress: Yes  Sedation: No    Motor Examination:  5/5 Strength in lower extremities: Yes     Catheter Site:   Catheter entry site is clean/dry/intact: Yes    Tender: No      Relevant Medications:  Current Pain Medications:  Medications related to Pain Management (From now, onward)    Start     Dose/Rate Route Frequency Ordered Stop    02/16/23 0000  acetaminophen (TYLENOL) tablet 650 mg         650 mg Oral EVERY 4 HOURS PRN 02/13/23 1431 02/14/23 0800  polyethylene glycol (MIRALAX) Packet 17 g         17 g Oral DAILY 02/13/23 1431 02/14/23 0800  aspirin (ASA) chewable tablet 162 mg         162 mg Oral or NG Tube DAILY 02/13/23 1431 02/13/23 2200  gabapentin (NEURONTIN) capsule 200 mg         200 mg Oral or Feeding Tube AT BEDTIME 02/13/23 1431 02/13/23 2000  senna-docusate (SENOKOT-S/PERICOLACE) 8.6-50 MG per tablet 1 tablet         1 tablet Oral 2 TIMES DAILY 02/13/23 1431 02/13/23 1500  ropivacaine 0.2% in NS perineural infusion (simple)          Perineural Continuous Nerve Block 02/13/23 1442 02/13/23 1500  ropivacaine 0.2% in NS perineural infusion (simple)          Perineural Continuous Nerve Block 02/13/23 1442 02/13/23 1431  oxyCODONE IR (ROXICODONE) half-tab 2.5 mg        See Hyperspace for full Linked Orders Report.    2.5 mg Oral EVERY 4 HOURS PRN 02/13/23 1431 02/13/23 1431  oxyCODONE (ROXICODONE) tablet 5 mg        See Hyperspace for full Linked Orders Report.    5 mg Oral EVERY 4 HOURS PRN 02/13/23 1431 02/13/23 1431  methocarbamol (ROBAXIN) tablet 500 mg         500 mg Oral or Feeding Tube EVERY 6 HOURS PRN 02/13/23 1431      02/13/23 1431  lidocaine 1 % 0.1-1 " mL         0.1-1 mL Other EVERY 1 HOUR PRN 02/13/23 1431      02/13/23 1431  lidocaine (LMX4) cream          Topical EVERY 1 HOUR PRN 02/13/23 1431      02/13/23 1431  magnesium hydroxide (MILK OF MAGNESIA) suspension 30 mL         30 mL Oral DAILY PRN 02/13/23 1431      02/13/23 1431  bisacodyl (DULCOLAX) suppository 10 mg         10 mg Rectal DAILY PRN 02/13/23 1431            Primary Service Contacted with Recommendations? No      Please see A&P for additional details of medical decision making.

## 2023-02-16 NOTE — PROGRESS NOTES
CV ICU PROGRESS NOTE  February 14, 2023   Avelina aMrion  [id number]  Admitted: 2/7/2023  7:35 AM      ASSESSMENT: Avelina Marion is a 67 year old male with a history of DM2, HTN, HLD, ESRD on dialysis, coronary artery disease, and tobacco use who is admitted after angiogram showed 80% LM disease, undergoing CABGx2 with Dr. Yepez on 2/13.    CO-MORBIDITIES:   Abnormal cardiovascular stress test  (primary encounter diagnosis)  Pre-operative cardiovascular examination  Organ transplant candidate  Encounter for pre-transplant evaluation for kidney and pancreas transplant  Coronary atherosclerosis due to severely calcified coronary lesion  CAD (coronary artery disease)    24-Hour Events:  3 hr iHD run -3L pulled on 2/15. Well tolerated. No pressors >24 hrs. Pain well controlled.     TODAY'S PROGRESS:   Keep CT until dialysis  Discontinue Bumex  x1 U pRBC 2/2 hgb 7.0, orthostatic hypotension  A-line Removed  Central line Removed  BMP daily  Transfer to floor    PLAN:  Neuro/ pain/ sedation:  - Monitor neurological status. Notify the MD for any acute changes in exam.  #Acute postoperative pain  - Scheduled: Gabapentin HS  - PRN: Tylenol, oxycodone, robaxin  - Regional anesthesia: B/L paravertebrals (ropivicaine)     Pulmonary care:   #Remote tobacco use, quit 1985  Resp: 14  - Extubated in OR, on NC since  - Goal SpO2 > 92%   - Encourage IS q15-30 minutes when awake  - OOB, PT / encourage movement     Cardiovascular:  #CABG x2, 2/13  #Hx CAD  #Hx HTN  #Orthostatic hypotension  - No pressors >24 hrs  - Continued orthostatic hypotension with ambulation, likely 2/2 -3L pulled iHD yesterday PM  - Transfuse 1u pRBC as below in heme  - Goal MAP >65, SBP <140, monitor hemodynamics  -  mg  - Lipitor 40 mg   - Hold BB   - Hold PTA amlodipine 5 mg     GI care / Nutrition:   - Low carb diet 2/2 DMII  - PPI ppx  - Passing gas, no BM since surgery  - Bowel regimen: MiraLAX, senna      Renal / Fluids / Electrolytes:   #ESRD,  HD since 5/2021 ([facility])  #Kidney Transplant Candidate  BL creat appears to be ~5-6  - Nephrology following  - 3 hr iHD run -3L pulled on 2/15, well tolerated. Plan to resume M/W/F dialysis schedule  - Holding diuresis today d/t likely low intravascular volume  - Strict I/O, daily weights, avoid/limit nephrotoxins  - Replete lytes PRN per protocol  - Has PTA Calcitrol, PhosLo, CaCarb  - Makes minimal urine baseline     Endocrine:    #Stress hyperglycemia  #DM2, insulin dependent  Preop A1c 7.0  - HDSSI   - PTA 20mg Lantus, consider restarting if high HDSSI usage  - Goal BG <180 for optimal healing     ID / Antibiotics:  #Stress induced leukocytosis  - Completed perioperative regimen  - Monitor fever curve, WBC, and inflammatory markers as appropriate     Heme:     #Acute blood loss anemia  #Acute blood loss thrombocytopenia  Baseline hgb ~13  - Hgb 7.0 this AM, transfuse 1u pRBC  - No s/sx active bleeding   - Hgb goal > 7.0  - S/p DDAVP + 1u pRBC post op 2/2 high CT output     MSK / Skin:  #Sternotomy  #Surgical Incision  - Sternal precautions, postop incision management protocol  - PT/OT/CR     Prophylaxis:     - Mechanical DVT ppx  - Chemical DVT ppx: SQH   - PPI     Lines / Tubes / Drains:  - RIJ CVC (PULL TODAY)  - Arterial Line (PULL TODAY)  - CTs x4 (2 meds, 2 pleural)   - Left AVF    Disposition:  - Orders to transfer to the floor      Patient seen, findings and plan discussed with CVICU staff and cardiothoracic surgeons.    Guy Ruff, MS4   2/15/2023 at 7:09 AM      ====================================    SUBJECTIVE  Pain well tolerated overnight. Tolerated iHD yesterday without complication. Happy with lines being pulled. No other questions or concerns.     OBJECTIVE  1. VITAL SIGNS  Temp:  [97.7  F (36.5  C)-98.3  F (36.8  C)] 98.2  F (36.8  C)  Pulse:  [68-85] 75  Resp:  [13-38] 14  BP: ()/(51-72) 131/61  MAP:  [56 mmHg-97 mmHg] 81 mmHg  Arterial Line BP: (104-165)/(36-71) 142/54  SpO2:  [89 %-100  %] 97 %  Resp: 14      2. INTAKE/ OUTPUT  I/O last 3 completed shifts:  In: 844.71 [P.O.:630; I.V.:214.71]  Out: 4114 [Urine:754; Other:3000; Chest Tube:360]    3. PHYSICAL EXAMINATION    General: Upright in bed, NAD.  Neuro: grossly intact, A&Ox4  Resp: NC. Course to clear lung sounds,   CV: sinus rhythm  Abdomen: Soft, non-distended, non-tender  Incisions: c/d/i  Extremities: warm and well perfused. Non-edematous  CT: To suction, serosang output, no airleak      4. INVESTIGATIONS  Arterial Blood Gases   Recent Labs   Lab 02/14/23  0802 02/13/23  1946 02/13/23  1434 02/13/23  1324   PH 7.39 7.31* 7.35 7.36   PCO2 36 36 44 38   PO2 78* 84 136* 278*   HCO3 22 18* 24 21     Complete Blood Count   Recent Labs   Lab 02/16/23  0347 02/15/23  1603 02/15/23  0342 02/14/23  0358 02/13/23  1945 02/13/23  1432   WBC 9.2  --  12.1* 17.9*  --  23.2*   HGB 7.0* 7.7* 7.4* 9.7*   < > 10.9*   PLT 99*  --  100* 184  --  221    < > = values in this interval not displayed.     Basic Metabolic Panel  Recent Labs   Lab 02/16/23  0347 02/16/23  0024 02/15/23  2210 02/15/23  2024 02/15/23  1147 02/15/23  1143   * 134*  --  132*  --  134*   POTASSIUM 4.1 4.0  --  3.7  --  5.0   CHLORIDE 98 98  --  98  --  100   CO2 25 25  --  22  --  22   BUN 25.9* 24.3*  --  20.3  --  40.8*   CR 4.10* 3.87*  --  3.43*  --  5.73*   * 173* 189* 175*   < > 178*    < > = values in this interval not displayed.     Liver Function Tests  Recent Labs   Lab 02/14/23  0111 02/13/23  2356 02/13/23  1432   AST 46 51* 36   ALT 10 11 12   ALKPHOS 35* 36* 47   BILITOTAL 0.3 0.3 0.4   ALBUMIN 2.9* 3.0* 3.6   INR  --   --  1.33*     Pancreatic Enzymes  No lab results found in last 7 days.  Coagulation Profile  Recent Labs   Lab 02/13/23  1432   INR 1.33*   PTT 33     Lactate  Invalid input(s): LACTATE    5. RADIOLOGY  Recent Results (from the past 24 hour(s))   XR Chest Port 1 View    Narrative    Portable chest    INDICATION: Postoperative CVTS  surgery    COMPARISON: Chest CT 2/7/2023. Most recent plain film 8/9/2022    FINDINGS: Postoperative chest CABG procedure with interim placement of  cerclage wires from median sternotomy. Right IJ catheter tip is in the  interlobar pulmonary artery on the right. Heart size normal. Scattered  patchy opacities in the lungs likely indicate edema and/or atelectasis  typical of postoperative status. No visible pneumothorax.      Impression    IMPRESSION: Postoperative CABG procedure chest with mild  atelectasis/edema. [place] catheter tip appearing in the interlobar  pulmonary artery on the right.    BETZAIDA MUJICA MD         SYSTEM ID:  [id number]

## 2023-02-16 NOTE — PLAN OF CARE
Major Shift Events:  Hemodynamically stable throughout the night. SR 70-80s, MAPs >65 w/o pressors. Chest tube with scant output. Mg and Ca replaced this AM. On 2L O2 overnight. Able to void following Uribe removal w/o complication. No BM this shift. No complaints of pain overnight, 2 ropivacaine @7cc/hr each remains in place.      Plan: Continue to work with therapies as tolerated. Possible floor orders today.     For vital signs and complete assessments, please see documentation flowsheets.

## 2023-02-16 NOTE — PROGRESS NOTES
Nephrology Progress Note  02/16/2023       Avelina Marion is a 67 yom with hx of ESRD since 4/2021 who is admitted to [facility] for CABG as part of kidney transplant workup.  Had angiogram with only mild CAD 12/2021 but had stress test in Dec 2022 showing ST depression, updated angio showed 80% LM (unclear why such rapid progression) and is now planned for CABG. Still makes UOP, runs MWF dialysis and had run 2/6 prior to admission.  Nephrology consulted for management of RRT with plan for CABG on 2/9.      Interval History :   Mr Marion continues to recover from CABG on 2/13, ran the past 2 days to keep on top of volume status, now on RA and doing well, still on very low dose pressor.  Plan for day off today, still does make UOP, plan for run tomorrow.      Assessment & Recommendations:   ESRD-Since 4/2021, runs MWF under care of Dr Kummer Davita Phalen.  L arm fistula access.  Accepted for renal tx and actually has had some offers but turned down due to higher risk then needed mohs surgery and now CABG as part of transplant workup.  Had HD 2/12 in preparation for CABG.  I did previously let him know that there is a chance we need to place catheter for CRRT should he be unstable and need RRT post-op which he understood and agreed with but have been able to stay with iHD modality so far.                  -Deciding on runs daily, holding today.                  -Access is L arm fistula, would need catheter if CRRT is needed but so far appears that we will be able to manage with iHD.                 -No need for new dialysis consent as he is ESRD on HD as outpt.           Volume-Pulled 3L on run yesterday, also made 750 of UOP and had small amount of chest tube output.  Overall was -3.2L.  Already net negative on his own this am, pulm status improved as he is now on RA.       Electrolytes-K 4.3, bicarb 25, Na 133, no acute issues.     BMD-Ca 8.4, Mg and Phos not checked today.      Anemia-Hgb 7.0, baseline is ~12, checking  "iron stores.       Nutrition-No issues, good appetite prior to surgery.       Time spent: 20 minutes on this date of encounter for chart review, physical exam, medical decision making and co-ordination of care.      Discussed with Dr Leonard     Recommendations were communicated to primary team via note     KWASI Lara CNS  Clinical Nurse Specialist  [phone]    Review of Systems:   I reviewed the following systems:  Gen: No fevers or chills  CV: No CP at rest  Resp: No SOB at rest  GI: No N/V    Physical Exam:   I/O last 3 completed shifts:  In: 835.23 [P.O.:580; I.V.:255.23]  Out: 3749 [Urine:429; Other:3000; Chest Tube:320]   /61   Pulse 81   Temp 97.8  F (36.6  C) (Oral)   Resp 26   Ht 1.803 m (5' 11\")   Wt 96.3 kg (212 lb 4.9 oz)   SpO2 91%   BMI 29.61 kg/m       GENERAL APPEARANCE: In no distress, off of pressor.    EYES: no scleral icterus  NECK:  no JVD  Pulmonary: lungs clear to auscultation with equal breath sounds bilaterally, no clubbing or cyanosis  CV: Regular rhythm, normal rate, no rub   - Edema none  GI: soft, nontender, normal bowel sounds  MS: no evidence of inflammation in joints, no muscle tenderness  :  Uribe  SKIN: no rash, warm, dry  NEURO: mentation intact and speech normal  ACCESS: L arm fistula    Labs:   All labs reviewed by me  Electrolytes/Renal -   Recent Labs   Lab Test 02/16/23  0834 02/16/23  0822 02/16/23  0347 02/16/23  0024 02/15/23  0344 02/15/23  0342 02/14/23  0456 02/14/23  0358   *  --  133* 134*   < > 135*   < > 133*   POTASSIUM 4.3  --  4.1 4.0   < > 4.6   < > 5.1   CHLORIDE 97*  --  98 98   < > 101   < > 99   CO2 25  --  25 25   < > 22   < > 19*   BUN 29.8*  --  25.9* 24.3*   < > 36.9*   < > 48.6*   CR 4.66*  --  4.10* 3.87*   < > 5.34*   < > 6.17*   * 148* 149* 173*   < > 141*   < > 170*  184*   DAVID 8.4*  --  7.8* 8.0*   < > 7.3*   < > 7.9*   MAG  --   --  1.4*  --   --  1.8  --  1.8   PHOS  --   --  3.1  --   --  4.1  --  2.4*    " < > = values in this interval not displayed.       CBC -   Recent Labs   Lab Test 02/16/23  0347 02/15/23  1603 02/15/23  0342 02/14/23  0358   WBC 9.2  --  12.1* 17.9*   HGB 7.0* 7.7* 7.4* 9.7*   PLT 99*  --  100* 184       LFTs -   Recent Labs   Lab Test 02/14/23  0111 02/13/23  2356 02/13/23  1432   ALKPHOS 35* 36* 47   BILITOTAL 0.3 0.3 0.4   ALT 10 11 12   AST 46 51* 36   PROTTOTAL 4.5* 4.8* 5.5*   ALBUMIN 2.9* 3.0* 3.6       Iron Panel - No lab results found.        Current Medications:    aspirin  162 mg Oral or NG Tube Daily     atorvastatin  40 mg Oral At Bedtime     bumetanide  2 mg Intravenous Once     gabapentin  200 mg Oral or Feeding Tube At Bedtime     heparin ANTICOAGULANT  5,000 Units Subcutaneous Q8H     insulin aspart  1-10 Units Subcutaneous TID w/meals     insulin aspart  1-7 Units Subcutaneous At Bedtime     lactated ringers  500 mL Intravenous Once     lactated ringers  500 mL Intravenous Once     mupirocin  0.5 g Both Nostrils BID     pantoprazole  40 mg Oral or NG Tube Daily    Or     pantoprazole  40 mg Oral Daily     polyethylene glycol  17 g Oral Daily     senna-docusate  1 tablet Oral BID     sodium chloride (PF)  3 mL Intracatheter Q8H       dextrose       norepinephrine Stopped (02/15/23 1015)     BETA BLOCKER NOT PRESCRIBED       ropivacaine 500 mg (02/14/23 2246)     ropivacaine 500 mg (02/14/23 2245)

## 2023-02-16 NOTE — PROGRESS NOTES
02/16/23 1600   Appointment Info   Signing Clinician's Name / Credentials (OT) NINA Beal   Rehab Comments (OT) OT, PT is CR   Living Environment   People in Home sibling(s);child(florence), dependent  (brother and sister)   Current Living Arrangements house   Home Accessibility stairs to enter home;stairs within home   Number of Stairs, Main Entrance 5   Number of Stairs, Within Home, Primary greater than 10 stairs   Transportation Anticipated family or friend will provide   Living Environment Comments Patient reports he lives with his brother and sister and their children in a multiple level home. His room is upstairs and the only bathroom is on the main floor. He goes to HD 3x per week and drives himself   Self-Care   Usual Activity Tolerance good   Current Activity Tolerance moderate   Regular Exercise No   Equipment Currently Used at Home none   Fall history within last six months no   Activity/Exercise/Self-Care Comment Pt reports IND with ADLs/IADLs and mobility at baseline.   General Information   Onset of Illness/Injury or Date of Surgery 02/13/23   Referring Physician Andres Brown MD   Patient/Family Therapy Goal Statement (OT) To go home   Additional Occupational Profile Info/Pertinent History of Current Problem Avelina Marion is a 67 year old male with a history of DM2, HTN, HLD, ESRD on dialysis, coronary artery disease, and tobacco use who is admitted after angiogram showed 80% LM disease, undergoing CABGx2 with Dr. Yepez on 2/13.   Existing Precautions/Restrictions fall;cardiac;sternal   Left Upper Extremity (Weight-bearing Status) partial weight-bearing (PWB)   Right Upper Extremity (Weight-bearing Status) partial weight-bearing (PWB)   Left Lower Extremity (Weight-bearing Status) full weight-bearing (FWB)   Right Lower Extremity (Weight-bearing Status) full weight-bearing (FWB)   Cognitive Status Examination   Orientation Status orientation to person, place and time   Cognitive Status  Comments Appears intact   Visual Perception   Visual Impairment/Limitations WNL   Sensory   Sensory Quick Adds sensation intact   Posture   Posture not impaired   Range of Motion Comprehensive   General Range of Motion no range of motion deficits identified   Strength Comprehensive (MMT)   General Manual Muscle Testing (MMT) Assessment no strength deficits identified   Coordination   Upper Extremity Coordination No deficits were identified   Bed Mobility   Bed Mobility supine-sit   Supine-Sit Isabela (Bed Mobility) minimum assist (75% patient effort)   Transfers   Transfers sit-stand transfer;toilet transfer;shower transfer   Sit-Stand Transfer   Sit-Stand Isabela (Transfers) contact guard   Shower Transfer   Type (Shower Transfer) lateral   Isabela Level (Shower Transfer) minimum assist (75% patient effort)   Shower Transfer Comments per clinical judgement   Toilet Transfer   Type (Toilet Transfer) sit-stand   Isabela Level (Toilet Transfer) minimum assist (75% patient effort)   Balance   Balance Comments Good seated balance, standing balance with CGA   Activities of Daily Living   BADL Assessment/Intervention bathing;lower body dressing;toileting   Bathing Assessment/Intervention   Isabela Level (Bathing) moderate assist (50% patient effort)   Comment, (Bathing) per clinical judgement   Lower Body Dressing Assessment/Training   Comment, (Lower Body Dressing) per clinical judgement   Isabela Level (Lower Body Dressing) minimum assist (75% patient effort)   Toileting   Comment, (Toileting) per clinical judgement   Isabela Level (Toileting) minimum assist (75% patient effort)   Clinical Impression   Criteria for Skilled Therapeutic Interventions Met (OT) Yes, treatment indicated   OT Diagnosis Pt is below baseline for ADLs   OT Problem List-Impairments impacting ADL problems related to;activity tolerance impaired;pain;post-surgical precautions   Assessment of Occupational  Performance 3-5 Performance Deficits   Identified Performance Deficits dressing, bathing, toileting, home management   Planned Therapy Interventions (OT) IADL retraining;ADL retraining;bed mobility training;transfer training;progressive activity/exercise;home program guidelines   Clinical Decision Making Complexity (OT) low complexity   Anticipated Equipment Needs Upon Discharge (OT) shower chair   Risk & Benefits of therapy have been explained evaluation/treatment results reviewed;care plan/treatment goals reviewed;risks/benefits reviewed;current/potential barriers reviewed;participants voiced agreement with care plan;participants included;patient   Clinical Impression Comments Pt presents below baseline, limited by activity tolerance, pain, post-surgical precautions. pt will benefit from skilled OT to progress toward PLOF.   OT Total Evaluation Time   OT Eval, Low Complexity Minutes (52593) 6   OT Goals   Therapy Frequency (OT) 5 times/wk   OT Predicted Duration/Target Date for Goal Attainment 02/20/23   OT Goals Lower Body Dressing;Lower Body Bathing;Transfers;Toilet Transfer/Toileting   OT: Lower Body Dressing Independent;within precautions   OT: Lower Body Bathing using adaptive equipment;with precautions;Modified independent   OT: Transfer   (tub/shower transfer)   OT: Toilet Transfer/Toileting Independent;toilet transfer;cleaning and garment management   Interventions   Interventions Quick Adds Therapeutic Activity   Therapeutic Activities   Therapeutic Activity Minutes (79125) 20   Symptoms noted during/after treatment fatigue   Treatment Detail/Skilled Intervention Facilitated hallway ambulation to progress pt activity tolerance for ADLs. Pt requesting Th return following blood transfusion to in hopes that activity tolerance/BPs will improve. On Th return, Pt able to report sternal precautions when prompted. See eval for bed mobility and initial STS. Pt orthostatics monitored throughout, with BP's stable,  pt reporting minimal dizziness, MAP consistently in 80s). Pt completing additional STS with SBA and FWW. pt ambulating 200ft with SBA and FWW, RN following with chair. Pt moving at steady pace, noting to use walker very minimally for stability. At end of session, pt sit > supine A for with line management only. Pt boosted in bed with Ax2. pt VSS on RA throughout.   OT Discharge Planning   OT Plan OT: standing ADLs, LB dressing, tub/shower transfers when able   OT Discharge Recommendation (DC Rec) home with assist;home with outpatient cardiac rehab   OT Rationale for DC Rec pt mobilizing w/SBA - CGA and FWW, anticipate will be safe to d/c home with family A for heavy ADLs/IADLs.   OT Brief overview of current status CGA w/FWW, BP stable this PM with ambulation   Total Session Time   Timed Code Treatment Minutes 20   Total Session Time (sum of timed and untimed services) 26   Psychosocial Support   Trust Relationship/Rapport care explained;choices provided;emotional support provided;questions answered;questions encouraged;thoughts/feelings acknowledged

## 2023-02-16 NOTE — PLAN OF CARE
Major Shift Events:  Patient with borderline Hgb @ 7 and symptomatic orthostatic hypotension. Given 1 unit PRBC and patient able to ambulate unit and maintain MAPs in the 80's. Denies any further lightheadedness after blood admin. A Line and Right Triple internal jugular removed. Incisions CDI, chest tubes patent with minimal sliding scale output.   Plan: Transfer to  when bed available.   For vital signs and complete assessments, please see documentation flowsheets.

## 2023-02-17 ENCOUNTER — APPOINTMENT (OUTPATIENT)
Dept: GENERAL RADIOLOGY | Facility: CLINIC | Age: 68
DRG: 233 | End: 2023-02-17
Attending: INTERNAL MEDICINE
Payer: COMMERCIAL

## 2023-02-17 ENCOUNTER — APPOINTMENT (OUTPATIENT)
Dept: OCCUPATIONAL THERAPY | Facility: CLINIC | Age: 68
DRG: 233 | End: 2023-02-17
Attending: INTERNAL MEDICINE
Payer: COMMERCIAL

## 2023-02-17 LAB
ANION GAP SERPL CALCULATED.3IONS-SCNC: 10 MMOL/L (ref 7–15)
BUN SERPL-MCNC: 40.1 MG/DL (ref 8–23)
CA-I BLD-MCNC: 4.3 MG/DL (ref 4.4–5.2)
CALCIUM SERPL-MCNC: 7.4 MG/DL (ref 8.8–10.2)
CHLORIDE SERPL-SCNC: 98 MMOL/L (ref 98–107)
CREAT SERPL-MCNC: 5.5 MG/DL (ref 0.67–1.17)
DEPRECATED HCO3 PLAS-SCNC: 23 MMOL/L (ref 22–29)
ERYTHROCYTE [DISTWIDTH] IN BLOOD BY AUTOMATED COUNT: 13.7 % (ref 10–15)
GFR SERPL CREATININE-BSD FRML MDRD: 11 ML/MIN/1.73M2
GLUCOSE BLDC GLUCOMTR-MCNC: 142 MG/DL (ref 70–99)
GLUCOSE BLDC GLUCOMTR-MCNC: 192 MG/DL (ref 70–99)
GLUCOSE BLDC GLUCOMTR-MCNC: 197 MG/DL (ref 70–99)
GLUCOSE BLDC GLUCOMTR-MCNC: 218 MG/DL (ref 70–99)
GLUCOSE SERPL-MCNC: 152 MG/DL (ref 70–99)
HCT VFR BLD AUTO: 23.3 % (ref 40–53)
HGB BLD-MCNC: 7.8 G/DL (ref 13.3–17.7)
MAGNESIUM SERPL-MCNC: 2.2 MG/DL (ref 1.7–2.3)
MCH RBC QN AUTO: 32.4 PG (ref 26.5–33)
MCHC RBC AUTO-ENTMCNC: 33.5 G/DL (ref 31.5–36.5)
MCV RBC AUTO: 97 FL (ref 78–100)
PHOSPHATE SERPL-MCNC: 2.9 MG/DL (ref 2.5–4.5)
PLATELET # BLD AUTO: 125 10E3/UL (ref 150–450)
POTASSIUM SERPL-SCNC: 3.7 MMOL/L (ref 3.4–5.3)
RBC # BLD AUTO: 2.41 10E6/UL (ref 4.4–5.9)
SODIUM SERPL-SCNC: 131 MMOL/L (ref 136–145)
WBC # BLD AUTO: 8 10E3/UL (ref 4–11)

## 2023-02-17 PROCEDURE — 250N000013 HC RX MED GY IP 250 OP 250 PS 637: Performed by: STUDENT IN AN ORGANIZED HEALTH CARE EDUCATION/TRAINING PROGRAM

## 2023-02-17 PROCEDURE — 80048 BASIC METABOLIC PNL TOTAL CA: CPT | Performed by: PHYSICIAN ASSISTANT

## 2023-02-17 PROCEDURE — 214N000001 HC R&B CCU UMMC

## 2023-02-17 PROCEDURE — 250N000011 HC RX IP 250 OP 636: Performed by: STUDENT IN AN ORGANIZED HEALTH CARE EDUCATION/TRAINING PROGRAM

## 2023-02-17 PROCEDURE — 85027 COMPLETE CBC AUTOMATED: CPT | Performed by: PHYSICIAN ASSISTANT

## 2023-02-17 PROCEDURE — 250N000011 HC RX IP 250 OP 636: Performed by: INTERNAL MEDICINE

## 2023-02-17 PROCEDURE — 99231 SBSQ HOSP IP/OBS SF/LOW 25: CPT | Performed by: NURSE PRACTITIONER

## 2023-02-17 PROCEDURE — 634N000001 HC RX 634: Performed by: CLINICAL NURSE SPECIALIST

## 2023-02-17 PROCEDURE — 71045 X-RAY EXAM CHEST 1 VIEW: CPT | Mod: 26 | Performed by: RADIOLOGY

## 2023-02-17 PROCEDURE — 250N000013 HC RX MED GY IP 250 OP 250 PS 637: Performed by: PHYSICIAN ASSISTANT

## 2023-02-17 PROCEDURE — 83735 ASSAY OF MAGNESIUM: CPT | Performed by: STUDENT IN AN ORGANIZED HEALTH CARE EDUCATION/TRAINING PROGRAM

## 2023-02-17 PROCEDURE — 99233 SBSQ HOSP IP/OBS HIGH 50: CPT | Mod: 24 | Performed by: INTERNAL MEDICINE

## 2023-02-17 PROCEDURE — 71045 X-RAY EXAM CHEST 1 VIEW: CPT

## 2023-02-17 PROCEDURE — 271N000002 HC RX 271: Performed by: INTERNAL MEDICINE

## 2023-02-17 PROCEDURE — 250N000013 HC RX MED GY IP 250 OP 250 PS 637: Performed by: NURSE PRACTITIONER

## 2023-02-17 PROCEDURE — 97535 SELF CARE MNGMENT TRAINING: CPT | Mod: GO

## 2023-02-17 PROCEDURE — 84100 ASSAY OF PHOSPHORUS: CPT | Performed by: STUDENT IN AN ORGANIZED HEALTH CARE EDUCATION/TRAINING PROGRAM

## 2023-02-17 PROCEDURE — 90937 HEMODIALYSIS REPEATED EVAL: CPT

## 2023-02-17 PROCEDURE — 258N000003 HC RX IP 258 OP 636: Performed by: CLINICAL NURSE SPECIALIST

## 2023-02-17 PROCEDURE — 99232 SBSQ HOSP IP/OBS MODERATE 35: CPT | Mod: 24 | Performed by: CLINICAL NURSE SPECIALIST

## 2023-02-17 PROCEDURE — 97530 THERAPEUTIC ACTIVITIES: CPT | Mod: GO

## 2023-02-17 PROCEDURE — 82330 ASSAY OF CALCIUM: CPT | Performed by: STUDENT IN AN ORGANIZED HEALTH CARE EDUCATION/TRAINING PROGRAM

## 2023-02-17 RX ADMIN — SODIUM CHLORIDE 250 ML: 9 INJECTION, SOLUTION INTRAVENOUS at 16:12

## 2023-02-17 RX ADMIN — PANTOPRAZOLE SODIUM 40 MG: 40 TABLET, DELAYED RELEASE ORAL at 08:22

## 2023-02-17 RX ADMIN — SENNOSIDES AND DOCUSATE SODIUM 1 TABLET: 50; 8.6 TABLET ORAL at 20:26

## 2023-02-17 RX ADMIN — ATORVASTATIN CALCIUM 40 MG: 40 TABLET, FILM COATED ORAL at 20:26

## 2023-02-17 RX ADMIN — Medication: at 23:11

## 2023-02-17 RX ADMIN — OXYCODONE HYDROCHLORIDE 2.5 MG: 5 TABLET ORAL at 22:44

## 2023-02-17 RX ADMIN — INSULIN ASPART 3 UNITS: 100 INJECTION, SOLUTION INTRAVENOUS; SUBCUTANEOUS at 14:08

## 2023-02-17 RX ADMIN — HEPARIN SODIUM 5000 UNITS: 5000 INJECTION, SOLUTION INTRAVENOUS; SUBCUTANEOUS at 20:26

## 2023-02-17 RX ADMIN — INSULIN ASPART 3 UNITS: 100 INJECTION, SOLUTION INTRAVENOUS; SUBCUTANEOUS at 20:26

## 2023-02-17 RX ADMIN — ACETAMINOPHEN 975 MG: 325 TABLET, FILM COATED ORAL at 03:10

## 2023-02-17 RX ADMIN — CALCIUM GLUCONATE 1 G: 20 INJECTION, SOLUTION INTRAVENOUS at 03:20

## 2023-02-17 RX ADMIN — MUPIROCIN 0.5 G: 20 OINTMENT TOPICAL at 20:41

## 2023-02-17 RX ADMIN — INSULIN ASPART 1 UNITS: 100 INJECTION, SOLUTION INTRAVENOUS; SUBCUTANEOUS at 08:28

## 2023-02-17 RX ADMIN — EPOETIN ALFA-EPBX 4000 UNITS: 10000 INJECTION, SOLUTION INTRAVENOUS; SUBCUTANEOUS at 16:12

## 2023-02-17 RX ADMIN — Medication: at 16:12

## 2023-02-17 RX ADMIN — HEPARIN SODIUM 5000 UNITS: 5000 INJECTION, SOLUTION INTRAVENOUS; SUBCUTANEOUS at 14:13

## 2023-02-17 RX ADMIN — Medication 500 MG: at 23:06

## 2023-02-17 RX ADMIN — HEPARIN SODIUM 5000 UNITS: 5000 INJECTION, SOLUTION INTRAVENOUS; SUBCUTANEOUS at 03:10

## 2023-02-17 RX ADMIN — ACETAMINOPHEN 975 MG: 325 TABLET, FILM COATED ORAL at 14:13

## 2023-02-17 RX ADMIN — SODIUM CHLORIDE 300 ML: 9 INJECTION, SOLUTION INTRAVENOUS at 16:12

## 2023-02-17 RX ADMIN — ACETAMINOPHEN 975 MG: 325 TABLET, FILM COATED ORAL at 20:26

## 2023-02-17 RX ADMIN — ASPIRIN 81 MG 162 MG: 81 TABLET ORAL at 08:22

## 2023-02-17 RX ADMIN — MUPIROCIN 0.5 G: 20 OINTMENT TOPICAL at 08:23

## 2023-02-17 RX ADMIN — GABAPENTIN 200 MG: 100 CAPSULE ORAL at 22:30

## 2023-02-17 ASSESSMENT — ACTIVITIES OF DAILY LIVING (ADL)
ADLS_ACUITY_SCORE: 26
ADLS_ACUITY_SCORE: 25
ADLS_ACUITY_SCORE: 26
ADLS_ACUITY_SCORE: 26
ADLS_ACUITY_SCORE: 25
ADLS_ACUITY_SCORE: 26
ADLS_ACUITY_SCORE: 25
ADLS_ACUITY_SCORE: 25
ADLS_ACUITY_SCORE: 26
ADLS_ACUITY_SCORE: 26
ADLS_ACUITY_SCORE: 25
ADLS_ACUITY_SCORE: 26

## 2023-02-17 NOTE — PLAN OF CARE
Major Shift Events:  Pt A&O x4, afebrile. PRN oxy given x1 for pain. On RA overnight. CT output minimal. HR 70-90, NSR. PRN hydralazine given x1 for SBP >140. Loose BM x2 via commode. Minimal UOP. Calcium replaced.    Plan: continue POC, move to 6C when bed is available    For vital signs and complete assessments, please see documentation flowsheets.

## 2023-02-17 NOTE — PROGRESS NOTES
Major Shift Events:  Transferred up to 6C  Neuro: A/O, KENNEDY  CV: SR- BP stable afebrie  Respi: Ls clear/dim on RA, using IS  Gi/Gu: good appetite, no BM today, 1x void otherwise anuric 2/2 HD    Plan: HD run later today, plan to work with therapies  For vital signs and complete assessments, please see documentation flowsheets.     Report given to Meena ZARATE Rn and all belongings sent with pt up to floor.Pt is aware of plan and has all personal belongings.

## 2023-02-17 NOTE — PROGRESS NOTES
Nephrology Progress Note  02/17/2023       Avelina Marion is a 67 yom with hx of ESRD since 4/2021 who is admitted to [facility] for CABG as part of kidney transplant workup.  Had angiogram with only mild CAD 12/2021 but had stress test in Dec 2022 showing ST depression, updated angio showed 80% LM (unclear why such rapid progression) and is now planned for CABG. Still makes UOP, runs MWF dialysis and had run 2/6 prior to admission.  Nephrology consulted for management of RRT with plan for CABG on 2/9.      Interval History :   Mr Marion continues to recover from CABG earlier this week.  Now running iHD on his usual MWF schedule.  Still makes significant UOP but was net positive slightly yesterday and BP's are stable so planning to pull 1-2L of UF.  Likely can have weekend off of HD with next run 2/20 but will review daily.      Assessment & Recommendations:   ESRD-Since 4/2021, runs MWF under care of Dr Kummer Davita Phalen.  L arm fistula access.  Accepted for renal tx and actually has had some offers but turned down due to higher risk then needed mohs surgery and now CABG as part of transplant workup.  Had HD 2/12 in preparation for CABG.  I did previously let him know that there is a chance we need to place catheter for CRRT should he be unstable and need RRT post-op which he understood and agreed with but will try to stay with iHD modality.                   -HD today for volume with some pulm edema, 3h/2-3L.                  -Access is L arm fistula, would need catheter if CRRT is needed but so far appears that we will be able to manage with iHD.                 -No need for new dialysis consent as he is ESRD on HD as outpt.           Volume-Still up in wt from admission but stable pulm status, pulling 1-2L on run today, makes significant UOP (700cc yesterday).       Electrolytes-K 3.7, bicarb 23, Na 131, no acute issues.     BMD-Ca 7.4, Mg and Phos not checked today.      Anemia-Hgb 7.8, baseline is ~12, iron sats  "20%, can consider EPO with next run.       Nutrition-No issues, good appetite prior to surgery.       Time spent: 25 minutes on this date of encounter for chart review, physical exam, medical decision making and co-ordination of care.      Discussed with Dr Leonard     Recommendations were communicated to primary team via note     KWASI Lara CNS  Clinical Nurse Specialist  [phone]    Review of Systems:   I reviewed the following systems:  Gen: No fevers or chills  CV: No CP at rest  Resp: No SOB at rest  GI: No N/V    Physical Exam:   I/O last 3 completed shifts:  In: 1915 [P.O.:1530; I.V.:108]  Out: 1020 [Urine:700; Chest Tube:320]   /57 (BP Location: Right arm, Cuff Size: Adult Regular)   Pulse 71   Temp 97.7  F (36.5  C) (Oral)   Resp 14   Ht 1.803 m (5' 11\")   Wt 96.3 kg (212 lb 4.9 oz)   SpO2 99%   BMI 29.61 kg/m       GENERAL APPEARANCE: Extubated post surgery, still on pressor.    EYES: no scleral icterus  NECK:  no JVD  Pulmonary: lungs clear to auscultation with equal breath sounds bilaterally, no clubbing or cyanosis  CV: Regular rhythm, normal rate, no rub   - Edema none  GI: soft, nontender, normal bowel sounds  MS: no evidence of inflammation in joints, no muscle tenderness  :  Uribe  SKIN: no rash, warm, dry  NEURO: mentation intact and speech normal  ACCESS: L arm fistula    Labs:   All labs reviewed by me  Electrolytes/Renal -   Recent Labs   Lab Test 02/17/23  0826 02/17/23  0306 02/16/23  2119 02/16/23  1210 02/16/23  0834 02/16/23  0822 02/16/23  0347 02/15/23  0344 02/15/23  0342   NA  --  131*  --   --  133*  --  133*   < > 135*   POTASSIUM  --  3.7  --   --  4.3  --  4.1   < > 4.6   CHLORIDE  --  98  --   --  97*  --  98   < > 101   CO2  --  23  --   --  25  --  25   < > 22   BUN  --  40.1*  --   --  29.8*  --  25.9*   < > 36.9*   CR  --  5.50*  --   --  4.66*  --  4.10*   < > 5.34*   * 152* 172*   < > 160*   < > 149*   < > 141*   DAVID  --  7.4*  --   --  8.4* "  --  7.8*   < > 7.3*   MAG  --  2.2  --   --   --   --  1.4*  --  1.8   PHOS  --  2.9  --   --   --   --  3.1  --  4.1    < > = values in this interval not displayed.       CBC -   Recent Labs   Lab Test 02/17/23  0306 02/16/23  0347 02/15/23  1603 02/15/23  0342   WBC 8.0 9.2  --  12.1*   HGB 7.8* 7.0* 7.7* 7.4*   * 99*  --  100*       LFTs -   Recent Labs   Lab Test 02/14/23  0111 02/13/23  2356 02/13/23  1432   ALKPHOS 35* 36* 47   BILITOTAL 0.3 0.3 0.4   ALT 10 11 12   AST 46 51* 36   PROTTOTAL 4.5* 4.8* 5.5*   ALBUMIN 2.9* 3.0* 3.6       Iron Panel -   Recent Labs   Lab Test 02/16/23  0834   IRON 24*   IRONSAT 20   TREMAINE 1,461*           Current Medications:    sodium chloride 0.9%  250 mL Intravenous Once in dialysis/CRRT     sodium chloride 0.9%  300 mL Hemodialysis Machine Once     acetaminophen  975 mg Oral Q8H     aspirin  162 mg Oral or NG Tube Daily     atorvastatin  40 mg Oral At Bedtime     epoetin martin-epbx  4,000 Units Intravenous Once in dialysis/CRRT     gabapentin  200 mg Oral or Feeding Tube At Bedtime     heparin ANTICOAGULANT  5,000 Units Subcutaneous Q8H     insulin aspart  1-10 Units Subcutaneous TID w/meals     insulin aspart  1-7 Units Subcutaneous At Bedtime     mupirocin  0.5 g Both Nostrils BID     - MEDICATION INSTRUCTIONS -   Does not apply Once     pantoprazole  40 mg Oral or NG Tube Daily    Or     pantoprazole  40 mg Oral Daily     polyethylene glycol  17 g Oral Daily     senna-docusate  1 tablet Oral BID     sodium chloride (PF)  3 mL Intracatheter Q8H       dextrose       BETA BLOCKER NOT PRESCRIBED       ropivacaine 500 mg (02/16/23 1105)     ropivacaine 500 mg (02/16/23 1105)     - MEDICATION INSTRUCTIONS -

## 2023-02-17 NOTE — PLAN OF CARE
Major Shift Events:  New transfer from 4E 1100. VSS. SR. RA- no acute changes. Doing well. Verbalizes readiness to work with therapy and ambulate in hallways.  Nerve block effective for pain control   Plan: HD run today, CT drsg changes tomorrow  For vital signs and complete assessments, please see documentation flowsheets

## 2023-02-17 NOTE — PROGRESS NOTES
"Pain Service Progress Note  [facility]  Date: 02/17/2023       Patient Name: Avelina Marion  MRN: [id number]  Age: 67 year old  Sex: male      Assessment:  Avelina Marion is a 67 year old male with PMH significant for T2DM with long term use of insulin, proliferative retinopathy, HTN, HLD, ESRD on dialysis, CAD, tobacco use, admitted after angiogram showed 80% LM disease    Procedure: s/p CABG x 2    Date of Surgery: 2/13/23     Date of Catheter Placement: 2/13/23     Plan/Recommendations:  1. Regional Anesthesia/Analgesia  -Continuous Catheter Type/Site: bilateral paravertebral (PV) T4-5  Infusate: Ropivacaine 0.2%  Programmed Intermittent Bolus (PIB) at 7 mL Q60 min via each catheter, total infusion rate of 14 mL/hr    Plan to maintain catheters for max of 7 days    2. Anticoagulation  Okay to give heparin SC per primary service orders  -Please contact Inpatient Pain Service before ordering or making any anticoagulation changes       3. Multimodal Analgesia  - per primary service    Pain Service will continue to follow.    Discussed with attending anesthesiologist    Please Page the Pain Team Via Am[employer]: \"PAIN MANAGEMENT ACUTE INPATIENT/ [facility]\"    Gloria Rodriguez, APRN CNP  02/17/2023     Overnight Events: None    Tubes/Drains: Yes  4 Chest tubes    Subjective: \"I don't have pain, just feel stiff by chest tubes\"  Nausea: No  Symptoms of LAST: No    Pain Location:  Lower anterior chest wall, chest tube sites    Pain Intensity:    Pain at Rest: 0/10   Pain with Activity: 1/10  Comfort Goal: met   Baseline Pain: NA   Satisfied with your level of pain control: Yes    Diet: Consistent Carbohydrate Diet Moderate Consistent Carb (60 g CHO per Meal) Diet    Relevant Labs:  Recent Labs   Lab Test 02/17/23  0306 02/13/23  1945 02/13/23  1432   INR  --   --  1.33*   *   < > 221   PTT  --   --  33   BUN 40.1*   < > 45.5*    < > = values in this interval not displayed. " "      Physical Exam:  Vitals: /77 (BP Location: Right arm, Cuff Size: Adult Regular)   Pulse 85   Temp 97.7  F (36.5  C) (Oral)   Resp 16   Ht 1.803 m (5' 11\")   Wt 96.3 kg (212 lb 4.9 oz)   SpO2 96%   BMI 29.61 kg/m      Physical Exam:   Orientation:  Alert, oriented, and in no acute distress: Yes  Sedation: No    Motor Examination:  5/5 Strength in lower extremities: Yes    Catheter Site:   Catheter entry site is clean/dry/intact: Yes    Tender: No      Relevant Medications:  Current Pain Medications:  Medications related to Pain Management (From now, onward)    Start     Dose/Rate Route Frequency Ordered Stop    02/17/23 0720  lidocaine 1 % 0.5 mL         0.5 mL Intradermal ONCE PRN 02/17/23 0720      02/17/23 0720  lidocaine 1 % 0.5 mL         0.5 mL Intradermal ONCE PRN 02/17/23 0720      02/16/23 1200  acetaminophen (TYLENOL) tablet 975 mg         975 mg Oral EVERY 8 HOURS 02/16/23 1012 02/19/23 1159    02/16/23 0000  acetaminophen (TYLENOL) tablet 650 mg         650 mg Oral EVERY 4 HOURS PRN 02/13/23 1431      02/14/23 0800  polyethylene glycol (MIRALAX) Packet 17 g         17 g Oral DAILY 02/13/23 1431      02/14/23 0800  aspirin (ASA) chewable tablet 162 mg         162 mg Oral or NG Tube DAILY 02/13/23 1431      02/13/23 2200  gabapentin (NEURONTIN) capsule 200 mg         200 mg Oral or Feeding Tube AT BEDTIME 02/13/23 1431      02/13/23 2000  senna-docusate (SENOKOT-S/PERICOLACE) 8.6-50 MG per tablet 1 tablet         1 tablet Oral 2 TIMES DAILY 02/13/23 1431      02/13/23 1500  ropivacaine 0.2% in NS perineural infusion (simple)          Perineural Continuous Nerve Block 02/13/23 1442      02/13/23 1500  ropivacaine 0.2% in NS perineural infusion (simple)          Perineural Continuous Nerve Block 02/13/23 1442      02/13/23 1431  oxyCODONE IR (ROXICODONE) half-tab 2.5 mg        See Hyperspace for full Linked Orders Report.    2.5 mg Oral EVERY 4 HOURS PRN 02/13/23 1431      02/13/23 1431  " oxyCODONE (ROXICODONE) tablet 5 mg        See Hyperspace for full Linked Orders Report.    5 mg Oral EVERY 4 HOURS PRN 02/13/23 1431      02/13/23 1431  methocarbamol (ROBAXIN) tablet 500 mg         500 mg Oral or Feeding Tube EVERY 6 HOURS PRN 02/13/23 1431      02/13/23 1431  lidocaine 1 % 0.1-1 mL         0.1-1 mL Other EVERY 1 HOUR PRN 02/13/23 1431      02/13/23 1431  lidocaine (LMX4) cream          Topical EVERY 1 HOUR PRN 02/13/23 1431      02/13/23 1431  magnesium hydroxide (MILK OF MAGNESIA) suspension 30 mL         30 mL Oral DAILY PRN 02/13/23 1431      02/13/23 1431  bisacodyl (DULCOLAX) suppository 10 mg         10 mg Rectal DAILY PRN 02/13/23 1431            Primary Service Contacted with Recommendations? Yes      Please see A&P for additional details of medical decision making.

## 2023-02-17 NOTE — PROGRESS NOTES
CV ICU PROGRESS NOTE  February 14, 2023   Avelina Marion  [id number]  Admitted: 2/7/2023  7:35 AM      ASSESSMENT: Avelina Marion is a 67 year old male with a history of DM2, HTN, HLD, ESRD on dialysis, coronary artery disease, and tobacco use who is admitted after angiogram showed 80% LM disease, undergoing CABGx2 with Dr. Yepez on 2/13.    CO-MORBIDITIES:   Abnormal cardiovascular stress test  (primary encounter diagnosis)  Pre-operative cardiovascular examination  Organ transplant candidate  Encounter for pre-transplant evaluation for kidney and pancreas transplant  Coronary atherosclerosis due to severely calcified coronary lesion  CAD (coronary artery disease)    24-Hour Events:  Prn oxycodone x1. PRN hydralazine x1 for SBP >140. 1u pRBC given for low hgb and orthostatic hypotension. No further episodes orthostatic hypotension with ambulation following transfusion.    TODAY'S PROGRESS:   Transfer to   iHD per nephrology  Hgb 7.8, orthostatic hypotension resolving  Remove CTs after iHD today    PLAN:  Neuro/ pain/ sedation:  - Monitor neurological status. Notify the MD for any acute changes in exam.  #Acute postoperative pain  - Scheduled: Tylenol 975, Gabapentin 200 HS  - PRN: Tylenol, oxycodone, robaxin  - Regional anesthesia: B/L paravertebrals (ropivicaine)     Pulmonary care:   #Remote tobacco use, quit 1985  Resp: 14  - RA  - Goal SpO2 > 92%   - Encourage IS q15-30 minutes when awake     Cardiovascular:  #CABG x2, 2/13  #Hx CAD  #Hx HTN  #Orthostatic hypotension, resolving  - Improved orthostatic hypotension following pRBC transfusion 2/17  - Goal MAP >65, SBP <140, monitor hemodynamics  - Hydralazine 10mg prn for SBP >140, DBP >90  -  mg  - Lipitor 40 mg   - Hold BB   - Hold PTA amlodipine 5 mg     GI care / Nutrition:   - Low carb diet 2/2 DMII  - PPI ppx  - Bowel regimen: MiraLAX, senna      Renal / Fluids / Electrolytes:   #ESRD, HD since 5/2021 (MWF)  #Kidney Transplant Candidate  BL  creat appears to be ~5-6  - Nephrology following  - 3 hr iHD run -3L pulled on 2/15, well tolerated  - Plan for HD run today per nephrology  - Strict I/O, daily weights, avoid/limit nephrotoxins  - Replete lytes PRN per protocol  - Has PTA Calcitrol, PhosLo, CaCarb  - Makes minimal urine baseline     Endocrine:    #DM2, insulin dependent  #Stress hyperglycemia  Preop A1c 7.0  - HDSSI (8u last 24 hrs)  - PTA 20mg Lantus, consider restarting if high HDSSI usage  - Goal BG <180 for optimal healing     ID / Antibiotics:  #Stress induced leukocytosis  - Completed perioperative regimen  - Monitor fever curve, WBC, and inflammatory markers as appropriate     Heme:     #Acute blood loss anemia  #Acute blood loss thrombocytopenia  Baseline hgb ~13  - Hgb 7.8 this AM  - No s/sx active bleeding   - Hgb goal > 7.0  - S/p DDAVP + 1u pRBC post op 2/2 high CT output  - S/p 1u pRBC transfusion 2/16     MSK / Skin:  #Sternotomy  #Surgical Incision  - Sternal precautions, postop incision management protocol  - PT/OT/CR     Prophylaxis:     - Mechanical DVT ppx  - Chemical DVT ppx: SQH   - PPI     Lines / Tubes / Drains:  - CTs x4 (2 meds, 2 pleural) (CVTS TO PULL TODAY AFTER DIALYSIS)  - Left AVF    Disposition:  - Transfer to     Patient seen, findings and plan discussed with CVICU staff and cardiothoracic surgeons.    Guy Ruff, MS4   2/17/2023 at 11:00 AM    ====================================    SUBJECTIVE  Patient well overnight. Oxycodone x1 for pain. First BM post-surgery.      OBJECTIVE  1. VITAL SIGNS  Temp:  [97.5  F (36.4  C)-98.5  F (36.9  C)] 97.8  F (36.6  C)  Pulse:  [68-86] 71  Resp:  [11-35] 17  BP: (100-163)/() 118/59  MAP:  [64 mmHg-103 mmHg] 74 mmHg  Arterial Line BP: (104-162)/(45-72) 120/51  SpO2:  [91 %-100 %] 99 %  Resp: 17      2. INTAKE/ OUTPUT  I/O last 3 completed shifts:  In: 1915 [P.O.:1530; I.V.:108]  Out: 1020 [Urine:700; Chest Tube:320]    3. PHYSICAL EXAMINATION    General: Upright in  bed, NAD.  Neuro: grossly intact, A&Ox4  Resp: NC. Course to clear lung sounds  CV: sinus rhythm  Abdomen: Soft, non-distended, non-tender  Incisions: c/d/i  Extremities: Warm and well perfused. 0-1+ edema lower extremities.   CT: To suction, serosang output, no airleak      4. INVESTIGATIONS  Arterial Blood Gases   Recent Labs   Lab 02/14/23  0802 02/13/23  1946 02/13/23  1434 02/13/23  1324   PH 7.39 7.31* 7.35 7.36   PCO2 36 36 44 38   PO2 78* 84 136* 278*   HCO3 22 18* 24 21     Complete Blood Count   Recent Labs   Lab 02/17/23  0306 02/16/23  0347 02/15/23  1603 02/15/23  0342 02/14/23  0358   WBC 8.0 9.2  --  12.1* 17.9*   HGB 7.8* 7.0* 7.7* 7.4* 9.7*   * 99*  --  100* 184     Basic Metabolic Panel  Recent Labs   Lab 02/17/23  0306 02/16/23  2119 02/16/23  1821 02/16/23  1210 02/16/23  0834 02/16/23  0822 02/16/23  0347 02/16/23  0024   *  --   --   --  133*  --  133* 134*   POTASSIUM 3.7  --   --   --  4.3  --  4.1 4.0   CHLORIDE 98  --   --   --  97*  --  98 98   CO2 23  --   --   --  25  --  25 25   BUN 40.1*  --   --   --  29.8*  --  25.9* 24.3*   CR 5.50*  --   --   --  4.66*  --  4.10* 3.87*   * 172* 179* 234* 160*   < > 149* 173*    < > = values in this interval not displayed.     Liver Function Tests  Recent Labs   Lab 02/14/23  0111 02/13/23  2356 02/13/23  1432   AST 46 51* 36   ALT 10 11 12   ALKPHOS 35* 36* 47   BILITOTAL 0.3 0.3 0.4   ALBUMIN 2.9* 3.0* 3.6   INR  --   --  1.33*     Pancreatic Enzymes  No lab results found in last 7 days.  Coagulation Profile  Recent Labs   Lab 02/13/23  1432   INR 1.33*   PTT 33     Lactate  Invalid input(s): LACTATE    5. RADIOLOGY  Recent Results (from the past 24 hour(s))   XR Chest Port 1 View    Narrative    Portable chest    INDICATION: Postoperative CVTS surgery    COMPARISON: Chest CT 2/7/2023. Most recent plain film 8/9/2022    FINDINGS: Postoperative chest CABG procedure with interim placement of  cerclage wires from median  sternotomy. Right IJ catheter tip is in the  interlobar pulmonary artery on the right. Heart size normal. Scattered  patchy opacities in the lungs likely indicate edema and/or atelectasis  typical of postoperative status. No visible pneumothorax.      Impression    IMPRESSION: Postoperative CABG procedure chest with mild  atelectasis/edema. [place] catheter tip appearing in the interlobar  pulmonary artery on the right.    BETZAIDA MUJICA MD         SYSTEM ID:  [id number]

## 2023-02-18 ENCOUNTER — APPOINTMENT (OUTPATIENT)
Dept: GENERAL RADIOLOGY | Facility: CLINIC | Age: 68
DRG: 233 | End: 2023-02-18
Attending: INTERNAL MEDICINE
Payer: COMMERCIAL

## 2023-02-18 ENCOUNTER — APPOINTMENT (OUTPATIENT)
Dept: PHYSICAL THERAPY | Facility: CLINIC | Age: 68
DRG: 233 | End: 2023-02-18
Attending: INTERNAL MEDICINE
Payer: COMMERCIAL

## 2023-02-18 LAB
ANION GAP SERPL CALCULATED.3IONS-SCNC: 11 MMOL/L (ref 7–15)
BUN SERPL-MCNC: 25.8 MG/DL (ref 8–23)
CA-I BLD-MCNC: 3.9 MG/DL (ref 4.4–5.2)
CALCIUM SERPL-MCNC: 7.8 MG/DL (ref 8.8–10.2)
CHLORIDE SERPL-SCNC: 96 MMOL/L (ref 98–107)
CREAT SERPL-MCNC: 4.22 MG/DL (ref 0.67–1.17)
DEPRECATED HCO3 PLAS-SCNC: 23 MMOL/L (ref 22–29)
ERYTHROCYTE [DISTWIDTH] IN BLOOD BY AUTOMATED COUNT: 13.8 % (ref 10–15)
GFR SERPL CREATININE-BSD FRML MDRD: 15 ML/MIN/1.73M2
GLUCOSE BLDC GLUCOMTR-MCNC: 149 MG/DL (ref 70–99)
GLUCOSE BLDC GLUCOMTR-MCNC: 174 MG/DL (ref 70–99)
GLUCOSE BLDC GLUCOMTR-MCNC: 196 MG/DL (ref 70–99)
GLUCOSE BLDC GLUCOMTR-MCNC: 214 MG/DL (ref 70–99)
GLUCOSE SERPL-MCNC: 206 MG/DL (ref 70–99)
HCT VFR BLD AUTO: 27.4 % (ref 40–53)
HGB BLD-MCNC: 8.9 G/DL (ref 13.3–17.7)
MAGNESIUM SERPL-MCNC: 1.8 MG/DL (ref 1.7–2.3)
MCH RBC QN AUTO: 32.1 PG (ref 26.5–33)
MCHC RBC AUTO-ENTMCNC: 32.5 G/DL (ref 31.5–36.5)
MCV RBC AUTO: 99 FL (ref 78–100)
PHOSPHATE SERPL-MCNC: 2.9 MG/DL (ref 2.5–4.5)
PLATELET # BLD AUTO: 168 10E3/UL (ref 150–450)
POTASSIUM SERPL-SCNC: 3.9 MMOL/L (ref 3.4–5.3)
RBC # BLD AUTO: 2.77 10E6/UL (ref 4.4–5.9)
SODIUM SERPL-SCNC: 130 MMOL/L (ref 136–145)
WBC # BLD AUTO: 7.8 10E3/UL (ref 4–11)

## 2023-02-18 PROCEDURE — 71045 X-RAY EXAM CHEST 1 VIEW: CPT

## 2023-02-18 PROCEDURE — 99232 SBSQ HOSP IP/OBS MODERATE 35: CPT | Mod: 24 | Performed by: INTERNAL MEDICINE

## 2023-02-18 PROCEDURE — 250N000013 HC RX MED GY IP 250 OP 250 PS 637: Performed by: PHYSICIAN ASSISTANT

## 2023-02-18 PROCEDURE — 85027 COMPLETE CBC AUTOMATED: CPT | Performed by: PHYSICIAN ASSISTANT

## 2023-02-18 PROCEDURE — 71046 X-RAY EXAM CHEST 2 VIEWS: CPT | Mod: 26 | Performed by: RADIOLOGY

## 2023-02-18 PROCEDURE — 97530 THERAPEUTIC ACTIVITIES: CPT | Mod: GP

## 2023-02-18 PROCEDURE — 214N000001 HC R&B CCU UMMC

## 2023-02-18 PROCEDURE — 250N000013 HC RX MED GY IP 250 OP 250 PS 637: Performed by: STUDENT IN AN ORGANIZED HEALTH CARE EDUCATION/TRAINING PROGRAM

## 2023-02-18 PROCEDURE — 84100 ASSAY OF PHOSPHORUS: CPT | Performed by: STUDENT IN AN ORGANIZED HEALTH CARE EDUCATION/TRAINING PROGRAM

## 2023-02-18 PROCEDURE — 71045 X-RAY EXAM CHEST 1 VIEW: CPT | Mod: 26 | Performed by: RADIOLOGY

## 2023-02-18 PROCEDURE — 250N000011 HC RX IP 250 OP 636: Performed by: STUDENT IN AN ORGANIZED HEALTH CARE EDUCATION/TRAINING PROGRAM

## 2023-02-18 PROCEDURE — 36415 COLL VENOUS BLD VENIPUNCTURE: CPT | Performed by: PHYSICIAN ASSISTANT

## 2023-02-18 PROCEDURE — 99231 SBSQ HOSP IP/OBS SF/LOW 25: CPT | Mod: GC | Performed by: ANESTHESIOLOGY

## 2023-02-18 PROCEDURE — 80048 BASIC METABOLIC PNL TOTAL CA: CPT | Performed by: PHYSICIAN ASSISTANT

## 2023-02-18 PROCEDURE — 250N000013 HC RX MED GY IP 250 OP 250 PS 637: Performed by: NURSE PRACTITIONER

## 2023-02-18 PROCEDURE — 83735 ASSAY OF MAGNESIUM: CPT | Performed by: STUDENT IN AN ORGANIZED HEALTH CARE EDUCATION/TRAINING PROGRAM

## 2023-02-18 PROCEDURE — 82330 ASSAY OF CALCIUM: CPT | Performed by: STUDENT IN AN ORGANIZED HEALTH CARE EDUCATION/TRAINING PROGRAM

## 2023-02-18 PROCEDURE — 97116 GAIT TRAINING THERAPY: CPT | Mod: GP

## 2023-02-18 PROCEDURE — 71046 X-RAY EXAM CHEST 2 VIEWS: CPT

## 2023-02-18 RX ORDER — METOPROLOL TARTRATE 25 MG/1
25 TABLET, FILM COATED ORAL 2 TIMES DAILY
Status: DISCONTINUED | OUTPATIENT
Start: 2023-02-18 | End: 2023-02-20 | Stop reason: HOSPADM

## 2023-02-18 RX ADMIN — METOPROLOL TARTRATE 25 MG: 25 TABLET, FILM COATED ORAL at 19:58

## 2023-02-18 RX ADMIN — INSULIN ASPART 3 UNITS: 100 INJECTION, SOLUTION INTRAVENOUS; SUBCUTANEOUS at 08:06

## 2023-02-18 RX ADMIN — PANTOPRAZOLE SODIUM 40 MG: 40 TABLET, DELAYED RELEASE ORAL at 08:16

## 2023-02-18 RX ADMIN — HEPARIN SODIUM 5000 UNITS: 5000 INJECTION, SOLUTION INTRAVENOUS; SUBCUTANEOUS at 03:44

## 2023-02-18 RX ADMIN — OXYCODONE HYDROCHLORIDE 2.5 MG: 5 TABLET ORAL at 21:48

## 2023-02-18 RX ADMIN — HYDRALAZINE HYDROCHLORIDE 10 MG: 20 INJECTION INTRAMUSCULAR; INTRAVENOUS at 03:00

## 2023-02-18 RX ADMIN — INSULIN ASPART 3 UNITS: 100 INJECTION, SOLUTION INTRAVENOUS; SUBCUTANEOUS at 12:17

## 2023-02-18 RX ADMIN — GABAPENTIN 200 MG: 100 CAPSULE ORAL at 21:48

## 2023-02-18 RX ADMIN — ASPIRIN 81 MG 162 MG: 81 TABLET ORAL at 08:16

## 2023-02-18 RX ADMIN — SENNOSIDES AND DOCUSATE SODIUM 1 TABLET: 50; 8.6 TABLET ORAL at 19:57

## 2023-02-18 RX ADMIN — HEPARIN SODIUM 5000 UNITS: 5000 INJECTION, SOLUTION INTRAVENOUS; SUBCUTANEOUS at 16:50

## 2023-02-18 RX ADMIN — MUPIROCIN 0.5 G: 20 OINTMENT TOPICAL at 08:18

## 2023-02-18 RX ADMIN — METOPROLOL TARTRATE 25 MG: 25 TABLET, FILM COATED ORAL at 10:47

## 2023-02-18 RX ADMIN — ATORVASTATIN CALCIUM 40 MG: 40 TABLET, FILM COATED ORAL at 19:57

## 2023-02-18 RX ADMIN — INSULIN ASPART 1 UNITS: 100 INJECTION, SOLUTION INTRAVENOUS; SUBCUTANEOUS at 19:12

## 2023-02-18 RX ADMIN — ACETAMINOPHEN 650 MG: 325 TABLET ORAL at 19:09

## 2023-02-18 RX ADMIN — ACETAMINOPHEN 975 MG: 325 TABLET, FILM COATED ORAL at 03:44

## 2023-02-18 ASSESSMENT — ACTIVITIES OF DAILY LIVING (ADL)
ADLS_ACUITY_SCORE: 26
ADLS_ACUITY_SCORE: 26
ADLS_ACUITY_SCORE: 22
ADLS_ACUITY_SCORE: 26
ADLS_ACUITY_SCORE: 21
ADLS_ACUITY_SCORE: 21
ADLS_ACUITY_SCORE: 26
ADLS_ACUITY_SCORE: 21
ADLS_ACUITY_SCORE: 21
ADLS_ACUITY_SCORE: 20
ADLS_ACUITY_SCORE: 20
ADLS_ACUITY_SCORE: 26

## 2023-02-18 NOTE — PROGRESS NOTES
Cardiovascular Surgery Progress Note  02/18/2023         Assessment and Plan:     Avelina Marion is a 67 year old male with a history of DM2, HTN, HLD, ESRD on dialysis, coronary artery disease, and tobacco use who is admitted after angiogram showed 80% LM disease, undergoing CABGx2 with Dr. Yepez on 2/13.    Cardiovascular:   Hx of CAD - s/p CABG x2, 2/13  Hx HTN  Orthostatic hypotension, resolving  No arrhythmias overnight, HD stable, in NSR.  Intra-op TTE showed LV EF 55-60%  -  mg  - Lipitor 40 mg   - BB: metoprolol tartrate 25 mg BID start 2/18  - Hold PTA amlodipine 5 mg  - Diuresis as below    CT/PW: plan to remove today 2/18    Pulmonary:  Extubated in the OR. Now saturating well on RA  - Supplemental O2 PRN to keep sats > 92%. Wean off as tolerated.  - Pulm hygiene, IS, activity and deep breathing    Neurology / MSK:  Acute post-operative pain   Pain well controlled with current regimen:  - Acetaminophen, PO oxycodone PRN, IV dilaudid PRN, methocarbamol     / Renal / Fluid / Electrolytes:  ESRD, HD since 5/2021 ([facility])  Renal Transplant Eval, ongoing  BL creat ~ 5-6, most recent creatinine 4.22; minimal UO at baseline  FLUID STATUS: Pre-op weight 199, weight today 205; I/O past 24 hours: -2.1L  - Nephrology following  - Tolerating HD runs, last 2/17  - Strict I/O, daily weights, avoid/limit nephrotoxins  - Replete lytes PRN per protocol  - Continue PTA Calcitrol, PhosLo, CaCarb    GI / Nutrition:   - Tolerating regular diet  - Continue bowel regimen, last BM 2/17    Endocrine:  DM2, insulin dependent  Stress induced hyperglycemia   Hgb A1c 7.0  - PTA 20mg Lantus  - Initially managed on insulin drip postop, transitioned to sliding scale; goal BG <180 for optimal healing    Infectious Disease:  Stress induced leukocytosis  WBC 7.8, remains afebrile, no signs or symptoms of infection  - Completed perioperative antibiotics  - Continue to monitor fever curve, CBC    Hematology:   Acute blood loss  anemia and thrombocytopenia  Hgb 8.9; Plt 168, no signs or symptoms of active bleeding  - CBC daily  - S/p DDAVP + 1u pRBC post op 2/2 high CT output  - S/p 1u pRBC transfusion 2/16    Anticoagulation:   - ASA only    MSK/Skin:  Sternotomy  Surgical incision  - Sternal precautions  - Incisional cares per protocol    Prophylaxis:   - Stress ulcer prophylaxis: Pantoprazole 40 mg daily for 30 days  - DVT prophylaxis: Subcutaneous heparin, SCD    Disposition:   - Transferred to  on 2/17  - Therapies recommending discharge to home. Possibly home in the next couple days.    Discussed with Dr. Marleni Jha PA-C   Cardiothoracic Surgery   Pager #[phone]  February 18, 2023 at 8:42 AM          Interval History:     No overnight events.  States pain is well managed on current regimen. Slept well overnight.  Tolerating diet, is passing flatus, + BM. No nausea or vomiting.  Breathing well without complaints.   Working with therapies and ambulating in halls without assistance.   Denies chest pain, palpitations, dizziness, syncopal symptoms, fevers, chills, myalgias, or sternal popping/clicking.         Physical Exam:     Gen: A&Ox4, NAD  Neuro: no focal deficits   CV: RRR, normal S1 S2, no murmurs, rubs or gallops  Pulm: CTA, no wheezing or rhonchi, normal breathing on RA  Abd: nondistended, normal BS, soft, nontender  Ext: mild peripheral edema  Incision: clean, dry, intact, no erythema, sternum stable  Tubes/drain sites: dressing clean and dry, serosanguinous output, no air leak. 24 hr output 210 mL.          Data:    Imaging:  reviewed recent imaging, no acute concerns    No results found for this or any previous visit (from the past 24 hour(s)).     Labs:  Recent Labs   Lab 02/18/23  0805 02/18/23  0705 02/17/23  2229 02/17/23  2020 02/17/23  0826 02/17/23  0306 02/16/23  1210 02/16/23  0834 02/16/23  0822 02/16/23  0347 02/14/23  0157 02/14/23  0111 02/14/23  0058 02/13/23  2356 02/13/23  1624  02/13/23  1432   WBC  --  7.8  --   --   --  8.0  --   --   --  9.2   < >  --   --   --   --  23.2*   HGB  --  8.9*  --   --   --  7.8*  --   --   --  7.0*   < >  --   --   --    < > 10.9*   MCV  --  99  --   --   --  97  --   --   --  100   < >  --   --   --   --  96   PLT  --  168  --   --   --  125*  --   --   --  99*   < >  --   --   --   --  221   INR  --   --   --   --   --   --   --   --   --   --   --   --   --   --   --  1.33*   NA  --   --   --   --   --  131*  --  133*  --  133*   < > 133*  --  133*   < > 135*   POTASSIUM  --   --   --   --   --  3.7  --  4.3  --  4.1   < > 4.7  --  5.0   < > 4.4   CHLORIDE  --   --   --   --   --  98  --  97*  --  98   < > 101  --  99   < > 97*   CO2  --   --   --   --   --  23  --  25  --  25   < > 18*  --  17*   < > 22   BUN  --   --   --   --   --  40.1*  --  29.8*  --  25.9*   < > 44.6*  --  46.8*   < > 45.5*   CR  --   --   --   --   --  5.50*  --  4.66*  --  4.10*   < > 5.48*  --  5.87*   < > 5.79*   ANIONGAP  --   --   --   --   --  10  --  11  --  10   < > 14  --  17*   < > 16*   DAVID  --   --   --   --   --  7.4*  --  8.4*  --  7.8*   < > 7.1*  --  7.7*   < > 7.6*   *  --  218* 197*   < > 152*   < > 160*   < > 149*   < > 191*   < > 201*   < > 146*  138*   ALBUMIN  --   --   --   --   --   --   --   --   --   --   --  2.9*  --  3.0*  --  3.6   PROTTOTAL  --   --   --   --   --   --   --   --   --   --   --  4.5*  --  4.8*  --  5.5*   BILITOTAL  --   --   --   --   --   --   --   --   --   --   --  0.3  --  0.3  --  0.4   ALKPHOS  --   --   --   --   --   --   --   --   --   --   --  35*  --  36*  --  47   ALT  --   --   --   --   --   --   --   --   --   --   --  10  --  11  --  12   AST  --   --   --   --   --   --   --   --   --   --   --  46  --  51*  --  36    < > = values in this interval not displayed.      GLUCOSE:   Recent Labs   Lab 02/18/23  0805 02/17/23 2229 02/17/23 2020 02/17/23  1406 02/17/23  0826 02/17/23  0306   * 218* 197* 192*  142* 152*

## 2023-02-18 NOTE — PROGRESS NOTES
HEMODIALYSIS TREATMENT NOTE    Date: 2/17/2023  Time: 7:01 PM    Data:  Pre Wt:   96.3 kg  Desired Wt: 94.3 kg  Post Wt:  94.3 kg  Weight change: 2   kg  Ultrafiltration - Post Run Net Total Removed (mL): 2000  mL  Vascular Access Status: patent  Dialyzer Rinse: Light, Streaked  Total Blood Volume Processed: 73 L Liters  Total Dialysis (Treatment) Time: 3 Hours    Lab:   No    Interventions:  Chest tubes Continuous low suction  AVF cannulated 15 gauge needles  Epoetin    Assessment:  Pt ran for 3 hrs on a K3/ Ca 3 bath with a /  and 2 L pulled with no complications. AVF positive for T/B. Epoetin admin per MAR. Pt rested throughout tx with no complaints. Pt rinsed back and hemostasis achieved in about 10 mins. Pt alert and oriented x4. Report given to PCN.     Plan:    Per renal team

## 2023-02-18 NOTE — PROGRESS NOTES
Pain Service Progress Note  [facility]  Date: 02/18/2023       Patient Name: Avelina Marion  MRN: [id number]  Age: 67 year old  Sex: male      Assessment:  Avelina Marion is a 67 year old male with PMH significant for T2DM with long term use of insulin, proliferative retinopathy, HTN, HLD, ESRD on dialysis, CAD, tobacco use, admitted after angiogram showed 80% LM disease    Procedure: s/p CABG x 2    Date of Surgery: 2/13/23     Date of Catheter Placement: 2/13/23     Subjective and Interval History: NAEO.  Patient reports great pain control.  Denies any significant pain at all. Additionally her has not had anyacute or concerning weakness, paresthesias, circumoral numbness, metallic taste or tinnitus. He is ambulating without assistance and tolerating a diet    Antithrombotic/Thrombolytic Therapy: Last dose Heparin SQ administered 2/18 @ 0400     Medications related to Pain Management (From now, onward)    Start     Dose/Rate Route Frequency Ordered Stop    02/16/23 0000  acetaminophen (TYLENOL) tablet 650 mg         650 mg Oral EVERY 4 HOURS PRN 02/13/23 1431      02/14/23 0800  polyethylene glycol (MIRALAX) Packet 17 g         17 g Oral DAILY 02/13/23 1431      02/14/23 0800  aspirin (ASA) chewable tablet 162 mg         162 mg Oral or NG Tube DAILY 02/13/23 1431      02/13/23 2200  gabapentin (NEURONTIN) capsule 200 mg         200 mg Oral or Feeding Tube AT BEDTIME 02/13/23 1431      02/13/23 2000  senna-docusate (SENOKOT-S/PERICOLACE) 8.6-50 MG per tablet 1 tablet         1 tablet Oral 2 TIMES DAILY 02/13/23 1431 02/13/23 1500  ropivacaine 0.2% in NS perineural infusion (simple)          Perineural Continuous Nerve Block 02/13/23 1442      02/13/23 1500  ropivacaine 0.2% in NS perineural infusion (simple)          Perineural Continuous Nerve Block 02/13/23 1442      02/13/23 1431  oxyCODONE IR (ROXICODONE) half-tab 2.5 mg        See Hyperspace for full Linked Orders Report.    2.5  "mg Oral EVERY 4 HOURS PRN 02/13/23 1431      02/13/23 1431  oxyCODONE (ROXICODONE) tablet 5 mg        See Hyperspace for full Linked Orders Report.    5 mg Oral EVERY 4 HOURS PRN 02/13/23 1431      02/13/23 1431  methocarbamol (ROBAXIN) tablet 500 mg         500 mg Oral or Feeding Tube EVERY 6 HOURS PRN 02/13/23 1431      02/13/23 1431  magnesium hydroxide (MILK OF MAGNESIA) suspension 30 mL         30 mL Oral DAILY PRN 02/13/23 1431      02/13/23 1431  bisacodyl (DULCOLAX) suppository 10 mg         10 mg Rectal DAILY PRN 02/13/23 1431              Objective  Lab Results     Recent Labs   Lab Test 02/18/23  0705   WBC 7.8   RBC 2.77*   HGB 8.9*   HCT 27.4*   MCV 99   MCH 32.1   MCHC 32.5   RDW 13.8        Lab Results   Component Value Date    INR 1.33 02/13/2023    INR 1.03 02/07/2023    INR 0.99 08/09/2022    INR 1.07 08/05/2021       /70 (BP Location: Right arm, Cuff Size: Adult Regular)   Pulse 76   Temp 36.8  C (98.3  F) (Oral)   Resp 16   Ht 1.803 m (5' 11\")   Wt 93.3 kg (205 lb 11.2 oz)   SpO2 92%   BMI 28.69 kg/m       Exam:  Constitutional: alert, no distress and cooperative  Neuro/MSK:  Strength B/L upper and lower Extremities 5/5  and overall symmetric  Skin: paravertebral (PV) catheter sites are c/d/i, no tenderness, erythema, heme, edema.      Assessment  Patient currently achieving adequate pain control with block and multimodal analgesia.  Chest tubes have been removed and pt is currently meeting activity goals. No weakness or paresthesias. No evidence of adverse side effects associated with local anesthetic.     Plan    Time 1510. Bilateral Paravertebral catheters were removed without complication, dark tip intact.  Insertion site c/d/i. Small bandage applied    Okay to administer Heparin SQ at 1510, 1 hour after catheter removal.    o Primary service and Bedside RN aware of plans.    RAPS will sign off    Thank you for allowing us the opportunity to participate in the care of " Avelina Marion  - discussed plan with attending anesthesiologist    Khadijah Guan MD   [facility]

## 2023-02-18 NOTE — PLAN OF CARE
Temp: 98.5  F (36.9  C) Temp src: Oral BP: (!) 152/73 Pulse: 84   Resp: 18 SpO2: 96 % O2 Device: None (Room air)       Shift: 0951-1306  D: Admitted 2/7 after angiogram showed 80% LM disease. S/p CABGx2 on 2/13.   PMHx of DM2, HTN, HLD, ESRD on dialysis (MWF), CAD, and tobacco use.     Neuro: A&O x4. Calls appropriately. SBA.  Cardiac: Tele in place, SR. HR 80's. Afebrile. Denies chest pain.   Resp: VSS on RA sating >96%. Slight CORTÉS. CT x4 (2 containers), adequate output. Please see flowsheet for total output. Lung sounds clear/diminished.   GI/: Voiding spontaneously. Last BM 2/17. Passing flatus. ACHS BG check, sliding scale insulin given. Low carb diet.   Skin: No new deficits noted  LDA's: 2 PIV in place SL. CT x4  Pain: Reports slight discomfort at CT sites, PRN dilaudid 2.5mg given with relief.   AM weight: 205 lb 11.2 oz    Plan: Continue to monitor and follow POC. Notify CVTS with any changes.

## 2023-02-18 NOTE — PLAN OF CARE
End of Shift Summary. See flowsheets for vital signs and detailed assessment.    Changes this shift: VSS. Chest tubes removed this morning. Ropivicaine catheters to be removed this afternoon. Pain well controlled. Walking in the hallway, little unsteady/needs walker. Na down trending, monitor. Metoprolol started BID. No calcium replacement needed per team.    Plan:  Continue with POC.

## 2023-02-19 ENCOUNTER — APPOINTMENT (OUTPATIENT)
Dept: OCCUPATIONAL THERAPY | Facility: CLINIC | Age: 68
DRG: 233 | End: 2023-02-19
Attending: INTERNAL MEDICINE
Payer: COMMERCIAL

## 2023-02-19 ENCOUNTER — APPOINTMENT (OUTPATIENT)
Dept: GENERAL RADIOLOGY | Facility: CLINIC | Age: 68
DRG: 233 | End: 2023-02-19
Attending: INTERNAL MEDICINE
Payer: COMMERCIAL

## 2023-02-19 ENCOUNTER — APPOINTMENT (OUTPATIENT)
Dept: PHYSICAL THERAPY | Facility: CLINIC | Age: 68
DRG: 233 | End: 2023-02-19
Attending: INTERNAL MEDICINE
Payer: COMMERCIAL

## 2023-02-19 LAB
ANION GAP SERPL CALCULATED.3IONS-SCNC: 11 MMOL/L (ref 7–15)
BUN SERPL-MCNC: 38.2 MG/DL (ref 8–23)
CALCIUM SERPL-MCNC: 7.9 MG/DL (ref 8.8–10.2)
CHLORIDE SERPL-SCNC: 99 MMOL/L (ref 98–107)
CREAT SERPL-MCNC: 5.63 MG/DL (ref 0.67–1.17)
DEPRECATED HCO3 PLAS-SCNC: 23 MMOL/L (ref 22–29)
ERYTHROCYTE [DISTWIDTH] IN BLOOD BY AUTOMATED COUNT: 14.1 % (ref 10–15)
GFR SERPL CREATININE-BSD FRML MDRD: 11 ML/MIN/1.73M2
GLUCOSE BLDC GLUCOMTR-MCNC: 170 MG/DL (ref 70–99)
GLUCOSE BLDC GLUCOMTR-MCNC: 176 MG/DL (ref 70–99)
GLUCOSE BLDC GLUCOMTR-MCNC: 228 MG/DL (ref 70–99)
GLUCOSE SERPL-MCNC: 127 MG/DL (ref 70–99)
HCT VFR BLD AUTO: 27.9 % (ref 40–53)
HGB BLD-MCNC: 9 G/DL (ref 13.3–17.7)
MCH RBC QN AUTO: 32.1 PG (ref 26.5–33)
MCHC RBC AUTO-ENTMCNC: 32.3 G/DL (ref 31.5–36.5)
MCV RBC AUTO: 100 FL (ref 78–100)
PLATELET # BLD AUTO: 191 10E3/UL (ref 150–450)
POTASSIUM SERPL-SCNC: 4.5 MMOL/L (ref 3.4–5.3)
RBC # BLD AUTO: 2.8 10E6/UL (ref 4.4–5.9)
SODIUM SERPL-SCNC: 133 MMOL/L (ref 136–145)
WBC # BLD AUTO: 9.3 10E3/UL (ref 4–11)

## 2023-02-19 PROCEDURE — 97530 THERAPEUTIC ACTIVITIES: CPT | Mod: GO

## 2023-02-19 PROCEDURE — 85027 COMPLETE CBC AUTOMATED: CPT | Performed by: PHYSICIAN ASSISTANT

## 2023-02-19 PROCEDURE — 71045 X-RAY EXAM CHEST 1 VIEW: CPT | Mod: 26 | Performed by: RADIOLOGY

## 2023-02-19 PROCEDURE — 97535 SELF CARE MNGMENT TRAINING: CPT | Mod: GO

## 2023-02-19 PROCEDURE — 250N000011 HC RX IP 250 OP 636: Performed by: STUDENT IN AN ORGANIZED HEALTH CARE EDUCATION/TRAINING PROGRAM

## 2023-02-19 PROCEDURE — 250N000013 HC RX MED GY IP 250 OP 250 PS 637: Performed by: PHYSICIAN ASSISTANT

## 2023-02-19 PROCEDURE — 214N000001 HC R&B CCU UMMC

## 2023-02-19 PROCEDURE — 36415 COLL VENOUS BLD VENIPUNCTURE: CPT | Performed by: PHYSICIAN ASSISTANT

## 2023-02-19 PROCEDURE — 80048 BASIC METABOLIC PNL TOTAL CA: CPT | Performed by: PHYSICIAN ASSISTANT

## 2023-02-19 PROCEDURE — 99231 SBSQ HOSP IP/OBS SF/LOW 25: CPT | Mod: 24 | Performed by: INTERNAL MEDICINE

## 2023-02-19 PROCEDURE — 97116 GAIT TRAINING THERAPY: CPT | Mod: GP

## 2023-02-19 PROCEDURE — 71045 X-RAY EXAM CHEST 1 VIEW: CPT

## 2023-02-19 PROCEDURE — 97530 THERAPEUTIC ACTIVITIES: CPT | Mod: GP

## 2023-02-19 PROCEDURE — 250N000013 HC RX MED GY IP 250 OP 250 PS 637: Performed by: NURSE PRACTITIONER

## 2023-02-19 PROCEDURE — 250N000013 HC RX MED GY IP 250 OP 250 PS 637: Performed by: STUDENT IN AN ORGANIZED HEALTH CARE EDUCATION/TRAINING PROGRAM

## 2023-02-19 RX ADMIN — GABAPENTIN 200 MG: 100 CAPSULE ORAL at 22:21

## 2023-02-19 RX ADMIN — INSULIN ASPART 2 UNITS: 100 INJECTION, SOLUTION INTRAVENOUS; SUBCUTANEOUS at 20:26

## 2023-02-19 RX ADMIN — METOPROLOL TARTRATE 25 MG: 25 TABLET, FILM COATED ORAL at 20:27

## 2023-02-19 RX ADMIN — HEPARIN SODIUM 5000 UNITS: 5000 INJECTION, SOLUTION INTRAVENOUS; SUBCUTANEOUS at 11:18

## 2023-02-19 RX ADMIN — SENNOSIDES AND DOCUSATE SODIUM 1 TABLET: 50; 8.6 TABLET ORAL at 20:27

## 2023-02-19 RX ADMIN — ATORVASTATIN CALCIUM 40 MG: 40 TABLET, FILM COATED ORAL at 20:27

## 2023-02-19 RX ADMIN — SENNOSIDES AND DOCUSATE SODIUM 1 TABLET: 50; 8.6 TABLET ORAL at 08:52

## 2023-02-19 RX ADMIN — OXYCODONE HYDROCHLORIDE 2.5 MG: 5 TABLET ORAL at 22:22

## 2023-02-19 RX ADMIN — INSULIN ASPART 2 UNITS: 100 INJECTION, SOLUTION INTRAVENOUS; SUBCUTANEOUS at 14:19

## 2023-02-19 RX ADMIN — HEPARIN SODIUM 5000 UNITS: 5000 INJECTION, SOLUTION INTRAVENOUS; SUBCUTANEOUS at 20:26

## 2023-02-19 RX ADMIN — PANTOPRAZOLE SODIUM 40 MG: 40 TABLET, DELAYED RELEASE ORAL at 08:53

## 2023-02-19 RX ADMIN — HEPARIN SODIUM 5000 UNITS: 5000 INJECTION, SOLUTION INTRAVENOUS; SUBCUTANEOUS at 00:13

## 2023-02-19 RX ADMIN — ASPIRIN 81 MG 162 MG: 81 TABLET ORAL at 08:53

## 2023-02-19 RX ADMIN — METOPROLOL TARTRATE 25 MG: 25 TABLET, FILM COATED ORAL at 08:53

## 2023-02-19 ASSESSMENT — ACTIVITIES OF DAILY LIVING (ADL)
ADLS_ACUITY_SCORE: 21

## 2023-02-19 NOTE — PROGRESS NOTES
Nephrology Progress Note  02/19/2023       Avelina Marion is a 67 yom with hx of ESRD since 4/2021 who is admitted to [facility] for CABG as part of kidney transplant workup.  Had angiogram with only mild CAD 12/2021 but had stress test in Dec 2022 showing ST depression, updated angio showed 80% LM (unclear why such rapid progression) and is now planned for CABG. Still makes UOP, runs MWF dialysis and had run 2/6 prior to admission.  Nephrology consulted for management of RRT with plan for CABG on 2/9.      Interval History : February 19, 2023  Mr Marion continues to recover from CABG earlier this week.  Now running iHD on his usual MWF schedule.    No acute events  Patient feels generally well. No CP.  No acute Complaints.  Ambulating    Assessment & Recommendations:   ESRD-Since 4/2021, runs MWF under care of Dr Kummer Davita Phalen.  L arm fistula access.  Accepted for renal tx and actually has had some offers but turned down due to higher risk then needed mohs surgery and now CABG as part of transplant workup.  Had HD 2/12 in preparation for CABG.  I did previously let him know that there is a chance we need to place catheter for CRRT should he be unstable and need RRT post-op which he understood and agreed with but will try to stay with iHD modality.                   -HD planned for 2/20                  -Access is L arm fistula, would need catheter if CRRT is needed but so far appears that we will be able to manage with iHD.                 -No need for new dialysis consent as he is ESRD on HD as outpt.           Electrolytes- no acute issues.     Anemia-Hgb 9,  iron sats 20%, On EPO 4000 units with HD.       Time spent: 35 minutes on this date of encounter for chart review, physical exam, medical decision making and co-ordination of care.      Tobias Leonard MD  Division of Nephrology and Hypertension  Pager: [phone]  February 19, 2023      Review of Systems:   I reviewed the following systems:  Gen: No  "fevers or chills  CV: No CP at rest  Resp: No SOB at rest  GI: No N/V    Physical Exam:   I/O last 3 completed shifts:  In: 1160 [P.O.:1160]  Out: 1150 [Urine:1150]   BP (!) 140/63 (BP Location: Right arm)   Pulse 80   Temp 98.3  F (36.8  C) (Oral)   Resp 16   Ht 1.803 m (5' 11\")   Wt 93.3 kg (205 lb 11.2 oz)   SpO2 91%   BMI 28.69 kg/m       GENERAL APPEARANCE: alert communicative normally.  Ambulating well.    EYES: no scleral icterus  Edema none  MS: no evidence of inflammation in joints, no muscle tenderness  SKIN: no rash, warm, dry  NEURO: mentation intact and speech normal  ACCESS: L arm fistula    Labs:   All labs reviewed by me  Electrolytes/Renal -   Recent Labs   Lab Test 02/19/23  1417 02/19/23  0620 02/18/23  2155 02/18/23  0805 02/18/23  0705 02/17/23  0826 02/17/23  0306 02/16/23  0822 02/16/23  0347   NA  --  133*  --   --  130*  --  131*   < > 133*   POTASSIUM  --  4.5  --   --  3.9  --  3.7   < > 4.1   CHLORIDE  --  99  --   --  96*  --  98   < > 98   CO2  --  23  --   --  23  --  23   < > 25   BUN  --  38.2*  --   --  25.8*  --  40.1*   < > 25.9*   CR  --  5.63*  --   --  4.22*  --  5.50*   < > 4.10*   * 127* 174*   < > 206*   < > 152*   < > 149*   DAVID  --  7.9*  --   --  7.8*  --  7.4*   < > 7.8*   MAG  --   --   --   --  1.8  --  2.2  --  1.4*   PHOS  --   --   --   --  2.9  --  2.9  --  3.1    < > = values in this interval not displayed.       CBC -   Recent Labs   Lab Test 02/19/23  0620 02/18/23  0705 02/17/23  0306   WBC 9.3 7.8 8.0   HGB 9.0* 8.9* 7.8*    168 125*       LFTs -   Recent Labs   Lab Test 02/14/23  0111 02/13/23  2356 02/13/23  1432   ALKPHOS 35* 36* 47   BILITOTAL 0.3 0.3 0.4   ALT 10 11 12   AST 46 51* 36   PROTTOTAL 4.5* 4.8* 5.5*   ALBUMIN 2.9* 3.0* 3.6       Iron Panel -   Recent Labs   Lab Test 02/16/23  0834   IRON 24*   IRONSAT 20   TREMAINE 1,461*           Current Medications:    aspirin  162 mg Oral or NG Tube Daily     atorvastatin  40 mg Oral At " Bedtime     gabapentin  200 mg Oral or Feeding Tube At Bedtime     heparin ANTICOAGULANT  5,000 Units Subcutaneous Q8H     insulin aspart  1-10 Units Subcutaneous TID w/meals     insulin aspart  1-7 Units Subcutaneous At Bedtime     insulin glargine  10 Units Subcutaneous At Bedtime     metoprolol tartrate  25 mg Oral BID     pantoprazole  40 mg Oral or NG Tube Daily    Or     pantoprazole  40 mg Oral Daily     polyethylene glycol  17 g Oral Daily     senna-docusate  1 tablet Oral BID     sodium chloride (PF)  3 mL Intracatheter Q8H       dextrose

## 2023-02-19 NOTE — PROGRESS NOTES
Major Shift Events:  Doing post CT removal. Mild CORTÉS walking in hallways. Showered. CT sites covered. No acute changes   Plan: Home tomorrow after HD run  For vital signs and complete assessments, please see documentation flowsheets

## 2023-02-19 NOTE — PROGRESS NOTES
Cardiovascular Surgery Progress Note  02/19/2023         Assessment and Plan:     Avelina Marion is a 67 year old male with a history of DM2, HTN, HLD, ESRD on dialysis, coronary artery disease, and tobacco use who is admitted after angiogram showed 80% LM disease, undergoing CABGx2 with Dr. Yepez on 2/13.    Cardiovascular:   Hx of CAD - s/p CABG x2, 2/13  Hx HTN  Orthostatic hypotension, resolving  No arrhythmias overnight, HD stable, in NSR.  Intra-op TTE showed LV EF 55-60%  -  mg  - Lipitor 40 mg   - BB: metoprolol tartrate 25 mg BID start 2/18  - Hold PTA amlodipine 5 mg  - Diuresis as below    CT/PW: removed 2/18    Pulmonary:  Extubated in the OR. Now saturating well on RA  - Supplemental O2 PRN to keep sats > 92%. Wean off as tolerated.  - Pulm hygiene, IS, activity and deep breathing    Neurology / MSK:  Acute post-operative pain   Pain well controlled with current regimen:  - Acetaminophen, PO oxycodone PRN, IV dilaudid PRN, methocarbamol     / Renal / Fluid / Electrolytes:  ESRD, HD since 5/2021 ([facility])  Renal Transplant Eval, ongoing  BL creat ~ 5-6, most recent creatinine 4.22; minimal UO at baseline  FLUID STATUS: Pre-op weight 199, weight today 205; I/O past 24 hours: -2.1L  - Nephrology following  - Tolerating HD runs, last 2/17  - Strict I/O, daily weights, avoid/limit nephrotoxins  - Replete lytes PRN per protocol  - Continue PTA Calcitrol, PhosLo, CaCarb    GI / Nutrition:   - Tolerating regular diet  - Continue bowel regimen, last BM 2/18    Endocrine:  DM2, insulin dependent  Stress induced hyperglycemia   Hgb A1c 7.0  - PTA 20mg Lantus  - Initially managed on insulin drip postop, transitioned to sliding scale; goal BG <180 for optimal healing    Infectious Disease:  Stress induced leukocytosis  WBC 9.3, remains afebrile, no signs or symptoms of infection  - Completed perioperative antibiotics  - Continue to monitor fever curve, CBC    Hematology:   Acute blood loss anemia and  thrombocytopenia  Hgb 9; Plt 191, no signs or symptoms of active bleeding  - CBC daily    Anticoagulation:   - ASA only    MSK/Skin:  Sternotomy  Surgical incision  - Sternal precautions  - Incisional cares per protocol    Prophylaxis:   - Stress ulcer prophylaxis: Pantoprazole 40 mg daily for 30 days  - DVT prophylaxis: Subcutaneous heparin, SCD    Disposition:   - Transferred to  on 2/17  - Therapies recommending discharge to home. Home tommorrow after HD run    Discussed with Dr. Marleni Jha PAAbdielC   Cardiothoracic Surgery   Pager #[phone]  February 19, 2023 at 11:56 AM          Interval History:     No overnight events.  States pain is well managed on current regimen. Slept well overnight.  Tolerating diet, is passing flatus, + BM. No nausea or vomiting.  Breathing well without complaints.   Working with therapies and ambulating in halls without assistance.   Denies chest pain, palpitations, dizziness, syncopal symptoms, fevers, chills, myalgias, or sternal popping/clicking.         Physical Exam:     Gen: A&Ox4, NAD  Neuro: no focal deficits   CV: RRR, normal S1 S2, no murmurs, rubs or gallops  Pulm: CTA, no wheezing or rhonchi, normal breathing on RA  Abd: nondistended, normal BS, soft, nontender  Ext: mild peripheral edema  Incision: clean, dry, intact, no erythema, sternum stable  Tubes/drain sites: dressing clean and dry         Data:    Imaging:  reviewed recent imaging, no acute concerns    Recent Results (from the past 24 hour(s))   XR Chest 2 Views    Narrative    XR CHEST 2 VIEWS  2/18/2023 2:21 PM      HISTORY: s/p CT removal    COMPARISON: 2/18/2023    FINDINGS: PA and lateral views. Bilateral chest tubes have been  removed. No appreciable pneumothorax. Cardiac silhouette is stable.  Stable patchy basilar opacities and streaky perihilar opacities. No  substantial pleural effusion.      Impression    IMPRESSION: No pneumothorax status post chest tube removal.    I have personally  reviewed the examination and initial interpretation  and I agree with the findings.    KATHY BALLARD MD         SYSTEM ID:  [id number]        Labs:  Recent Labs   Lab 02/19/23  0620 02/18/23  2155 02/18/23  1828 02/18/23  0805 02/18/23  0705 02/17/23  0826 02/17/23  0306 02/14/23  0157 02/14/23  0111 02/14/23  0058 02/13/23  2356 02/13/23  1624 02/13/23  1432   WBC 9.3  --   --   --  7.8  --  8.0   < >  --   --   --   --  23.2*   HGB 9.0*  --   --   --  8.9*  --  7.8*   < >  --   --   --    < > 10.9*     --   --   --  99  --  97   < >  --   --   --   --  96     --   --   --  168  --  125*   < >  --   --   --   --  221   INR  --   --   --   --   --   --   --   --   --   --   --   --  1.33*   *  --   --   --  130*  --  131*   < > 133*  --  133*   < > 135*   POTASSIUM 4.5  --   --   --  3.9  --  3.7   < > 4.7  --  5.0   < > 4.4   CHLORIDE 99  --   --   --  96*  --  98   < > 101  --  99   < > 97*   CO2 23  --   --   --  23  --  23   < > 18*  --  17*   < > 22   BUN 38.2*  --   --   --  25.8*  --  40.1*   < > 44.6*  --  46.8*   < > 45.5*   CR 5.63*  --   --   --  4.22*  --  5.50*   < > 5.48*  --  5.87*   < > 5.79*   ANIONGAP 11  --   --   --  11  --  10   < > 14  --  17*   < > 16*   DAVID 7.9*  --   --   --  7.8*  --  7.4*   < > 7.1*  --  7.7*   < > 7.6*   * 174* 149*   < > 206*   < > 152*   < > 191*   < > 201*   < > 146*  138*   ALBUMIN  --   --   --   --   --   --   --   --  2.9*  --  3.0*  --  3.6   PROTTOTAL  --   --   --   --   --   --   --   --  4.5*  --  4.8*  --  5.5*   BILITOTAL  --   --   --   --   --   --   --   --  0.3  --  0.3  --  0.4   ALKPHOS  --   --   --   --   --   --   --   --  35*  --  36*  --  47   ALT  --   --   --   --   --   --   --   --  10  --  11  --  12   AST  --   --   --   --   --   --   --   --  46  --  51*  --  36    < > = values in this interval not displayed.      GLUCOSE:   Recent Labs   Lab 02/19/23  0620 02/18/23  2155 02/18/23  1828 02/18/23  1215  02/18/23  0805 02/18/23  0705   * 174* 149* 196* 214* 206*

## 2023-02-19 NOTE — PLAN OF CARE
Temp: 98.4  F (36.9  C) Temp src: Axillary BP: 125/61 Pulse: 87   Resp: 18 SpO2: 94 % O2 Device: None (Room air)      Neuro: alert, oriented.  Cardiac: NSR, BPs stable  Resp: chest tubes x 4 removed today, CXR done to follow up on removal. No SOB, lungs clear, diminished. Encouraged to use IS. Ropivicaine catheters removed by anesthesia, band aids over sites, C/D/I.   GI/: Denies nausea, appetite good. Voiding in urinal, pt oliguric, HD patient. FSBG ACHS, sliding scale coverage. Started on lantus tonight.  Skin: Sternotomy incision BRIONNA, WDL. Drsg over chest tube sites C/D/I. Bruising noted over fistula site on L arm.   Activity: Worked with PT today. Using walker to ambulate in hallways, SBA while in room up to BR. Has urinal for voiding overnight.   Pain: Incisional pain well controlled with prn tylenol and oxycodone. Warm packs on neck for some stiffness.   Access: PIV SL.     Continue plan of care, assess for changes.         Goal Outcome Evaluation:      Plan of Care Reviewed With: patient    Overall Patient Progress: improvingOverall Patient Progress: improving    Outcome Evaluation: chest tubes and rupivicaine catheters removed today. Working with therapy.

## 2023-02-19 NOTE — PLAN OF CARE
Temp: 97  F (36.1  C) Temp src: Axillary BP: (!) 140/70 Pulse: 79   Resp: 18 SpO2: 95 % O2 Device: None (Room air)       Shift: [id number]  D: Admitted 2/7 after angiogram showed 80% LM disease. S/p CABGx2 on 2/13.   PMHx of DM2, HTN, HLD, ESRD on dialysis (MWF), CAD, and tobacco use.     Neuro: A&O x4. Calls appropriately. SBA.  Cardiac: Tele in place, SR. HR 80's. Afebrile. Denies chest pain.   Resp: VSS on RA sating >96%. Slight CORTÉS. Lung sounds clear/diminished. Pt independently uses IS throughout shift.  GI/: Voiding spontaneously, oliguric pt on dialysis. Last BM 2/17. Passing flatus. ACHS BG check. Low carb diet.   Skin: No new deficits noted. Sternal incision BRIONNA, CT dressing CDI, Bruising noted on L arm over fistula site.  LDA's: 2 PIV in place SL  Pain: Denies    Plan: Continue to monitor and follow POC. Notify CVTS with any changes.

## 2023-02-19 NOTE — PROGRESS NOTES
Nephrology Progress Note  02/18/2023       Avelina Marion is a 67 yom with hx of ESRD since 4/2021 who is admitted to [facility] for CABG as part of kidney transplant workup.  Had angiogram with only mild CAD 12/2021 but had stress test in Dec 2022 showing ST depression, updated angio showed 80% LM (unclear why such rapid progression) and is now planned for CABG. Still makes UOP, runs MWF dialysis and had run 2/6 prior to admission.  Nephrology consulted for management of RRT with plan for CABG on 2/9.      Interval History : February 18, 2023  Mr Marion continues to recover from CABG earlier this week.  Now running iHD on his usual MWF schedule.    Had L sided chest tube removed today.  Post CXR showed no pneumothorax.  Patient feels generally fair. No CP.  No acute Complaints.  Had HD yesterday with 2L UF     Assessment & Recommendations:   ESRD-Since 4/2021, runs MWF under care of Dr Kummer [facility].  L arm fistula access.  Accepted for renal tx and actually has had some offers but turned down due to higher risk then needed mohs surgery and now CABG as part of transplant workup.  Had HD 2/12 in preparation for CABG.  I did previously let him know that there is a chance we need to place catheter for CRRT should he be unstable and need RRT post-op which he understood and agreed with but will try to stay with iHD modality.                   -HD planned for 2/20 barring any unexpected lab abnormalities.                  -Access is L arm fistula, would need catheter if CRRT is needed but so far appears that we will be able to manage with iHD.                 -No need for new dialysis consent as he is ESRD on HD as outpt.           Electrolytes- no acute issues.     Anemia-Hgb 8.9,  iron sats 20%, can consider EPO with next run.       Nutrition-No issues, good appetite prior to surgery.       Time spent: 35 minutes on this date of encounter for chart review, physical exam, medical decision making and co-ordination of  "care.      Tobias Leonard MD  Division of Nephrology and Hypertension  Pager: [phone]  February 18, 2023  6:31 PM      Review of Systems:   I reviewed the following systems:  Gen: No fevers or chills  CV: No CP at rest  Resp: No SOB at rest  GI: No N/V    Physical Exam:   I/O last 3 completed shifts:  In: 1120 [P.O.:1120]  Out: 3345 [Urine:975; Other:2300; Chest Tube:70]   /70 (BP Location: Right arm, Cuff Size: Adult Regular)   Pulse 76   Temp 98.3  F (36.8  C) (Oral)   Resp 16   Ht 1.803 m (5' 11\")   Wt 93.3 kg (205 lb 11.2 oz)   SpO2 92%   BMI 28.69 kg/m       GENERAL APPEARANCE: alert communicative normally    EYES: no scleral icterus  NECK:  no JVD  Pulmonary: lungs clear to auscultation with equal breath sounds bilaterally, no clubbing or cyanosis  CV: Regular rhythm, normal rate, no rub   - Edema none  GI: soft, nontender, normal bowel sounds  MS: no evidence of inflammation in joints, no muscle tenderness  SKIN: no rash, warm, dry  NEURO: mentation intact and speech normal  ACCESS: L arm fistula    Labs:   All labs reviewed by me  Electrolytes/Renal -   Recent Labs   Lab Test 02/18/23  1215 02/18/23  0805 02/18/23  0705 02/17/23  0826 02/17/23  0306 02/16/23  1210 02/16/23  0834 02/16/23  0822 02/16/23  0347   NA  --   --  130*  --  131*  --  133*  --  133*   POTASSIUM  --   --  3.9  --  3.7  --  4.3  --  4.1   CHLORIDE  --   --  96*  --  98  --  97*  --  98   CO2  --   --  23  --  23  --  25  --  25   BUN  --   --  25.8*  --  40.1*  --  29.8*  --  25.9*   CR  --   --  4.22*  --  5.50*  --  4.66*  --  4.10*   * 214* 206*   < > 152*   < > 160*   < > 149*   DAVID  --   --  7.8*  --  7.4*  --  8.4*  --  7.8*   MAG  --   --  1.8  --  2.2  --   --   --  1.4*   PHOS  --   --  2.9  --  2.9  --   --   --  3.1    < > = values in this interval not displayed.       CBC -   Recent Labs   Lab Test 02/18/23  0705 02/17/23  0306 02/16/23  0347   WBC 7.8 8.0 9.2   HGB 8.9* 7.8* 7.0*    125* 99* "       LFTs -   Recent Labs   Lab Test 02/14/23  0111 02/13/23  2356 02/13/23  1432   ALKPHOS 35* 36* 47   BILITOTAL 0.3 0.3 0.4   ALT 10 11 12   AST 46 51* 36   PROTTOTAL 4.5* 4.8* 5.5*   ALBUMIN 2.9* 3.0* 3.6       Iron Panel -   Recent Labs   Lab Test 02/16/23  0834   IRON 24*   IRONSAT 20   TREMAINE 1,461*           Current Medications:    aspirin  162 mg Oral or NG Tube Daily     atorvastatin  40 mg Oral At Bedtime     gabapentin  200 mg Oral or Feeding Tube At Bedtime     heparin ANTICOAGULANT  5,000 Units Subcutaneous Q8H     insulin aspart  1-10 Units Subcutaneous TID w/meals     insulin aspart  1-7 Units Subcutaneous At Bedtime     insulin glargine  10 Units Subcutaneous At Bedtime     metoprolol tartrate  25 mg Oral BID     pantoprazole  40 mg Oral or NG Tube Daily    Or     pantoprazole  40 mg Oral Daily     polyethylene glycol  17 g Oral Daily     senna-docusate  1 tablet Oral BID     sodium chloride (PF)  3 mL Intracatheter Q8H       dextrose       ropivacaine 500 mg (02/16/23 1105)     ropivacaine 500 mg (02/16/23 1105)

## 2023-02-20 ENCOUNTER — APPOINTMENT (OUTPATIENT)
Dept: PHYSICAL THERAPY | Facility: CLINIC | Age: 68
DRG: 233 | End: 2023-02-20
Attending: INTERNAL MEDICINE
Payer: COMMERCIAL

## 2023-02-20 VITALS
OXYGEN SATURATION: 99 % | WEIGHT: 205.7 LBS | SYSTOLIC BLOOD PRESSURE: 159 MMHG | HEART RATE: 82 BPM | RESPIRATION RATE: 15 BRPM | TEMPERATURE: 97.5 F | HEIGHT: 71 IN | DIASTOLIC BLOOD PRESSURE: 74 MMHG | BODY MASS INDEX: 28.8 KG/M2

## 2023-02-20 LAB
ANION GAP SERPL CALCULATED.3IONS-SCNC: 15 MMOL/L (ref 7–15)
BUN SERPL-MCNC: 50.5 MG/DL (ref 8–23)
CALCIUM SERPL-MCNC: 7.7 MG/DL (ref 8.8–10.2)
CHLORIDE SERPL-SCNC: 103 MMOL/L (ref 98–107)
CREAT SERPL-MCNC: 6.75 MG/DL (ref 0.67–1.17)
DEPRECATED HCO3 PLAS-SCNC: 19 MMOL/L (ref 22–29)
ERYTHROCYTE [DISTWIDTH] IN BLOOD BY AUTOMATED COUNT: 14.3 % (ref 10–15)
GFR SERPL CREATININE-BSD FRML MDRD: 8 ML/MIN/1.73M2
GLUCOSE BLDC GLUCOMTR-MCNC: 149 MG/DL (ref 70–99)
GLUCOSE BLDC GLUCOMTR-MCNC: 194 MG/DL (ref 70–99)
GLUCOSE SERPL-MCNC: 157 MG/DL (ref 70–99)
HCT VFR BLD AUTO: 29.3 % (ref 40–53)
HGB BLD-MCNC: 9.1 G/DL (ref 13.3–17.7)
MCH RBC QN AUTO: 31.6 PG (ref 26.5–33)
MCHC RBC AUTO-ENTMCNC: 31.1 G/DL (ref 31.5–36.5)
MCV RBC AUTO: 102 FL (ref 78–100)
PLATELET # BLD AUTO: 237 10E3/UL (ref 150–450)
POTASSIUM SERPL-SCNC: 4.2 MMOL/L (ref 3.4–5.3)
RBC # BLD AUTO: 2.88 10E6/UL (ref 4.4–5.9)
SODIUM SERPL-SCNC: 137 MMOL/L (ref 136–145)
WBC # BLD AUTO: 11.2 10E3/UL (ref 4–11)

## 2023-02-20 PROCEDURE — 97530 THERAPEUTIC ACTIVITIES: CPT | Mod: GP

## 2023-02-20 PROCEDURE — 36415 COLL VENOUS BLD VENIPUNCTURE: CPT | Performed by: PHYSICIAN ASSISTANT

## 2023-02-20 PROCEDURE — 634N000001 HC RX 634: Performed by: INTERNAL MEDICINE

## 2023-02-20 PROCEDURE — 80048 BASIC METABOLIC PNL TOTAL CA: CPT | Performed by: PHYSICIAN ASSISTANT

## 2023-02-20 PROCEDURE — 258N000003 HC RX IP 258 OP 636: Performed by: INTERNAL MEDICINE

## 2023-02-20 PROCEDURE — 90937 HEMODIALYSIS REPEATED EVAL: CPT

## 2023-02-20 PROCEDURE — 85027 COMPLETE CBC AUTOMATED: CPT | Performed by: PHYSICIAN ASSISTANT

## 2023-02-20 PROCEDURE — 99232 SBSQ HOSP IP/OBS MODERATE 35: CPT | Mod: 24 | Performed by: CLINICAL NURSE SPECIALIST

## 2023-02-20 PROCEDURE — 250N000011 HC RX IP 250 OP 636: Performed by: STUDENT IN AN ORGANIZED HEALTH CARE EDUCATION/TRAINING PROGRAM

## 2023-02-20 PROCEDURE — 250N000013 HC RX MED GY IP 250 OP 250 PS 637: Performed by: STUDENT IN AN ORGANIZED HEALTH CARE EDUCATION/TRAINING PROGRAM

## 2023-02-20 RX ORDER — ASPIRIN 81 MG/1
162 TABLET, CHEWABLE ORAL DAILY
Qty: 60 TABLET | Refills: 0 | Status: ON HOLD | OUTPATIENT
Start: 2023-02-21 | End: 2023-09-21

## 2023-02-20 RX ORDER — ATORVASTATIN CALCIUM 40 MG/1
40 TABLET, FILM COATED ORAL AT BEDTIME
Qty: 30 TABLET | Refills: 0 | Status: ON HOLD | OUTPATIENT
Start: 2023-02-20 | End: 2023-09-21

## 2023-02-20 RX ORDER — AMOXICILLIN 250 MG
1 CAPSULE ORAL 2 TIMES DAILY PRN
Qty: 30 TABLET | Refills: 0 | Status: SHIPPED | OUTPATIENT
Start: 2023-02-20 | End: 2023-03-22

## 2023-02-20 RX ORDER — METOPROLOL TARTRATE 25 MG/1
25 TABLET, FILM COATED ORAL 2 TIMES DAILY
Qty: 60 TABLET | Refills: 0 | Status: SHIPPED | OUTPATIENT
Start: 2023-02-20 | End: 2023-09-22

## 2023-02-20 RX ORDER — OXYCODONE HYDROCHLORIDE 5 MG/1
2.5 TABLET ORAL EVERY 6 HOURS PRN
Qty: 5 TABLET | Refills: 0 | Status: SHIPPED | OUTPATIENT
Start: 2023-02-20 | End: 2023-03-06

## 2023-02-20 RX ORDER — ACETAMINOPHEN 325 MG/1
650 TABLET ORAL EVERY 4 HOURS PRN
Qty: 60 TABLET | Refills: 0 | Status: SHIPPED | OUTPATIENT
Start: 2023-02-20 | End: 2023-09-25

## 2023-02-20 RX ADMIN — EPOETIN ALFA-EPBX 4000 UNITS: 10000 INJECTION, SOLUTION INTRAVENOUS; SUBCUTANEOUS at 14:06

## 2023-02-20 RX ADMIN — ASPIRIN 81 MG 162 MG: 81 TABLET ORAL at 08:32

## 2023-02-20 RX ADMIN — PANTOPRAZOLE SODIUM 40 MG: 40 TABLET, DELAYED RELEASE ORAL at 08:33

## 2023-02-20 RX ADMIN — Medication: at 13:18

## 2023-02-20 RX ADMIN — SENNOSIDES AND DOCUSATE SODIUM 1 TABLET: 50; 8.6 TABLET ORAL at 08:33

## 2023-02-20 RX ADMIN — SODIUM CHLORIDE 250 ML: 9 INJECTION, SOLUTION INTRAVENOUS at 13:17

## 2023-02-20 RX ADMIN — SODIUM CHLORIDE 300 ML: 9 INJECTION, SOLUTION INTRAVENOUS at 13:16

## 2023-02-20 RX ADMIN — INSULIN ASPART 3 UNITS: 100 INJECTION, SOLUTION INTRAVENOUS; SUBCUTANEOUS at 12:13

## 2023-02-20 RX ADMIN — INSULIN ASPART 1 UNITS: 100 INJECTION, SOLUTION INTRAVENOUS; SUBCUTANEOUS at 08:42

## 2023-02-20 RX ADMIN — HEPARIN SODIUM 5000 UNITS: 5000 INJECTION, SOLUTION INTRAVENOUS; SUBCUTANEOUS at 04:31

## 2023-02-20 ASSESSMENT — ACTIVITIES OF DAILY LIVING (ADL)
ADLS_ACUITY_SCORE: 21

## 2023-02-20 NOTE — PLAN OF CARE
Temp: 98.3  F (36.8  C) Temp src: Oral BP: 129/65 Pulse: 73   Resp: 18 SpO2: 96 % O2 Device: None (Room air)       Shift: [id number]  D: Admitted 2/7 after angiogram showed 80% LM disease. S/p CABGx2 on 2/13.   PMHx of DM2, HTN, HLD, ESRD on dialysis (MWF), CAD, and tobacco use.     Neuro: A&O x4. Calls appropriately. SBA.  Cardiac: Tele in place, SR. HR 70's. Afebrile.    Resp: VSS on RA sating >96%.   GI/: Adequate urine output. ACHS BG check. Low carb diet.   Skin: Sternal incision BRIONNA, CT dressing CDI, Bruising noted on L arm over fistula site.  LDA's: 2 PIV in place SL  Pain: Denies    Plan: Continue to monitor and follow POC. Discharge home today 2/20 after HD run. Notify CVTS with any changes.

## 2023-02-20 NOTE — DISCHARGE INSTRUCTIONS
AFTER YOU GO HOME FROM YOUR HEART SURGERY  (S/p 2 vessel coronary artery bypass surgery - 2/13/2023    You had a sternotomy, avoid lifting anything greater than ten pounds for 6 weeks after surgery and then less than 20 pounds for an additional 6 weeks. Do not reach backwards or use arms to push out of chair. Do not let people pull on your arms to assist with standing. Avoid twisting or reaching too far across your body.  Avoid strenuous activities such as bowling, vacuuming, raking, shoveling, golf or tennis for 12 weeks after your surgery. It is okay to resume sex if you feel comfortable in doing so. You may have to try different positions with your partner.  Splint your chest incision by hugging a pillow or bringing your arms across your chest when coughing or sneezing. Please try to sleep on your back for the first 4-6 weeks to avoid extra stress on your sternum (breastbone) while it is healing.     No driving for 4 weeks after surgery or while on pain medication.     Shower or wash your incisions twice daily with soap and water (or as instructed), pat dry. Keep wound clean and dry, showers are okay after discharge, but don't let spray hit directly on incision. No baths or swimming for 1 month. Cover chest tube sites with gauze until they stop draining, then leave open to air. It is not abnormal for chest tube sites to drain yellowish/clear fluid for up to 2-3 weeks after surgery.   Watch for signs of infection: increased redness, tenderness, warmth or any drainage from sternum incision.  Also a temperature > 100.5 F or chills. Call your surgeon or primary care provider's office immediately. Remove any skin glue left on incisions after 10-14 days. This will not affect your incision and can speed up healing.    Exercise is very important in your recovery. Please follow the guidelines set up for you in your cardiac rehab classes at the hospital. If outpatient cardiac rehab was ordered for you, we highly  recommend you participate. If you have problems arranging your cardiac rehab, please call [phone] for all locations, with the exception of [place], please call [phone] and [place], please call [phone].    Avoid sitting for prolonged periods of time, try to walk every hour during the day. If you have a leg incision, elevate your leg often when you are not walking.    Check your weight when you get home from the hospital and continue to check it daily through your recovery for at least a month. If you notice a weight gain of 2-3 pounds in a week, notify your primary care physician, cardiologist or surgeon.    Bowel activity may be slow after surgery. If necessary, you may take an over the counter laxative such as Milk of Magnesia or Miralax. You may have stool softeners prescribed (docusate sodium, Senokot). We recommend using stool softeners while using narcotics for pain (oxycodone/percocet, hydrocodone/vicodin, hydromorphone/dilaudid).      Wean OFF of narcotics (oxycodone, dilaudid, hydrocodone) as soon as possible. You should continue taking acetaminophen as long as you have any surgical pain as the first choice for pain control and add narcotics as necessary for pain to be tolerable.      DO NOT SMOKE.  IF YOU NEED HELP QUITTING, PLEASE TALK WITH YOUR CARDIOLOGIST OR PRIMARY DOCTOR.    REGARDING PRESCRIPTION REFILLS.  If you need a refill on your pain medication contact us to discuss your pain and a possible one time refill.   All other medications will be adjusted, discontinued and re-filled by your primary care physician and/or your cardiologist as they were prior to your surgery. We have given you enough for one to three month with possibly one refill.    POST-OPERATIVE CLINIC VISITS  You have a follow up visit with CVTS Surgery Advance Care Practitioners at the [facility].  You will then return to the care of your primary provider and your cardiologist. Future  medication refills should come from your PCP or Cardiologist.   You should see your primary care provider in 2-4 weeks after discharge.   It is important to see your cardiologist about 4-6 weeks after discharge.    If you do not hear from a  in 7 days, please call [phone] (choose option 1) and request to be seen with a general cardiologist or someone that you have seen in the past.   If there is a need to return to see CT Surgery, please call our  Bharati Milton at [phone].     SURGICAL QUESTIONS  Please call Neel Blackwood, Rae Helm, Catrachita Velasquez, or Mary Fajardo with surgical recovery and medication questions; phone numbers are listed below.  They will assist you with your needs and contact other surgery care team members as indicated.    On weekends or after hours, please call [phone] and ask the  to page the Cardiothoracic Surgery fellow on call.      Thank you,    Your Cardiothoracic Surgery Team   Neel Blackwood RN Care Coordinator - [phone]  Rae Helm, RN Care Coordinator - [phone]   Mary Escobar, RN Care Coordinator - [phone]   Suha Velasquez, RN Care Coordinator - [phone]

## 2023-02-20 NOTE — PROGRESS NOTES
CLINICAL NUTRITION SERVICES    Reason for Assessment:  Heart-healthy nutrition education, received consult previously (pt s/p CABG)    Diet History:  Pt reports no history of receiving heart-healthy nutrition education in the past.      Nutrition Diagnosis:  Food- and nutrition-related knowledge deficit r/t no previous knowledge of heart-healthy diet AEB pt report of no previous formal heart-healthy nutrition education.    Nutrition Prescription/Recs:  Continue heart-healthy diet.      Interventions:  Nutrition Education  1. Provided handouts: Your Heart-Healthy Diet, How to Read Nutrition Labels, Low-Sodium Foods and Drinks, and Tips for a Low-Sodium Diet.      Goals:    1. Pt will verbalize at least four foods high in saturated or trans-fats and five foods high in sodium.    2. Pt will list at least two interventions to make current meal plan more heart-healthy.     Follow-up:   Patient to ask any further nutrition-related questions before discharge. In addition, pt may request outpatient RD appointment.    Francesco Franco   Dietetic Intern     I have read and agree with the above nutrition note.  Sabine Erazo, MS, RD, LD, [facility]   6C Pgr: [phone]

## 2023-02-20 NOTE — DISCHARGE SUMMARY
Dialysis Discharge Summary Brief    [facility]  Division of Nephrology  Nephrology Discharge Dialysis Orders  Ph: [phone]  Fax: [phone]    Avelina Marion  MRN: [id number]  YOB: 1955    [facility]  Primary Nephrologist: Dr Mitchell    Date of Admission: 2/7/2023  Date of Discharge: 2/20/2023  Discharge Diagnosis: ESRD post CABG    Mr Marion was admitted 2/7 for cardiac workup, ended up having CABG on 2/13 and has done very well, now discharging home with plan to go back to his usual outpatient dialysis.  No changes to his overall dialysis plan, if there are any questions please do not hesitate to page me at the number below      [x] Resume all previous dialysis orders, no changes.       Name of physician completing this form: KWASI Lara MD  [phone]

## 2023-02-20 NOTE — PROGRESS NOTES
Nephrology Progress Note  02/20/2023       Avelina Marion is a 67 yom with hx of ESRD since 4/2021 who is admitted to [facility] for CABG as part of kidney transplant workup.  Had angiogram with only mild CAD 12/2021 but had stress test in Dec 2022 showing ST depression, updated angio showed 80% LM (unclear why such rapid progression) and is now planned for CABG. Still makes UOP, runs MWF dialysis and had run 2/6 prior to admission.  Nephrology consulted for management of RRT with plan for CABG on 2/9.      Interval History :   Mr Marion continues to recover from CABG, now 1 week out.  Running HD on his usual MWF schedule, no issues on run today and is planned for discharge home.  Next outpt run planned for 2/22, will send separate discharge summary to his unit.        Assessment & Recommendations:   ESRD-Since 4/2021, runs MWF under care of Dr Kummer [facility].  L arm fistula access.  Accepted for renal tx and actually has had some offers but turned down due to higher risk then needed mohs surgery and now CABG as part of transplant workup.  Had HD 2/12 in preparation for CABG.  I did previously let him know that there is a chance we need to place catheter for CRRT should he be unstable and need RRT post-op which he understood and agreed with but will try to stay with iHD modality.                 -HD today then discharge                -Access is L arm fistula                -No need for new dialysis consent as he is ESRD on HD as outpt.           Volume-Still up in wt from admission but stable pulm status, pulling 1-2L on run today, makes significant UOP (700cc yesterday).       Electrolytes-K 4.2, bicarb 19, Na 137, no acute issues.     BMD-Ca 7.4, Mg and Phos not checked today.      Anemia-Hgb 7.8, baseline is ~12, iron sats 20%, can consider EPO with next run.       Nutrition-No issues, good appetite prior to surgery.       Time spent: 25 minutes on this date of encounter for chart review, physical exam, medical  "decision making and co-ordination of care.      Discussed with Dr Leonard     Recommendations were communicated to primary team via note     KWASI Lara CNS  Clinical Nurse Specialist  [phone]    Review of Systems:   I reviewed the following systems:  Gen: No fevers or chills  CV: No CP at rest  Resp: No SOB at rest  GI: No N/V    Physical Exam:   I/O last 3 completed shifts:  In: 1470 [P.O.:1470]  Out: 1850 [Urine:1850]   BP (!) 153/75 (BP Location: Right arm)   Pulse 84   Temp 97.5  F (36.4  C) (Oral)   Resp 21   Ht 1.803 m (5' 11\")   Wt 93.3 kg (205 lb 11.2 oz)   SpO2 100%   BMI 28.69 kg/m       GENERAL APPEARANCE: Extubated post surgery, still on pressor.    EYES: no scleral icterus  NECK:  no JVD  Pulmonary: lungs clear to auscultation with equal breath sounds bilaterally, no clubbing or cyanosis  CV: Regular rhythm, normal rate, no rub   - Edema none  GI: soft, nontender, normal bowel sounds  MS: no evidence of inflammation in joints, no muscle tenderness  :  [place]  SKIN: no rash, warm, dry  NEURO: mentation intact and speech normal  ACCESS: L arm fistula    Labs:   All labs reviewed by me  Electrolytes/Renal -   Recent Labs   Lab Test 02/20/23  1210 02/20/23  0808 02/20/23  0611 02/19/23  1417 02/19/23  0620 02/18/23  0805 02/18/23  0705 02/17/23  0826 02/17/23  0306 02/16/23  0822 02/16/23  0347   NA  --   --  137  --  133*  --  130*  --  131*   < > 133*   POTASSIUM  --   --  4.2  --  4.5  --  3.9  --  3.7   < > 4.1   CHLORIDE  --   --  103  --  99  --  96*  --  98   < > 98   CO2  --   --  19*  --  23  --  23  --  23   < > 25   BUN  --   --  50.5*  --  38.2*  --  25.8*  --  40.1*   < > 25.9*   CR  --   --  6.75*  --  5.63*  --  4.22*  --  5.50*   < > 4.10*   * 149* 157*   < > 127*   < > 206*   < > 152*   < > 149*   DAVID  --   --  7.7*  --  7.9*  --  7.8*  --  7.4*   < > 7.8*   MAG  --   --   --   --   --   --  1.8  --  2.2  --  1.4*   PHOS  --   --   --   --   --   --  2.9  --  " 2.9  --  3.1    < > = values in this interval not displayed.       CBC -   Recent Labs   Lab Test 02/20/23  0611 02/19/23  0620 02/18/23  0705   WBC 11.2* 9.3 7.8   HGB 9.1* 9.0* 8.9*    191 168       LFTs -   Recent Labs   Lab Test 02/14/23  0111 02/13/23  2356 02/13/23  1432   ALKPHOS 35* 36* 47   BILITOTAL 0.3 0.3 0.4   ALT 10 11 12   AST 46 51* 36   PROTTOTAL 4.5* 4.8* 5.5*   ALBUMIN 2.9* 3.0* 3.6       Iron Panel -   Recent Labs   Lab Test 02/16/23  0834   IRON 24*   IRONSAT 20   TREMAINE 1,461*           Current Medications:    aspirin  162 mg Oral or NG Tube Daily     atorvastatin  40 mg Oral At Bedtime     epoetin martin-epbx  4,000 Units Intravenous Once in dialysis/CRRT     gabapentin  200 mg Oral or Feeding Tube At Bedtime     heparin ANTICOAGULANT  5,000 Units Subcutaneous Q8H     insulin aspart  1-10 Units Subcutaneous TID w/meals     insulin aspart  1-7 Units Subcutaneous At Bedtime     insulin glargine  10 Units Subcutaneous At Bedtime     metoprolol tartrate  25 mg Oral BID     pantoprazole  40 mg Oral or NG Tube Daily    Or     pantoprazole  40 mg Oral Daily     polyethylene glycol  17 g Oral Daily     senna-docusate  1 tablet Oral BID     sodium chloride (PF)  3 mL Intracatheter Q8H       dextrose

## 2023-02-20 NOTE — DISCHARGE SUMMARY
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, with long-term current use of insulin (H)     Hypertension     Proliferative diabetic retinopathy (H)     Anemia in stage 5 chronic kidney disease, not on chronic dialysis (H)     Secondary hyperparathyroidism of renal origin (H)     Cardiac arrhythmia     CKD (chronic kidney disease) stage 5, GFR less than 15 ml/min (H)     Diabetes mellitus type II, uncontrolled     Glaucoma suspect of right eye     Hearing loss on right     Hyperlipidemia with target LDL less than 100     Hyperphosphatemia     Normal anion gap metabolic acidosis     Orthostatic hypotension     Status post cataract extraction of both eyes with insertion of intraocular lens     Type II diabetes mellitus with neurological manifestations (H)     Uremia     Vitamin D deficiency     Status post coronary angiogram     Pancreas transplant candidate     BCC (basal cell carcinoma), face     Pre-operative cardiovascular examination     CAD (coronary artery disease)     Abnormal cardiovascular stress test     Organ transplant candidate     Coronary atherosclerosis due to severely calcified coronary lesion     Encounter for pre-transplant evaluation for kidney and pancreas transplant               Secondary Diagnoses:     Acute postoperative pain, improving    Stress induced hyperglycemia, resolved    Stress induced leukocytosis, resolved    Acute blood loss anemia,  stable    Acute blood loss thrombocytopenia, resolved    PROCEDURES PERFORMED:   Date: 2/13/23.  Surgeon: Dr. Yepez  1.  Coronary artery bypass grafting x 2               - reversed saphenous vein graft to the obtuse marginal branch of the left circumflex coronary artery              - pedicled left internal mammary artery to left anterior descending coronary artery  2.  Endoscopic vein harvest of the greater saphenous vein from the left lower extremity.  3. Transesophageal echocardiogram  4. Bilateral intercostal nerve block with AtriCure cryo probe    INTRAOPERATIVE FINDINGS:    1) The left internal mammary artery was 3 mm in diameter and had excellent flow.    2) The greater saphenous vein from the left lower extremity was 4-5 mm in diameter and suitable for conduit.    3) The ascending aorta was free of calcified plaque.    4) The obtuse marginal branch of the left circumflex coronary artery was 1.5 mm in diameter and free of disease at the site of anastomosis.   5) The left anterior descending coronary artery 2 mm in diameter and free of disease at the site of anastomosis. 6) After reperfusion and defibrillation, sinus rhythm resumed.    7) Left ventricular function was 60% preoperatively and unchanged after bypass on low-dose inotropic support.    PATHOLOGY RESULTS:    none    CULTURE RESULTS:    none    CONSULTS:      PT/OT    Intensivist    Nephrololgy    BRIEF HISTORY OF ILLNESS:  Avelina Marion is a 67 year old male with a history of DM2, HTN, HLD, ESRD on dialysis, coronary artery disease, and tobacco use who is admitted after angiogram showed 80% LM disease, undergoing CABGx2 with Dr. Yepez on 2/13.    HOSPITAL COURSE: Avelina Marion is a 67 year old male who on 2/13/2023  underwent the above-named procedures and tolerated the operation well.     Postoperatively was admitted to the CVICU.  Patient was extubated within protocol in the OR.  Blood pressure and cardiac index were managed with  "vasopressors and inotropic agents which were continuously weaned until no longer needed.  Patient was subsequently  transferred to the surgical telemetry floor.    While on the surgical unit, the patient continued to progress well. Chest tubes and temporary pacemaker wires were removed when deemed appropriate.     Nephrology followed patient throughout his hospital stay for dialysis needs. He tolerated hemodialysis well after surgery. He was transitioned back to his outpatient dialysis schedule of M/W/F; will re-evaluate need in clinic follow-up.      Patient was transiently hyperglycemic and treated with insulin infusion then transitioned to sliding scale insulin per protocol. Blood sugars remained stable. No further glycemic control agents needed at this time.     Prior to discharge, his pain was controlled well, he was working well with therapies, able to perform most ADLs, ambulate without difficulty, and had full return of bowel and bladder function.  On February 20, 2023, he was discharged to home in stable condition. Follow up with cardiology and cardiac surgery have been arranged. Pt encouraged to follow up with PCP and cardiac rehab upon discharge.    Patient discharged on aspirin:  Yes 81 mg  Patient discharged on beta blocker: yes metoprolol tartrate 25 mg BID   Patient discharged on ACE Inhibitor/ARB: no    Patient discharged on statin: yes Lipitor 40 mg daily         Discharge Disposition:     Discharged to home            Condition on Discharge:     Discharge condition: Stable   Discharge vitals: Blood pressure (!) 141/73, pulse 80, temperature 97.6  F (36.4  C), temperature source Oral, resp. rate 18, height 1.803 m (5' 11\"), weight 93.3 kg (205 lb 11.2 oz), SpO2 98 %.     Code status on discharge: Full Code     Vitals:    02/14/23 0400 02/16/23 0600 02/18/23 0200   Weight: 97.2 kg (214 lb 4.6 oz) 96.3 kg (212 lb 4.9 oz) 93.3 kg (205 lb 11.2 oz)       DAY OF DISCHARGE PHYSICAL EXAM:    Gen: A&Ox4, " NAD  Neuro: no focal deficits   CV: RRR, normal S1 S2, no murmurs, rubs or gallops  Pulm: CTA, no wheezing or rhonchi, normal breathing on RA  Abd: nondistended, normal BS, soft, nontender  Ext: no peripheral edema  Incision: clean, dry, intact, no erythema, sternum stable  Tubes/drain sites: dressing clean and dry    LABS  Most Recent 3 CBC's:  Recent Labs   Lab Test 02/20/23  0611 02/19/23  0620 02/18/23  0705   WBC 11.2* 9.3 7.8   HGB 9.1* 9.0* 8.9*   * 100 99    191 168      Most Recent 3 BMP's:  Recent Labs   Lab Test 02/20/23  1210 02/20/23  0808 02/20/23  0611 02/19/23  1417 02/19/23  0620 02/18/23  0805 02/18/23  0705   NA  --   --  137  --  133*  --  130*   POTASSIUM  --   --  4.2  --  4.5  --  3.9   CHLORIDE  --   --  103  --  99  --  96*   CO2  --   --  19*  --  23  --  23   BUN  --   --  50.5*  --  38.2*  --  25.8*   CR  --   --  6.75*  --  5.63*  --  4.22*   ANIONGAP  --   --  15  --  11  --  11   DAVID  --   --  7.7*  --  7.9*  --  7.8*   * 149* 157*   < > 127*   < > 206*    < > = values in this interval not displayed.     Most Recent 2 LFT's:  Recent Labs   Lab Test 02/14/23  0111 02/13/23  2356   AST 46 51*   ALT 10 11   ALKPHOS 35* 36*   BILITOTAL 0.3 0.3     Most Recent INR's and Anticoagulation Dosing History:  Anticoagulation Dose History     Recent Dosing and Labs Latest Ref Rng & Units 8/5/2021 8/9/2022 2/7/2023 2/13/2023    INR 0.85 - 1.15 1.07 0.99 1.03 1.33(H)        Most Recent 3 Troponin's:No lab results found.  Most Recent Cholesterol Panel:  Recent Labs   Lab Test 12/19/22  1744   CHOL 116   LDL 43   HDL 51   TRIG 109     Most Recent 6 Bacteria Isolates From Any Culture (See EPIC Reports for Culture Details):No lab results found.  Most Recent TSH, T4 and A1c Labs:  Recent Labs   Lab Test 02/07/23  1644   A1C 7.1*      Recent Labs   Lab 02/20/23  1210 02/20/23  0808 02/20/23  0611 02/19/23  2214 02/19/23  2017 02/19/23  1417   * 149* 157* 228* 176* 170*        Imaging:  XR CHEST 2 VIEWS  2/18/2023 2:21 PM       HISTORY: s/p CT removal     COMPARISON: 2/18/2023     FINDINGS: PA and lateral views. Bilateral chest tubes have been  removed. No appreciable pneumothorax. Cardiac silhouette is stable.  Stable patchy basilar opacities and streaky perihilar opacities. No  substantial pleural effusion.                                                                      IMPRESSION: No pneumothorax status post chest tube removal.      PRE-ADMISSION MEDICATIONS:  Medications Prior to Admission   Medication Sig Dispense Refill Last Dose     calcium acetate (PHOSLO) 667 MG CAPS capsule Take 667 mg by mouth 3 times daily (with meals)   2/6/2023 at 2130     calcium carbonate 750 MG CHEW Take 750 mg by mouth 4 times daily   2/6/2023 at 2130     insulin glargine (LANTUS PEN) 100 UNIT/ML pen Inject 20 Units Subcutaneous At Bedtime   2/6/2023 at 2130     Blood Glucose Monitoring Suppl (ACCU-CHEK GUIDE) w/Device KIT Use to test 4 times a day. Pharmacy to dispense brand based on insurance.        [DISCONTINUED] amLODIPine (NORVASC) 5 MG tablet Take 5 mg by mouth   2/6/2023 at 2130     [DISCONTINUED] aspirin (ASA) 81 MG chewable tablet chew and swallow one tablet by mouth every day   2/6/2023 at 2130     [DISCONTINUED] atorvastatin (LIPITOR) 20 MG tablet TAKE 1 TABLET BY MOUTH DAILY   2/6/2023 at 2130       DISCHARGE MEDICATIONS:   Current Discharge Medication List      START taking these medications    Details   acetaminophen (TYLENOL) 325 MG tablet Take 2 tablets (650 mg) by mouth every 4 hours as needed for pain, headaches or fever (For optimal non-opioid multimodal pain management to improve pain control.)  Qty: 60 tablet, Refills: 0    Associated Diagnoses: S/P CABG (coronary artery bypass graft)      metoprolol tartrate (LOPRESSOR) 25 MG tablet Take 1 tablet (25 mg) by mouth 2 times daily  Qty: 60 tablet, Refills: 0    Associated Diagnoses: S/P CABG (coronary artery bypass graft)       oxyCODONE (ROXICODONE) 5 MG tablet Take 0.5 tablets (2.5 mg) by mouth every 6 hours as needed for breakthrough pain  Qty: 5 tablet, Refills: 0    Associated Diagnoses: S/P CABG (coronary artery bypass graft)      senna-docusate (SENOKOT-S/PERICOLACE) 8.6-50 MG tablet Take 1 tablet by mouth 2 times daily as needed for constipation  Qty: 30 tablet, Refills: 0    Associated Diagnoses: S/P CABG (coronary artery bypass graft)         CONTINUE these medications which have CHANGED    Details   aspirin (ASA) 81 MG chewable tablet 2 tablets (162 mg) by Oral or NG Tube route daily  Qty: 60 tablet, Refills: 0    Associated Diagnoses: S/P CABG (coronary artery bypass graft)      atorvastatin (LIPITOR) 40 MG tablet Take 1 tablet (40 mg) by mouth At Bedtime  Qty: 30 tablet, Refills: 0    Associated Diagnoses: S/P CABG (coronary artery bypass graft)         CONTINUE these medications which have NOT CHANGED    Details   calcium acetate (PHOSLO) 667 MG CAPS capsule Take 667 mg by mouth 3 times daily (with meals)      calcium carbonate 750 MG CHEW Take 750 mg by mouth 4 times daily      insulin glargine (LANTUS PEN) 100 UNIT/ML pen Inject 20 Units Subcutaneous At Bedtime      Blood Glucose Monitoring Suppl (ACCU-CHEK GUIDE) w/Device KIT Use to test 4 times a day. Pharmacy to dispense brand based on insurance.         STOP taking these medications       amLODIPine (NORVASC) 5 MG tablet Comments:   Reason for Stopping:                CC:Amy Carbone      [facility]   Cardiothoracic Surgery  Office phone: [phone]  Office fax: [phone]

## 2023-02-20 NOTE — PROGRESS NOTES
HEMODIALYSIS TREATMENT NOTE    Date: 2/20/2023  Time: 4:15 PM    Data:  Pre Wt:     Desired Wt: 91.8 kg   Post Wt:    Weight change: -2  kg  Ultrafiltration - Post Run Net Total Removed (mL): 2300 mL  Vascular Access Status: patent  Dialyzer Rinse: Clear  Total Blood Volume Processed: 70 Liters   Total Dialysis (Treatment) Time: 3h Hours    Lab:   no    Interventions:  HD 3 hr, pt rested the entire time without issues. L AVF pos bruit and thrill with noted bruising about 5d-1week old and healing well. Homeostasis reached with holding for 10 minutes.     Assessment:  Vss, report called to PCN     Plan:    Per renal team. Plan is for pt to discharge from hospital to home today.

## 2023-02-20 NOTE — PROGRESS NOTES
Care Management Discharge Note    Discharge Date: 02/20/2023     Discharge Disposition: Home    Discharge Services: OP CR (referral in place, schedule for eval on 3/1)    Discharge DME: None    Discharge Transportation: family or friend will provide    Additional Information:  Per PA, pt discharging home today. Had HD run this morning and will return to regular OP HD schedule.    Donnie Phalen  Phone: [phone]  This writer called and updated that pt is discharging today.    CC will continue to monitor patient's medical condition and progress towards discharge.  Carolyn Joshi RN BSN  6C Unit Care Coordinator  Phone number: [phone]  Pager: [phone]

## 2023-02-21 NOTE — ANESTHESIA PROCEDURE NOTES
Perioperative KATE Procedure Note    Staff -        Anesthesiologist:  Chirag Roper MD       Resident/Fellow: Mauricio Wilson MD       Performed By: anesthesiologist and resident  Preanesthesia Checklist:  Patient identified, IV assessed, risks and benefits discussed, monitors and equipment assessed, procedure being performed at surgeon's request and anesthesia consent obtained.    KATE Probe Insertion    Probe Status PRE Insertion: NO obvious damage  Probe type:  Adult 3D  Bite block used:   Soft  Insertion Technique: Easy, no oropharyngeal manipulation  Insertion complications: None obvious  Billing Report:KATE report by Anesthesiologist (See Separate Report note)  Probe Status POST Removal: NO obvious damage    KATE Report  General Procedure Information  Images for this study have been archived.  Modalities: 2D, CW Doppler, PW Doppler and Color flow mapping  Diagnostic Indications comments: Monitoring during cabg.  Echocardiographic and Doppler Measurements  Right Ventricle:  Cavity size normal.    Hypertrophy not present.   Thrombus not present.    Global function normal.   Ejection Fraction 60%.    Left Ventricle:  Cavity size normal.    Hypertrophy not present.   Thrombus not present.   Global Function normal.       Ventricular Regional Function:  1- Basal Anteroseptal:  normal  2- Basal Anterior:  normal  3- Basal Anterolateral:  normal  4- Basal Inferolateral:  normal  5- Basal Inferior:  normal  6- Basal Inferoseptal:  normal  7- Mid Anteroseptal:  normal  8- Mid Anterior:  normal  9- Mid Anterolateral:  normal  10- Mid Inferolateral:  normal  11- Mid Inferior:  normal  12- Mid Inferoseptal:  normal  13- Apical Anterior:  normal  14- Apical Lateral:  normal  15- Apical Inferior:  normal  16- Apical Septal:  normal  17- Irving:  normal    Valves  Aortic Valve: Annulus normal.  Stenosis not present.  Regurgitation absent.  Leaflets normal.  Leaflet motions normal.    Mitral Valve: Annulus normal.   Stenosis not present.  Regurgitation +1.  Leaflets normal.  Leaflet motions normal.    Tricuspid Valve: Annulus normal.  Stenosis not present.  Regurgitation absent.  Leaflets normal.  Leaflet motions normal.    Pulmonic Valve: Annulus normal.  Stenosis not present.  Regurgitation absent.      Aorta: Ascending Aorta: Size normal.  Dissection not present.  Plaque thickness less than 3 mm.  Mobile plaque not present.    Aortic Arch: Size normal.    Dissection not present.   Plaque thickness less than 3 mm.   Mobile plaque not present.    Descending Aorta: Size normal.    Dissection not present.   Plaque thickness less than 3 mm.   Mobile plaque not present.      Right Atrium:  Size normal.   Spontaneous echo contrast not present.   Thrombus not present.   Tumor not present.   Device not present.     Left Atrium: Size normal.  Spontaneous echo contrast not present.  Thrombus not present.  Tumor not present.  Device not present.    Left atrial appendage normal.     Atrial Septum: Intra-atrial septal morphology normal.       Ventricular Septum: Intra-ventricular septum morphology normal.        Other Findings:   Pericardium:  normal. Pleural Effusion:  none. Pulmonary Arteries:  normal.  Cornoary sinus catheter present.    Post Intervention Findings  Procedure(s) performed:  CABG.  Regional wall motion:. Surgeon(s) notified of all postintervention findings: Yes.             Post Intervention comments: No pericardial effusion, no aortic dissection, no RWMA. EF Approximately 60%, normal RV function. Valvular function in unchanged.    Echocardiogram Comments

## 2023-02-21 NOTE — ANESTHESIA PREPROCEDURE EVALUATION
Anesthesia Pre-Procedure Evaluation    Patient: Avelina Marion   MRN: [id number] : 1955        Procedure : Procedure(s):  TRANSESPHAGEAL ECHOCARDIOGRAM, MEDIAN STERNOTOMY, TAKE DOWN OF LEFT INTERNAL MAMMARY, LEFT ENDOSCOPIC GREATER SAPHENOUS VEIN HARVEST, CRYO NERVE BLOCK, CARDIOPULMONARY BYPASS, CORONARY ARTERY BYPASS GRAFT X 2.          Past Medical History:   Diagnosis Date     Anemia in chronic kidney disease      BCC (basal cell carcinoma), face 2022     Diabetic retinopathy of both eyes (H)      End stage renal disease (H)      Hypertension      Secondary renal hyperparathyroidism (H)      Type 2 diabetes mellitus (H)       Past Surgical History:   Procedure Laterality Date     BYPASS GRAFT ARTERY CORONARY N/A 2023    Procedure: TRANSESPHAGEAL ECHOCARDIOGRAM, MEDIAN STERNOTOMY, TAKE DOWN OF LEFT INTERNAL MAMMARY, LEFT ENDOSCOPIC GREATER SAPHENOUS VEIN HARVEST, CRYO NERVE BLOCK, CARDIOPULMONARY BYPASS, CORONARY ARTERY BYPASS GRAFT X 2.;  Surgeon: Patrice Yepez MD;  Location: [facility] OR     CREATE FISTULA ARTERIOVENOUS UPPER EXTREMITY Left      CV CORONARY ANGIOGRAM N/A 2021    Procedure: CV CORONARY ANGIOGRAM;  Surgeon: Milton Cam MD;  Location:  [facility]ATH [facility]     CV CORONARY ANGIOGRAM N/A 2023    Procedure: Coronary Angiogram;  Surgeon: Milton Cam MD;  Location:  HE[facility]ARD[facility] CATH LAB     CV PCI N/A 2023    Procedure: Percutaneous Coronary Intervention;  Surgeon: Milton Cam MD;  Location:  HE[facility] CARDI[facility] CATH LAB      No Known Allergies   Social History     Tobacco Use     Smoking status: Former     Types: Cigarettes     Smokeless tobacco: Never     Tobacco comments:     Quit    Substance Use Topics     Alcohol use: Never      Wt Readings from Last 1 Encounters:   23 93.3 kg (205 lb 11.2 oz)        Anesthesia Evaluation   Pt has had prior anesthetic.     No history of anesthetic complications       ROS/MED HX  ENT/Pulmonary:  - neg  pulmonary ROS     Neurologic:       Cardiovascular:     (+) hypertension--CAD --- (-) murmur   METS/Exercise Tolerance:     Hematologic:       Musculoskeletal:       GI/Hepatic:       Renal/Genitourinary:     (+) renal disease, Pt requires dialysis,     Endo:     (+) type II DM,     Psychiatric/Substance Use:       Infectious Disease:       Malignancy:       Other:            Physical Exam    Airway        Mallampati: I   TM distance: > 3 FB   Neck ROM: full   Mouth opening: > 3 cm    Respiratory Devices and Support         Dental           Cardiovascular          Rhythm and rate: regular and normal (-) no murmur    Pulmonary           breath sounds clear to auscultation           OUTSIDE LABS:  CBC:   Lab Results   Component Value Date    WBC 11.2 (H) 02/20/2023    WBC 9.3 02/19/2023    HGB 9.1 (L) 02/20/2023    HGB 9.0 (L) 02/19/2023    HCT 29.3 (L) 02/20/2023    HCT 27.9 (L) 02/19/2023     02/20/2023     02/19/2023     BMP:   Lab Results   Component Value Date     02/20/2023     (L) 02/19/2023    POTASSIUM 4.2 02/20/2023    POTASSIUM 4.5 02/19/2023    CHLORIDE 103 02/20/2023    CHLORIDE 99 02/19/2023    CO2 19 (L) 02/20/2023    CO2 23 02/19/2023    BUN 50.5 (H) 02/20/2023    BUN 38.2 (H) 02/19/2023    CR 6.75 (H) 02/20/2023    CR 5.63 (H) 02/19/2023     (H) 02/20/2023     (H) 02/20/2023     COAGS:   Lab Results   Component Value Date    PTT 33 02/13/2023    INR 1.33 (H) 02/13/2023     POC: No results found for: BGM, HCG, HCGS  HEPATIC:   Lab Results   Component Value Date    ALBUMIN 2.9 (L) 02/14/2023    PROTTOTAL 4.5 (L) 02/14/2023    ALT 10 02/14/2023    AST 46 02/14/2023    ALKPHOS 35 (L) 02/14/2023    BILITOTAL 0.3 02/14/2023     OTHER:   Lab Results   Component Value Date    PH 7.39 02/14/2023    LACT 1.4 02/14/2023    A1C 7.1 (H) 02/07/2023    DAVID 7.7 (L) 02/20/2023    PHOS 2.9 02/18/2023    MAG 1.8 02/18/2023    LIPASE 38 08/09/2022    AMYLASE 65 08/09/2022    CRP  <2.9 10/29/2021    SED 17 10/29/2021       Anesthesia Plan    ASA Status:  4   NPO Status:  NPO Appropriate    Anesthesia Type: General.     - Airway: ETT   Induction: Intravenous.   Maintenance: Balanced.   Techniques and Equipment:     - Lines/Monitors: 2nd IV, Arterial Line, Central Line, PAC, KATE            KATE Absolute Contra-indication: NONE            KATE Relative Contra-indication: NONE     Consents    Anesthesia Plan(s) and associated risks, benefits, and realistic alternatives discussed. Questions answered and patient/representative(s) expressed understanding.     - Discussed: Risks, Benefits and Alternatives for BOTH SEDATION and the PROCEDURE were discussed     - Discussed with:  Patient      - Extended Intubation/Ventilatory Support Discussed: Yes.      - Patient is DNR/DNI Status: No    Use of blood products discussed: Yes.     - Discussed with: Patient.     - Consented: consented to blood products            Reason for refusal: other.     Postoperative Care    Pain management: IV analgesics, Oral pain medications.   PONV prophylaxis: Ondansetron (or other 5HT-3), Dexamethasone or Solumedrol     Comments:                Chirag Roper MD

## 2023-02-21 NOTE — ANESTHESIA POSTPROCEDURE EVALUATION
Patient: Avelina Marion    Procedure: Procedure(s):  TRANSESPHAGEAL ECHOCARDIOGRAM, MEDIAN STERNOTOMY, TAKE DOWN OF LEFT INTERNAL MAMMARY, LEFT ENDOSCOPIC GREATER SAPHENOUS VEIN HARVEST, CRYO NERVE BLOCK, CARDIOPULMONARY BYPASS, CORONARY ARTERY BYPASS GRAFT X 2.       Anesthesia Type:  No value filed.    Note:  Disposition: ICU            ICU Sign Out: Anesthesiologist/ICU physician sign out WAS performed   Postop Pain Control: Uneventful            Sign Out: Well controlled pain   PONV: No   Neuro/Psych: Uneventful            Sign Out: Acceptable/Baseline neuro status   Airway/Respiratory: Uneventful            Sign Out: Acceptable/Baseline resp. status; O2 supplementation               Oxygen: Face mask   CV/Hemodynamics: Uneventful            Sign Out: Acceptable CV status; No obvious hypovolemia; No obvious fluid overload   Other NRE: NONE   DID A NON-ROUTINE EVENT OCCUR? No           Last vitals:  Vitals:    02/20/23 1600 02/20/23 1621 02/20/23 1624   BP: 137/67 (!) 145/77 (!) 159/74   Pulse: 80 82    Resp: 15 15    Temp:      SpO2: 98% 99%        Electronically Signed By: Chirag Roper MD  February 20, 2023  6:04 PM

## 2023-02-21 NOTE — PLAN OF CARE
DISCHARGE   Discharged to: Home  Via: Automobile  Accompanied by: Sister  Discharge Instructions: diet, activity, medications, follow up appointments, and post CV surgery restrictions.  Prescriptions: Picked up by RN at discharge pharmacy per pt's request; medication list reviewed & sent with pt  Follow Up Appointments:  information given  Belongings: All sent with pt except patient missing pale lavender converse baseball hat. Message left with ICU where he was admitted to.  IV: out  Telemetry: off  Pt exhibits understanding of above discharge instructions; all questions answered.      Goal Outcome Evaluation:      Plan of Care Reviewed With: patient

## 2023-02-21 NOTE — PLAN OF CARE
Occupational Therapy Discharge Summary    Reason for therapy discharge:    Discharged to home with outpatient therapy.    Progress towards therapy goal(s). See goals on Care Plan in [facility] electronic health record for goal details.  Goals partially met.  Barriers to achieving goals:   discharge from facility.    Therapy recommendation(s):    Continued therapy is recommended.  Rationale/Recommendations:  to increase cardiovascular endurance.

## 2023-02-27 ENCOUNTER — TELEPHONE (OUTPATIENT)
Dept: TRANSPLANT | Facility: CLINIC | Age: 68
End: 2023-02-27
Payer: COMMERCIAL

## 2023-02-27 NOTE — TELEPHONE ENCOUNTER
Reviewed with patient that after he has completed cardiac rehab and had re-assessment by Cardiology and clearance for transplant surgery, will discuss next steps.  Patient does report he does prefer to do cardiac rehab at [facility] as is very close to his home.  Noted when patient attends first rehab session on 03/01/2023, he should ask is possible to complete the rehab at [facility].  Encouraged patient to contact me should he have questions. Otherwise, will plan to contact patient once he completes cardiac rehab.  Patient verbalizes agreement with this plan.

## 2023-04-19 ENCOUNTER — TELEPHONE (OUTPATIENT)
Dept: TRANSPLANT | Facility: CLINIC | Age: 68
End: 2023-04-19
Payer: COMMERCIAL

## 2023-04-19 NOTE — TELEPHONE ENCOUNTER
Patient requests update of what is needed to be considered for active status on the transplant waitlists and reports he will be completing cardiac rehab early next month.  Requested patient have cardiac rehab provider fax a note once rehab has been completed.  Also requested patient contact [facility] cardiology as he reports he wasn't scheduled for a post-op visit after he was discharged from heart bypass surgery in February.  Also requested he request a message be sent to his cardiologist to confirm whether he needs an updated visit with the cardiologist.  Noted since patient reports he was never contacted to schedule updated pulmonary function testing, I will send a message to the pulmonologist.  Noted I will also review patient's chart with one of our transplant providers to confirm whether patient will need an updated transplant provider visit.  Patient verbalizes understanding and agreement with this plan.

## 2023-04-26 ENCOUNTER — TELEPHONE (OUTPATIENT)
Dept: CARDIOLOGY | Facility: CLINIC | Age: 68
End: 2023-04-26
Payer: COMMERCIAL

## 2023-04-26 DIAGNOSIS — J84.9 INTERSTITIAL LUNG DISEASE (H): Primary | ICD-10-CM

## 2023-04-26 NOTE — TELEPHONE ENCOUNTER
[facility] Call Center    Phone Message    May a detailed message be left on voicemail: yes     Reason for Call: Other: Patient would like to know if he should make a follow up with Dr Frost or if he no longer needs to see cardiology after he finsihes his cardiac rehab      Action Taken: Other: cardio    Travel Screening: Not Applicable

## 2023-04-27 ENCOUNTER — TELEPHONE (OUTPATIENT)
Dept: TRANSPLANT | Facility: CLINIC | Age: 68
End: 2023-04-27
Payer: COMMERCIAL

## 2023-04-27 NOTE — TELEPHONE ENCOUNTER
Left voice message requesting return call from patient once he completes cardiac rehab, so we may scheduled a return transplant surgeon visit and frailty assessment.

## 2023-04-28 ENCOUNTER — TELEPHONE (OUTPATIENT)
Dept: PULMONOLOGY | Facility: CLINIC | Age: 68
End: 2023-04-28
Payer: COMMERCIAL

## 2023-04-28 NOTE — TELEPHONE ENCOUNTER
Patient Contacted for the patient to call back and schedule the following:    Appointment type: FPFT  Provider: Afua  Return date: 6/29/23  Specialty phone number: na  Additional appointment(s) needed: na  Additonal Notes: 4/28/23 - FPFT scheduled for 6/29/23, per Dr. Caal's note. [facility]

## 2023-05-10 ENCOUNTER — TELEPHONE (OUTPATIENT)
Dept: TRANSPLANT | Facility: CLINIC | Age: 68
End: 2023-05-10
Payer: COMMERCIAL

## 2023-05-10 DIAGNOSIS — N18.6 ESRD (END STAGE RENAL DISEASE) (H): ICD-10-CM

## 2023-05-10 DIAGNOSIS — E11.9 DIABETES MELLITUS, TYPE 2 (H): ICD-10-CM

## 2023-05-10 DIAGNOSIS — Z76.82 ORGAN TRANSPLANT CANDIDATE: ICD-10-CM

## 2023-05-10 NOTE — TELEPHONE ENCOUNTER
Confirmed I haven't received faxed letter from patient's cardiac rehab, but able to review 05/02/2023 cardiac rehab note that patient has completed Phase II rehab, so will send scheduling request for return appointments with transplant surgeon and frailty visit.   Patient verbalizes agreement with this plan.

## 2023-05-20 ENCOUNTER — HEALTH MAINTENANCE LETTER (OUTPATIENT)
Age: 68
End: 2023-05-20

## 2023-06-27 ENCOUNTER — OFFICE VISIT (OUTPATIENT)
Dept: TRANSPLANT | Facility: CLINIC | Age: 68
End: 2023-06-27
Attending: SURGERY
Payer: COMMERCIAL

## 2023-06-27 ENCOUNTER — ALLIED HEALTH/NURSE VISIT (OUTPATIENT)
Dept: TRANSPLANT | Facility: CLINIC | Age: 68
End: 2023-06-27
Attending: SURGERY
Payer: COMMERCIAL

## 2023-06-27 VITALS
BODY MASS INDEX: 29.02 KG/M2 | OXYGEN SATURATION: 96 % | HEIGHT: 71 IN | HEART RATE: 59 BPM | DIASTOLIC BLOOD PRESSURE: 73 MMHG | SYSTOLIC BLOOD PRESSURE: 119 MMHG | WEIGHT: 207.3 LBS

## 2023-06-27 DIAGNOSIS — Z76.82 ORGAN TRANSPLANT CANDIDATE: ICD-10-CM

## 2023-06-27 DIAGNOSIS — Z76.82 ORGAN TRANSPLANT CANDIDATE: Primary | ICD-10-CM

## 2023-06-27 DIAGNOSIS — E11.22 TYPE 2 DIABETES MELLITUS WITH CHRONIC KIDNEY DISEASE ON CHRONIC DIALYSIS, WITH LONG-TERM CURRENT USE OF INSULIN (H): ICD-10-CM

## 2023-06-27 DIAGNOSIS — Z79.4 TYPE 2 DIABETES MELLITUS WITH CHRONIC KIDNEY DISEASE ON CHRONIC DIALYSIS, WITH LONG-TERM CURRENT USE OF INSULIN (H): ICD-10-CM

## 2023-06-27 DIAGNOSIS — N18.6 ESRD (END STAGE RENAL DISEASE) (H): ICD-10-CM

## 2023-06-27 DIAGNOSIS — E11.9 DM TYPE 2 (DIABETES MELLITUS, TYPE 2) (H): ICD-10-CM

## 2023-06-27 DIAGNOSIS — E11.9 DIABETES MELLITUS, TYPE 2 (H): ICD-10-CM

## 2023-06-27 DIAGNOSIS — Z99.2 TYPE 2 DIABETES MELLITUS WITH CHRONIC KIDNEY DISEASE ON CHRONIC DIALYSIS, WITH LONG-TERM CURRENT USE OF INSULIN (H): ICD-10-CM

## 2023-06-27 DIAGNOSIS — N18.6 TYPE 2 DIABETES MELLITUS WITH CHRONIC KIDNEY DISEASE ON CHRONIC DIALYSIS, WITH LONG-TERM CURRENT USE OF INSULIN (H): ICD-10-CM

## 2023-06-27 PROCEDURE — 99215 OFFICE O/P EST HI 40 MIN: CPT | Performed by: SURGERY

## 2023-06-27 PROCEDURE — G0463 HOSPITAL OUTPT CLINIC VISIT: HCPCS | Performed by: SURGERY

## 2023-06-27 NOTE — LETTER
6/27/2023         RE: Avelina Marion  [facility] [place] [address]        Dear Colleague,    Thank you for referring your patient, Avelina Marion, to the [facility]. Please see a copy of my visit note below.    Transplant Surgery Consult Note    Medical record number: [id number]  YOB: 1955,   Consult requested by Dr. Mitchell for evaluation of kidney and pancreas transplant candidacy.    Assessment and Recommendations: Mr. Marion is a good candidate for transplantation and has a good understanding of the risks and benefits of this approach to management of renal failure and diabetes. The following issues should be addressed prior to transplant:     Mr. Marion has End stage renal failure due to diabetes mellitus type 2 whose condition is not expected to resolve, is expected to progress, and is expected to continue to develop related comorbid conditions.  Cardiology consult for cardiac risk stratification to be ordered: No  CT abdomen and pelvis without contrast to be ordered for assessment of vascular targets: Yes  Transplant listing labs ordered to include HLA, ABOx2, Cr, etc.  Dietician consult ordered: Yes  Social work consult ordered: Yes  Imaging reports reviewed:  CT from 8/2021                                                     IMPRESSION:   1. No significant calcifications of the iliac vasculature.  2. Moderate coronary artery calcifications.  3. Chronic-appearing pulmonary opacities suggesting interstitial lung  disease versus scarring.   Radiology images reviewed:CT from 8/2021 reviewed and shows bilateral iliac systems suitable for transplant  Recipient suitable to move forward with work up of living donors:  Yes  Need sample for updated PRA due to receiving blood around time of CABG  Recommend weight loss but not required for transplant. Just carries weight around abdomen but not currently prohibitive  Does not appear frail.  Pulm appt for transplant risk  assessment later this week  Should repeat non-con CT A/P in August since last assessment of vasculature was done  and pt is diabetic on dialysis     The majority of our visit was spent in counselling, discussing the medical and surgical risks of living or  donor kidney and pancreas transplantation, either in a simultaneous or sequential fashion. I contrasted approximate wait time for SPK vs living vs  donor kidneys from normal (0-85%) or higher (%) kidney donor profile index (KDPI) donors and their associated outcomes. I would not recommend this individual to consider kidneys from high KDPI donors. The reason for this decision is best summarized as: multi-organ transplant candidate.  Access to transplant will be impacted by living donor availability and overall candidacy for SPK, as well as the influence of blood type and degree of sensitization. We discussed advantages of preemptive transplant as well as living donor kidney transplant, and graft and patient survival outcomes associated with these options. Potential surgical complications of kidney and pancreas transplantation include bleeding, clotting, infection, wound complications, anastomotic failure and other issues such as cardiac complications, pneumonia, deep venous thrombosis, pulmonary embolism, post transplant diabetes and death. We discussed the need for protocol biopsy of the kidney and the possible need for a ureteral stent (and subsequent removal). We discussed benefits and risks associated with different approaches to exocrine drainage of pancreatic secretions. We also discussed differences in the average length of stay, recovery process, and posttransplant lab and monitoring protocol. We discussed the risk of graft rejection and recurrent diabetic nephropathy in the setting of poor glycemic control. I emphasized the need for strict immunosuppression adherence and the potential for complications of immunosuppression such as  skin cancer or lymphoma, as well as a very low but not zero risk of donor-derived disease transmission risks (infection, cancer). Mr. Marion asked good questions and the patient's candidacy will be reviewed at our Multidisciplinary Selection Committee. Thank you for the opportunity to participate in Mr. Marion's care.    Total time: 60 minutes        Louise Barros MD FACS  Associate Professor of Surgery  Director, Living Kidney Donor Program.  ---------------------------------------------------------------------------------------------------    HPI: Mr. Marion has End stage renal failure due to diabetes mellitus type 2. The patient has had diabetes for 25 years. Management is by Lantus 10 units at bedtime. The patient usually checks his blood sugar 0 times/day. Hypoglyemic unawareness is not an issue.  The diabetes is controlled. 7.4%    Complications of diabetes include:    Retinopathy:  Yes   Neuropathy: No  Gastroparesis:  No    The patient is on dialysis. Since 4/2021    Has potential kidney donors:  No.  Interested in participation in paired exchange if a donor is willing: Doesn't know     The patient has the following pertinent history:       No    Yes  Dialysis:    []      [x] via:       Blood Transfusion                  []      [x]  Number of units:  1 Most recently: 2/2023  Pregnancy:    [x]      [] Number:       Previous Transplant:  [x]      [] Details:    Cancer    []      [x] Comment: BCC  Kidney stones   [x]      [] Comment:      Recurrent infections  [x]      []  Type:                  Bladder dysfunction  [x]      [] Cause:    Claudication   [x]      [] Distance:    Previous Amputation  [x]      [] Cause:     Chronic anticoagulation  [x]      [] Indication:  Scientology  [x]      []     Past Medical History:   Diagnosis Date    Anemia in chronic kidney disease     BCC (basal cell carcinoma), face 8/9/2022    Diabetic retinopathy of both eyes (H)     End stage renal disease (H)      Hypertension     Secondary renal hyperparathyroidism (H)     Type 2 diabetes mellitus (H)      Past Surgical History:   Procedure Laterality Date    BYPASS GRAFT ARTERY CORONARY N/A 2/13/2023    Procedure: TRANSESPHAGEAL ECHOCARDIOGRAM, MEDIAN STERNOTOMY, TAKE DOWN OF LEFT INTERNAL MAMMARY, LEFT ENDOSCOPIC GREATER SAPHENOUS VEIN HARVEST, CRYO NERVE BLOCK, CARDIOPULMONARY BYPASS, CORONARY ARTERY BYPASS GRAFT X 2.;  Surgeon: Patrice Yepez MD;  Location: [facility] OR    CREATE FISTULA ARTERIOVENOUS UPPER EXTREMITY Left     CV CORONARY ANGIOGRAM N/A 12/30/2021    Procedure: CV CORONARY ANGIOGRAM;  Surgeon: Milton Cam MD;  [facility]    CV CORONARY ANGIOGRAM N/A 2/7/2023    Procedure: Coronary Angiogram;  Surgeon: Milton Cam MD;  [facility]    CV PCI N/A 2/7/2023    Procedure: Percutaneous Coronary Intervention;  Surgeon: Milton Cam MD;  [facility]     Family History   Problem Relation Age of Onset    Diabetes Brother     Kidney Disease Brother      Social History     Socioeconomic History    Marital status: Single     Spouse name: Not on file    Number of children: Not on file    Years of education: Not on file    Highest education level: Not on file   Occupational History    Not on file   Tobacco Use    Smoking status: Former     Types: Cigarettes    Smokeless tobacco: Never    Tobacco comments:     Quit 1984   Substance and Sexual Activity    Alcohol use: Never    Drug use: Never    Sexual activity: Not on file   Other Topics Concern    Parent/sibling w/ CABG, MI or angioplasty before 65F 55M? Not Asked   Social History Narrative    Not on file     Social Determinants of Health     Financial Resource Strain: Not on file   Food Insecurity: Not on file   Transportation Needs: Not on file   Physical Activity: Not on file   Stress: Not on file   Social Connections: Not on file   Intimate Partner Violence: Not on file   Housing  Stability: Not on file       ROS:   CONSTITUTIONAL:  No fevers or chills  EYES: negative for icterus  ENT:  negative for hearing loss, tinnitus and sore throat  RESPIRATORY:  negative for cough, sputum, dyspnea  CARDIOVASCULAR:  negative for chest pain Fatigue  Renal Insufficiency  GASTROINTESTINAL:  negative for nausea, vomiting, diarrhea or constipation  GENITOURINARY:  negative for incontinence, dysuria, bladder emptying problems  HEME:  No easy bruising  INTEGUMENT:  negative for rash and pruritus  NEURO:  Negative for headache, seizure disorder    Allergies:   No Known Allergies    Medications:  Prescription Medications as of 2023         Rx Number Disp Refills Start End Last Dispensed Date Next Fill Date Owning Pharmacy    acetaminophen (TYLENOL) 325 MG tablet  60 tablet 0 2023    [address]    Sig: Take 2 tablets (650 mg) by mouth every 4 hours as needed for pain, headaches or fever (For optimal non-opioid multimodal pain management to improve pain control.)    Class: E-Prescribe    Route: Oral    aspirin (ASA) 81 MG chewable tablet  60 tablet 0 2023    [address]    Si tablets (162 mg) by Oral or NG Tube route daily    Class: E-Prescribe    Route: Oral or NG Tube    atorvastatin (LIPITOR) 40 MG tablet  30 tablet 0 2023    [address]    Sig: Take 1 tablet (40 mg) by mouth At Bedtime    Class: E-Prescribe    Route: Oral    Blood Glucose Monitoring Suppl (ACCU-CHEK GUIDE) w/Device KIT    2021    [id number] - [place], [place] - [address] AT [facility]    Sig: Use to test 4 times a day. Pharmacy to dispense brand based on insurance.    Class: Historical    calcium acetate (PHOSLO) 667 MG CAPS capsule    2021    [facility] #[id number] - [place], [place] - [address]  AT [address] [facility]    Sig: Take 667 mg by mouth 3 times daily (with meals)    Class: Historical    Route: Oral    calcium carbonate 750 MG CHEW            Sig: Take 750 mg by mouth 4 times daily    Class: Historical    Route: Oral    insulin glargine (LANTUS PEN) 100 UNIT/ML pen        [id number] - [place], [place] - [address] [facility] AT [address] [facility]    Sig: Inject 20 Units Subcutaneous At Bedtime    Class: Historical    Route: Subcutaneous    metoprolol tartrate (LOPRESSOR) 25 MG tablet  60 tablet 0 2/20/2023    [facility] [place], [place] - [address] [facility]    Sig: Take 1 tablet (25 mg) by mouth 2 times daily    Class: E-Prescribe    Route: Oral            Exam:      Appearance: in no apparent distress.   Skin: normal  Head and Neck: Normal, no rashes or jaundice  Respiratory: easy respirations, no audible wheezing.  Cardiovascular: RRR  Abdomen: rounded and obese, No distention   Extremities: femoral 2+/2+, Edema, none  Neuro: without deficit     Diagnostics:   No results found for this or any previous visit (from the past 672 hour(s)).  [facility]   Date Value Ref Range Status   01/16/2023 7  Final       Sincerely,        Louise Barros MD, MD

## 2023-06-27 NOTE — PROGRESS NOTES
Transplant Surgery Consult Note    Medical record number: [id number]  YOB: 1955,   Consult requested by Dr. Mitchell for evaluation of kidney and pancreas transplant candidacy.    Assessment and Recommendations: Mr. Marion is a good candidate for transplantation and has a good understanding of the risks and benefits of this approach to management of renal failure and diabetes. The following issues should be addressed prior to transplant:     Mr. Marion has End stage renal failure due to diabetes mellitus type 2 whose condition is not expected to resolve, is expected to progress, and is expected to continue to develop related comorbid conditions.  Cardiology consult for cardiac risk stratification to be ordered: No  CT abdomen and pelvis without contrast to be ordered for assessment of vascular targets: Yes  Transplant listing labs ordered to include HLA, ABOx2, Cr, etc.  Dietician consult ordered: Yes  Social work consult ordered: Yes  Imaging reports reviewed:  CT from 8/2021                                                     IMPRESSION:   1. No significant calcifications of the iliac vasculature.  2. Moderate coronary artery calcifications.  3. Chronic-appearing pulmonary opacities suggesting interstitial lung  disease versus scarring.   Radiology images reviewed:CT from 8/2021 reviewed and shows bilateral iliac systems suitable for transplant  Recipient suitable to move forward with work up of living donors:  Yes  Need sample for updated PRA due to receiving blood around time of CABG  Recommend weight loss but not required for transplant. Just carries weight around abdomen but not currently prohibitive  Does not appear frail.  Pulm appt for transplant risk assessment later this week  Should repeat non-con CT A/P in August since last assessment of vasculature was done 2021 and pt is diabetic on dialysis     The majority of our visit was spent in counselling, discussing the medical and surgical risks  of living or  donor kidney and pancreas transplantation, either in a simultaneous or sequential fashion. I contrasted approximate wait time for SPK vs living vs  donor kidneys from normal (0-85%) or higher (%) kidney donor profile index (KDPI) donors and their associated outcomes. I would not recommend this individual to consider kidneys from high KDPI donors. The reason for this decision is best summarized as: multi-organ transplant candidate.  Access to transplant will be impacted by living donor availability and overall candidacy for SPK, as well as the influence of blood type and degree of sensitization. We discussed advantages of preemptive transplant as well as living donor kidney transplant, and graft and patient survival outcomes associated with these options. Potential surgical complications of kidney and pancreas transplantation include bleeding, clotting, infection, wound complications, anastomotic failure and other issues such as cardiac complications, pneumonia, deep venous thrombosis, pulmonary embolism, post transplant diabetes and death. We discussed the need for protocol biopsy of the kidney and the possible need for a ureteral stent (and subsequent removal). We discussed benefits and risks associated with different approaches to exocrine drainage of pancreatic secretions. We also discussed differences in the average length of stay, recovery process, and posttransplant lab and monitoring protocol. We discussed the risk of graft rejection and recurrent diabetic nephropathy in the setting of poor glycemic control. I emphasized the need for strict immunosuppression adherence and the potential for complications of immunosuppression such as skin cancer or lymphoma, as well as a very low but not zero risk of donor-derived disease transmission risks (infection, cancer). Mr. Marion asked good questions and the patient's candidacy will be reviewed at our Multidisciplinary Selection  Committee. Thank you for the opportunity to participate in Mr. Marion's care.    Total time: 60 minutes        Louise Barros MD FACS  Associate Professor of Surgery  Director, Living Kidney Donor Program.  ---------------------------------------------------------------------------------------------------    HPI: Mr. Marion has End stage renal failure due to diabetes mellitus type 2. The patient has had diabetes for 25 years. Management is by Lantus 10 units at bedtime. The patient usually checks his blood sugar 0 times/day. Hypoglyemic unawareness is not an issue.  The diabetes is controlled. 7.4%    Complications of diabetes include:    Retinopathy:  Yes   Neuropathy: No  Gastroparesis:  No    The patient is on dialysis. Since 4/2021    Has potential kidney donors:  No.  Interested in participation in paired exchange if a donor is willing: Doesn't know     The patient has the following pertinent history:       No    Yes  Dialysis:    []      [x] via:       Blood Transfusion                  []      [x]  Number of units:  1 Most recently: 2/2023  Pregnancy:    [x]      [] Number:       Previous Transplant:  [x]      [] Details:    Cancer    []      [x] Comment: BCC  Kidney stones   [x]      [] Comment:      Recurrent infections  [x]      []  Type:                  Bladder dysfunction  [x]      [] Cause:    Claudication   [x]      [] Distance:    Previous Amputation  [x]      [] Cause:     Chronic anticoagulation  [x]      [] Indication:  Confucianist  [x]      []     Past Medical History:   Diagnosis Date     Anemia in chronic kidney disease      BCC (basal cell carcinoma), face 8/9/2022     Diabetic retinopathy of both eyes (H)      End stage renal disease (H)      Hypertension      Secondary renal hyperparathyroidism (H)      Type 2 diabetes mellitus (H)      Past Surgical History:   Procedure Laterality Date     BYPASS GRAFT ARTERY CORONARY N/A 2/13/2023    Procedure: TRANSESPHAGEAL  ECHOCARDIOGRAM, MEDIAN STERNOTOMY, TAKE DOWN OF LEFT INTERNAL MAMMARY, LEFT ENDOSCOPIC GREATER SAPHENOUS VEIN HARVEST, CRYO NERVE BLOCK, CARDIOPULMONARY BYPASS, CORONARY ARTERY BYPASS GRAFT X 2.;  Surgeon: Patrice Yepez MD;  Location: [facility]     CREATE FISTULA ARTERIOVENOUS UPPER EXTREMITY Left      CV CORONARY ANGIOGRAM N/A 12/30/2021    Procedure: CV CORONARY ANGIOGRAM;  Surgeon: Milton Cam MD;  [facility]     CV CORONARY ANGIOGRAM N/A 2/7/2023    Procedure: Coronary Angiogram;  Surgeon: Milton Cam MD;  [facility]     CV PCI N/A 2/7/2023    Procedure: Percutaneous Coronary Intervention;  Surgeon: Milton Cam MD;  [facility]     Family History   Problem Relation Age of Onset     Diabetes Brother      Kidney Disease Brother      Social History     Socioeconomic History     Marital status: Single     Spouse name: Not on file     Number of children: Not on file     Years of education: Not on file     Highest education level: Not on file   Occupational History     Not on file   Tobacco Use     Smoking status: Former     Types: Cigarettes     Smokeless tobacco: Never     Tobacco comments:     Quit 1984   Substance and Sexual Activity     Alcohol use: Never     Drug use: Never     Sexual activity: Not on file   Other Topics Concern     Parent/sibling w/ CABG, MI or angioplasty before 65F 55M? Not Asked   Social History Narrative     Not on file     Social Determinants of Health     Financial Resource Strain: Not on file   Food Insecurity: Not on file   Transportation Needs: Not on file   Physical Activity: Not on file   Stress: Not on file   Social Connections: Not on file   Intimate Partner Violence: Not on file   Housing Stability: Not on file       ROS:   CONSTITUTIONAL:  No fevers or chills  EYES: negative for icterus  ENT:  negative for hearing loss, tinnitus and sore throat  RESPIRATORY:  negative for cough, sputum,  dyspnea  CARDIOVASCULAR:  negative for chest pain Fatigue  Renal Insufficiency  GASTROINTESTINAL:  negative for nausea, vomiting, diarrhea or constipation  GENITOURINARY:  negative for incontinence, dysuria, bladder emptying problems  HEME:  No easy bruising  INTEGUMENT:  negative for rash and pruritus  NEURO:  Negative for headache, seizure disorder    Allergies:   No Known Allergies    Medications:  Prescription Medications as of 2023       Rx Number Disp Refills Start End Last Dispensed Date Next Fill Date Owning Pharmacy    acetaminophen (TYLENOL) 325 MG tablet  60 tablet 0 2023    [address]    Sig: Take 2 tablets (650 mg) by mouth every 4 hours as needed for pain, headaches or fever (For optimal non-opioid multimodal pain management to improve pain control.)    Class: E-Prescribe    Route: Oral    aspirin (ASA) 81 MG chewable tablet  60 tablet 0 2023    [address]    Si tablets (162 mg) by Oral or NG Tube route daily    Class: E-Prescribe    Route: Oral or NG Tube    atorvastatin (LIPITOR) 40 MG tablet  30 tablet 0 2023    [address]    Sig: Take 1 tablet (40 mg) by mouth At Bedtime    Class: E-Prescribe    Route: Oral    Blood Glucose Monitoring Suppl (ACCU-CHEK GUIDE) w/Device KIT    2021    [id number] - [place], [place] - [address] AT [address] [facility]    Sig: Use to test 4 times a day. Pharmacy to dispense brand based on insurance.    Class: Historical    calcium acetate (PHOSLO) 667 MG CAPS capsule    2021    [id number] - [place], [place] - [address] AT [address] [facility]    Sig: Take 667 mg by mouth 3 times daily (with meals)    Class: Historical    Route: Oral    calcium carbonate 750 MG CHEW            Sig: Take 750 mg by mouth 4 times daily     Class: Historical    Route: Oral    insulin glargine (LANTUS PEN) 100 UNIT/ML pen        [id number] - [place], [place] - [address] [facility]    Sig: Inject 20 Units Subcutaneous At Bedtime    Class: Historical    Route: Subcutaneous    metoprolol tartrate (LOPRESSOR) 25 MG tablet  60 tablet 0 2/20/2023    [facility] [place], [place] - [address] [facility]    Sig: Take 1 tablet (25 mg) by mouth 2 times daily    Class: E-Prescribe    Route: Oral          Exam:      Appearance: in no apparent distress.   Skin: normal  Head and Neck: Normal, no rashes or jaundice  Respiratory: easy respirations, no audible wheezing.  Cardiovascular: RRR  Abdomen: rounded and obese, No distention   Extremities: femoral 2+/2+, Edema, none  Neuro: without deficit     Diagnostics:   No results found for this or any previous visit (from the past 672 hour(s)).  OS CPRA   Date Value Ref Range Status   01/16/2023 7  Final

## 2023-06-29 DIAGNOSIS — J84.9 INTERSTITIAL LUNG DISEASE (H): ICD-10-CM

## 2023-06-29 PROCEDURE — 94726 PLETHYSMOGRAPHY LUNG VOLUMES: CPT | Performed by: INTERNAL MEDICINE

## 2023-06-29 PROCEDURE — 94375 RESPIRATORY FLOW VOLUME LOOP: CPT | Performed by: INTERNAL MEDICINE

## 2023-06-29 PROCEDURE — 94729 DIFFUSING CAPACITY: CPT | Performed by: INTERNAL MEDICINE

## 2023-07-03 ENCOUNTER — TELEPHONE (OUTPATIENT)
Dept: TRANSPLANT | Facility: CLINIC | Age: 68
End: 2023-07-03
Payer: COMMERCIAL

## 2023-07-03 NOTE — TELEPHONE ENCOUNTER
Updated patient that will plan to review chart at 07/05/2023 transplant committee meeting and will contact patient by 07/06/2023 with an update.  Patient apologizes as he didn't stop by [place] [facility] [place] last week to have a new PRA sample drawn as he reports he forgot, but is scheduled to have dialysis draw a new PRA sample on 07/05/2023.

## 2023-07-05 ENCOUNTER — LAB (OUTPATIENT)
Dept: LAB | Facility: CLINIC | Age: 68
End: 2023-07-05
Payer: COMMERCIAL

## 2023-07-05 ENCOUNTER — TELEPHONE (OUTPATIENT)
Dept: TRANSPLANT | Facility: CLINIC | Age: 68
End: 2023-07-05
Payer: COMMERCIAL

## 2023-07-05 ENCOUNTER — COMMITTEE REVIEW (OUTPATIENT)
Dept: TRANSPLANT | Facility: CLINIC | Age: 68
End: 2023-07-05
Payer: COMMERCIAL

## 2023-07-05 DIAGNOSIS — E11.9 DM TYPE 2 (DIABETES MELLITUS, TYPE 2) (H): ICD-10-CM

## 2023-07-05 DIAGNOSIS — N18.6 ESRD (END STAGE RENAL DISEASE) (H): ICD-10-CM

## 2023-07-05 DIAGNOSIS — Z76.82 ORGAN TRANSPLANT CANDIDATE: ICD-10-CM

## 2023-07-05 LAB
DLCOUNC-%PRED-PRE: 68 %
DLCOUNC-PRE: 19.1 ML/MIN/MMHG
DLCOUNC-PRED: 27.9 ML/MIN/MMHG
ERV-%PRED-PRE: 26 %
ERV-PRE: 0.37 L
ERV-PRED: 1.38 L
EXPTIME-PRE: 6.63 SEC
FEF2575-%PRED-PRE: 183 %
FEF2575-PRE: 4.89 L/SEC
FEF2575-PRED: 2.66 L/SEC
FEFMAX-%PRED-PRE: 147 %
FEFMAX-PRE: 13.54 L/SEC
FEFMAX-PRED: 9.17 L/SEC
FEV1-%PRED-PRE: 104 %
FEV1-PRE: 3.47 L
FEV1FEV6-PRE: 86 %
FEV1FEV6-PRED: 78 %
FEV1FVC-PRE: 86 %
FEV1FVC-PRED: 77 %
FEV1SVC-PRE: 85 %
FEV1SVC-PRED: 65 %
FIFMAX-PRE: 6.84 L/SEC
FRCPLETH-%PRED-PRE: 52 %
FRCPLETH-PRE: 2.09 L
FRCPLETH-PRED: 3.98 L
FVC-%PRED-PRE: 93 %
FVC-PRE: 4.03 L
FVC-PRED: 4.31 L
IC-%PRED-PRE: 100 %
IC-PRE: 3.7 L
IC-PRED: 3.68 L
RVPLETH-%PRED-PRE: 69 %
RVPLETH-PRE: 1.72 L
RVPLETH-PRED: 2.5 L
TLCPLETH-%PRED-PRE: 76 %
TLCPLETH-PRE: 5.79 L
TLCPLETH-PRED: 7.55 L
VA-%PRED-PRE: 75 %
VA-PRE: 5.14 L
VC-%PRED-PRE: 79 %
VC-PRE: 4.07 L
VC-PRED: 5.1 L

## 2023-07-05 PROCEDURE — 86833 HLA CLASS II HIGH DEFIN QUAL: CPT

## 2023-07-05 PROCEDURE — 86832 HLA CLASS I HIGH DEFIN QUAL: CPT

## 2023-07-05 NOTE — TELEPHONE ENCOUNTER
Update to patient before activating on the kidney and kidney/pancreas lists, committee requested I contact [facility] cardiology as patient wasn't scheduled for return appointment after cardiac bypass admission.  Noted I have sent a communication and awaiting a response.  Also reviewed I have sent a communication to the pulmonologist to review pulmonary function test report from 06/29/2023, once available.  Further updates to follow.  Patient verbalizes agreement with this plan.

## 2023-07-05 NOTE — COMMITTEE REVIEW
Abdominal Patient Discussion Note Transplant Coordinator: Gege Altamirano  Transplant Surgeon:       Referring Physician: Referred Self    Committee Review Members:  Nephrology Evelyn Rico PA-C, Leia Ruiz MD, Jesu Mosquera MD   Nutrition Suha Stanley, JOSE ANGEL   Pharmacist Angelica Santana, [facility]    - Clinical Elana Zamora, [facility]   Transplant BURTON SALDAÑA, RN, Gege Altamirano, RN, Elana Brar, RN, Miki Hill, KARINE, Bel Chatman, KARINE, Claudine Stearns RN   Transplant Surgery Louise Barros MD, MD       Additional Discussion Notes and Findings: Committee reviewed current medical information including patient now 5 months s/p CABG, completed cardiac rehab in May 2023, completed dermatology follow-up after Moh's procedure, next appointment due January 2024, and completed PFT's, narrative report still pending.  Committee recommends 'curbside' message to [facility] cardiology to confirm whether patient needs return appointment as hasn't been seen outpatient since prior to February 2023 CABG. If cardiology okay with patient moving forward with transplant surgery now (without updated assessment), then okay to activate on kidney and kidney/pancreas lists.   06/29/2023 PFT narrative report still pending, request Dr. Caal (pul) review, but this doesn't hold up active status on transplant waitlists.  Patient should have updated non-contrast CT A/P in August 2023 to re-assess vessel suitability, if hasn't undergone transplant.

## 2023-07-10 ENCOUNTER — TELEPHONE (OUTPATIENT)
Dept: TRANSPLANT | Facility: CLINIC | Age: 68
End: 2023-07-10
Payer: COMMERCIAL

## 2023-07-10 DIAGNOSIS — Z76.82 ORGAN TRANSPLANT CANDIDATE: ICD-10-CM

## 2023-07-10 DIAGNOSIS — E11.9 DIABETES MELLITUS, TYPE 2 (H): ICD-10-CM

## 2023-07-10 DIAGNOSIS — N18.6 ESRD (END STAGE RENAL DISEASE) (H): ICD-10-CM

## 2023-07-10 NOTE — TELEPHONE ENCOUNTER
Updated patient that we have requested prior approval for transplant from patient's insurance provider and are awaiting a decision.  Also noted that Dr. Frost has commented he does not need to see patient after the coronary artery bypass surgery and that I haven't yet received a response from Dr. Caal related to 06/29/2023 PFT results.  Noted will contact patient with an update after we have received an update from patient's insurance provider.  Patient verbalizes agreement with this plan.

## 2023-07-12 LAB
PROTOCOL CUTOFF: NORMAL
SA 1 CELL: NORMAL
SA 1 TEST METHOD: NORMAL
SA 2 CELL: NORMAL
SA 2 TEST METHOD: NORMAL
SA1 HI RISK ABY: NORMAL
SA1 MOD RISK ABY: NORMAL
SA2 HI RISK ABY: NORMAL
SA2 MOD RISK ABY: NORMAL
UNACCEPTABLE ANTIGENS: NORMAL
UNOS CPRA: 23
ZZZSA 1  COMMENTS: NORMAL
ZZZSA 2 COMMENTS: NORMAL

## 2023-07-14 ENCOUNTER — ANCILLARY PROCEDURE (OUTPATIENT)
Dept: CT IMAGING | Facility: CLINIC | Age: 68
End: 2023-07-14
Attending: SURGERY
Payer: COMMERCIAL

## 2023-07-14 DIAGNOSIS — Z76.82 ORGAN TRANSPLANT CANDIDATE: ICD-10-CM

## 2023-07-14 DIAGNOSIS — E11.9 DIABETES MELLITUS, TYPE 2 (H): ICD-10-CM

## 2023-07-14 DIAGNOSIS — N18.6 ESRD (END STAGE RENAL DISEASE) (H): ICD-10-CM

## 2023-07-14 PROCEDURE — 74176 CT ABD & PELVIS W/O CONTRAST: CPT | Performed by: RADIOLOGY

## 2023-07-17 ENCOUNTER — DOCUMENTATION ONLY (OUTPATIENT)
Dept: TRANSPLANT | Facility: CLINIC | Age: 68
End: 2023-07-17
Payer: COMMERCIAL

## 2023-07-17 NOTE — PROGRESS NOTES
07/06/2023 Bertha Frost MD [place], Gege, RN  I really donot need to see him again after CABG.     Thanks for checking.

## 2023-07-19 ENCOUNTER — TELEPHONE (OUTPATIENT)
Dept: PULMONOLOGY | Facility: CLINIC | Age: 68
End: 2023-07-19
Payer: COMMERCIAL

## 2023-07-19 NOTE — TELEPHONE ENCOUNTER
Left Voicemail (1st Attempt) and Sent Mychart (1st Attempt) for the patient to call back and schedule the following:    Appointment type: [facility] Return  Provider: Ramo Owen Starcher, Robichaux, Larson, Chen Etchison, Ijeoma, or Kylee  Return date: On or around 12/21/2023  Specialty phone number: [phone]  Additional appointment(s) needed: N/A  Additonal Notes: N/A    Daniel Thibodeaux on 7/19/2023 at 3:43 PM

## 2023-07-20 ENCOUNTER — TELEPHONE (OUTPATIENT)
Dept: TRANSPLANT | Facility: CLINIC | Age: 68
End: 2023-07-20
Payer: COMMERCIAL

## 2023-07-20 NOTE — TELEPHONE ENCOUNTER
Left voice message that have received insurance prior approval for transplant surgery, but awaiting response from lung provider to review 06/29/2023 pulmonary function test results and provide clearance for surgery.  Requested return call from patient should he have questions.

## 2023-07-25 ENCOUNTER — TELEPHONE (OUTPATIENT)
Dept: TRANSPLANT | Facility: CLINIC | Age: 68
End: 2023-07-25
Payer: COMMERCIAL

## 2023-07-25 ENCOUNTER — TEAM CONFERENCE (OUTPATIENT)
Dept: TRANSPLANT | Facility: CLINIC | Age: 68
End: 2023-07-25
Payer: COMMERCIAL

## 2023-07-25 NOTE — TELEPHONE ENCOUNTER
Called patient to inform them of status change to ACTIVE  on 23. Status change letter and PRA orders to be mailed. Patient is in agreement with listing ACTIVE status.      Discussed:   - Pt is eligible for  donor offers and pt can receive calls 24 hours a day, 7 days a week, and on call staff need to be able to reach pt or one of emergency contacts within 30 minutes or they might skip over to next recipient on list.   -Please make sure your phone is charged at all times and your voicemail is not full   -Your transplant on call coordinator will give you directions about when and where you will need to come to the hospital for transplant  -The transplant coordinator will call you to discuss your current health status, the offer, and plans to move forward.  Some organ offer calls will require you to come to the hospital immediately; some may not be as time sensitive.  It may be possible for you to come to the hospital and, for various reasons, the transplant could be cancelled.  This is often called a  dry run .  - Reviewed the right to accept or decline offer, without penalty  - Expected length of surgical procedure is about 3-4 hours kidney 4-6 hours kidney/pancreas, expected inpatient length of stay post transplant is 3-4 days for kidney and 7-10 days, and potential to stay locally post transplant. Offered resources for lodging in the area  - Verified contact information as documented in Epic. Confirmed emergency contact information  -Instructed patient about importance of having PRAs drawn every 3 months via: ([facility]). Encouraged them to set reminder in their preferred calendar to stay on top of their quarterly labs  -Reminded pt to stay up on health maintenance and to call with any updates on their health status, insurance or contact information.   -Reminded pt to inform RNCC as soon as possible if they test positive for COVID.     -Provided name and contact information for additional  questions or concerns.  Pt expressed excellent understanding of all and was in good agreement with the plan.     Verified pt is active in EPIC And UNOS 7/25/23 Adia Silva RN

## 2023-07-25 NOTE — LETTER
2023    Avelina Marion  [address] [place] [address]    Dear Mr. Marion,    This letter is sent to confirm that you have completed your transplant work-up and you are a candidate in the kidney and pancreas transplant program at the [facility].  Your status was changed to ACTIVE on the kidney and kidney and pancreas  waitlists on 2023.      When you are active on the waitlist and an organ becomes available, a coordinator will need to speak to you immediately.  You could be contacted at any time during the day or night as an organ could become available at any time.  Please make certain our office always has your current telephone numbers and address.      Items we will need from you:    We have received approval from your insurance company for the transplant procedure.  It is critical that you notify us if there is any change in your insurance.  It is also important that you familiarize yourself with the details of your specific insurance policy.  Our patient  is available to assist you if you should have any questions regarding your coverage.    An ALA or PRA blood sample needs to be sent here every 3  months to match you with  donors or any potential living donors.  If you need this testing, special mailing boxes (called mailers) will be sent to you directly from the Outreach Department.  You should take the physician order form and the  to your home laboratory when it is time to for this testing to be done.  Additional mailers can be obtained by calling the Transplant Office and asking to speak to a kidney and pancreas .    During this waiting period, we may request additional periodic laboratory tests with your primary physician.  It will be your responsibility to remind your physician to forward your results to the Transplant Office.            We need to be kept informed of any  changes in your medical condition such as:    changes in your medications,   significant changes in your health  significant infections (such as pneumonia or abscesses)  blood transfusions  any condition which requires hospitalization  any surgery    Remember to complete any routine cancer screening tests required before your transplant.  This includes colonoscopy; prostate screening for men, and mammogram and gynecologic testing for women, as well as dental work.  Your primary care clinic can assist you with arranging for these exams.  Remind your caregivers to forward copies of the records and final reports.      We want you to know that our program has physician and surgeon coverage 24 hours a day, 365 days a year. In addition, our transplant surgeons and physicians will not be on call for two or more transplant programs more than 30 miles apart unless the circumstances have been reviewed and approved by the United Network for Organ Sharing (UNOS) Membership and Professional Standards Committee (MPSC). Finally, our primary physician and primary surgeons are not designated as the primary surgeon or primary physician at more than 1 transplant hospital. If this coverage changes or there are substantial program changes, you will be notified in writing by letter.     Attached is a letter from UNOS that describes the services and information offered to patients by UNOS and the Organ Procurement and Transplantation Network (OPTN).    We appreciate having had the opportunity to participate in your care.  If you have questions, please feel free to call me at [phone] or the Transplant Office at [phone].      Sincerely,    Gege Altamirano RN, BSN  Transplant Coordinator   On Behalf of Solid Organ Transplant  [facility]      Enclosures: ALA/PRA Physician Order, Telephone Contact List, UNOS Letter, Waitlist Information Update, and While You Are Waiting  cc: Care Team            The Organ Procurement and  Transplantation Network  Toll-free patient services line:     Your resource for organ transplant information    If you have a question regarding your own medical care, you always should call your transplant hospital first. However, for general organ transplant-related information, you can call the Organ Procurement and Transplantation Network (OPTN) toll-free patient services line at 7-429-125- 3021. Anyone, including potential transplant candidates, candidates, recipients, family members, friends, living donors, and donor family members, can call this number to:    Talk about organ donation, living donation, the transplant process, the donation process, and transplant policies.  Get a free patient information kit with helpful booklets, waiting list and transplant information, and a list of all transplant hospitals.  Ask questions about the OPTN website (https://optn.transplant.[facility].gov/), the United Network for Organ Sharing s (UNOS) website (https://unos.org/), or the UNOS website for living donors and transplant recipients. (https://www.transplantliving.org/).  Learn how the OPTN can help you.  Talk about any concerns that you may have with a transplant hospital.    The [facility] transplant system, the OPTN, is managed under federal contract by the United Network for Organ Sharing (UNOS), which is a non-profit charitable organization. The OPTN helps create and define organ sharing policies that make the best use of donated organs. This process continuously evaluating new advances and discoveries so policies can be adapted to best serve patients waiting for transplants. To do so, the OPTN works closely with transplant professionals, transplant patients, transplant candidates, donor families, living donors, and the public. All transplant programs and organ procurement organizations throughout the country are OPTN members and are obligated to follow the policies the OPTN creates for allocating organs.    The OPTN  also is responsible for:    Providing educational material for patients, the public, and professionals.  Raising awareness of the need for donated organs and tissue.  Coordinating organ procurement, matching, and placement.  Collecting information about every organ transplant and donation that occurs in the [place].  Remember, you should contact your transplant hospital directly if you have questions or concerns about your own medical care including medical records, work-up progress, and test results.  We are not your transplant hospital, and our staff will not be able to answer questions about your case, so please keep your transplant hospital s phone number handy.    However, while you research your transplant needs and learn as much as you can about transplantation and donation, we welcome your call to our toll-free patient services line at [phone]

## 2023-07-25 NOTE — TELEPHONE ENCOUNTER
Left Voicemail (2nd Attempt) for the patient to call back and schedule the following:    Appointment type: Return Pulmonary  Provider: Ramo Owen Starcher, Robichaux, Larson, Nirali Salinas, Ijeoma, or Kylee  Return date: On or around 12/21/2023  Specialty phone number: [phone]  Additional appointment(s) needed: Repeat spirometry and DLCO  Additonal Notes: N/A    Daniel Thibodeaux on 7/25/2023 at 10:45 AM

## 2023-07-25 NOTE — TELEPHONE ENCOUNTER
Image Review Meeting    ATTENDEES: Elieser Murillo    IMAGES REVIEWED: 07/14/2023 non-con CT A/P    DECISION: Patient has suitable vessels for kidney or kidney/pancreas transplants.    INCIDENTALS: No

## 2023-07-25 NOTE — LETTER
PHYSICIAN ORDER   ALA/PRA BLOOD    DATE & TIME ISSUED: 2023 11:28 AM  PATIENT NAME: Avelina Marion   : 1955     [facility] MR# [if applicable]: [id number]     DIAGNOSIS/ICD-10 CODE: Awaiting Organ transplant [Z76.82}  EXPIRES: (1 YEAR AFTER DATE ISSUED)  Every 3 months: NEXT DUE 2023    Please draw 20ml of blood in red top (plain) tube for Antileukocyte Antibody (ALA or PRA).  Label tubes with the patient s name, date of birth, and complete lab slip.    Mailers, lab slips with instructions are sent to patient separately.  Call the Outreach Lab at [phone] to reorder mailers.  Mail blood to (this address is also on the mailers):    IMMUNOLOGY LABORATORY   [facility]    Room [phone] [address]  [address]        Louise Barros MD FACS  Associate Professor of Surgery  Director, Living Kidney Donor Program.

## 2023-07-30 ENCOUNTER — ORGAN (OUTPATIENT)
Dept: TRANSPLANT | Facility: CLINIC | Age: 68
End: 2023-07-30
Payer: COMMERCIAL

## 2023-07-30 DIAGNOSIS — Z76.82 AWAITING ORGAN TRANSPLANT: Primary | ICD-10-CM

## 2023-07-30 NOTE — TELEPHONE ENCOUNTER
"Organ Offer Encounter Information    Organ Offer Information  Organ offer date & time: 7/30/2023  2:28 PM  Coordinator/Fellow/Attending name: Adia Cook RN   Organ(s):  Organ UNOS ID Match Run ID Comment Organ Laterality   Kidney [id number] MNOP    Pancreas [id number] MNOP         Recent infections?: No      New medications?: No Recent pregnancy?: No     Angicoagulation medications?: No Recent vaccinations?: No     Recent blood transfusions?: No Recent hospitalizations?: No   Has your insurance changed in the last 6-12 months?: Neg    Patient last dialyzed: 7/28/2023 11:00 AM  Dialysis type: Hemo  Discussed organ offer with: Patient  Patient/Caregiver name: Avelina  Discussed risk category with Patient/Other: N/A  Patient/Other asked to speak to a surgeon?: No  Discussed program-specific outcomes: Did not have questions regarding SRTR  Right to decline organ offer without penalty, Patient/Other: Aware of option to decline without penalty  Organ offer decision status Patient/Other: Declined Offer  UNOS decline reason: 801 - Candidate ill, unavailable, refused, or temporarily unsuitable  Detail: Patient states that he has too much going on in his life at this time to get the transplant  Organ disposition: Case Cancelled - Other (specify)  Additional Comments: 7/30/2023 2:29 PM  Kidney: Local, Primary  MD: Javad  OPO Contact: [employer] ([phone])  VXM Results: Done, compatible  XM Plan (FXM must be done with serum no older than 10 days from transplant):   Is this an ABO A2 donor to ABO B Recipient:No  (In the event of A2 to B transplant, Anti-A titers need to be collected at the time of the crossmatch or admission - whichever is first.  Use smartphrase .A2toB for instructions.)  Plan (Admission, NPO, Donor OR): Spoke to Avelina, presented offer. Patient declined at this time, stating \"there is too much going in my life and I can't be hospitalized at this time.\" Patient listened to entire offer & " verbalized understanding. Writer offered for the surgeon to speak to patient, patient declined. No further questions at this time. Dr Vieira updated with patient decision.  - - -   COVID Screening  In the past month, have you:  Or anyone close to you had a positive COVID test or suspected to have COVID: no  Had any COVID symptoms (Fever, Cough, Short of Breath, Loss of Taste/Smell, Rash): no     Attestation I have discussed all of the above with the Patient/Legal Guardian/Caregiver regarding this organ offer.: Yes  Coordinator/Fellow/Attending name: Adia Cook RN

## 2023-08-02 ENCOUNTER — TELEPHONE (OUTPATIENT)
Dept: TRANSPLANT | Facility: CLINIC | Age: 68
End: 2023-08-02
Payer: COMMERCIAL

## 2023-08-02 NOTE — TELEPHONE ENCOUNTER
"Patient reports he hated declining the organ offer, but has some things he's \"working on\" and that he will be busy until after [place], so patient verbalized agreement with having his status changed to Inactive on the kidney and kidney/pancreas lists at this time. Reviewed patient will not be eligible to receive organ offer calls while he is inactive, but will continue to accumulate waiting time.  Noted will contact patient after September 4, 2023 or patient may contact me before that time if he is ready to move forward with transplant.  Patient verbalizes understanding and agreement with this plan.     8/3/23 addendum KB: Verified listing in Epic and UNOS as Inactive on the kidney/pancreas and kidney waitlist. -Elana ALMANZA RN     "

## 2023-08-02 NOTE — LETTER
August 2, 2023    Avelina Marion  [address] [place] [address]    Dear Mr. Marion,    This letter is sent to notify you that your listing status was changed from Active to Inactive on the kidney and  kidney and pancreas transplant waitlists at the [facility].  Your status was changed per your request.    During this waiting period, we may still request periodic laboratory tests with your primary physician.  It will be your responsibility to remind your physician to forward your results to the Transplant Office.    We still need to be kept informed of any changes such as:    changes in your insurance coverage  change in your phone number, address or emergency contact  significant changes in your health  significant infections (such as pneumonia or abscesses)  blood transfusions  any condition which requires hospitalization or surgery  We want you to know that our program has physician and surgeon coverage 24 hours a day, 365 days a year. In addition, our transplant surgeons and physicians will not be on call for two or more transplant programs more than 30 miles apart unless the circumstances have been reviewed and approved by the United Network for Organ Sharing (UNOS) Membership and Professional Standards Committee (MPSC). Finally, our primary physician and primary surgeons are not designated as the primary surgeon or primary physician at more than 1 transplant hospital. If this coverage changes or there are substantial program changes, you will be notified in writing by letter.   Attached is a letter from UNOS that describes the services and information offered to patients by UNOS and the Organ Procurement and Transplantation Network (OPTN).    If you have any questions regarding this letter, please contact your transplant coordinator at [phone] or the Transplant Office at [phone], option 5.    Sincerely,  Kidney and Pancreas Transplant Program    Enclosures: UNOS Letter  CC: Care  Team        The Organ Procurement and Transplantation Network  Toll-free patient services line:     Your resource for organ transplant information    If you have a question regarding your own medical care, you always should call your transplant hospital first. However, for general organ transplant-related information, you can call the Organ Procurement and Transplantation Network (OPTN) toll-free patient services line at [phone]- [phone]. Anyone, including potential transplant candidates, candidates, recipients, family members, friends, living donors, and donor family members, can call this number to:      Talk about organ donation, living donation, the transplant process, the donation process, and transplant policies.  Get a free patient information kit with helpful booklets, waiting list and transplant information, and a list of all transplant hospitals.  Ask questions about the OPTN website (https://optn.transplant.hrsa.gov/), the United Network for Organ Sharing s (UNOS) website (https://unos.org/), or the UNOS website for living donors and transplant recipients. (https://www.transplantliving.org/).  Learn how the OPTN can help you.  Talk about any concerns that you may have with a transplant hospital.  The [facility] s transplant system, the OPTN, is managed under federal contract by the United Network for Organ Sharing (UNOS), which is a non-profit charitable organization. The OPTN helps create and define organ sharing policies that make the best use of donated organs. This process continuously evaluating new advances and discoveries so policies can be adapted to best serve patients waiting for transplants. To do so, the OPTN works closely with transplant professionals, transplant patients, transplant candidates, donor families, living donors, and the public. All transplant programs and organ procurement organizations throughout the country are OPTN members and are obligated to follow the policies the OPTN creates  for allocating organs.  The OPTN also is responsible for:    Providing educational material for patients, the public, and professionals.  Raising awareness of the need for donated organs and tissue.  Coordinating organ procurement, matching, and placement.  Collecting information about every organ transplant and donation that occurs in the [place].  Remember, you should contact your transplant hospital directly if you have questions or concerns about your own medical care including medical records, work-up progress, and test results.    We are not your transplant hospital, and our staff will not be able to answer questions about your case, so please keep your transplant hospital s phone number handy.    However, while you research your transplant needs and learn as much as you can about transplantation and donation, we welcome your call to our toll-free patient services line at [phone]

## 2023-09-05 ENCOUNTER — TELEPHONE (OUTPATIENT)
Dept: TRANSPLANT | Facility: CLINIC | Age: 68
End: 2023-09-05
Payer: COMMERCIAL

## 2023-09-05 DIAGNOSIS — Z76.82 ORGAN TRANSPLANT CANDIDATE: ICD-10-CM

## 2023-09-05 DIAGNOSIS — N18.6 ESRD (END STAGE RENAL DISEASE) (H): ICD-10-CM

## 2023-09-05 DIAGNOSIS — E11.9 DIABETES MELLITUS, TYPE 2 (H): Primary | ICD-10-CM

## 2023-09-05 DIAGNOSIS — Z12.5 PROSTATE CANCER SCREENING: ICD-10-CM

## 2023-09-05 NOTE — LETTER
2023    Avelina Marion  [address] [place] [address]    Dear Mr. Marion,    This letter is sent to confirm that you have completed your transplant work-up and you are a candidate in the kidney and pancreas transplant program at the [facility].  Your status was changed to ACTIVE on the kidney and kidney and pancreas  waitlists on 2023.      When you are active on the waitlists and an organ becomes available, a coordinator will need to speak to you immediately.  You could be contacted at any time during the day or night as an organ could become available at any time.  Please make certain our office always has your current telephone numbers and address.      Items we will need from you:    We have received approval from your insurance company for the transplant procedure.  It is critical that you notify us if there is any change in your insurance.  It is also important that you familiarize yourself with the details of your specific insurance policy.  Our patient  is available to assist you if you should have any questions regarding your coverage.    An ALA or PRA blood sample needs to be sent here every 3 months to match you with  donors or any potential living donors.  If you need this testing, special mailing boxes (called mailers) will be sent to you directly from the Outreach Department.  You should take the physician order form and the  to your home laboratory when it is time to for this testing to be done.  Additional mailers can be obtained by calling the Transplant Office and asking to speak to a kidney and pancreas .    During this waiting period, we may request additional periodic laboratory tests with your primary physician.  It will be your responsibility to remind your physician to forward your results to the Transplant Office.  We need to be kept informed of any  changes in your medical condition such as:  changes in your medications,   significant changes in your health  significant infections (such as pneumonia or abscesses)  blood transfusions  any condition which requires hospitalization  any surgery    Remember to complete any routine cancer screening tests required before your transplant.  This includes colonoscopy; prostate screening for men, and mammogram and gynecologic testing for women, as well as dental work.  Your primary care clinic can assist you with arranging for these exams.  Remind your caregivers to forward copies of the records and final reports.      We want you to know that our program has physician and surgeon coverage 24 hours a day, 365 days a year. In addition, our transplant surgeons and physicians will not be on call for two or more transplant programs more than 30 miles apart unless the circumstances have been reviewed and approved by the United Network for Organ Sharing (UNOS) Membership and Professional Standards Committee (MPSC). Finally, our primary physician and primary surgeons are not designated as the primary surgeon or primary physician at more than 1 transplant hospital. If this coverage changes or there are substantial program changes, you will be notified in writing by letter.     Attached is a letter from UNOS that describes the services and information offered to patients by UNOS and the Organ Procurement and Transplantation Network (OPTN).    We appreciate having had the opportunity to participate in your care.  If you have questions, please feel free to call me directly at [phone] or the Transplant Office at [phone] or [phone].      Sincerely,    Gege Altamirano RN, BSN, Transplant Coordinator   On Behalf of Solid Organ Transplant  [facility], [facility]      Enclosures: ALA/PRA Physician Order, Telephone Contact List, UNOS Letter, Waitlist Information Update, and While You Are Waiting  cc: Care  Team  The Organ Procurement and Transplantation Network Toll-free patient services line:  [phone]  Your resource for organ transplant information    Staffed 8:30 am - 5:00 pm ET Monday - Friday Leave a message 24/7 to receive a call back    The Organ Procurement and Transplantation Network (OPTN) is the national transplant system. It makes the policies that decide how donated organs are matched to patients waiting for a transplant. The OPTN:    Makes sure donated organs get matched to people on the transplant waiting list  Tells people about the donation and transplant processes  Makes sure that the public knows about the need for more organ and tissue donations    The OPTN has a free patient services line that you can call to:  Get more information about:  Organ donation and organ transplants  Donation and transplant policies  Get an information kit with:  A list of transplant hospitals  Waiting list information  Talk about any questions you may have about your transplant hospital or organ procurement organization. The staff will do their best to help you or point you to others who may help.  Find out how you can volunteer with the OPTN and help shape transplant policy     The patient services line number is: [phone]    Patient services line staff CANNOT answer questions about your own medical care, including:  Waiting list status  Test results  Medical records  You will need to call your transplant hospital for this information.    The following websites have more information about transplantation and donation:  OPTN: https://optn.transplant.hrsa.gov/    For potential living donors and transplant recipients:  Living with transplant: https://www.transplantliving.org/    Living donation process: https://optn.transplant.hrsa.gov/living-donation/    Financial assistance: https://www.livingdonorassistance.org/    Transplantation data: https://www.srtr.org/    Organ donation:  https://www.organdonor.gov/    Volunteer with the OPTN: https://optn.transplant.[facility].gov/get-involved/

## 2023-09-05 NOTE — LETTER
PHYSICIAN ORDER   ALA/PRA BLOOD    DATE & TIME ISSUED: 2023 5:17 PM  PATIENT NAME: Avelina Marion   : 1955     [facility] MR# [if applicable]: [id number]     DIAGNOSIS/ICD-10 CODE: Awaiting Organ transplant [Z76.82}  EXPIRES: (1 YEAR AFTER DATE ISSUED)  Every 3 months: Next DUE 2023    Please draw 20ml of blood in red top (plain) tube for Antileukocyte Antibody (ALA or PRA).  Label tubes with the patient s name, date of birth, and complete lab slip.    Mailers, lab slips with instructions are sent to patient separately.  Call the Outreach Lab at [phone] to reorder mailers.  Mail blood to (this address is also on the mailers):    IMMUNOLOGY LABORATORY   [facility]    Room 7-139 [address]  [address]        Louise Barros MD FACS  Associate Professor of Surgery  Director, Living Kidney Donor Program.

## 2023-09-05 NOTE — TELEPHONE ENCOUNTER
Called patient to inform them of status change to ACTIVE  on 23. Status change letter and PRA orders to be mailed. Patient is in agreement with listing ACTIVE status.      Discussed:   - Pt is eligible for  donor offers and pt can receive calls 24 hours a day, 7 days a week, and on call staff need to be able to reach pt or one of emergency contacts within 30 minutes or they might skip over to next recipient on list.   -Please make sure your phone is charged at all times and your voicemail is not full   -Your transplant on call coordinator will give you directions about when and where you will need to come to the hospital for transplant  -The transplant coordinator will call you to discuss your current health status, the offer, and plans to move forward.  Some organ offer calls will require you to come to the hospital immediately; some may not be as time sensitive.  It may be possible for you to come to the hospital and, for various reasons, the transplant could be cancelled.  This is often called a  dry run .  - Reviewed the right to accept or decline offer, without penalty  - Expected length of surgical procedure is , expected inpatient length of stay post transplant is 7-10 days, and potential to stay locally post transplant. Offered resources for lodging in the area  - Verified contact information as documented in Epic. Confirmed emergency contact information  -Instructed patient about importance of having PRAs drawn every 3 months via: (Mailers/[facility]). Encouraged them to set reminder in their preferred calendar to stay on top of their quarterly labs  -Reminded pt to stay up on health maintenance and to call with any updates on their health status, insurance or contact information.   -Reminded pt to inform RNCC as soon as possible if they test positive for COVID.     -Provided name and contact information for additional questions or concerns.  Pt expressed excellent understanding of all and was in  good agreement with the plan    9/6/23 addendum: Verified listing in Epic and UNOS as active on the kidney alone and kidney/pancreas waitlist. -Elana ALMANZA RN

## 2023-09-08 DIAGNOSIS — Z76.82 AWAITING ORGAN TRANSPLANT: Primary | ICD-10-CM

## 2023-09-11 DIAGNOSIS — Z76.82 AWAITING ORGAN TRANSPLANT: Primary | ICD-10-CM

## 2023-09-12 ENCOUNTER — ORGAN (OUTPATIENT)
Dept: TRANSPLANT | Facility: CLINIC | Age: 68
End: 2023-09-12
Payer: COMMERCIAL

## 2023-09-12 ENCOUNTER — APPOINTMENT (OUTPATIENT)
Dept: LAB | Facility: CLINIC | Age: 68
End: 2023-09-12
Payer: COMMERCIAL

## 2023-09-12 ENCOUNTER — LAB (OUTPATIENT)
Dept: LAB | Facility: CLINIC | Age: 68
End: 2023-09-12

## 2023-09-12 DIAGNOSIS — Z76.82 AWAITING ORGAN TRANSPLANT: ICD-10-CM

## 2023-09-12 DIAGNOSIS — Z76.82 AWAITING ORGAN TRANSPLANT: Primary | ICD-10-CM

## 2023-09-12 PROCEDURE — 36415 COLL VENOUS BLD VENIPUNCTURE: CPT

## 2023-09-12 PROCEDURE — 86832 HLA CLASS I HIGH DEFIN QUAL: CPT | Performed by: SURGERY

## 2023-09-12 PROCEDURE — 86825 HLA X-MATH NON-CYTOTOXIC: CPT | Performed by: SURGERY

## 2023-09-12 PROCEDURE — 86833 HLA CLASS II HIGH DEFIN QUAL: CPT | Performed by: SURGERY

## 2023-09-13 ENCOUNTER — ORGAN (OUTPATIENT)
Dept: TRANSPLANT | Facility: CLINIC | Age: 68
End: 2023-09-13
Payer: COMMERCIAL

## 2023-09-13 DIAGNOSIS — Z76.82 AWAITING ORGAN TRANSPLANT: Primary | ICD-10-CM

## 2023-09-13 NOTE — TELEPHONE ENCOUNTER
Organ Offer Encounter Information    Organ Offer Information  Organ offer date & time: 9/12/2023  5:40 PM  Coordinator/Fellow/Attending name: Haylee Silva RN   Organ(s):  Organ UNOS ID Match Run ID Comment Organ Laterality   Pancreas [id number] TNMS    Kidney [id number] TNMS         Recent infections?: No      New medications?: No Recent pregnancy?: No     Angicoagulation medications?: Yes (Comment: 81mg aspirin) Recent vaccinations?: No     Recent blood transfusions?: No Recent hospitalizations?: No   Has your insurance changed in the last 6-12 months?: Neg    Patient last dialyzed: 9/11/2023  4:00 PM  Dialysis type: Hemo  Discussed organ offer with: Patient  Patient/Caregiver name: Avelina  Discussed risk category with Patient/Other: N/A  Patient/Other asked to speak to a surgeon?: No  Discussed program-specific outcomes: Did not have questions regarding SRTR  Right to decline organ offer without penalty, Patient/Other: Aware of option to decline without penalty  Organ offer decision status Patient/Other: Accepted Offer  Organ disposition: Case Cancelled - Other (specify)  Additional Comments: 9/12/2023 5:41 PM  KP/Panc: Primary, import  MD: Dr. Ascencio  OPO Contact: [place] [phone]  VXM Results: Compatible  XM Plan (FXM must be done with serum no older than 10 days from transplant): Plan to have patient come to hospital to drop off blood in acute care lab, requested blood, arriving [employer] #[id number], due to arrive before 2300.  Plan (Admission, NPO, Donor OR): Donor OR set for 0730 tomorrow, pt will drop off blood for FXM this evening and then be NPO after early breakfast tomorrow morning until we know if the surgery is a go or not. Pt verbalized understanding and has no further questions at this time. FXM orders placed, notified Joanne in immunology. Spoke to Jess, charge nurse in acute care lab, also notified [place]  security of pt's arrival.   - - -   COVID Screening  In the  past month, have you:  Or anyone close to you had a positive COVID test or suspected to have COVID: No   Had any COVID symptoms (Fever, Cough, Short of Breath, Loss of Taste/Smell, Rash): No    Donor OR Time: 0730 9/13  Procuring MD: Dr. Andrez Contreras  Contact in the OR: tbd  Organs Being Procured: Lungs, liver, panc & kidneys  Flush Solution: UW  Biopsy: Plan to biopsy kidneys and have results within 30-60min, requested with Damion and confirmed again with Mohamud.   Pump: No  Special Requests (Special blood tubes, nodes, waivers): None at this time, possibly anatomy or biopsy waivers pending OR  MD for Visualization: Dr. Ascencio  Transportation Details: Identified [employer] flight [id number] departing [place] at 1315 and arriving in [place] at 1536. Notified OUC at [facility]    9/12/2023 9:05 PM:   Called Avelina to see if he was able to make any changes to his HD schedule for tomorrow (11am) but his center is closed on Tuesdays, he left a VM requesting to have HD early in the morning or maybe push his run back to afternoon. If he can't get in early and we accept the K/P, we may need to admit him for inpatient HD before surgery. Dr. De La Torre aware.   Haylee Silva RN  Transplant Coordinator    9/13/2023 7:09 AM:   Called Avelina to introduce myself and provide my contact information if he were to have any questions. Plan for him to eat breakfast and then be NPO for possible surgery. He is trying to get into dialysis this morning.   Adia Cook RN  Transplant Coordinator     9/13/2023 7:51 AM:   Provided Jessy, onsite coordinator ([phone]) with Dr Ascencio's direct # for visualization of pancreas.   Adia Cook RN  Transplant Coordinator     9/13/2023 10:13 AM:   Per Dr Ascencio, declining KP offer d/t anatomy issues after visualization. Spoke to Avelina, informed him that the case is cancelled and he can go to HD at his normal time. No further questions at this time. [facility] updated that we  declined offer.   Adia Cook RN  Transplant Coordinator     Attestation I have discussed all of the above with the Patient/Legal Guardian/Caregiver regarding this organ offer.: Yes  Coordinator/Fellow/Attending name: Haylee Silva RN

## 2023-09-14 ENCOUNTER — HOSPITAL ENCOUNTER (INPATIENT)
Facility: CLINIC | Age: 68
Setting detail: SURGERY ADMIT
DRG: 008 | End: 2023-09-14
Attending: SURGERY | Admitting: SURGERY
Payer: COMMERCIAL

## 2023-09-14 ENCOUNTER — HOSPITAL ENCOUNTER (INPATIENT)
Facility: CLINIC | Age: 68
LOS: 6 days | Discharge: HOME | DRG: 008 | End: 2023-09-21
Attending: SURGERY | Admitting: SURGERY
Payer: COMMERCIAL

## 2023-09-14 ENCOUNTER — ANESTHESIA (OUTPATIENT)
Dept: SURGERY | Facility: CLINIC | Age: 68
DRG: 008 | End: 2023-09-14
Payer: COMMERCIAL

## 2023-09-14 ENCOUNTER — ANESTHESIA EVENT (OUTPATIENT)
Dept: SURGERY | Facility: CLINIC | Age: 68
DRG: 008 | End: 2023-09-14
Payer: COMMERCIAL

## 2023-09-14 ENCOUNTER — APPOINTMENT (OUTPATIENT)
Dept: CT IMAGING | Facility: CLINIC | Age: 68
DRG: 008 | End: 2023-09-14
Attending: PHYSICIAN ASSISTANT
Payer: COMMERCIAL

## 2023-09-14 DIAGNOSIS — Z94.83 HISTORY OF SIMULTANEOUS KIDNEY AND PANCREAS TRANSPLANT (H): ICD-10-CM

## 2023-09-14 DIAGNOSIS — Z94.0 HISTORY OF SIMULTANEOUS KIDNEY AND PANCREAS TRANSPLANT (H): ICD-10-CM

## 2023-09-14 DIAGNOSIS — Z95.1 S/P CABG (CORONARY ARTERY BYPASS GRAFT): ICD-10-CM

## 2023-09-14 DIAGNOSIS — N18.6 END STAGE RENAL DISEASE (H): Primary | ICD-10-CM

## 2023-09-14 LAB
ABO/RH(D): NORMAL
ALBUMIN MFR UR ELPH: 104 MG/DL
ALBUMIN SERPL BCG-MCNC: 4.3 G/DL (ref 3.5–5.2)
ALBUMIN UR-MCNC: 100 MG/DL
ALP SERPL-CCNC: 60 U/L (ref 40–129)
ALT SERPL W P-5'-P-CCNC: 6 U/L (ref 0–70)
AMYLASE SERPL-CCNC: 62 U/L (ref 28–100)
ANION GAP SERPL CALCULATED.3IONS-SCNC: 16 MMOL/L (ref 7–15)
ANTIBODY SCREEN: NEGATIVE
APPEARANCE UR: CLEAR
APTT PPP: 27 SECONDS (ref 22–38)
AST SERPL W P-5'-P-CCNC: 22 U/L (ref 0–45)
BASE EXCESS BLDA CALC-SCNC: 2.4 MMOL/L (ref -9.6–2)
BILIRUB SERPL-MCNC: 0.9 MG/DL
BILIRUB UR QL STRIP: NEGATIVE
BLD PROD TYP BPU: NORMAL
BLD PROD TYP BPU: NORMAL
BLOOD COMPONENT TYPE: NORMAL
BLOOD COMPONENT TYPE: NORMAL
BUN SERPL-MCNC: 46.4 MG/DL (ref 8–23)
CA-I BLD-MCNC: 4 MG/DL (ref 4.4–5.2)
CALCIUM SERPL-MCNC: 9 MG/DL (ref 8.8–10.2)
CHLORIDE SERPL-SCNC: 95 MMOL/L (ref 98–107)
CODING SYSTEM: NORMAL
CODING SYSTEM: NORMAL
COLOR UR AUTO: ABNORMAL
CREAT SERPL-MCNC: 6.77 MG/DL (ref 0.67–1.17)
CREAT UR-MCNC: 184 MG/DL
CROSSMATCH: NORMAL
CROSSMATCH: NORMAL
DEPRECATED HCO3 PLAS-SCNC: 25 MMOL/L (ref 22–29)
EGFRCR SERPLBLD CKD-EPI 2021: 8 ML/MIN/1.73M2
ERYTHROCYTE [DISTWIDTH] IN BLOOD BY AUTOMATED COUNT: 13.8 % (ref 10–15)
GLUCOSE BLD-MCNC: 167 MG/DL (ref 70–99)
GLUCOSE BLDC GLUCOMTR-MCNC: 183 MG/DL (ref 70–99)
GLUCOSE BLDC GLUCOMTR-MCNC: 209 MG/DL (ref 70–99)
GLUCOSE SERPL-MCNC: 245 MG/DL (ref 70–99)
GLUCOSE UR STRIP-MCNC: 500 MG/DL
HCO3 BLDA-SCNC: 27 MMOL/L (ref 21–28)
HCT VFR BLD AUTO: 42.1 % (ref 40–53)
HGB BLD-MCNC: 13.1 G/DL (ref 13.3–17.7)
HGB BLD-MCNC: 14.5 G/DL (ref 13.3–17.7)
HGB UR QL STRIP: ABNORMAL
HYALINE CASTS: 3 /LPF
INR PPP: 1.06 (ref 0.85–1.15)
KETONES UR STRIP-MCNC: NEGATIVE MG/DL
LACTATE BLD-SCNC: 0.9 MMOL/L
LEUKOCYTE ESTERASE UR QL STRIP: NEGATIVE
LIPASE SERPL-CCNC: 41 U/L (ref 13–60)
MCH RBC QN AUTO: 33.5 PG (ref 26.5–33)
MCHC RBC AUTO-ENTMCNC: 34.4 G/DL (ref 31.5–36.5)
MCV RBC AUTO: 97 FL (ref 78–100)
MUCOUS THREADS #/AREA URNS LPF: PRESENT /LPF
NITRATE UR QL: NEGATIVE
O2/TOTAL GAS SETTING VFR VENT: 43 %
PCO2 BLDA: 40 MM HG (ref 35–45)
PH BLDA: 7.44 [PH] (ref 7.35–7.45)
PH UR STRIP: 6.5 [PH] (ref 5–7)
PLATELET # BLD AUTO: 175 10E3/UL (ref 150–450)
PO2 BLDA: 116 MM HG (ref 80–105)
POTASSIUM BLD-SCNC: 3.9 MMOL/L (ref 3.5–5)
POTASSIUM SERPL-SCNC: 4.4 MMOL/L (ref 3.4–5.3)
PROT SERPL-MCNC: 7.3 G/DL (ref 6.4–8.3)
PROT/CREAT 24H UR: 0.57 MG/MG CR (ref 0–0.2)
RBC # BLD AUTO: 4.33 10E6/UL (ref 4.4–5.9)
RBC URINE: 1 /HPF
SARS-COV-2 RNA RESP QL NAA+PROBE: NEGATIVE
SODIUM BLD-SCNC: 137 MMOL/L (ref 133–144)
SODIUM SERPL-SCNC: 136 MMOL/L (ref 136–145)
SP GR UR STRIP: 1.02 (ref 1–1.03)
SPECIMEN EXPIRATION DATE: NORMAL
UNIT ABO/RH: NORMAL
UNIT ABO/RH: NORMAL
UNIT NUMBER: NORMAL
UNIT NUMBER: NORMAL
UNIT STATUS: NORMAL
UNIT STATUS: NORMAL
UNIT TYPE ISBT: 600
UNIT TYPE ISBT: 600
UROBILINOGEN UR STRIP-MCNC: NORMAL MG/DL
WBC # BLD AUTO: 7.7 10E3/UL (ref 4–11)
WBC URINE: 1 /HPF

## 2023-09-14 PROCEDURE — 82150 ASSAY OF AMYLASE: CPT | Performed by: PHYSICIAN ASSISTANT

## 2023-09-14 PROCEDURE — 250N000011 HC RX IP 250 OP 636

## 2023-09-14 PROCEDURE — 87635 SARS-COV-2 COVID-19 AMP PRB: CPT | Performed by: PHYSICIAN ASSISTANT

## 2023-09-14 PROCEDURE — 93005 ELECTROCARDIOGRAM TRACING: CPT

## 2023-09-14 PROCEDURE — 86790 VIRUS ANTIBODY NOS: CPT | Performed by: PHYSICIAN ASSISTANT

## 2023-09-14 PROCEDURE — 250N000009 HC RX 250

## 2023-09-14 PROCEDURE — 250N000011 HC RX IP 250 OP 636: Performed by: STUDENT IN AN ORGANIZED HEALTH CARE EDUCATION/TRAINING PROGRAM

## 2023-09-14 PROCEDURE — 258N000003 HC RX IP 258 OP 636: Performed by: PHYSICIAN ASSISTANT

## 2023-09-14 PROCEDURE — 250N000009 HC RX 250: Performed by: PHYSICIAN ASSISTANT

## 2023-09-14 PROCEDURE — 71250 CT THORAX DX C-: CPT

## 2023-09-14 PROCEDURE — 999N000141 HC STATISTIC PRE-PROCEDURE NURSING ASSESSMENT: Performed by: SURGERY

## 2023-09-14 PROCEDURE — 86645 CMV ANTIBODY IGM: CPT | Performed by: PHYSICIAN ASSISTANT

## 2023-09-14 PROCEDURE — 86706 HEP B SURFACE ANTIBODY: CPT | Performed by: PHYSICIAN ASSISTANT

## 2023-09-14 PROCEDURE — 0FYG0Z0 TRANSPLANTATION OF PANCREAS, ALLOGENEIC, OPEN APPROACH: ICD-10-PCS | Performed by: SURGERY

## 2023-09-14 PROCEDURE — 87389 HIV-1 AG W/HIV-1&-2 AB AG IA: CPT | Performed by: PHYSICIAN ASSISTANT

## 2023-09-14 PROCEDURE — 99207 PR NO CHARGE LOS: CPT | Performed by: SURGERY

## 2023-09-14 PROCEDURE — 86901 BLOOD TYPING SEROLOGIC RH(D): CPT | Performed by: PHYSICIAN ASSISTANT

## 2023-09-14 PROCEDURE — 86644 CMV ANTIBODY: CPT | Performed by: PHYSICIAN ASSISTANT

## 2023-09-14 PROCEDURE — 83690 ASSAY OF LIPASE: CPT | Performed by: PHYSICIAN ASSISTANT

## 2023-09-14 PROCEDURE — 86850 RBC ANTIBODY SCREEN: CPT | Performed by: PHYSICIAN ASSISTANT

## 2023-09-14 PROCEDURE — 0TY10Z0 TRANSPLANTATION OF LEFT KIDNEY, ALLOGENEIC, OPEN APPROACH: ICD-10-PCS | Performed by: SURGERY

## 2023-09-14 PROCEDURE — 86665 EPSTEIN-BARR CAPSID VCA: CPT | Performed by: PHYSICIAN ASSISTANT

## 2023-09-14 PROCEDURE — 84132 ASSAY OF SERUM POTASSIUM: CPT

## 2023-09-14 PROCEDURE — 250N000025 HC SEVOFLURANE, PER MIN: Performed by: SURGERY

## 2023-09-14 PROCEDURE — 86923 COMPATIBILITY TEST ELECTRIC: CPT | Performed by: PHYSICIAN ASSISTANT

## 2023-09-14 PROCEDURE — 71250 CT THORAX DX C-: CPT | Mod: 26 | Performed by: RADIOLOGY

## 2023-09-14 PROCEDURE — 87340 HEPATITIS B SURFACE AG IA: CPT | Performed by: PHYSICIAN ASSISTANT

## 2023-09-14 PROCEDURE — 82962 GLUCOSE BLOOD TEST: CPT

## 2023-09-14 PROCEDURE — 84156 ASSAY OF PROTEIN URINE: CPT | Performed by: PHYSICIAN ASSISTANT

## 2023-09-14 PROCEDURE — 272N000001 HC OR GENERAL SUPPLY STERILE: Performed by: SURGERY

## 2023-09-14 PROCEDURE — 83605 ASSAY OF LACTIC ACID: CPT

## 2023-09-14 PROCEDURE — 250N000013 HC RX MED GY IP 250 OP 250 PS 637: Performed by: PHYSICIAN ASSISTANT

## 2023-09-14 PROCEDURE — 99207 PR NO BILLABLE SERVICE THIS VISIT: CPT | Performed by: SURGERY

## 2023-09-14 PROCEDURE — 85027 COMPLETE CBC AUTOMATED: CPT | Performed by: PHYSICIAN ASSISTANT

## 2023-09-14 PROCEDURE — 80053 COMPREHEN METABOLIC PANEL: CPT | Performed by: PHYSICIAN ASSISTANT

## 2023-09-14 PROCEDURE — 360N000078 HC SURGERY LEVEL 5, PER MIN: Performed by: SURGERY

## 2023-09-14 PROCEDURE — 85610 PROTHROMBIN TIME: CPT | Performed by: PHYSICIAN ASSISTANT

## 2023-09-14 PROCEDURE — 250N000009 HC RX 250: Performed by: STUDENT IN AN ORGANIZED HEALTH CARE EDUCATION/TRAINING PROGRAM

## 2023-09-14 PROCEDURE — 36415 COLL VENOUS BLD VENIPUNCTURE: CPT | Performed by: PHYSICIAN ASSISTANT

## 2023-09-14 PROCEDURE — 87521 HEPATITIS C PROBE&RVRS TRNSC: CPT | Performed by: PHYSICIAN ASSISTANT

## 2023-09-14 PROCEDURE — C2617 STENT, NON-COR, TEM W/O DEL: HCPCS | Performed by: SURGERY

## 2023-09-14 PROCEDURE — 86803 HEPATITIS C AB TEST: CPT | Performed by: PHYSICIAN ASSISTANT

## 2023-09-14 PROCEDURE — 0DTJ0ZZ RESECTION OF APPENDIX, OPEN APPROACH: ICD-10-PCS | Performed by: SURGERY

## 2023-09-14 PROCEDURE — 86704 HEP B CORE ANTIBODY TOTAL: CPT | Performed by: PHYSICIAN ASSISTANT

## 2023-09-14 PROCEDURE — 87086 URINE CULTURE/COLONY COUNT: CPT | Performed by: PHYSICIAN ASSISTANT

## 2023-09-14 PROCEDURE — 710N000009 HC RECOVERY PHASE 1, LEVEL 1, PER MIN: Performed by: SURGERY

## 2023-09-14 PROCEDURE — 250N000011 HC RX IP 250 OP 636: Mod: JZ | Performed by: PHYSICIAN ASSISTANT

## 2023-09-14 PROCEDURE — 81001 URINALYSIS AUTO W/SCOPE: CPT | Performed by: PHYSICIAN ASSISTANT

## 2023-09-14 PROCEDURE — 999N000054 HC STATISTIC EKG NON-CHARGEABLE

## 2023-09-14 PROCEDURE — 812N000013 HC ACQUISITION PANCREAS CADAVER OF SPK

## 2023-09-14 PROCEDURE — 258N000003 HC RX IP 258 OP 636

## 2023-09-14 PROCEDURE — 370N000017 HC ANESTHESIA TECHNICAL FEE, PER MIN: Performed by: SURGERY

## 2023-09-14 PROCEDURE — 85730 THROMBOPLASTIN TIME PARTIAL: CPT | Performed by: PHYSICIAN ASSISTANT

## 2023-09-14 DEVICE — STENT URETERAL PERCUFLEX PLUS 4.8FRX16CM M0061751980
Type: IMPLANTABLE DEVICE | Site: URETER | Status: NON-FUNCTIONAL
Removed: 2023-10-23

## 2023-09-14 RX ORDER — DEXTROSE MONOHYDRATE 25 G/50ML
25-50 INJECTION, SOLUTION INTRAVENOUS
Status: DISCONTINUED | OUTPATIENT
Start: 2023-09-14 | End: 2023-09-15 | Stop reason: HOSPADM

## 2023-09-14 RX ORDER — NITROGLYCERIN 10 MG/100ML
INJECTION INTRAVENOUS PRN
Status: DISCONTINUED | OUTPATIENT
Start: 2023-09-14 | End: 2023-09-15

## 2023-09-14 RX ORDER — SODIUM CHLORIDE, SODIUM GLUCONATE, SODIUM ACETATE, POTASSIUM CHLORIDE AND MAGNESIUM CHLORIDE 526; 502; 368; 37; 30 MG/100ML; MG/100ML; MG/100ML; MG/100ML; MG/100ML
INJECTION, SOLUTION INTRAVENOUS CONTINUOUS PRN
Status: DISCONTINUED | OUTPATIENT
Start: 2023-09-14 | End: 2023-09-15

## 2023-09-14 RX ORDER — OCTREOTIDE ACETATE 100 UG/ML
100 INJECTION, SOLUTION INTRAVENOUS; SUBCUTANEOUS ONCE
Status: COMPLETED | OUTPATIENT
Start: 2023-09-14 | End: 2023-09-14

## 2023-09-14 RX ORDER — NICOTINE POLACRILEX 4 MG
15-30 LOZENGE BUCCAL
Status: DISCONTINUED | OUTPATIENT
Start: 2023-09-14 | End: 2023-09-15 | Stop reason: HOSPADM

## 2023-09-14 RX ORDER — LIDOCAINE 40 MG/G
CREAM TOPICAL
Status: DISCONTINUED | OUTPATIENT
Start: 2023-09-14 | End: 2023-09-15 | Stop reason: HOSPADM

## 2023-09-14 RX ORDER — PROPOFOL 10 MG/ML
INJECTION, EMULSION INTRAVENOUS PRN
Status: DISCONTINUED | OUTPATIENT
Start: 2023-09-14 | End: 2023-09-15

## 2023-09-14 RX ORDER — BUMETANIDE 0.25 MG/ML
5 INJECTION INTRAMUSCULAR; INTRAVENOUS ONCE
Status: COMPLETED | OUTPATIENT
Start: 2023-09-14 | End: 2023-09-15

## 2023-09-14 RX ORDER — HEPARIN SODIUM 1000 [USP'U]/ML
INJECTION, SOLUTION INTRAVENOUS; SUBCUTANEOUS PRN
Status: DISCONTINUED | OUTPATIENT
Start: 2023-09-14 | End: 2023-09-15

## 2023-09-14 RX ORDER — FENTANYL CITRATE 50 UG/ML
INJECTION, SOLUTION INTRAMUSCULAR; INTRAVENOUS PRN
Status: DISCONTINUED | OUTPATIENT
Start: 2023-09-14 | End: 2023-09-15

## 2023-09-14 RX ORDER — KETAMINE HYDROCHLORIDE 10 MG/ML
INJECTION INTRAMUSCULAR; INTRAVENOUS PRN
Status: DISCONTINUED | OUTPATIENT
Start: 2023-09-14 | End: 2023-09-15

## 2023-09-14 RX ORDER — IBUPROFEN 200 MG
400 TABLET ORAL EVERY 4 HOURS PRN
Status: ON HOLD | COMMUNITY
End: 2023-09-21

## 2023-09-14 RX ORDER — EPHEDRINE SULFATE 50 MG/ML
INJECTION, SOLUTION INTRAMUSCULAR; INTRAVENOUS; SUBCUTANEOUS PRN
Status: DISCONTINUED | OUTPATIENT
Start: 2023-09-14 | End: 2023-09-15

## 2023-09-14 RX ORDER — CALCIUM CHLORIDE 100 MG/ML
INJECTION INTRAVENOUS; INTRAVENTRICULAR PRN
Status: DISCONTINUED | OUTPATIENT
Start: 2023-09-14 | End: 2023-09-15

## 2023-09-14 RX ORDER — DOPAMINE HYDROCHLORIDE 160 MG/100ML
INJECTION, SOLUTION INTRAVENOUS CONTINUOUS PRN
Status: DISCONTINUED | OUTPATIENT
Start: 2023-09-14 | End: 2023-09-15

## 2023-09-14 RX ORDER — MANNITOL 20 G/100ML
INJECTION, SOLUTION INTRAVENOUS PRN
Status: DISCONTINUED | OUTPATIENT
Start: 2023-09-14 | End: 2023-09-15

## 2023-09-14 RX ORDER — PIPERACILLIN SODIUM, TAZOBACTAM SODIUM 3; .375 G/15ML; G/15ML
3.38 INJECTION, POWDER, LYOPHILIZED, FOR SOLUTION INTRAVENOUS ONCE
Status: COMPLETED | OUTPATIENT
Start: 2023-09-14 | End: 2023-09-14

## 2023-09-14 RX ORDER — FUROSEMIDE 10 MG/ML
INJECTION INTRAMUSCULAR; INTRAVENOUS PRN
Status: DISCONTINUED | OUTPATIENT
Start: 2023-09-14 | End: 2023-09-15

## 2023-09-14 RX ORDER — ACETAMINOPHEN 325 MG/1
975 TABLET ORAL ONCE
Status: COMPLETED | OUTPATIENT
Start: 2023-09-14 | End: 2023-09-14

## 2023-09-14 RX ORDER — LIDOCAINE HYDROCHLORIDE 20 MG/ML
INJECTION, SOLUTION INFILTRATION; PERINEURAL PRN
Status: DISCONTINUED | OUTPATIENT
Start: 2023-09-14 | End: 2023-09-15

## 2023-09-14 RX ADMIN — CALCIUM CHLORIDE INJECTION 300 MG: 100 INJECTION, SOLUTION INTRAVENOUS at 22:15

## 2023-09-14 RX ADMIN — PIPERACILLIN AND TAZOBACTAM 3.38 G: 3; .375 INJECTION, POWDER, FOR SOLUTION INTRAVENOUS at 19:32

## 2023-09-14 RX ADMIN — Medication 20 MG: at 21:38

## 2023-09-14 RX ADMIN — Medication 20 MG: at 22:26

## 2023-09-14 RX ADMIN — PHENYLEPHRINE HYDROCHLORIDE 100 MCG: 10 INJECTION INTRAVENOUS at 19:42

## 2023-09-14 RX ADMIN — HEPARIN SODIUM 1000 UNITS: 1000 INJECTION INTRAVENOUS; SUBCUTANEOUS at 22:58

## 2023-09-14 RX ADMIN — EPHEDRINE SULFATE 5 MG: 5 INJECTION INTRAVENOUS at 21:42

## 2023-09-14 RX ADMIN — EPHEDRINE SULFATE 5 MG: 5 INJECTION INTRAVENOUS at 20:19

## 2023-09-14 RX ADMIN — EPHEDRINE SULFATE 5 MG: 5 INJECTION INTRAVENOUS at 19:55

## 2023-09-14 RX ADMIN — HYDROMORPHONE HYDROCHLORIDE 0.5 MG: 1 INJECTION, SOLUTION INTRAMUSCULAR; INTRAVENOUS; SUBCUTANEOUS at 21:56

## 2023-09-14 RX ADMIN — MICAFUNGIN SODIUM 100 MG: 50 INJECTION, POWDER, LYOPHILIZED, FOR SOLUTION INTRAVENOUS at 19:32

## 2023-09-14 RX ADMIN — Medication 10 MG: at 23:30

## 2023-09-14 RX ADMIN — Medication 100 MG: at 19:20

## 2023-09-14 RX ADMIN — SODIUM CHLORIDE 500 MG: 9 INJECTION, SOLUTION INTRAVENOUS at 19:42

## 2023-09-14 RX ADMIN — Medication 30 MG: at 20:39

## 2023-09-14 RX ADMIN — EPHEDRINE SULFATE 5 MG: 5 INJECTION INTRAVENOUS at 21:28

## 2023-09-14 RX ADMIN — PHENYLEPHRINE HYDROCHLORIDE 50 MCG: 10 INJECTION INTRAVENOUS at 23:55

## 2023-09-14 RX ADMIN — MIDAZOLAM 2 MG: 1 INJECTION INTRAMUSCULAR; INTRAVENOUS at 19:05

## 2023-09-14 RX ADMIN — PHENYLEPHRINE HYDROCHLORIDE 100 MCG: 10 INJECTION INTRAVENOUS at 19:35

## 2023-09-14 RX ADMIN — FENTANYL CITRATE 100 MCG: 50 INJECTION, SOLUTION INTRAMUSCULAR; INTRAVENOUS at 19:20

## 2023-09-14 RX ADMIN — NITROGLYCERIN 100 MCG: 10 INJECTION INTRAVENOUS at 23:35

## 2023-09-14 RX ADMIN — SODIUM CHLORIDE, SODIUM GLUCONATE, SODIUM ACETATE, POTASSIUM CHLORIDE AND MAGNESIUM CHLORIDE: 526; 502; 368; 37; 30 INJECTION, SOLUTION INTRAVENOUS at 23:48

## 2023-09-14 RX ADMIN — CALCIUM CHLORIDE INJECTION 300 MG: 100 INJECTION, SOLUTION INTRAVENOUS at 22:45

## 2023-09-14 RX ADMIN — PHENYLEPHRINE HYDROCHLORIDE 50 MCG: 10 INJECTION INTRAVENOUS at 23:48

## 2023-09-14 RX ADMIN — FENTANYL CITRATE 50 MCG: 50 INJECTION, SOLUTION INTRAMUSCULAR; INTRAVENOUS at 22:23

## 2023-09-14 RX ADMIN — OCTREOTIDE ACETATE 100 MCG: 100 INJECTION, SOLUTION INTRAVENOUS; SUBCUTANEOUS at 20:28

## 2023-09-14 RX ADMIN — CALCIUM CHLORIDE INJECTION 400 MG: 100 INJECTION, SOLUTION INTRAVENOUS at 22:56

## 2023-09-14 RX ADMIN — NITROGLYCERIN 50 MCG: 10 INJECTION INTRAVENOUS at 23:32

## 2023-09-14 RX ADMIN — SODIUM CHLORIDE, SODIUM GLUCONATE, SODIUM ACETATE, POTASSIUM CHLORIDE AND MAGNESIUM CHLORIDE: 526; 502; 368; 37; 30 INJECTION, SOLUTION INTRAVENOUS at 19:05

## 2023-09-14 RX ADMIN — FENTANYL CITRATE 50 MCG: 50 INJECTION, SOLUTION INTRAMUSCULAR; INTRAVENOUS at 23:40

## 2023-09-14 RX ADMIN — MYCOPHENOLATE MOFETIL 1000 MG: 500 INJECTION, POWDER, LYOPHILIZED, FOR SOLUTION INTRAVENOUS at 19:58

## 2023-09-14 RX ADMIN — EPHEDRINE SULFATE 5 MG: 5 INJECTION INTRAVENOUS at 23:59

## 2023-09-14 RX ADMIN — ANTI-THYMOCYTE GLOBULIN (RABBIT) 200 MG: 5 INJECTION, POWDER, LYOPHILIZED, FOR SOLUTION INTRAVENOUS at 19:58

## 2023-09-14 RX ADMIN — PROPOFOL 130 MG: 10 INJECTION, EMULSION INTRAVENOUS at 19:20

## 2023-09-14 RX ADMIN — EPHEDRINE SULFATE 5 MG: 5 INJECTION INTRAVENOUS at 20:57

## 2023-09-14 RX ADMIN — NITROGLYCERIN 50 MCG: 10 INJECTION INTRAVENOUS at 22:25

## 2023-09-14 RX ADMIN — ACETAMINOPHEN 975 MG: 325 TABLET, FILM COATED ORAL at 18:55

## 2023-09-14 RX ADMIN — Medication 30 MG: at 20:36

## 2023-09-14 RX ADMIN — INSULIN HUMAN 1 UNITS/HR: 1 INJECTION, SOLUTION INTRAVENOUS at 22:19

## 2023-09-14 RX ADMIN — Medication 10 MG: at 21:34

## 2023-09-14 RX ADMIN — FUROSEMIDE 40 MG: 10 INJECTION, SOLUTION INTRAVENOUS at 23:41

## 2023-09-14 RX ADMIN — LIDOCAINE HYDROCHLORIDE 60 MG: 20 INJECTION, SOLUTION INFILTRATION; PERINEURAL at 19:20

## 2023-09-14 RX ADMIN — DOPAMINE HYDROCHLORIDE 2 MCG/KG/MIN: 160 INJECTION, SOLUTION INTRAVENOUS at 22:59

## 2023-09-14 RX ADMIN — Medication 20 MG: at 23:37

## 2023-09-14 RX ADMIN — Medication 10 MG: at 22:30

## 2023-09-14 RX ADMIN — MANNITOL 12.5 G: 20 INJECTION, SOLUTION INTRAVENOUS at 23:41

## 2023-09-14 RX ADMIN — PHENYLEPHRINE HYDROCHLORIDE 100 MCG: 10 INJECTION INTRAVENOUS at 23:51

## 2023-09-14 RX ADMIN — NITROGLYCERIN 50 MCG: 10 INJECTION INTRAVENOUS at 22:26

## 2023-09-14 RX ADMIN — SODIUM CHLORIDE, SODIUM GLUCONATE, SODIUM ACETATE, POTASSIUM CHLORIDE AND MAGNESIUM CHLORIDE: 526; 502; 368; 37; 30 INJECTION, SOLUTION INTRAVENOUS at 20:30

## 2023-09-14 ASSESSMENT — ACTIVITIES OF DAILY LIVING (ADL)
ADLS_ACUITY_SCORE: 18

## 2023-09-14 NOTE — PROGRESS NOTES
Sister Jasmyn will be pt's family to notify throughout care and will be his ride home upon discharge after inpatient hospitalization.

## 2023-09-14 NOTE — H&P
"[facility]    History and Physical  Solid Organ Transplant     Date of Admission:  2023      Assessment & Plan   Avelina Marion is a 68 year old male with a past medical history significant for end stage kidney disease secondary to type 2 diabetes mellitus, BCC, CAD. On HD MWF via LUE AVF since 2021. Still makes urine. CABG x 2 2023.     -Pixley to outpatient for pre-op work-up  -Pre-op labs, including BMP, CBC, coag panel, viral serologies  -EKG, CXR  -Cardiac clearance: S/p CABG 2023  PAD: CT 23 \"1. Minimal aortoiliac atherosclerotic calcification with sparing of the external iliac arteries. No aneurysmal dilation.\"     Evidence of pulmonary interstitial changes: On  CT c/a/p. Thought to possibly be scarring from prior unknown exposure, per pulmonary visit in 10/2021. PFTs normal. Repeat chest CT 10/18/22 \"Mildly prominent subcarinal lymph node measuring 13 x 12 mm significance uncertain. Other benign-appearing mediastinal lymph nodes.  3. Subpleural fibrotic-type interstitial lung disease.\"  Discussing next steps with team     Code Status   Full Code    Disposition Plan   Expected discharge in 7 days to prior living arrangement once medically stable post transplant.     Entered: Tequila Rodríguez PA-C 2023, 2:32 PM       Tequila Rodríguez PA-C    Attestation: I saw and examined this patient with Tequila Rodríguez PA-C, and the transplant surgery team. I independently reviewed all pertinent laboratory and imaging information and made independent management decisions including immunosuppression adjustment. I agree with the findings and plan as documented in this note.  Tk Ascencio MD    Primary Care Physician   Amy Truong    Chief Complaint    donor kidney pancreas pre-op candidate    History is obtained from the patient and electronic health record    History of Present Illness   Avelina Marion is a 68 " year old male with a past medical history significant for end stage kidney disease secondary to type 2 diabetes mellitus, BCC, CAD. On HD MWF via AVF since April 2021. CABG x 2 2/2023. Has some baseline chest tightness post sternotomy, but denies any acute chest pain or shortness of breath. Uses 10 units of lantus daily. Denies any recent hospitalizations, infections, fevers, cough, colds. Denies neuropathy    The patient is on dialysis. Modality: Hemodialysis, via L AVF   Dialysis days: Monday, Wednesday, Friday.  Last dialysis run: 9/13/23  Does make urine prior to transplantation (still a good amount)  H/o blood transfusion: No    Past Medical History    I have reviewed this patient's medical history and updated it with pertinent information if needed.   Past Medical History:   Diagnosis Date    Anemia in chronic kidney disease     BCC (basal cell carcinoma), face 8/9/2022    Diabetic retinopathy of both eyes (H)     End stage renal disease (H)     Hypertension     Secondary renal hyperparathyroidism (H)     Type 2 diabetes mellitus (H)        Past Surgical History   I have reviewed this patient's surgical history and updated it with pertinent information if needed.  Past Surgical History:   Procedure Laterality Date    BYPASS GRAFT ARTERY CORONARY N/A 2/13/2023    Procedure: TRANSESPHAGEAL ECHOCARDIOGRAM, MEDIAN STERNOTOMY, TAKE DOWN OF LEFT INTERNAL MAMMARY, LEFT ENDOSCOPIC GREATER SAPHENOUS VEIN HARVEST, CRYO NERVE BLOCK, CARDIOPULMONARY BYPASS, CORONARY ARTERY BYPASS GRAFT X 2.;  Surgeon: Patrice Yepez MD;  Location: [facility] OR    CREATE FISTULA ARTERIOVENOUS UPPER EXTREMITY Left     CV CORONARY ANGIOGRAM N/A 12/30/2021    Procedure: CV CORONARY ANGIOGRAM;  Surgeon: Milton Cam MD;  Location:  [facility] CARDIAC CATH LAB    CV CORONARY ANGIOGRAM N/A 2/7/2023    Procedure: Coronary Angiogram;  Surgeon: Milton Cam MD;  Location:  HEART CARDIAC CATH LAB    CV PCI N/A 2/7/2023    Procedure:  Percutaneous Coronary Intervention;  Surgeon: Milton Cam MD;  Location:  HEART CARDIAC CATH LAB       Prior to Admission Medications   Prior to Admission Medications   Prescriptions Last Dose Informant Patient Reported? Taking?   Blood Glucose Monitoring Suppl (ACCU-CHEK GUIDE) w/Device KIT   Yes No   Sig: Use to test 4 times a day. Pharmacy to dispense brand based on insurance.   acetaminophen (TYLENOL) 325 MG tablet   No No   Sig: Take 2 tablets (650 mg) by mouth every 4 hours as needed for pain, headaches or fever (For optimal non-opioid multimodal pain management to improve pain control.)   aspirin (ASA) 81 MG chewable tablet   No No   Si tablets (162 mg) by Oral or NG Tube route daily   atorvastatin (LIPITOR) 40 MG tablet   No No   Sig: Take 1 tablet (40 mg) by mouth At Bedtime   calcium acetate (PHOSLO) 667 MG CAPS capsule   Yes No   Sig: Take 667 mg by mouth 3 times daily (with meals)   calcium carbonate 750 MG CHEW   Yes No   Sig: Take 750 mg by mouth 4 times daily   insulin glargine (LANTUS PEN) 100 UNIT/ML pen   Yes No   Sig: Inject 20 Units Subcutaneous At Bedtime   metoprolol tartrate (LOPRESSOR) 25 MG tablet   No No   Sig: Take 1 tablet (25 mg) by mouth 2 times daily      Facility-Administered Medications: None     Allergies   No Known Allergies    Social History   I have reviewed this patient's social history and updated it with pertinent information if needed. Avelina Marion  reports that he has quit smoking. His smoking use included cigarettes. He has never used smokeless tobacco. He reports that he does not drink alcohol and does not use drugs.    Family History   I have reviewed this patient's family history and updated it with pertinent information if needed.   Family History   Problem Relation Age of Onset    Diabetes Brother     Kidney Disease Brother        Review of Systems   The 10 point Review of Systems is negative other than noted in the HPI or here.    Physical Exam   Temp:  97.6  F (36.4  C) Temp src: Oral BP: (!) 133/92 Pulse: 60   Resp: 18 SpO2: 100 % O2 Device: None (Room air)    Vital Signs with Ranges  Temp:  [97.6  F (36.4  C)] 97.6  F (36.4  C)  Pulse:  [60] 60  Resp:  [18] 18  BP: (133)/(92) 133/92  SpO2:  [100 %] 100 %  202 lbs 8 oz    Constitutional: Awake, alert, cooperative, no apparent distress, and appears stated age.  Eyes: Lids and lashes normal, pupils equal, round, extra ocular muscles intact, sclera clear, conjunctiva normal.  ENT: Normocephalic, without obvious abnormality, atraumatic  Respiratory: Nonlabored resps on RA, clear to auscultation bilaterally  Cardiovascular: Regular rate and rhythm, normal S1 and S2, and no murmur noted.  GI: No scars, soft, non-distended, non-tender  Genitourinary:  Voiding  Skin: No bruising or bleeding, normal skin color, texture, turgor  Musculoskeletal: There is no redness, warmth, or swelling of the joints.    Neurologic: Awake, alert, oriented to name, place and time.   Neuropsychiatric: Calm, normal eye contact, alert, normal affect, oriented to self, place, time and situation, memory for past and recent events intact and thought process normal.    Data   Pending

## 2023-09-14 NOTE — ANESTHESIA PREPROCEDURE EVALUATION
Anesthesia Pre-Procedure Evaluation    Patient: Avelina Marion   MRN: [id number] : 1955        Procedure : Procedure(s):  Transplant pancreas, kidney  donor, combined          Past Medical History:   Diagnosis Date    Anemia in chronic kidney disease     BCC (basal cell carcinoma), face 2022    Diabetic retinopathy of both eyes (H)     End stage renal disease (H)     Hypertension     Secondary renal hyperparathyroidism (H)     Type 2 diabetes mellitus (H)       Past Surgical History:   Procedure Laterality Date    BYPASS GRAFT ARTERY CORONARY N/A 2023    Procedure: TRANSESPHAGEAL ECHOCARDIOGRAM, MEDIAN STERNOTOMY, TAKE DOWN OF LEFT INTERNAL MAMMARY, LEFT ENDOSCOPIC GREATER SAPHENOUS VEIN HARVEST, CRYO NERVE BLOCK, CARDIOPULMONARY BYPASS, CORONARY ARTERY BYPASS GRAFT X 2.;  Surgeon: Patrice Yepez MD;  Location: [facility] OR    CREATE FISTULA ARTERIOVENOUS UPPER EXTREMITY Left     CV CORONARY ANGIOGRAM N/A 2021    Procedure: CV CORONARY ANGIOGRAM;  Surgeon: Milton Cam MD;  Location:  HEART CARDIAC CATH LAB    CV CORONARY ANGIOGRAM N/A 2023    Procedure: Coronary Angiogram;  Surgeon: Milton Cam MD;  Location: [facility] CARDIAC CATH LAB    CV PCI N/A 2023    Procedure: Percutaneous Coronary Intervention;  Surgeon: Milton Cam MD;  Location:  HEART CARDIAC CATH LAB      No Known Allergies   Social History     Tobacco Use    Smoking status: Former     Types: Cigarettes    Smokeless tobacco: Never    Tobacco comments:     Quit    Substance Use Topics    Alcohol use: Never      Wt Readings from Last 1 Encounters:   23 91.9 kg (202 lb 8 oz)        Anesthesia Evaluation   Pt has had prior anesthetic.     No history of anesthetic complications       ROS/MED HX  ENT/Pulmonary:  - neg pulmonary ROS     Neurologic:       Cardiovascular:     (+)  hypertension- -  CAD -  CABG-date: . -                                 Previous cardiac testing   Echo: Date:  Results:  No New echo or cath since his previous CABG  Stress Test:  Date: Results:    ECG Reviewed:  Date: Results:    Cath:  Date: Results:   (-) murmur   METS/Exercise Tolerance: >4 METS    Hematologic:       Musculoskeletal:       GI/Hepatic:       Renal/Genitourinary:     (+) renal disease,  Pt requires dialysis,           Endo:     (+)  type II DM,                    Psychiatric/Substance Use:       Infectious Disease:       Malignancy:       Other:            Physical Exam    Airway        Mallampati: I   TM distance: > 3 FB   Neck ROM: full   Mouth opening: > 3 cm    Respiratory Devices and Support         Dental       (+) Multiple visibly decayed, broken teeth      Cardiovascular          Rhythm and rate: regular and normal (-) no murmur    Pulmonary           breath sounds clear to auscultation           OUTSIDE LABS:  CBC:   Lab Results   Component Value Date    WBC 11.2 (H) 02/20/2023    WBC 9.3 02/19/2023    HGB 9.1 (L) 02/20/2023    HGB 9.0 (L) 02/19/2023    HCT 29.3 (L) 02/20/2023    HCT 27.9 (L) 02/19/2023     02/20/2023     02/19/2023     BMP:   Lab Results   Component Value Date     02/20/2023     (L) 02/19/2023    POTASSIUM 4.2 02/20/2023    POTASSIUM 4.5 02/19/2023    CHLORIDE 103 02/20/2023    CHLORIDE 99 02/19/2023    CO2 19 (L) 02/20/2023    CO2 23 02/19/2023    BUN 50.5 (H) 02/20/2023    BUN 38.2 (H) 02/19/2023    CR 6.75 (H) 02/20/2023    CR 5.63 (H) 02/19/2023     (H) 02/20/2023     (H) 02/20/2023     COAGS:   Lab Results   Component Value Date    PTT 33 02/13/2023    INR 1.33 (H) 02/13/2023     POC: No results found for: BGM, HCG, HCGS  HEPATIC:   Lab Results   Component Value Date    ALBUMIN 2.9 (L) 02/14/2023    PROTTOTAL 4.5 (L) 02/14/2023    ALT 10 02/14/2023    AST 46 02/14/2023    ALKPHOS 35 (L) 02/14/2023    BILITOTAL 0.3 02/14/2023     OTHER:   Lab Results   Component Value Date    PH 7.39 02/14/2023    LACT 1.4 02/14/2023    A1C 7.1 (H)  02/07/2023    DAVID 7.7 (L) 02/20/2023    PHOS 2.9 02/18/2023    MAG 1.8 02/18/2023    LIPASE 38 08/09/2022    AMYLASE 65 08/09/2022    CRP <2.9 10/29/2021    SED 17 10/29/2021       Anesthesia Plan    ASA Status:  3    NPO Status:  NPO Appropriate    Anesthesia Type: General.     - Airway: ETT   Induction: Intravenous.   Maintenance: Balanced.   Techniques and Equipment:     - Lines/Monitors: 2nd IV, Central Line, Arterial Line     Consents    Anesthesia Plan(s) and associated risks, benefits, and realistic alternatives discussed. Questions answered and patient/representative(s) expressed understanding.     - Discussed: Risks, Benefits and Alternatives for BOTH SEDATION and the PROCEDURE were discussed     - Discussed with:       - Extended Intubation/Ventilatory Support Discussed: Yes.      - Patient is DNR/DNI Status: No     Use of blood products discussed: Yes.     - Discussed with: Patient.     - Consented: consented to blood products            Reason for refusal: other.     Postoperative Care    Pain management: IV analgesics, Oral pain medications.   PONV prophylaxis: Ondansetron (or other 5HT-3), Dexamethasone or Solumedrol     Comments:                Panda Moise MD

## 2023-09-14 NOTE — PHARMACY-ADMISSION MEDICATION HISTORY
Pharmacist Admission Medication History    Admission medication history is complete. The information provided in this note is only as accurate as the sources available at the time of the update.    Medication reconciliation/reorder completed by provider prior to medication history? No    Information Source(s): Patient via in-person  Reviewed surescripts dispense history    Pertinent Information: Mr Marion was unable to recall names or doses of medications without prompting. He was aware of what they looked like and frequency at which he takes them each day.    Changes made to PTA medication list:  Added:   ibuprofen  Deleted: None  Changed:   Insulin glargine 20 units daily at bedtime changed to 10 units daily at bedtime    Medication Affordability: no issues     Allergies reviewed with patient and updates made in EHR: yes    Medication History Completed By: Gloria Maciel, PharmLuisD., BCPS, [facility]  Pager [phone]   9/14/2023 5:52 PM    Prior to Admission medications    Medication Sig Last Dose Taking? Auth Provider Long Term End Date   aspirin (ASA) 81 MG chewable tablet 2 tablets (162 mg) by Oral or NG Tube route daily 9/11/2023 Yes Fabiana Jha PA-C No    atorvastatin (LIPITOR) 40 MG tablet Take 1 tablet (40 mg) by mouth At Bedtime 9/13/2023 Yes Fabiana Jha PA-C Yes    calcium acetate (PHOSLO) 667 MG CAPS capsule Take 667 mg by mouth 3 times daily (with meals) 9/13/2023 at 8pm Yes Reported, Patient     calcium carbonate 750 MG CHEW Take 1,500 mg by mouth 2 times daily 9/14/2023 at am Yes Reported, Patient     ibuprofen (ADVIL/MOTRIN) 200 MG tablet Take 400 mg by mouth every 4 hours as needed for pain Past Week Yes Unknown, Entered By History     insulin glargine (LANTUS PEN) 100 UNIT/ML pen Inject 10 Units Subcutaneous At Bedtime 9/13/2023 at pm Yes Reported, Patient Yes    metoprolol tartrate (LOPRESSOR) 25 MG tablet Take 1 tablet (25 mg) by mouth 2 times daily 9/14/2023 at am Yes Fabiana Jha PA-C  Yes    acetaminophen (TYLENOL) 325 MG tablet Take 2 tablets (650 mg) by mouth every 4 hours as needed for pain, headaches or fever (For optimal non-opioid multimodal pain management to improve pain control.)  at never Doesn't take Fabiana Jha PA-C     Blood Glucose Monitoring Suppl (ACCU-CHEK GUIDE) w/Device KIT Use to test 4 times a day. Pharmacy to dispense brand based on insurance.   Reported, Patient

## 2023-09-14 NOTE — PROGRESS NOTES
Nursing Focus: Admission    D: Patient admitted from home for kidney and pancreas transplant.    I: Upon arrival to the unit patient was oriented to room, unit, and call light. Patient s height, weight, and vital signs were obtained. Allergies reviewed and allergy band applied. MD notified of patient s arrival on the unit. Adult AVS completed. Education assessment completed    A: Vital signs stable upon admission. Patient rates pain at zero.     P: Transplant this evening.

## 2023-09-15 ENCOUNTER — APPOINTMENT (OUTPATIENT)
Dept: GENERAL RADIOLOGY | Facility: CLINIC | Age: 68
DRG: 008 | End: 2023-09-15
Attending: PHYSICIAN ASSISTANT
Payer: COMMERCIAL

## 2023-09-15 ENCOUNTER — TELEPHONE (OUTPATIENT)
Dept: TRANSPLANT | Facility: CLINIC | Age: 68
End: 2023-09-15
Payer: COMMERCIAL

## 2023-09-15 ENCOUNTER — APPOINTMENT (OUTPATIENT)
Dept: ULTRASOUND IMAGING | Facility: CLINIC | Age: 68
DRG: 008 | End: 2023-09-15
Attending: SURGERY
Payer: COMMERCIAL

## 2023-09-15 ENCOUNTER — APPOINTMENT (OUTPATIENT)
Dept: GENERAL RADIOLOGY | Facility: CLINIC | Age: 68
DRG: 008 | End: 2023-09-15
Attending: SURGERY
Payer: COMMERCIAL

## 2023-09-15 ENCOUNTER — DOCUMENTATION ONLY (OUTPATIENT)
Dept: TRANSPLANT | Facility: CLINIC | Age: 68
End: 2023-09-15

## 2023-09-15 ENCOUNTER — APPOINTMENT (OUTPATIENT)
Dept: ULTRASOUND IMAGING | Facility: CLINIC | Age: 68
DRG: 008 | End: 2023-09-15
Attending: PHYSICIAN ASSISTANT
Payer: COMMERCIAL

## 2023-09-15 PROBLEM — Z94.83 HISTORY OF SIMULTANEOUS KIDNEY AND PANCREAS TRANSPLANT (H): Status: ACTIVE | Noted: 2023-09-15

## 2023-09-15 PROBLEM — Z94.0 HISTORY OF SIMULTANEOUS KIDNEY AND PANCREAS TRANSPLANT (H): Status: ACTIVE | Noted: 2023-09-15

## 2023-09-15 LAB
AMYLASE SERPL-CCNC: 91 U/L (ref 28–100)
ANION GAP SERPL CALCULATED.3IONS-SCNC: 20 MMOL/L (ref 7–15)
APTT PPP: 26 SECONDS (ref 22–38)
ATRIAL RATE - MUSE: 60 BPM
BASE EXCESS BLDA CALC-SCNC: -1.2 MMOL/L (ref -9.6–2)
BASE EXCESS BLDA CALC-SCNC: -2 MMOL/L (ref -9.6–2)
BASE EXCESS BLDA CALC-SCNC: -2.9 MMOL/L (ref -9.6–2)
BASOPHILS # BLD AUTO: 0 10E3/UL (ref 0–0.2)
BASOPHILS NFR BLD AUTO: 0 %
BUN SERPL-MCNC: 44.5 MG/DL (ref 8–23)
CA-I BLD-MCNC: 4.3 MG/DL (ref 4.4–5.2)
CALCIUM SERPL-MCNC: 8.6 MG/DL (ref 8.8–10.2)
CHLORIDE SERPL-SCNC: 97 MMOL/L (ref 98–107)
CMV IGG SERPL IA-ACNC: <0.2 U/ML
CMV IGG SERPL IA-ACNC: NORMAL
CMV IGM SERPL IA-ACNC: <8 AU/ML
CMV IGM SERPL IA-ACNC: NEGATIVE
CREAT SERPL-MCNC: 6.29 MG/DL (ref 0.67–1.17)
DEPRECATED HCO3 PLAS-SCNC: 19 MMOL/L (ref 22–29)
DIASTOLIC BLOOD PRESSURE - MUSE: NORMAL MMHG
DONOR IDENTIFICATION: NORMAL
DSA COMMENTS: NORMAL
DSA PRESENT: NO
DSA TEST METHOD: NORMAL
EBV VCA IGG SER IA-ACNC: 67 U/ML
EBV VCA IGG SER IA-ACNC: POSITIVE
EBV VCA IGM SER IA-ACNC: <10 U/ML
EBV VCA IGM SER IA-ACNC: NORMAL
EGFRCR SERPLBLD CKD-EPI 2021: 9 ML/MIN/1.73M2
EOSINOPHIL # BLD AUTO: 0 10E3/UL (ref 0–0.7)
EOSINOPHIL NFR BLD AUTO: 0 %
ERYTHROCYTE [DISTWIDTH] IN BLOOD BY AUTOMATED COUNT: 13.9 % (ref 10–15)
GLUCOSE BLD-MCNC: 103 MG/DL (ref 70–99)
GLUCOSE BLD-MCNC: 107 MG/DL (ref 70–99)
GLUCOSE BLD-MCNC: 173 MG/DL (ref 70–99)
GLUCOSE BLDC GLUCOMTR-MCNC: 101 MG/DL (ref 70–99)
GLUCOSE BLDC GLUCOMTR-MCNC: 101 MG/DL (ref 70–99)
GLUCOSE BLDC GLUCOMTR-MCNC: 105 MG/DL (ref 70–99)
GLUCOSE BLDC GLUCOMTR-MCNC: 113 MG/DL (ref 70–99)
GLUCOSE BLDC GLUCOMTR-MCNC: 114 MG/DL (ref 70–99)
GLUCOSE BLDC GLUCOMTR-MCNC: 116 MG/DL (ref 70–99)
GLUCOSE BLDC GLUCOMTR-MCNC: 118 MG/DL (ref 70–99)
GLUCOSE BLDC GLUCOMTR-MCNC: 119 MG/DL (ref 70–99)
GLUCOSE BLDC GLUCOMTR-MCNC: 121 MG/DL (ref 70–99)
GLUCOSE BLDC GLUCOMTR-MCNC: 123 MG/DL (ref 70–99)
GLUCOSE BLDC GLUCOMTR-MCNC: 128 MG/DL (ref 70–99)
GLUCOSE BLDC GLUCOMTR-MCNC: 146 MG/DL (ref 70–99)
GLUCOSE BLDC GLUCOMTR-MCNC: 148 MG/DL (ref 70–99)
GLUCOSE BLDC GLUCOMTR-MCNC: 149 MG/DL (ref 70–99)
GLUCOSE BLDC GLUCOMTR-MCNC: 154 MG/DL (ref 70–99)
GLUCOSE BLDC GLUCOMTR-MCNC: 155 MG/DL (ref 70–99)
GLUCOSE BLDC GLUCOMTR-MCNC: 157 MG/DL (ref 70–99)
GLUCOSE BLDC GLUCOMTR-MCNC: 162 MG/DL (ref 70–99)
GLUCOSE BLDC GLUCOMTR-MCNC: 175 MG/DL (ref 70–99)
GLUCOSE BLDC GLUCOMTR-MCNC: 189 MG/DL (ref 70–99)
GLUCOSE SERPL-MCNC: 120 MG/DL (ref 70–99)
HBA1C MFR BLD: 8.4 %
HCO3 BLDA-SCNC: 22 MMOL/L (ref 21–28)
HCO3 BLDA-SCNC: 22 MMOL/L (ref 21–28)
HCO3 BLDA-SCNC: 24 MMOL/L (ref 21–28)
HCT VFR BLD AUTO: 37 % (ref 40–53)
HGB BLD-MCNC: 10.2 G/DL (ref 13.3–17.7)
HGB BLD-MCNC: 11.1 G/DL (ref 13.3–17.7)
HGB BLD-MCNC: 11.9 G/DL (ref 13.3–17.7)
HGB BLD-MCNC: 11.9 G/DL (ref 13.3–17.7)
HGB BLD-MCNC: 12 G/DL (ref 13.3–17.7)
HGB BLD-MCNC: 12.4 G/DL (ref 13.3–17.7)
HGB BLD-MCNC: 12.6 G/DL (ref 13.3–17.7)
IMM GRANULOCYTES # BLD: 0.1 10E3/UL
IMM GRANULOCYTES NFR BLD: 1 %
INR PPP: 1.11 (ref 0.85–1.15)
INTERPRETATION ECG - MUSE: NORMAL
LACTATE BLD-SCNC: 1.1 MMOL/L
LACTATE BLD-SCNC: 2.5 MMOL/L
LACTATE BLD-SCNC: 2.5 MMOL/L
LIPASE SERPL-CCNC: 104 U/L (ref 13–60)
LYMPHOCYTES # BLD AUTO: 0.2 10E3/UL (ref 0.8–5.3)
LYMPHOCYTES NFR BLD AUTO: 1 %
MAGNESIUM SERPL-MCNC: 2.1 MG/DL (ref 1.7–2.3)
MCH RBC QN AUTO: 33.9 PG (ref 26.5–33)
MCHC RBC AUTO-ENTMCNC: 34.1 G/DL (ref 31.5–36.5)
MCV RBC AUTO: 100 FL (ref 78–100)
MONOCYTES # BLD AUTO: 1.4 10E3/UL (ref 0–1.3)
MONOCYTES NFR BLD AUTO: 7 %
NEUTROPHILS # BLD AUTO: 17.3 10E3/UL (ref 1.6–8.3)
NEUTROPHILS NFR BLD AUTO: 91 %
NRBC # BLD AUTO: 0 10E3/UL
NRBC BLD AUTO-RTO: 0 /100
O2/TOTAL GAS SETTING VFR VENT: 38 %
O2/TOTAL GAS SETTING VFR VENT: 41 %
O2/TOTAL GAS SETTING VFR VENT: 42 %
ORGAN: NORMAL
P AXIS - MUSE: 7 DEGREES
PCO2 BLDA: 33 MM HG (ref 35–45)
PCO2 BLDA: 39 MM HG (ref 35–45)
PCO2 BLDA: 40 MM HG (ref 35–45)
PH BLDA: 7.36 [PH] (ref 7.35–7.45)
PH BLDA: 7.39 [PH] (ref 7.35–7.45)
PH BLDA: 7.42 [PH] (ref 7.35–7.45)
PHOSPHATE SERPL-MCNC: 3.3 MG/DL (ref 2.5–4.5)
PLATELET # BLD AUTO: 175 10E3/UL (ref 150–450)
PO2 BLDA: 107 MM HG (ref 80–105)
PO2 BLDA: 111 MM HG (ref 80–105)
PO2 BLDA: 111 MM HG (ref 80–105)
POTASSIUM BLD-SCNC: 4 MMOL/L (ref 3.5–5)
POTASSIUM BLD-SCNC: 4 MMOL/L (ref 3.5–5)
POTASSIUM BLD-SCNC: 4.4 MMOL/L (ref 3.5–5)
POTASSIUM SERPL-SCNC: 4.1 MMOL/L (ref 3.4–5.3)
POTASSIUM SERPL-SCNC: 4.2 MMOL/L (ref 3.4–5.3)
POTASSIUM SERPL-SCNC: 4.3 MMOL/L (ref 3.4–5.3)
POTASSIUM SERPL-SCNC: 4.5 MMOL/L (ref 3.4–5.3)
PR INTERVAL - MUSE: 152 MS
QRS DURATION - MUSE: 88 MS
QT - MUSE: 446 MS
QTC - MUSE: 446 MS
R AXIS - MUSE: 1 DEGREES
RADIOLOGIST FLAGS: ABNORMAL
RADIOLOGIST FLAGS: ABNORMAL
RBC # BLD AUTO: 3.72 10E6/UL (ref 4.4–5.9)
SA 1 CELL: NORMAL
SA 1 TEST METHOD: NORMAL
SA 2 CELL: NORMAL
SA 2 TEST METHOD: NORMAL
SA1 HI RISK ABY: NORMAL
SA1 MOD RISK ABY: NORMAL
SA2 HI RISK ABY: NORMAL
SA2 MOD RISK ABY: NORMAL
SODIUM BLD-SCNC: 134 MMOL/L (ref 133–144)
SODIUM BLD-SCNC: 135 MMOL/L (ref 133–144)
SODIUM BLD-SCNC: 137 MMOL/L (ref 133–144)
SODIUM SERPL-SCNC: 136 MMOL/L (ref 136–145)
SYSTOLIC BLOOD PRESSURE - MUSE: NORMAL MMHG
T AXIS - MUSE: 63 DEGREES
UNACCEPTABLE ANTIGENS: NORMAL
UNOS CPRA: 23
VENTRICULAR RATE- MUSE: 60 BPM
WBC # BLD AUTO: 19.1 10E3/UL (ref 4–11)
ZZZSA 1  COMMENTS: NORMAL
ZZZSA 2 COMMENTS: NORMAL

## 2023-09-15 PROCEDURE — 258N000003 HC RX IP 258 OP 636: Performed by: PHYSICIAN ASSISTANT

## 2023-09-15 PROCEDURE — 250N000013 HC RX MED GY IP 250 OP 250 PS 637: Performed by: SURGERY

## 2023-09-15 PROCEDURE — 258N000003 HC RX IP 258 OP 636: Performed by: STUDENT IN AN ORGANIZED HEALTH CARE EDUCATION/TRAINING PROGRAM

## 2023-09-15 PROCEDURE — 84132 ASSAY OF SERUM POTASSIUM: CPT | Performed by: SURGERY

## 2023-09-15 PROCEDURE — 82803 BLOOD GASES ANY COMBINATION: CPT

## 2023-09-15 PROCEDURE — 85610 PROTHROMBIN TIME: CPT | Performed by: SURGERY

## 2023-09-15 PROCEDURE — 999N000065 XR CHEST PORT 1 VIEW

## 2023-09-15 PROCEDURE — 82150 ASSAY OF AMYLASE: CPT | Performed by: SURGERY

## 2023-09-15 PROCEDURE — 250N000011 HC RX IP 250 OP 636: Performed by: PHYSICIAN ASSISTANT

## 2023-09-15 PROCEDURE — 36415 COLL VENOUS BLD VENIPUNCTURE: CPT | Performed by: SURGERY

## 2023-09-15 PROCEDURE — 250N000009 HC RX 250

## 2023-09-15 PROCEDURE — 258N000003 HC RX IP 258 OP 636: Performed by: SURGERY

## 2023-09-15 PROCEDURE — 250N000012 HC RX MED GY IP 250 OP 636 PS 637: Mod: JZ | Performed by: SURGERY

## 2023-09-15 PROCEDURE — 812N000002 HC ACQUISITION KIDNEY CADAVER OF SPK

## 2023-09-15 PROCEDURE — 36592 COLLECT BLOOD FROM PICC: CPT | Performed by: SURGERY

## 2023-09-15 PROCEDURE — 120N000011 HC R&B TRANSPLANT UMMC

## 2023-09-15 PROCEDURE — 71045 X-RAY EXAM CHEST 1 VIEW: CPT | Mod: 26 | Performed by: RADIOLOGY

## 2023-09-15 PROCEDURE — 258N000002 HC RX IP 258 OP 250: Performed by: SURGERY

## 2023-09-15 PROCEDURE — 82962 GLUCOSE BLOOD TEST: CPT

## 2023-09-15 PROCEDURE — 84100 ASSAY OF PHOSPHORUS: CPT | Performed by: SURGERY

## 2023-09-15 PROCEDURE — 99223 1ST HOSP IP/OBS HIGH 75: CPT | Mod: FS

## 2023-09-15 PROCEDURE — 93975 VASCULAR STUDY: CPT

## 2023-09-15 PROCEDURE — 250N000011 HC RX IP 250 OP 636

## 2023-09-15 PROCEDURE — 93975 VASCULAR STUDY: CPT | Mod: XS

## 2023-09-15 PROCEDURE — 250N000011 HC RX IP 250 OP 636: Performed by: STUDENT IN AN ORGANIZED HEALTH CARE EDUCATION/TRAINING PROGRAM

## 2023-09-15 PROCEDURE — 83735 ASSAY OF MAGNESIUM: CPT | Performed by: SURGERY

## 2023-09-15 PROCEDURE — 88302 TISSUE EXAM BY PATHOLOGIST: CPT | Mod: TC | Performed by: SURGERY

## 2023-09-15 PROCEDURE — 250N000009 HC RX 250: Performed by: SURGERY

## 2023-09-15 PROCEDURE — 85730 THROMBOPLASTIN TIME PARTIAL: CPT | Performed by: SURGERY

## 2023-09-15 PROCEDURE — 250N000011 HC RX IP 250 OP 636: Mod: JZ | Performed by: SURGERY

## 2023-09-15 PROCEDURE — 76776 US EXAM K TRANSPL W/DOPPLER: CPT

## 2023-09-15 PROCEDURE — 250N000009 HC RX 250: Performed by: STUDENT IN AN ORGANIZED HEALTH CARE EDUCATION/TRAINING PROGRAM

## 2023-09-15 PROCEDURE — 85018 HEMOGLOBIN: CPT | Performed by: SURGERY

## 2023-09-15 PROCEDURE — 93975 VASCULAR STUDY: CPT | Mod: 26 | Performed by: RADIOLOGY

## 2023-09-15 PROCEDURE — 250N000013 HC RX MED GY IP 250 OP 250 PS 637: Performed by: PHYSICIAN ASSISTANT

## 2023-09-15 PROCEDURE — 83690 ASSAY OF LIPASE: CPT | Performed by: SURGERY

## 2023-09-15 PROCEDURE — 3E043XZ INTRODUCTION OF VASOPRESSOR INTO CENTRAL VEIN, PERCUTANEOUS APPROACH: ICD-10-PCS | Performed by: SURGERY

## 2023-09-15 PROCEDURE — 74018 RADEX ABDOMEN 1 VIEW: CPT | Mod: 26 | Performed by: RADIOLOGY

## 2023-09-15 PROCEDURE — 85025 COMPLETE CBC W/AUTO DIFF WBC: CPT | Performed by: SURGERY

## 2023-09-15 PROCEDURE — 250N000012 HC RX MED GY IP 250 OP 636 PS 637: Performed by: PHYSICIAN ASSISTANT

## 2023-09-15 PROCEDURE — 83036 HEMOGLOBIN GLYCOSYLATED A1C: CPT | Performed by: SURGERY

## 2023-09-15 PROCEDURE — 76776 US EXAM K TRANSPL W/DOPPLER: CPT | Mod: 26 | Performed by: RADIOLOGY

## 2023-09-15 PROCEDURE — 999N000063 XR ABDOMEN PORT 1 VIEW

## 2023-09-15 PROCEDURE — 84132 ASSAY OF SERUM POTASSIUM: CPT

## 2023-09-15 RX ORDER — HYDRALAZINE HYDROCHLORIDE 20 MG/ML
2.5-5 INJECTION INTRAMUSCULAR; INTRAVENOUS EVERY 10 MIN PRN
Status: DISCONTINUED | OUTPATIENT
Start: 2023-09-15 | End: 2023-09-15 | Stop reason: HOSPADM

## 2023-09-15 RX ORDER — AMOXICILLIN 250 MG
1 CAPSULE ORAL 2 TIMES DAILY
Status: DISCONTINUED | OUTPATIENT
Start: 2023-09-18 | End: 2023-09-21 | Stop reason: HOSPADM

## 2023-09-15 RX ORDER — ONDANSETRON 4 MG/1
4 TABLET, ORALLY DISINTEGRATING ORAL EVERY 30 MIN PRN
Status: DISCONTINUED | OUTPATIENT
Start: 2023-09-15 | End: 2023-09-15 | Stop reason: HOSPADM

## 2023-09-15 RX ORDER — CLOTRIMAZOLE 10 MG/1
10 LOZENGE ORAL 4 TIMES DAILY
Status: DISCONTINUED | OUTPATIENT
Start: 2023-09-22 | End: 2023-09-21 | Stop reason: HOSPADM

## 2023-09-15 RX ORDER — FAMOTIDINE 20 MG/1
20 TABLET, FILM COATED ORAL 2 TIMES DAILY
Status: DISCONTINUED | OUTPATIENT
Start: 2023-09-15 | End: 2023-09-15

## 2023-09-15 RX ORDER — HYDROMORPHONE HYDROCHLORIDE 1 MG/ML
0.4 INJECTION, SOLUTION INTRAMUSCULAR; INTRAVENOUS; SUBCUTANEOUS EVERY 5 MIN PRN
Status: DISCONTINUED | OUTPATIENT
Start: 2023-09-15 | End: 2023-09-15 | Stop reason: HOSPADM

## 2023-09-15 RX ORDER — HEPARIN SODIUM 1000 [USP'U]/ML
INJECTION, SOLUTION INTRAVENOUS; SUBCUTANEOUS PRN
Status: DISCONTINUED | OUTPATIENT
Start: 2023-09-15 | End: 2023-09-15 | Stop reason: HOSPADM

## 2023-09-15 RX ORDER — NALOXONE HYDROCHLORIDE 0.4 MG/ML
0.4 INJECTION, SOLUTION INTRAMUSCULAR; INTRAVENOUS; SUBCUTANEOUS
Status: DISCONTINUED | OUTPATIENT
Start: 2023-09-15 | End: 2023-09-21 | Stop reason: HOSPADM

## 2023-09-15 RX ORDER — BISACODYL 10 MG
10 SUPPOSITORY, RECTAL RECTAL DAILY PRN
Status: DISCONTINUED | OUTPATIENT
Start: 2023-09-15 | End: 2023-09-21 | Stop reason: HOSPADM

## 2023-09-15 RX ORDER — POLYETHYLENE GLYCOL 3350 17 G/17G
17 POWDER, FOR SOLUTION ORAL DAILY
Status: DISCONTINUED | OUTPATIENT
Start: 2023-09-16 | End: 2023-09-20

## 2023-09-15 RX ORDER — SODIUM CHLORIDE, SODIUM LACTATE, POTASSIUM CHLORIDE, CALCIUM CHLORIDE 600; 310; 30; 20 MG/100ML; MG/100ML; MG/100ML; MG/100ML
INJECTION, SOLUTION INTRAVENOUS CONTINUOUS
Status: DISCONTINUED | OUTPATIENT
Start: 2023-09-15 | End: 2023-09-15 | Stop reason: HOSPADM

## 2023-09-15 RX ORDER — PIPERACILLIN SODIUM, TAZOBACTAM SODIUM 2; .25 G/10ML; G/10ML
2.25 INJECTION, POWDER, LYOPHILIZED, FOR SOLUTION INTRAVENOUS EVERY 8 HOURS
Status: COMPLETED | OUTPATIENT
Start: 2023-09-15 | End: 2023-09-18

## 2023-09-15 RX ORDER — NALOXONE HYDROCHLORIDE 0.4 MG/ML
0.2 INJECTION, SOLUTION INTRAMUSCULAR; INTRAVENOUS; SUBCUTANEOUS
Status: DISCONTINUED | OUTPATIENT
Start: 2023-09-15 | End: 2023-09-21 | Stop reason: HOSPADM

## 2023-09-15 RX ORDER — DEXTROSE MONOHYDRATE 100 MG/ML
INJECTION, SOLUTION INTRAVENOUS CONTINUOUS PRN
Status: DISCONTINUED | OUTPATIENT
Start: 2023-09-15 | End: 2023-09-21 | Stop reason: HOSPADM

## 2023-09-15 RX ORDER — HYDROMORPHONE HCL IN WATER/PF 6 MG/30 ML
0.1 PATIENT CONTROLLED ANALGESIA SYRINGE INTRAVENOUS
Status: DISCONTINUED | OUTPATIENT
Start: 2023-09-15 | End: 2023-09-18

## 2023-09-15 RX ORDER — METHOCARBAMOL 500 MG/1
500 TABLET, FILM COATED ORAL EVERY 6 HOURS PRN
Status: DISCONTINUED | OUTPATIENT
Start: 2023-09-15 | End: 2023-09-21 | Stop reason: HOSPADM

## 2023-09-15 RX ORDER — ACETAMINOPHEN 325 MG/1
650 TABLET ORAL ONCE
Status: COMPLETED | OUTPATIENT
Start: 2023-09-15 | End: 2023-09-15

## 2023-09-15 RX ORDER — FAMOTIDINE 10 MG
10 TABLET ORAL EVERY 24 HOURS
Status: DISCONTINUED | OUTPATIENT
Start: 2023-09-15 | End: 2023-09-21 | Stop reason: HOSPADM

## 2023-09-15 RX ORDER — FENTANYL CITRATE 50 UG/ML
25 INJECTION, SOLUTION INTRAMUSCULAR; INTRAVENOUS EVERY 5 MIN PRN
Status: DISCONTINUED | OUTPATIENT
Start: 2023-09-15 | End: 2023-09-15 | Stop reason: HOSPADM

## 2023-09-15 RX ORDER — DEXTROSE MONOHYDRATE 25 G/50ML
25-50 INJECTION, SOLUTION INTRAVENOUS
Status: DISCONTINUED | OUTPATIENT
Start: 2023-09-15 | End: 2023-09-21 | Stop reason: HOSPADM

## 2023-09-15 RX ORDER — NICOTINE POLACRILEX 4 MG
15-30 LOZENGE BUCCAL
Status: DISCONTINUED | OUTPATIENT
Start: 2023-09-15 | End: 2023-09-21 | Stop reason: HOSPADM

## 2023-09-15 RX ORDER — HYDROMORPHONE HYDROCHLORIDE 1 MG/ML
0.2 INJECTION, SOLUTION INTRAMUSCULAR; INTRAVENOUS; SUBCUTANEOUS EVERY 5 MIN PRN
Status: DISCONTINUED | OUTPATIENT
Start: 2023-09-15 | End: 2023-09-15 | Stop reason: HOSPADM

## 2023-09-15 RX ORDER — ONDANSETRON 2 MG/ML
INJECTION INTRAMUSCULAR; INTRAVENOUS PRN
Status: DISCONTINUED | OUTPATIENT
Start: 2023-09-15 | End: 2023-09-15

## 2023-09-15 RX ORDER — VALGANCICLOVIR 450 MG/1
450 TABLET, FILM COATED ORAL
Status: DISCONTINUED | OUTPATIENT
Start: 2023-09-18 | End: 2023-09-19

## 2023-09-15 RX ORDER — PROCHLORPERAZINE MALEATE 5 MG
5 TABLET ORAL EVERY 6 HOURS PRN
Status: DISCONTINUED | OUTPATIENT
Start: 2023-09-15 | End: 2023-09-21 | Stop reason: HOSPADM

## 2023-09-15 RX ORDER — FENTANYL CITRATE 50 UG/ML
50 INJECTION, SOLUTION INTRAMUSCULAR; INTRAVENOUS EVERY 5 MIN PRN
Status: DISCONTINUED | OUTPATIENT
Start: 2023-09-15 | End: 2023-09-15 | Stop reason: HOSPADM

## 2023-09-15 RX ORDER — ONDANSETRON 2 MG/ML
4 INJECTION INTRAMUSCULAR; INTRAVENOUS EVERY 30 MIN PRN
Status: DISCONTINUED | OUTPATIENT
Start: 2023-09-15 | End: 2023-09-15 | Stop reason: HOSPADM

## 2023-09-15 RX ORDER — VASOPRESSIN IN 0.9 % NACL 2 UNIT/2ML
SYRINGE (ML) INTRAVENOUS PRN
Status: DISCONTINUED | OUTPATIENT
Start: 2023-09-15 | End: 2023-09-15

## 2023-09-15 RX ORDER — ACETAMINOPHEN 325 MG/1
975 TABLET ORAL EVERY 8 HOURS
Status: COMPLETED | OUTPATIENT
Start: 2023-09-18 | End: 2023-09-21

## 2023-09-15 RX ORDER — MYCOPHENOLATE MOFETIL 200 MG/ML
1000 POWDER, FOR SUSPENSION ORAL 2 TIMES DAILY
Status: DISCONTINUED | OUTPATIENT
Start: 2023-09-15 | End: 2023-09-15

## 2023-09-15 RX ORDER — MYCOPHENOLATE MOFETIL 200 MG/ML
1000 POWDER, FOR SUSPENSION ORAL
Status: DISCONTINUED | OUTPATIENT
Start: 2023-09-15 | End: 2023-09-18

## 2023-09-15 RX ORDER — HYDROMORPHONE HCL IN WATER/PF 6 MG/30 ML
0.2 PATIENT CONTROLLED ANALGESIA SYRINGE INTRAVENOUS
Status: DISCONTINUED | OUTPATIENT
Start: 2023-09-15 | End: 2023-09-18

## 2023-09-15 RX ORDER — METOPROLOL TARTRATE 1 MG/ML
1-2 INJECTION, SOLUTION INTRAVENOUS EVERY 5 MIN PRN
Status: DISCONTINUED | OUTPATIENT
Start: 2023-09-15 | End: 2023-09-15 | Stop reason: HOSPADM

## 2023-09-15 RX ORDER — ONDANSETRON 2 MG/ML
4 INJECTION INTRAMUSCULAR; INTRAVENOUS EVERY 6 HOURS PRN
Status: DISCONTINUED | OUTPATIENT
Start: 2023-09-15 | End: 2023-09-21 | Stop reason: HOSPADM

## 2023-09-15 RX ORDER — OCTREOTIDE ACETATE 100 UG/ML
100 INJECTION, SOLUTION INTRAVENOUS; SUBCUTANEOUS EVERY 12 HOURS
Status: DISCONTINUED | OUTPATIENT
Start: 2023-09-15 | End: 2023-09-15

## 2023-09-15 RX ORDER — MAGNESIUM OXIDE 400 MG/1
400 TABLET ORAL EVERY 24 HOURS
Status: DISCONTINUED | OUTPATIENT
Start: 2023-09-17 | End: 2023-09-21 | Stop reason: HOSPADM

## 2023-09-15 RX ORDER — PAPAVERINE HYDROCHLORIDE 30 MG/ML
INJECTION INTRAMUSCULAR; INTRAVENOUS PRN
Status: DISCONTINUED | OUTPATIENT
Start: 2023-09-15 | End: 2023-09-15 | Stop reason: HOSPADM

## 2023-09-15 RX ORDER — SULFAMETHOXAZOLE AND TRIMETHOPRIM 200; 40 MG/5ML; MG/5ML
10 SUSPENSION ORAL
Status: DISCONTINUED | OUTPATIENT
Start: 2023-09-16 | End: 2023-09-18

## 2023-09-15 RX ORDER — ACETAMINOPHEN 325 MG/1
650 TABLET ORAL EVERY 4 HOURS PRN
Status: DISCONTINUED | OUTPATIENT
Start: 2023-09-18 | End: 2023-09-21 | Stop reason: HOSPADM

## 2023-09-15 RX ORDER — HEPARIN SODIUM 10000 [USP'U]/100ML
200 INJECTION, SOLUTION INTRAVENOUS CONTINUOUS
Status: DISCONTINUED | OUTPATIENT
Start: 2023-09-15 | End: 2023-09-16

## 2023-09-15 RX ORDER — ASPIRIN 325 MG
325 TABLET ORAL DAILY
Status: DISCONTINUED | OUTPATIENT
Start: 2023-09-15 | End: 2023-09-21 | Stop reason: HOSPADM

## 2023-09-15 RX ORDER — ONDANSETRON 4 MG/1
4 TABLET, ORALLY DISINTEGRATING ORAL EVERY 6 HOURS PRN
Status: DISCONTINUED | OUTPATIENT
Start: 2023-09-15 | End: 2023-09-21 | Stop reason: HOSPADM

## 2023-09-15 RX ORDER — FUROSEMIDE 10 MG/ML
80 INJECTION INTRAMUSCULAR; INTRAVENOUS
Status: DISCONTINUED | OUTPATIENT
Start: 2023-09-15 | End: 2023-09-17

## 2023-09-15 RX ORDER — DIPHENHYDRAMINE HCL 12.5MG/5ML
25-50 LIQUID (ML) ORAL ONCE
Status: COMPLETED | OUTPATIENT
Start: 2023-09-15 | End: 2023-09-15

## 2023-09-15 RX ORDER — HALOPERIDOL 5 MG/ML
1 INJECTION INTRAMUSCULAR
Status: DISCONTINUED | OUTPATIENT
Start: 2023-09-15 | End: 2023-09-15 | Stop reason: HOSPADM

## 2023-09-15 RX ORDER — PIPERACILLIN SODIUM, TAZOBACTAM SODIUM 3; .375 G/15ML; G/15ML
3.38 INJECTION, POWDER, LYOPHILIZED, FOR SOLUTION INTRAVENOUS EVERY 6 HOURS
Status: DISCONTINUED | OUTPATIENT
Start: 2023-09-15 | End: 2023-09-15

## 2023-09-15 RX ORDER — OCTREOTIDE ACETATE 100 UG/ML
100 INJECTION, SOLUTION INTRAVENOUS; SUBCUTANEOUS 3 TIMES DAILY
Status: COMPLETED | OUTPATIENT
Start: 2023-09-15 | End: 2023-09-20

## 2023-09-15 RX ORDER — DIPHENHYDRAMINE HCL 25 MG
25-50 CAPSULE ORAL ONCE
Status: COMPLETED | OUTPATIENT
Start: 2023-09-15 | End: 2023-09-15

## 2023-09-15 RX ADMIN — HYDROMORPHONE HYDROCHLORIDE 0.4 MG: 1 INJECTION, SOLUTION INTRAMUSCULAR; INTRAVENOUS; SUBCUTANEOUS at 08:36

## 2023-09-15 RX ADMIN — CALCIUM CHLORIDE INJECTION 400 MG: 100 INJECTION, SOLUTION INTRAVENOUS at 02:35

## 2023-09-15 RX ADMIN — PHENYLEPHRINE HYDROCHLORIDE 100 MCG: 10 INJECTION INTRAVENOUS at 04:16

## 2023-09-15 RX ADMIN — METHOCARBAMOL 500 MG: 500 TABLET, FILM COATED ORAL at 22:24

## 2023-09-15 RX ADMIN — ASPIRIN 325 MG ORAL TABLET 325 MG: 325 PILL ORAL at 13:50

## 2023-09-15 RX ADMIN — Medication 20 MG: at 00:36

## 2023-09-15 RX ADMIN — NOREPINEPHRINE BITARTRATE 6.4 MCG: 1 INJECTION, SOLUTION, CONCENTRATE INTRAVENOUS at 04:24

## 2023-09-15 RX ADMIN — TACROLIMUS 3 MG: 5 CAPSULE ORAL at 18:05

## 2023-09-15 RX ADMIN — HEPARIN SODIUM 1000 UNITS: 1000 INJECTION INTRAVENOUS; SUBCUTANEOUS at 01:24

## 2023-09-15 RX ADMIN — Medication 5 MG: at 01:00

## 2023-09-15 RX ADMIN — FUROSEMIDE 100 MG: 10 INJECTION, SOLUTION INTRAVENOUS at 02:49

## 2023-09-15 RX ADMIN — NITROGLYCERIN 100 MCG: 10 INJECTION INTRAVENOUS at 00:10

## 2023-09-15 RX ADMIN — MANNITOL 25 G: 20 INJECTION, SOLUTION INTRAVENOUS at 02:06

## 2023-09-15 RX ADMIN — CALCIUM CHLORIDE INJECTION 300 MG: 100 INJECTION, SOLUTION INTRAVENOUS at 00:25

## 2023-09-15 RX ADMIN — SODIUM CHLORIDE, SODIUM LACTATE, POTASSIUM CHLORIDE, CALCIUM CHLORIDE AND DEXTROSE MONOHYDRATE: 5; 600; 310; 30; 20 INJECTION, SOLUTION INTRAVENOUS at 04:35

## 2023-09-15 RX ADMIN — SODIUM CHLORIDE 250 MG: 9 INJECTION, SOLUTION INTRAVENOUS at 15:03

## 2023-09-15 RX ADMIN — Medication 0.5 UNITS: at 01:30

## 2023-09-15 RX ADMIN — Medication 40 MG: at 13:50

## 2023-09-15 RX ADMIN — BUMETANIDE 5 MG: 0.25 INJECTION INTRAMUSCULAR; INTRAVENOUS at 02:12

## 2023-09-15 RX ADMIN — FAMOTIDINE 10 MG: 10 TABLET, FILM COATED ORAL at 15:02

## 2023-09-15 RX ADMIN — Medication 10 MG: at 03:06

## 2023-09-15 RX ADMIN — SODIUM CHLORIDE: 4.5 INJECTION, SOLUTION INTRAVENOUS at 08:47

## 2023-09-15 RX ADMIN — FENTANYL CITRATE 50 MCG: 50 INJECTION, SOLUTION INTRAMUSCULAR; INTRAVENOUS at 03:51

## 2023-09-15 RX ADMIN — DIPHENHYDRAMINE HYDROCHLORIDE 50 MG: 25 SOLUTION ORAL at 15:06

## 2023-09-15 RX ADMIN — Medication 0.5 UNITS: at 03:56

## 2023-09-15 RX ADMIN — FENTANYL CITRATE 50 MCG: 50 INJECTION, SOLUTION INTRAMUSCULAR; INTRAVENOUS at 03:45

## 2023-09-15 RX ADMIN — PHENYLEPHRINE HYDROCHLORIDE 100 MCG: 10 INJECTION INTRAVENOUS at 01:26

## 2023-09-15 RX ADMIN — MYCOPHENOLATE MOFETIL 1000 MG: 200 POWDER, FOR SUSPENSION ORAL at 18:05

## 2023-09-15 RX ADMIN — MYCOPHENOLATE MOFETIL 1000 MG: 200 POWDER, FOR SUSPENSION ORAL at 13:50

## 2023-09-15 RX ADMIN — ONDANSETRON 4 MG: 2 INJECTION INTRAMUSCULAR; INTRAVENOUS at 03:32

## 2023-09-15 RX ADMIN — ANTI-THYMOCYTE GLOBULIN (RABBIT) 200 MG: 5 INJECTION, POWDER, LYOPHILIZED, FOR SOLUTION INTRAVENOUS at 15:46

## 2023-09-15 RX ADMIN — OCTREOTIDE ACETATE 100 MCG: 100 INJECTION, SOLUTION INTRAVENOUS; SUBCUTANEOUS at 20:17

## 2023-09-15 RX ADMIN — SODIUM CHLORIDE, SODIUM GLUCONATE, SODIUM ACETATE, POTASSIUM CHLORIDE AND MAGNESIUM CHLORIDE: 526; 502; 368; 37; 30 INJECTION, SOLUTION INTRAVENOUS at 02:49

## 2023-09-15 RX ADMIN — HYDROMORPHONE HYDROCHLORIDE 0.5 MG: 1 INJECTION, SOLUTION INTRAMUSCULAR; INTRAVENOUS; SUBCUTANEOUS at 03:36

## 2023-09-15 RX ADMIN — Medication 5 MG: at 03:00

## 2023-09-15 RX ADMIN — PHENYLEPHRINE HYDROCHLORIDE 100 MCG: 10 INJECTION INTRAVENOUS at 01:28

## 2023-09-15 RX ADMIN — NOREPINEPHRINE BITARTRATE 12.8 MCG: 1 INJECTION, SOLUTION, CONCENTRATE INTRAVENOUS at 04:19

## 2023-09-15 RX ADMIN — PIPERACILLIN AND TAZOBACTAM 2.25 G: 2; .25 INJECTION, POWDER, FOR SOLUTION INTRAVENOUS at 21:07

## 2023-09-15 RX ADMIN — INSULIN HUMAN 1 UNITS/HR: 1 INJECTION, SOLUTION INTRAVENOUS at 09:30

## 2023-09-15 RX ADMIN — FUROSEMIDE 100 MG: 10 INJECTION, SOLUTION INTRAVENOUS at 02:06

## 2023-09-15 RX ADMIN — PIPERACILLIN AND TAZOBACTAM 2.25 G: 2; .25 INJECTION, POWDER, FOR SOLUTION INTRAVENOUS at 14:17

## 2023-09-15 RX ADMIN — FUROSEMIDE 80 MG: 10 INJECTION, SOLUTION INTRAMUSCULAR; INTRAVENOUS at 15:15

## 2023-09-15 RX ADMIN — SODIUM CHLORIDE, SODIUM GLUCONATE, SODIUM ACETATE, POTASSIUM CHLORIDE AND MAGNESIUM CHLORIDE: 526; 502; 368; 37; 30 INJECTION, SOLUTION INTRAVENOUS at 01:06

## 2023-09-15 RX ADMIN — Medication 0.5 UNITS: at 03:27

## 2023-09-15 RX ADMIN — NITROGLYCERIN 100 MCG: 10 INJECTION INTRAVENOUS at 00:49

## 2023-09-15 RX ADMIN — TACROLIMUS 3 MG: 5 CAPSULE ORAL at 13:50

## 2023-09-15 RX ADMIN — SODIUM CHLORIDE: 9 INJECTION, SOLUTION INTRAVENOUS at 05:30

## 2023-09-15 RX ADMIN — CALCIUM CHLORIDE INJECTION 300 MG: 100 INJECTION, SOLUTION INTRAVENOUS at 00:31

## 2023-09-15 RX ADMIN — SODIUM CHLORIDE, SODIUM LACTATE, POTASSIUM CHLORIDE, CALCIUM CHLORIDE AND DEXTROSE MONOHYDRATE: 5; 600; 310; 30; 20 INJECTION, SOLUTION INTRAVENOUS at 14:00

## 2023-09-15 RX ADMIN — HEPARIN SODIUM 200 UNITS/HR: 10000 INJECTION, SOLUTION INTRAVENOUS at 05:28

## 2023-09-15 RX ADMIN — Medication 20 MG: at 01:19

## 2023-09-15 RX ADMIN — MICAFUNGIN SODIUM 100 MG: 50 INJECTION, POWDER, LYOPHILIZED, FOR SOLUTION INTRAVENOUS at 20:16

## 2023-09-15 RX ADMIN — OCTREOTIDE ACETATE 100 MCG: 100 INJECTION, SOLUTION INTRAVENOUS; SUBCUTANEOUS at 13:57

## 2023-09-15 RX ADMIN — ACETAMINOPHEN 650 MG: 325 TABLET, FILM COATED ORAL at 15:02

## 2023-09-15 RX ADMIN — PHENYLEPHRINE HYDROCHLORIDE 50 MCG: 10 INJECTION INTRAVENOUS at 01:24

## 2023-09-15 RX ADMIN — SODIUM CHLORIDE, POTASSIUM CHLORIDE, SODIUM LACTATE AND CALCIUM CHLORIDE 1000 ML: 600; 310; 30; 20 INJECTION, SOLUTION INTRAVENOUS at 07:00

## 2023-09-15 RX ADMIN — NITROGLYCERIN 50 MCG: 10 INJECTION INTRAVENOUS at 01:00

## 2023-09-15 RX ADMIN — Medication 0.5 UNITS: at 03:44

## 2023-09-15 RX ADMIN — SUGAMMADEX 200 MG: 100 INJECTION, SOLUTION INTRAVENOUS at 03:32

## 2023-09-15 ASSESSMENT — ACTIVITIES OF DAILY LIVING (ADL)
ADLS_ACUITY_SCORE: 18
TOILETING_ISSUES: NO
ADLS_ACUITY_SCORE: 18
WALKING_OR_CLIMBING_STAIRS_DIFFICULTY: NO
CHANGE_IN_FUNCTIONAL_STATUS_SINCE_ONSET_OF_CURRENT_ILLNESS/INJURY: NO
ADLS_ACUITY_SCORE: 18
ADLS_ACUITY_SCORE: 18
FALL_HISTORY_WITHIN_LAST_SIX_MONTHS: NO
ADLS_ACUITY_SCORE: 18
HEARING_DIFFICULTY_OR_DEAF: NO
ADLS_ACUITY_SCORE: 18
ADLS_ACUITY_SCORE: 18
DRESSING/BATHING_DIFFICULTY: NO
ADLS_ACUITY_SCORE: 20
WEAR_GLASSES_OR_BLIND: NO
ADLS_ACUITY_SCORE: 20
DIFFICULTY_COMMUNICATING: NO
ADLS_ACUITY_SCORE: 18
CONCENTRATING,_REMEMBERING_OR_MAKING_DECISIONS_DIFFICULTY: NO
DOING_ERRANDS_INDEPENDENTLY_DIFFICULTY: NO
DIFFICULTY_EATING/SWALLOWING: NO
ADLS_ACUITY_SCORE: 20
ADLS_ACUITY_SCORE: 18

## 2023-09-15 NOTE — PROGRESS NOTES
[place] with transplant team was at bedside to assess Avelina. She re-ordered a bedside ultrasound to be repeated.  She is otherwise okay with patient going to  for care.    Dopamine drip was discontinued at 0845 this morning, patient has not needed it since that time.  Blood pressures have remained stable, see flow sheets.

## 2023-09-15 NOTE — ANESTHESIA CARE TRANSFER NOTE
Patient: Avelina Marion    Procedure: Procedure(s):  Transplant pancreas, kidney  donor, combined, appendectomy       Diagnosis: End stage renal disease (H) [N18.6]  Diagnosis Additional Information: No value filed.    Anesthesia Type:   General     Note:    Oropharynx: oropharynx clear of all foreign objects and spontaneously breathing  Level of Consciousness: awake  Oxygen Supplementation: face mask  Level of Supplemental Oxygen (L/min / FiO2): 8  Independent Airway: airway patency satisfactory and stable  Dentition: dentition unchanged  Vital Signs Stable: post-procedure vital signs reviewed and stable  Report to RN Given: handoff report given  Patient transferred to: PACU    Handoff Report: Identifed the Patient, Identified the Reponsible Provider, Reviewed the pertinent medical history, Discussed the surgical course, Reviewed Intra-OP anesthesia mangement and issues during anesthesia, Set expectations for post-procedure period and Allowed opportunity for questions and acknowledgement of understanding    Vitals:  Vitals Value Taken Time   /75 09/15/23 0440   Temp 36.4    Pulse 105 09/15/23 0445   Resp 12    SpO2 92 % 09/15/23 0445   Vitals shown include unvalidated device data.    Electronically Signed By: KWASI Saba CRNA  September 15, 2023  4:46 AM

## 2023-09-15 NOTE — TELEPHONE ENCOUNTER
Organ Offer Encounter Information    Organ Offer Information  Organ offer date & time: 9/13/2023 10:21 AM  Coordinator/Fellow/Attending name: Adia Cook RN   Organ(s):  Organ UNOS ID Match Run ID Comment Organ Laterality   Kidney [id number] [facility]    Pancreas [id number] [facility]         Recent infections?: No      New medications?: No Recent pregnancy?: No     Angicoagulation medications?: Yes (Comment: 81 mg asp) Recent vaccinations?: No     Recent blood transfusions?: No Recent hospitalizations?: No   Has your insurance changed in the last 6-12 months?: Neg    Patient last dialyzed: 9/11/2023 11:00 AM  Dialysis type: Hemo  Discussed organ offer with: Patient  Patient/Caregiver name: Avelina  Discussed risk category with Patient/Other: N/A  Patient/Other asked to speak to a surgeon?: No  Discussed program-specific outcomes: Did not have questions regarding SRTR  Right to decline organ offer without penalty, Patient/Other: Aware of option to decline without penalty  Organ offer decision status Patient/Other: Accepted Offer  Organ disposition: Transplanted  Additional Comments: 9/13/2023 10:24 AM  KP: Marlen, Primary  MD: Silva  [facility] Contact: Elen ([phone])  VXM Results: Completed, negative  XM Plan (FXM must be done with serum no older than 10 days from transplant): Donor blood to arrive 9/13 @ 1100, plan to run FXM with recipient blood drawn yesterday  Is this an ABO A2 donor to ABO B Recipient:No  (In the event of A2 to B transplant, Anti-A titers need to be collected at the time of the crossmatch or admission - whichever is first.  Use smartphrase .A2toB for instructions.)  Plan (Admission, NPO, Donor OR): Spoke to Avelina, discussed new offer. Avelina accepts offer at this time. I informed Avelina that I will call him with FXM results. Spoke to [employer], informed them that we are interested in this offer and to start looking at potential charter flights for tomorrow.   - - -   COVID Screening  In  the past month, have you:  Or anyone close to you had a positive COVID test or suspected to have COVID: No  Had any COVID symptoms (Fever, Cough, Short of Breath, Loss of Taste/Smell, Rash): No    9/13/2023 10:38 AM:   Spoke to Lisa in Immunology, confirmed there was enough left of Avelina's blood sample to run a FXM. Lisa also confirmed she did receive donor blood for [id number].  Adia Cook RN  Transplant Coordinator     9/13/2023 2:51 PM:   FXM negative, Dr Ascencio & Dr De La Torre updated. Called Avelina, informed him that FXM was negative. Per Dr Ascencio, OK for him to eat an early breakfast tomorrow morning and then remain NPO until we determine if the organs are accepted. Called KRISTIE Espinosa to ask about charter options, she will look into it and let me know final pricing.  Adia Cook RN  Transplant Coordinator     Donor OR Time: 9/14 0900  Procuring MD: Dr Alicea  Contact in the OR: Elen ([phone])  Organs Being Procured: HR, LI, KP, KIs  Flush Solution: Servator B  Biopsy: No  Pump: No  Special Requests (Special blood tubes, nodes, waivers): No  MD for Visualization: Dr De La Torre  Transportation Details: Per Dr Ascencio, does not want to book charter at this time. Plan to wait until 0700 tomorrow, call [employer] and ask if there is a plane [place]. Dr Ascencio requesting visual of the pancreas prior to booking. He is aware there may not be a plane available if we wait that late to book the flight. [employer] updated with plan.  Adia Cook RN  Transplant Coordinator     09/14/23 0715  LS notified to find transportation charter options, awaiting update on anything available.   Called patient to update contact information. Confirmed NPO status. Last food/drink 2100 last night. Patient agreeable to remain NPO and will wait for an update on transportation and availability in the next couple of hours.   Dedra Ashby RN    9:30 AM  Tita on 7a notified of potential  admission. She has one bed available that needs to be cleaned. She will hold the bed until acceptance or decline is determined.   Dedra Ashby RN    12:14 PM  Initial visualization looks good. No replaced anatomy. Plane booked per Dr. Ascencio. Plane in position at [facility], needs 2 hour call out time. Will be ready to takeoff at [facility] in approximately 2-2.5 hours. Flight time 2.5 hours. Requested back table photos.   Dedra Ashby RN    Admissions: 1221, Ramon  Unit: 1217, 7a Tita. Will plan for admit in approximately 1 hour. 1352, Tita updated on firm ETA 1415.   Update Provider Entering Orders (XM Plan & COVID Testing):  1220, Teuqila Rodríguez NP. 1352-updated ETA 1415.   Immunology: 1224, [place]   Inpatient Lab (COVID Testing [phone], Option 2): RN to confirm STAT run  Vessel Storage Confirmation (PA/KP/LI): yes, ok to store  Research: ON HOLD  Book OR: 1230, Haylee. Tentatively booked for 1700, will updated with timing, final acceptance, and laterality. 1455Nikki updated on laterality LEFT kidney, OR moved to 1800, and confirmed final acceptance  TransNet/ABO Verification: 1459, labels printed for LEFT kidney/ pancreas  Add Organ: 1229 pancreas added. 1447, LEFT kidney added  Blood Bank: [facility]  Transportation: [id number], ready for wheels up at 1430 CDT. Flight time 2.5 hours.    Dedra Ashby RN    12:36 PM  Patient notified of likelihood of moving forward with offer, admission imminently pending. Patient confirms he can be to the hospital within 30 minutes and will notify his ride to be ready to leave. Will update him shortly when ready to admit.   Dedra Ashby RN    1:54 PM  Patient updated that his ride is only available for 30 more minutes. Decision made per Dr. Ascencio to admit patient immediately to assure he will be available and at the hospital when final acceptance is confirmed.   Dedra Ashby RN    2:34 PM  Anatomical waivers granted by Elen with [facility] at 1334. Anatomy  reviewed with Dr Ascencio and LEFT kidney accepted.   Dedra Ashby RN               Attestation I have discussed all of the above with the Patient/Legal Guardian/Caregiver regarding this organ offer.: Yes  Coordinator/Fellow/Attending name: Adia Cook RN Sarah Kemmer RN

## 2023-09-15 NOTE — ANESTHESIA POSTPROCEDURE EVALUATION
Patient: Avelina Marion    Procedure: Procedure(s):  Transplant pancreas, kidney  donor, combined, appendectomy       Anesthesia Type:  General    Note:  Disposition: Inpatient   Postop Pain Control: Uneventful            Sign Out: Well controlled pain   PONV: No   Neuro/Psych: Uneventful            Sign Out: Acceptable/Baseline neuro status   Airway/Respiratory: Uneventful            Sign Out: Acceptable/Baseline resp. status   CV/Hemodynamics: Uneventful            Sign Out: Acceptable CV status; No obvious hypovolemia; No obvious fluid overload   Other NRE: NONE   DID A NON-ROUTINE EVENT OCCUR? No           Last vitals:  Vitals Value Taken Time   BP 89/53 09/15/23 0630   Temp 36.9  C (98.4  F) 09/15/23 0530   Pulse 82 09/15/23 0632   Resp 16 09/15/23 0600   SpO2 100 % 09/15/23 0631   Vitals shown include unvalidated device data.    Electronically Signed By: Panda Moise MD  September 15, 2023  6:32 AM

## 2023-09-15 NOTE — TELEPHONE ENCOUNTER
Please call Wilder, Admin Sec., [facility]# [phone] following up on new transplant patient.

## 2023-09-15 NOTE — OR NURSING
Pt SBP dropping into the 80-90's with Maps  <65, dopamine restarted, currently running at 1.5mcg/kg/min.  Pt responded to dopamine with pressures back into the 100's/60's with map of 74.  Pts last UOP of 100.

## 2023-09-15 NOTE — PROGRESS NOTES
Admitted/transferred from: PACU  Time of arrival on unit 1230  2 RN full  skin assessment completed by Makayla ALMANZA & Steven PLAZA  Skin assessment finding: lim catheter in place, midline incision covered with OP dressing, R STEVEN, preventative mepilex on bottom, dressing on R wrist from ART line  Interventions/actions: ensure adequate hydration, encourage ambulation    Will continue to monitor.

## 2023-09-15 NOTE — TELEPHONE ENCOUNTER
Dialysis center calling to see how he is doing and if he is going to need further dialysis. RNCC stated it was too early to tell, as he was just transplanted today but gave them the number for the unit and number for RNCC to touch base next week to see how he is doing.

## 2023-09-15 NOTE — ANESTHESIA PROCEDURE NOTES
Arterial Line Procedure Note    Pre-Procedure   Staff -        Anesthesiologist:  Panda Moise MD       Performed By: anesthesiologist       Location: OR       Pre-Anesthestic Checklist: patient identified, IV checked, risks and benefits discussed, informed consent, monitors and equipment checked, pre-op evaluation and at physician/surgeon's request  Timeout:       Correct Patient: Yes        Correct Procedure: Yes        Correct Site: Yes        Correct Position: Yes   Line Placement:   This line was placed Post Induction  Procedure   Procedure: arterial line       Laterality: right       Insertion Site: radial.  Insertion/Injection        Technique: Seldinger Technique and ultrasound guided        1. Ultrasound was used to evaluate the access site.       2. Artery evaluated via ultrasound for patency/adequacy.       3. Using real-time ultrasound the needle/catheter was observed entering the artery/vein.       Catheter Type/Size: 20 G, 1.75 in/4.5 cm quick cath (integral wire)  Narrative        Tegaderm dressing used.       Complications: None apparent,        Arterial waveform: No        IBP within 10% of NIBP: No

## 2023-09-15 NOTE — PROGRESS NOTES
Took over care of patient from Bharati Rubalcava RN at 0730 this morning.    Temp:  [97.4  F (36.3  C)-98.4  F (36.9  C)] 98.4  F (36.9  C)  Pulse:  [] 82  Resp:  [14-18] 16  BP: ()/(53-95) 107/61  MAP:  [51 mmHg-88 mmHg] 51 mmHg  Arterial Line BP: ()/(42-69) 51/46  SpO2:  [88 %-100 %] 100 %    Time of notification: 0745  Provider notified: Dr. Green  Patient status: NG tube not able to be flushed, asked Dr. Green to look at image again.  Orders received: NG is coiled in mouth. Pull NG tube.        Time of notification: 0745  Provider notified: Dr. Green  Patient status: Per overnight RN, internal jugular needs to be pulled back, but was advised by Dr. Moise to leave as is. Asked Dr. Green to look at imagine and advise.  Orders received: Per Dr. Green feels it needs to be pulled back, may do this in PACU before patient leaves for inpatient unit.

## 2023-09-15 NOTE — ANESTHESIA PROCEDURE NOTES
Central Line/PA Catheter Placement    Pre-Procedure   Staff -        Anesthesiologist:  Panda Moise MD       Resident/Fellow: Yenifer Moss MD       Performed By: resident       Location: OR       Pre-Anesthestic Checklist: patient identified, IV checked, site marked, risks and benefits discussed, informed consent, monitors and equipment checked, pre-op evaluation and at physician/surgeon's request  Timeout:       Correct Patient: Yes        Correct Procedure: Yes        Correct Site: Yes        Correct Position: Yes        Correct Laterality: Yes   Line Placement:   This line was placed Post Induction    Procedure   Procedure: central line       Laterality: right       Insertion Site: internal jugular.       Patient Position: Trendelenburg  Sterile Prep        All elements of maximal sterile barrier technique followed       Patient Prep/Sterile Barriers: draped, hand hygiene, gloves , hat , mask , draped, gown, sterile gel and probe cover       Skin prep: Chloraprep  Insertion/Injection        Technique: ultrasound guided        1. Ultrasound was used to evaluate the access site.       2. Vein evaluated via ultrasound for patency/adequacy.       3. Using real-time ultrasound the needle/catheter was observed entering the artery/vein.       Type: CVC       Catheter Size: 9 Fr       Catheter Length: 12       Number of Lumens: triple lumen  Narrative         Secured by: suture       Tegaderm dressing used.       Complications: None apparent,        blood aspirated from all lumens,        All lumens flushed: Yes       Verification method: Ultrasound and Placement to be verified post-op (Verified with vu)

## 2023-09-15 NOTE — ANESTHESIA PROCEDURE NOTES
Airway       Patient location during procedure: OR       Procedure Start/Stop Times: 9/14/2023 7:26 PM  Staff -        Anesthesiologist:  Panda Moise MD       CRNA: Aston Samaniego APRN CRNA       Performed By: CRNA  Consent for Airway        Urgency: elective  Indications and Patient Condition       Indications for airway management: laure-procedural       Induction type:intravenous       Mask difficulty assessment: 2 - vent by mask + OA or adjuvant +/- NMBA    Final Airway Details       Final airway type: endotracheal airway       Successful airway: ETT - single and Oral  Endotracheal Airway Details        ETT size (mm): 8.0       Cuffed: yes       Successful intubation technique: direct laryngoscopy       DL Blade Type: MAC 3       Grade View of Cords: 1       Adjucts: stylet       Position: Right       Measured from: gums/teeth       Secured at (cm): 23       Bite block used: None    Post intubation assessment        Placement verified by: capnometry, equal breath sounds and chest rise        Number of attempts at approach: 1       Number of other approaches attempted: 0       Secured with: other (comment) (Tube Tamer)       Ease of procedure: easy       Dentition: Intact    Medication(s) Administered   Medication Administration Time: 9/14/2023 7:26 PM

## 2023-09-15 NOTE — OR NURSING
Radiology called regarding lines post CXR.  Radiologist called regarding placement of Central line and NG tube placement.   notified and stated that lines were all ok and to keep using them.

## 2023-09-15 NOTE — OP NOTE
Transplant Surgery  Operative Note     Procedure Date:  Case start 9/14, complete 09/15/23    Preoperative Diagnosis:  End Stage renal failure due to diabetes mellitus type 2    Postoperative Diagnosis:  Same    Procedure:  1. Left Kidney Donation after Brain Death Kidney, Left iliac fossa, without vascular reconstruction. A J-J stent was placed.   2. Kidney allograft preparation on Back Table   3. Open appendectomy    4. Whole Pancreas Donation after Brain Death Pancreas Transplant   5. Pancreas allograft preparation on Back Table    Surgeon:  Surgeon(s) and Role:     * Tk Ascencio MD - Primary     * Panda De La Torre MD - Fellow - Assisting. Dr. De La Torre was the primary assistant for all aspects of the case and participated in all anastomoses and reconstruction except for the back table preparation of the kidney and pancreas.      * Ramon Vieira MD - Fellow - Assisting. Dr. Vieira was the primary assistant for the back table preparation of both kidney and pancreas and participated in the back table vascular reconstruction for the pancreas.     Fellow/Assistant:  As above    Anesthesia:  General    Specimen:  Appendix (permanent)    Drains:  Anibal-Beard drain    Urine Output:  340 mls    Estimated Blood Loss:  500    Fluids Administered:        Intraoperative Events: None    Complications: None.    Findings: Pancreas: graft pancreas normal quality. Small with small vessels as expected for pediatric donor. Conventional bench and Y graft reconstruction. Placed head down in right iliac fossa with portal vein end to side to proximal common iliac vein/distal IVC. Internal iliac veins ligated and divided. Y graft end to side to common iliac artery. Good reperfusion, small bleeding. Bowel managed with hand sewn side to side two layered duodenojejunostomy ~100cm distal to ligament of treitz. Tail placed behind cecum.     Kidney: graft kidney with normal quality. SIngle artery/vein/ureter.  Anastomosed end to side to external iliac vessels. Good reperfusion- some spasm but cleared quickly. Urine production intraoperatively. URETERAL STENT PLACED. Kidney placed on side behind left colon.       None.    Indication: The patient has Type 2 diabetes with End Stage kidney failure. The patient received an organ offer for a Donation after Brain Death kidney and Donation after Brain Death pancreas. After discussing the risks and benefits of proceeding, the patient agreed to proceed with surgery and provided informed consent.    Final ABO/Crossmatch Verification: After the donor organ arrived to the operating room and prior to anastomosis, I participated in the transplant pre-verification upon organ receipt timeout by visually verifying the donor ID, organ and laterality, donor blood type, recipient unique identifier, recipient blood type, and that the donor and recipient are blood type compatible. The crossmatch was done prospectively; and the T cell crossmatch result was negative and B cell Flow crossmatch result was negative. The patient received Thymoglobulin, Cellcept, and Solumedrol on induction.    Pancreas Donor Organ Information:   Donor UNOS ID: [id number]  Donor ABO: A1  Donor Arterial Clamp on: 9/14/2023 12:45 PM  Vessels with organ: Yes    Ischemic time:  Total:  662  Cold: 620  Warm: 42     Pancreas Back Table Details:   Procedure:  Bench preparation of the pancreas allograft for transplantation with arterial reconstruction    Preoperative Diagnosis:  End stage renal failure due to diabetes mellitus type 2    Postoperative Diagnosis:  Same    Surgeon:  Surgeon(s) and Role:     * Mindy Nicole MD - Primary     * Panda De La Torre MD - Fellow - Assisting     * Ramon Vieira MD - Fellow - Assisting    Faculty Co-Surgeon:  MINDY NICOLE    Fellow/Assistant:  As above    Anesthesia:  None    Graft Injury:  No    Donor Arrival to Recipient Room:  9/14/2023  7:23 PM  "   Portal Venous Extension:  No    Donor Iliac Vessel Quality:  Normal     Findings: as above    Pancreas Back Table Preparation: The donor pancreas was received and inspected. It had been previously flushed with UW. We removed the spleen by doubly ligating vessels between the tail of the pancreas and the spleen. The peripancreatic fat was ligated with 3-0 silk ties and removed. The proximal duodenum staple line was inverted and oversewn using running 4-0 Prolene suture. The bile duct and GDA were re-secured. The previously stapled root of the mesentery was oversewn using 4-0 Prolene forward and back. The third and fourth portions of the duodenum were freed away from the pancreas by ligating and dividing the intervening tissue with a series of 3-0 and 4-0 silk ties. Ganglionectomy was performed with 3-0 and 4-0 silk ties.    65 Arterial \"Y graft\" construction was required: the donor iliac artery was dilated, leak checked, and tailored to create an extension \"Y' graft by anastomosing the internal iliac artery to the splenic artery and the external iliac artery to the superior mesenteric artery in an end-to end fashion using running 7-0 Prolene suture.  . The pancreas was transferred to a new bowl with fresh iced cold preservation solution. Faculty was present for key portions of the procedure.    Operative Procedure:   Arterial Anastomosis Start:  9/14/2023 11:05 PM    Recipient Arterial Unclamp:  9/14/2023 11:47 PM    Extra Vessels Used:    UNOS ID Type Placement Comments   [id number]  Artery out of box 1735        Extra Vessels Banked:  Yes     Kidney Donor Organ Information:    Donor UNOS ID: [id number]  Donor ABO: A1  Donor Arterial Clamp on: 9/14/2023 12:54 PM  Vessels with organ: No    Ischemic time:  Total:  795  Cold: 764  Warm: 31     Kidney Back Table Details:    Preoperative Procedure:  Bench preparation of the kidney allograft for transplantation without vascular reconstruction    Diagnosis:  End stage renal " failure due to diabetes mellitus type 2    Postoperative Diagnosis:  Same    Surgeon:  Surgeon(s) and Role:     * Tk Ascencio MD - Primary     * Tor Oh MD - Fellow - Assisting     * Ramon Vieira MD - Fellow - Assisting    Faculty Co-Surgeon:  TOR OH    Fellow/Assistant:  As above    Anesthesia:  None    Donor Arrival to Recipient Room:  9/14/2023  7:23 PM      Graft Injury: No  Graft Biopsy: no      Organ Received On: Ice  Pump Resistance: n/a   Pump Flow: n/a    Ureteral Anatomy: Single  Arterial Anatomy: Single  Venous Anatomy: Single      Any Reconstruction:  No    Artery:   N/a    Vein:   N/a     Findings: Normal. As above    Kidney Back Table Preparation: The donor kidney was received and inspected. It had been previously flushed with UW. The graft was prepared on the back table by removing perinephric fat and ligating venous tributaries and lymphatics. The ureter was also cleaned of excess tissue. If required, reconstruction was performed as detailed above. The kidney was stored in iced cold preservation solution until ready for transplantation. Faculty was present for the critical portions of the procedure.    Operative Procedure:   Arterial Anastomosis Start:  9/15/2023  1:38 AM    Recipient Arterial Unclamp:  9/15/2023  2:09 AM    Extra Vessels Used:  Yes- artery    Extra Vessels Banked:  Yes- vein       The patient was brought to the operating room, placed in a supine position, and a time out was performed. Sequential compression devices were placed on both lower extremities and general endotracheal anesthesia was induced. The patient was given IV antibiotics, Thymoglobulin, Cellcept, and Solumedrol, and a Uribe catheter. A central line and arterial lines were placed by Anesthesia service. The abdomen was then shaved, prepped, and draped in the usual sterile fashion. We performed a lower midline incision, divided the linea alba and opened the peritoneum sharply  under direct vision. Retractors were placed.    Pancreas Transplant: We mobilized the right colon and the ureter medially and proceeded to circumferentially dissect the right iliac vessels. The internal iliac vein was ligated and divided. We obtained vascular control, performed a venotomy, and performed an anastomosis between the donor portal vein and the recipient's right Common Iliac. We then obtained proximal and distal control of the right common iliac artery . Arteriotomy and irrigation ensued, and the donor Y graft was anastomosed to the common iliac artery  in an end to side fashion. After both anastomoses were completed, we opened the clamps. The pancreas consistency and reperfusion quality was Pink throughout, the graft duodenum was Pink throughout. There was mild pancreatitis. Overall the graft was rated [place] grade A. The exocrine secretions of the pancreas were drained via Enteric w/o ronal-en-y employing  a side to side hand sewn 2-layered. The pancreas placement was Intra-Peritoneal. Faculty was present for key portions of the procedure.    Kidney Transplant: The patient was heparinized. We applied atraumatic vascular clamps and the donor kidney was brought to the operative field. We made a venotomy and the renal vein was anastomosed to the recipient left External Iliac vein in an end-to-side fashion. An arteriotomy was made and the donor renal artery was anastomosed to the recipient left common iliac artery in an end to side fashion. The patient was simultaneously loaded with IV mannitol, Lasix and volume. The renal artery was protected and the clamps were removed. After several cardiac cycles, we opened the renal artery and the kidney had Good reperfusion and was firm and pink .    The transplant ureter was managed by creating a Liche (anterior multistitch) anastomosis with absorbable suture. A stent was placed across the anastomosis. The kidney made Yes urine prior to implantation.    Hemostasis  was obtained, the anastomoses inspected, and the kidney placed in the iliac fossa. After placement, the vessel lay was inspected and found to be acceptable. The kidney position was Intra-peritoneal. The field was irrigated with antibiotic solution. A drain was placed. The retractor was removed and the abdominal wall fascia reapproximated. Subcutaneous tissues were irrigated and hemostasis obtained. The skin was reapproximated with staples and a dry dressing was applied. Faculty was present for key portions of the procedure.    The order of the organ reperfusion was: pancreas then kidney.    The appendix was excised using standard open technique..    All needle, sponge and instrument counts were correct x 2. The patient was awakened, extubated, and transferred to the PACU for post-op monitoring.

## 2023-09-15 NOTE — INTERIM SUMMARY
Called by the nurse about the NG and central line position. There is a formal reading from radiology confirming that the NG is in the mid thoracic esophagus and upon examination the NG was seen coiled on the back of the patient's mouth. The right internal jugular central line is in the low right atrium/inferior jugular junction and needs to be retracted a couple of centimeters.  Plan:  Replace the NG  Pull the right internal jugular  New chest xray to confirm positioning of NG and RIJ    Case was discussed with Dr Neri ( anesthesiologist on call and AIC).    Dr Woodrow Alvarez Junior, MD  Anesthesiology resident, PGY4

## 2023-09-15 NOTE — INTERIM SUMMARY
Called by radiology about the RIJ and NG positioning.  NG was replaced but with sidehole projecting at the region of the  gastroesophageal junction. Consider advancing enteric tube 5 to 6 cm  for better positioning.    NG was advanced 5 cm at 1125 AM.    RIJ was retracted 4-5 cm moving from the  inferior IVC to the right atrium. Consider retraction of approximately 4.5 to 5 cm for better positioning.   RIJ was retracted 4 cm at 1140.    Dr Woodrow Alvarez Junior, MD  Anesthesia resident, PGY4

## 2023-09-15 NOTE — PROGRESS NOTES
Patient removed from UNOS waitlist after  donor left kidney and pancreas transplant. UNOS ID [id number]  .    Donor Has Risk Criteria for Transmission of HIV/HCV/HBV: no  Recipient Notified of Risk Criteria: n/a    Dedra Ashby RN

## 2023-09-15 NOTE — PHARMACY-TRANSPLANT NOTE
Adult Kidney/Pancreas Transplant Post Operative Note    68 year old male s/p SPK (DBD donor) transplant on 9/15/23 for Type II diabetes.      Planned immunosuppression regimen per kidney/pancreas transplant protocol:  INDUCTION: 6.5mg/kg x 91.9kg = 597mg    9/14 Thymoglobulin 200mg (2mg/kg); SM 500mg  9/15 Thymoglobulin 200mg (2mg/kg); SM 250mg  9/16 Thymoglobulin 200mg (2mg/kg); SM 100mg    MAINTENANCE:    + Mycophenolate mofetil 1000mg BID  + Tacrolimus with goal trough levels of 8-10 mcg/L for the first 6 months post-transplant.      Opportunistic pathogen prophylaxis includes:   + PJP ppx: trimethoprim/sulfamethoxazole  + Thrush ppx: clotrimazole when micafungin laure-op completes  + CMV D?/R-: Valganciclovir duration pending donor status    Post-op antibiotic/antifungal surgical prophylaxis includes: piperacillin-tazobactam for 3 days post-procedure and micafungin IV for 6 days post-procedure.    CAD c/b CABG 2/2023  + Metoprolol (or alternative BB) to be restarted when appropriate  + Aspirin  continued lifelong (also for PAT ppx)  + Atorvastatin 40mg daily to be continued lifelong when able to tolerate PO    Patient is not enrolled in medication study.    Pharmacy will monitor for medication interactions and immunosuppression levels in conjunction with the team.  Medication therapy needs for discharge planning will continue to be addressed throughout the current admission via multidisciplinary rounds and order review.   Pharmacy will make recommendations as appropriate.     Abner Dumont PharmD, BCTXP, BCPS  Inpatient clinical pharmacist

## 2023-09-15 NOTE — PROGRESS NOTES
Transplant Surgery  Inpatient Daily Progress Note  09/15/2023    Assessment & Plan: Avelina Marion is a 68 year old male with a past medical history significant for end stage kidney disease secondary to type 2 diabetes mellitus, BCC, CAD. On HD MWF via LUE AVF since 2021. Still makes urine. CABG x 2 2023.     Graft function:  S/p kidney transplant: Good urine output (though did make urine before). Cr 6.3 immediately post op from 6.8.  S/p pancreas transplant: Difficult to visualize pancreas transplant on US but appears to have flow in and out. Minimal usage on the insulin drip (0-1 U/hr). Will continue to monitor closely. Mild pancreatitis noted, octreotide 100 mg TID. Heparin prophylaxis as below.  Immunosuppression management: Induction with thymoglobulin, plan for 6.5 mg/kg.  Solu-Medrol 500 mg intra-op  Maintenance with:  Tacrolimus 3 mg BID, goal 8-10  MMF 1000 mg BID  Hematology: Hgb stable ~12, will continue to trend  Heparin drip at 200 U/hr for graft prophylaxis. Will increase to 400 U/hr if hgb stable.  Cardiorespiratory: Hypotensive post op initially requiring dopamine in PACU. Now adequate blood pressures  GI/Nutrition: NPO, NG tube in place  Endocrine:   Fluid/Electrolytes: MIVF: D5LR at 100 mL/hr plus urine replacements.   : Uribe to remain due to new surgical anastomosis  Infectious disease: Tessa-op prophylaxis with zosyn and micafungin. Prophylaxis with bactrim and vaclyte  Prophylaxis: DVT, fall, GI, fungal  Disposition: 7A    Medical Decision Making: Medium  Subsequent visit 23608 (moderate level decision making)    VIVI/Fellow/Resident Provider: Tequila Rodríguez PA-C    Faculty: Tk Ascencio MD  _________________________________________________________________  Transplant History: Admitted 2023 for  donor kidney pancreas transplant.  9/15/2023 (Kidney), 2023 (Kidney / Pancreas), Postoperative day: 1 (Kidney, Pancreas), 0 (Kidney)     Interval History:  History is obtained from the patient and electronic health record  Overnight events: OR finished this AM. No acute issues thus far.    ROS:   A 10-point review of systems was negative except as noted above.    Meds:      Physical Exam:     Admit Weight: (P) 91.9 kg (202 lb 8 oz)    Current vitals:   /66   Pulse 82   Temp 98.4  F (36.9  C) (Oral)   Resp 19   Wt 91.9 kg (202 lb 8 oz)   SpO2 99%   BMI 28.24 kg/m      CVP (mmHg): 3 mmHg    Vital sign ranges:    Temp:  [97.4  F (36.3  C)-98.4  F (36.9  C)] 98.4  F (36.9  C)  Pulse:  [] 82  Resp:  [10-19] 19  BP: ()/(45-95) 132/66  MAP:  [51 mmHg-105 mmHg] 79 mmHg  Arterial Line BP: ()/(42-69) 133/54  SpO2:  [88 %-100 %] 99 %  Patient Vitals for the past 24 hrs:   BP Temp Temp src Pulse Resp SpO2 Weight   09/15/23 1130 132/66 -- -- 82 19 99 % --   09/15/23 1115 111/60 -- -- 86 17 99 % --   09/15/23 1045 110/57 98.4  F (36.9  C) Oral 83 18 99 % --   09/15/23 1030 110/63 -- -- 82 -- 97 % --   09/15/23 1020 -- -- -- -- -- 93 % --   09/15/23 1015 114/64 -- -- 83 10 100 % --   09/15/23 1000 120/58 97.6  F (36.4  C) Oral 83 12 100 % --   09/15/23 0945 (!) 140/77 -- -- 92 14 95 % --   09/15/23 0930 107/59 -- -- 84 14 99 % --   09/15/23 0915 110/59 -- -- 82 12 99 % --   09/15/23 0900 107/60 -- -- 81 14 100 % --   09/15/23 0845 113/45 -- -- 85 14 99 % --   09/15/23 0830 (!) 149/63 -- -- 81 12 100 % --   09/15/23 0815 116/57 -- -- 81 12 99 % --   09/15/23 0800 (!) 127/90 -- -- 82 12 100 % --   09/15/23 0745 114/61 -- -- 81 14 100 % --   09/15/23 0730 114/61 -- -- 83 10 100 % --   09/15/23 0700 107/61 -- -- 82 -- 100 % --   09/15/23 0645 99/61 -- -- 82 -- 100 % --   09/15/23 0630 (!) 89/53 -- -- 82 -- 100 % --   09/15/23 0615 93/56 -- -- 84 -- 100 % --   09/15/23 0600 105/57 -- -- 86 16 99 % --   09/15/23 0545 102/62 -- -- 84 14 99 % --   09/15/23 0530 95/58 98.4  F (36.9  C) -- 87 -- 100 % --   09/15/23 0515 112/61 -- -- 97 -- (!) 88 % --   09/15/23  0500 132/73 -- -- 100 -- 98 % --   09/15/23 0445 128/67 -- -- 105 -- 92 % --   09/15/23 0435 (!) 127/95 -- -- 106 -- 93 % --   09/15/23 0432 (!) 127/95 97.4  F (36.3  C) Axillary 100 -- -- --   09/14/23 1700 115/60 98.4  F (36.9  C) Oral 61 16 93 % --   09/14/23 1421 (!) 133/92 97.6  F (36.4  C) Oral 60 18 100 % 91.9 kg (202 lb 8 oz)   09/14/23 1419 -- -- -- -- -- -- (P) 91.9 kg (202 lb 8 oz)     General Appearance: in no apparent distress.   Skin: normal, warm  Heart: regular rate and rhythm  Lungs: Nonlabored resps  Abdomen: The abdomen is soft, nontender. Drain with serosanguinous output. The wound is covered with post-op dressing.  : lim is present. Urine has no gross hematuria.   Extremities: edema: absent.   Neurologic: awake, alert, and oriented. Tremor absent.     Data:   CMP  Recent Labs   Lab 09/15/23  1055 09/15/23  0959 09/15/23  0929 09/15/23  0604 09/15/23  0447 09/15/23  0445 09/15/23  0354 09/15/23  0323 09/15/23  0201 09/14/23  1720 09/14/23  1539   NA  --   --   --   --  136  --  134  --  137   < > 136   POTASSIUM  --  4.3  --   --  4.5  --  4.4  --  4.0   < > 4.4   CHLORIDE  --   --   --   --  97*  --   --   --   --   --  95*   CO2  --   --   --   --  19*  --   --   --   --   --  25   *  --  162*   < > 120*   < > 103*   < > 107*   < > 245*   BUN  --   --   --   --  44.5*  --   --   --   --   --  46.4*   CR  --   --   --   --  6.29*  --   --   --   --   --  6.77*   GFRESTIMATED  --   --   --   --  9*  --   --   --   --   --  8*   DAVID  --   --   --   --  8.6*  --   --   --   --   --  9.0   ICAW  --   --   --   --   --   --  4.3*  --  4.3*   < >  --    MAG  --   --   --   --  2.1  --   --   --   --   --   --    PHOS  --   --   --   --  3.3  --   --   --   --   --   --    AMYLASE  --   --   --   --  91  --   --   --   --   --  62   LIPASE  --   --   --   --  104*  --   --   --   --   --  41   ALBUMIN  --   --   --   --   --   --   --   --   --   --  4.3   BILITOTAL  --   --   --   --   --    --   --   --   --   --  0.9   ALKPHOS  --   --   --   --   --   --   --   --   --   --  60   AST  --   --   --   --   --   --   --   --   --   --  22   ALT  --   --   --   --   --   --   --   --   --   --  6    < > = values in this interval not displayed.     CBC  Recent Labs   Lab 09/15/23  0959 09/15/23  0447 09/14/23  2108 09/14/23  1539   HGB 12.0* 12.6*   < > 14.5   WBC  --  19.1*  --  7.7   PLT  --  175  --  175   A1C  --  8.4*  --   --     < > = values in this interval not displayed.     COAGS  Recent Labs   Lab 09/15/23  0447 09/14/23  1539   INR 1.11 1.06   PTT 26 27      Urinalysis  Recent Labs   Lab Test 09/14/23  1643 02/12/23  1539   COLOR Light Yellow Yellow   APPEARANCE Clear Slightly Cloudy*   URINEGLC 500* 100*   URINEBILI Negative Negative   URINEKETONE Negative Negative   SG 1.017 1.015   UBLD Small* Trace*   URINEPH 6.5 5.5   PROTEIN 100* 100*   NITRITE Negative Negative   LEUKEST Negative Negative   RBCU 1 3*   WBCU 1 2     Virology:  Hepatitis C Antibody   Date Value Ref Range Status   08/09/2022 Nonreactive Nonreactive Final

## 2023-09-15 NOTE — PLAN OF CARE
Goal Outcome Evaluation:    Plan of Care Reviewed With: patient  Overall Patient Progress: improving  Outcome Evaluation: Cr improving, hgb and K stable    BP (!) 140/66 (BP Location: Right arm)   Pulse 86   Temp 98.2  F (36.8  C) (Oral)   Resp 19   Wt 91.9 kg (202 lb 8 oz)   SpO2 94%   BMI 28.24 kg/m      4146-1933. VSS on 1L NC. K+ 4.1, Hgb 11.1. Rechecks scheduled for 2200. CVPs ranging 8-10. Denies pain, nausea. A&O x4 but patient is quite sleepy. NPO. Triple lumen IJ with CVP line, thymo, and replacements #1 & #2. PIV x2 with insulin gtt & heparin gtt straight rated at 200 units/hr with MIVF of D5LR at 100 mL/hr. Q1hr BG checks on insulin gtt algorithm 1, currently infusing 0.5 units/hr. STEVEN drain with serosang drainage. NG to LIS; clamping after meds. Uribe in place with good urine output. Patient on tele; NSR. Midline incision covered with OP dressing, CDI. Dressing over old ART line; CDI. Patient not yet oob. Med card & lab book yet to be started. Continue with plan of care and update team with any changes.

## 2023-09-15 NOTE — PROGRESS NOTES
"CLINICAL NUTRITION SERVICES - ASSESSMENT NOTE     Nutrition Prescription    RECOMMENDATIONS FOR MDs/PROVIDERS TO ORDER:  Diet adv v nutrition support within 5-7 days    Malnutrition Status:    Unable to determine due to incomplete NFPE    Future/Additional Recommendations:  -- monitor diet advancement  -- obtain NFPE/nutrition history as able        REASON FOR ASSESSMENT  Avelina Marion is a 68 year old male seen by the dietitian for MD Order- Assess & Educate post-op SOT    Per chart review patient with a past medical history significant for end stage kidney disease secondary to type 2 diabetes mellitus, BCC, CAD. On HD MWF via LUE AVF and CABG x 2     NUTRITION HISTORY  Avelina was busy with other cares when this writer attempted to visit.   Per chart review - he received a heart healthy nutrition education (2/20/23).     Last seen by outpatient RD (8/5/21)  Diet Recall from (8/5/21)  Breakfast None    Lunch Sweet and sour chicken and fried rice (Chinese food); [employer] or [employer] -->majority of lunch is eating out of the home; at home may have deli meat s/w    Dinner Spaghetti; chicken/steak/burgers + veggies    Snacks None    Beverages Diet clear soda (3/day), water, very occasional milk    Alcohol None    Dining out Several times/week (lunch mostly, less often dinner)        CURRENT NUTRITION ORDERS  Diet: NPO    LABS  Labs reviewed  (9/15): HA1C 8.4% (H), BUN 44.5 mg/dL (H), Cr 6.29 mg/dL (H), Lipase 104 U/L (H)     MEDICATIONS  Medications reviewed    ANTHROPOMETRICS  Height: 0 cm (Data Unavailable) - 180.3 cm (5' 11\")  Most Recent Weight: 91.9 kg (202 lb 8 oz)    IBW: 78.2 kg  BMI: Overweight BMI 25-29.9  Weight History:   Wt Readings from Last 10 Encounters:   09/14/23 91.9 kg (202 lb 8 oz)   06/27/23 94 kg (207 lb 4.8 oz)   02/18/23 93.3 kg (205 lb 11.2 oz)   12/19/22 93.2 kg (205 lb 8 oz)   10/18/22 93.1 kg (205 lb 2.2 oz)   10/18/22 93.4 kg (205 lb 14.4 oz)   08/09/22 93.8 kg (206 lb 11.2 oz)   12/30/21 " 92.5 kg (204 lb)   10/29/21 92.1 kg (203 lb)   08/05/21 92.1 kg (203 lb)   Dosing Weight: 92 kg (admission weight)    ASSESSED NUTRITION NEEDS:  Estimated Energy Needs: 2934-2255 kcals (30-35 Kcal/Kg)  Justification: increased needs post-op SOT  Estimated Protein Needs: 120-184 grams protein (1.3-2 gm/Kg)  Justification: increased needs post op SOT  Estimated Fluid Needs: 5529-0349  mL (25-30 mL/Kg)  Justification: maintenance, or per MD pending fluid status and adequate UOP    PHYSICAL FINDINGS  See malnutrition section below.     MALNUTRITION  % Intake: Unable to assess  % Weight Loss: None noted  Subcutaneous Fat Loss: Unable to assess  Muscle Loss: Unable to assess  Fluid Accumulation/Edema: None noted  Malnutrition Diagnosis: Unable to determine due to incomplete NFPE     NUTRITION DIAGNOSIS:  Altered GI function related to s/p kidney/pancreas transplant as evidenced by NGT and NPO status     INTERVENTIONS  Implementation  Nutrition Education: Not appropriate at this time due to patient condition     Goals  Diet adv v nutrition support within 5-7 days    Monitoring/Evaluation  Progress toward goals will be monitored and evaluated per protocol.      Ivette Bauer MS/RD/LD  7A/7B (beds 219-229) RD pager: [phone]  Weekend/Holiday RD pager: [phone]     activity/movement

## 2023-09-15 NOTE — OR NURSING
Spoke with Dr. Moise regarding radiologist's call to PACU.  Recommended CVC retraction 7 cm, and NG advancement 17 cm.  Dr. Moise states no intervention at this time.

## 2023-09-15 NOTE — PROGRESS NOTES
NG tube replaced, by Miley Mcfadden RN and Bre Riddle RN.  Patient tolerated procedure well. Abdominal x-ray ordered by Dr. Green, will await results.    Internal jugular repositioned by Dr. Green, CXR ordered.  Awaiting results.        UPDATE 1100:  Dr. Green read Xray for internal jugular and NG tube placement. Both need to be adjusted. The NG pushed up to 57cm and the internal jugular pulled back by 3cm.  No Xray will be needed to verfy placement on either of these per Dr. Green.

## 2023-09-15 NOTE — CONSULTS
[facility]  Transplant Nephrology Consult  Date of Admission:  9/14/2023  Today's Date: 09/15/2023  Requesting physician: Tk Ascencio*    Recommendations:  - No indication for dialysis.  - Agree with high intensity induction.  - When SBP greater than 130 would resume metoprolol at 12.5 mg PO BID.    Assessment & Plan   # DDKT (SPK): Trend down in serum creatinine.   - Baseline Creatinine: ~ TBD   - Proteinuria: Not checked post transplant   - Date DSA Last Checked: Sep/2023      Latest DSA:  (in process)   - BK Viremia: Not checked post transplant   - Kidney Tx Biopsy: No   - Transplant Ureteral Stent: Yes    # Pancreas Tx (SPK):    - Pancreatic Exocrine Drainage: Enteric drained     - Blood glucose: Near euglycemia      On insulin: Yes   - HbA1c: Stable, near normal      Latest HbA1c: 7.1% (prior to transplant)   - Pancreatic enzymes: Increased   - Date DSA Last Checked: Sep/2023  Latest DSA:  (in process)   - Pancreas Tx Biopsy: No    # Immunosuppression: Tacrolimus immediate release (goal 8-10), Mycophenolate mofetil (dose 1000 mg every 12 hours), and Methylprednisolone (dose taper)   - Patient is in an immunosuppressed state and will continue to monitor for efficacy and toxicity of immunosuppression medications.   - Induction with Recent Transplant:  High Intensity Protocol    - Changes: Not at this time    # Infection Prophylaxis:   - PJP: Sulfa/TMP (Bactrim)  - CMV: Valganciclovir (Valcyte)  - Thrush: Clotrimazole aramis (Mycelex)  - Fungal: Micafungin (Mycamine)    # Hypertension: Controlled;  Goal BP: < 150/90   - Volume status: Euvolemic  EDW ~ 92 kg   - Changes: Not at this time.  When SBP greater than 130 would resume metoprolol at 12.5 mg PO BID.    Prior to admission antihypertensives: metoprolol tartrate 25 mg PO BID    # Type 2 Diabetes: Borderline control (HbA1c 7-9%) Last HbA1c: 7.1%   - Management as per primary team.    # Anemia in Chronic  Renal Disease: Hgb: Decreased      MCKENNA: No   - Iron studies: Low iron saturation, but high ferritin 2/2023    # Mineral Bone Disorder:    - Secondary renal hyperparathyroidism; PTH level: Not checked recently        On treatment: None  - Vitamin D; level: Not checked recently        On supplement: No  - Calcium; level: slightly Low        On supplement: No, oscal 500-5 mg PO BID starts 5/18  - Phosphorus; level: Normal        On supplement: No    # Electrolytes:   - Potassium; level: Normal        On supplement: No  - Magnesium; level: Normal        On supplement: No, magnesium oxide 400 mg PO daily starts 9/17  - Bicarbonate; level: Low        On supplement: No  - Sodium; level: Normal    # CAD: s/p CABG (2/2023).      # PAD: Pt denies any foot wounds.     # BCC: s/p Mohs (8/2022)    # Transplant History:  Etiology of Kidney Failure: Diabetes mellitus type 2  Tx: DDKT (SPK)  Transplant: 9/15/2023 (Kidney), 9/14/2023 (Kidney / Pancreas)  Crossmatch at time of Tx: (pending)  Significant changes in immunosuppression: None  Significant transplant-related complications: None    Recommendations were communicated to the primary team verbally.    Seen and discussed with Dr. Ruiz.    Miguel Anderson, APRN CNP  Pager: [phone]        Physician Attestation     I saw and evaluated Avelina Marion as part of a shared APRN/PA visit.     I personally reviewed the vital signs, medications, labs, and imaging.    I personally performed the substantive portion of the medical decision making for this visit - please see the VIVI's documentation for full details.    Key management decisions made by me and carried out under my direction: ESKD 2/2 type 2 DM  on HD x 2 yrs via LUE AVF, CAD s/p CABG 2/2023 s.p SPK 9/14/2023. High risk induction. Remains on insulin 1u/h, good UOP. 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  He also has a history of CAD s/p CABG (2/2023), PAD, and BCC w/p Mohs (8/2022). Final crossmatch result pending and most recent PRA 23%. Induction was with rATG.  His creatinine this morning of 6.29 is down only slightly from 6.77 yesterday, and urine output has been 790 ml over the last three shifts.  Pt denies any recent illness or blood transfusions.    SBP 110s-140 this morning.  Afebrile. No SOB on 1L O2 via NC.  Pt reports abdominal pain is tolerable.  No BM yet since surgery.       Review of Systems    The 10 point Review of Systems is negative other than noted in the HPI or here.     Past Medical History    I have reviewed this patient's medical history and updated it with pertinent information if needed.   Past Medical History:   Diagnosis Date    Anemia in chronic kidney disease     BCC (basal cell carcinoma), face 8/9/2022    Diabetic retinopathy of both eyes (H)     End stage renal disease (H)     Hypertension     Secondary renal hyperparathyroidism (H)     Type 2 diabetes mellitus (H)        Past Surgical History   I have reviewed this patient's surgical history and updated it with pertinent information if needed.  Past Surgical History:   Procedure Laterality Date    BYPASS GRAFT ARTERY CORONARY N/A 2/13/2023    Procedure: TRANSESPHAGEAL ECHOCARDIOGRAM, MEDIAN STERNOTOMY, TAKE DOWN OF LEFT INTERNAL MAMMARY, LEFT ENDOSCOPIC GREATER SAPHENOUS VEIN HARVEST, CRYO NERVE BLOCK, CARDIOPULMONARY BYPASS, CORONARY ARTERY BYPASS GRAFT X 2.;  Surgeon: Patrice Yepez MD;  Location:  OR    CREATE FISTULA ARTERIOVENOUS UPPER EXTREMITY Left     CV CORONARY ANGIOGRAM N/A 12/30/2021    Procedure: CV CORONARY ANGIOGRAM;  Surgeon: Milton Cam MD;  Location:  HEART CARDIAC CATH LAB    CV  CORONARY ANGIOGRAM N/A 2023    Procedure: Coronary Angiogram;  Surgeon: Milton Cam MD;  Location:  HEART CARDIAC CATH LAB    CV PCI N/A 2023    Procedure: Percutaneous Coronary Intervention;  Surgeon: Milton Cam MD;  Location: [facility] CARDIAC CATH LAB       Family History   I have reviewed this patient's family history and updated it with pertinent information if needed.   Family History   Problem Relation Age of Onset    Diabetes Brother     Kidney Disease Brother        Social History   I have reviewed this patient's social history and updated it with pertinent information if needed. Avelina Marion  reports that he has quit smoking. His smoking use included cigarettes. He has never used smokeless tobacco. He reports that he does not drink alcohol and does not use drugs.    Allergies   No Known Allergies  Prior to Admission Medications    lactated ringers  1,000 mL Intravenous Once      dextrose      dextrose 5% lactated ringers 1,000 mL infusion 100 mL/hr at 09/15/23 1226    HEParin 200 Units/hr (09/15/23 1226)    insulin regular 1 Units/hr (09/15/23 1225)    NaCl 0.45 % 1,000 mL infusion Stopped (09/15/23 1230)    sodium chloride 0.9 % 1,000 mL infusion 150 mL/hr at 09/15/23 1230       Physical Exam   Temp  Av  F (36.7  C)  Min: 97.4  F (36.3  C)  Max: 98.4  F (36.9  C)  Arterial Line BP  Min: 51/46  Max: 146/63  Arterial Line MAP (mmHg)  Av.5 mmHg  Min: 51 mmHg  Max: 88 mmHg      Pulse  Av.9  Min: 60  Max: 106 Resp  Av  Min: 14  Max: 18  SpO2  Av.1 %  Min: 88 %  Max: 100 %    CVP (mmHg): 3 mmHgBP 107/61   Pulse 82   Temp 98.4  F (36.9  C)   Resp 16   Wt 91.9 kg (202 lb 8 oz)   SpO2 100%   BMI 28.24 kg/m      Admit Weight: (P) 91.9 kg (202 lb 8 oz)     GENERAL APPEARANCE: alert and no distress  HENT: mouth without ulcers or lesions  RESP: lungs clear to auscultation - no rales, rhonchi or wheezes  CV: regular rhythm, normal rate, no rub, no murmur  EDEMA: no  LE edema bilaterally  ABDOMEN: soft, nondistended, appropriately tender  MS: extremities normal - no gross deformities noted, no evidence of inflammation in joints, no muscle tenderness  SKIN: no rash  PSYCH: mentation appears normal and affect normal/bright  DIALYSIS ACCESS:  LUE AV fistula +bruit/+thrill      Data   CMP  Recent Labs   Lab 09/15/23  1224 09/15/23  1055 09/15/23  0959 09/15/23  0929 09/15/23  0842 09/15/23  0604 09/15/23  0447 09/15/23  0445 09/15/23  0354 09/15/23  0323 09/15/23  0201 09/15/23  0059 09/15/23  0002 09/14/23  1720 09/14/23  1539   NA  --   --   --   --   --   --  136  --  134  --  137  --  135   < > 136   POTASSIUM  --   --  4.3  --   --   --  4.5  --  4.4  --  4.0  --  4.0   < > 4.4   CHLORIDE  --   --   --   --   --   --  97*  --   --   --   --   --   --   --  95*   CO2  --   --   --   --   --   --  19*  --   --   --   --   --   --   --  25   ANIONGAP  --   --   --   --   --   --  20*  --   --   --   --   --   --   --  16*   * 175*  --  162* 155*   < > 120*   < > 103*   < > 107*   < > 173*   < > 245*   BUN  --   --   --   --   --   --  44.5*  --   --   --   --   --   --   --  46.4*   CR  --   --   --   --   --   --  6.29*  --   --   --   --   --   --   --  6.77*   GFRESTIMATED  --   --   --   --   --   --  9*  --   --   --   --   --   --   --  8*   DAVID  --   --   --   --   --   --  8.6*  --   --   --   --   --   --   --  9.0   MAG  --   --   --   --   --   --  2.1  --   --   --   --   --   --   --   --    PHOS  --   --   --   --   --   --  3.3  --   --   --   --   --   --   --   --    PROTTOTAL  --   --   --   --   --   --   --   --   --   --   --   --   --   --  7.3   ALBUMIN  --   --   --   --   --   --   --   --   --   --   --   --   --   --  4.3   BILITOTAL  --   --   --   --   --   --   --   --   --   --   --   --   --   --  0.9   ALKPHOS  --   --   --   --   --   --   --   --   --   --   --   --   --   --  60   AST  --   --   --   --   --   --   --   --   --   --   --    --   --   --  22   ALT  --   --   --   --   --   --   --   --   --   --   --   --   --   --  6    < > = values in this interval not displayed.     CBC  Recent Labs   Lab 09/15/23  0959 09/15/23  0447 09/15/23  0354 09/15/23  0201 09/14/23  2108 09/14/23  1539   HGB 12.0* 12.6* 12.4* 11.9*   < > 14.5   WBC  --  19.1*  --   --   --  7.7   RBC  --  3.72*  --   --   --  4.33*   HCT  --  37.0*  --   --   --  42.1   MCV  --  100  --   --   --  97   MCH  --  33.9*  --   --   --  33.5*   MCHC  --  34.1  --   --   --  34.4   RDW  --  13.9  --   --   --  13.8   PLT  --  175  --   --   --  175    < > = values in this interval not displayed.     INR  Recent Labs   Lab 09/15/23  0447 09/14/23  1539   INR 1.11 1.06   PTT 26 27     ABG  Recent Labs   Lab 09/15/23  0354 09/15/23  0201 09/15/23  0002 09/14/23  2108   PH 7.42 7.36 7.39 7.44   PCO2 33* 40 39 40   PO2 107* 111* 111* 116*   HCO3 22 22 24 27   O2PER 41.0 42.0 38.0 43.0      Urine Studies  Recent Labs   Lab Test 09/14/23  1643 02/12/23  1539 08/09/22  1017 08/05/21  1426   COLOR Light Yellow Yellow Light Yellow Yellow   APPEARANCE Clear Slightly Cloudy* Clear Clear   URINEGLC 500* 100* 70* 50*   URINEBILI Negative Negative Negative Negative   URINEKETONE Negative Negative Negative Negative   SG 1.017 1.015 1.017 1.016   UBLD Small* Trace* Small* Negative   URINEPH 6.5 5.5 6.5 7.0   PROTEIN 100* 100* 70* 100*   NITRITE Negative Negative Negative Negative   LEUKEST Negative Negative Negative Negative   RBCU 1 3* 6* 2   WBCU 1 2 1 1     No lab results found.  PTH  No lab results found.  Iron Studies  Recent Labs   Lab Test 02/16/23  0834   IRON 24*   *   IRONSAT 20   TREMAINE 1,461*       IMAGING:  All imaging studies reviewed by me.

## 2023-09-16 LAB
AMYLASE SERPL-CCNC: 64 U/L (ref 28–100)
ANION GAP SERPL CALCULATED.3IONS-SCNC: 13 MMOL/L (ref 7–15)
BACTERIA UR CULT: NO GROWTH
BASOPHILS # BLD AUTO: 0 10E3/UL (ref 0–0.2)
BASOPHILS NFR BLD AUTO: 0 %
BUN SERPL-MCNC: 50.1 MG/DL (ref 8–23)
CALCIUM SERPL-MCNC: 7.6 MG/DL (ref 8.8–10.2)
CHLORIDE SERPL-SCNC: 102 MMOL/L (ref 98–107)
CREAT SERPL-MCNC: 5.41 MG/DL (ref 0.67–1.17)
DEPRECATED HCO3 PLAS-SCNC: 25 MMOL/L (ref 22–29)
EGFRCR SERPLBLD CKD-EPI 2021: 11 ML/MIN/1.73M2
EOSINOPHIL # BLD AUTO: 0 10E3/UL (ref 0–0.7)
EOSINOPHIL NFR BLD AUTO: 0 %
ERYTHROCYTE [DISTWIDTH] IN BLOOD BY AUTOMATED COUNT: 14.1 % (ref 10–15)
GLUCOSE BLDC GLUCOMTR-MCNC: 109 MG/DL (ref 70–99)
GLUCOSE BLDC GLUCOMTR-MCNC: 109 MG/DL (ref 70–99)
GLUCOSE BLDC GLUCOMTR-MCNC: 113 MG/DL (ref 70–99)
GLUCOSE BLDC GLUCOMTR-MCNC: 114 MG/DL (ref 70–99)
GLUCOSE BLDC GLUCOMTR-MCNC: 116 MG/DL (ref 70–99)
GLUCOSE BLDC GLUCOMTR-MCNC: 123 MG/DL (ref 70–99)
GLUCOSE BLDC GLUCOMTR-MCNC: 124 MG/DL (ref 70–99)
GLUCOSE BLDC GLUCOMTR-MCNC: 126 MG/DL (ref 70–99)
GLUCOSE BLDC GLUCOMTR-MCNC: 126 MG/DL (ref 70–99)
GLUCOSE BLDC GLUCOMTR-MCNC: 129 MG/DL (ref 70–99)
GLUCOSE BLDC GLUCOMTR-MCNC: 129 MG/DL (ref 70–99)
GLUCOSE BLDC GLUCOMTR-MCNC: 130 MG/DL (ref 70–99)
GLUCOSE BLDC GLUCOMTR-MCNC: 130 MG/DL (ref 70–99)
GLUCOSE BLDC GLUCOMTR-MCNC: 131 MG/DL (ref 70–99)
GLUCOSE BLDC GLUCOMTR-MCNC: 132 MG/DL (ref 70–99)
GLUCOSE BLDC GLUCOMTR-MCNC: 132 MG/DL (ref 70–99)
GLUCOSE BLDC GLUCOMTR-MCNC: 133 MG/DL (ref 70–99)
GLUCOSE BLDC GLUCOMTR-MCNC: 134 MG/DL (ref 70–99)
GLUCOSE BLDC GLUCOMTR-MCNC: 134 MG/DL (ref 70–99)
GLUCOSE BLDC GLUCOMTR-MCNC: 136 MG/DL (ref 70–99)
GLUCOSE BLDC GLUCOMTR-MCNC: 138 MG/DL (ref 70–99)
GLUCOSE BLDC GLUCOMTR-MCNC: 142 MG/DL (ref 70–99)
GLUCOSE SERPL-MCNC: 129 MG/DL (ref 70–99)
HCT VFR BLD AUTO: 31.1 % (ref 40–53)
HGB BLD-MCNC: 10.1 G/DL (ref 13.3–17.7)
HGB BLD-MCNC: 10.3 G/DL (ref 13.3–17.7)
HGB BLD-MCNC: 10.3 G/DL (ref 13.3–17.7)
HGB BLD-MCNC: 9.3 G/DL (ref 13.3–17.7)
HGB BLD-MCNC: 9.9 G/DL (ref 13.3–17.7)
IMM GRANULOCYTES # BLD: 0 10E3/UL
IMM GRANULOCYTES NFR BLD: 0 %
LIPASE SERPL-CCNC: 35 U/L (ref 13–60)
LYMPHOCYTES # BLD AUTO: 0.1 10E3/UL (ref 0.8–5.3)
LYMPHOCYTES NFR BLD AUTO: 1 %
MAGNESIUM SERPL-MCNC: 1.9 MG/DL (ref 1.7–2.3)
MCH RBC QN AUTO: 33.4 PG (ref 26.5–33)
MCHC RBC AUTO-ENTMCNC: 33.1 G/DL (ref 31.5–36.5)
MCV RBC AUTO: 101 FL (ref 78–100)
MONOCYTES # BLD AUTO: 0.6 10E3/UL (ref 0–1.3)
MONOCYTES NFR BLD AUTO: 5 %
NEUTROPHILS # BLD AUTO: 10.1 10E3/UL (ref 1.6–8.3)
NEUTROPHILS NFR BLD AUTO: 94 %
NRBC # BLD AUTO: 0 10E3/UL
NRBC BLD AUTO-RTO: 0 /100
PHOSPHATE SERPL-MCNC: 3.9 MG/DL (ref 2.5–4.5)
PLATELET # BLD AUTO: 107 10E3/UL (ref 150–450)
POTASSIUM SERPL-SCNC: 4.4 MMOL/L (ref 3.4–5.3)
POTASSIUM SERPL-SCNC: 4.5 MMOL/L (ref 3.4–5.3)
POTASSIUM SERPL-SCNC: 4.5 MMOL/L (ref 3.4–5.3)
RBC # BLD AUTO: 3.08 10E6/UL (ref 4.4–5.9)
SODIUM SERPL-SCNC: 140 MMOL/L (ref 136–145)
UFH PPP CHRO-ACNC: <0.1 IU/ML
WBC # BLD AUTO: 10.4 10E3/UL (ref 4–11)

## 2023-09-16 PROCEDURE — 84132 ASSAY OF SERUM POTASSIUM: CPT | Performed by: SURGERY

## 2023-09-16 PROCEDURE — 85018 HEMOGLOBIN: CPT | Performed by: PHYSICIAN ASSISTANT

## 2023-09-16 PROCEDURE — 250N000011 HC RX IP 250 OP 636: Mod: JZ | Performed by: SURGERY

## 2023-09-16 PROCEDURE — 258N000003 HC RX IP 258 OP 636: Performed by: SURGERY

## 2023-09-16 PROCEDURE — 250N000013 HC RX MED GY IP 250 OP 250 PS 637: Performed by: PHYSICIAN ASSISTANT

## 2023-09-16 PROCEDURE — 82310 ASSAY OF CALCIUM: CPT | Performed by: SURGERY

## 2023-09-16 PROCEDURE — 36415 COLL VENOUS BLD VENIPUNCTURE: CPT | Performed by: SURGERY

## 2023-09-16 PROCEDURE — 85018 HEMOGLOBIN: CPT | Performed by: SURGERY

## 2023-09-16 PROCEDURE — 250N000013 HC RX MED GY IP 250 OP 250 PS 637: Performed by: SURGERY

## 2023-09-16 PROCEDURE — 83690 ASSAY OF LIPASE: CPT | Performed by: SURGERY

## 2023-09-16 PROCEDURE — 36592 COLLECT BLOOD FROM PICC: CPT | Performed by: PHYSICIAN ASSISTANT

## 2023-09-16 PROCEDURE — 36592 COLLECT BLOOD FROM PICC: CPT | Performed by: SURGERY

## 2023-09-16 PROCEDURE — 85520 HEPARIN ASSAY: CPT | Performed by: SURGERY

## 2023-09-16 PROCEDURE — 82150 ASSAY OF AMYLASE: CPT | Performed by: SURGERY

## 2023-09-16 PROCEDURE — 120N000011 HC R&B TRANSPLANT UMMC

## 2023-09-16 PROCEDURE — 250N000011 HC RX IP 250 OP 636: Performed by: PHYSICIAN ASSISTANT

## 2023-09-16 PROCEDURE — 258N000003 HC RX IP 258 OP 636: Performed by: PHYSICIAN ASSISTANT

## 2023-09-16 PROCEDURE — 84100 ASSAY OF PHOSPHORUS: CPT | Performed by: SURGERY

## 2023-09-16 PROCEDURE — 250N000012 HC RX MED GY IP 250 OP 636 PS 637: Performed by: PHYSICIAN ASSISTANT

## 2023-09-16 PROCEDURE — 83735 ASSAY OF MAGNESIUM: CPT | Performed by: SURGERY

## 2023-09-16 PROCEDURE — 36415 COLL VENOUS BLD VENIPUNCTURE: CPT | Performed by: PHYSICIAN ASSISTANT

## 2023-09-16 PROCEDURE — 85014 HEMATOCRIT: CPT | Performed by: SURGERY

## 2023-09-16 PROCEDURE — 250N000011 HC RX IP 250 OP 636: Mod: JZ,JB | Performed by: PHYSICIAN ASSISTANT

## 2023-09-16 RX ORDER — HEPARIN SODIUM 10000 [USP'U]/100ML
500 INJECTION, SOLUTION INTRAVENOUS CONTINUOUS
Status: DISCONTINUED | OUTPATIENT
Start: 2023-09-16 | End: 2023-09-20

## 2023-09-16 RX ORDER — DIPHENHYDRAMINE HCL 12.5MG/5ML
25-50 LIQUID (ML) ORAL ONCE
Status: COMPLETED | OUTPATIENT
Start: 2023-09-16 | End: 2023-09-16

## 2023-09-16 RX ORDER — ACETAMINOPHEN 325 MG/1
650 TABLET ORAL ONCE
Status: COMPLETED | OUTPATIENT
Start: 2023-09-16 | End: 2023-09-16

## 2023-09-16 RX ORDER — DIPHENHYDRAMINE HCL 25 MG
25-50 CAPSULE ORAL ONCE
Status: COMPLETED | OUTPATIENT
Start: 2023-09-16 | End: 2023-09-16

## 2023-09-16 RX ORDER — METHYLPREDNISOLONE SODIUM SUCCINATE 125 MG/2ML
100 INJECTION, POWDER, LYOPHILIZED, FOR SOLUTION INTRAMUSCULAR; INTRAVENOUS ONCE
Status: COMPLETED | OUTPATIENT
Start: 2023-09-16 | End: 2023-09-16

## 2023-09-16 RX ORDER — CALCIUM GLUCONATE 20 MG/ML
1 INJECTION, SOLUTION INTRAVENOUS ONCE
Status: COMPLETED | OUTPATIENT
Start: 2023-09-16 | End: 2023-09-16

## 2023-09-16 RX ADMIN — ANTI-THYMOCYTE GLOBULIN (RABBIT) 200 MG: 5 INJECTION, POWDER, LYOPHILIZED, FOR SOLUTION INTRAVENOUS at 18:30

## 2023-09-16 RX ADMIN — PIPERACILLIN AND TAZOBACTAM 2.25 G: 2; .25 INJECTION, POWDER, FOR SOLUTION INTRAVENOUS at 20:36

## 2023-09-16 RX ADMIN — FUROSEMIDE 80 MG: 10 INJECTION, SOLUTION INTRAMUSCULAR; INTRAVENOUS at 08:51

## 2023-09-16 RX ADMIN — METHOCARBAMOL 500 MG: 500 TABLET, FILM COATED ORAL at 12:17

## 2023-09-16 RX ADMIN — Medication 40 MG: at 08:51

## 2023-09-16 RX ADMIN — MYCOPHENOLATE MOFETIL 1000 MG: 200 POWDER, FOR SUSPENSION ORAL at 17:40

## 2023-09-16 RX ADMIN — TACROLIMUS 3 MG: 5 CAPSULE ORAL at 08:51

## 2023-09-16 RX ADMIN — CALCIUM GLUCONATE 1 G: 20 INJECTION, SOLUTION INTRAVENOUS at 17:51

## 2023-09-16 RX ADMIN — PIPERACILLIN AND TAZOBACTAM 2.25 G: 2; .25 INJECTION, POWDER, FOR SOLUTION INTRAVENOUS at 05:11

## 2023-09-16 RX ADMIN — METHYLPREDNISOLONE SODIUM SUCCINATE 100 MG: 125 INJECTION, POWDER, FOR SOLUTION INTRAMUSCULAR; INTRAVENOUS at 17:34

## 2023-09-16 RX ADMIN — HEPARIN SODIUM 400 UNITS/HR: 10000 INJECTION, SOLUTION INTRAVENOUS at 10:14

## 2023-09-16 RX ADMIN — POLYETHYLENE GLYCOL 3350 17 G: 17 POWDER, FOR SOLUTION ORAL at 08:51

## 2023-09-16 RX ADMIN — SODIUM CHLORIDE, SODIUM LACTATE, POTASSIUM CHLORIDE, CALCIUM CHLORIDE AND DEXTROSE MONOHYDRATE: 5; 600; 310; 30; 20 INJECTION, SOLUTION INTRAVENOUS at 09:30

## 2023-09-16 RX ADMIN — OCTREOTIDE ACETATE 100 MCG: 100 INJECTION, SOLUTION INTRAVENOUS; SUBCUTANEOUS at 13:52

## 2023-09-16 RX ADMIN — OCTREOTIDE ACETATE 100 MCG: 100 INJECTION, SOLUTION INTRAVENOUS; SUBCUTANEOUS at 20:36

## 2023-09-16 RX ADMIN — DIPHENHYDRAMINE HYDROCHLORIDE 50 MG: 25 SOLUTION ORAL at 17:34

## 2023-09-16 RX ADMIN — PIPERACILLIN AND TAZOBACTAM 2.25 G: 2; .25 INJECTION, POWDER, FOR SOLUTION INTRAVENOUS at 13:50

## 2023-09-16 RX ADMIN — ASPIRIN 325 MG ORAL TABLET 325 MG: 325 PILL ORAL at 08:51

## 2023-09-16 RX ADMIN — OCTREOTIDE ACETATE 100 MCG: 100 INJECTION, SOLUTION INTRAVENOUS; SUBCUTANEOUS at 09:47

## 2023-09-16 RX ADMIN — MICAFUNGIN SODIUM 100 MG: 50 INJECTION, POWDER, LYOPHILIZED, FOR SOLUTION INTRAVENOUS at 18:45

## 2023-09-16 RX ADMIN — MYCOPHENOLATE MOFETIL 1000 MG: 200 POWDER, FOR SUSPENSION ORAL at 08:51

## 2023-09-16 RX ADMIN — ACETAMINOPHEN 650 MG: 500 TABLET ORAL at 17:33

## 2023-09-16 RX ADMIN — SODIUM CHLORIDE, SODIUM LACTATE, POTASSIUM CHLORIDE, CALCIUM CHLORIDE AND DEXTROSE MONOHYDRATE: 5; 600; 310; 30; 20 INJECTION, SOLUTION INTRAVENOUS at 18:44

## 2023-09-16 RX ADMIN — HYDROMORPHONE HYDROCHLORIDE 0.1 MG: 0.2 INJECTION, SOLUTION INTRAMUSCULAR; INTRAVENOUS; SUBCUTANEOUS at 12:17

## 2023-09-16 RX ADMIN — TACROLIMUS 3 MG: 5 CAPSULE ORAL at 17:40

## 2023-09-16 RX ADMIN — FUROSEMIDE 80 MG: 10 INJECTION, SOLUTION INTRAMUSCULAR; INTRAVENOUS at 17:33

## 2023-09-16 RX ADMIN — SULFAMETHOXAZOLE AND TRIMETHOPRIM 80 MG: 200; 40 SUSPENSION ORAL at 08:51

## 2023-09-16 RX ADMIN — FAMOTIDINE 10 MG: 10 INJECTION, SOLUTION INTRAVENOUS at 13:51

## 2023-09-16 ASSESSMENT — ACTIVITIES OF DAILY LIVING (ADL)
ADLS_ACUITY_SCORE: 22
ADLS_ACUITY_SCORE: 20
ADLS_ACUITY_SCORE: 22
ADLS_ACUITY_SCORE: 20
ADLS_ACUITY_SCORE: 22
ADLS_ACUITY_SCORE: 20
ADLS_ACUITY_SCORE: 20
ADLS_ACUITY_SCORE: 22
ADLS_ACUITY_SCORE: 22
ADLS_ACUITY_SCORE: 20

## 2023-09-16 NOTE — PLAN OF CARE
Shift: 1627-9597  /67 (BP Location: Right arm)   Pulse 96   Temp 98.9  F (37.2  C) (Oral)   Resp 16   Wt 91.9 kg (202 lb 8 oz)   SpO2 98%   BMI 28.24 kg/m       VS- stable  on 1L NC. On continuous pulse oximetry.   CVP - 5-10. On Tele  BG- Q1 on insulin gtt Algorithm  #1 105-142  Labs- Potasium 4.1 Hemoglobin 10.2 recheck with AM labs   Pain/Nausea/PRN'S- denies nausea. PRN Robaxin given for pain x1  Diet- NPO with Ice chips   LDA- 3L right internal jugular, Right PIV X2, Left AV fistula.  NG to low intermittent  suction 150 ml output   Right STEVEN 110 ml output   Gtt/IVF-   -D5LR @ 100 ml/hr.  -Heparin Gtt 200 units/hr straight rated  -insulin gtt currently at 0.5 units/hr  GI- pt not yet passing flatus  - lim  Hourly urine output  Skin- midline incision covered with OP dressing CDI  Activity- Patient not yet OOB since surgery  Plan- Med card and lab book started. continue with plan of care

## 2023-09-16 NOTE — PROGRESS NOTES
[facility]   Transplant Nephrology Progress Note  Date of Admission:  9/14/202t  Today's Date: 09/16/2023    Recommendations:  - 1 g Calcium Gluconate IV     Assessment & Plan   # DDKT (SPK): Trend down , excellent urine output   - Baseline Creatinine: ~ TBD   - Proteinuria: Not checked post transplant   - Date DSA Last Checked: Sep/2023      Latest DSA:  in process   - BK Viremia: Not checked post transplant   - Kidney Tx Biopsy: No    # Pancreas Tx (SPK):    - Pancreatic Exocrine Drainage: Enteric drained     - Blood glucose: Near euglycemia      On insulin: Yes   - HbA1c: Stable, near normal      Latest HbA1c: 7.1% (prior to transplant)   - Pancreatic enzymes: Increased   - Date DSA Last Checked: Sep/2023  Latest DSA:  in process   - Pancreas Tx Biopsy: No    # Immunosuppression: Tacrolimus immediate release (goal 8-10), Mycophenolate mofetil (dose 1000 mg every 12 hours) and Prednisone (dose taper)   - Patient is in an immunosuppressed state and will continue to monitor for efficacy and toxicity of immunosuppression medications.  - High intensity protocol  - Changes: No     # Infection Prophylaxis:   - PJP: Sulfa/TMP (Bactrim)  - CMV: Valganciclovir (Valcyte)  - Thrush: Clotrimazole aramis (Mycelex)  - Fungal: Micafungin (Mycamine)    # Hypertension: Controlled;  Goal BP: < 150/90   - Volume status: Euvolemic     - Changes: Not at this time, When SBP greater than 130 would resume metoprolol at 12.5 mg PO BID.      # Anemia in Chronic Renal Disease: Hgb: Decreased      MCKENNA: No   - Iron studies: Low iron saturation, but high ferritin    # Mineral Bone Disorder:   - Secondary renal hyperparathyroidism; PTH level: Not checked recently        On treatment: None  - Vitamin D; level: Not checked recently        On supplement: No  - Calcium; level: Low        On supplement: No, No, oscal 500-5 mg PO BID starts 5/18  - Phosphorus; level: Normal        On supplement: No  -  Secondary renal hyperparathyroidism; PTH level: Not checked recently        On treatment: None  - Vitamin D; level: Not checked recently        On supplement: No  - Calcium; level: Low        On supplement: No, recommend 1 g Calcium Gluconate IV   - Phosphorus; level: Normal        On supplement: No    # Electrolytes:   - Potassium; level: Normal        On supplement: No  - Magnesium; level: Normal        On supplement: Yes  - Bicarbonate; level: Normal        On supplement: No     # CAD: S/p CABG    #PAD    #Non-melanotic skin cancer: S/p Mohs in 2022    # Transplant History:  Etiology of Kidney Failure: Diabetes mellitus type 2  Tx: DDKT (SPK)  Transplant: 9/15/2023 (Kidney), 9/14/2023 (Kidney / Pancreas)  Significant changes in immunosuppression: None  Significant transplant-related complications: None    Recommendations were communicated to the primary team via this note.    Discussed with Dr. Demetri Moreno, [facility]  Transplant Nephrology  Contact information available via [facility] Paging/Directory     Interval History     Mr. Estradas creatinine is 5.41 (09/16 0801); Trend down.  Excellent  urine output.  Other significant labs/tests/vitals: stable   No  events overnight.  No  chest pain or shortness of breath.  No  leg swelling.  No nausea and vomiting.  No gas yet.  No fever, sweats or chills.       .    Review of Systems   4 point ROS was obtained and negative except as noted in the Interval History.    MEDICATIONS:   [START ON 9/18/2023] acetaminophen  975 mg Oral Q8H    aspirin  325 mg Oral or Feeding Tube Daily    [START ON 9/18/2023] calcium carbonate-vitamin D  1 tablet Oral BID w/meals    [START ON 9/22/2023] clotrimazole  10 mg Buccal 4x Daily    famotidine  10 mg Oral Q24H    Or    famotidine  10 mg Intravenous Q24H    furosemide  80 mg Intravenous BID    [START ON 9/17/2023] magnesium oxide  400 mg Oral Q24H    micafungin  100 mg Intravenous Q24H    mycophenolate  1,000 mg Per NG tube  BID IS    octreotide  100 mcg Subcutaneous TID    pantoprazole  40 mg Per NG tube Daily    piperacillin-tazobactam  2.25 g Intravenous Q8H    polyethylene glycol  17 g Oral Daily    [START ON 2023] senna-docusate  1 tablet Oral BID    sodium chloride (PF)  3 mL Intravenous Q8H    sulfamethoxazole-trimethoprim  10 mL Per NG tube Once per day on  Sat    tacrolimus  3 mg Oral or NG Tube BID IS    [START ON 2023] valGANciclovir  450 mg Oral Once per day on       dextrose      dextrose 5% lactated ringers 1,000 mL infusion 100 mL/hr at 23 0700    HEParin 200 Units/hr (23 0500)    insulin regular 0.5 Units/hr (23 0701)    NaCl 0.45 % 1,000 mL infusion Stopped (09/15/23 1230)    sodium chloride 0.9 % 1,000 mL infusion Stopped (09/15/23 1900)       Physical Exam   Temp  Av.4  F (36.9  C)  Min: 97.4  F (36.3  C)  Max: 99.1  F (37.3  C)  Arterial Line BP  Min: 51/46  Max: 179/68  Arterial Line MAP (mmHg)  Av.4 mmHg  Min: 51 mmHg  Max: 105 mmHg      Pulse  Av.5  Min: 60  Max: 106 Resp  Avg: 15  Min: 10  Max: 19  SpO2  Av.4 %  Min: 88 %  Max: 100 %    CVP (mmHg): 11 mmHgBP 137/68 (BP Location: Right arm)   Pulse 98   Temp 98  F (36.7  C) (Oral)   Resp 14   Wt 91.9 kg (202 lb 8 oz)   SpO2 98%   BMI 28.24 kg/m     Date 23 0700 - 23 0659   Shift 0540-9630 5056-7381 0168-3743 24 Hour Total   INTAKE   I.V. 1251.07   1251.07   Shift Total(mL/kg) 1251.07(13.62)   1251.07(13.62)   OUTPUT   Shift Total(mL/kg)       Weight (kg) 91.85 91.85 91.85 91.85      Admit Weight: (P) 91.9 kg (202 lb 8 oz)     GENERAL APPEARANCE: alert and no distress  HENT: mouth without ulcers or lesions  RESP: lungs clear to auscultation - no rales, rhonchi or wheezes  CV: regular rhythm, normal rate, no rub, no murmur  EDEMA: no LE edema bilaterally  ABDOMEN: soft, nondistended, nontender, bowel sounds normal  MS: extremities normal - no gross deformities noted, no evidence of  inflammation in joints, no muscle tenderness  SKIN: no rash    Data   All labs reviewed by me.  CMP  Recent Labs   Lab 09/16/23  0649 09/16/23  0557 09/16/23  0511 09/16/23  0409 09/16/23  0348 09/15/23  2206 09/15/23  2201 09/15/23  1802 09/15/23  1702 09/15/23  1055 09/15/23  0959 09/15/23  0604 09/15/23  0447 09/15/23  0445 09/15/23  0354 09/15/23  0323 09/15/23  0201 09/15/23  0059 09/15/23  0002 09/14/23  1720 09/14/23  1539   NA  --   --   --   --   --   --   --   --   --   --   --   --  136  --  134  --  137  --  135   < > 136   POTASSIUM  --   --   --   --  4.4  --  4.2  --  4.1  --  4.3  --  4.5  --  4.4  --  4.0  --  4.0   < > 4.4   CHLORIDE  --   --   --   --   --   --   --   --   --   --   --   --  97*  --   --   --   --   --   --   --  95*   CO2  --   --   --   --   --   --   --   --   --   --   --   --  19*  --   --   --   --   --   --   --  25   ANIONGAP  --   --   --   --   --   --   --   --   --   --   --   --  20*  --   --   --   --   --   --   --  16*   * 132* 138* 114*  --    < >  --    < >  --    < >  --    < > 120*   < > 103*   < > 107*   < > 173*   < > 245*   BUN  --   --   --   --   --   --   --   --   --   --   --   --  44.5*  --   --   --   --   --   --   --  46.4*   CR  --   --   --   --   --   --   --   --   --   --   --   --  6.29*  --   --   --   --   --   --   --  6.77*   GFRESTIMATED  --   --   --   --   --   --   --   --   --   --   --   --  9*  --   --   --   --   --   --   --  8*   DAVID  --   --   --   --   --   --   --   --   --   --   --   --  8.6*  --   --   --   --   --   --   --  9.0   MAG  --   --   --   --   --   --   --   --   --   --   --   --  2.1  --   --   --   --   --   --   --   --    PHOS  --   --   --   --   --   --   --   --   --   --   --   --  3.3  --   --   --   --   --   --   --   --    PROTTOTAL  --   --   --   --   --   --   --   --   --   --   --   --   --   --   --   --   --   --   --   --  7.3   ALBUMIN  --   --   --   --   --   --   --   --   --    --   --   --   --   --   --   --   --   --   --   --  4.3   BILITOTAL  --   --   --   --   --   --   --   --   --   --   --   --   --   --   --   --   --   --   --   --  0.9   ALKPHOS  --   --   --   --   --   --   --   --   --   --   --   --   --   --   --   --   --   --   --   --  60   AST  --   --   --   --   --   --   --   --   --   --   --   --   --   --   --   --   --   --   --   --  22   ALT  --   --   --   --   --   --   --   --   --   --   --   --   --   --   --   --   --   --   --   --  6    < > = values in this interval not displayed.     CBC  Recent Labs   Lab 09/16/23  0348 09/15/23  2152 09/15/23  1702 09/15/23  0959 09/15/23  0447 09/14/23  2108 09/14/23  1539   HGB 10.1* 10.2* 11.1* 12.0* 12.6*   < > 14.5   WBC  --   --   --   --  19.1*  --  7.7   RBC  --   --   --   --  3.72*  --  4.33*   HCT  --   --   --   --  37.0*  --  42.1   MCV  --   --   --   --  100  --  97   MCH  --   --   --   --  33.9*  --  33.5*   MCHC  --   --   --   --  34.1  --  34.4   RDW  --   --   --   --  13.9  --  13.8   PLT  --   --   --   --  175  --  175    < > = values in this interval not displayed.     INR  Recent Labs   Lab 09/15/23  0447 09/14/23  1539   INR 1.11 1.06   PTT 26 27     ABG  Recent Labs   Lab 09/15/23  0354 09/15/23  0201 09/15/23  0002 09/14/23 2108   PH 7.42 7.36 7.39 7.44   PCO2 33* 40 39 40   PO2 107* 111* 111* 116*   HCO3 22 22 24 27   O2PER 41.0 42.0 38.0 43.0      Urine Studies  Recent Labs   Lab Test 09/14/23  1643 02/12/23  1539 08/09/22  1017 08/05/21  1426   COLOR Light Yellow Yellow Light Yellow Yellow   APPEARANCE Clear Slightly Cloudy* Clear Clear   URINEGLC 500* 100* 70* 50*   URINEBILI Negative Negative Negative Negative   URINEKETONE Negative Negative Negative Negative   SG 1.017 1.015 1.017 1.016   UBLD Small* Trace* Small* Negative   URINEPH 6.5 5.5 6.5 7.0   PROTEIN 100* 100* 70* 100*   NITRITE Negative Negative Negative Negative   LEUKEST Negative Negative Negative Negative   RBCU 1 3*  6* 2   WBCU 1 2 1 1     No lab results found.  PTH  No lab results found.  Iron Studies  Recent Labs   Lab Test 02/16/23  0834   IRON 24*   *   IRONSAT 20   TREMAINE 1,461*       IMAGING:  All imaging studies reviewed by me.

## 2023-09-16 NOTE — PLAN OF CARE
Goal Outcome Evaluation:    BP (!) 146/69 (BP Location: Right arm)   Pulse 98   Temp 98  F (36.7  C) (Oral)   Resp 16   Wt 95.8 kg (211 lb 4.8 oz)   SpO2 97%   BMI 29.47 kg/m        Shift: 1479-8067  VS: VSS on 1 Liter of oxygen. Pulse on Cont.  Pain: 3/10 when resting, 5/10 when ambulated. Gave Robaxin and IV Diluadid one time before ambulating - effective   Neuro: Alert and oriented x 4  Cardiac: Tele discontinued this afternoon   Respiratory: attempted to wean oxygen off, oxygen dropped at 78% on RA.     Diet/Appetite:  NPO    /GI: lim cath with AUP. Bowel sound hypoactive, no passing gas, intermittent hiccups.    LDA's: internal jugular triple lumen, STEVEN drain, R hand PIV  2x  Skin: midline incision with prima pore dressing and   Activity: Ambulated outside room 3x's this shift, gait belt and walker. Tolerated well   Updates: Patient watched My Transplant videos, lab book updated, Abdominal precautions info given. CCP and Tele discontinued about 1300 . Thymo started at 1830   Pertinent Labs/: potassium 4.5, Mag 1.9, Phos 3.9  B's -130's Insulin drip 0.5 units Algorithm 1   Plan: cont with POC        Plan of Care Reviewed With: patient    Overall Patient Progress: improvingOverall Patient Progress: improving

## 2023-09-16 NOTE — PROGRESS NOTES
Transplant Surgery  Inpatient Daily Progress Note  09/16/2023    Assessment & Plan: Avelina Marion is a 68 year old male with a past medical history significant for end stage kidney disease secondary to type 2 diabetes mellitus, BCC, CAD. On HD MWF via LUE AVF since April 2021. Still makes urine. CABG x 2 2/2023.     Graft function:  S/p kidney transplant: Cr 5.4, decreased from post 6.8. UO 3.8L yesterday. K stable.     S/p pancreas transplant: Difficult to visualize pancreas transplant on US but appears to have flow in and out. Minimal usage on the insulin drip (~0.5 U/hr). Lipase normalized.    -Mild pancreatitis noted, octreotide 100 mg TID.   -Heparin prophylaxis as below.    Immunosuppression management: Induction with thymoglobulin, plan for 6.5 mg/kg.  Thymo 200 mg intra-op, 200 mg on 9/15, 200 mg on 9/16. Plan for 50 mg on 9/17  Solu-Medrol 500 mg intra-op, 250 mg on 9/15, 100 mg on 9/16    Maintenance with:  Tacrolimus 3 mg BID, goal 8-10. Check level on Sunday  MMF 1000 mg BID    Hematology: Hgb 10.1 (10.2). Monitor Hgb q4 hrs today with increase in Heparin.   Heparin drip at 200 U/hr for graft prophylaxis. Increase to 400 U/hr. If hbg stable increase to 500 unit(s)/hr.    Cardiorespiratory: Hypotensive post op initially requiring dopamine in PACU. Resolved    GI/Nutrition: NPO, NG tube in place  Start bowel regimen    Endocrine: see above    Fluid/Electrolytes: MIVF: D5LR at 100 mL/hr. Stop urine replacements.     : Uribe to remain due to new surgical anastomosis    Infectious disease: Tessa-op prophylaxis with zosyn and micafungin. Prophylaxis with bactrim and vaclyte    Prophylaxis: DVT (mechanical, heparin straight rate), fall, GI, fungal    Activity: PT consult. OOB to chair and ambulate today.     Disposition: 7A    VIVI/Fellow/Resident Provider: Linda Camacho PA-C    Faculty: Tk Ascencio MD  _________________________________________________________________  Transplant History: Admitted  2023 for  donor kidney pancreas transplant.  9/15/2023 (Kidney), 2023 (Kidney / Pancreas), Postoperative day: 2 (Kidney, Pancreas), 1 (Kidney)     Interval History: History is obtained from the patient and electronic health record  Overnight events: Pain controlled. Denies cest pain or SOB. No flatus or BM. Has not been out of bed to chair yet today.     ROS:   A 10-point review of systems was negative except as noted above.    Meds:   [START ON 2023] acetaminophen  975 mg Oral Q8H    aspirin  325 mg Oral or Feeding Tube Daily    [START ON 2023] calcium carbonate-vitamin D  1 tablet Oral BID w/meals    [START ON 2023] clotrimazole  10 mg Buccal 4x Daily    famotidine  10 mg Oral Q24H    Or    famotidine  10 mg Intravenous Q24H    furosemide  80 mg Intravenous BID    [START ON 2023] magnesium oxide  400 mg Oral Q24H    micafungin  100 mg Intravenous Q24H    mycophenolate  1,000 mg Per NG tube BID IS    octreotide  100 mcg Subcutaneous TID    pantoprazole  40 mg Per NG tube Daily    piperacillin-tazobactam  2.25 g Intravenous Q8H    polyethylene glycol  17 g Oral Daily    [START ON 2023] senna-docusate  1 tablet Oral BID    sodium chloride (PF)  3 mL Intravenous Q8H    sulfamethoxazole-trimethoprim  10 mL Per NG tube Once per day on  Sat    tacrolimus  3 mg Oral or NG Tube BID IS    [START ON 2023] valGANciclovir  450 mg Oral Once per day on Mon Thu       Physical Exam:     Admit Weight: (P) 91.9 kg (202 lb 8 oz)    Current vitals:   /68 (BP Location: Right arm)   Pulse 98   Temp 98  F (36.7  C) (Oral)   Resp 14   Wt 91.9 kg (202 lb 8 oz)   SpO2 98%   BMI 28.24 kg/m          Vital sign ranges:    Temp:  [97.6  F (36.4  C)-99.1  F (37.3  C)] 98  F (36.7  C)  Pulse:  [81-98] 98  Resp:  [10-19] 14  BP: (107-149)/(45-90) 137/68  MAP:  [57 mmHg-105 mmHg] 79 mmHg  Arterial Line BP: ()/(51-68) 133/54  SpO2:  [93 %-100 %] 98 %  Patient Vitals for the  past 24 hrs:   BP Temp Temp src Pulse Resp SpO2   09/16/23 0649 137/68 98  F (36.7  C) Oral 98 14 98 %   09/16/23 0600 129/67 98.9  F (37.2  C) Oral 96 16 98 %   09/16/23 0500 133/66 98.6  F (37  C) Oral 97 18 96 %   09/16/23 0400 134/70 -- -- 92 16 98 %   09/16/23 0300 138/68 98.9  F (37.2  C) Oral 92 16 96 %   09/16/23 0200 130/65 99.1  F (37.3  C) Oral 93 16 96 %   09/16/23 0100 133/66 98.9  F (37.2  C) Oral 96 16 98 %   09/16/23 0000 138/66 -- -- 89 15 94 %   09/15/23 2300 133/67 98.9  F (37.2  C) Oral 94 16 94 %   09/15/23 2200 (!) 140/87 98.4  F (36.9  C) Oral 96 16 96 %   09/15/23 2100 135/67 98.4  F (36.9  C) Oral 92 18 95 %   09/15/23 2009 139/68 98.9  F (37.2  C) Oral 91 16 93 %   09/15/23 1551 (!) 140/66 98.2  F (36.8  C) Oral 86 19 94 %   09/15/23 1214 115/66 97.6  F (36.4  C) Oral 92 16 98 %   09/15/23 1145 122/65 -- -- 86 15 99 %   09/15/23 1130 132/66 -- -- 82 19 99 %   09/15/23 1115 111/60 -- -- 86 17 99 %   09/15/23 1045 110/57 98.4  F (36.9  C) Oral 83 18 99 %   09/15/23 1030 110/63 -- -- 82 -- 97 %   09/15/23 1020 -- -- -- -- -- 93 %   09/15/23 1015 114/64 -- -- 83 10 100 %   09/15/23 1000 120/58 97.6  F (36.4  C) Oral 83 12 100 %   09/15/23 0945 (!) 140/77 -- -- 92 14 95 %   09/15/23 0930 107/59 -- -- 84 14 99 %   09/15/23 0915 110/59 -- -- 82 12 99 %   09/15/23 0900 107/60 -- -- 81 14 100 %   09/15/23 0845 113/45 -- -- 85 14 99 %   09/15/23 0830 (!) 149/63 -- -- 81 12 100 %   09/15/23 0815 116/57 -- -- 81 12 99 %   09/15/23 0800 (!) 127/90 -- -- 82 12 100 %   09/15/23 0745 114/61 -- -- 81 14 100 %   09/15/23 0730 114/61 -- -- 83 10 100 %       General Appearance: in no apparent distress.   Skin: normal, warm  Heart: regular rate and rhythm  Lungs: Nonlabored resps  Abdomen: The abdomen is soft, nontender. Drain with serosanguinous output. The wound is covered with post-op dressing.  : lim is present. Urine has no gross hematuria.   Extremities: edema: absent.   Neurologic: awake, alert, and  oriented. Tremor absent.     Data:   [facility]  Recent Labs   Lab 09/16/23  0649 09/16/23  0557 09/16/23  0409 09/16/23  0348 09/15/23  2206 09/15/23  2201 09/15/23  0604 09/15/23  0447 09/15/23  0445 09/15/23  0354 09/15/23  0323 09/15/23  0201 09/14/23  1720 09/14/23  1539   NA  --   --   --   --   --   --   --  136  --  134  --  137   < > 136   POTASSIUM  --   --   --  4.4  --  4.2   < > 4.5  --  4.4  --  4.0   < > 4.4   CHLORIDE  --   --   --   --   --   --   --  97*  --   --   --   --   --  95*   CO2  --   --   --   --   --   --   --  19*  --   --   --   --   --  25   * 132*   < >  --    < >  --    < > 120*   < > 103*   < > 107*   < > 245*   BUN  --   --   --   --   --   --   --  44.5*  --   --   --   --   --  46.4*   CR  --   --   --   --   --   --   --  6.29*  --   --   --   --   --  6.77*   GFRESTIMATED  --   --   --   --   --   --   --  9*  --   --   --   --   --  8*   DAVID  --   --   --   --   --   --   --  8.6*  --   --   --   --   --  9.0   ICAW  --   --   --   --   --   --   --   --   --  4.3*  --  4.3*   < >  --    MAG  --   --   --   --   --   --   --  2.1  --   --   --   --   --   --    PHOS  --   --   --   --   --   --   --  3.3  --   --   --   --   --   --    AMYLASE  --   --   --   --   --   --   --  91  --   --   --   --   --  62   LIPASE  --   --   --   --   --   --   --  104*  --   --   --   --   --  41   ALBUMIN  --   --   --   --   --   --   --   --   --   --   --   --   --  4.3   BILITOTAL  --   --   --   --   --   --   --   --   --   --   --   --   --  0.9   ALKPHOS  --   --   --   --   --   --   --   --   --   --   --   --   --  60   AST  --   --   --   --   --   --   --   --   --   --   --   --   --  22   ALT  --   --   --   --   --   --   --   --   --   --   --   --   --  6    < > = values in this interval not displayed.       CBC  Recent Labs   Lab 09/16/23  0348 09/15/23  2152 09/15/23  0959 09/15/23  0447 09/14/23  2108 09/14/23  1539   HGB 10.1* 10.2*   < > 12.6*   < > 14.5   WBC  --    --   --  19.1*  --  7.7   PLT  --   --   --  175  --  175   A1C  --   --   --  8.4*  --   --     < > = values in this interval not displayed.       COAGS  Recent Labs   Lab 09/15/23  0447 09/14/23  1539   INR 1.11 1.06   PTT 26 27        Urinalysis  Recent Labs   Lab Test 09/14/23  1643 02/12/23  1539   COLOR Light Yellow Yellow   APPEARANCE Clear Slightly Cloudy*   URINEGLC 500* 100*   URINEBILI Negative Negative   URINEKETONE Negative Negative   SG 1.017 1.015   UBLD Small* Trace*   URINEPH 6.5 5.5   PROTEIN 100* 100*   NITRITE Negative Negative   LEUKEST Negative Negative   RBCU 1 3*   WBCU 1 2       Virology:  Hepatitis C Antibody   Date Value Ref Range Status   08/09/2022 Nonreactive Nonreactive Final

## 2023-09-17 ENCOUNTER — APPOINTMENT (OUTPATIENT)
Dept: PHYSICAL THERAPY | Facility: CLINIC | Age: 68
DRG: 008 | End: 2023-09-17
Attending: PHYSICIAN ASSISTANT
Payer: COMMERCIAL

## 2023-09-17 LAB
AMYLASE SERPL-CCNC: 35 U/L (ref 28–100)
ANION GAP SERPL CALCULATED.3IONS-SCNC: 11 MMOL/L (ref 7–15)
BASOPHILS # BLD AUTO: 0 10E3/UL (ref 0–0.2)
BASOPHILS NFR BLD AUTO: 0 %
BUN SERPL-MCNC: 50.8 MG/DL (ref 8–23)
CA-I BLD-MCNC: 3.8 MG/DL (ref 4.4–5.2)
CALCIUM SERPL-MCNC: 7.3 MG/DL (ref 8.8–10.2)
CHLORIDE SERPL-SCNC: 106 MMOL/L (ref 98–107)
CREAT SERPL-MCNC: 4.35 MG/DL (ref 0.67–1.17)
DEPRECATED HCO3 PLAS-SCNC: 26 MMOL/L (ref 22–29)
EGFRCR SERPLBLD CKD-EPI 2021: 14 ML/MIN/1.73M2
EOSINOPHIL # BLD AUTO: 0 10E3/UL (ref 0–0.7)
EOSINOPHIL NFR BLD AUTO: 0 %
ERYTHROCYTE [DISTWIDTH] IN BLOOD BY AUTOMATED COUNT: 14 % (ref 10–15)
GLUCOSE BLDC GLUCOMTR-MCNC: 105 MG/DL (ref 70–99)
GLUCOSE BLDC GLUCOMTR-MCNC: 114 MG/DL (ref 70–99)
GLUCOSE BLDC GLUCOMTR-MCNC: 115 MG/DL (ref 70–99)
GLUCOSE BLDC GLUCOMTR-MCNC: 125 MG/DL (ref 70–99)
GLUCOSE BLDC GLUCOMTR-MCNC: 126 MG/DL (ref 70–99)
GLUCOSE BLDC GLUCOMTR-MCNC: 127 MG/DL (ref 70–99)
GLUCOSE BLDC GLUCOMTR-MCNC: 130 MG/DL (ref 70–99)
GLUCOSE BLDC GLUCOMTR-MCNC: 132 MG/DL (ref 70–99)
GLUCOSE BLDC GLUCOMTR-MCNC: 135 MG/DL (ref 70–99)
GLUCOSE BLDC GLUCOMTR-MCNC: 136 MG/DL (ref 70–99)
GLUCOSE BLDC GLUCOMTR-MCNC: 141 MG/DL (ref 70–99)
GLUCOSE BLDC GLUCOMTR-MCNC: 147 MG/DL (ref 70–99)
GLUCOSE SERPL-MCNC: 148 MG/DL (ref 70–99)
HCT VFR BLD AUTO: 27.5 % (ref 40–53)
HGB BLD-MCNC: 8.7 G/DL (ref 13.3–17.7)
HGB BLD-MCNC: 9 G/DL (ref 13.3–17.7)
IMM GRANULOCYTES # BLD: 0 10E3/UL
IMM GRANULOCYTES NFR BLD: 0 %
LIPASE SERPL-CCNC: 25 U/L (ref 13–60)
LYMPHOCYTES # BLD AUTO: 0.1 10E3/UL (ref 0.8–5.3)
LYMPHOCYTES NFR BLD AUTO: 2 %
MAGNESIUM SERPL-MCNC: 1.8 MG/DL (ref 1.7–2.3)
MCH RBC QN AUTO: 33.5 PG (ref 26.5–33)
MCHC RBC AUTO-ENTMCNC: 32.7 G/DL (ref 31.5–36.5)
MCV RBC AUTO: 102 FL (ref 78–100)
MONOCYTES # BLD AUTO: 0.3 10E3/UL (ref 0–1.3)
MONOCYTES NFR BLD AUTO: 7 %
NEUTROPHILS # BLD AUTO: 4.2 10E3/UL (ref 1.6–8.3)
NEUTROPHILS NFR BLD AUTO: 91 %
NRBC # BLD AUTO: 0 10E3/UL
NRBC BLD AUTO-RTO: 0 /100
PHOSPHATE SERPL-MCNC: 4.1 MG/DL (ref 2.5–4.5)
PLATELET # BLD AUTO: 95 10E3/UL (ref 150–450)
POTASSIUM SERPL-SCNC: 3.8 MMOL/L (ref 3.4–5.3)
RBC # BLD AUTO: 2.69 10E6/UL (ref 4.4–5.9)
SODIUM SERPL-SCNC: 143 MMOL/L (ref 136–145)
TACROLIMUS BLD-MCNC: 4.7 UG/L (ref 5–15)
TME LAST DOSE: ABNORMAL H
TME LAST DOSE: ABNORMAL H
UFH PPP CHRO-ACNC: <0.1 IU/ML
WBC # BLD AUTO: 4.6 10E3/UL (ref 4–11)

## 2023-09-17 PROCEDURE — 82330 ASSAY OF CALCIUM: CPT | Performed by: PHYSICIAN ASSISTANT

## 2023-09-17 PROCEDURE — 36415 COLL VENOUS BLD VENIPUNCTURE: CPT | Performed by: PHYSICIAN ASSISTANT

## 2023-09-17 PROCEDURE — 80048 BASIC METABOLIC PNL TOTAL CA: CPT | Performed by: SURGERY

## 2023-09-17 PROCEDURE — 250N000012 HC RX MED GY IP 250 OP 636 PS 637: Performed by: PHYSICIAN ASSISTANT

## 2023-09-17 PROCEDURE — 250N000011 HC RX IP 250 OP 636: Performed by: SURGERY

## 2023-09-17 PROCEDURE — 84100 ASSAY OF PHOSPHORUS: CPT | Performed by: SURGERY

## 2023-09-17 PROCEDURE — 36592 COLLECT BLOOD FROM PICC: CPT | Performed by: PHYSICIAN ASSISTANT

## 2023-09-17 PROCEDURE — 258N000003 HC RX IP 258 OP 636: Performed by: SURGERY

## 2023-09-17 PROCEDURE — 250N000013 HC RX MED GY IP 250 OP 250 PS 637: Performed by: SURGERY

## 2023-09-17 PROCEDURE — 250N000011 HC RX IP 250 OP 636: Performed by: PHYSICIAN ASSISTANT

## 2023-09-17 PROCEDURE — 83690 ASSAY OF LIPASE: CPT | Performed by: SURGERY

## 2023-09-17 PROCEDURE — 99233 SBSQ HOSP IP/OBS HIGH 50: CPT | Mod: 24 | Performed by: NURSE PRACTITIONER

## 2023-09-17 PROCEDURE — 80197 ASSAY OF TACROLIMUS: CPT | Performed by: PHYSICIAN ASSISTANT

## 2023-09-17 PROCEDURE — 250N000011 HC RX IP 250 OP 636: Mod: JZ | Performed by: SURGERY

## 2023-09-17 PROCEDURE — 85018 HEMOGLOBIN: CPT | Performed by: PHYSICIAN ASSISTANT

## 2023-09-17 PROCEDURE — 250N000011 HC RX IP 250 OP 636: Mod: JZ,JB | Performed by: PHYSICIAN ASSISTANT

## 2023-09-17 PROCEDURE — 82150 ASSAY OF AMYLASE: CPT | Performed by: SURGERY

## 2023-09-17 PROCEDURE — 97530 THERAPEUTIC ACTIVITIES: CPT | Mod: GP

## 2023-09-17 PROCEDURE — 120N000011 HC R&B TRANSPLANT UMMC

## 2023-09-17 PROCEDURE — 97161 PT EVAL LOW COMPLEX 20 MIN: CPT | Mod: GP

## 2023-09-17 PROCEDURE — 85025 COMPLETE CBC W/AUTO DIFF WBC: CPT | Performed by: SURGERY

## 2023-09-17 PROCEDURE — 85520 HEPARIN ASSAY: CPT | Performed by: SURGERY

## 2023-09-17 PROCEDURE — 83735 ASSAY OF MAGNESIUM: CPT | Performed by: SURGERY

## 2023-09-17 PROCEDURE — 97116 GAIT TRAINING THERAPY: CPT | Mod: GP

## 2023-09-17 RX ORDER — DIPHENHYDRAMINE HCL 12.5MG/5ML
25-50 LIQUID (ML) ORAL ONCE
Status: DISCONTINUED | OUTPATIENT
Start: 2023-09-17 | End: 2023-09-17

## 2023-09-17 RX ORDER — DIPHENHYDRAMINE HCL 25 MG
25-50 CAPSULE ORAL ONCE
Status: DISCONTINUED | OUTPATIENT
Start: 2023-09-17 | End: 2023-09-17

## 2023-09-17 RX ORDER — FUROSEMIDE 10 MG/ML
40 INJECTION INTRAMUSCULAR; INTRAVENOUS
Status: DISCONTINUED | OUTPATIENT
Start: 2023-09-17 | End: 2023-09-18

## 2023-09-17 RX ORDER — ACETAMINOPHEN 325 MG/1
650 TABLET ORAL ONCE
Status: DISCONTINUED | OUTPATIENT
Start: 2023-09-17 | End: 2023-09-17

## 2023-09-17 RX ORDER — CALCIUM GLUCONATE 20 MG/ML
1 INJECTION, SOLUTION INTRAVENOUS ONCE
Status: COMPLETED | OUTPATIENT
Start: 2023-09-17 | End: 2023-09-17

## 2023-09-17 RX ADMIN — FAMOTIDINE 10 MG: 10 INJECTION, SOLUTION INTRAVENOUS at 15:17

## 2023-09-17 RX ADMIN — TACROLIMUS 4 MG: 5 CAPSULE ORAL at 18:00

## 2023-09-17 RX ADMIN — OCTREOTIDE ACETATE 100 MCG: 100 INJECTION, SOLUTION INTRAVENOUS; SUBCUTANEOUS at 14:21

## 2023-09-17 RX ADMIN — CALCIUM GLUCONATE 1 G: 20 INJECTION, SOLUTION INTRAVENOUS at 09:16

## 2023-09-17 RX ADMIN — PIPERACILLIN AND TAZOBACTAM 2.25 G: 2; .25 INJECTION, POWDER, FOR SOLUTION INTRAVENOUS at 20:58

## 2023-09-17 RX ADMIN — MAGNESIUM OXIDE TAB 400 MG (241.3 MG ELEMENTAL MG) 400 MG: 400 (241.3 MG) TAB at 08:34

## 2023-09-17 RX ADMIN — TACROLIMUS 3 MG: 5 CAPSULE ORAL at 08:37

## 2023-09-17 RX ADMIN — MICAFUNGIN SODIUM 100 MG: 50 INJECTION, POWDER, LYOPHILIZED, FOR SOLUTION INTRAVENOUS at 19:45

## 2023-09-17 RX ADMIN — SODIUM CHLORIDE, SODIUM LACTATE, POTASSIUM CHLORIDE, CALCIUM CHLORIDE AND DEXTROSE MONOHYDRATE: 5; 600; 310; 30; 20 INJECTION, SOLUTION INTRAVENOUS at 04:38

## 2023-09-17 RX ADMIN — SODIUM CHLORIDE, SODIUM LACTATE, POTASSIUM CHLORIDE, CALCIUM CHLORIDE AND DEXTROSE MONOHYDRATE: 5; 600; 310; 30; 20 INJECTION, SOLUTION INTRAVENOUS at 14:36

## 2023-09-17 RX ADMIN — FUROSEMIDE 40 MG: 10 INJECTION, SOLUTION INTRAMUSCULAR; INTRAVENOUS at 08:35

## 2023-09-17 RX ADMIN — PIPERACILLIN AND TAZOBACTAM 2.25 G: 2; .25 INJECTION, POWDER, FOR SOLUTION INTRAVENOUS at 14:22

## 2023-09-17 RX ADMIN — FUROSEMIDE 40 MG: 10 INJECTION, SOLUTION INTRAMUSCULAR; INTRAVENOUS at 16:09

## 2023-09-17 RX ADMIN — OCTREOTIDE ACETATE 100 MCG: 100 INJECTION, SOLUTION INTRAVENOUS; SUBCUTANEOUS at 08:35

## 2023-09-17 RX ADMIN — POLYETHYLENE GLYCOL 3350 17 G: 17 POWDER, FOR SOLUTION ORAL at 08:34

## 2023-09-17 RX ADMIN — Medication 40 MG: at 08:33

## 2023-09-17 RX ADMIN — PIPERACILLIN AND TAZOBACTAM 2.25 G: 2; .25 INJECTION, POWDER, FOR SOLUTION INTRAVENOUS at 04:38

## 2023-09-17 RX ADMIN — MYCOPHENOLATE MOFETIL 1000 MG: 200 POWDER, FOR SUSPENSION ORAL at 17:59

## 2023-09-17 RX ADMIN — MYCOPHENOLATE MOFETIL 1000 MG: 200 POWDER, FOR SUSPENSION ORAL at 08:36

## 2023-09-17 RX ADMIN — ASPIRIN 325 MG ORAL TABLET 325 MG: 325 PILL ORAL at 08:33

## 2023-09-17 RX ADMIN — OCTREOTIDE ACETATE 100 MCG: 100 INJECTION, SOLUTION INTRAVENOUS; SUBCUTANEOUS at 19:45

## 2023-09-17 ASSESSMENT — ACTIVITIES OF DAILY LIVING (ADL)
DEPENDENT_IADLS:: INDEPENDENT
ADLS_ACUITY_SCORE: 22

## 2023-09-17 NOTE — PROGRESS NOTES
09/17/23 1100   Appointment Info   Signing Clinician's Name / Credentials (PT) Missy Underwood, PT   Living Environment   People in Home sibling(s);child(florence), dependent  (brother/sister, plus sister teenage children)   Current Living Arrangements house   Home Accessibility stairs to enter home;stairs within home   Number of Stairs, Main Entrance 5   Stair Railings, Main Entrance railings on both sides of stairs   Number of Stairs, Within Home, Primary greater than 10 stairs   Stair Railings, Within Home, Primary railings safe and in good condition   Living Environment Comments Patient reports he lives with his brother and sister and their children in a multiple level home. His room is upstairs and the only bathroom is on the main floor.   Self-Care   Usual Activity Tolerance good   Current Activity Tolerance moderate   Fall history within last six months no   Activity/Exercise/Self-Care Comment Independent community ambulator at baseline and does all own ADLS and IADLS. Has help as needed from family members.   General Information   Onset of Illness/Injury or Date of Surgery 09/14/23   Referring Physician Tk Ascencio MD   Patient/Family Therapy Goals Statement (PT) move easier, return home   Pertinent History of Current Problem (include personal factors and/or comorbidities that impact the POC) 68 year old male with a past medical history significant for end stage kidney disease secondary to type 2 diabetes mellitus, BCC, CAD. On HD MWF via LUE AVF since April 2021. Still makes urine. CABG x 2 2/2023. S/p DD Kidney/Pancreas transplant on 9/15/2023   Existing Precautions/Restrictions abdominal   General Observations Pt very agreeable to PT   Cognition   Affect/Mental Status (Cognition) WFL   Orientation Status (Cognition) oriented x 4   Follows Commands (Cognition) WNL   Safety Deficit (Cognition) minimal deficit;safety precautions awareness   Posture    Posture Protracted shoulders   Range  of Motion (ROM)   ROM Comment B LEs WFL, limited by abd pain   Strength (Manual Muscle Testing)   Strength Comments B LEs WFL, limited by abd pain   Bed Mobility   Comment, (Bed Mobility) Min A sup>sit   Transfers   Comment, (Transfers) CGA sit<>stand   Gait/Stairs (Locomotion)   Comment, (Gait/Stairs) min A amb 25' wh walker   Clinical Impression   Criteria for Skilled Therapeutic Intervention Yes, treatment indicated   PT Diagnosis (PT) Impaired functional mobility   Influenced by the following impairments decreased I transfers, gait, bed mob, barrier navigation, ADLs   Functional limitations due to impairments bed mobility, transfers, gait and stairs   Clinical Presentation (PT Evaluation Complexity) Stable/Uncomplicated   Clinical Presentation Rationale Clinical judgment   Clinical Decision Making (Complexity) low complexity   Planned Therapy Interventions (PT) balance training;bed mobility training;gait training;neuromuscular re-education;ROM (range of motion);stair training;strengthening;transfer training;progressive activity/exercise;risk factor education;home program guidelines;patient/family education   Risk & Benefits of therapy have been explained evaluation/treatment results reviewed;care plan/treatment goals reviewed;risks/benefits reviewed;current/potential barriers reviewed;participants voiced agreement with care plan;participants included;patient   PT Total Evaluation Time   PT Eval, Low Complexity Minutes (55973) 6   Plan of Care Review   Plan of Care Reviewed With patient   Physical Therapy Goals   PT Frequency 6x/week   PT Predicted Duration/Target Date for Goal Attainment 10/13/23   PT Goals Bed Mobility;Transfers;Gait;Stairs   PT: Bed Mobility Modified independent;Supine to/from sit;Rolling;Within precautions   PT: Transfers Modified independent;Sit to/from stand;Bed to/from chair;Assistive device;Within precautions   PT: Gait Modified independent;Assistive device;Within precautions;Greater than  200 feet   PT: Stairs Modified independent;Within precautions;Greater than 10 stairs;Rail on right   Total Session Time   Total Session Time (sum of timed and untimed services) 6

## 2023-09-17 NOTE — PLAN OF CARE
Shift: 5216-8650  /86 (BP Location: Right arm)   Pulse 90   Temp 98.2  F (36.8  C) (Oral)   Resp 16   Wt 95.8 kg (211 lb 4.8 oz)   SpO2 94%   BMI 29.47 kg/m         VS- stable  on 1L NC. On continuous pulse oximetry.   BG- Q2 on insulin gtt Algorithm  #1 109-141  Labs-  AM labs pending   Pain/Nausea/PRN'S- denies nausea, minimal pain while resting.  Diet- NPO with Ice chips   LDA- 3L right internal jugular, Right PIV X2, Left AV fistula.  NG to low intermittent  suction 200 ml output   Right STEVEN  105 ml output   Gtt/IVF-   -D5LR @ 100 ml/hr.  -Heparin Gtt 500 units/hr straight rated  -insulin gtt currently at 0.5 units/hr  GI- pt not yet passing flatus, hypoactive Bowel sounds   - lim 1,800 ml output   Skin- midline incision covered with OP dressing, marked drainage.  Activity- assist of one with walker   Plan- Med card and lab book up to date.

## 2023-09-17 NOTE — CONSULTS
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  Met with patient to update psychosocial assessment and provide brief education about SW role while inpatient, as well as expectations/requirements and follow up needs post-transplant. SW also provided education about need for compliance with transplant medications, and explained ESRD Medicare benefits and medication coverage under Medicare part B.  Medicare 2728 forms completed and signed by patient.  Presenting Information   Living Situation: in [place], [place] with his brother, sister and his sister's two children.  If not local, plans for short term stay:  N/A  Previous Functional Status: Independent  Cultural/Language/Spiritual Considerations: None indicated at this time.    Support System  Primary Support Person Sister Jasmyn  Other support:  Brother Al  Plan for support in immediate post-transplant period: Jasmyn    Health Care Directive  Decision Maker: Self  Alternate Decision Maker: Siblings  Health Care Directive: Provided education and Declined completing    Mental Health/Coping:   History of Mental Health: No previous or current issues identified.  History of Chemical Health: Patient has not had alcohol since  and marijuana since .  No treatment needed.  Current status: Appropriate  Coping: Patient indicated when under stress he will talk to his friends or family.  At this time patient indicated he is doing well.  Services Needed/Recommended: None indicated at this time.    Financial   Income: shelter but does work at the [place] [employer] every year.  Impact of transplant on income: N/A  Insurance and medication coverage: Medicare Part A and B and [employer]  Financial concerns: None indicated at this time.  Resources needed: None indicated at this time.    Assessment,  recommendations and discharge plan:  Writer met with patient in his room.  He indicated he is doing very well and feels good.  He indicated his transplanted organs are functioning.     VISHAL Hanley, [facility]  [facility] - Kidney/Pancreas Transplant   [phone]

## 2023-09-17 NOTE — PROGRESS NOTES
Transplant Surgery  Inpatient Daily Progress Note  09/17/2023    Assessment & Plan: Avelina Marion is a 68 year old male with a past medical history significant for end stage kidney disease secondary to type 2 diabetes mellitus, BCC, CAD. On HD MWF via LUE AVF since April 2021. Still makes urine. CABG x 2 2/2023.     Graft function:  S/p kidney transplant: Cr 4.4, trending down. UO 2.6L yesterday. K stable.     S/p pancreas transplant: Difficult to visualize pancreas transplant on US but appears to have flow in and out. Minimal usage on the insulin drip (~0.5 U/hr). Lipase normalized.    -Mild pancreatitis noted, octreotide 100 mg TID.   -Heparin prophylaxis as below.    Immunosuppression management: Induction with thymoglobulin, plan for 6.5 mg/kg.  Thymo 200 mg intra-op, 200 mg on 9/15, 200 mg on 9/16. 50 mg on 9/17 to complete induction.  Solu-Medrol 500 mg intra-op, 250 mg on 9/15, 100 mg on 9/16    Maintenance with:  Tacrolimus 3 mg BID, goal 8-10. Check level on Sunday  MMF 1000 mg BID    Hematology: Hgb 9 (9.3).  Check Hgb at 1200  Heparin drip at 500 U/hr for graft prophylaxis.     Cardiorespiratory: Hypotensive post op initially requiring dopamine in PACU. Resolved    GI/Nutrition: NPO, NG tube in place. Clamp NGT x 2 hours. If after 2 hours residual is < 250 ml will remove NGT.  Bowel regimen ordered    Endocrine: see above    Fluid/Electrolytes: MIVF: D5LR at 100 mL/hr. Decrease lasix 40 mg.  Hypomagnesemia: ppx Mg oxide 400 mg daily  Hypocalcemia: Ca gluconate 1g on 9/16 and 9/17. Repeat Ionized Ca on 9/18.     : Uribe to remain due to new surgical anastomosis. Remove POD 3    Infectious disease: Tessa-op prophylaxis with zosyn and micafungin. Prophylaxis with bactrim and vaclyte    Prophylaxis: DVT (mechanical, heparin straight rate), fall, GI, fungal    Activity: PT consult. OOB to chair and ambulate today.     Disposition: 7A    VIVI/Fellow/Resident Provider: Linda Camacho PA-C    Faculty: Tk  MD Silva  _________________________________________________________________  Transplant History: Admitted 2023 for  donor kidney pancreas transplant.  9/15/2023 (Kidney), 2023 (Kidney / Pancreas), Postoperative day: 3 (Kidney, Pancreas), 2 (Kidney)     Interval History: History is obtained from the patient and electronic health record  Overnight events: Pain controlled. Denies cest pain or SOB. No flatus or BM. Has not been out of bed to chair yet today.     ROS:   A 10-point review of systems was negative except as noted above.    Meds:   [START ON 2023] acetaminophen  975 mg Oral Q8H    aspirin  325 mg Oral or Feeding Tube Daily    [START ON 2023] calcium carbonate-vitamin D  1 tablet Oral BID w/meals    [START ON 2023] clotrimazole  10 mg Buccal 4x Daily    famotidine  10 mg Oral Q24H    Or    famotidine  10 mg Intravenous Q24H    furosemide  80 mg Intravenous BID    magnesium oxide  400 mg Oral Q24H    micafungin  100 mg Intravenous Q24H    mycophenolate  1,000 mg Per NG tube BID IS    octreotide  100 mcg Subcutaneous TID    pantoprazole  40 mg Per NG tube Daily    piperacillin-tazobactam  2.25 g Intravenous Q8H    polyethylene glycol  17 g Oral Daily    [START ON 2023] senna-docusate  1 tablet Oral BID    sodium chloride (PF)  3 mL Intravenous Q8H    sulfamethoxazole-trimethoprim  10 mL Per NG tube Once per day on  Sat    tacrolimus  3 mg Oral or NG Tube BID IS    [START ON 2023] valGANciclovir  450 mg Oral Once per day on        Physical Exam:     Admit Weight: (P) 91.9 kg (202 lb 8 oz)    Current vitals:   /86 (BP Location: Right arm)   Pulse 90   Temp 98.2  F (36.8  C) (Oral)   Resp 16   Wt 95.8 kg (211 lb 4.8 oz)   SpO2 94%   BMI 29.47 kg/m          Vital sign ranges:    Temp:  [98  F (36.7  C)-99.3  F (37.4  C)] 98.2  F (36.8  C)  Pulse:  [] 90  Resp:  [13-17] 16  BP: (136-151)/(68-86) 139/86  SpO2:  [91 %-97 %] 94 %  Patient  Vitals for the past 24 hrs:   BP Temp Temp src Pulse Resp SpO2 Weight   09/17/23 0559 139/86 98.2  F (36.8  C) Oral 90 16 94 % --   09/17/23 0153 (!) 142/74 98.1  F (36.7  C) Oral 92 16 95 % --   09/16/23 2159 (!) 146/75 98.2  F (36.8  C) Oral 91 17 91 % --   09/16/23 1858 (!) 150/77 98.7  F (37.1  C) Oral 98 17 96 % --   09/16/23 1825 (!) 151/76 98.7  F (37.1  C) Oral 97 17 94 % --   09/16/23 1252 (!) 146/69 98  F (36.7  C) Oral 98 16 97 % --   09/16/23 1100 (!) 140/68 98  F (36.7  C) Oral 95 16 95 % --   09/16/23 1000 (!) 143/69 98.4  F (36.9  C) Oral 98 13 96 % --   09/16/23 0900 (!) 142/75 99.3  F (37.4  C) Oral 98 13 97 % --   09/16/23 0825 -- -- -- -- -- -- 95.8 kg (211 lb 4.8 oz)   09/16/23 0800 136/68 98.8  F (37.1  C) Oral 102 13 93 % --       General Appearance: in no apparent distress.   Skin: normal, warm  Heart: regular rate and rhythm  Lungs: Nonlabored resps  Abdomen: The abdomen is soft, nontender. Drain with serosanguinous output. The wound is covered with post-op dressing.  : lim is present. Urine has no gross hematuria.   Extremities: edema: absent.   Neurologic: awake, alert, and oriented. Tremor absent.     Data:   CMP  Recent Labs   Lab 09/17/23  0643 09/17/23  0603 09/17/23  0359 09/16/23  0917 09/16/23  0801 09/16/23  0409 09/16/23  0348 09/15/23  0604 09/15/23  0447 09/15/23  0445 09/15/23  0354 09/14/23  1720 09/14/23  1539   NA  --   --   --   --  140  --   --   --  136  --  134   < > 136   POTASSIUM  --   --   --   --  4.5  4.5  --  4.4   < > 4.5  --  4.4   < > 4.4   CHLORIDE  --   --   --   --  102  --   --   --  97*  --   --   --  95*   CO2  --   --   --   --  25  --   --   --  19*  --   --   --  25   GLC  --  147* 135*   < > 129*   < >  --    < > 120*   < > 103*   < > 245*   BUN  --   --   --   --  50.1*  --   --   --  44.5*  --   --   --  46.4*   CR  --   --   --   --  5.41*  --   --   --  6.29*  --   --   --  6.77*   GFRESTIMATED  --   --   --   --  11*  --   --   --  9*  --    --   --  8*   DAVID  --   --   --   --  7.6*  --   --   --  8.6*  --   --   --  9.0   ICAW 3.8*  --   --   --   --   --   --   --   --   --  4.3*   < >  --    MAG  --   --   --   --  1.9  --   --   --  2.1  --   --   --   --    PHOS  --   --   --   --  3.9  --   --   --  3.3  --   --   --   --    AMYLASE  --   --   --   --  64  --   --   --  91  --   --   --  62   LIPASE  --   --   --   --  35  --   --   --  104*  --   --   --  41   ALBUMIN  --   --   --   --   --   --   --   --   --   --   --   --  4.3   BILITOTAL  --   --   --   --   --   --   --   --   --   --   --   --  0.9   ALKPHOS  --   --   --   --   --   --   --   --   --   --   --   --  60   AST  --   --   --   --   --   --   --   --   --   --   --   --  22   ALT  --   --   --   --   --   --   --   --   --   --   --   --  6    < > = values in this interval not displayed.       CBC  Recent Labs   Lab 09/17/23  0643 09/16/23  1709 09/16/23  1321 09/16/23  0801 09/15/23  0959 09/15/23  0447   HGB 9.0* 9.3*   < > 10.3*  10.3*   < > 12.6*   WBC 4.6  --   --  10.4  --  19.1*   PLT 95*  --   --  107*  --  175   A1C  --   --   --   --   --  8.4*    < > = values in this interval not displayed.       COAGS  Recent Labs   Lab 09/15/23  0447 09/14/23  1539   INR 1.11 1.06   PTT 26 27        Urinalysis  Recent Labs   Lab Test 09/14/23  1643 02/12/23  1539   COLOR Light Yellow Yellow   APPEARANCE Clear Slightly Cloudy*   URINEGLC 500* 100*   URINEBILI Negative Negative   URINEKETONE Negative Negative   SG 1.017 1.015   UBLD Small* Trace*   URINEPH 6.5 5.5   PROTEIN 100* 100*   NITRITE Negative Negative   LEUKEST Negative Negative   RBCU 1 3*   WBCU 1 2       Virology:  Hepatitis C Antibody   Date Value Ref Range Status   08/09/2022 Nonreactive Nonreactive Final

## 2023-09-17 NOTE — PROGRESS NOTES
[facility]   Transplant Nephrology Progress Note  Date of Admission:  9/14/202t  Today's Date: 09/17/2023    Recommendations:  - 1 g calcium gluconate IV    Assessment & Plan   # DDKT (SPK): Trend down , excellent urine output   - Baseline Creatinine: ~ TBD   - Proteinuria: Not checked post transplant   - Date DSA Last Checked: Sep/2023      Latest DSA: No   - BK Viremia: Not checked post transplant   - Kidney Tx Biopsy: No    # Pancreas Tx (SPK):    - Pancreatic Exocrine Drainage: Enteric drained     - Blood glucose: Near euglycemia      On insulin: Yes   - HbA1c: Stable, near normal      Latest HbA1c: 7.1% (prior to transplant)   - Pancreatic enzymes: Increased   - Date DSA Last Checked: Sep/2023  Latest DSA: No   - Pancreas Tx Biopsy: No    # Immunosuppression: Tacrolimus immediate release (goal 8-10), Mycophenolate mofetil (dose 1000 mg every 12 hours) and Prednisone (dose taper)   - Patient is in an immunosuppressed state and will continue to monitor for efficacy and toxicity of immunosuppression medications.  - High intensity protocol  - Changes: No     # Infection Prophylaxis:   - PJP: Sulfa/TMP (Bactrim)  - CMV: Valganciclovir (Valcyte)  - Thrush: Clotrimazole aramis (Mycelex)  - Fungal: Micafungin (Mycamine)    # Hypertension: Controlled;  Goal BP: < 150/90   - Volume status: Euvolemic     - Changes: Not at this time, When SBP greater than 130 would resume metoprolol at 12.5 mg PO BID.      # Anemia in Chronic Renal Disease: Hgb: Decreased      MCKENNA: No   - Iron studies: Low iron saturation, but high ferritin    # Mineral Bone Disorder:   - Secondary renal hyperparathyroidism; PTH level: Not checked recently        On treatment: None  - Vitamin D; level: Not checked recently        On supplement: No  - Calcium; level: Low        On supplement: No, No, oscal 500-5 mg PO BID starts 5/18  - Phosphorus; level: Normal        On supplement: No  - Secondary renal  hyperparathyroidism; PTH level: Not checked recently        On treatment: None  - Vitamin D; level: Not checked recently        On supplement: No  - Calcium; level: Low        On supplement: No, recommend 1 g Calcium Gluconate IV   - Phosphorus; level: Normal        On supplement: No    # Electrolytes:   - Potassium; level: Normal        On supplement: No  - Magnesium; level: Normal        On supplement: Yes  - Bicarbonate; level: Normal        On supplement: No     # CAD: S/p CABG    # PAD    #Non-melanotic skin cancer: S/p Mohs in 2022    # Transplant History:  Etiology of Kidney Failure: Diabetes mellitus type 2  Tx: DDKT (SPK)  Transplant: 9/15/2023 (Kidney), 9/14/2023 (Kidney / Pancreas)  Significant changes in immunosuppression: None  Significant transplant-related complications: None    Recommendations were communicated to the primary team via this note.    Discussed with Dr. Demetri Moreno, [facility]  Transplant Nephrology  Contact information available via [facility] Paging/Directory     Interval History     Mr. Estradas creatinine is 4.35 (09/17 0643); Trend down.  Excellent  urine output.  Other significant labs/tests/vitals: stable   No  events overnight.  No  chest pain or shortness of breath.  No  leg swelling.  No nausea and vomiting. Passing gas today.   No fever, sweats or chills.       .    Review of Systems   4 point ROS was obtained and negative except as noted in the Interval History.    MEDICATIONS:   acetaminophen  650 mg Oral Once    [START ON 9/18/2023] acetaminophen  975 mg Oral Q8H    anti-thymocyte globulin  50 mg Intravenous Central line Once    aspirin  325 mg Oral or Feeding Tube Daily    [START ON 9/18/2023] calcium carbonate-vitamin D  1 tablet Oral BID w/meals    calcium gluconate  1 g Intravenous Once    [START ON 9/22/2023] clotrimazole  10 mg Buccal 4x Daily    diphenhydrAMINE  25-50 mg Oral Once    Or    diphenhydrAMINE  25-50 mg Per NG tube Once    famotidine  10 mg  Oral Q24H    Or    famotidine  10 mg Intravenous Q24H    furosemide  40 mg Intravenous BID    magnesium oxide  400 mg Oral Q24H    micafungin  100 mg Intravenous Q24H    mycophenolate  1,000 mg Per NG tube BID IS    octreotide  100 mcg Subcutaneous TID    pantoprazole  40 mg Per NG tube Daily    piperacillin-tazobactam  2.25 g Intravenous Q8H    polyethylene glycol  17 g Oral Daily    [START ON 2023] senna-docusate  1 tablet Oral BID    sodium chloride (PF)  3 mL Intravenous Q8H    sulfamethoxazole-trimethoprim  10 mL Per NG tube Once per day on  Sat    tacrolimus  3 mg Oral or NG Tube BID IS    [START ON 2023] valGANciclovir  450 mg Oral Once per day on       dextrose      dextrose 5% lactated ringers 1,000 mL infusion 100 mL/hr at 23 0438    heparin 500 Units/hr (23 0812)    insulin regular 0.5 Units/hr (23 0805)       Physical Exam   Temp  Av.4  F (36.9  C)  Min: 97.4  F (36.3  C)  Max: 99.1  F (37.3  C)  Arterial Line BP  Min: 51/46  Max: 179/68  Arterial Line MAP (mmHg)  Av.4 mmHg  Min: 51 mmHg  Max: 105 mmHg      Pulse  Av.5  Min: 60  Max: 106 Resp  Avg: 15  Min: 10  Max: 19  SpO2  Av.4 %  Min: 88 %  Max: 100 %    CVP (mmHg): 11 mmHgBP (!) 152/73 (BP Location: Right arm)   Pulse 83   Temp 98.1  F (36.7  C) (Oral)   Resp 16   Wt 95.8 kg (211 lb 4.8 oz)   SpO2 95%   BMI 29.47 kg/m     Date 23 07 - 23 06   Shift 5871-4168 8106-8642 9109-7968 24 Hour Total   INTAKE   I.V. 1251.07   1251.07   Shift Total(mL/kg) 1251.07(13.62)   1251.07(13.62)   OUTPUT   Shift Total(mL/kg)       Weight (kg) 91.85 91.85 91.85 91.85      Admit Weight: (P) 91.9 kg (202 lb 8 oz)     GENERAL APPEARANCE: alert and no distress  HENT: mouth without ulcers or lesions  RESP: lungs clear to auscultation - no rales, rhonchi or wheezes  CV: regular rhythm, normal rate, no rub, no murmur  EDEMA: no LE edema bilaterally  ABDOMEN: soft, nondistended, nontender,  bowel sounds normal  MS: extremities normal - no gross deformities noted, no evidence of inflammation in joints, no muscle tenderness  SKIN: no rash    Data   All labs reviewed by me.  CMP  Recent Labs   Lab 09/17/23  0801 09/17/23  0643 09/17/23  0603 09/17/23  0359 09/16/23  0917 09/16/23  0801 09/16/23  0409 09/16/23  0348 09/15/23  2206 09/15/23  2201 09/15/23  0604 09/15/23  0447 09/15/23  0445 09/15/23  0354 09/14/23  1720 09/14/23  1539   NA  --  143  --   --   --  140  --   --   --   --   --  136  --  134   < > 136   POTASSIUM  --  3.8  --   --   --  4.5  4.5  --  4.4  --  4.2   < > 4.5  --  4.4   < > 4.4   CHLORIDE  --  106  --   --   --  102  --   --   --   --   --  97*  --   --   --  95*   CO2  --  26  --   --   --  25  --   --   --   --   --  19*  --   --   --  25   ANIONGAP  --  11  --   --   --  13  --   --   --   --   --  20*  --   --   --  16*   * 148* 147* 135*   < > 129*   < >  --    < >  --    < > 120*   < > 103*   < > 245*   BUN  --  50.8*  --   --   --  50.1*  --   --   --   --   --  44.5*  --   --   --  46.4*   CR  --  4.35*  --   --   --  5.41*  --   --   --   --   --  6.29*  --   --   --  6.77*   GFRESTIMATED  --  14*  --   --   --  11*  --   --   --   --   --  9*  --   --   --  8*   DAVID  --  7.3*  --   --   --  7.6*  --   --   --   --   --  8.6*  --   --   --  9.0   MAG  --  1.8  --   --   --  1.9  --   --   --   --   --  2.1  --   --   --   --    PHOS  --  4.1  --   --   --  3.9  --   --   --   --   --  3.3  --   --   --   --    PROTTOTAL  --   --   --   --   --   --   --   --   --   --   --   --   --   --   --  7.3   ALBUMIN  --   --   --   --   --   --   --   --   --   --   --   --   --   --   --  4.3   BILITOTAL  --   --   --   --   --   --   --   --   --   --   --   --   --   --   --  0.9   ALKPHOS  --   --   --   --   --   --   --   --   --   --   --   --   --   --   --  60   AST  --   --   --   --   --   --   --   --   --   --   --   --   --   --   --  22   ALT  --   --   --    --   --   --   --   --   --   --   --   --   --   --   --  6    < > = values in this interval not displayed.       CBC  Recent Labs   Lab 09/17/23  0643 09/16/23  1709 09/16/23  1321 09/16/23  0801 09/15/23  0959 09/15/23  0447 09/14/23  2108 09/14/23  1539   HGB 9.0* 9.3* 9.9* 10.3*  10.3*   < > 12.6*   < > 14.5   WBC 4.6  --   --  10.4  --  19.1*  --  7.7   RBC 2.69*  --   --  3.08*  --  3.72*  --  4.33*   HCT 27.5*  --   --  31.1*  --  37.0*  --  42.1   *  --   --  101*  --  100  --  97   MCH 33.5*  --   --  33.4*  --  33.9*  --  33.5*   MCHC 32.7  --   --  33.1  --  34.1  --  34.4   RDW 14.0  --   --  14.1  --  13.9  --  13.8   PLT 95*  --   --  107*  --  175  --  175    < > = values in this interval not displayed.       INR  Recent Labs   Lab 09/15/23  0447 09/14/23  1539   INR 1.11 1.06   PTT 26 27       ABG  Recent Labs   Lab 09/15/23  0354 09/15/23  0201 09/15/23  0002 09/14/23 2108   PH 7.42 7.36 7.39 7.44   PCO2 33* 40 39 40   PO2 107* 111* 111* 116*   HCO3 22 22 24 27   O2PER 41.0 42.0 38.0 43.0        Urine Studies  Recent Labs   Lab Test 09/14/23  1643 02/12/23  1539 08/09/22  1017 08/05/21  1426   COLOR Light Yellow Yellow Light Yellow Yellow   APPEARANCE Clear Slightly Cloudy* Clear Clear   URINEGLC 500* 100* 70* 50*   URINEBILI Negative Negative Negative Negative   URINEKETONE Negative Negative Negative Negative   SG 1.017 1.015 1.017 1.016   UBLD Small* Trace* Small* Negative   URINEPH 6.5 5.5 6.5 7.0   PROTEIN 100* 100* 70* 100*   NITRITE Negative Negative Negative Negative   LEUKEST Negative Negative Negative Negative   RBCU 1 3* 6* 2   WBCU 1 2 1 1       No lab results found.  PTH  No lab results found.  Iron Studies  Recent Labs   Lab Test 02/16/23  0834   IRON 24*   *   IRONSAT 20   TREMAINE 1,461*         IMAGING:  All imaging studies reviewed by me.

## 2023-09-17 NOTE — PLAN OF CARE
BP (!) 141/67 (BP Location: Right arm)   Pulse 87   Temp 97.6  F (36.4  C) (Oral)   Resp 16   Wt 95.8 kg (211 lb 4.8 oz)   SpO2 94%   BMI 29.47 kg/m      Shift: 0167-2341  Isolation Status: None  VS: VSS on RA, afebrile  Neuro: Aox4  Behaviors: Calm, pleasant. Resting in between cares.  BG: Q2H, Alg 1. 115-136  Labs: Ionized 3.8  Respiratory: WDL  Cardiac: WDL  Pain/Nausea: Denies pain or nausea this shift  PRN: N/a  Diet: NPO + ice chips. Tolerating well.  IV Access: R Triple lumen IJ, R PIVx2  Infusion(s): D5LR at 100ml/hr, Hep gtt at 500u/hr, Insulin gtt, Zosyn q8h  Lines/Drains: Uribe in place, STEVEN in place, Serosanguineous output  GI/: No BM this shift; UOP adequate  Skin: Midline abdominal incision, stapled, BRIONNA  Mobility: Ax1 with walker  Events/Education: Worked with PT this shift  Plan: Continue with plan of care

## 2023-09-17 NOTE — PLAN OF CARE
Goal Outcome Evaluation:      Plan of Care Reviewed With: patient    Overall Patient Progress: improvingOverall Patient Progress: improving    Outcome Evaluation: Pt plans to return home with family when medically stable.

## 2023-09-17 NOTE — CONSULTS
Care Management Initial Consult    General Information  Assessment completed with: Patient,    Type of CM/SW Visit: Initial Assessment    Primary Care Provider verified and updated as needed: Yes   Readmission within the last 30 days: no previous admission in last 30 days      Reason for Consult: discharge planning  Advance Care Planning: Advance Care Planning Reviewed: no concerns identified          Communication Assessment  Patient's communication style: spoken language (English or Bilingual)    Hearing Difficulty or Deaf: no   Wear Glasses or Blind: no    Cognitive  Cognitive/Neuro/Behavioral: WDL  Level of Consciousness: sedated  Arousal Level: arouses to voice  Orientation: oriented x 4  Mood/Behavior: calm, cooperative  Best Language: 0 - No aphasia  Speech: clear, spontaneous    Living Environment:   People in home: sibling(s), other (see comments) (lives w/ siblings and their 2 adult children)     Current living Arrangements: house      Able to return to prior arrangements: yes       Family/Social Support:  Care provided by: self  Provides care for: no one                Description of Support System:           Current Resources:   Patient receiving home care services: No     Community Resources: Dialysis Services ([facility])  Equipment currently used at home: none  Supplies currently used at home: None    Employment/Financial:  Employment Status: retired        Financial Concerns: No concerns identified           Does the patient's insurance plan have a 3 day qualifying hospital stay waiver?  No    Lifestyle & Psychosocial Needs:  Social Determinants of Health     Tobacco Use: Medium Risk (9/15/2023)    Patient History     Smoking Tobacco Use: Former     Smokeless Tobacco Use: Never     Passive Exposure: Not on file   Alcohol Use: Not At Risk (6/2/2021)    AUDIT-C     Frequency of Alcohol Consumption: Never     Average Number of Drinks: Not asked     Frequency of Binge Drinking: Never    Financial Resource Strain: Not on file   Food Insecurity: Not on file   Transportation Needs: Not on file   Physical Activity: Not on file   Stress: Not on file   Social Connections: Not on file   Intimate Partner Violence: Not on file   Depression: Not at risk (10/18/2022)    PHQ-2     PHQ-2 Score: 0   Housing Stability: Not on file       Functional Status:  Prior to admission patient needed assistance:   Dependent ADLs:: Independent  Dependent IADLs:: Independent       Mental Health Status:  Mental Health Status: No Current Concerns       Chemical Dependency Status:                Values/Beliefs:  Spiritual, Cultural Beliefs, Mandaen Practices, Values that affect care:                 Additional Information:    RNCC met w/ pt for initial assessment. Information in chart verified. He denied any questions or concerns at present, stating he was independent prior to hospitalization and doesn't anticipate any changes. He reports his family is supportive and able to help if he does require further assistance on discharge. Discharge recs pending. RNCC will continue to follow.    Jade Borja RNCC  Valeria RNCC  [facility]      RNCC Weekend Contacts  Units 5A/B/C Pager: [phone]    Units 6B/C/D Pager: [phone]    Units 7A/B/C/D Pager: [phone]    Units /[place] ICU Pager: [phone]    [facility] Ortho, 5 Med/Surg, ED Pager [phone]    Units [facility] Med/Surg, 8A and 10 ICU Pager: [phone]

## 2023-09-18 ENCOUNTER — APPOINTMENT (OUTPATIENT)
Dept: PHYSICAL THERAPY | Facility: CLINIC | Age: 68
DRG: 008 | End: 2023-09-18
Attending: SURGERY
Payer: COMMERCIAL

## 2023-09-18 LAB
AMYLASE SERPL-CCNC: 50 U/L (ref 28–100)
ANION GAP SERPL CALCULATED.3IONS-SCNC: 10 MMOL/L (ref 7–15)
BASOPHILS # BLD AUTO: 0 10E3/UL (ref 0–0.2)
BASOPHILS NFR BLD AUTO: 0 %
BUN SERPL-MCNC: 46.1 MG/DL (ref 8–23)
CA-I BLD-MCNC: 3.8 MG/DL (ref 4.4–5.2)
CALCIUM SERPL-MCNC: 7.1 MG/DL (ref 8.8–10.2)
CELL TYPE ALLO: NORMAL
CELL TYPE ALLO: NORMAL
CELL TYPE AUTO: NORMAL
CELL TYPE AUTO: NORMAL
CHANNELSHIFTALLOB1: -31
CHANNELSHIFTALLOB1: -49
CHANNELSHIFTALLOB2: -29
CHANNELSHIFTALLOB2: -48
CHANNELSHIFTALLOT1: -16
CHANNELSHIFTALLOT1: -56
CHANNELSHIFTALLOT2: -14
CHANNELSHIFTALLOT2: -57
CHANNELSHIFTAUTOB1: -44
CHANNELSHIFTAUTOB1: -46
CHANNELSHIFTAUTOB2: -36
CHANNELSHIFTAUTOB2: -44
CHANNELSHIFTAUTOT1: -40
CHANNELSHIFTAUTOT1: -46
CHANNELSHIFTAUTOT2: -36
CHANNELSHIFTAUTOT2: -46
CHLORIDE SERPL-SCNC: 107 MMOL/L (ref 98–107)
CREAT SERPL-MCNC: 3.54 MG/DL (ref 0.67–1.17)
CROSSMATCHDATEALLO: NORMAL
CROSSMATCHDATEALLO: NORMAL
CROSSMATCHDATEAUTO: NORMAL
CROSSMATCHDATEAUTO: NORMAL
DEPRECATED HCO3 PLAS-SCNC: 29 MMOL/L (ref 22–29)
DONOR ALLO: NORMAL
DONOR ALLO: NORMAL
DONOR AUTO: NORMAL
DONOR AUTO: NORMAL
DONORCELLDATE ALLO: NORMAL
DONORCELLDATE ALLO: NORMAL
DONORCELLDATE AUTO: NORMAL
DONORCELLDATE AUTO: NORMAL
EGFRCR SERPLBLD CKD-EPI 2021: 18 ML/MIN/1.73M2
EOSINOPHIL # BLD AUTO: 0 10E3/UL (ref 0–0.7)
EOSINOPHIL NFR BLD AUTO: 1 %
ERYTHROCYTE [DISTWIDTH] IN BLOOD BY AUTOMATED COUNT: 13.8 % (ref 10–15)
GLUCOSE BLDC GLUCOMTR-MCNC: 103 MG/DL (ref 70–99)
GLUCOSE BLDC GLUCOMTR-MCNC: 104 MG/DL (ref 70–99)
GLUCOSE BLDC GLUCOMTR-MCNC: 108 MG/DL (ref 70–99)
GLUCOSE BLDC GLUCOMTR-MCNC: 110 MG/DL (ref 70–99)
GLUCOSE BLDC GLUCOMTR-MCNC: 111 MG/DL (ref 70–99)
GLUCOSE BLDC GLUCOMTR-MCNC: 115 MG/DL (ref 70–99)
GLUCOSE BLDC GLUCOMTR-MCNC: 117 MG/DL (ref 70–99)
GLUCOSE BLDC GLUCOMTR-MCNC: 89 MG/DL (ref 70–99)
GLUCOSE BLDC GLUCOMTR-MCNC: 96 MG/DL (ref 70–99)
GLUCOSE BLDC GLUCOMTR-MCNC: 99 MG/DL (ref 70–99)
GLUCOSE SERPL-MCNC: 112 MG/DL (ref 70–99)
HCT VFR BLD AUTO: 26.2 % (ref 40–53)
HGB BLD-MCNC: 8.6 G/DL (ref 13.3–17.7)
HIV 1+2 AB+HIV1 P24 AG SERPL QL IA: NONREACTIVE
IMM GRANULOCYTES # BLD: 0.1 10E3/UL
IMM GRANULOCYTES NFR BLD: 1 %
LIPASE SERPL-CCNC: 39 U/L (ref 13–60)
LYMPHOCYTES # BLD AUTO: 0.1 10E3/UL (ref 0.8–5.3)
LYMPHOCYTES NFR BLD AUTO: 2 %
MAGNESIUM SERPL-MCNC: 1.6 MG/DL (ref 1.7–2.3)
MCH RBC QN AUTO: 33 PG (ref 26.5–33)
MCHC RBC AUTO-ENTMCNC: 32.8 G/DL (ref 31.5–36.5)
MCV RBC AUTO: 100 FL (ref 78–100)
MONOCYTES # BLD AUTO: 0.2 10E3/UL (ref 0–1.3)
MONOCYTES NFR BLD AUTO: 5 %
NEUTROPHILS # BLD AUTO: 4.5 10E3/UL (ref 1.6–8.3)
NEUTROPHILS NFR BLD AUTO: 91 %
NRBC # BLD AUTO: 0 10E3/UL
NRBC BLD AUTO-RTO: 0 /100
PHOSPHATE SERPL-MCNC: 2.9 MG/DL (ref 2.5–4.5)
PLATELET # BLD AUTO: 88 10E3/UL (ref 150–450)
POS CUT OFF ALLO B: >69
POS CUT OFF ALLO B: >69
POS CUT OFF ALLO T: >53
POS CUT OFF ALLO T: >53
POS CUT OFF AUTO B: >69
POS CUT OFF AUTO B: >69
POS CUT OFF AUTO T: >53
POS CUT OFF AUTO T: >53
POTASSIUM SERPL-SCNC: 3.6 MMOL/L (ref 3.4–5.3)
RBC # BLD AUTO: 2.61 10E6/UL (ref 4.4–5.9)
RESULT ALLO B1: NORMAL
RESULT ALLO B1: NORMAL
RESULT ALLO B2: NORMAL
RESULT ALLO B2: NORMAL
RESULT ALLO T1: NORMAL
RESULT ALLO T1: NORMAL
RESULT ALLO T2: NORMAL
RESULT ALLO T2: NORMAL
RESULT AUTO B1: NORMAL
RESULT AUTO B1: NORMAL
RESULT AUTO B2: NORMAL
RESULT AUTO B2: NORMAL
RESULT AUTO T1: NORMAL
RESULT AUTO T1: NORMAL
RESULT AUTO T2: NORMAL
RESULT AUTO T2: NORMAL
SERUM DATE ALLO B1: NORMAL
SERUM DATE ALLO B1: NORMAL
SERUM DATE ALLO B2: NORMAL
SERUM DATE ALLO B2: NORMAL
SERUM DATE ALLO T1: NORMAL
SERUM DATE ALLO T1: NORMAL
SERUM DATE ALLO T2: NORMAL
SERUM DATE ALLO T2: NORMAL
SERUM DATE AUTO B1: NORMAL
SERUM DATE AUTO B1: NORMAL
SERUM DATE AUTO B2: NORMAL
SERUM DATE AUTO B2: NORMAL
SERUM DATE AUTO T1: NORMAL
SERUM DATE AUTO T1: NORMAL
SERUM DATE AUTO T2: NORMAL
SERUM DATE AUTO T2: NORMAL
SODIUM SERPL-SCNC: 146 MMOL/L (ref 136–145)
TACROLIMUS BLD-MCNC: 5.5 UG/L (ref 5–15)
TESTMETHODALLO: NORMAL
TESTMETHODALLO: NORMAL
TESTMETHODAUTO: NORMAL
TESTMETHODAUTO: NORMAL
TME LAST DOSE: NORMAL H
TME LAST DOSE: NORMAL H
TREATMENT ALLO B1: NORMAL
TREATMENT ALLO B1: NORMAL
TREATMENT ALLO B2: NORMAL
TREATMENT ALLO B2: NORMAL
TREATMENT ALLO T1: NORMAL
TREATMENT ALLO T1: NORMAL
TREATMENT ALLO T2: NORMAL
TREATMENT ALLO T2: NORMAL
TREATMENT AUTO B1: NORMAL
TREATMENT AUTO B1: NORMAL
TREATMENT AUTO B2: NORMAL
TREATMENT AUTO B2: NORMAL
TREATMENT AUTO T1: NORMAL
TREATMENT AUTO T1: NORMAL
TREATMENT AUTO T2: NORMAL
TREATMENT AUTO T2: NORMAL
UFH PPP CHRO-ACNC: <0.1 IU/ML
WBC # BLD AUTO: 4.9 10E3/UL (ref 4–11)
ZZZCOMMENT ALLOB2: NORMAL
ZZZCOMMENT ALLOB2: NORMAL

## 2023-09-18 PROCEDURE — 82330 ASSAY OF CALCIUM: CPT | Performed by: PHYSICIAN ASSISTANT

## 2023-09-18 PROCEDURE — 120N000011 HC R&B TRANSPLANT UMMC

## 2023-09-18 PROCEDURE — 250N000012 HC RX MED GY IP 250 OP 636 PS 637: Performed by: PHYSICIAN ASSISTANT

## 2023-09-18 PROCEDURE — 85025 COMPLETE CBC W/AUTO DIFF WBC: CPT | Performed by: SURGERY

## 2023-09-18 PROCEDURE — 80197 ASSAY OF TACROLIMUS: CPT | Performed by: PHYSICIAN ASSISTANT

## 2023-09-18 PROCEDURE — 250N000011 HC RX IP 250 OP 636: Mod: JZ | Performed by: PHYSICIAN ASSISTANT

## 2023-09-18 PROCEDURE — 250N000012 HC RX MED GY IP 250 OP 636 PS 637: Performed by: SURGERY

## 2023-09-18 PROCEDURE — 250N000011 HC RX IP 250 OP 636: Performed by: SURGERY

## 2023-09-18 PROCEDURE — 99233 SBSQ HOSP IP/OBS HIGH 50: CPT | Mod: 24 | Performed by: INTERNAL MEDICINE

## 2023-09-18 PROCEDURE — 83690 ASSAY OF LIPASE: CPT | Performed by: SURGERY

## 2023-09-18 PROCEDURE — 85520 HEPARIN ASSAY: CPT | Performed by: SURGERY

## 2023-09-18 PROCEDURE — 97530 THERAPEUTIC ACTIVITIES: CPT | Mod: GP

## 2023-09-18 PROCEDURE — 80048 BASIC METABOLIC PNL TOTAL CA: CPT | Performed by: SURGERY

## 2023-09-18 PROCEDURE — 82150 ASSAY OF AMYLASE: CPT | Performed by: SURGERY

## 2023-09-18 PROCEDURE — 250N000011 HC RX IP 250 OP 636: Performed by: NURSE PRACTITIONER

## 2023-09-18 PROCEDURE — 250N000013 HC RX MED GY IP 250 OP 250 PS 637: Performed by: NURSE PRACTITIONER

## 2023-09-18 PROCEDURE — 250N000011 HC RX IP 250 OP 636: Mod: JZ,JB | Performed by: PHYSICIAN ASSISTANT

## 2023-09-18 PROCEDURE — 258N000003 HC RX IP 258 OP 636: Performed by: SURGERY

## 2023-09-18 PROCEDURE — 84100 ASSAY OF PHOSPHORUS: CPT | Performed by: SURGERY

## 2023-09-18 PROCEDURE — 36415 COLL VENOUS BLD VENIPUNCTURE: CPT | Performed by: PHYSICIAN ASSISTANT

## 2023-09-18 PROCEDURE — 83735 ASSAY OF MAGNESIUM: CPT | Performed by: SURGERY

## 2023-09-18 PROCEDURE — 250N000013 HC RX MED GY IP 250 OP 250 PS 637: Performed by: SURGERY

## 2023-09-18 PROCEDURE — 258N000003 HC RX IP 258 OP 636: Performed by: NURSE PRACTITIONER

## 2023-09-18 PROCEDURE — 250N000011 HC RX IP 250 OP 636: Mod: JZ | Performed by: SURGERY

## 2023-09-18 PROCEDURE — 97116 GAIT TRAINING THERAPY: CPT | Mod: GP

## 2023-09-18 RX ORDER — BISACODYL 10 MG
10 SUPPOSITORY, RECTAL RECTAL ONCE
Status: COMPLETED | OUTPATIENT
Start: 2023-09-18 | End: 2023-09-18

## 2023-09-18 RX ORDER — SULFAMETHOXAZOLE AND TRIMETHOPRIM 400; 80 MG/1; MG/1
1 TABLET ORAL
Status: DISCONTINUED | OUTPATIENT
Start: 2023-09-19 | End: 2023-09-20

## 2023-09-18 RX ORDER — PANTOPRAZOLE SODIUM 40 MG/1
40 TABLET, DELAYED RELEASE ORAL DAILY
Status: DISCONTINUED | OUTPATIENT
Start: 2023-09-19 | End: 2023-09-21 | Stop reason: HOSPADM

## 2023-09-18 RX ORDER — SODIUM CHLORIDE, SODIUM LACTATE, POTASSIUM CHLORIDE, CALCIUM CHLORIDE 600; 310; 30; 20 MG/100ML; MG/100ML; MG/100ML; MG/100ML
INJECTION, SOLUTION INTRAVENOUS CONTINUOUS
Status: DISCONTINUED | OUTPATIENT
Start: 2023-09-18 | End: 2023-09-19

## 2023-09-18 RX ORDER — TACROLIMUS 1 MG/1
4 CAPSULE ORAL
Status: DISCONTINUED | OUTPATIENT
Start: 2023-09-18 | End: 2023-09-20

## 2023-09-18 RX ORDER — ATORVASTATIN CALCIUM 20 MG/1
20 TABLET, FILM COATED ORAL AT BEDTIME
Status: DISCONTINUED | OUTPATIENT
Start: 2023-09-18 | End: 2023-09-21 | Stop reason: HOSPADM

## 2023-09-18 RX ORDER — CALCIUM GLUCONATE 20 MG/ML
1 INJECTION, SOLUTION INTRAVENOUS ONCE
Status: COMPLETED | OUTPATIENT
Start: 2023-09-18 | End: 2023-09-18

## 2023-09-18 RX ORDER — OXYCODONE HYDROCHLORIDE 5 MG/1
5 TABLET ORAL EVERY 4 HOURS PRN
Status: DISCONTINUED | OUTPATIENT
Start: 2023-09-18 | End: 2023-09-21 | Stop reason: HOSPADM

## 2023-09-18 RX ORDER — MYCOPHENOLATE MOFETIL 250 MG/1
1000 CAPSULE ORAL
Status: DISCONTINUED | OUTPATIENT
Start: 2023-09-18 | End: 2023-09-21 | Stop reason: HOSPADM

## 2023-09-18 RX ORDER — METOPROLOL TARTRATE 25 MG/1
25 TABLET, FILM COATED ORAL 2 TIMES DAILY
Status: DISCONTINUED | OUTPATIENT
Start: 2023-09-18 | End: 2023-09-21 | Stop reason: HOSPADM

## 2023-09-18 RX ORDER — MAGNESIUM SULFATE HEPTAHYDRATE 40 MG/ML
2 INJECTION, SOLUTION INTRAVENOUS ONCE
Status: COMPLETED | OUTPATIENT
Start: 2023-09-18 | End: 2023-09-18

## 2023-09-18 RX ADMIN — ACETAMINOPHEN 975 MG: 325 TABLET, FILM COATED ORAL at 15:43

## 2023-09-18 RX ADMIN — METOPROLOL TARTRATE 25 MG: 25 TABLET, FILM COATED ORAL at 13:05

## 2023-09-18 RX ADMIN — SODIUM CHLORIDE, POTASSIUM CHLORIDE, SODIUM LACTATE AND CALCIUM CHLORIDE: 600; 310; 30; 20 INJECTION, SOLUTION INTRAVENOUS at 10:32

## 2023-09-18 RX ADMIN — METOPROLOL TARTRATE 25 MG: 25 TABLET, FILM COATED ORAL at 19:34

## 2023-09-18 RX ADMIN — ACETAMINOPHEN 975 MG: 325 TABLET, FILM COATED ORAL at 08:23

## 2023-09-18 RX ADMIN — Medication 1 TABLET: at 08:23

## 2023-09-18 RX ADMIN — VALGANCICLOVIR 450 MG: 450 TABLET, FILM COATED ORAL at 08:28

## 2023-09-18 RX ADMIN — Medication 1 TABLET: at 18:02

## 2023-09-18 RX ADMIN — SENNOSIDES AND DOCUSATE SODIUM 1 TABLET: 50; 8.6 TABLET ORAL at 08:23

## 2023-09-18 RX ADMIN — MAGNESIUM OXIDE TAB 400 MG (241.3 MG ELEMENTAL MG) 400 MG: 400 (241.3 MG) TAB at 08:23

## 2023-09-18 RX ADMIN — ASPIRIN 325 MG ORAL TABLET 325 MG: 325 PILL ORAL at 08:23

## 2023-09-18 RX ADMIN — TACROLIMUS 4 MG: 1 CAPSULE ORAL at 17:59

## 2023-09-18 RX ADMIN — MYCOPHENOLATE MOFETIL 1000 MG: 250 CAPSULE ORAL at 18:00

## 2023-09-18 RX ADMIN — HEPARIN SODIUM 500 UNITS/HR: 10000 INJECTION, SOLUTION INTRAVENOUS at 13:10

## 2023-09-18 RX ADMIN — FUROSEMIDE 40 MG: 10 INJECTION, SOLUTION INTRAMUSCULAR; INTRAVENOUS at 08:22

## 2023-09-18 RX ADMIN — MAGNESIUM SULFATE IN WATER 2 G: 40 INJECTION, SOLUTION INTRAVENOUS at 09:08

## 2023-09-18 RX ADMIN — SODIUM CHLORIDE, SODIUM LACTATE, POTASSIUM CHLORIDE, CALCIUM CHLORIDE AND DEXTROSE MONOHYDRATE: 5; 600; 310; 30; 20 INJECTION, SOLUTION INTRAVENOUS at 09:42

## 2023-09-18 RX ADMIN — METHOCARBAMOL 500 MG: 500 TABLET, FILM COATED ORAL at 20:50

## 2023-09-18 RX ADMIN — MYCOPHENOLATE MOFETIL 1000 MG: 200 POWDER, FOR SUSPENSION ORAL at 08:26

## 2023-09-18 RX ADMIN — CALCIUM GLUCONATE 1 G: 20 INJECTION, SOLUTION INTRAVENOUS at 13:05

## 2023-09-18 RX ADMIN — OCTREOTIDE ACETATE 100 MCG: 100 INJECTION, SOLUTION INTRAVENOUS; SUBCUTANEOUS at 14:37

## 2023-09-18 RX ADMIN — ATORVASTATIN CALCIUM 20 MG: 20 TABLET, FILM COATED ORAL at 21:37

## 2023-09-18 RX ADMIN — TACROLIMUS 4 MG: 5 CAPSULE ORAL at 08:26

## 2023-09-18 RX ADMIN — MICAFUNGIN SODIUM 100 MG: 50 INJECTION, POWDER, LYOPHILIZED, FOR SOLUTION INTRAVENOUS at 19:30

## 2023-09-18 RX ADMIN — FAMOTIDINE 10 MG: 10 TABLET, FILM COATED ORAL at 13:05

## 2023-09-18 RX ADMIN — PIPERACILLIN AND TAZOBACTAM 2.25 G: 2; .25 INJECTION, POWDER, FOR SOLUTION INTRAVENOUS at 04:40

## 2023-09-18 RX ADMIN — OCTREOTIDE ACETATE 100 MCG: 100 INJECTION, SOLUTION INTRAVENOUS; SUBCUTANEOUS at 08:22

## 2023-09-18 RX ADMIN — Medication 40 MG: at 08:23

## 2023-09-18 RX ADMIN — SODIUM CHLORIDE, POTASSIUM CHLORIDE, SODIUM LACTATE AND CALCIUM CHLORIDE: 600; 310; 30; 20 INJECTION, SOLUTION INTRAVENOUS at 23:00

## 2023-09-18 RX ADMIN — OCTREOTIDE ACETATE 100 MCG: 100 INJECTION, SOLUTION INTRAVENOUS; SUBCUTANEOUS at 20:09

## 2023-09-18 ASSESSMENT — ACTIVITIES OF DAILY LIVING (ADL)
ADLS_ACUITY_SCORE: 22

## 2023-09-18 NOTE — PLAN OF CARE
BP (!) 143/68 (BP Location: Right arm)   Pulse 89   Temp 98.3  F (36.8  C) (Oral)   Resp 16   Wt 95.8 kg (211 lb 4.8 oz)   SpO2 91%   BMI 29.47 kg/m         Neuro: Aox4  BG: Q2H, Alg 1. 125-127  Respiratory: WDL, denies SOB  Cardiac: WDL, denies chest pain  Pain/Nausea: Denies pain or nausea   Diet: NPO + ice chips. Tolerating well.  IV Access: R Triple lumen IJ, R PIVx2  Infusion(s): D5LR at 100ml/hr, Hep gtt at 500u/hr  Lines/Drains: Uribe in place, STEVEN in place, Serosanguineous output  GI/: No BM this shift; UOP adequate  Skin: Midline abdominal incision, stapled, BRIONNA  Mobility: Ax1 with walker  Plan: Monitor and Continue with plan of care

## 2023-09-18 NOTE — PLAN OF CARE
Goal Outcome Evaluation:  Shift: 3312-0361  BP (!) 146/68 (BP Location: Right arm)   Pulse 79   Temp 97.7  F (36.5  C) (Oral)   Resp 16   Wt 95.8 kg (211 lb 4.8 oz)   SpO2 91%   BMI 29.47 kg/m       VS- stable  on RA  BG- Q2 on insulin gtt ALG #1 104-117  Labs- pending   Pain/Nausea/PRN'S- no complaints of pain or nausea.  Diet- NPO except Ice chips  LDA- RIJ, R PIV X2, Left av fistula, STEVEN 120 ml output  Gtt/IVF- D5LR @100, heparin gtt @500 units/hr, insulin gtt.   GI- No Bm since 9/14  - lim with good output  Skin- midline incision stapled BRIONNA, no drainage, STEVEN on right, dressing changed  Activity- assist with walker   Events- NG tube removed at 0330 per orders  Plan- continue with plan of care.

## 2023-09-18 NOTE — PLAN OF CARE
/70 (BP Location: Right arm)   Pulse 76   Temp 98.4  F (36.9  C) (Oral)   Resp 16   Wt 93.4 kg (205 lb 14.4 oz)   SpO2 94%   BMI 28.72 kg/m      Shift: 6053-8708  Isolation Status: None  VS: VSS on RA, afebrile  Neuro: Aox4  Behaviors: Calm, pleasant  BG: Q2H, now ACHS 89- 115  Labs: Mg 1.6, Ca 7.1, replaced this shift  Respiratory: Diminished in bases. Encourage use of IS  Cardiac: WDL  Pain/Nausea: Denies pain or nausea  PRN: n/a  Diet: Clears, tolerating well. Fair appetite  IV Access: R IJ, R PIV x2  Infusion(s): LR at 100ml/hr, Hep gtt at 500u/hr, Insulin gtt to be discontinued  Lines/Drains: STEVEN x1, leaky at site  GI/: Voiding without difficulty. PVRs x2 done. BMx2 this shift  Skin: Abdominal incision stapled BRIONNA.  Mobility: Ax1 with walker.  Events/Education: Worked with PT today. Tolerated well.  Plan: Continue with plan of care

## 2023-09-18 NOTE — PROGRESS NOTES
[facility]   Transplant Nephrology Progress Note  Date of Admission:  9/14/202t  Today's Date: 09/18/2023    Recommendations:  - 1 g calcium gluconate IV   -As patient is now drinking water no need for IV free water  -Restart metoprolol 25mg PO BID  -Stop lasix  -Obtain donor CMV status    Assessment & Plan   # DDKT (SPK): Trend down   - Baseline Creatinine: ~ TBD   - Proteinuria: Not checked post transplant   - Date DSA Last Checked: Sep/2023      Latest DSA: No DSA at time of transplant   - BK Viremia: Not checked post transplant   - Kidney Tx Biopsy: No    -Double J stent placed at time of kidney transplant. Remove 4-6 weeks post transplant.    # Pancreas Tx (SPK):    - Pancreatic Exocrine Drainage: Enteric drained     - Blood glucose: Near euglycemia      On insulin: Yes   - HbA1c: Last checked at time of transplant      Latest HbA1c: 7.1% (prior to transplant)   - Pancreatic enzymes: Stable   - Date DSA Last Checked: Sep/2023  Latest DSA: No DSA at time of transplant   - Pancreas Tx Biopsy: No    # Immunosuppression: Tacrolimus immediate release (goal 8-10) and Mycophenolate mofetil (dose 1000 mg every 12 hours)   - Patient is in an immunosuppressed state and will continue to monitor for efficacy and toxicity of immunosuppression medications.   - rATG induction with recent transplant   - Changes: No     # Infection Prophylaxis:   - PJP: Sulfa/TMP (Bactrim)  - CMV: Valganciclovir (Valcyte), duration dependent on donor CMV status  - Thrush: Clotrimazole aramis (Mycelex)  - Fungal: Micafungin (Mycamine)    # Hypertension: Controlled;  Goal BP: < 150/90   - Volume status: Euvolemic     - Changes: Yes - restart metoprolol 25mg PO BID and stop lasix      # Anemia in Chronic Renal Disease: Hgb: Trend down      MCKENNA: No   - Iron studies: Low iron saturation, but high ferritin    # Mineral Bone Disorder:   - Secondary renal hyperparathyroidism; PTH level: Not checked recently         On treatment: None  - Vitamin D; level: Not checked recently        On supplement: No  - Calcium; level: Low        On supplement: Yes, oscal 500-5 mg PO BID. Give IV calcium gluconate 1g IV x1 today  - Phosphorus; level: Normal        On supplement: No    # Electrolytes:   - Potassium; level: Normal        On supplement: No  - Magnesium; level: Normal        On supplement: Yes  - Bicarbonate; level: Normal        On supplement: No     # Hypernatremia:   -Now that NG removed promote PO water intake.    # CAD: S/p CABG 2/2023. Restart metoprolol 25mg PO BID    # PAD    #Non-melanotic skin cancer: S/p Mohs 8/2022    # Transplant History:  Etiology of Kidney Failure: Diabetes mellitus type 2  Tx: DDKT (SPK)  Transplant: 9/15/2023 (Kidney), 9/14/2023 (Kidney / Pancreas)  Significant changes in immunosuppression: None  Significant transplant-related complications: None    Recommendations were communicated to the primary team verbally.    Jeffrey Chaparro MD  Transplant Nephrology  Contact information available via [facility] Paging/Directory     Interval History     Mr. Marion's creatinine is 3.54 (09/18 0555); Trend down.  Good urine output.  Other significant labs/tests/vitals: Scr decreasing, calcium low at 7.1  No events overnight.  No chest pain or shortness of breath.  No leg swelling.  No nausea and vomiting.  Bowel movements are now present.  No fever, sweats or chills.    Review of Systems   4 point ROS was obtained and negative except as noted in the Interval History.    MEDICATIONS:    acetaminophen  975 mg Oral Q8H     aspirin  325 mg Oral or Feeding Tube Daily     atorvastatin  20 mg Oral At Bedtime     calcium carbonate-vitamin D  1 tablet Oral BID w/meals     [START ON 9/22/2023] clotrimazole  10 mg Buccal 4x Daily     famotidine  10 mg Oral Q24H    Or     famotidine  10 mg Intravenous Q24H     magnesium oxide  400 mg Oral Q24H     metoprolol tartrate  25 mg Oral BID     micafungin  100 mg Intravenous Q24H      mycophenolate  1,000 mg Per NG tube BID IS     octreotide  100 mcg Subcutaneous TID     pantoprazole  40 mg Per NG tube Daily     polyethylene glycol  17 g Oral Daily     senna-docusate  1 tablet Oral BID     sodium chloride (PF)  3 mL Intravenous Q8H     sulfamethoxazole-trimethoprim  10 mL Per NG tube Once per day on  Sat     tacrolimus  4 mg Oral or NG Tube BID IS     valGANciclovir  450 mg Oral Once per day on        dextrose       heparin 500 Units/hr (23 1310)     insulin regular 0.5 Units/hr (23 1200)     lactated ringers 100 mL/hr at 23 1400       Physical Exam   Temp  Av.4  F (36.9  C)  Min: 97.4  F (36.3  C)  Max: 99.1  F (37.3  C)  Arterial Line BP  Min: 51/46  Max: 179/68  Arterial Line MAP (mmHg)  Av.4 mmHg  Min: 51 mmHg  Max: 105 mmHg      Pulse  Av.5  Min: 60  Max: 106 Resp  Avg: 15  Min: 10  Max: 19  SpO2  Av.4 %  Min: 88 %  Max: 100 %    CVP (mmHg): 11 mmHgBP 139/70 (BP Location: Right arm)   Pulse 76   Temp 98.4  F (36.9  C) (Oral)   Resp 16   Wt 93.4 kg (205 lb 14.4 oz)   SpO2 94%   BMI 28.72 kg/m     Date 23 0700 - 23 0659   Shift 9692-5806 7250-0919 2703-5400 24 Hour Total   INTAKE   I.V. 1251.07   1251.07   Shift Total(mL/kg) 1251.07(13.62)   1251.07(13.62)   OUTPUT   Shift Total(mL/kg)       Weight (kg) 91.85 91.85 91.85 91.85      Admit Weight: (P) 91.9 kg (202 lb 8 oz)     GENERAL APPEARANCE: alert and no distress  HENT: mouth without ulcers or lesions  RESP: lungs clear to auscultation - no rales, rhonchi or wheezes  CV: regular rhythm, normal rate, no rub, no murmur  EDEMA: no LE edema bilaterally  ABDOMEN: soft, nondistended, nontender, bowel sounds normal  MS: extremities normal - no gross deformities noted, no evidence of inflammation in joints, no muscle tenderness  SKIN: no rash    Data   All labs reviewed by me.  CMP  Recent Labs   Lab 23  1330 23  0957 23  0822 23  0555 23  0801  09/17/23  0643 09/16/23  0917 09/16/23  0801 09/16/23  0409 09/16/23  0348 09/15/23  0604 09/15/23  0447 09/14/23  1720 09/14/23  1539   NA  --   --   --  146*  --  143  --  140  --   --   --  136   < > 136   POTASSIUM  --   --   --  3.6  --  3.8  --  4.5  4.5  --  4.4   < > 4.5   < > 4.4   CHLORIDE  --   --   --  107  --  106  --  102  --   --   --  97*  --  95*   CO2  --   --   --  29  --  26  --  25  --   --   --  19*  --  25   ANIONGAP  --   --   --  10  --  11  --  13  --   --   --  20*  --  16*   * 115* 110* 112*   < > 148*   < > 129*   < >  --    < > 120*   < > 245*   BUN  --   --   --  46.1*  --  50.8*  --  50.1*  --   --   --  44.5*  --  46.4*   CR  --   --   --  3.54*  --  4.35*  --  5.41*  --   --   --  6.29*  --  6.77*   GFRESTIMATED  --   --   --  18*  --  14*  --  11*  --   --   --  9*  --  8*   DAVID  --   --   --  7.1*  --  7.3*  --  7.6*  --   --   --  8.6*  --  9.0   MAG  --   --   --  1.6*  --  1.8  --  1.9  --   --   --  2.1  --   --    PHOS  --   --   --  2.9  --  4.1  --  3.9  --   --   --  3.3  --   --    PROTTOTAL  --   --   --   --   --   --   --   --   --   --   --   --   --  7.3   ALBUMIN  --   --   --   --   --   --   --   --   --   --   --   --   --  4.3   BILITOTAL  --   --   --   --   --   --   --   --   --   --   --   --   --  0.9   ALKPHOS  --   --   --   --   --   --   --   --   --   --   --   --   --  60   AST  --   --   --   --   --   --   --   --   --   --   --   --   --  22   ALT  --   --   --   --   --   --   --   --   --   --   --   --   --  6    < > = values in this interval not displayed.     CBC  Recent Labs   Lab 09/18/23  0555 09/17/23  1149 09/17/23  0643 09/16/23  1709 09/16/23  1321 09/16/23  0801 09/15/23  0959 09/15/23  0447   HGB 8.6* 8.7* 9.0* 9.3*   < > 10.3*  10.3*   < > 12.6*   WBC 4.9  --  4.6  --   --  10.4  --  19.1*   RBC 2.61*  --  2.69*  --   --  3.08*  --  3.72*   HCT 26.2*  --  27.5*  --   --  31.1*  --  37.0*     --  102*  --   --  101*   --  100   MCH 33.0  --  33.5*  --   --  33.4*  --  33.9*   MCHC 32.8  --  32.7  --   --  33.1  --  34.1   RDW 13.8  --  14.0  --   --  14.1  --  13.9   PLT 88*  --  95*  --   --  107*  --  175    < > = values in this interval not displayed.     INR  Recent Labs   Lab 09/15/23  0447 09/14/23  1539   INR 1.11 1.06   PTT 26 27     ABG  Recent Labs   Lab 09/15/23  0354 09/15/23  0201 09/15/23  0002 09/14/23  2108   PH 7.42 7.36 7.39 7.44   PCO2 33* 40 39 40   PO2 107* 111* 111* 116*   HCO3 22 22 24 27   O2PER 41.0 42.0 38.0 43.0      Urine Studies  Recent Labs   Lab Test 09/14/23  1643 02/12/23  1539 08/09/22  1017 08/05/21  1426   COLOR Light Yellow Yellow Light Yellow Yellow   APPEARANCE Clear Slightly Cloudy* Clear Clear   URINEGLC 500* 100* 70* 50*   URINEBILI Negative Negative Negative Negative   URINEKETONE Negative Negative Negative Negative   SG 1.017 1.015 1.017 1.016   UBLD Small* Trace* Small* Negative   URINEPH 6.5 5.5 6.5 7.0   PROTEIN 100* 100* 70* 100*   NITRITE Negative Negative Negative Negative   LEUKEST Negative Negative Negative Negative   RBCU 1 3* 6* 2   WBCU 1 2 1 1     No lab results found.  PTH  No lab results found.  Iron Studies  Recent Labs   Lab Test 02/16/23  0834   IRON 24*   *   IRONSAT 20   TREMAINE 1,461*       IMAGING:  All imaging studies reviewed by me.

## 2023-09-18 NOTE — PROGRESS NOTES
Transplant Surgery  Inpatient Daily Progress Note  09/18/2023    Assessment & Plan: Avelina Marion is a 68 year old male with a past medical history significant for end stage kidney disease secondary to type 2 diabetes mellitus (on HD via AVF since 2021), BCC, CAD s/p CABG x 2 2/2023. He is now s/p SPK with ureteral stent placement and appendectomy on 9/14/23 with Dr. Ascencio.     s/p SPK 9/14/23:   Kidney transplant with ureteral stent placement: Cr 3.5 (from 4.4 < 5.4), trending down. Good UOP. Post-op US patent.    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 Lipase normalized. Minimal usage on the insulin drip (~0.5 units/hr).    - Mild pancreatitis noted, continue octreotide 100 mg TID   - Heparin gtt 500 units/hr and  mg daily for prophylaxis    Immunosuppression management:   Induction: per SPK protocol (cPRA 23, no DSA) with steroid pulse and Thymoglobulin 600 mg (6.5 mg/kg) complete.   Maintenance:   - MMF 1000 mg BID   - Tacrolimus 3 mg BID. Goal level 8-10.    Neuro:  Acute post op pain: Controlled on current regimen of scheduled Tylenol, PRN Robaxin. Stop PRN IV Dilaudid today and start PRN oxycodone.      Hematology:   Anemia of chronic disease/Acute blood loss: Hgb 8-9, stable.   Thrombocytopenia: likely r/t Thymo. PLT 80's, monitor.     Cardiorespiratory:   CAD s/p CABG x2 2023: Continue ASA, statin, metoprolol.     GI/Nutrition: NGT removed POD#2. Diet advanced to clears today. Bowel regimen ordered.    Endocrine: See above.     Fluid/Electrolytes: MIVF: LR at 100 mL/hr.   Hypomagnesemia: Continue PO Mag-Ox. Give additional 2 grams IV today.   Hypocalcemia: Ca gluconate 1g on 9/16 and 9/17. Give 1 gram today and OsCal BID.     : Discontinue Uribe catheter today and monitor PVRx2    Infectious disease: No issues.     Prophylaxis: DVT (mechanical, heparin straight rate), fall, GI (PPI), PJP (Bactrim),  viral (Valcyte x 12 weeks; CMV D - / R -), fungal (Micafungin)    Activity: PT consult. OOB to chair and ambulate today.     Disposition: 7A, PT recommending home with assist.     VIVI/Fellow/Resident Provider: Mary Perez, NP [id number]    Faculty: Francesco Farnsworth MD PhD  _________________________________________________________________  Transplant History: Admitted 2023 for  donor kidney pancreas transplant.  9/15/2023 (Kidney), 2023 (Kidney / Pancreas), Postoperative day: 4 (Kidney, Pancreas), 3 (Kidney)     Interval History: History is obtained from the patient and electronic health record  Overnight events: NG removed overnight with low residual output after clamping trial. Pain is controlled. Denies nausea. Tolerating sips of water. Passing gas but no BM since surgery.     ROS:   A 10-point review of systems was negative except as noted above.    Meds:   acetaminophen  975 mg Oral Q8H    aspirin  325 mg Oral or Feeding Tube Daily    calcium carbonate-vitamin D  1 tablet Oral BID w/meals    [START ON 2023] clotrimazole  10 mg Buccal 4x Daily    famotidine  10 mg Oral Q24H    Or    famotidine  10 mg Intravenous Q24H    furosemide  40 mg Intravenous BID    magnesium oxide  400 mg Oral Q24H    magnesium sulfate  2 g Intravenous Once    micafungin  100 mg Intravenous Q24H    mycophenolate  1,000 mg Per NG tube BID IS    octreotide  100 mcg Subcutaneous TID    pantoprazole  40 mg Per NG tube Daily    polyethylene glycol  17 g Oral Daily    senna-docusate  1 tablet Oral BID    sodium chloride (PF)  3 mL Intravenous Q8H    sulfamethoxazole-trimethoprim  10 mL Per NG tube Once per day on  Sat    tacrolimus  4 mg Oral or NG Tube BID IS    valGANciclovir  450 mg Oral Once per day on        Physical Exam:     Admit Weight: (P) 91.9 kg (202 lb 8 oz)    Current vitals:   BP (!) 146/68 (BP Location: Right arm)   Pulse 79   Temp 97.7  F (36.5  C) (Oral)   Resp 16   Wt 95.8 kg (211 lb 4.8 oz)    SpO2 91%   BMI 29.47 kg/m          Vital sign ranges:    Temp:  [97.6  F (36.4  C)-98.7  F (37.1  C)] 97.7  F (36.5  C)  Pulse:  [79-89] 79  Resp:  [16] 16  BP: (134-152)/(67-73) 146/68  SpO2:  [91 %-95 %] 91 %  Patient Vitals for the past 24 hrs:   BP Temp Temp src Pulse Resp SpO2   09/18/23 0556 (!) 146/68 97.7  F (36.5  C) Oral 79 16 91 %   09/18/23 0153 (!) 144/68 97.7  F (36.5  C) Oral 83 16 92 %   09/17/23 2253 -- -- -- -- -- 94 %   09/17/23 2200 134/69 98.7  F (37.1  C) Oral 87 16 95 %   09/17/23 1820 (!) 143/68 98.3  F (36.8  C) Oral 89 16 91 %   09/17/23 1403 (!) 141/67 97.6  F (36.4  C) Oral 87 16 94 %   09/17/23 0910 (!) 152/73 98.1  F (36.7  C) Oral 83 16 95 %       General Appearance: in no apparent distress.   Skin: normal, warm  Heart: regular rate and rhythm  Lungs: Nonlabored resps on RA  Abdomen: The abdomen is soft, nontender. Drain with serosanguinous output. Midline incision C/D/I.   : Uribe is present. Urine has no gross hematuria.   Extremities: edema: absent.   Neurologic: awake, alert, and oriented. Tremor absent.     Data:   CMP  Recent Labs   Lab 09/18/23  0822 09/18/23  0555 09/17/23  0801 09/17/23  0643 09/14/23  1720 09/14/23  1539   NA  --  146*  --  143   < > 136   POTASSIUM  --  3.6  --  3.8   < > 4.4   CHLORIDE  --  107  --  106   < > 95*   CO2  --  29  --  26   < > 25   * 112*   < > 148*   < > 245*   BUN  --  46.1*  --  50.8*   < > 46.4*   CR  --  3.54*  --  4.35*   < > 6.77*   GFRESTIMATED  --  18*  --  14*   < > 8*   DAVID  --  7.1*  --  7.3*   < > 9.0   ICAW  --  3.8*  --  3.8*   < >  --    MAG  --  1.6*  --  1.8   < >  --    PHOS  --  2.9  --  4.1   < >  --    AMYLASE  --  50  --  35   < > 62   LIPASE  --  39  --  25   < > 41   ALBUMIN  --   --   --   --   --  4.3   BILITOTAL  --   --   --   --   --  0.9   ALKPHOS  --   --   --   --   --  60   AST  --   --   --   --   --  22   ALT  --   --   --   --   --  6    < > = values in this interval not displayed.        CBC  Recent Labs   Lab 09/18/23  0555 09/17/23  1149 09/17/23  0643 09/15/23  0959 09/15/23  0447   HGB 8.6* 8.7* 9.0*   < > 12.6*   WBC 4.9  --  4.6   < > 19.1*   PLT 88*  --  95*   < > 175   A1C  --   --   --   --  8.4*    < > = values in this interval not displayed.       COAGS  Recent Labs   Lab 09/15/23  0447 09/14/23  1539   INR 1.11 1.06   PTT 26 27        Urinalysis  Recent Labs   Lab Test 09/14/23  1643 02/12/23  1539   COLOR Light Yellow Yellow   APPEARANCE Clear Slightly Cloudy*   URINEGLC 500* 100*   URINEBILI Negative Negative   URINEKETONE Negative Negative   SG 1.017 1.015   UBLD Small* Trace*   URINEPH 6.5 5.5   PROTEIN 100* 100*   NITRITE Negative Negative   LEUKEST Negative Negative   RBCU 1 3*   WBCU 1 2       Virology:  Hepatitis C Antibody   Date Value Ref Range Status   08/09/2022 Nonreactive Nonreactive Final

## 2023-09-19 ENCOUNTER — TELEPHONE (OUTPATIENT)
Dept: PHARMACY | Facility: CLINIC | Age: 68
End: 2023-09-19
Payer: COMMERCIAL

## 2023-09-19 ENCOUNTER — APPOINTMENT (OUTPATIENT)
Dept: PHYSICAL THERAPY | Facility: CLINIC | Age: 68
DRG: 008 | End: 2023-09-19
Attending: SURGERY
Payer: COMMERCIAL

## 2023-09-19 LAB
AMYLASE SERPL-CCNC: 54 U/L (ref 28–100)
ANION GAP SERPL CALCULATED.3IONS-SCNC: 8 MMOL/L (ref 7–15)
BASOPHILS # BLD AUTO: 0 10E3/UL (ref 0–0.2)
BASOPHILS NFR BLD AUTO: 0 %
BUN SERPL-MCNC: 41.4 MG/DL (ref 8–23)
CALCIUM SERPL-MCNC: 6.9 MG/DL (ref 8.8–10.2)
CHLORIDE SERPL-SCNC: 103 MMOL/L (ref 98–107)
CREAT SERPL-MCNC: 2.98 MG/DL (ref 0.67–1.17)
DEPRECATED HCO3 PLAS-SCNC: 29 MMOL/L (ref 22–29)
EGFRCR SERPLBLD CKD-EPI 2021: 22 ML/MIN/1.73M2
EOSINOPHIL # BLD AUTO: 0.1 10E3/UL (ref 0–0.7)
EOSINOPHIL NFR BLD AUTO: 1 %
ERYTHROCYTE [DISTWIDTH] IN BLOOD BY AUTOMATED COUNT: 13.5 % (ref 10–15)
GLUCOSE BLDC GLUCOMTR-MCNC: 122 MG/DL (ref 70–99)
GLUCOSE BLDC GLUCOMTR-MCNC: 83 MG/DL (ref 70–99)
GLUCOSE BLDC GLUCOMTR-MCNC: 99 MG/DL (ref 70–99)
GLUCOSE SERPL-MCNC: 96 MG/DL (ref 70–99)
HBV CORE AB SERPL QL IA: NONREACTIVE
HBV DNA SERPL QL NAA+PROBE: NORMAL
HBV SURFACE AB SERPL IA-ACNC: 11.71 M[IU]/ML
HBV SURFACE AB SERPL IA-ACNC: NORMAL M[IU]/ML
HBV SURFACE AG SERPL QL IA: NONREACTIVE
HCT VFR BLD AUTO: 23.9 % (ref 40–53)
HCV AB SERPL QL IA: NONREACTIVE
HCV RNA SERPL QL NAA+PROBE: NORMAL
HGB BLD-MCNC: 7.9 G/DL (ref 13.3–17.7)
HIV1+2 RNA SERPL QL NAA+PROBE: NORMAL
IMM GRANULOCYTES # BLD: 0.1 10E3/UL
IMM GRANULOCYTES NFR BLD: 2 %
LIPASE SERPL-CCNC: 51 U/L (ref 13–60)
LYMPHOCYTES # BLD AUTO: 0.2 10E3/UL (ref 0.8–5.3)
LYMPHOCYTES NFR BLD AUTO: 2 %
MAGNESIUM SERPL-MCNC: 1.8 MG/DL (ref 1.7–2.3)
MCH RBC QN AUTO: 33.5 PG (ref 26.5–33)
MCHC RBC AUTO-ENTMCNC: 33.1 G/DL (ref 31.5–36.5)
MCV RBC AUTO: 101 FL (ref 78–100)
MONOCYTES # BLD AUTO: 0.5 10E3/UL (ref 0–1.3)
MONOCYTES NFR BLD AUTO: 7 %
NEUTROPHILS # BLD AUTO: 5.9 10E3/UL (ref 1.6–8.3)
NEUTROPHILS NFR BLD AUTO: 88 %
NRBC # BLD AUTO: 0 10E3/UL
NRBC BLD AUTO-RTO: 0 /100
PATH REPORT.COMMENTS IMP SPEC: NORMAL
PATH REPORT.COMMENTS IMP SPEC: NORMAL
PATH REPORT.FINAL DX SPEC: NORMAL
PATH REPORT.GROSS SPEC: NORMAL
PATH REPORT.MICROSCOPIC SPEC OTHER STN: NORMAL
PATH REPORT.RELEVANT HX SPEC: NORMAL
PHOSPHATE SERPL-MCNC: 2.7 MG/DL (ref 2.5–4.5)
PHOTO IMAGE: NORMAL
PLATELET # BLD AUTO: 93 10E3/UL (ref 150–450)
POTASSIUM SERPL-SCNC: 3.4 MMOL/L (ref 3.4–5.3)
RBC # BLD AUTO: 2.36 10E6/UL (ref 4.4–5.9)
SODIUM SERPL-SCNC: 140 MMOL/L (ref 136–145)
UFH PPP CHRO-ACNC: <0.1 IU/ML
WBC # BLD AUTO: 6.7 10E3/UL (ref 4–11)

## 2023-09-19 PROCEDURE — 250N000011 HC RX IP 250 OP 636: Mod: JZ | Performed by: SURGERY

## 2023-09-19 PROCEDURE — 99233 SBSQ HOSP IP/OBS HIGH 50: CPT | Mod: 24 | Performed by: INTERNAL MEDICINE

## 2023-09-19 PROCEDURE — 85025 COMPLETE CBC W/AUTO DIFF WBC: CPT | Performed by: SURGERY

## 2023-09-19 PROCEDURE — 80048 BASIC METABOLIC PNL TOTAL CA: CPT | Performed by: SURGERY

## 2023-09-19 PROCEDURE — 250N000013 HC RX MED GY IP 250 OP 250 PS 637: Performed by: PHYSICIAN ASSISTANT

## 2023-09-19 PROCEDURE — 97530 THERAPEUTIC ACTIVITIES: CPT | Mod: GP

## 2023-09-19 PROCEDURE — 83735 ASSAY OF MAGNESIUM: CPT | Performed by: SURGERY

## 2023-09-19 PROCEDURE — 97116 GAIT TRAINING THERAPY: CPT | Mod: GP

## 2023-09-19 PROCEDURE — 250N000012 HC RX MED GY IP 250 OP 636 PS 637: Performed by: SURGERY

## 2023-09-19 PROCEDURE — 36415 COLL VENOUS BLD VENIPUNCTURE: CPT | Performed by: SURGERY

## 2023-09-19 PROCEDURE — 84100 ASSAY OF PHOSPHORUS: CPT | Performed by: SURGERY

## 2023-09-19 PROCEDURE — 85520 HEPARIN ASSAY: CPT | Performed by: SURGERY

## 2023-09-19 PROCEDURE — 88302 TISSUE EXAM BY PATHOLOGIST: CPT | Mod: 26 | Performed by: PATHOLOGY

## 2023-09-19 PROCEDURE — 83690 ASSAY OF LIPASE: CPT | Performed by: SURGERY

## 2023-09-19 PROCEDURE — 258N000003 HC RX IP 258 OP 636: Performed by: SURGERY

## 2023-09-19 PROCEDURE — 82150 ASSAY OF AMYLASE: CPT | Performed by: SURGERY

## 2023-09-19 PROCEDURE — 250N000013 HC RX MED GY IP 250 OP 250 PS 637: Performed by: NURSE PRACTITIONER

## 2023-09-19 PROCEDURE — 250N000011 HC RX IP 250 OP 636: Mod: JZ,JB | Performed by: PHYSICIAN ASSISTANT

## 2023-09-19 PROCEDURE — 250N000013 HC RX MED GY IP 250 OP 250 PS 637: Performed by: SURGERY

## 2023-09-19 PROCEDURE — 120N000011 HC R&B TRANSPLANT UMMC

## 2023-09-19 RX ORDER — VALGANCICLOVIR 450 MG/1
450 TABLET, FILM COATED ORAL EVERY OTHER DAY
Status: DISCONTINUED | OUTPATIENT
Start: 2023-09-20 | End: 2023-09-21 | Stop reason: HOSPADM

## 2023-09-19 RX ORDER — CALCIUM GLUCONATE 20 MG/ML
1 INJECTION, SOLUTION INTRAVENOUS ONCE
Status: COMPLETED | OUTPATIENT
Start: 2023-09-19 | End: 2023-09-19

## 2023-09-19 RX ORDER — CALCITRIOL 0.25 UG/1
0.25 CAPSULE, LIQUID FILLED ORAL DAILY
Status: DISCONTINUED | OUTPATIENT
Start: 2023-09-19 | End: 2023-09-20

## 2023-09-19 RX ADMIN — ATORVASTATIN CALCIUM 20 MG: 20 TABLET, FILM COATED ORAL at 21:27

## 2023-09-19 RX ADMIN — CALCITRIOL CAPSULES 0.25 MCG 0.25 MCG: 0.25 CAPSULE ORAL at 14:32

## 2023-09-19 RX ADMIN — TACROLIMUS 4 MG: 1 CAPSULE ORAL at 08:48

## 2023-09-19 RX ADMIN — MYCOPHENOLATE MOFETIL 1000 MG: 250 CAPSULE ORAL at 17:28

## 2023-09-19 RX ADMIN — CALCIUM GLUCONATE 1 G: 20 INJECTION, SOLUTION INTRAVENOUS at 14:32

## 2023-09-19 RX ADMIN — Medication 2 TABLET: at 17:31

## 2023-09-19 RX ADMIN — ACETAMINOPHEN 975 MG: 325 TABLET, FILM COATED ORAL at 08:51

## 2023-09-19 RX ADMIN — TACROLIMUS 4 MG: 1 CAPSULE ORAL at 17:29

## 2023-09-19 RX ADMIN — OCTREOTIDE ACETATE 100 MCG: 100 INJECTION, SOLUTION INTRAVENOUS; SUBCUTANEOUS at 14:32

## 2023-09-19 RX ADMIN — ACETAMINOPHEN 975 MG: 325 TABLET, FILM COATED ORAL at 00:39

## 2023-09-19 RX ADMIN — ASPIRIN 325 MG ORAL TABLET 325 MG: 325 PILL ORAL at 08:50

## 2023-09-19 RX ADMIN — ACETAMINOPHEN 975 MG: 325 TABLET, FILM COATED ORAL at 15:32

## 2023-09-19 RX ADMIN — MYCOPHENOLATE MOFETIL 1000 MG: 250 CAPSULE ORAL at 08:48

## 2023-09-19 RX ADMIN — OXYCODONE HYDROCHLORIDE 5 MG: 5 TABLET ORAL at 13:02

## 2023-09-19 RX ADMIN — METOPROLOL TARTRATE 25 MG: 25 TABLET, FILM COATED ORAL at 19:57

## 2023-09-19 RX ADMIN — MAGNESIUM OXIDE TAB 400 MG (241.3 MG ELEMENTAL MG) 400 MG: 400 (241.3 MG) TAB at 08:51

## 2023-09-19 RX ADMIN — SENNOSIDES AND DOCUSATE SODIUM 1 TABLET: 50; 8.6 TABLET ORAL at 19:57

## 2023-09-19 RX ADMIN — SULFAMETHOXAZOLE AND TRIMETHOPRIM 1 TABLET: 400; 80 TABLET ORAL at 08:49

## 2023-09-19 RX ADMIN — OCTREOTIDE ACETATE 100 MCG: 100 INJECTION, SOLUTION INTRAVENOUS; SUBCUTANEOUS at 19:58

## 2023-09-19 RX ADMIN — FAMOTIDINE 10 MG: 10 TABLET, FILM COATED ORAL at 13:01

## 2023-09-19 RX ADMIN — MICAFUNGIN SODIUM 100 MG: 50 INJECTION, POWDER, LYOPHILIZED, FOR SOLUTION INTRAVENOUS at 19:58

## 2023-09-19 RX ADMIN — PANTOPRAZOLE SODIUM 40 MG: 40 TABLET, DELAYED RELEASE ORAL at 08:50

## 2023-09-19 RX ADMIN — METOPROLOL TARTRATE 25 MG: 25 TABLET, FILM COATED ORAL at 08:49

## 2023-09-19 RX ADMIN — Medication 1 TABLET: at 08:50

## 2023-09-19 RX ADMIN — OCTREOTIDE ACETATE 100 MCG: 100 INJECTION, SOLUTION INTRAVENOUS; SUBCUTANEOUS at 09:03

## 2023-09-19 ASSESSMENT — ACTIVITIES OF DAILY LIVING (ADL)
ADLS_ACUITY_SCORE: 22

## 2023-09-19 NOTE — PLAN OF CARE
BP (!) 141/70 (BP Location: Right arm)   Pulse 62   Temp 98.1  F (36.7  C) (Oral)   Resp 18   Wt 93.4 kg (205 lb 14.4 oz)   SpO2 97%   BMI 28.72 kg/m      Shift: 2359-3935  Isolation Status: none  VS: hypertensive on room air, afebrile  Neuro: Aox4  Behaviors: calm and pleasant   BG: ACHS (99, 96)  Respiratory: WDL, denies sob  Cardiac: hypertensive, regular rate and rhythm   Pain/Nausea: moderate pain, denies nausea  PRN: Robaxin x1  Diet: clears-tolerating well  IV Access: PIV R x2, L. Fistula, TL IJ  Infusion(s): Heparin at 500u/hr, LR @ 50 mL/hr  Lines/Drains: STEVEN x1, leaking at site, dressing changed x2  GI/: Voiding without difficulty, BM x1  Skin: Abdominal incision stapled open to air without drainage  Mobility: Assist 1 with walker  Plan: Continue plan of care and notify team of any changes

## 2023-09-19 NOTE — PROGRESS NOTES
Care Management Follow Up    Length of Stay (days): 4    Expected Discharge Date: 09/20/2023     Concerns to be Addressed: denies needs/concerns at this time     Patient plan of care discussed at interdisciplinary rounds: Yes    Anticipated Discharge Disposition: Home, Home Care     Anticipated Discharge Services: None  Anticipated Discharge DME: None    Patient/family educated on Medicare website which has current facility and service quality ratings: no  Education Provided on the Discharge Plan: Yes  Patient/Family in Agreement with the Plan: yes    Referrals Placed by CM/SW: External Care Coordination, Homecare  Private pay costs discussed: Not applicable    Additional Information:  Met with pt.  Introduced RNCC role.  Reviewed anticipated plan for discharge, ATC appointments, transplant labs M/TH and home care RN services.  Pt confirmed his brother and sister will be his primary caregivers.  Pt would like home care RN support post hospitalization.  Pt notes no concerns with ATC appointments and outpatient follow up.    Initiated referral to [facility].     left for [facility] [place] [phone].    Ginny Gabriel, RN BSN, PHN, ACM-RN  7A RN Care Coordinator  Phone: [phone]  Pager [phone]    To contact the weekend RNCC  [place] (0800 - 1630) Saturday and Sunday    Units: 5A,5B,5C [phone]    Units: 6A, [phone]    Units 6B, 6C, 6D [phone]    Units: 7A, 7B, 7C, 7D, [phone]    [facility] ([phone]) Saturday and Sunday    Units: 5 Ortho, 8A, 10 ICU, & Pediatric Units-Pager 4: [phone]    9/19/2023 12:31 PM

## 2023-09-19 NOTE — PROGRESS NOTES
"Reason for Assessment:  Nutrition education regarding post transplant diet.   Diet History:  Pt denies following any special diet prior to admission.  He has had a hx of cardiac diet education s/p CABG 2/2023. He reports having had a good appetite/intake.  He dislikes Ensure, \"too chalky\".   Today he had his first meal of scrambled eggs with cheese, toast and hot chocolate.  He ate most of it and tolerated it well.  He also had a BM this afternoon.   Nutrition Diagnosis:  Food- and nutrition-related knowledge deficit r/t no previous knowledge of post transplant diet guidelines AEB patient verbal report.   Interventions:   Provided instruction on post-transplant diet with discussion regarding protein sources and high protein needs in acute post-tx phase.   -- Reviewed recommendations to follow low fat/low sodium diet long term and discussed heart healthy diet tips.    -- Reviewed need for food safety precautions to prevent food borne illness.  Provided handouts: Post Transplant Diet Guidelines and USDA food safety booklet  Pt verbalized understanding of diet following education and denied further nutritional questions.    Will send special K protein bar for HS snack time.   Goals:   Patient will verbalize understanding of education provided.  Follow-up:    Patient to ask any further nutrition-related questions before discharge.  In addition, pt may request outpatient RD appointment.    Mary Allan, MS, RD, LD, CCTD, CNSC  7A and 7B beds [phone], pager [phone]  Weekend pager [phone]    "

## 2023-09-19 NOTE — PHARMACY-TRANSPLANT NOTE
Solid Organ Transplant Recipient Prior to Discharge Note    68 year old male s/p SPK (DBD donor) transplant on 9/15/23 for Type II diabetes.    Pharmacy has monitored for medication interactions and immunosuppression levels in conjunction with the multidisciplinary team. In anticipation for discharge, medication therapy needs have been addressed daily throughout the current admission via multidisciplinary rounds and/or discussions, order verification, daily clinical pharmacy review, and communication with prescribers.    MAINTENANCE:  - Mycophenolate 1000 mg BID  - Tacrolimus with goal trough levels of 8-10 mcg/L for first 3 months post-transplant, most recent level at 14.6 (12h) on 9/20/23.      Opportunistic pathogen prophylaxis includes:  - PJP: trimethoprim/sulfamethoxazole  - CMV D-/R-: Valganciclovir for 3 months tentatively   - Thrush ppx: clotrimazole (micafungin IV x6 doses completed during hospital stay)    Post-op antibiotic/antifungal surgical prophylaxis:  includes: piperacillin-tazobactam for 3 days post-procedure completed and micafungin IV for 6 days post-procedure completed    Disease states:  CAD c/b CABG 2/2023   - Pt on asa 325 daily for prophylaxis  - atorvastatin 20 mg daily  - metoprolol 25 mg tartrate bid  - No ACE/ARB on board yet, follow-up for plan to start outpatient    Huey EsparzaD., R.Ph., PGY1 Resident

## 2023-09-19 NOTE — TELEPHONE ENCOUNTER
A pharmacist spent 30 minutes providing medication teaching with Avelina Marion for discharge with a focus on new medications/dose changes.  The discharge medication list was reviewed with the patient/family and the following points were discussed, as applicable: Name, description, purpose, dose/strength, duration of medications, common side effects, food/medications to avoid, action to be taken if dose is missed, when to call MD, and how to obtain refills.  The patient will be responsible for managing medications. Additionally, the following transplant related education was covered: Purpose of medication card, Timing of medications and day of lab draw considerations , Prescription Insurance , and Discharge process for receiving meds   Patient will  transplant supplies including 7 day pill organizer, thermometer, and BP monitor at the discharge pharmacy along with medications.  Patient will use local pharmacy or other mail order for outpatient medications.   Clinical Pharmacy Consult:                                                      Transplant Specific:   Date of Transplant: kidney 9/15/23, pancreas 9/19/23  Type of Transplant:  kidney/pancreas  First Transplant: yes  History of rejection: no    Immunosuppression Regimen   TAC 4mg qAM & 4mg qPM and MMF 1000mg qAM & 1000mg qPM, prednisone taper.  Patient specific goal: 8-10 mcg/L  Most recent level: 5.5 micrograms/L, date 9/18/23  Immunosuppressant Levels:  Subtherapeutic  Pt adherent to lab draws: yes  Scr:   Lab Results   Component Value Date    CR 2.98 09/19/2023     Side effects: no side effects    Prophylactic Medications  Antibacterial:  Bactrim SS three times per week  Scheduled Discontinue Date: Lifelong    Antifungal: Clotrimazole after completion of IV micafungin  Scheduled Discontinue Date: 3 months    Antiviral: Valcyte 450 mg twice weekly   Scheduled Discontinue Date: 3 months    Acid Reducer: Protonix (pantoprazole) and Pepcid  (famotidine)  Scheduled Reviewed Date: N/A    Thrombosis Prevention: Aspirin 325 mg PO daily  Scheduled Discontinue Date:  N/A    Blood Pressure Management  Frequency of home Blood Pressure checks: twice daily  Most recent home BP: 129/62 mmHg  Patient Blood pressure goal: <150/90 mmHg  Patient blood pressure at goal: yes  Hospitalizations/ER visits since last assessment: 0    Med rec/DUR performed: yes  Med Rec Discrepancies: no    Reminders:    Bring to first clinic appt: med box, med card, bp monitor, all medications being taken, and lab book.  2.   MTM pharmacist visit on first clinic appt and if ok, again in 3 to 4 months during follow up appt.  3.   Avoid Grapefruit and Grapefruit juice.   4.   Avoid herbal supplements. If wish to take other medications or supplements, call your coordinator.   5.   Keep lab appts.   6.   Can use apps on phone like app2you to help manage medication lists and reminders.   7.   Make sure you are protecting your skin by wearing long sleeves and applying sunscreen to exposed skin, for any significant time in the sun.     Transplant Coordinator is Makayla Wu RN     Thank you,  Ivette Boyle, PharmD, BCACP  [facility]  [phone]

## 2023-09-19 NOTE — PLAN OF CARE
Goal Outcome Evaluation: [id number] - Avelina has been afebrile w stable VS this shift on room air. Given scheduled tylenol and prn oxycodone once this shift for abdominal pain. Denies nausea. Advanced to a regular diet today and pt tolerated two meals well without any nausea or pain. Voiding well with good UOP. Pt had a bowel movement this morning. BGs 96, 86 & 99. MIVF discontinued, remains on heparin gtt @ 5mL/hr. Walked and climbed stairs with PT today. Reviewed immunosuppression meds with patient twice, updated med card and lab book. Up with SBA. Will continue to monitor and notify team w concerns.

## 2023-09-19 NOTE — PROGRESS NOTES
Transplant Social Work Services Progress Note       Data: VAN met with Avelina to provide psychosocial support and information on writing to donor family.   Intervention: SW utilized strengths based perspectives and relational approaches   Assessment: Avelina was alert, orientated and appropriate. He reports that he is doing well and thankful for the gift of transplant. He will discharge home with his siblings as his post transplant support.   Education provided by VAN:   Writing to Donor Family   *Psychoeduction from life source   Plan: VAN will continue to follow for psychosocial support, resources and advocate on behalf of the patient.       Elana AGUILAR [facility]  Outpatient Kidney/Pancreas/[facility] Transplant Program   [address] [place] [address] [place] [id number]  [email or web address]  Office: [phone] I Fax: [phone]

## 2023-09-19 NOTE — DISCHARGE INSTRUCTIONS
Diet recommendations post-transplant: High protein diet (aim for 120 grams/day) x 8 weeks.  Heart healthy dietary habits long term (low saturated/trans fat, low sodium). Practice food safety precautions. See nutrition handout and food safety booklet for more information.

## 2023-09-19 NOTE — PROGRESS NOTES
Transplant Surgery  Inpatient Daily Progress Note  09/19/2023    Assessment & Plan: Avelina Marion is a 68 year old male with a past medical history significant for end stage kidney disease secondary to type 2 diabetes mellitus (on HD via AVF since 2021), BCC, CAD s/p CABG x 2 2/2023. He is now s/p SPK with ureteral stent placement and appendectomy on 9/14/23 with Dr. Ascencio.     s/p SPK 9/14/23:   Kidney transplant with ureteral stent placement: Cr 3 < 3.5, continuing to trend down. Good UOP. Post-op US patent.    - Stopped IV Lasix BID    Pancreas transplant: Difficult to visualize pancreas transplant on US but appears to have flow in and out. Lipase normalized. Minimal usage on the insulin drip (~0.5 units/hr), now stopped.    - Mild pancreatitis noted, continue octreotide 100 mg TID   - Heparin gtt 500 units/hr and  mg daily for prophylaxis    Immunosuppression management:   Induction: per SPK protocol (cPRA 23, no DSA) with steroid pulse and Thymoglobulin 600 mg (6.5 mg/kg) complete.   Maintenance:   - MMF 1000 mg BID   - Tacrolimus 3 mg BID. Goal level 8-10.    Neuro:  Acute post op pain: Controlled on current regimen of scheduled Tylenol, PRN Robaxin. Stopped PRN IV Dilaudid today and started PRN PO oxycodone.      Hematology:   Anemia of chronic disease/Acute blood loss: Hgb 8-9, stable.   Thrombocytopenia: likely r/t Thymo. PLT 90's, monitor.     Cardiorespiratory:   CAD s/p CABG x2 2023: Continue ASA, statin, metoprolol.     GI/Nutrition: NGT removed POD#2. Advance diet to regular today. Bowel regimen ordered.    Endocrine: See above.     Fluid/Electrolytes: MIVF: discontinue   Hypomagnesemia: Continue PO Mag-Ox.   Hypocalcemia: Ca gluconate 1g on 9/16-9/18. OsCal BID.     : Uribe catheter removed and monitor PVRx2    Infectious disease: No issues.     Prophylaxis: DVT (mechanical, heparin straight rate), fall, GI (PPI), PJP (Bactrim), viral (Valcyte x 12 weeks; CMV D - / R -), fungal  (Micafungin)    Activity: PT consult. OOB to chair and ambulate today.     Disposition: 7A, PT recommending home with assist.     VIVI/Fellow/Resident Provider: Tequila Rodríguez PA-C [id number]    Faculty: Francesco Farnsworth MD PhD  _________________________________________________________________  Transplant History: Admitted 2023 for  donor kidney pancreas transplant.  9/15/2023 (Kidney), 2023 (Kidney / Pancreas), Postoperative day: 5 (Kidney, Pancreas), 4 (Kidney)     Interval History: History is obtained from the patient and electronic health record  Overnight events: Pain is controlled. Denies nausea. Tolerating clears. + BM.     ROS:   A 10-point review of systems was negative except as noted above.    Meds:   acetaminophen  975 mg Oral Q8H    aspirin  325 mg Oral or Feeding Tube Daily    atorvastatin  20 mg Oral At Bedtime    calcium carbonate-vitamin D  1 tablet Oral BID w/meals    [START ON 2023] clotrimazole  10 mg Buccal 4x Daily    famotidine  10 mg Oral Q24H    Or    famotidine  10 mg Intravenous Q24H    insulin aspart  1-7 Units Subcutaneous TID AC    insulin aspart  1-5 Units Subcutaneous At Bedtime    magnesium oxide  400 mg Oral Q24H    metoprolol tartrate  25 mg Oral BID    micafungin  100 mg Intravenous Q24H    mycophenolate  1,000 mg Oral BID IS    octreotide  100 mcg Subcutaneous TID    pantoprazole  40 mg Oral Daily    polyethylene glycol  17 g Oral Daily    senna-docusate  1 tablet Oral BID    sodium chloride (PF)  3 mL Intravenous Q8H    sulfamethoxazole-trimethoprim  1 tablet Per NG tube Once per day on  Sat    tacrolimus  4 mg Oral or NG Tube BID IS    valGANciclovir  450 mg Oral Once per day on u       Physical Exam:     Admit Weight: (P) 91.9 kg (202 lb 8 oz)    Current vitals:   /63 (BP Location: Right arm)   Pulse 62   Temp 98.1  F (36.7  C) (Oral)   Resp 17   Wt 92.3 kg (203 lb 8 oz)   SpO2 97%   BMI 28.38 kg/m          Vital sign ranges:    Temp:   [97.8  F (36.6  C)-98.4  F (36.9  C)] 98.1  F (36.7  C)  Pulse:  [56-76] 62  Resp:  [16-18] 17  BP: (127-147)/(63-73) 132/63  SpO2:  [94 %-98 %] 97 %  Patient Vitals for the past 24 hrs:   BP Temp Temp src Pulse Resp SpO2 Weight   09/19/23 0924 132/63 98.1  F (36.7  C) Oral 62 17 97 % --   09/19/23 0516 129/63 97.8  F (36.6  C) Oral 62 18 96 % --   09/19/23 0300 -- -- -- -- -- -- 92.3 kg (203 lb 8 oz)   09/19/23 0220 127/64 98.1  F (36.7  C) Oral 56 18 96 % --   09/18/23 2115 (!) 141/70 98.1  F (36.7  C) Oral 62 18 97 % --   09/18/23 1702 (!) 147/73 98.4  F (36.9  C) Oral 72 18 98 % --   09/18/23 1341 139/70 98.4  F (36.9  C) Oral 76 16 94 % --       General Appearance: in no apparent distress.   Skin: normal, warm  Heart: regular rate and rhythm  Lungs: Nonlabored resps on RA  Abdomen: The abdomen is soft, nontender. Drain with serosanguinous output. Midline incision C/D/I.   : Voiding  Extremities: edema: absent.   Neurologic: awake, alert, and oriented. Tremor absent.     Data:   CMP  Recent Labs   Lab 09/19/23  0526 09/18/23  2117 09/18/23  0822 09/18/23  0555 09/17/23  0801 09/17/23  0643 09/14/23  1720 09/14/23  1539     --   --  146*  --  143   < > 136   POTASSIUM 3.4  --   --  3.6  --  3.8   < > 4.4   CHLORIDE 103  --   --  107  --  106   < > 95*   CO2 29  --   --  29  --  26   < > 25   GLC 96 96   < > 112*   < > 148*   < > 245*   BUN 41.4*  --   --  46.1*  --  50.8*   < > 46.4*   CR 2.98*  --   --  3.54*  --  4.35*   < > 6.77*   GFRESTIMATED 22*  --   --  18*  --  14*   < > 8*   DAVID 6.9*  --   --  7.1*  --  7.3*   < > 9.0   ICAW  --   --   --  3.8*  --  3.8*   < >  --    MAG 1.8  --   --  1.6*  --  1.8   < >  --    PHOS 2.7  --   --  2.9  --  4.1   < >  --    AMYLASE 54  --   --  50  --  35   < > 62   LIPASE 51  --   --  39  --  25   < > 41   ALBUMIN  --   --   --   --   --   --   --  4.3   BILITOTAL  --   --   --   --   --   --   --  0.9   ALKPHOS  --   --   --   --   --   --   --  60   AST  --    --   --   --   --   --   --  22   ALT  --   --   --   --   --   --   --  6    < > = values in this interval not displayed.       CBC  Recent Labs   Lab 09/19/23  0526 09/18/23  0555 09/15/23  0959 09/15/23  0447   HGB 7.9* 8.6*   < > 12.6*   WBC 6.7 4.9   < > 19.1*   PLT 93* 88*   < > 175   A1C  --   --   --  8.4*    < > = values in this interval not displayed.       COAGS  Recent Labs   Lab 09/15/23  0447 09/14/23  1539   INR 1.11 1.06   PTT 26 27        Urinalysis  Recent Labs   Lab Test 09/14/23  1643 02/12/23  1539   COLOR Light Yellow Yellow   APPEARANCE Clear Slightly Cloudy*   URINEGLC 500* 100*   URINEBILI Negative Negative   URINEKETONE Negative Negative   SG 1.017 1.015   UBLD Small* Trace*   URINEPH 6.5 5.5   PROTEIN 100* 100*   NITRITE Negative Negative   LEUKEST Negative Negative   RBCU 1 3*   WBCU 1 2       Virology:  Hepatitis C Antibody   Date Value Ref Range Status   08/09/2022 Nonreactive Nonreactive Final

## 2023-09-20 ENCOUNTER — APPOINTMENT (OUTPATIENT)
Dept: PHYSICAL THERAPY | Facility: CLINIC | Age: 68
DRG: 008 | End: 2023-09-20
Attending: SURGERY
Payer: COMMERCIAL

## 2023-09-20 LAB
AMYLASE SERPL-CCNC: 61 U/L (ref 28–100)
ANION GAP SERPL CALCULATED.3IONS-SCNC: 9 MMOL/L (ref 7–15)
BASOPHILS # BLD AUTO: 0 10E3/UL (ref 0–0.2)
BASOPHILS NFR BLD AUTO: 0 %
BUN SERPL-MCNC: 38.3 MG/DL (ref 8–23)
CA-I BLD-MCNC: 3.8 MG/DL (ref 4.4–5.2)
CALCIUM SERPL-MCNC: 6.9 MG/DL (ref 8.8–10.2)
CHLORIDE SERPL-SCNC: 105 MMOL/L (ref 98–107)
CREAT FLD-MCNC: 2.6 MG/DL
CREAT SERPL-MCNC: 2.53 MG/DL (ref 0.67–1.17)
CREATININE BODY FLUID SOURCE: NORMAL
DEPRECATED HCO3 PLAS-SCNC: 26 MMOL/L (ref 22–29)
EGFRCR SERPLBLD CKD-EPI 2021: 27 ML/MIN/1.73M2
EOSINOPHIL # BLD AUTO: 0.1 10E3/UL (ref 0–0.7)
EOSINOPHIL NFR BLD AUTO: 1 %
ERYTHROCYTE [DISTWIDTH] IN BLOOD BY AUTOMATED COUNT: 13.8 % (ref 10–15)
GLUCOSE BLDC GLUCOMTR-MCNC: 103 MG/DL (ref 70–99)
GLUCOSE BLDC GLUCOMTR-MCNC: 104 MG/DL (ref 70–99)
GLUCOSE BLDC GLUCOMTR-MCNC: 120 MG/DL (ref 70–99)
GLUCOSE BLDC GLUCOMTR-MCNC: 133 MG/DL (ref 70–99)
GLUCOSE BLDC GLUCOMTR-MCNC: 98 MG/DL (ref 70–99)
GLUCOSE SERPL-MCNC: 97 MG/DL (ref 70–99)
HCT VFR BLD AUTO: 23.5 % (ref 40–53)
HGB BLD-MCNC: 7.9 G/DL (ref 13.3–17.7)
IMM GRANULOCYTES # BLD: 0.2 10E3/UL
IMM GRANULOCYTES NFR BLD: 2 %
LIPASE SERPL-CCNC: 60 U/L (ref 13–60)
LYMPHOCYTES # BLD AUTO: 0.1 10E3/UL (ref 0.8–5.3)
LYMPHOCYTES NFR BLD AUTO: 2 %
MAGNESIUM SERPL-MCNC: 1.8 MG/DL (ref 1.7–2.3)
MCH RBC QN AUTO: 33.2 PG (ref 26.5–33)
MCHC RBC AUTO-ENTMCNC: 33.6 G/DL (ref 31.5–36.5)
MCV RBC AUTO: 99 FL (ref 78–100)
MONOCYTES # BLD AUTO: 0.6 10E3/UL (ref 0–1.3)
MONOCYTES NFR BLD AUTO: 8 %
NEUTROPHILS # BLD AUTO: 6.6 10E3/UL (ref 1.6–8.3)
NEUTROPHILS NFR BLD AUTO: 87 %
NRBC # BLD AUTO: 0 10E3/UL
NRBC BLD AUTO-RTO: 0 /100
PHOSPHATE SERPL-MCNC: 2.6 MG/DL (ref 2.5–4.5)
PLATELET # BLD AUTO: 105 10E3/UL (ref 150–450)
POTASSIUM SERPL-SCNC: 3.7 MMOL/L (ref 3.4–5.3)
RBC # BLD AUTO: 2.38 10E6/UL (ref 4.4–5.9)
SODIUM SERPL-SCNC: 140 MMOL/L (ref 136–145)
TACROLIMUS BLD-MCNC: 14.6 UG/L (ref 5–15)
TME LAST DOSE: NORMAL H
TME LAST DOSE: NORMAL H
UFH PPP CHRO-ACNC: <0.1 IU/ML
WBC # BLD AUTO: 7.6 10E3/UL (ref 4–11)

## 2023-09-20 PROCEDURE — 120N000011 HC R&B TRANSPLANT UMMC

## 2023-09-20 PROCEDURE — 80197 ASSAY OF TACROLIMUS: CPT | Performed by: PHYSICIAN ASSISTANT

## 2023-09-20 PROCEDURE — 250N000011 HC RX IP 250 OP 636: Mod: JZ,JB | Performed by: PHYSICIAN ASSISTANT

## 2023-09-20 PROCEDURE — 85520 HEPARIN ASSAY: CPT | Performed by: SURGERY

## 2023-09-20 PROCEDURE — 82330 ASSAY OF CALCIUM: CPT | Performed by: INTERNAL MEDICINE

## 2023-09-20 PROCEDURE — 82570 ASSAY OF URINE CREATININE: CPT | Performed by: PHYSICIAN ASSISTANT

## 2023-09-20 PROCEDURE — 250N000011 HC RX IP 250 OP 636: Mod: JZ | Performed by: PHYSICIAN ASSISTANT

## 2023-09-20 PROCEDURE — 82150 ASSAY OF AMYLASE: CPT | Performed by: SURGERY

## 2023-09-20 PROCEDURE — 97530 THERAPEUTIC ACTIVITIES: CPT | Mod: GP

## 2023-09-20 PROCEDURE — 84100 ASSAY OF PHOSPHORUS: CPT | Performed by: SURGERY

## 2023-09-20 PROCEDURE — 36592 COLLECT BLOOD FROM PICC: CPT | Performed by: PHYSICIAN ASSISTANT

## 2023-09-20 PROCEDURE — 250N000013 HC RX MED GY IP 250 OP 250 PS 637: Performed by: NURSE PRACTITIONER

## 2023-09-20 PROCEDURE — 258N000003 HC RX IP 258 OP 636: Performed by: SURGERY

## 2023-09-20 PROCEDURE — 250N000011 HC RX IP 250 OP 636: Mod: JZ | Performed by: SURGERY

## 2023-09-20 PROCEDURE — 250N000012 HC RX MED GY IP 250 OP 636 PS 637: Performed by: PHYSICIAN ASSISTANT

## 2023-09-20 PROCEDURE — 83690 ASSAY OF LIPASE: CPT | Performed by: SURGERY

## 2023-09-20 PROCEDURE — 83735 ASSAY OF MAGNESIUM: CPT | Performed by: SURGERY

## 2023-09-20 PROCEDURE — 85025 COMPLETE CBC W/AUTO DIFF WBC: CPT | Performed by: SURGERY

## 2023-09-20 PROCEDURE — 99233 SBSQ HOSP IP/OBS HIGH 50: CPT | Mod: 24

## 2023-09-20 PROCEDURE — 250N000012 HC RX MED GY IP 250 OP 636 PS 637: Performed by: SURGERY

## 2023-09-20 PROCEDURE — 250N000013 HC RX MED GY IP 250 OP 250 PS 637: Performed by: PHYSICIAN ASSISTANT

## 2023-09-20 PROCEDURE — 36592 COLLECT BLOOD FROM PICC: CPT | Performed by: INTERNAL MEDICINE

## 2023-09-20 PROCEDURE — 82310 ASSAY OF CALCIUM: CPT | Performed by: SURGERY

## 2023-09-20 PROCEDURE — 250N000013 HC RX MED GY IP 250 OP 250 PS 637: Performed by: SURGERY

## 2023-09-20 PROCEDURE — 97116 GAIT TRAINING THERAPY: CPT | Mod: GP

## 2023-09-20 RX ORDER — TACROLIMUS 1 MG/1
3 CAPSULE ORAL
Status: DISCONTINUED | OUTPATIENT
Start: 2023-09-20 | End: 2023-09-21 | Stop reason: HOSPADM

## 2023-09-20 RX ORDER — SULFAMETHOXAZOLE AND TRIMETHOPRIM 400; 80 MG/1; MG/1
1 TABLET ORAL DAILY
Status: DISCONTINUED | OUTPATIENT
Start: 2023-09-21 | End: 2023-09-21 | Stop reason: HOSPADM

## 2023-09-20 RX ORDER — CALCIUM GLUCONATE 20 MG/ML
1 INJECTION, SOLUTION INTRAVENOUS ONCE
Status: COMPLETED | OUTPATIENT
Start: 2023-09-20 | End: 2023-09-20

## 2023-09-20 RX ORDER — CALCITRIOL 0.25 UG/1
0.25 CAPSULE, LIQUID FILLED ORAL ONCE
Status: COMPLETED | OUTPATIENT
Start: 2023-09-20 | End: 2023-09-20

## 2023-09-20 RX ORDER — CALCITRIOL 0.25 UG/1
0.5 CAPSULE, LIQUID FILLED ORAL DAILY
Status: DISCONTINUED | OUTPATIENT
Start: 2023-09-21 | End: 2023-09-21 | Stop reason: HOSPADM

## 2023-09-20 RX ORDER — POLYETHYLENE GLYCOL 3350 17 G/17G
17 POWDER, FOR SOLUTION ORAL DAILY PRN
Status: DISCONTINUED | OUTPATIENT
Start: 2023-09-20 | End: 2023-09-21 | Stop reason: HOSPADM

## 2023-09-20 RX ADMIN — ATORVASTATIN CALCIUM 20 MG: 20 TABLET, FILM COATED ORAL at 22:45

## 2023-09-20 RX ADMIN — OCTREOTIDE ACETATE 100 MCG: 100 INJECTION, SOLUTION INTRAVENOUS; SUBCUTANEOUS at 08:34

## 2023-09-20 RX ADMIN — METOPROLOL TARTRATE 25 MG: 25 TABLET, FILM COATED ORAL at 19:39

## 2023-09-20 RX ADMIN — ACETAMINOPHEN 975 MG: 325 TABLET, FILM COATED ORAL at 08:35

## 2023-09-20 RX ADMIN — Medication 2 TABLET: at 17:15

## 2023-09-20 RX ADMIN — METOPROLOL TARTRATE 25 MG: 25 TABLET, FILM COATED ORAL at 08:35

## 2023-09-20 RX ADMIN — CALCITRIOL CAPSULES 0.25 MCG 0.25 MCG: 0.25 CAPSULE ORAL at 08:35

## 2023-09-20 RX ADMIN — CALCITRIOL CAPSULES 0.25 MCG 0.25 MCG: 0.25 CAPSULE ORAL at 11:02

## 2023-09-20 RX ADMIN — ACETAMINOPHEN 975 MG: 325 TABLET, FILM COATED ORAL at 02:12

## 2023-09-20 RX ADMIN — SENNOSIDES AND DOCUSATE SODIUM 1 TABLET: 50; 8.6 TABLET ORAL at 19:39

## 2023-09-20 RX ADMIN — MYCOPHENOLATE MOFETIL 1000 MG: 250 CAPSULE ORAL at 08:36

## 2023-09-20 RX ADMIN — CALCIUM GLUCONATE 1 G: 20 INJECTION, SOLUTION INTRAVENOUS at 10:58

## 2023-09-20 RX ADMIN — SENNOSIDES AND DOCUSATE SODIUM 1 TABLET: 50; 8.6 TABLET ORAL at 08:40

## 2023-09-20 RX ADMIN — PANTOPRAZOLE SODIUM 40 MG: 40 TABLET, DELAYED RELEASE ORAL at 08:35

## 2023-09-20 RX ADMIN — ACETAMINOPHEN 975 MG: 325 TABLET, FILM COATED ORAL at 17:14

## 2023-09-20 RX ADMIN — TACROLIMUS 4 MG: 1 CAPSULE ORAL at 08:36

## 2023-09-20 RX ADMIN — HEPARIN SODIUM 500 UNITS/HR: 10000 INJECTION, SOLUTION INTRAVENOUS at 13:47

## 2023-09-20 RX ADMIN — FAMOTIDINE 10 MG: 10 TABLET, FILM COATED ORAL at 13:45

## 2023-09-20 RX ADMIN — TACROLIMUS 3 MG: 1 CAPSULE ORAL at 17:15

## 2023-09-20 RX ADMIN — MYCOPHENOLATE MOFETIL 1000 MG: 250 CAPSULE ORAL at 17:15

## 2023-09-20 RX ADMIN — OCTREOTIDE ACETATE 100 MCG: 100 INJECTION, SOLUTION INTRAVENOUS; SUBCUTANEOUS at 13:45

## 2023-09-20 RX ADMIN — Medication 2 TABLET: at 08:35

## 2023-09-20 RX ADMIN — MAGNESIUM OXIDE TAB 400 MG (241.3 MG ELEMENTAL MG) 400 MG: 400 (241.3 MG) TAB at 08:35

## 2023-09-20 RX ADMIN — ASPIRIN 325 MG ORAL TABLET 325 MG: 325 PILL ORAL at 08:35

## 2023-09-20 RX ADMIN — VALGANCICLOVIR 450 MG: 450 TABLET, FILM COATED ORAL at 08:36

## 2023-09-20 RX ADMIN — OCTREOTIDE ACETATE 100 MCG: 100 INJECTION, SOLUTION INTRAVENOUS; SUBCUTANEOUS at 19:39

## 2023-09-20 RX ADMIN — MICAFUNGIN SODIUM 100 MG: 50 INJECTION, POWDER, LYOPHILIZED, FOR SOLUTION INTRAVENOUS at 19:38

## 2023-09-20 ASSESSMENT — ACTIVITIES OF DAILY LIVING (ADL)
ADLS_ACUITY_SCORE: 22

## 2023-09-20 NOTE — PROGRESS NOTES
[facility]   Transplant Nephrology Progress Note  Date of Admission:  9/14/202t  Today's Date: 09/20/2023    Recommendations:  - Increase calcitriol to 0.5 mcg PO daily.  - Give 1 g calcium gluconate IV.  - Recheck ionized calcium tomorrow morning.    Assessment & Plan   # DDKT (SPK): Trend down in serum creatinine.   - Baseline Creatinine: ~ TBD   - Proteinuria: Not checked post transplant   - Date DSA Last Checked: Sep/2023      Latest DSA: No DSA at time of transplant   - BK Viremia: Not checked post transplant   - Kidney Tx Biopsy: No    -Double J stent placed at time of kidney transplant. Remove 4-6 weeks post transplant.    # Pancreas Tx (SPK):    - Pancreatic Exocrine Drainage: Enteric drained     - Blood glucose: Near euglycemia      On insulin: Yes   - HbA1c: Last checked at time of transplant      Latest HbA1c: 7.1% (prior to transplant)   - Pancreatic enzymes: Stable   - Date DSA Last Checked: Sep/2023  Latest DSA: No DSA at time of transplant   - Pancreas Tx Biopsy: No    # Immunosuppression: Tacrolimus immediate release (goal 8-10) and Mycophenolate mofetil (dose 1000 mg every 12 hours)   - Patient is in an immunosuppressed state and will continue to monitor for efficacy and toxicity of immunosuppression medications.   - rATG induction with recent transplant   - Changes: No     # Infection Prophylaxis:   - PJP: Sulfa/TMP (Bactrim)  - CMV: Valganciclovir (Valcyte), x3m, CMV -/-  - Thrush: Clotrimazole aramis (Mycelex)  - Fungal: Micafungin (Mycamine)    # Hypertension: Controlled;  Goal BP: < 150/90   - Volume status: Euvolemic     - Changes: Not at this time. Continue metoprolol 25mg PO BID.    # Anemia in Chronic Renal Disease: Hgb: Stable, low      MCKENNA: No   - Iron studies: Low iron saturation, but high ferritin    # Mineral Bone Disorder:   - Secondary renal hyperparathyroidism; PTH level: Not checked recently        On treatment: Calcitriol 0.25 mcg  daily for hypocalcemia, increase to 0.5 mcg daily.  - Vitamin D; level: Not checked recently        On supplement: No  - Calcium; level: Low           On supplement: Yes, oscal 2 tabs BID.  Give 1 g calcium gluconate IV.  - Phosphorus; level: Normal          On supplement: No    # Electrolytes:   - Potassium; level: Normal        On supplement: No  - Magnesium; level: Normal        On supplement: Yes, magnesium oxide 400 mg PO daily  - Bicarbonate; level: Normal        On supplement: No     # Hypernatremia:   -resolved.    # CAD: S/p CABG 2/2023. Continue metoprolol 25mg PO BID.    # PAD    #Non-melanotic skin cancer: S/p Mohs 8/2022    # Transplant History:  Etiology of Kidney Failure: Diabetes mellitus type 2  Tx: DDKT (SPK)  Transplant: 9/15/2023 (Kidney), 9/14/2023 (Kidney / Pancreas)  Significant changes in immunosuppression: None  Significant transplant-related complications: None    Recommendations were communicated to the primary team verbally.    KWASI N[facility]  Transplant Nephrology  Contact information available via [facility] Paging/Directory     Interval History   Mr. Marion's creatinine is 2.53 (09/20 0535); Trend down from 2.98 yesterday.   I/O last 3 completed shifts:  In: 570 [P.O.:560; I.V.:10]  Out: 1610 [Urine:1220; Drains:390]   Other significant labs/tests/vitals: SBP 130s - 140s overnight. Afebrile  No acute events overnight.  No chest pain or shortness of breath.  No leg swelling.  No N/V/D.      Review of Systems   4 point ROS was obtained and negative except as noted in the Interval History.    MEDICATIONS:   acetaminophen  975 mg Oral Q8H    aspirin  325 mg Oral or Feeding Tube Daily    atorvastatin  20 mg Oral At Bedtime    calcitRIOL  0.25 mcg Oral Daily    calcium carbonate-vitamin D  2 tablet Oral BID w/meals    [START ON 9/22/2023] clotrimazole  10 mg Buccal 4x Daily    famotidine  10 mg Oral Q24H    Or    famotidine  10 mg Intravenous Q24H    insulin aspart  1-7 Units  Subcutaneous TID AC    insulin aspart  1-5 Units Subcutaneous At Bedtime    magnesium oxide  400 mg Oral Q24H    metoprolol tartrate  25 mg Oral BID    micafungin  100 mg Intravenous Q24H    mycophenolate  1,000 mg Oral BID IS    octreotide  100 mcg Subcutaneous TID    pantoprazole  40 mg Oral Daily    polyethylene glycol  17 g Oral Daily    senna-docusate  1 tablet Oral BID    sodium chloride (PF)  3 mL Intravenous Q8H    sulfamethoxazole-trimethoprim  1 tablet Per NG tube Once per day on Tue Thu Sat    tacrolimus  4 mg Oral or NG Tube BID IS    valGANciclovir  450 mg Oral Every Other Day      dextrose      heparin 500 Units/hr (23)       Physical Exam   Temp  Av.4  F (36.9  C)  Min: 97.4  F (36.3  C)  Max: 99.1  F (37.3  C)  Arterial Line BP  Min: 51/46  Max: 179/68  Arterial Line MAP (mmHg)  Av.4 mmHg  Min: 51 mmHg  Max: 105 mmHg      Pulse  Av.5  Min: 60  Max: 106 Resp  Avg: 15  Min: 10  Max: 19  SpO2  Av.4 %  Min: 88 %  Max: 100 %    CVP (mmHg): 11 mmHgBP 137/64 (BP Location: Right arm)   Pulse 60   Temp 98.3  F (36.8  C) (Oral)   Resp 18   Wt 92.3 kg (203 lb 8 oz)   SpO2 95%   BMI 28.38 kg/m        Admit Weight: (P) 91.9 kg (202 lb 8 oz)     GENERAL APPEARANCE: alert and no distress  HENT: mouth without ulcers or lesions  RESP: lungs clear to auscultation - no rales, rhonchi or wheezes  CV: regular rhythm, normal rate, no rub, no murmur  EDEMA: no LE edema bilaterally  ABDOMEN: soft, nondistended, nontender, bowel sounds normal  MS: extremities normal - no gross deformities noted, no evidence of inflammation in joints, no muscle tenderness  SKIN: no rash    Data   All labs reviewed by me.  CMP  Recent Labs   Lab 23  0808 23  0535 23  0159 23  2126 23  1056 23  0526 23  0822 23  0555 23  0801 23  0643 23  1720 23  1539   NA  --  140  --   --   --  140  --  146*  --  143   < > 136   POTASSIUM  --  3.7  --    --   --  3.4  --  3.6  --  3.8   < > 4.4   CHLORIDE  --  105  --   --   --  103  --  107  --  106   < > 95*   CO2  --  26  --   --   --  29  --  29  --  26   < > 25   ANIONGAP  --  9  --   --   --  8  --  10  --  11   < > 16*   GLC 98 97 104* 122*   < > 96   < > 112*   < > 148*   < > 245*   BUN  --  38.3*  --   --   --  41.4*  --  46.1*  --  50.8*   < > 46.4*   CR  --  2.53*  --   --   --  2.98*  --  3.54*  --  4.35*   < > 6.77*   GFRESTIMATED  --  27*  --   --   --  22*  --  18*  --  14*   < > 8*   DAVID  --  6.9*  --   --   --  6.9*  --  7.1*  --  7.3*   < > 9.0   MAG  --  1.8  --   --   --  1.8  --  1.6*  --  1.8   < >  --    PHOS  --  2.6  --   --   --  2.7  --  2.9  --  4.1   < >  --    PROTTOTAL  --   --   --   --   --   --   --   --   --   --   --  7.3   ALBUMIN  --   --   --   --   --   --   --   --   --   --   --  4.3   BILITOTAL  --   --   --   --   --   --   --   --   --   --   --  0.9   ALKPHOS  --   --   --   --   --   --   --   --   --   --   --  60   AST  --   --   --   --   --   --   --   --   --   --   --  22   ALT  --   --   --   --   --   --   --   --   --   --   --  6    < > = values in this interval not displayed.     CBC  Recent Labs   Lab 09/20/23  0535 09/19/23  0526 09/18/23  0555 09/17/23  1149 09/17/23  0643   HGB 7.9* 7.9* 8.6* 8.7* 9.0*   WBC 7.6 6.7 4.9  --  4.6   RBC 2.38* 2.36* 2.61*  --  2.69*   HCT 23.5* 23.9* 26.2*  --  27.5*   MCV 99 101* 100  --  102*   MCH 33.2* 33.5* 33.0  --  33.5*   MCHC 33.6 33.1 32.8  --  32.7   RDW 13.8 13.5 13.8  --  14.0   * 93* 88*  --  95*     INR  Recent Labs   Lab 09/15/23  0447 09/14/23  1539   INR 1.11 1.06   PTT 26 27     ABG  Recent Labs   Lab 09/15/23  0354 09/15/23  0201 09/15/23  0002 09/14/23  2108   PH 7.42 7.36 7.39 7.44   PCO2 33* 40 39 40   PO2 107* 111* 111* 116*   HCO3 22 22 24 27   O2PER 41.0 42.0 38.0 43.0      Urine Studies  Recent Labs   Lab Test 09/14/23  1643 02/12/23  1539 08/09/22  1017 08/05/21  1426   COLOR Light Yellow  Aixa Gutierrez)  Dermatology  [address] [facility], [address]  [place] [address]  Phone: [phone]  Fax: [phone]  Follow Up Time:    Yellow Light Yellow Yellow   APPEARANCE Clear Slightly Cloudy* Clear Clear   URINEGLC 500* 100* 70* 50*   URINEBILI Negative Negative Negative Negative   URINEKETONE Negative Negative Negative Negative   SG 1.017 1.015 1.017 1.016   UBLD Small* Trace* Small* Negative   URINEPH 6.5 5.5 6.5 7.0   PROTEIN 100* 100* 70* 100*   NITRITE Negative Negative Negative Negative   LEUKEST Negative Negative Negative Negative   RBCU 1 3* 6* 2   WBCU 1 2 1 1     No lab results found.  PTH  No lab results found.  Iron Studies  Recent Labs   Lab Test 02/16/23  0834   IRON 24*   *   IRONSAT 20   TREMAINE 1,461*       IMAGING:  All imaging studies reviewed by me.

## 2023-09-20 NOTE — PLAN OF CARE
Shift: 1892-1273  /64 (BP Location: Right arm)   Pulse 60   Temp 98.3  F (36.8  C) (Oral)   Resp 18   Wt 92.3 kg (203 lb 8 oz)   SpO2 95%   BMI 28.38 kg/m       VS- stable on RA  BG- ACHS 122, 104  Labs- pending   Pain/Nausea/PRN'S- denies pain and nausea.   Diet- regular diet   LDA- Right IJ, R PIV SL  Gtt/IVF- Heparin gtt @ 500 units/hr   GI- LBM yesterday   -voiding adequately 800 ml output.  Skin- Midline stabled JONO STATON J.P. 80 ml output   Activity- Assist of one with walker   Plan- med card and lab book up to date.continue with plan of care.

## 2023-09-20 NOTE — PROGRESS NOTES
Transplant Surgery  Inpatient Daily Progress Note  09/20/2023    Assessment & Plan: Avelina Marion is a 68 year old male with a past medical history significant for end stage kidney disease secondary to type 2 diabetes mellitus (on HD via AVF since 2021), BCC, CAD s/p CABG x 2 2/2023. He is now s/p SPK with ureteral stent placement and appendectomy on 9/14/23 with Dr. Ascencio.     s/p SPK 9/14/23:   Kidney transplant with ureteral stent placement: Cr 2.5< 3, continuing to trend down. Good UOP. Post-op US patent.    - Stopped IV Lasix BID    Pancreas transplant: Difficult to visualize pancreas transplant on US but appears to have flow in and out. Lipase normalized. Minimal usage on the insulin drip (~0.5 units/hr), now stopped.    - Mild pancreatitis noted, octreotide 100 mg TID, will complete today.   - Heparin gtt 500 units/hr (will stop today) and  mg daily for prophylaxis    Immunosuppression management:   Induction: per SPK protocol (cPRA 23, no DSA) with steroid pulse and Thymoglobulin 600 mg (6.5 mg/kg) complete.   Maintenance:   - MMF 1000 mg BID   - Tacrolimus 3 mg BID. Goal level 8-10.    Neuro:  Acute post op pain: Controlled on current regimen of scheduled Tylenol, PRN Robaxin, and PRN PO oxycodone.      Hematology:   Anemia of chronic disease/Acute blood loss: Hgb 8-9, stable.   Thrombocytopenia: likely r/t Thymo. PLT now trending up    Cardiorespiratory:   CAD s/p CABG x2 2023: Continue ASA, statin, metoprolol.     GI/Nutrition: NGT removed POD#2. Advanced to regular diet, tolerating. Bowel regimen ordered.    Endocrine: See above.     Fluid/Electrolytes: MIVF: discontinue   Hypomagnesemia: Continue PO Mag-Ox.   Hypocalcemia: Ca gluconate 1g on 9/16-9/20. OsCal BID. Calcitriol, increase to 0.5    : Uribe catheter removed and monitor PVRx2    Infectious disease: No issues.     Prophylaxis: DVT (mechanical, heparin straight rate), fall, GI (PPI), PJP (Bactrim), viral (Valcyte x 12 weeks; CMV D  - / R -), fungal (Micafungin)    Activity: PT consult. OOB to chair and ambulate today.     Disposition: 7A, PT recommending home with assist.     VIVI/Fellow/Resident Provider: Tequila Rodríguez PA-C [id number]    Faculty: Francesco Farnsworth MD PhD  _________________________________________________________________  Transplant History: Admitted 2023 for  donor kidney pancreas transplant.  9/15/2023 (Kidney), 2023 (Kidney / Pancreas), Postoperative day: 6 (Kidney, Pancreas), 5 (Kidney)     Interval History: History is obtained from the patient and electronic health record  Overnight events: Pain is controlled. Denies nausea. Tolerating clears. + BM.     ROS:   A 10-point review of systems was negative except as noted above.    Meds:   acetaminophen  975 mg Oral Q8H    aspirin  325 mg Oral or Feeding Tube Daily    atorvastatin  20 mg Oral At Bedtime    calcitRIOL  0.25 mcg Oral Once    [START ON 2023] calcitRIOL  0.5 mcg Oral Daily    calcium carbonate-vitamin D  2 tablet Oral BID w/meals    calcium gluconate  1 g Intravenous Once    [START ON 2023] clotrimazole  10 mg Buccal 4x Daily    famotidine  10 mg Oral Q24H    Or    famotidine  10 mg Intravenous Q24H    insulin aspart  1-7 Units Subcutaneous TID AC    insulin aspart  1-5 Units Subcutaneous At Bedtime    magnesium oxide  400 mg Oral Q24H    metoprolol tartrate  25 mg Oral BID    micafungin  100 mg Intravenous Q24H    mycophenolate  1,000 mg Oral BID IS    octreotide  100 mcg Subcutaneous TID    pantoprazole  40 mg Oral Daily    polyethylene glycol  17 g Oral Daily    senna-docusate  1 tablet Oral BID    sodium chloride (PF)  3 mL Intravenous Q8H    sulfamethoxazole-trimethoprim  1 tablet Per NG tube Once per day on  Sat    tacrolimus  4 mg Oral or NG Tube BID IS    valGANciclovir  450 mg Oral Every Other Day       Physical Exam:     Admit Weight: (P) 91.9 kg (202 lb 8 oz)    Current vitals:   BP (!) 145/80 (BP Location: Right arm)   Pulse  61   Temp 98.2  F (36.8  C) (Oral)   Resp 16   Wt 92.3 kg (203 lb 8 oz)   SpO2 97%   BMI 28.38 kg/m          Vital sign ranges:    Temp:  [97.9  F (36.6  C)-98.3  F (36.8  C)] 98.2  F (36.8  C)  Pulse:  [55-63] 61  Resp:  [16-18] 16  BP: (129-146)/(61-80) 145/80  SpO2:  [95 %-97 %] 97 %  Patient Vitals for the past 24 hrs:   BP Temp Temp src Pulse Resp SpO2   09/20/23 0922 (!) 145/80 98.2  F (36.8  C) Oral 61 16 97 %   09/20/23 0603 137/64 98.3  F (36.8  C) Oral 60 18 95 %   09/20/23 0200 (!) 146/61 98.1  F (36.7  C) Oral 60 18 95 %   09/19/23 2130 138/64 97.9  F (36.6  C) Oral 63 18 96 %   09/19/23 1746 129/70 98  F (36.7  C) Oral 63 16 97 %   09/19/23 1345 129/62 98.1  F (36.7  C) Oral 55 16 97 %       General Appearance: in no apparent distress.   Skin: normal, warm  Heart: regular rate and rhythm  Lungs: Nonlabored resps on RA  Abdomen: The abdomen is soft, nontender. Drain with serosanguinous output. Midline incision C/D/I.   : Voiding  Extremities: edema: absent.   Neurologic: awake, alert, and oriented. Tremor absent.     Data:   CMP  Recent Labs   Lab 09/20/23  0809 09/20/23  0808 09/20/23  0535 09/19/23  1056 09/19/23  0526 09/18/23  0822 09/18/23  0555 09/14/23  1720 09/14/23  1539   NA  --   --  140  --  140  --  146*   < > 136   POTASSIUM  --   --  3.7  --  3.4  --  3.6   < > 4.4   CHLORIDE  --   --  105  --  103  --  107   < > 95*   CO2  --   --  26  --  29  --  29   < > 25   GLC  --  98 97   < > 96   < > 112*   < > 245*   BUN  --   --  38.3*  --  41.4*  --  46.1*   < > 46.4*   CR  --   --  2.53*  --  2.98*  --  3.54*   < > 6.77*   GFRESTIMATED  --   --  27*  --  22*  --  18*   < > 8*   DAVID  --   --  6.9*  --  6.9*  --  7.1*   < > 9.0   ICAW 3.8*  --   --   --   --   --  3.8*   < >  --    MAG  --   --  1.8  --  1.8  --  1.6*   < >  --    PHOS  --   --  2.6  --  2.7  --  2.9   < >  --    AMYLASE  --   --  61  --  54  --  50   < > 62   LIPASE  --   --  60  --  51  --  39   < > 41   ALBUMIN  --   --    --   --   --   --   --   --  4.3   BILITOTAL  --   --   --   --   --   --   --   --  0.9   ALKPHOS  --   --   --   --   --   --   --   --  60   AST  --   --   --   --   --   --   --   --  22   ALT  --   --   --   --   --   --   --   --  6    < > = values in this interval not displayed.       CBC  Recent Labs   Lab 09/20/23  0535 09/19/23  0526 09/15/23  0959 09/15/23  0447   HGB 7.9* 7.9*   < > 12.6*   WBC 7.6 6.7   < > 19.1*   * 93*   < > 175   A1C  --   --   --  8.4*    < > = values in this interval not displayed.       COAGS  Recent Labs   Lab 09/15/23  0447 09/14/23  1539   INR 1.11 1.06   PTT 26 27        Urinalysis  Recent Labs   Lab Test 09/14/23  1643 02/12/23  1539   COLOR Light Yellow Yellow   APPEARANCE Clear Slightly Cloudy*   URINEGLC 500* 100*   URINEBILI Negative Negative   URINEKETONE Negative Negative   SG 1.017 1.015   UBLD Small* Trace*   URINEPH 6.5 5.5   PROTEIN 100* 100*   NITRITE Negative Negative   LEUKEST Negative Negative   RBCU 1 3*   WBCU 1 2       Virology:  Hepatitis C Antibody   Date Value Ref Range Status   09/14/2023 Nonreactive Nonreactive Final

## 2023-09-20 NOTE — PLAN OF CARE
BP (!) 148/68 (BP Location: Right arm)   Pulse 63   Temp 98.2  F (36.8  C) (Oral)   Resp 16   Wt 92.3 kg (203 lb 8 oz)   SpO2 99%   BMI 28.38 kg/m      6229-2223. VSS on RA, afebrile. ACHS sugars. Denies pain/nausea. Regular diet with poor appetite. Heparin gtt discontinued this shift. Double lumen IJ & PIV SL. LBM 9/20. Voiding good amounts in urinal. Midline incision stapled, BRIONNA. STEVEN drain with serosang drainage. Patient worked with PT today, motivated to go for walks. Up SBA/UAL but patient prefers to call for help. Patient met with specialty pharmacy. Med card & lab book up to date, meds reviewed with patient. Likely discharge later this week. Continue with plan of care and update team with any changes.

## 2023-09-20 NOTE — PROGRESS NOTES
Care Management Follow Up    Length of Stay (days): 5    Expected Discharge Date: 09/21/2023     Concerns to be Addressed: denies needs/concerns at this time     Patient plan of care discussed at interdisciplinary rounds: No    Anticipated Discharge Disposition: Home,      Anticipated Discharge Services: None  Anticipated Discharge DME: None    Patient/family educated on Medicare website which has current facility and service quality ratings: no  Education Provided on the Discharge Plan: Yes  Patient/Family in Agreement with the Plan: yes    Referrals Placed by CM/SW: External Care Coordination, Homecare  Private pay costs discussed:     Additional Information:  Notified by [facility] that they were unable to secure home care agency.  Updated Tequila TRINIDAD  Met with pt.  Reviewed above information.  Reviewed anticipated plan for discharge, ATC appointments and transplant labs M/TH.  Reconfirmed caregiver support will be pt brother and sister.   Pt notes no concerns regarding anticipated plan for discharge.    Ginny Gabriel RN BSN, PHN, ACM-RN  7A RN Care Coordinator  Phone: [phone]  Pager [phone]    To contact the weekend RNCC  [place] (0800 [phone]) Saturday and Sunday    Units: 5A,5B,5C [phone]    Units: 6A, [phone]    Units 6B, 6C, 6D [phone]    Units: 7A, 7B, 7C, 7D, [phone]    [facility] ([phone]) Saturday and Sunday    Units: 5 Ortho, 8A, 10 ICU, & Pediatric Units-Pager 4: [phone]    9/20/2023 4:01 PM

## 2023-09-21 ENCOUNTER — TELEPHONE (OUTPATIENT)
Dept: PHARMACY | Facility: CLINIC | Age: 68
End: 2023-09-21
Payer: COMMERCIAL

## 2023-09-21 VITALS
OXYGEN SATURATION: 98 % | HEART RATE: 73 BPM | WEIGHT: 203.5 LBS | RESPIRATION RATE: 16 BRPM | DIASTOLIC BLOOD PRESSURE: 72 MMHG | SYSTOLIC BLOOD PRESSURE: 143 MMHG | TEMPERATURE: 98.1 F | BODY MASS INDEX: 28.38 KG/M2

## 2023-09-21 PROBLEM — D84.9 IMMUNOSUPPRESSED STATUS (H): Status: ACTIVE | Noted: 2023-09-21

## 2023-09-21 LAB
AMYLASE SERPL-CCNC: 66 U/L (ref 28–100)
ANION GAP SERPL CALCULATED.3IONS-SCNC: 9 MMOL/L (ref 7–15)
BASOPHILS # BLD MANUAL: 0 10E3/UL (ref 0–0.2)
BASOPHILS NFR BLD MANUAL: 0 %
BUN SERPL-MCNC: 30.9 MG/DL (ref 8–23)
CA-I BLD-MCNC: 4.1 MG/DL (ref 4.4–5.2)
CALCIUM SERPL-MCNC: 7.3 MG/DL (ref 8.8–10.2)
CHLORIDE SERPL-SCNC: 105 MMOL/L (ref 98–107)
CREAT SERPL-MCNC: 2.45 MG/DL (ref 0.67–1.17)
DEPRECATED HCO3 PLAS-SCNC: 24 MMOL/L (ref 22–29)
EGFRCR SERPLBLD CKD-EPI 2021: 28 ML/MIN/1.73M2
EOSINOPHIL # BLD MANUAL: 0.1 10E3/UL (ref 0–0.7)
EOSINOPHIL NFR BLD MANUAL: 1 %
ERYTHROCYTE [DISTWIDTH] IN BLOOD BY AUTOMATED COUNT: 13.7 % (ref 10–15)
GLUCOSE BLDC GLUCOMTR-MCNC: 100 MG/DL (ref 70–99)
GLUCOSE BLDC GLUCOMTR-MCNC: 97 MG/DL (ref 70–99)
GLUCOSE SERPL-MCNC: 99 MG/DL (ref 70–99)
HCT VFR BLD AUTO: 24.1 % (ref 40–53)
HGB BLD-MCNC: 8.3 G/DL (ref 13.3–17.7)
LIPASE SERPL-CCNC: 69 U/L (ref 13–60)
LYMPHOCYTES # BLD MANUAL: 0.1 10E3/UL (ref 0.8–5.3)
LYMPHOCYTES NFR BLD MANUAL: 2 %
MAGNESIUM SERPL-MCNC: 1.8 MG/DL (ref 1.7–2.3)
MCH RBC QN AUTO: 33.9 PG (ref 26.5–33)
MCHC RBC AUTO-ENTMCNC: 34.4 G/DL (ref 31.5–36.5)
MCV RBC AUTO: 98 FL (ref 78–100)
METAMYELOCYTES # BLD MANUAL: 0.2 10E3/UL
METAMYELOCYTES NFR BLD MANUAL: 3 %
MONOCYTES # BLD MANUAL: 0.4 10E3/UL (ref 0–1.3)
MONOCYTES NFR BLD MANUAL: 5 %
NEUTROPHILS # BLD MANUAL: 6.2 10E3/UL (ref 1.6–8.3)
NEUTROPHILS NFR BLD MANUAL: 89 %
PHOSPHATE SERPL-MCNC: 2.6 MG/DL (ref 2.5–4.5)
PLAT MORPH BLD: ABNORMAL
PLATELET # BLD AUTO: 124 10E3/UL (ref 150–450)
POTASSIUM SERPL-SCNC: 3.7 MMOL/L (ref 3.4–5.3)
RBC # BLD AUTO: 2.45 10E6/UL (ref 4.4–5.9)
RBC MORPH BLD: ABNORMAL
SODIUM SERPL-SCNC: 138 MMOL/L (ref 136–145)
UFH PPP CHRO-ACNC: <0.1 IU/ML
WBC # BLD AUTO: 7 10E3/UL (ref 4–11)

## 2023-09-21 PROCEDURE — 82310 ASSAY OF CALCIUM: CPT | Performed by: SURGERY

## 2023-09-21 PROCEDURE — 250N000012 HC RX MED GY IP 250 OP 636 PS 637: Performed by: PHYSICIAN ASSISTANT

## 2023-09-21 PROCEDURE — 85027 COMPLETE CBC AUTOMATED: CPT | Performed by: SURGERY

## 2023-09-21 PROCEDURE — 250N000013 HC RX MED GY IP 250 OP 250 PS 637: Performed by: SURGERY

## 2023-09-21 PROCEDURE — 82374 ASSAY BLOOD CARBON DIOXIDE: CPT | Performed by: SURGERY

## 2023-09-21 PROCEDURE — 99233 SBSQ HOSP IP/OBS HIGH 50: CPT | Mod: 24 | Performed by: INTERNAL MEDICINE

## 2023-09-21 PROCEDURE — 82150 ASSAY OF AMYLASE: CPT | Performed by: SURGERY

## 2023-09-21 PROCEDURE — 85007 BL SMEAR W/DIFF WBC COUNT: CPT | Performed by: SURGERY

## 2023-09-21 PROCEDURE — 250N000012 HC RX MED GY IP 250 OP 636 PS 637: Performed by: SURGERY

## 2023-09-21 PROCEDURE — 250N000013 HC RX MED GY IP 250 OP 250 PS 637: Performed by: NURSE PRACTITIONER

## 2023-09-21 PROCEDURE — 85520 HEPARIN ASSAY: CPT | Performed by: SURGERY

## 2023-09-21 PROCEDURE — 250N000013 HC RX MED GY IP 250 OP 250 PS 637: Performed by: PHYSICIAN ASSISTANT

## 2023-09-21 PROCEDURE — 83690 ASSAY OF LIPASE: CPT | Performed by: SURGERY

## 2023-09-21 PROCEDURE — 83735 ASSAY OF MAGNESIUM: CPT | Performed by: SURGERY

## 2023-09-21 PROCEDURE — 82330 ASSAY OF CALCIUM: CPT | Performed by: PHYSICIAN ASSISTANT

## 2023-09-21 PROCEDURE — 84100 ASSAY OF PHOSPHORUS: CPT | Performed by: SURGERY

## 2023-09-21 PROCEDURE — 36415 COLL VENOUS BLD VENIPUNCTURE: CPT | Performed by: PHYSICIAN ASSISTANT

## 2023-09-21 RX ORDER — PANTOPRAZOLE SODIUM 40 MG/1
40 TABLET, DELAYED RELEASE ORAL DAILY
Qty: 30 TABLET | Refills: 0 | Status: SHIPPED | OUTPATIENT
Start: 2023-09-21 | End: 2023-10-17

## 2023-09-21 RX ORDER — FAMOTIDINE 10 MG
10 TABLET ORAL EVERY 24 HOURS
Qty: 30 TABLET | Refills: 0 | Status: SHIPPED | OUTPATIENT
Start: 2023-09-21 | End: 2023-10-17

## 2023-09-21 RX ORDER — VALGANCICLOVIR 450 MG/1
450 TABLET, FILM COATED ORAL EVERY OTHER DAY
Qty: 60 TABLET | Refills: 2 | Status: SHIPPED | OUTPATIENT
Start: 2023-09-22 | End: 2023-10-17

## 2023-09-21 RX ORDER — CALCITRIOL 0.5 UG/1
0.5 CAPSULE, LIQUID FILLED ORAL DAILY
Qty: 30 CAPSULE | Refills: 11 | Status: SHIPPED | OUTPATIENT
Start: 2023-09-21 | End: 2023-10-17

## 2023-09-21 RX ORDER — SULFAMETHOXAZOLE AND TRIMETHOPRIM 400; 80 MG/1; MG/1
1 TABLET ORAL DAILY
Qty: 30 TABLET | Refills: 11 | Status: SHIPPED | OUTPATIENT
Start: 2023-09-21 | End: 2023-10-04

## 2023-09-21 RX ORDER — ASPIRIN 325 MG
325 TABLET ORAL DAILY
Qty: 90 TABLET | Refills: 3
Start: 2023-09-21 | End: 2023-09-22

## 2023-09-21 RX ORDER — MAGNESIUM OXIDE 400 MG/1
400 TABLET ORAL EVERY 24 HOURS
Qty: 30 TABLET | Refills: 11 | Status: SHIPPED | OUTPATIENT
Start: 2023-09-21 | End: 2023-10-17

## 2023-09-21 RX ORDER — POLYETHYLENE GLYCOL 3350 17 G/17G
17 POWDER, FOR SOLUTION ORAL DAILY
Qty: 510 G | Refills: 0 | Status: SHIPPED | OUTPATIENT
Start: 2023-09-21 | End: 2023-10-17

## 2023-09-21 RX ORDER — TACROLIMUS 0.5 MG/1
CAPSULE ORAL
Qty: 60 CAPSULE | Refills: 11 | Status: SHIPPED | OUTPATIENT
Start: 2023-09-21 | End: 2023-10-03

## 2023-09-21 RX ORDER — OXYCODONE HYDROCHLORIDE 5 MG/1
5 TABLET ORAL EVERY 4 HOURS PRN
Qty: 8 TABLET | Refills: 0 | Status: SHIPPED | OUTPATIENT
Start: 2023-09-21 | End: 2023-09-25

## 2023-09-21 RX ORDER — ATORVASTATIN CALCIUM 20 MG/1
20 TABLET, FILM COATED ORAL AT BEDTIME
Qty: 30 TABLET | Refills: 11 | Status: SHIPPED | OUTPATIENT
Start: 2023-09-21 | End: 2023-10-17

## 2023-09-21 RX ORDER — MYCOPHENOLATE MOFETIL 250 MG/1
1000 CAPSULE ORAL 2 TIMES DAILY
Qty: 240 CAPSULE | Refills: 11 | Status: SHIPPED | OUTPATIENT
Start: 2023-09-21 | End: 2023-10-17

## 2023-09-21 RX ORDER — CLOTRIMAZOLE 10 MG/1
10 LOZENGE ORAL 4 TIMES DAILY
Qty: 360 LOZENGE | Refills: 0 | Status: SHIPPED | OUTPATIENT
Start: 2023-09-22 | End: 2023-09-28

## 2023-09-21 RX ORDER — TACROLIMUS 1 MG/1
3 CAPSULE ORAL 2 TIMES DAILY
Qty: 180 CAPSULE | Refills: 11 | Status: SHIPPED | OUTPATIENT
Start: 2023-09-21 | End: 2023-09-22

## 2023-09-21 RX ORDER — AMOXICILLIN 250 MG
1-2 CAPSULE ORAL 2 TIMES DAILY
Qty: 100 TABLET | Refills: 0 | Status: SHIPPED | OUTPATIENT
Start: 2023-09-21 | End: 2023-10-17

## 2023-09-21 RX ADMIN — SENNOSIDES AND DOCUSATE SODIUM 1 TABLET: 50; 8.6 TABLET ORAL at 09:16

## 2023-09-21 RX ADMIN — ACETAMINOPHEN 325 MG: 325 TABLET, FILM COATED ORAL at 01:31

## 2023-09-21 RX ADMIN — PANTOPRAZOLE SODIUM 40 MG: 40 TABLET, DELAYED RELEASE ORAL at 09:14

## 2023-09-21 RX ADMIN — ASPIRIN 325 MG ORAL TABLET 325 MG: 325 PILL ORAL at 09:14

## 2023-09-21 RX ADMIN — MAGNESIUM OXIDE TAB 400 MG (241.3 MG ELEMENTAL MG) 400 MG: 400 (241.3 MG) TAB at 09:13

## 2023-09-21 RX ADMIN — TACROLIMUS 3 MG: 1 CAPSULE ORAL at 09:14

## 2023-09-21 RX ADMIN — SULFAMETHOXAZOLE AND TRIMETHOPRIM 1 TABLET: 400; 80 TABLET ORAL at 09:15

## 2023-09-21 RX ADMIN — CALCITRIOL CAPSULES 0.25 MCG 0.5 MCG: 0.25 CAPSULE ORAL at 09:15

## 2023-09-21 RX ADMIN — MYCOPHENOLATE MOFETIL 1000 MG: 250 CAPSULE ORAL at 09:14

## 2023-09-21 RX ADMIN — Medication 2 TABLET: at 09:14

## 2023-09-21 RX ADMIN — METOPROLOL TARTRATE 25 MG: 25 TABLET, FILM COATED ORAL at 09:14

## 2023-09-21 ASSESSMENT — ACTIVITIES OF DAILY LIVING (ADL)
ADLS_ACUITY_SCORE: 22

## 2023-09-21 NOTE — PROGRESS NOTES
BP (!) 151/70 (BP Location: Right arm)   Pulse 61   Temp 97.8  F (36.6  C) (Oral)   Resp 16   Wt 92.3 kg (203 lb 8 oz)   SpO2 95%   BMI 28.38 kg/m      SHIFT: 8945-0627  ISOLATION: NA  VITALS: Hypertensive on RA, afebrile  BG: AC/HS (120,100)  Recent Labs   Lab 09/21/23  0601 09/21/23  0141 09/20/23  2200 09/20/23  1649 09/20/23  1126 09/20/23  0808   GLC 99 100* 120* 133* 103* 98   NEURO: Aox4, Calm, Cooperative, Pleasant  DIET: Regular Diet  PAIN: Denies pain & Nausea  RESPIRATORY: Clear Upper & Lower Lobes Bilaterally  CARDIAC: S1 & S2 audible  : 820 mL voided w/ bedside urinal  GI: No BM within shift  TUBES: 1x Eder 110 mL output   MIVF/GTT/ABX: NA  ASSIST: SBA  SAFETY: WDL  SKIN: Midline abdominal incision, open to air, stapled  LABS: Creatinine 2.34, Calcium 7.3, Hgb 8.3  Recent Labs   Lab Test 09/21/23  0601 09/20/23  0535 09/19/23  0526   POTASSIUM 3.7 3.7 3.4   PHOS 2.6 2.6 2.7   MAG 1.8 1.8 1.8   HGB 8.3* 7.9* 7.9*   PLAN: Continue w/poc & Notify MD of any changes

## 2023-09-21 NOTE — PROGRESS NOTES
Patient's Name: Avelina Marion    Procedure Date: 09/21/23  Procedure Time 1100    Procedure Performed: Central line removal     The Central Venous Catheter (CVC) was removed per provider order.     The central venous catheter was located: Right Internal Jugular vein, with a total of 3 lumens.     The patient was placed in Trendelenburg position with Insertion site lower than heart.     Valsalva response achieved by: Patient instructed to take a deep breath and bear down while the CVC was removed with a constant steady motion.     Firm pressure was applied at the access site for 3 minutes until bleeding stopped.     Post removal site assessment; Clean and dry without bleeding. Occlusive dressing applied: Yes    CVC tip was inspected and intact: Yes    Tip was sent to lab for culture per provider order: NA    Patient tolerated the procedure: well    2nd RN present during removal (if applicable) Dedra Gallagher RN   9/21/2023   1:02 PM

## 2023-09-21 NOTE — PLAN OF CARE
DISCHARGE:  Patient with orders to discharge to home.     Education Provided:   Med Card up to date and reviewed with patient  Lab Book up to date and reviewed with patient  Handouts provided to patient  Specialty Pharmacy met with patient prior to discharge  LDAs removed    Discharge instructions, medications & follow ups reviewed with patient. Copy of discharge summary given to patient. STEVEN drain, PIV, IJ removed. [facility] notified, report given to RN.    Patient in stable condition. AVSS. Patient had no further questions regarding discharge instructions and medications. Patient transferred out by wheelchair & left with sister.    Plan for follow up in clinic in coming days.

## 2023-09-21 NOTE — PROGRESS NOTES
[facility]   Transplant Nephrology Progress Note  Date of Admission:  9/14/202t  Today's Date: 09/21/2023    Recommendations:  -Ok to discharge today    Assessment & Plan   # DDKT (SPK): Trend down in serum creatinine albeit more slowly than previously, likely 2/2 high tac level   - Baseline Creatinine: ~ TBD   - Proteinuria: Not checked post transplant   - Date DSA Last Checked: Sep/2023      Latest DSA: No DSA at time of transplant   - BK Viremia: Not checked post transplant   - Kidney Tx Biopsy: No    -Double J stent placed at time of kidney transplant. Remove 4-6 weeks post transplant.    # Pancreas Tx (SPK):    - Pancreatic Exocrine Drainage: Enteric drained     - Blood glucose: Euglycemia      On insulin: No   - HbA1c: Last checked at time of transplant      Latest HbA1c: 7.1%    - Pancreatic enzymes: slight trend up   - Date DSA Last Checked: Sep/2023  Latest DSA: No DSA at time of transplant   - Pancreas Tx Biopsy: No    # Immunosuppression: Tacrolimus immediate release (goal 8-10) and Mycophenolate mofetil (dose 1000 mg every 12 hours)   - Patient is in an immunosuppressed state and will continue to monitor for efficacy and toxicity of immunosuppression medications.   - rATG induction with recent transplant   - Changes: No     # Infection Prophylaxis:   - PJP: Sulfa/TMP (Bactrim)  - CMV: Valganciclovir (Valcyte), x3m, CMV -/-  - Thrush: Clotrimazole aramis (Mycelex)  - Fungal: Micafungin (Mycamine)    # Hypertension: Controlled;  Goal BP: < 150/90   - Volume status: Euvolemic     - Changes: Not at this time. Continue metoprolol 25mg PO BID.    # Anemia in Chronic Renal Disease: Hgb: Stable, low      MCKENNA: No   - Iron studies: Low iron saturation, but high ferritin    # Mineral Bone Disorder:   - Secondary renal hyperparathyroidism; PTH level: Not checked recently        On treatment: Calcitriol 0.5 mcg daily for hypocalcemia  - Vitamin D; level: Not checked  recently        On supplement: Yes  - Calcium; level: Low but trend up           On supplement: Yes, oscal 2 tabs BID.    - Phosphorus; level: Normal          On supplement: No    # Electrolytes:   - Potassium; level: Normal        On supplement: No  - Magnesium; level: Normal        On supplement: Yes, magnesium oxide 400 mg PO daily  - Bicarbonate; level: Normal        On supplement: No       # CAD: S/p CABG 2/2023. Continue metoprolol 25mg PO BID.    # PAD    #Non-melanotic skin cancer: S/p Mohs 8/2022    # Transplant History:  Etiology of Kidney Failure: Diabetes mellitus type 2  Tx: DDKT (SPK)  Transplant: 9/14/2023 (Kidney / Pancreas)  Significant changes in immunosuppression: None  Significant transplant-related complications: None    Recommendations were communicated to the primary team verbally.    Jeffrey Chaparro MD  Transplant Nephrology  Contact information available via [facility] Paging/Directory     Interval History   Mr. Marion's creatinine is 2.45 (09/21 0601); Trend down.  Good urine output.  Other significant labs/tests/vitals: calcium improving  No events overnight.  No chest pain or shortness of breath.  No leg swelling.  No nausea and vomiting.  Bowel movements are normal.  No fever, sweats or chills.        Review of Systems   4 point ROS was obtained and negative except as noted in the Interval History.    MEDICATIONS:    aspirin  325 mg Oral or Feeding Tube Daily     atorvastatin  20 mg Oral At Bedtime     calcitRIOL  0.5 mcg Oral Daily     calcium carbonate-vitamin D  2 tablet Oral BID w/meals     [START ON 9/22/2023] clotrimazole  10 mg Buccal 4x Daily     famotidine  10 mg Oral Q24H    Or     famotidine  10 mg Intravenous Q24H     insulin aspart  1-7 Units Subcutaneous TID AC     insulin aspart  1-5 Units Subcutaneous At Bedtime     magnesium oxide  400 mg Oral Q24H     metoprolol tartrate  25 mg Oral BID     mycophenolate  1,000 mg Oral BID IS     pantoprazole  40 mg Oral Daily      senna-docusate  1 tablet Oral BID     sodium chloride (PF)  3 mL Intravenous Q8H     sulfamethoxazole-trimethoprim  1 tablet Oral or NG Tube Daily     tacrolimus  3 mg Oral or NG Tube BID IS     valGANciclovir  450 mg Oral Every Other Day       dextrose         Physical Exam   Temp  Av.4  F (36.9  C)  Min: 97.4  F (36.3  C)  Max: 99.1  F (37.3  C)  Arterial Line BP  Min: 51/46  Max: 179/68  Arterial Line MAP (mmHg)  Av.4 mmHg  Min: 51 mmHg  Max: 105 mmHg      Pulse  Av.5  Min: 60  Max: 106 Resp  Avg: 15  Min: 10  Max: 19  SpO2  Av.4 %  Min: 88 %  Max: 100 %    CVP (mmHg): 11 mmHgBP (!) 143/72 (BP Location: Right arm)   Pulse 73   Temp 98.1  F (36.7  C) (Oral)   Resp 16   Wt 92.3 kg (203 lb 8 oz)   SpO2 98%   BMI 28.38 kg/m        Admit Weight: (P) 91.9 kg (202 lb 8 oz)     GENERAL APPEARANCE: alert and no distress  HENT: mouth without ulcers or lesions  RESP: lungs clear to auscultation - no rales, rhonchi or wheezes  CV: regular rhythm, normal rate, no rub, no murmur  EDEMA: no LE edema bilaterally  ABDOMEN: soft, nondistended, nontender, bowel sounds normal  MS: extremities normal - no gross deformities noted, no evidence of inflammation in joints, no muscle tenderness  SKIN: no rash    Data   All labs reviewed by me.  CMP  Recent Labs   Lab 23  0903 23  0601 23  0141 23  2200 23  0808 23  0535 23  1056 23  0526 23  0822 23  0555 23  1720 23  1539   NA  --  138  --   --   --  140  --  140  --  146*   < > 136   POTASSIUM  --  3.7  --   --   --  3.7  --  3.4  --  3.6   < > 4.4   CHLORIDE  --  105  --   --   --  105  --  103  --  107   < > 95*   CO2  --  24  --   --   --  26  --  29  --  29   < > 25   ANIONGAP  --  9  --   --   --  9  --  8  --  10   < > 16*   GLC 97 99 100* 120*   < > 97   < > 96   < > 112*   < > 245*   BUN  --  30.9*  --   --   --  38.3*  --  41.4*  --  46.1*   < > 46.4*   CR  --  2.45*  --   --   --   2.53*  --  2.98*  --  3.54*   < > 6.77*   GFRESTIMATED  --  28*  --   --   --  27*  --  22*  --  18*   < > 8*   DAVID  --  7.3*  --   --   --  6.9*  --  6.9*  --  7.1*   < > 9.0   MAG  --  1.8  --   --   --  1.8  --  1.8  --  1.6*   < >  --    PHOS  --  2.6  --   --   --  2.6  --  2.7  --  2.9   < >  --    PROTTOTAL  --   --   --   --   --   --   --   --   --   --   --  7.3   ALBUMIN  --   --   --   --   --   --   --   --   --   --   --  4.3   BILITOTAL  --   --   --   --   --   --   --   --   --   --   --  0.9   ALKPHOS  --   --   --   --   --   --   --   --   --   --   --  60   AST  --   --   --   --   --   --   --   --   --   --   --  22   ALT  --   --   --   --   --   --   --   --   --   --   --  6    < > = values in this interval not displayed.     CBC  Recent Labs   Lab 09/21/23  0601 09/20/23  0535 09/19/23  0526 09/18/23  0555   HGB 8.3* 7.9* 7.9* 8.6*   WBC 7.0 7.6 6.7 4.9   RBC 2.45* 2.38* 2.36* 2.61*   HCT 24.1* 23.5* 23.9* 26.2*   MCV 98 99 101* 100   MCH 33.9* 33.2* 33.5* 33.0   MCHC 34.4 33.6 33.1 32.8   RDW 13.7 13.8 13.5 13.8   * 105* 93* 88*     INR  Recent Labs   Lab 09/15/23  0447 09/14/23  1539   INR 1.11 1.06   PTT 26 27     ABG  Recent Labs   Lab 09/15/23  0354 09/15/23  0201 09/15/23  0002 09/14/23  2108   PH 7.42 7.36 7.39 7.44   PCO2 33* 40 39 40   PO2 107* 111* 111* 116*   HCO3 22 22 24 27   O2PER 41.0 42.0 38.0 43.0      Urine Studies  Recent Labs   Lab Test 09/14/23  1643 02/12/23  1539 08/09/22  1017 08/05/21  1426   COLOR Light Yellow Yellow Light Yellow Yellow   APPEARANCE Clear Slightly Cloudy* Clear Clear   URINEGLC 500* 100* 70* 50*   URINEBILI Negative Negative Negative Negative   URINEKETONE Negative Negative Negative Negative   SG 1.017 1.015 1.017 1.016   UBLD Small* Trace* Small* Negative   URINEPH 6.5 5.5 6.5 7.0   PROTEIN 100* 100* 70* 100*   NITRITE Negative Negative Negative Negative   LEUKEST Negative Negative Negative Negative   RBCU 1 3* 6* 2   WBCU 1 2 1 1     No  lab results found.  PTH  No lab results found.  Iron Studies  Recent Labs   Lab Test 02/16/23  0834   IRON 24*   *   IRONSAT 20   TREMAINE 1,461*       IMAGING:  All imaging studies reviewed by me.

## 2023-09-21 NOTE — TELEPHONE ENCOUNTER
Avelina Marion is a post-kidney/pancreas transplant patient who discharged on 921/23. Patient will get medications from local or mail order pharmacy. The patient will be responsible for managing medications.    SOT*EV MILLER) IS A (KIDNEY/ PANCREAS) TRANSPLANT PATIENT  (DATE OF TRANSPLANT: (DATE: KIDNEY 9/15/2023, PANCREAS 9/14/2023) )   HEALTH BENEFIT: (MEDICA) MEDICARE PART B & PART D DUAL PLAN  ID# [id number] GRP#[id number] (EFFECTIVE (DATE: 11/1/2022) )   PROCESSING INFO: ID# [id number] [facility]# 4MEDICA PCN# [place] BIN# [id number] (EFFECTIVE (DATE: 11/1/2022) )  (DO NOT BILL TO ORIGINAL MEDICARE ID# [id number] (PART A EFFECTIVE (DATE: 7/1/2021) , PART B EFFECTIVE (DATE: 1/1/2022) )  MEDICAID ID# [id number]     PHARMACY BENEFIT: (MEDICA) PART D  PROCESSING INFO: ID# [id number] [facility]# 4MEDICA PCN# [place] BIN# [id number] (EFFECTIVE (DATE: 11/1/2022) )  NO DEDUCTIBLE OR MAX OUT-OF-POCKET DUE TO LOW-INCOME SUBSIDY  COPAY STRUCTURE:  $ (1.45) FOR GENERIC  $ (4.30) FOR BRAND     TEST CLAIM SPECIALTY #28  MYCOPHENOLATE 250MG (#240/30DS)=$1.45  PROGRAF 1MG (#180/30DS)= PRODUCT NOT ON FORMULARY   TACROLIMUS 1MG (#60/30DS)=$1.45  CYCLOSPORINE 100MG (#60/30DS)=$1.45  VALGANCICLOVIR 450MG (#60/30DS)=$1.45  VALACYCLOVIR 1GM (#90/30DS) $1.45    TEST CLAIM DISCHARGE #27 (18 YEARS AND OLDER):  MYCOPHENOLATE 250MG (#240/30DS)=$1.45  PROGRAF 1MG (#180/30DS)= PRODUCT NOT ON FORMULARY   TACROLIMUS 1MG (#60/30DS)=$1.45  CYCLOSPORINE 100MG (#60/30DS)=$1.45  VALGANCICLOVIR 450MG (#60/30DS)=$1.45  VALACYCLOVIR 1GM (#90/30DS) $1.45      **CAN PATIENT FILL AT [facility] FOR MEDICATIONS LISTED? PATIENT CAN FILL THROUGH [facility] HOWEVER PT HAS A PLAN THROUGH [facility], AND PER OUR CONTRACT WITH THEM, WE ARE ONLY ALLOWED TO SHIP TO PT IF THEY ARE PHYSICALLY UNABLE TO COME IN TO OUR PHARMACY TO . IF PT IS COMFORTABLE WITH US DOCUMENTING THIS IN THEIR RECORD, WE CAN SHIP OUT THEIR MEDS (FOR [place] RESIDENTS ONLY). IF NOT, THEY WILL NEED TO UTILIZE  [facility]([phone]) OR  AT ANY [facility] SITE.         **BILL 1ST FILL OF IMMUNOS TO [facility] AT DISCHARGE    Marian Velásquez [facility]

## 2023-09-21 NOTE — DISCHARGE SUMMARY
[facility]    Discharge Summary  Transplant Surgery    Date of Admission:  2023  Date of Discharge:  2023  Discharging Provider: Tequila Rodríguez PA-C/Dr. 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, with long-term current use of insulin (H)    Hypertension    Pancreas transplant candidate    Immunosuppressed status (H)      Procedure/Surgery Information   Procedure: Procedure(s):  Transplant pancreas, kidney  donor, combined, appendectomy  Bench kidney  Bench pancreas   Surgeon(s): Surgeon(s) and Role:     * Tk Ascencio MD - Primary     * Panda De La Torre MD - Fellow - Assisting     * Ramon Vieira MD - Fellow - Assisting         History of Present Illness   Avelina Marion is a 68 year old male with a past medical history significant for end stage kidney disease secondary to type 2 diabetes mellitus (on HD via AVF since ), BCC, CAD s/p CABG x 2 2023. He is now s/p SPK with ureteral stent placement and appendectomy on 23 with Dr. Ascencio.     Hospital Course   S/p SPK 23:   Kidney transplant with ureteral stent placement: Cr 2.45< 2.5, continuing to trend down. Slight stall might be due to supratherapeutic tac level. Good UOP. Post-op US patent.      Pancreas transplant: Difficult to visualize pancreas transplant on US but appears to have flow in and out. Lipase normalized, mild increase to 69 on the day of discharge. Minimal usage on the insulin drip (~0.5 units/hr), now stopped, not requiring additional insulin once drip stopped   - Mild pancreatitis noted in OR, octreotide 100 mg TID, completed POD 5.   - Heparin gtt 500 units/hr (stopped POD 5) and  mg daily  for prophylaxis     Immunosuppression management:   Induction: per SPK protocol (cPRA 23, no DSA) with steroid pulse and Thymoglobulin 600 mg (6.5 mg/kg) complete.   Maintenance:   - MMF 1000 mg BID   - Tacrolimus 3 mg BID. Goal level 8-10.    Prophylaxis with bactrim for PJP, viral (Valcyte x 12 weeks; CMV D - / R -), fungal (Micafungin, followed by clotrimazole)     Neuro:  Acute post op pain: Controlled on current regimen of Tylenol, PRN Robaxin, and PRN PO oxycodone.      Hematology:   Anemia of chronic disease/Acute blood loss: Hgb 8-9, stable.   Thrombocytopenia: likely r/t Thymo. PLT now trending up     Cardiorespiratory:   CAD s/p CABG x2 2023: Continue ASA, statin, metoprolol.      GI/Nutrition: NGT removed POD#2. Advanced to regular diet, tolerating. Return of bowel function prior to discharge.     Endocrine: See above.      Fluid/Electrolytes: MIVF: discontinue   Hypomagnesemia: Continue PO Mag-Ox.   Hypocalcemia: Ca gluconate 1g on 9/16-9/20. OsCal BID. Calcitriol, increase to 0.5     : Uribe catheter removed, PVR x 2 negative for retention    Transplant coordinator: Makayla Wu [phone]  DSA at time of transplant: No  Ureteral stent: Yes  CMV: Donor -  /Recipient -  EBV: Donor + / Recipient +  Thymoglobulin: 600 mg (6.5 mg/kg)    Post-operative pain control: will be tylenol and oxycodone on discharge.     Discharge Disposition   Discharged to home   Condition at discharge: Stable    Pending Results   These results will be followed up by transplant team  Unresulted Labs Ordered in the Past 30 Days of this Admission       Date and Time Order Name Status Description    9/14/2023  4:21 PM Prepare red blood cells (unit) Preliminary     9/14/2023  4:21 PM Prepare red blood cells (unit) Preliminary     9/14/2023  3:00 PM BK Virus IgG Antibody In process           Final pathology results:   -Benign appendix with fibrous obliteration of the tip otherwise no significant histologic abnormality      Primary Care Physician   Amy Truong    Physical Exam   Temp: 98.1  F (36.7  C) Temp src: Oral BP: (!) 143/72 Pulse: 73   Resp: 16 SpO2: 98 % O2 Device: None (Room air)    Vitals:    09/16/23 0825 09/18/23 0844 09/19/23 0300   Weight: 95.8 kg (211 lb 4.8 oz) 93.4 kg (205 lb 14.4 oz) 92.3 kg (203 lb 8 oz)     Vital Signs with Ranges  Temp:  [97.7  F (36.5  C)-98.4  F (36.9  C)] 98.1  F (36.7  C)  Pulse:  [60-73] 73  Resp:  [15-16] 16  BP: (139-151)/(68-73) 143/72  SpO2:  [95 %-99 %] 98 %  I/O last 3 completed shifts:  In: 3 [I.V.:3]  Out: 1730 [Urine:1420; Drains:310]    Constitutional: Awake, alert, cooperative, no apparent distress, and appears stated age.  Eyes: Lids and lashes normal, pupils equal, round, extra ocular muscles intact, sclera clear, conjunctiva normal.  ENT: Normocephalic, without obvious abnormality, atraumatic  Respiratory: Nonlabored resps on RA  Cardiovascular: Perfused  GI: Soft, non-distended, non-tender, midline incision stapled, c/d/i  Genitourinary: Voiding  Skin: No bruising or bleeding, normal skin color, texture, turgor  Musculoskeletal: There is no redness, warmth, or swelling of the joints.  Full range of motion noted.    Neurologic: Awake, alert, oriented to name, place and time.    Neuropsychiatric: Calm, normal eye contact, alert, normal affect, oriented to self, place, time and situation, memory for past and recent events intact and thought process normal.    Consultations This Hospital Stay   NEPHROLOGY KIDNEY/PANCREAS TRANSPLANT ADULT IP CONSULT  NURSING TO CONSULT FOR VASCULAR ACCESS CARE IP CONSULT  PHARMACY IP CONSULT  NEPHROLOGY KIDNEY/PANCREAS TRANSPLANT ADULT IP CONSULT  SOCIAL WORK IP CONSULT  PHARMACY IP CONSULT  SOT MEDICATION HISTORY IP PHARMACY CONSULT  NUTRITION SERVICES ADULT IP CONSULT  PHYSICAL THERAPY ADULT IP CONSULT  CARE MANAGEMENT / SOCIAL WORK IP CONSULT    Time Spent on this Encounter   I have spent greater than 30 minutes on this  discharge.    Discharge Orders   Discharge Medications   Discharge Medication List as of 9/21/2023 11:26 AM        START taking these medications    Details   aspirin (ASA) 325 MG tablet Take 1 tablet (325 mg) by mouth daily, Disp-90 tablet, R-3, No Print Out      calcitRIOL (ROCALTROL) 0.5 MCG capsule Take 1 capsule (0.5 mcg) by mouth daily, Disp-30 capsule, R-11, E-Prescribe      calcium carbonate-vitamin D (OSCAL) 500-5 MG-MCG tablet Take 2 tablets by mouth 2 times daily, Disp-120 tablet, R-11, E-Prescribe      clotrimazole (MYCELEX) 10 MG lozenge Place 1 lozenge (10 mg) inside cheek 4 times daily for 90 days, Disp-360 lozenge, R-0, E-Prescribe      famotidine (PEPCID) 10 MG tablet Take 1 tablet (10 mg) by mouth every 24 hours, Disp-30 tablet, R-0, E-Prescribe      magnesium oxide (MAG-OX) 400 MG tablet Take 1 tablet (400 mg) by mouth every 24 hours, Disp-30 tablet, R-11, E-Prescribe      mycophenolate (GENERIC EQUIVALENT) 250 MG capsule Take 4 capsules (1,000 mg) by mouth 2 times daily, Disp-240 capsule, R-11, E-Prescribe      oxyCODONE (ROXICODONE) 5 MG tablet Take 1 tablet (5 mg) by mouth every 4 hours as needed for severe pain or moderate pain, Disp-8 tablet, R-0, E-Prescribe      pantoprazole (PROTONIX) 40 MG EC tablet Take 1 tablet (40 mg) by mouth daily, Disp-30 tablet, R-0, E-Prescribe      polyethylene glycol (MIRALAX) 17 GM/Dose powder Take 17 g by mouth daily, Disp-510 g, R-0, E-Prescribe      senna-docusate (SENOKOT-S/PERICOLACE) 8.6-50 MG tablet Take 1-2 tablets by mouth 2 times daily HOLD for loose stools, Disp-100 tablet, R-0, E-Prescribe      sulfamethoxazole-trimethoprim (BACTRIM) 400-80 MG tablet 1 tablet by Oral or NG Tube route daily, Disp-30 tablet, R-11, E-Prescribe      tacrolimus (GENERIC EQUIVALENT) 0.5 MG capsule Take 1 by mouth twice daily when directed by transplant team for dose titration, Disp-60 capsule, R-11, E-Prescribe      tacrolimus (GENERIC EQUIVALENT) 1 MG capsule Take 3  capsules (3 mg) by mouth 2 times daily, Disp-180 capsule, R-11, E-Prescribe      valGANciclovir (VALCYTE) 450 MG tablet Take 1 tablet (450 mg) by mouth every other day Titrate up to 2 tablets by mouth daily as directed by transplant team (based on kidney function), Disp-60 tablet, R-2, E-Prescribe           CONTINUE these medications which have CHANGED    Details   atorvastatin (LIPITOR) 20 MG tablet Take 1 tablet (20 mg) by mouth At Bedtime, Disp-30 tablet, R-11, E-Prescribe           CONTINUE these medications which have NOT CHANGED    Details   acetaminophen (TYLENOL) 325 MG tablet Take 2 tablets (650 mg) by mouth every 4 hours as needed for pain, headaches or fever (For optimal non-opioid multimodal pain management to improve pain control.), Disp-60 tablet, R-0, E-Prescribe      Blood Glucose Monitoring Suppl (ACCU-CHEK GUIDE) w/Device KIT Use to test 4 times a day. Pharmacy to dispense brand based on insurance., Historical      metoprolol tartrate (LOPRESSOR) 25 MG tablet Take 1 tablet (25 mg) by mouth 2 times daily, Disp-60 tablet, R-0, E-Prescribe           STOP taking these medications       aspirin (ASA) 81 MG chewable tablet Comments:   Reason for Stopping:         calcium acetate (PHOSLO) 667 MG CAPS capsule Comments:   Reason for Stopping:         calcium carbonate 750 MG CHEW Comments:   Reason for Stopping:         ibuprofen (ADVIL/MOTRIN) 200 MG tablet Comments:   Reason for Stopping:         insulin glargine (LANTUS PEN) 100 UNIT/ML pen Comments:   Reason for Stopping:                  Physical Therapy Referral      Reason for your hospital stay    Patient underwent kidney pancreas transplant with ureteral stent on 9/14/23 with Dr. Ascencio.     Activity    Your activity upon discharge: Walk at least four times a day, lift no greater than 10 pounds for 8 weeks from the time of surgery.  No driving while taking narcotics or 3 weeks after surgery.     [facility]ult [facility] Follow-up and recommended labs and  tests    [facility] ER FOLLOW UP:     1. [facility]: Over the next 2 days you will be seen in the [facility] (ph. [phone], option 3). Your labs will be drawn at the beginning of your appointment. DO NOT take your medications prior to having labs drawn. Please bring all your medications with you from home to take after labs are drawn.     2. Follow up with Dr. Ascencio in Transplant Clinic in 1-2 weeks.     3. Follow up with Transplant Nephrologist as scheduled.     4. Ureteral stent removal in 4-6 weeks, to be scheduled by Transplant Coordinator. If a  does not contact you for this, please contact your transplant coordinator.     5. Follow up with your primary care provider in ~8 weeks. Patient to schedule.     Remember to always bring an updated medication list to all appointments.        Call your Transplant Coordinator ([phone]) with questions about Transplant Center appointment scheduling.     LABS:     CBC, BMP, magnesium, phosphorus, lipase, tacrolimus level to be drawn daily while in ATC, then every Monday and Thursday at an outpatient lab.     When to contact your care team    WHEN TO CONTACT YOUR  COORDINATOR:     Transplant Coordinator [phone]     Notify your coordinator if you have pain over your kidney or pancreas, increased redness or drainage from your incision, fever greater than 100F, decreased urine output or new or increased amount of blood in urine.     Notify your coordinator immediately if you are ever unable to take your immunosuppressive medications for any reason.     If you have URGENT concerns after office hours, please call the hospital switchboard at [phone] and ask to have the organ transplant nurse on-call paged. If you have a life-threatening emergency, go to the nearest emergency room.     Wound care and dressings    Instructions to care for your wound at home: If you have staples in place, they will be removed  about 3 weeks after surgery. Wash incision daily with soap and water. Do not soak or scrub.     Monitor and record    Monitor blood pressure and weight daily.     Diet    Follow this diet upon discharge: Diet recommendations post-transplant: Heart healthy dietary habits long term (low saturated/trans fat, low sodium). High protein diet x 8 weeks. Practice food safety precautions.         Data   Most Recent 3 Creatinines:  Recent Labs   Lab Test 09/21/23  0601 09/20/23  0535 09/19/23  0526   CR 2.45* 2.53* 2.98*       Lab Results   Component Value Date    AMYLASE 66 09/21/2023    AMYLASE 61 09/20/2023    AMYLASE 54 09/19/2023     Lab Results   Component Value Date    LIPASE 69 (H) 09/21/2023    LIPASE 60 09/20/2023    LIPASE 51 09/19/2023

## 2023-09-21 NOTE — PROGRESS NOTES
CLINICAL NUTRITION SERVICES - DISCHARGE NOTE    Patient s discharge needs assessed and discharge planning has been conducted with the multidisciplinary transplant care team including physicians, pharmacy, social work and transplant coordinator.    Follow up/Monitoring:  Once discharged, place outpatient nutrition consult via the transplant team if nutrition concerns arise.    Willy Ramsey, JOSE ANGEL, LD  7A/7B (beds 219-229) RD pager: [phone]  Weekend/Holiday RD pager: [phone]

## 2023-09-22 ENCOUNTER — OFFICE VISIT (OUTPATIENT)
Dept: INFUSION THERAPY | Facility: CLINIC | Age: 68
End: 2023-09-22
Attending: NURSE PRACTITIONER
Payer: COMMERCIAL

## 2023-09-22 ENCOUNTER — TELEPHONE (OUTPATIENT)
Dept: TRANSPLANT | Facility: CLINIC | Age: 68
End: 2023-09-22

## 2023-09-22 ENCOUNTER — LAB (OUTPATIENT)
Dept: LAB | Facility: CLINIC | Age: 68
End: 2023-09-22
Payer: COMMERCIAL

## 2023-09-22 VITALS
BODY MASS INDEX: 29.33 KG/M2 | WEIGHT: 210.3 LBS | OXYGEN SATURATION: 100 % | TEMPERATURE: 97.4 F | RESPIRATION RATE: 16 BRPM

## 2023-09-22 DIAGNOSIS — Z95.1 S/P CABG (CORONARY ARTERY BYPASS GRAFT): ICD-10-CM

## 2023-09-22 DIAGNOSIS — Z94.83 PANCREAS REPLACED BY TRANSPLANT (H): ICD-10-CM

## 2023-09-22 DIAGNOSIS — Z94.0 KIDNEY REPLACED BY TRANSPLANT: ICD-10-CM

## 2023-09-22 DIAGNOSIS — Z79.899 ENCOUNTER FOR LONG-TERM CURRENT USE OF MEDICATION: ICD-10-CM

## 2023-09-22 DIAGNOSIS — Z20.828 CONTACT WITH AND (SUSPECTED) EXPOSURE TO OTHER VIRAL COMMUNICABLE DISEASES: ICD-10-CM

## 2023-09-22 DIAGNOSIS — Z94.0 HISTORY OF SIMULTANEOUS KIDNEY AND PANCREAS TRANSPLANT (H): ICD-10-CM

## 2023-09-22 DIAGNOSIS — Z12.5 PROSTATE CANCER SCREENING: ICD-10-CM

## 2023-09-22 DIAGNOSIS — Z48.298 AFTERCARE FOLLOWING ORGAN TRANSPLANT: Primary | ICD-10-CM

## 2023-09-22 DIAGNOSIS — E11.9 DIABETES MELLITUS, TYPE 2 (H): ICD-10-CM

## 2023-09-22 DIAGNOSIS — Z94.0 KIDNEY REPLACED BY TRANSPLANT: Primary | ICD-10-CM

## 2023-09-22 DIAGNOSIS — Z94.83 HISTORY OF SIMULTANEOUS KIDNEY AND PANCREAS TRANSPLANT (H): ICD-10-CM

## 2023-09-22 DIAGNOSIS — Z76.82 ORGAN TRANSPLANT CANDIDATE: ICD-10-CM

## 2023-09-22 DIAGNOSIS — N18.6 ESRD (END STAGE RENAL DISEASE) (H): ICD-10-CM

## 2023-09-22 DIAGNOSIS — Z98.890 OTHER SPECIFIED POSTPROCEDURAL STATES: ICD-10-CM

## 2023-09-22 DIAGNOSIS — Z48.298 AFTERCARE FOLLOWING ORGAN TRANSPLANT: ICD-10-CM

## 2023-09-22 LAB
AMYLASE SERPL-CCNC: 80 U/L (ref 28–100)
ANION GAP SERPL CALCULATED.3IONS-SCNC: 11 MMOL/L (ref 7–15)
BASOPHILS # BLD MANUAL: 0.2 10E3/UL (ref 0–0.2)
BASOPHILS NFR BLD MANUAL: 2 %
BUN SERPL-MCNC: 25.5 MG/DL (ref 8–23)
CALCIUM SERPL-MCNC: 8.2 MG/DL (ref 8.8–10.2)
CHLORIDE SERPL-SCNC: 104 MMOL/L (ref 98–107)
CREAT SERPL-MCNC: 2.33 MG/DL (ref 0.67–1.17)
DEPRECATED HCO3 PLAS-SCNC: 24 MMOL/L (ref 22–29)
EGFRCR SERPLBLD CKD-EPI 2021: 30 ML/MIN/1.73M2
EOSINOPHIL # BLD MANUAL: 0 10E3/UL (ref 0–0.7)
EOSINOPHIL NFR BLD MANUAL: 0 %
ERYTHROCYTE [DISTWIDTH] IN BLOOD BY AUTOMATED COUNT: 13.8 % (ref 10–15)
GLUCOSE SERPL-MCNC: 101 MG/DL (ref 70–99)
HCT VFR BLD AUTO: 29.4 % (ref 40–53)
HGB BLD-MCNC: 10 G/DL (ref 13.3–17.7)
LIPASE SERPL-CCNC: 75 U/L (ref 13–60)
LYMPHOCYTES # BLD MANUAL: 0.3 10E3/UL (ref 0.8–5.3)
LYMPHOCYTES NFR BLD MANUAL: 4 %
MAGNESIUM SERPL-MCNC: 1.7 MG/DL (ref 1.7–2.3)
MCH RBC QN AUTO: 33.7 PG (ref 26.5–33)
MCHC RBC AUTO-ENTMCNC: 34 G/DL (ref 31.5–36.5)
MCV RBC AUTO: 99 FL (ref 78–100)
METAMYELOCYTES # BLD MANUAL: 0.2 10E3/UL
METAMYELOCYTES NFR BLD MANUAL: 2 %
MONOCYTES # BLD MANUAL: 0.7 10E3/UL (ref 0–1.3)
MONOCYTES NFR BLD MANUAL: 8 %
NEUTROPHILS # BLD MANUAL: 7.1 10E3/UL (ref 1.6–8.3)
NEUTROPHILS NFR BLD MANUAL: 84 %
PHOSPHATE SERPL-MCNC: 1.8 MG/DL (ref 2.5–4.5)
PLAT MORPH BLD: ABNORMAL
PLATELET # BLD AUTO: 156 10E3/UL (ref 150–450)
POLYCHROMASIA BLD QL SMEAR: SLIGHT
POTASSIUM SERPL-SCNC: 3.6 MMOL/L (ref 3.4–5.3)
PSA SERPL DL<=0.01 NG/ML-MCNC: 1.77 NG/ML (ref 0–4.5)
RBC # BLD AUTO: 2.97 10E6/UL (ref 4.4–5.9)
RBC MORPH BLD: ABNORMAL
SCANNED LAB RESULT: ABNORMAL
SODIUM SERPL-SCNC: 139 MMOL/L (ref 136–145)
TACROLIMUS BLD-MCNC: 14.7 UG/L (ref 5–15)
TME LAST DOSE: NORMAL H
TME LAST DOSE: NORMAL H
WBC # BLD AUTO: 8.5 10E3/UL (ref 4–11)

## 2023-09-22 PROCEDURE — 85007 BL SMEAR W/DIFF WBC COUNT: CPT | Performed by: PATHOLOGY

## 2023-09-22 PROCEDURE — 83690 ASSAY OF LIPASE: CPT | Performed by: PATHOLOGY

## 2023-09-22 PROCEDURE — 84100 ASSAY OF PHOSPHORUS: CPT | Performed by: PATHOLOGY

## 2023-09-22 PROCEDURE — 83735 ASSAY OF MAGNESIUM: CPT | Performed by: PATHOLOGY

## 2023-09-22 PROCEDURE — 99215 OFFICE O/P EST HI 40 MIN: CPT | Mod: 24 | Performed by: NURSE PRACTITIONER

## 2023-09-22 PROCEDURE — 36415 COLL VENOUS BLD VENIPUNCTURE: CPT | Performed by: PATHOLOGY

## 2023-09-22 PROCEDURE — 80197 ASSAY OF TACROLIMUS: CPT | Performed by: NURSE PRACTITIONER

## 2023-09-22 PROCEDURE — 36415 COLL VENOUS BLD VENIPUNCTURE: CPT

## 2023-09-22 PROCEDURE — 99000 SPECIMEN HANDLING OFFICE-LAB: CPT | Performed by: PATHOLOGY

## 2023-09-22 PROCEDURE — G0103 PSA SCREENING: HCPCS | Performed by: PATHOLOGY

## 2023-09-22 PROCEDURE — 85027 COMPLETE CBC AUTOMATED: CPT | Performed by: PATHOLOGY

## 2023-09-22 PROCEDURE — 82150 ASSAY OF AMYLASE: CPT | Performed by: PATHOLOGY

## 2023-09-22 PROCEDURE — 80048 BASIC METABOLIC PNL TOTAL CA: CPT | Performed by: PATHOLOGY

## 2023-09-22 PROCEDURE — G0463 HOSPITAL OUTPT CLINIC VISIT: HCPCS | Performed by: NURSE PRACTITIONER

## 2023-09-22 RX ORDER — TACROLIMUS 1 MG/1
2 CAPSULE ORAL 2 TIMES DAILY
Qty: 120 CAPSULE | Refills: 11 | Status: SHIPPED | OUTPATIENT
Start: 2023-09-22 | End: 2023-09-23

## 2023-09-22 RX ORDER — ACETAMINOPHEN 80 MG
1 TABLET,CHEWABLE ORAL DAILY
Qty: 1 EACH | Refills: 0 | Status: SHIPPED | OUTPATIENT
Start: 2023-09-22

## 2023-09-22 RX ORDER — ASPIRIN 325 MG
325 TABLET ORAL DAILY
Qty: 90 TABLET | Refills: 0 | Status: SHIPPED | OUTPATIENT
Start: 2023-09-22 | End: 2023-09-22

## 2023-09-22 RX ORDER — METOPROLOL TARTRATE 25 MG/1
12.5 TABLET, FILM COATED ORAL 2 TIMES DAILY
Qty: 60 TABLET | Refills: 0
Start: 2023-09-22 | End: 2023-10-17

## 2023-09-22 RX ORDER — ASPIRIN 325 MG
325 TABLET ORAL DAILY
Qty: 90 TABLET | Refills: 0 | Status: ON HOLD | OUTPATIENT
Start: 2023-09-22 | End: 2023-10-20

## 2023-09-22 NOTE — PLAN OF CARE
Physical Therapy Discharge Summary    Reason for therapy discharge:    Discharged to home with outpatient therapy.    Progress towards therapy goal(s). See goals on Care Plan in [facility] electronic health record for goal details.  Goals met    Therapy recommendation(s):    Continued therapy is recommended.  Rationale/Recommendations:  OP PT recommended to promote higher level balance training.

## 2023-09-22 NOTE — PROGRESS NOTES
"Avelina Marion came to [facility] today for a lab and assess following a kidney/pancreas transplant on 9/14/23.      Discharge date: 9/21/23  Transplant coordinator: Makayla ADAMES  Phone number patient can be reached at: [phone] (H)       Physical Assessment:  See physical assessment located under \"Document Flowsheets\".  Incision site: staples; CDI  Lines: none  Urine clarity: clear yellow per pt  Hydration: Pt reports drinking pretty well; encouraged 2L fluids daily  Nutrition: good appetite   Last BM: today  Pain: Pt reports manageable without any analgesics    My transplant place videos watched: yes; inpatient    Labs drawn by [facility] staff Yes    Plan of care for today: Pt and vitals assessed. Medications reviewed and pill box filled for pt. 7A checklist reiveiwed. Pt was seen today by Adia Mccormack.    Medication changes: Decrease Metoprolol to 12.5mg BID    Medications administered:      Patient education:    The following teaching topics were addressed: Importance of drinking 2L of non-caffeinated fluids daily, Incisional care, Signs/symptoms of infection, Medications (purposes, doses and times of administration), Phone numbers to call with concenrs (Transplant coordinator, [facility] and [facility]), 7A discharge check list, and Plan of care   Patient verbalized understanding and all questions answered.    Drug level:  Patient took 3mg of Tacrolimus last evening at 7:30pm.  Care coordinator to follow up with the result.    Face to face time: 60 mintues  Discharge Plan    Pt will follow up with [facility] tomorrow for labs and assessment  Discharge instructions reviewed with patient: YES  Patient/Representative verbalized understanding, all questions answered: YES    Discharged from unit at 10:00 with whom: self to home.    Dedra Mao, RN, RN    "

## 2023-09-22 NOTE — LETTER
9/22/2023         RE: Avelina Marion  [address] [place] [address]        Dear Colleague,    Thank you for referring your patient, Avelina Marion, to the [facility]. Please see a copy of my visit note below.    TRANSPLANT NEPHROLOGY EARLY POST TRANSPLANT VISIT    Assessment & Plan  # DDKT (SPK): Trend up   - Baseline Creatinine: ~ TBD   - Proteinuria: Mild (0.5-1.0 grams)   - Date DSA Last Checked: Sep/2023      Latest DSA: No DSA at time of transplant   - BK Viremia: No   - Kidney Tx Biopsy: No   - Transplant Ureteral Stent: Yes     # Pancreas Tx (SPK):    - Pancreatic Exocrine Drainage: Enteric drained     - Blood glucose: Euglycemia      On insulin: No   - HbA1c: Stable      Latest HbA1c: 8.4%   - Pancreatic enzymes: Trend up   - Date DSA Last Checked: Sep/2023  Latest DSA: No DSA at time of transplant   - Pancreas Tx Biopsy: No    # Immunosuppression: Tacrolimus immediate release (goal 8-10) and Mycophenolate mofetil (dose 750 mg every 12 hours)   - Induction with Recent Transplant:  High Intensity Protocol   - Continue with intensive monitoring of immunosuppression for efficacy and toxicity.   - Changes: Not at this time    # Infection Prophylaxis:   - PJP: Sulfa/TMP (Bactrim)  - CMV: Valganciclovir (Valcyte)  - Thrush: Clotrimazole aramis (Mycelex)     # Blood Pressure: Hypotensive;  Goal BP: < 150/90   - Volume status: Hypovolemic     - Changes: Yes - Coreg to 12.5mg BID     # Anemia in Chronic Renal Disease: Hgb: Stable      MCKENNA: No   - Iron studies: Not checked recently    # Mineral Bone Disorder:    - Secondary renal hyperparathyroidism; PTH level: Not checked recently        On treatment: None  - Vitamin D; level: Not checked recently        On supplement: Yes  - Calcium; level: Low        On supplement: Yes  - Phosphorus; level: Low        On supplement: No    # Electrolytes:   - Potassium; level: Normal        On supplement: No  - Magnesium;  level: Normal        On supplement: Yes  - Bicarbonate; level: Low        On supplement: No  - Sodium; level: Normal    # CAD: S/p CABG 2/2023. Continue metoprolol 25mg PO BID.     # PAD     #Non-melanotic skin cancer: S/p Mohs 8/2022    # Medical Compliance: Yes    # Health Maintenance and Vaccination Review: Reviewed and up to date    # Transplant History:  Etiology of Kidney Failure: Diabetes mellitus type 2  Tx: DDKT (SPK)  Transplant: 9/14/2023 (Kidney / Pancreas)  Donor Type: Donation after Brain Death Donor Class:   Crossmatch at time of Tx: negative  DSA at time of Tx: No  Significant changes in immunosuppression: None  CMV IgG Ab High Risk Discordance (D+/R-): No  EBV IgG Ab High Risk Discordance (D+/R-): No  Significant transplant-related complications: None    Transplant Office Phone Number: [phone]    Assessment and plan was discussed with the patient and he voiced his understanding and agreement.    Return visit: No follow-ups on file.    Adia Mccormack NP    Chief Complaint  Mr. Marion is a 68 year old here for kidney transplant and pancreas transplant.     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  He also has a history of CAD s/p CABG (2/2023), PAD, and BCC w/p Mohs (8/2022). Final crossmatch result pending and most recent PRA 23%. Induction was with rATG     Doing well.  No complaints.  States he does not want an IV site today.  No fevers or chills.    No nausea or vomiting    No urinary complaints    Home BP: Not checked    Problem List  Patient Active Problem List   Diagnosis    End stage renal disease (H)    Type 2 diabetes mellitus with left eye affected by proliferative retinopathy and macular edema, with long-term current use of insulin (H)    Hypertension    Proliferative diabetic retinopathy (H)    Anemia in stage 5  chronic kidney disease, not on chronic dialysis (H)    Secondary hyperparathyroidism of renal origin (H)    Cardiac arrhythmia    CKD (chronic kidney disease) stage 5, GFR less than 15 ml/min (H)    Diabetes mellitus type II, uncontrolled    Glaucoma suspect of right eye    Hearing loss on right    Hyperlipidemia with target LDL less than 100    Hyperphosphatemia    Normal anion gap metabolic acidosis    Orthostatic hypotension    Status post cataract extraction of both eyes with insertion of intraocular lens    Type II diabetes mellitus with neurological manifestations (H)    Uremia    Vitamin D deficiency    Status post coronary angiogram    Pancreas transplant candidate    BCC (basal cell carcinoma), face    Pre-operative cardiovascular examination    CAD (coronary artery disease)    Abnormal cardiovascular stress test    Organ transplant candidate    Coronary atherosclerosis due to severely calcified coronary lesion    Encounter for pre-transplant evaluation for kidney and pancreas transplant    History of simultaneous kidney and pancreas transplant (H)    Immunosuppressed status (H)       Allergies  No Known Allergies    Medications  Current Outpatient Medications   Medication Sig    aspirin (ASA) 325 MG tablet Take 1 tablet (325 mg) by mouth daily    metoprolol tartrate (LOPRESSOR) 25 MG tablet Take 0.5 tablets (12.5 mg) by mouth 2 times daily    Misc. Devices (PILL SPLITTER) MISC 1 applicator daily (Patient not taking: Reported on 9/23/2023)    acetaminophen (TYLENOL) 325 MG tablet Take 2 tablets (650 mg) by mouth every 4 hours as needed for pain, headaches or fever (For optimal non-opioid multimodal pain management to improve pain control.) (Patient not taking: Reported on 9/23/2023)    atorvastatin (LIPITOR) 20 MG tablet Take 1 tablet (20 mg) by mouth At Bedtime    Blood Glucose Monitoring Suppl (ACCU-CHEK GUIDE) w/Device KIT Use to test 4 times a day. Pharmacy to dispense brand based on insurance. (Patient  not taking: Reported on 9/23/2023)    calcitRIOL (ROCALTROL) 0.5 MCG capsule Take 1 capsule (0.5 mcg) by mouth daily    calcium carbonate-vitamin D (OSCAL) 500-5 MG-MCG tablet Take 2 tablets by mouth 2 times daily    clotrimazole (MYCELEX) 10 MG lozenge Place 1 lozenge (10 mg) inside cheek 4 times daily for 90 days    famotidine (PEPCID) 10 MG tablet Take 1 tablet (10 mg) by mouth every 24 hours (Patient not taking: Reported on 9/23/2023)    magnesium oxide (MAG-OX) 400 MG tablet Take 1 tablet (400 mg) by mouth every 24 hours    mycophenolate (GENERIC EQUIVALENT) 250 MG capsule Take 4 capsules (1,000 mg) by mouth 2 times daily    oxyCODONE (ROXICODONE) 5 MG tablet Take 1 tablet (5 mg) by mouth every 4 hours as needed for severe pain or moderate pain (Patient not taking: Reported on 9/23/2023)    pantoprazole (PROTONIX) 40 MG EC tablet Take 1 tablet (40 mg) by mouth daily    phosphorus tablet 250 mg 250 MG per tablet Take 1 tablet (250 mg) by mouth 2 times daily for 15 days    polyethylene glycol (MIRALAX) 17 GM/Dose powder Take 17 g by mouth daily (Patient not taking: Reported on 9/23/2023)    psyllium (METAMUCIL/KONSYL) 58.6 % powder Take 6 g (1 teaspoonful) by mouth daily Can increase up to 3 TIMES DAILY if needed.    senna-docusate (SENOKOT-S/PERICOLACE) 8.6-50 MG tablet Take 1-2 tablets by mouth 2 times daily HOLD for loose stools (Patient not taking: Reported on 9/25/2023)    sulfamethoxazole-trimethoprim (BACTRIM) 400-80 MG tablet 1 tablet by Oral or NG Tube route daily    tacrolimus (GENERIC EQUIVALENT) 0.5 MG capsule Take 1 by mouth twice daily when directed by transplant team for dose titration (Patient not taking: Reported on 9/23/2023)    tacrolimus (GENERIC EQUIVALENT) 1 MG capsule Take 1 capsule (1 mg) by mouth 2 times daily    valGANciclovir (VALCYTE) 450 MG tablet Take 1 tablet (450 mg) by mouth every other day Titrate up to 2 tablets by mouth daily as directed by transplant team (based on kidney  function)     No current facility-administered medications for this visit.     Medications Discontinued During This Encounter   Medication Reason    metoprolol tartrate (LOPRESSOR) 25 MG tablet Reorder (No AVS)    aspirin (ASA) 325 MG tablet Reorder (No AVS)       Physical Exam  Vital Signs: There were no vitals taken for this visit.    GENERAL APPEARANCE: alert and no distress  HENT: mouth without ulcers or lesions  RESP: lungs clear to auscultation - no rales, rhonchi or wheezes  CV: regular rhythm, normal rate, no rub, no murmur  EDEMA: no LE edema bilaterally  ABDOMEN: soft, nondistended, nontender, bowel sounds normal  MS: extremities normal - no gross deformities noted, no evidence of inflammation in joints, no muscle tenderness  SKIN: no rash    Data        Latest Ref Rng & Units 9/25/2023     8:23 AM 9/24/2023     8:35 AM 9/23/2023     7:26 AM   Renal   Sodium 136 - 145 mmol/L 139  140  137    K 3.4 - 5.3 mmol/L 4.4  3.8  3.5    Cl 98 - 107 mmol/L 108  108  104    Cl (external) 98 - 107 mmol/L 108  108  104    CO2 22 - 29 mmol/L 21  22  24    Urea Nitrogen 8.0 - 23.0 mg/dL 16.8  17.0  23.6    Creatinine 0.67 - 1.17 mg/dL 2.25  2.20  2.30    Glucose 70 - 99 mg/dL 102  95  95    Calcium 8.8 - 10.2 mg/dL 8.5  7.8  7.9    Magnesium 1.7 - 2.3 mg/dL 1.7  1.6  1.8          Latest Ref Rng & Units 9/25/2023     8:23 AM 9/24/2023     8:35 AM 9/23/2023     7:26 AM   Bone Health   Phosphorus 2.5 - 4.5 mg/dL 1.8  2.0  1.9    Parathyroid Hormone Intact 15 - 65 pg/mL  7           Latest Ref Rng & Units 9/25/2023     8:23 AM 9/24/2023     8:35 AM 9/23/2023     7:26 AM   Heme   WBC 4.0 - 11.0 10e3/uL 11.9  8.8  9.9    Hgb 13.3 - 17.7 g/dL 9.1  8.9  9.4    Plt 150 - 450 10e3/uL 211  170  198    ABSOLUTE NEUTROPHIL 1.6 - 8.3 10e3/uL   8.9    ABSOLUTE LYMPHOCYTES 0.8 - 5.3 10e3/uL   0.1    ABSOLUTE MONOCYTES 0.0 - 1.3 10e3/uL   0.6    ABSOLUTE EOSINOPHILS 0.0 - 0.7 10e3/uL   0.1          Latest Ref Rng & Units 9/14/2023      3:39 PM 2/14/2023     1:11 AM 2/13/2023    11:56 PM   Liver   AP 40 - 129 U/L 60  35  36    TBili <=1.2 mg/dL 0.9  0.3  0.3    ALT 0 - 70 U/L 6  10  11    AST 0 - 45 U/L 22  46  51    Tot Protein 6.4 - 8.3 g/dL 7.3  4.5  4.8    Albumin 3.5 - 5.2 g/dL 4.3  2.9  3.0          Latest Ref Rng & Units 9/25/2023     8:23 AM 9/24/2023     8:35 AM 9/23/2023     7:26 AM   Pancreas   Amylase 28 - 100 U/L 56  58  68    Lipase (Roche) 13 - 60 U/L 50  53  65          Latest Ref Rng & Units 9/24/2023     8:35 AM 2/16/2023     8:34 AM   Iron studies   Iron 61 - 157 ug/dL 32  24    Iron Sat Index 15 - 46 % 21  20    Ferritin 31 - 409 ng/mL 1,164  1,461          Latest Ref Rng & Units 9/14/2023     3:39 PM 8/9/2022     6:26 AM 8/5/2021     2:23 AM   [facility] Virology   EBV CAPSID ANTIBODY IGG No detectable antibody. Positive  Positive  Positive        Recent Labs   Lab Test 09/17/23  0643 09/18/23  0555 09/20/23  0535 09/22/23  0739 09/23/23  0726   DOSTAC  --   --   --  9/21/2023  --    TACROL 4.7*   < > 14.6 14.7 19.8*    < > = values in this interval not displayed.            Adia Mccormack, NP

## 2023-09-22 NOTE — TELEPHONE ENCOUNTER
Kidney and Pancreas Transplant Coordinator Transition to Outpatient Discharge Note    Pt Name: Avelina Marion  : 1955    Transplant Date: 23  Hospital Discharge Date: 23    Surgeon (updated in Transplant Information tab): lois  Nephrologist (updated in Transplant Information tab): moni  Transplant Coordinator: Makayla Wu RN    Avelina Marion DDRT d/t DM2, right side side   Stent was placed.  Pancreas Drainage: Enteric        Immunosuppression:   CNI: tac  Antimetabolite: mmf  Prednisone taper: Yes.    Prophylaxis:   CMV: D-/R- : Valcyte 3 months; renal dosing  EBV: D+/R+  Antibiotic: bactrim    High Risk DSA: No.    Lines / drains in place at time of discharge:    If yes, review of parameters to track and when to contact RNCC: No.      [facility]armacy after discharge: [facility]  Lab after discharge: [facility]  Lab letter faxed to outside lab, if needed: No.      Post-op course / additional monitoring:       Education:  Writer spoke with Avelina Marion.   We discussed immunosuppression medications, indications, side effects, dose adjustments, and lab monitoring.   Lab draw schedule was provided via [employer] / mail to the patient and fully explained.    DSA  kits needed:  No.    If so, request submitted to centralized team for send-out.  Allosure  kits needed:  No.    If so, request submitted to LPN for kits to be sent to patient's home   address & request for Allosure acquisition form to be submitted.   Living Donor:  No.    If yes, discussed with patient data collection through [facility] for KFL in effort  to improve eplet matching. This will include additional, non-clinical lab   draw organized by an external organization, [facility].     MyTransplant Place: RNCC encouraged on-going review, emphasis on   post-transplant content.   MyChart: RNCC encouraged regular self-review of lab values via [facility] and  educated about importance of utilization for awareness of required follow   up and  communication with SOT team members.     Further education was provided on typical post transplant course, labs examined with thresholds, biopsy, infection prevention, office contact numbers, and when to call.       Discharge Transition Plan:   Pt will transition home, with assistance from family. Pt will not have home care.   Pt states having working BP monitor, scale, and thermometer at home.      Follow Up:  Orders for post-transplant follow-up appointments placed: Yes.  Patient Status Checklist Task updated: Yes.    Special/Additional needs: No.  Pt questions for follow up:         Makayla Wu RN  Post-Kidney Transplant Coordinator  (ph) [phone]

## 2023-09-22 NOTE — PATIENT INSTRUCTIONS
Continue to push fluids-goal is 2 liters daily      Aspirin at 1st floor pharmacy and pill splitter     Check if you have Metoprolol Tartate (Lopressor) 25mg at home-you will now need to split the pill. New dose is 12.5mg twice daily    Bring Blood pressure cuff to appointment tomorrow and we can check it    Please call coordinator if you have any questions/concerns: [phone]    Return tomorrow at 7:00 for labs and assessment

## 2023-09-23 ENCOUNTER — TELEPHONE (OUTPATIENT)
Dept: TRANSPLANT | Facility: CLINIC | Age: 68
End: 2023-09-23

## 2023-09-23 ENCOUNTER — INFUSION THERAPY VISIT (OUTPATIENT)
Dept: INFUSION THERAPY | Facility: CLINIC | Age: 68
End: 2023-09-23
Attending: NURSE PRACTITIONER
Payer: COMMERCIAL

## 2023-09-23 VITALS
DIASTOLIC BLOOD PRESSURE: 60 MMHG | BODY MASS INDEX: 29.26 KG/M2 | OXYGEN SATURATION: 98 % | SYSTOLIC BLOOD PRESSURE: 123 MMHG | WEIGHT: 209.8 LBS

## 2023-09-23 DIAGNOSIS — Z94.0 KIDNEY REPLACED BY TRANSPLANT: Primary | ICD-10-CM

## 2023-09-23 DIAGNOSIS — Z94.0 HISTORY OF SIMULTANEOUS KIDNEY AND PANCREAS TRANSPLANT (H): ICD-10-CM

## 2023-09-23 DIAGNOSIS — Z94.83 HISTORY OF SIMULTANEOUS KIDNEY AND PANCREAS TRANSPLANT (H): ICD-10-CM

## 2023-09-23 LAB
AMYLASE SERPL-CCNC: 68 U/L (ref 28–100)
ANION GAP SERPL CALCULATED.3IONS-SCNC: 9 MMOL/L (ref 7–15)
BASOPHILS # BLD MANUAL: 0 10E3/UL (ref 0–0.2)
BASOPHILS NFR BLD MANUAL: 0 %
BUN SERPL-MCNC: 23.6 MG/DL (ref 8–23)
CALCIUM SERPL-MCNC: 7.9 MG/DL (ref 8.8–10.2)
CHLORIDE SERPL-SCNC: 104 MMOL/L (ref 98–107)
CREAT SERPL-MCNC: 2.3 MG/DL (ref 0.67–1.17)
DEPRECATED HCO3 PLAS-SCNC: 24 MMOL/L (ref 22–29)
EGFRCR SERPLBLD CKD-EPI 2021: 31 ML/MIN/1.73M2
EOSINOPHIL # BLD MANUAL: 0.1 10E3/UL (ref 0–0.7)
EOSINOPHIL NFR BLD MANUAL: 1 %
ERYTHROCYTE [DISTWIDTH] IN BLOOD BY AUTOMATED COUNT: 13.8 % (ref 10–15)
GLUCOSE SERPL-MCNC: 95 MG/DL (ref 70–99)
HCT VFR BLD AUTO: 27.9 % (ref 40–53)
HGB BLD-MCNC: 9.4 G/DL (ref 13.3–17.7)
LIPASE SERPL-CCNC: 65 U/L (ref 13–60)
LYMPHOCYTES # BLD MANUAL: 0.1 10E3/UL (ref 0.8–5.3)
LYMPHOCYTES NFR BLD MANUAL: 1 %
MAGNESIUM SERPL-MCNC: 1.8 MG/DL (ref 1.7–2.3)
MCH RBC QN AUTO: 33.3 PG (ref 26.5–33)
MCHC RBC AUTO-ENTMCNC: 33.7 G/DL (ref 31.5–36.5)
MCV RBC AUTO: 99 FL (ref 78–100)
METAMYELOCYTES # BLD MANUAL: 0.1 10E3/UL
METAMYELOCYTES NFR BLD MANUAL: 1 %
MONOCYTES # BLD MANUAL: 0.6 10E3/UL (ref 0–1.3)
MONOCYTES NFR BLD MANUAL: 6 %
MYELOCYTES # BLD MANUAL: 0.1 10E3/UL
MYELOCYTES NFR BLD MANUAL: 1 %
NEUTROPHILS # BLD MANUAL: 8.9 10E3/UL (ref 1.6–8.3)
NEUTROPHILS NFR BLD MANUAL: 90 %
PHOSPHATE SERPL-MCNC: 1.9 MG/DL (ref 2.5–4.5)
PLAT MORPH BLD: ABNORMAL
PLATELET # BLD AUTO: 198 10E3/UL (ref 150–450)
POTASSIUM SERPL-SCNC: 3.5 MMOL/L (ref 3.4–5.3)
RBC # BLD AUTO: 2.82 10E6/UL (ref 4.4–5.9)
RBC MORPH BLD: ABNORMAL
SODIUM SERPL-SCNC: 137 MMOL/L (ref 136–145)
TACROLIMUS BLD-MCNC: 19.8 UG/L (ref 5–15)
TME LAST DOSE: ABNORMAL H
TME LAST DOSE: ABNORMAL H
WBC # BLD AUTO: 9.9 10E3/UL (ref 4–11)

## 2023-09-23 PROCEDURE — 82150 ASSAY OF AMYLASE: CPT

## 2023-09-23 PROCEDURE — 85007 BL SMEAR W/DIFF WBC COUNT: CPT

## 2023-09-23 PROCEDURE — 83735 ASSAY OF MAGNESIUM: CPT

## 2023-09-23 PROCEDURE — 85027 COMPLETE CBC AUTOMATED: CPT

## 2023-09-23 PROCEDURE — 80197 ASSAY OF TACROLIMUS: CPT

## 2023-09-23 PROCEDURE — 96360 HYDRATION IV INFUSION INIT: CPT

## 2023-09-23 PROCEDURE — 83690 ASSAY OF LIPASE: CPT

## 2023-09-23 PROCEDURE — 258N000003 HC RX IP 258 OP 636: Performed by: NURSE PRACTITIONER

## 2023-09-23 PROCEDURE — 84100 ASSAY OF PHOSPHORUS: CPT

## 2023-09-23 PROCEDURE — 80048 BASIC METABOLIC PNL TOTAL CA: CPT

## 2023-09-23 PROCEDURE — 36415 COLL VENOUS BLD VENIPUNCTURE: CPT

## 2023-09-23 RX ORDER — TACROLIMUS 1 MG/1
1 CAPSULE ORAL 2 TIMES DAILY
Qty: 60 CAPSULE | Refills: 11 | Status: SHIPPED | OUTPATIENT
Start: 2023-09-23 | End: 2023-10-03

## 2023-09-23 RX ADMIN — SODIUM CHLORIDE, POTASSIUM CHLORIDE, SODIUM LACTATE AND CALCIUM CHLORIDE 1000 ML: 600; 310; 30; 20 INJECTION, SOLUTION INTRAVENOUS at 09:04

## 2023-09-23 NOTE — PROGRESS NOTES
"Avelina Marion came to [facility] today for a lab and assess following a kidney, pancreas transplant on 9/14/23.      Discharge date: 9/21/23  Transplant coordinator: Makayla Wu RN     Physical Assessment:  See physical assessment located under \"Document Flowsheets\".  Incision site: abdominal with staples, clear, dry, intact, no drainage   Lines: j/p site dressing change, clear, dry, intact  Urine clarity: per patient, urine clear, no blood   Hydration: per patient, drank 3L of water and diet soda yesterday  Nutrition: appetite improving, no nausea, no vomiting   Last BM: soft, 1 bm per day with stool softeners  Pain: 2/10, patient not currently taking pain meds   Labs drawn by [facility] staff Yes    Plan of care for today: labs, assessment, education     Medication changes: added phosphorous, 2 tablets, 250mg BID    Medications administered:  Administrations This Visit       lactated ringers BOLUS 1,000 mL       Admin Date  09/23/2023 Action  $New Bag Dose  1,000 mL Rate  1,000 mL/hr Route  Intravenous Administered By  Chery Stevne RN                    Patient education:    The following teaching topics were addressed: Importance of drinking 2L of non-caffeinated fluids daily, Incisional care, Medications (purposes, doses and times of administration), 7A discharge check list, and Plan of care   Patient verbalized understanding and all questions answered.    Drug level:  Patient took 2mg of tacrolimus last evening at 2000.  Care coordinator to follow up with the result.    Discharge Plan    Pt will follow up with [facility] 9/24/23  Discharge instructions reviewed with patient: YES  Patient/Representative verbalized understanding, all questions answered: YES    Marlin Brito, Nursing Student  "

## 2023-09-23 NOTE — TELEPHONE ENCOUNTER
ISSUE:   Tacrolimus IR level 19.8 on 9/23, goal 8-10, dose 2 mg BID.    PLAN:   Please call patient and confirm this was an accurate 12-hour trough. Verify Tacrolimus IR dose 2 mg BID. Confirm no new medications or illness. Confirm no missed doses. If accurate trough and accurate dose, hold tonight's dose and decrease Tacrolimus IR dose to 1 mg BID and repeat labs in 1 days    OUTCOME:   Spoke with patient, they confirm accurate trough level and current dose 2 mg BID. Patient confirmed dose change to 1 mg BID and to repeat labs in 1 days. Orders sent to preferred pharmacy for dose change and lab for repeat labs. Patient voiced understanding of plan.

## 2023-09-23 NOTE — PROGRESS NOTES
"Avelina Marion came to [facility] today for a lab and assess following a *** transplant on ***.      Discharge date: ***  Transplant coordinator: ***  Phone number patient can be reached at: ***      Physical Assessment:  See physical assessment located under \"Document Flowsheets\".  Incision site: ***  Lines: ***  Uribe: ***  Urine clarity: ***  Hydration: ***  Nutrition: ***   Last BM: ***  Pain: ***   My transplant place videos watched: ***    Labs drawn by [facility] staff {YES NO:763675}    Plan of care for today: ***    Medication changes: ***    Medications administered:  Administrations This Visit       lactated ringers BOLUS 1,000 mL       Admin Date  09/23/2023 Action  $New Bag Dose  1,000 mL Rate  1,000 mL/hr Route  Intravenous Administered By  Chery Steven, RN                    Patient education:    The following teaching topics were addressed: {education list:674574}   {PERSONS; PATIENT PARENTS OTHER:622602540} verbalized understanding and all questions answered.    Drug level:  Patient took ***mg of *** last evening at ***.  Care coordinator to follow up with the result.    Face to face time: ***  Discharge Plan    Pt will follow up with ***  Discharge instructions reviewed with patient: {yes/no:486140:s}  Patient/Representative verbalized understanding, all questions answered: {yes/no:415279:s}    Discharged from unit at *** with whom: *** to ***.    Chery Steven, RN, RN     "

## 2023-09-24 ENCOUNTER — TELEPHONE (OUTPATIENT)
Dept: TRANSPLANT | Facility: CLINIC | Age: 68
End: 2023-09-24

## 2023-09-24 ENCOUNTER — INFUSION THERAPY VISIT (OUTPATIENT)
Dept: INFUSION THERAPY | Facility: CLINIC | Age: 68
End: 2023-09-24
Attending: INTERNAL MEDICINE
Payer: COMMERCIAL

## 2023-09-24 VITALS
HEART RATE: 90 BPM | WEIGHT: 208.4 LBS | SYSTOLIC BLOOD PRESSURE: 128 MMHG | RESPIRATION RATE: 16 BRPM | OXYGEN SATURATION: 98 % | TEMPERATURE: 98 F | DIASTOLIC BLOOD PRESSURE: 71 MMHG | BODY MASS INDEX: 29.07 KG/M2

## 2023-09-24 DIAGNOSIS — Z94.83 PANCREAS REPLACED BY TRANSPLANT (H): ICD-10-CM

## 2023-09-24 DIAGNOSIS — Z94.0 KIDNEY REPLACED BY TRANSPLANT: Primary | ICD-10-CM

## 2023-09-24 LAB
AMYLASE SERPL-CCNC: 58 U/L (ref 28–100)
ANION GAP SERPL CALCULATED.3IONS-SCNC: 10 MMOL/L (ref 7–15)
BASOPHILS # BLD AUTO: 0 10E3/UL (ref 0–0.2)
BASOPHILS NFR BLD AUTO: 0 %
BUN SERPL-MCNC: 17 MG/DL (ref 8–23)
CALCIUM SERPL-MCNC: 7.8 MG/DL (ref 8.8–10.2)
CHLORIDE SERPL-SCNC: 108 MMOL/L (ref 98–107)
CREAT SERPL-MCNC: 2.2 MG/DL (ref 0.67–1.17)
DEPRECATED HCO3 PLAS-SCNC: 22 MMOL/L (ref 22–29)
EGFRCR SERPLBLD CKD-EPI 2021: 32 ML/MIN/1.73M2
EOSINOPHIL # BLD AUTO: 0.1 10E3/UL (ref 0–0.7)
EOSINOPHIL NFR BLD AUTO: 1 %
ERYTHROCYTE [DISTWIDTH] IN BLOOD BY AUTOMATED COUNT: 14 % (ref 10–15)
FERRITIN SERPL-MCNC: 1164 NG/ML (ref 31–409)
GLUCOSE SERPL-MCNC: 95 MG/DL (ref 70–99)
HCT VFR BLD AUTO: 26.3 % (ref 40–53)
HGB BLD-MCNC: 8.9 G/DL (ref 13.3–17.7)
IMM GRANULOCYTES # BLD: 0.3 10E3/UL
IMM GRANULOCYTES NFR BLD: 3 %
IRON BINDING CAPACITY (ROCHE): 151 UG/DL (ref 240–430)
IRON SATN MFR SERPL: 21 % (ref 15–46)
IRON SERPL-MCNC: 32 UG/DL (ref 61–157)
LIPASE SERPL-CCNC: 53 U/L (ref 13–60)
LYMPHOCYTES # BLD AUTO: 0.2 10E3/UL (ref 0.8–5.3)
LYMPHOCYTES NFR BLD AUTO: 2 %
MAGNESIUM SERPL-MCNC: 1.6 MG/DL (ref 1.7–2.3)
MCH RBC QN AUTO: 33.5 PG (ref 26.5–33)
MCHC RBC AUTO-ENTMCNC: 33.8 G/DL (ref 31.5–36.5)
MCV RBC AUTO: 99 FL (ref 78–100)
MONOCYTES # BLD AUTO: 0.7 10E3/UL (ref 0–1.3)
MONOCYTES NFR BLD AUTO: 8 %
NEUTROPHILS # BLD AUTO: 7.5 10E3/UL (ref 1.6–8.3)
NEUTROPHILS NFR BLD AUTO: 86 %
NRBC # BLD AUTO: 0 10E3/UL
NRBC BLD AUTO-RTO: 0 /100
PHOSPHATE SERPL-MCNC: 2 MG/DL (ref 2.5–4.5)
PLATELET # BLD AUTO: 170 10E3/UL (ref 150–450)
POTASSIUM SERPL-SCNC: 3.8 MMOL/L (ref 3.4–5.3)
PTH-INTACT SERPL-MCNC: 7 PG/ML (ref 15–65)
RBC # BLD AUTO: 2.66 10E6/UL (ref 4.4–5.9)
SODIUM SERPL-SCNC: 140 MMOL/L (ref 136–145)
WBC # BLD AUTO: 8.8 10E3/UL (ref 4–11)

## 2023-09-24 PROCEDURE — 83690 ASSAY OF LIPASE: CPT | Performed by: NURSE PRACTITIONER

## 2023-09-24 PROCEDURE — 83550 IRON BINDING TEST: CPT

## 2023-09-24 PROCEDURE — 83970 ASSAY OF PARATHORMONE: CPT

## 2023-09-24 PROCEDURE — 82150 ASSAY OF AMYLASE: CPT | Performed by: NURSE PRACTITIONER

## 2023-09-24 PROCEDURE — 84100 ASSAY OF PHOSPHORUS: CPT

## 2023-09-24 PROCEDURE — 36415 COLL VENOUS BLD VENIPUNCTURE: CPT

## 2023-09-24 PROCEDURE — 82728 ASSAY OF FERRITIN: CPT

## 2023-09-24 PROCEDURE — 80048 BASIC METABOLIC PNL TOTAL CA: CPT

## 2023-09-24 PROCEDURE — 82306 VITAMIN D 25 HYDROXY: CPT

## 2023-09-24 PROCEDURE — 85004 AUTOMATED DIFF WBC COUNT: CPT

## 2023-09-24 PROCEDURE — 83735 ASSAY OF MAGNESIUM: CPT

## 2023-09-24 ASSESSMENT — PAIN SCALES - GENERAL: PAINLEVEL: NO PAIN (0)

## 2023-09-24 NOTE — TELEPHONE ENCOUNTER
Pt did not have a level drawn today. Added level to current blood work. Pt is taking 1 mg BID. Pt aware that added blood draw on 9.25 at 815 am. Avelina will call with any changes.

## 2023-09-24 NOTE — PATIENT INSTRUCTIONS
Dear Avelina Marion    Thank you for choosing [facility] ([facility]) for your transplant cares.  The following information is a summary of your appointment as well as important reminders.      Please make sure your phone is available today because your coordinator will call to update you with your anti-rejection drug levels and possibly make changes to your anti-rejection dosages.    Please refer to your hospital discharge instructions for details on home care services, future appointments, phone numbers, and diet/activity levels.    If you are a transplant patient and require transplant education, please click on this link: [email or web address].    We look forward to seeing you at your next appointment here at [facility] ([facility]).  Please don t hesitate to call us at [phone] to reschedule any of your appointments or to speak with one of the [facility] registered nurses.  It was a pleasure taking care of you today.    Sincerely,    [facility]  [address] [place] [address]  [address]  Phone:  [phone]

## 2023-09-24 NOTE — PROGRESS NOTES
"Avelina Marion came to [facility] today for a lab and assess following a Kidney/Pancreas transplant on 9/14/23.      Discharge date: 9/21/23  Transplant coordinator: Makayla Wu RN  Phone number patient can be reached at: [phone]    Physical Assessment:  See physical assessment located under \"Document Flowsheets\".  -Incision site: c/d/i, staples intact, no redness/warmth/drainage  -Lines: n/a  -Uribe: n/a  -Urine clarity: clear/yellow, denies pain/burning with urination  -Hydration: ~1.5L water, diet sodas x2; reinforced goal of 2L per day   -Nutrition: appetite good, denies n/v  -Last BM: 9/24/23 ~0400; taking senna x1 daily  -Pain: 0/10    Labs drawn by [facility] staff: Yes, via PIV by RN    Plan of care for today:   -Labs, VS, assessment reviewed with Adia Mccormack NP.    Medication changes: none    Medications administered: none    Patient education:  The following teaching topics were addressed: Importance of drinking 2L of non-caffeinated fluids daily, Incisional care, Signs/symptoms of infection, Good handwashing, Medications (purposes, doses and times of administration), Phone numbers to call with concenrs (Transplant coordinator, [facility]), 7A discharge check list, and Plan of care    Patient verbalized understanding and all questions answered.    Drug level:  Patient did not take meds last night; took 1mg Tacrolimus at 0845 this morning. Labs to be drawn tomorrow.    Face to face time: 45min    Discharge Plan  Pt will follow up with lab appt 9/25/23.  Discharge instructions reviewed with patient: YES  Patient/Representative verbalized understanding, all questions answered: YES    Discharged from unit at 1000 with whom: self, to home.      Alyson Cole, KARINE    /71 (Patient Position: Standing)   Pulse 90   Temp 98  F (36.7  C)   Resp 16   Wt 94.5 kg (208 lb 6.4 oz)   SpO2 98%   BMI 29.07 kg/m      "

## 2023-09-25 ENCOUNTER — TELEPHONE (OUTPATIENT)
Dept: TRANSPLANT | Facility: CLINIC | Age: 68
End: 2023-09-25

## 2023-09-25 ENCOUNTER — LAB (OUTPATIENT)
Dept: LAB | Facility: CLINIC | Age: 68
End: 2023-09-25
Payer: COMMERCIAL

## 2023-09-25 ENCOUNTER — VIRTUAL VISIT (OUTPATIENT)
Dept: PHARMACY | Facility: CLINIC | Age: 68
End: 2023-09-25
Payer: COMMERCIAL

## 2023-09-25 DIAGNOSIS — R52 PAIN: ICD-10-CM

## 2023-09-25 DIAGNOSIS — R19.5 LOOSE STOOLS: ICD-10-CM

## 2023-09-25 DIAGNOSIS — Z98.890 OTHER SPECIFIED POSTPROCEDURAL STATES: ICD-10-CM

## 2023-09-25 DIAGNOSIS — Z20.828 CONTACT WITH AND (SUSPECTED) EXPOSURE TO OTHER VIRAL COMMUNICABLE DISEASES: ICD-10-CM

## 2023-09-25 DIAGNOSIS — Z78.9 TAKES DIETARY SUPPLEMENTS: ICD-10-CM

## 2023-09-25 DIAGNOSIS — Z48.298 AFTERCARE FOLLOWING ORGAN TRANSPLANT: ICD-10-CM

## 2023-09-25 DIAGNOSIS — Z79.899 ENCOUNTER FOR LONG-TERM CURRENT USE OF MEDICATION: ICD-10-CM

## 2023-09-25 DIAGNOSIS — E78.5 HYPERLIPIDEMIA WITH TARGET LDL LESS THAN 100: ICD-10-CM

## 2023-09-25 DIAGNOSIS — Z94.0 HISTORY OF SIMULTANEOUS KIDNEY AND PANCREAS TRANSPLANT (H): Primary | ICD-10-CM

## 2023-09-25 DIAGNOSIS — Z48.298 AFTERCARE FOLLOWING ORGAN TRANSPLANT: Primary | ICD-10-CM

## 2023-09-25 DIAGNOSIS — Z94.83 HISTORY OF SIMULTANEOUS KIDNEY AND PANCREAS TRANSPLANT (H): Primary | ICD-10-CM

## 2023-09-25 DIAGNOSIS — I10 ESSENTIAL HYPERTENSION: ICD-10-CM

## 2023-09-25 DIAGNOSIS — Z94.0 KIDNEY REPLACED BY TRANSPLANT: ICD-10-CM

## 2023-09-25 LAB
AMYLASE SERPL-CCNC: 56 U/L (ref 28–100)
ANION GAP SERPL CALCULATED.3IONS-SCNC: 10 MMOL/L (ref 7–15)
BUN SERPL-MCNC: 16.8 MG/DL (ref 8–23)
CALCIUM SERPL-MCNC: 8.5 MG/DL (ref 8.8–10.2)
CHLORIDE SERPL-SCNC: 108 MMOL/L (ref 98–107)
CREAT SERPL-MCNC: 2.25 MG/DL (ref 0.67–1.17)
DEPRECATED CALCIDIOL+CALCIFEROL SERPL-MC: 20 UG/L (ref 20–75)
DEPRECATED HCO3 PLAS-SCNC: 21 MMOL/L (ref 22–29)
EGFRCR SERPLBLD CKD-EPI 2021: 31 ML/MIN/1.73M2
ERYTHROCYTE [DISTWIDTH] IN BLOOD BY AUTOMATED COUNT: 14.2 % (ref 10–15)
GLUCOSE SERPL-MCNC: 102 MG/DL (ref 70–99)
HCT VFR BLD AUTO: 27.9 % (ref 40–53)
HGB BLD-MCNC: 9.1 G/DL (ref 13.3–17.7)
LIPASE SERPL-CCNC: 50 U/L (ref 13–60)
MAGNESIUM SERPL-MCNC: 1.7 MG/DL (ref 1.7–2.3)
MCH RBC QN AUTO: 32.7 PG (ref 26.5–33)
MCHC RBC AUTO-ENTMCNC: 32.6 G/DL (ref 31.5–36.5)
MCV RBC AUTO: 100 FL (ref 78–100)
PHOSPHATE SERPL-MCNC: 1.8 MG/DL (ref 2.5–4.5)
PLATELET # BLD AUTO: 211 10E3/UL (ref 150–450)
POTASSIUM SERPL-SCNC: 4.4 MMOL/L (ref 3.4–5.3)
RBC # BLD AUTO: 2.78 10E6/UL (ref 4.4–5.9)
SODIUM SERPL-SCNC: 139 MMOL/L (ref 136–145)
TACROLIMUS BLD-MCNC: 26 UG/L (ref 5–15)
TME LAST DOSE: ABNORMAL H
TME LAST DOSE: ABNORMAL H
WBC # BLD AUTO: 11.9 10E3/UL (ref 4–11)

## 2023-09-25 PROCEDURE — 83690 ASSAY OF LIPASE: CPT | Performed by: PATHOLOGY

## 2023-09-25 PROCEDURE — 82150 ASSAY OF AMYLASE: CPT | Performed by: PATHOLOGY

## 2023-09-25 PROCEDURE — 99605 MTMS BY PHARM NP 15 MIN: CPT | Performed by: PHARMACIST

## 2023-09-25 PROCEDURE — 36415 COLL VENOUS BLD VENIPUNCTURE: CPT | Performed by: PATHOLOGY

## 2023-09-25 PROCEDURE — 99607 MTMS BY PHARM ADDL 15 MIN: CPT | Performed by: PHARMACIST

## 2023-09-25 PROCEDURE — 80197 ASSAY OF TACROLIMUS: CPT | Performed by: INTERNAL MEDICINE

## 2023-09-25 PROCEDURE — 84100 ASSAY OF PHOSPHORUS: CPT | Performed by: PATHOLOGY

## 2023-09-25 PROCEDURE — 85027 COMPLETE CBC AUTOMATED: CPT | Performed by: PATHOLOGY

## 2023-09-25 PROCEDURE — 99000 SPECIMEN HANDLING OFFICE-LAB: CPT | Performed by: PATHOLOGY

## 2023-09-25 PROCEDURE — 80048 BASIC METABOLIC PNL TOTAL CA: CPT | Performed by: PATHOLOGY

## 2023-09-25 PROCEDURE — 83735 ASSAY OF MAGNESIUM: CPT | Performed by: PATHOLOGY

## 2023-09-25 RX ORDER — ACETAMINOPHEN 500 MG
500-1000 TABLET ORAL EVERY 6 HOURS PRN
COMMUNITY
End: 2023-11-27

## 2023-09-25 NOTE — LETTER
September 25, 2023  Avelina Marion  [address] [place] [address]    Dear Mr. Marion, [facility]     Thank you for talking with me on Sep 25, 2023 about your health and medications. As a follow-up to our conversation, I have included two documents:      Your Recommended To-Do List has steps you should take to get the best results from your medications.  Your Medication List will help you keep track of your medications and how to take them.    If you want to talk about these documents, please call Kg Grewal RPH at phone: [phone], Monday-Friday 8-4:30pm.    I look forward to working with you and your doctors to make sure your medications work well for you.    Sincerely,  Kg Grewal RPH  [facility] Pharmacist, [facility]

## 2023-09-25 NOTE — PATIENT INSTRUCTIONS
"Recommendations from today's MTM visit:                                                       Start Metamucil 1 teaspoonful daily. Can increase up to 3 TIMES DAILY if needed.   Call [facility] to refill Phosphorus supplement. If they do not have this in stock really push diet Dr. Gale, dairy, nuts, beans, eggs, and meat.   Recommend max dose of Acetaminophen at 1000mg (2 tabs), can take another dose 4 hours later if needed.     Follow-up: 2 months post transplant.    It was great speaking with you today.  I value your experience and would be very thankful for your time in providing feedback in our clinic survey. In the next few days, you may receive an email or text message from [employer] with a link to a survey related to your  clinical pharmacist.\"     To schedule another MTM appointment, please call the clinic directly or you may call the MTM scheduling line at [phone] or toll-free at [phone].     My Clinical Pharmacist's contact information:                                                      Please feel free to contact me with any questions or concerns you have.      Kg Grewal, PharmD  MTM Pharmacist    Phone: [phone]     "

## 2023-09-25 NOTE — LETTER
"Recommended To-Do List      Prepared on: 09/25/2023       You can get the best results from your medications by completing the items on this \"To-Do List.\"      Bring your To-Do List when you go to your doctor. And, share it with your family or caregivers.    My To-Do List:  What we talked about: What I should do:   A new medication that may be of benefit to you    Start taking Metamucil 1 tsp daily, may increase as needed          What we talked about: What I should do:   Your medication dosage being too high    Decrease your dosage of acetaminophen (TYLENOL) to 2 tablets (1000mg) per dose, can take another dose 4 hours later if needed          What we talked about: What I should do:   The importance of taking your medication as intended    Start taking Phosphorus 250mg twice daily.          What we talked about: What I should do:                       "

## 2023-09-25 NOTE — PROGRESS NOTES
TRANSPLANT NEPHROLOGY EARLY POST TRANSPLANT VISIT    Assessment & Plan   # DDKT (SPK): Trend up   - Baseline Creatinine: ~ TBD   - Proteinuria: Mild (0.5-1.0 grams)   - Date DSA Last Checked: Sep/2023      Latest DSA: No DSA at time of transplant   - BK Viremia: No   - Kidney Tx Biopsy: No   - Transplant Ureteral Stent: Yes     # Pancreas Tx (SPK):    - Pancreatic Exocrine Drainage: Enteric drained     - Blood glucose: Euglycemia      On insulin: No   - HbA1c: Stable      Latest HbA1c: 8.4%   - Pancreatic enzymes: Trend up   - Date DSA Last Checked: Sep/2023  Latest DSA: No DSA at time of transplant   - Pancreas Tx Biopsy: No    # Immunosuppression: Tacrolimus immediate release (goal 8-10) and Mycophenolate mofetil (dose 750 mg every 12 hours)   - Induction with Recent Transplant:  High Intensity Protocol   - Continue with intensive monitoring of immunosuppression for efficacy and toxicity.   - Changes: Not at this time    # Infection Prophylaxis:   - PJP: Sulfa/TMP (Bactrim)  - CMV: Valganciclovir (Valcyte)  - Thrush: Clotrimazole aramis (Mycelex)     # Blood Pressure: Hypotensive;  Goal BP: < 150/90   - Volume status: Hypovolemic     - Changes: Yes - Coreg to 12.5mg BID     # Anemia in Chronic Renal Disease: Hgb: Stable      MCKENNA: No   - Iron studies: Not checked recently    # Mineral Bone Disorder:    - Secondary renal hyperparathyroidism; PTH level: Not checked recently        On treatment: None  - Vitamin D; level: Not checked recently        On supplement: Yes  - Calcium; level: Low        On supplement: Yes  - Phosphorus; level: Low        On supplement: No    # Electrolytes:   - Potassium; level: Normal        On supplement: No  - Magnesium; level: Normal        On supplement: Yes  - Bicarbonate; level: Low        On supplement: No  - Sodium; level: Normal    # CAD: S/p CABG 2/2023. Continue metoprolol 25mg PO BID.     # PAD     #Non-melanotic skin cancer: S/p Mohs 8/2022    # Medical Compliance: Yes    #  Health Maintenance and Vaccination Review: Reviewed and up to date    # Transplant History:  Etiology of Kidney Failure: Diabetes mellitus type 2  Tx: DDKT (SPK)  Transplant: 9/14/2023 (Kidney / Pancreas)  Donor Type: Donation after Brain Death Donor Class:   Crossmatch at time of Tx: negative  DSA at time of Tx: No  Significant changes in immunosuppression: None  CMV IgG Ab High Risk Discordance (D+/R-): No  EBV IgG Ab High Risk Discordance (D+/R-): No  Significant transplant-related complications: None    Transplant Office Phone Number: [phone]    Assessment and plan was discussed with the patient and he voiced his understanding and agreement.    Return visit: No follow-ups on file.    Adia Mccormack NP    Chief Complaint   Mr. Marion is a 68 year old here for kidney transplant and pancreas transplant.     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  He also has a history of CAD s/p CABG (2/2023), PAD, and BCC w/p Mohs (8/2022). Final crossmatch result pending and most recent PRA 23%. Induction was with rATG     Doing well.  No complaints.  States he does not want an IV site today.  No fevers or chills.    No nausea or vomiting    No urinary complaints    Home BP: Not checked    Problem List   Patient Active Problem List   Diagnosis    End stage renal disease (H)    Type 2 diabetes mellitus with left eye affected by proliferative retinopathy and macular edema, with long-term current use of insulin (H)    Hypertension    Proliferative diabetic retinopathy (H)    Anemia in stage 5 chronic kidney disease, not on chronic dialysis (H)    Secondary hyperparathyroidism of renal origin (H)    Cardiac arrhythmia    CKD (chronic kidney disease) stage 5, GFR less than 15 ml/min (H)    Diabetes mellitus type II, uncontrolled    Glaucoma suspect of right eye     Hearing loss on right    Hyperlipidemia with target LDL less than 100    Hyperphosphatemia    Normal anion gap metabolic acidosis    Orthostatic hypotension    Status post cataract extraction of both eyes with insertion of intraocular lens    Type II diabetes mellitus with neurological manifestations (H)    Uremia    Vitamin D deficiency    Status post coronary angiogram    Pancreas transplant candidate    BCC (basal cell carcinoma), face    Pre-operative cardiovascular examination    CAD (coronary artery disease)    Abnormal cardiovascular stress test    Organ transplant candidate    Coronary atherosclerosis due to severely calcified coronary lesion    Encounter for pre-transplant evaluation for kidney and pancreas transplant    History of simultaneous kidney and pancreas transplant (H)    Immunosuppressed status (H)       Allergies   No Known Allergies    Medications   Current Outpatient Medications   Medication Sig    aspirin (ASA) 325 MG tablet Take 1 tablet (325 mg) by mouth daily    metoprolol tartrate (LOPRESSOR) 25 MG tablet Take 0.5 tablets (12.5 mg) by mouth 2 times daily    Misc. Devices (PILL SPLITTER) MISC 1 applicator daily (Patient not taking: Reported on 9/23/2023)    acetaminophen (TYLENOL) 325 MG tablet Take 2 tablets (650 mg) by mouth every 4 hours as needed for pain, headaches or fever (For optimal non-opioid multimodal pain management to improve pain control.) (Patient not taking: Reported on 9/23/2023)    atorvastatin (LIPITOR) 20 MG tablet Take 1 tablet (20 mg) by mouth At Bedtime    Blood Glucose Monitoring Suppl (ACCU-CHEK GUIDE) w/Device KIT Use to test 4 times a day. Pharmacy to dispense brand based on insurance. (Patient not taking: Reported on 9/23/2023)    calcitRIOL (ROCALTROL) 0.5 MCG capsule Take 1 capsule (0.5 mcg) by mouth daily    calcium carbonate-vitamin D (OSCAL) 500-5 MG-MCG tablet Take 2 tablets by mouth 2 times daily    clotrimazole (MYCELEX) 10 MG lozenge Place 1 lozenge  (10 mg) inside cheek 4 times daily for 90 days    famotidine (PEPCID) 10 MG tablet Take 1 tablet (10 mg) by mouth every 24 hours (Patient not taking: Reported on 9/23/2023)    magnesium oxide (MAG-OX) 400 MG tablet Take 1 tablet (400 mg) by mouth every 24 hours    mycophenolate (GENERIC EQUIVALENT) 250 MG capsule Take 4 capsules (1,000 mg) by mouth 2 times daily    oxyCODONE (ROXICODONE) 5 MG tablet Take 1 tablet (5 mg) by mouth every 4 hours as needed for severe pain or moderate pain (Patient not taking: Reported on 9/23/2023)    pantoprazole (PROTONIX) 40 MG EC tablet Take 1 tablet (40 mg) by mouth daily    phosphorus tablet 250 mg 250 MG per tablet Take 1 tablet (250 mg) by mouth 2 times daily for 15 days    polyethylene glycol (MIRALAX) 17 GM/Dose powder Take 17 g by mouth daily (Patient not taking: Reported on 9/23/2023)    psyllium (METAMUCIL/KONSYL) 58.6 % powder Take 6 g (1 teaspoonful) by mouth daily Can increase up to 3 TIMES DAILY if needed.    senna-docusate (SENOKOT-S/PERICOLACE) 8.6-50 MG tablet Take 1-2 tablets by mouth 2 times daily HOLD for loose stools (Patient not taking: Reported on 9/25/2023)    sulfamethoxazole-trimethoprim (BACTRIM) 400-80 MG tablet 1 tablet by Oral or NG Tube route daily    tacrolimus (GENERIC EQUIVALENT) 0.5 MG capsule Take 1 by mouth twice daily when directed by transplant team for dose titration (Patient not taking: Reported on 9/23/2023)    tacrolimus (GENERIC EQUIVALENT) 1 MG capsule Take 1 capsule (1 mg) by mouth 2 times daily    valGANciclovir (VALCYTE) 450 MG tablet Take 1 tablet (450 mg) by mouth every other day Titrate up to 2 tablets by mouth daily as directed by transplant team (based on kidney function)     No current facility-administered medications for this visit.     Medications Discontinued During This Encounter   Medication Reason    metoprolol tartrate (LOPRESSOR) 25 MG tablet Reorder (No AVS)    aspirin (ASA) 325 MG tablet Reorder (No AVS)       Physical  Exam   Vital Signs: There were no vitals taken for this visit.    GENERAL APPEARANCE: alert and no distress  HENT: mouth without ulcers or lesions  RESP: lungs clear to auscultation - no rales, rhonchi or wheezes  CV: regular rhythm, normal rate, no rub, no murmur  EDEMA: no LE edema bilaterally  ABDOMEN: soft, nondistended, nontender, bowel sounds normal  MS: extremities normal - no gross deformities noted, no evidence of inflammation in joints, no muscle tenderness  SKIN: no rash    Data         Latest Ref Rng & Units 9/25/2023     8:23 AM 9/24/2023     8:35 AM 9/23/2023     7:26 AM   Renal   Sodium 136 - 145 mmol/L 139  140  137    K 3.4 - 5.3 mmol/L 4.4  3.8  3.5    Cl 98 - 107 mmol/L 108  108  104    Cl (external) 98 - 107 mmol/L 108  108  104    CO2 22 - 29 mmol/L 21  22  24    Urea Nitrogen 8.0 - 23.0 mg/dL 16.8  17.0  23.6    Creatinine 0.67 - 1.17 mg/dL 2.25  2.20  2.30    Glucose 70 - 99 mg/dL 102  95  95    Calcium 8.8 - 10.2 mg/dL 8.5  7.8  7.9    Magnesium 1.7 - 2.3 mg/dL 1.7  1.6  1.8          Latest Ref Rng & Units 9/25/2023     8:23 AM 9/24/2023     8:35 AM 9/23/2023     7:26 AM   Bone Health   Phosphorus 2.5 - 4.5 mg/dL 1.8  2.0  1.9    Parathyroid Hormone Intact 15 - 65 pg/mL  7           Latest Ref Rng & Units 9/25/2023     8:23 AM 9/24/2023     8:35 AM 9/23/2023     7:26 AM   Heme   WBC 4.0 - 11.0 10e3/uL 11.9  8.8  9.9    Hgb 13.3 - 17.7 g/dL 9.1  8.9  9.4    Plt 150 - 450 10e3/uL 211  170  198    ABSOLUTE NEUTROPHIL 1.6 - 8.3 10e3/uL   8.9    ABSOLUTE LYMPHOCYTES 0.8 - 5.3 10e3/uL   0.1    ABSOLUTE MONOCYTES 0.0 - 1.3 10e3/uL   0.6    ABSOLUTE EOSINOPHILS 0.0 - 0.7 10e3/uL   0.1          Latest Ref Rng & Units 9/14/2023     3:39 PM 2/14/2023     1:11 AM 2/13/2023    11:56 PM   Liver   AP 40 - 129 U/L 60  35  36    TBili <=1.2 mg/dL 0.9  0.3  0.3    ALT 0 - 70 U/L 6  10  11    AST 0 - 45 U/L 22  46  51    Tot Protein 6.4 - 8.3 g/dL 7.3  4.5  4.8    Albumin 3.5 - 5.2 g/dL 4.3  2.9  3.0           Latest Ref Rng & Units 9/25/2023     8:23 AM 9/24/2023     8:35 AM 9/23/2023     7:26 AM   Pancreas   Amylase 28 - 100 U/L 56  58  68    Lipase (Roche) 13 - 60 U/L 50  53  65          Latest Ref Rng & Units 9/24/2023     8:35 AM 2/16/2023     8:34 AM   Iron studies   Iron 61 - 157 ug/dL 32  24    Iron Sat Index 15 - 46 % 21  20    Ferritin 31 - 409 ng/mL 1,164  1,461          Latest Ref Rng & Units 9/14/2023     3:39 PM 8/9/2022     6:26 AM 8/5/2021     2:23 AM   [facility]   EBV CAPSID ANTIBODY IGG No detectable antibody. Positive  Positive  Positive        Recent Labs   Lab Test 09/17/23  0643 09/18/23  0555 09/20/23  0535 09/22/23  0739 09/23/23  0726   [facility]  --   --   --  9/21/2023  --    TACROL 4.7*   < > 14.6 14.7 19.8*    < > = values in this interval not displayed.

## 2023-09-25 NOTE — TELEPHONE ENCOUNTER
Attempted to call times 2 LVM       Regarding tacrolimus  level       Jeffrey Chaparro MD Colianni, Lauren, RN  If this is a real tac trough please hold tac, obtain level tmrw morning, would restart when tac level <12 at 0.5mg bid

## 2023-09-25 NOTE — PROGRESS NOTES
Medication Therapy Management (MTM) Encounter    ASSESSMENT:                            Medication Adherence/Access: No issues identified    Kidney and Pancreas Transplant:    Stable.     Supplements:   Has not picked up Phosphorus, but it has been on back order. Recommended he call pharmacy to refill. Push high phos foods if not available.     Hypertension:   BP at goal <150/90 for 0-2 months post txp.     Hyperlipidemia   Stable.     Diarrhea:   Start Fiber supplement to improve loose stools.     Pain:  Recommend max dose of Acetaminophen per dose 1000mg. Discussed with patient.     PLAN:                            Start Metamucil 1 teaspoonful daily. Can increase up to 3 TIMES DAILY if needed.   Call [facility] to refill Phosphorus supplement. If they do not have this in stock really push diet DrLuis Gale, dairy, nuts, beans, eggs, and meat.   Recommend max dose of Acetaminophen at 1000mg (2 tabs), can take another dose 4 hours later if needed.     Follow-up: 2 months post transplant.    SUBJECTIVE/OBJECTIVE:                          Avelina Marion is a 68 year old male called for a transitions of care visit. He was discharged from [facility] on 9/21 for pancreas and kidney transplant.      Reason for visit: Initial post discharge med review.    Allergies/ADRs: Reviewed in chart  Past Medical History: Reviewed in chart  Tobacco: He reports that he has quit smoking. His smoking use included cigarettes. He has never used smokeless tobacco.  Alcohol: not currently using  THC/CBD: not recently.     Medication Adherence/Access: Patient uses pill box(es) and uses reminders/alarms.  Patient takes medications 4 time(s) per day. 8am and 8pm  Per patient, misses medication 1 times per week. Started using alarms.   The patient fills medications at [facility]: NO, fills medications at [facility].    Kidney and Pancreas Transplant:    Tacrolimus 1 mg twice daily  Mycophenolate Mofetil 1000 mg twice daily.   Pt reports loose stools, does  have a little numbness in left hand that predates transplant.   Transplant date: 9/14/23  Vascular Prophylaxis: Aspirin 325mg daily. Denies gastrointestinal bleed sx.   CMV prophylaxis: CrCl 25 to 39 mL/minute: Valcyte 450 mg every other day Treat 3 months post tx.  PJP prophylaxis: Bactrim SS daily  Antifungal Prophylaxis: Clotrimazole 4 TIMES DAILY for 3 months.   PPI use: Pantoprazole 40mg daily. Denies GERD sx.   Tx Coordinator: Makayla Wu Tx MD: Dr. Chaparro, Using Med Card: Yes  Immunizations: annual flu shot unknown; Pneumovax 23:  2017, 6/24/2022; Prevnar 20: none on file; TDaP:  2010; Shingrix: x2, HBV: indeterminate, COVID: Primary x 4    Estimated Creatinine Clearance: 37.9 mL/min (A) (based on SCr of 2.25 mg/dL (H)).    Supplements:   Mag Oxide 400mg daily ( 2 hours from MMF)  Phosphorus 250mg twice daily-> does not have this medication.   Calcium/D 1000mg (2 tabs) twice daily  Calcitriol 0.5mcg daily  Lab Results   Component Value Date    MAG 1.7 09/25/2023    MAG 1.6 (L) 09/24/2023    PHOS 1.8 (L) 09/25/2023    PHOS 2.0 (L) 09/24/2023    DAVID 8.5 (L) 09/25/2023    DAVID 7.8 (L) 09/24/2023     Hypertension:   Metoprolol tartrate 12.5mg twice daily.  Patient reports no current medication side effects.  Patient self-monitors blood pressure.  Home BP monitoring 126/85..    BP Readings from Last 3 Encounters:   09/24/23 128/71   09/23/23 123/60   09/21/23 (!) 143/72     Pulse Readings from Last 3 Encounters:   09/24/23 90   09/21/23 73   06/27/23 59     Hyperlipidemia   Atorvastatin 20mg daily  Patient reports no significant myalgias or other side effects.  Hx of CVD.   Recent Labs   Lab Test 12/19/22  1744   CHOL 116   HDL 51   LDL 43   TRIG 109     Diarrhea:   Stopped Senna due to loose stools. Not taking Miralax.   Has had diarrhea for 2 days. 4x in the past couple days. Watery/ loose.     Pain:  Acetaminophen 1500mg prn usually at bedtime,  Pain is well controlled per patient.     Today's  Vitals: There were no vitals taken for this visit.  ----------------  Post Discharge Medication Reconciliation Status: discharge medications reconciled and changed, per note/orders.    I spent 35 minutes with this patient today. All changes were made via collaborative practice agreement with Dr. Chaparro. A copy of the visit note was provided to the patient's provider(s).    A summary of these recommendations was sent via [employer].    Kg Grewal, PharmD  [facility] Pharmacist    Phone: [phone]     Telemedicine Visit Details  Type of service:  Telephone visit  Start Time: 9:29 AM  End Time: 10:05 AM     Medication Therapy Recommendations  Loose stools    Rationale: Untreated condition - Needs additional medication therapy - Indication   Recommendation: Start Medication - Metamucil 48.57 % Powd   Status: Accepted per CPA         Pain    Current Medication: acetaminophen (TYLENOL) 500 MG tablet   Rationale: Dose too high - Dosage too high - Safety   Recommendation: Decrease Dose   Status: Patient Agreed - Adherence/Education         Takes dietary supplements    Current Medication: phosphorus tablet 250 mg 250 MG per tablet   Rationale: Does not understand instructions - Adherence - Adherence   Recommendation: Start Medication   Status: Patient Agreed - Adherence/Education

## 2023-09-25 NOTE — LETTER
_  Medication List        Prepared on: 09/25/2023     Bring your Medication List when you go to the doctor, hospital, or   emergency room. And, share it with your family or caregivers.     Note any changes to how you take your medications.  Cross out medications when you no longer use them.    Medication How I take it Why I use it Prescriber   acetaminophen (TYLENOL) 500 MG tablet Take 500-1,000 mg by mouth every 6 hours as needed for mild pain  Pain Patient Reported   aspirin (ASA) 325 MG tablet Take 1 tablet (325 mg) by mouth daily Kidney Replaced by Transplant; Pancreas Replaced by Transplant (H) Adia Mccormack NP   atorvastatin (LIPITOR) 20 MG tablet Take 1 tablet (20 mg) by mouth At Bedtime S/P CABG (Coronary Artery Bypass Graft) Tequila Rodríguez PA-C   Blood Glucose Monitoring Suppl (ACCU-CHEK GUIDE) w/Device KIT Use to test 4 times a day. Pharmacy to dispense brand based on insurance.  Blood sugars Patient Reported   calcitRIOL (ROCALTROL) 0.5 MCG capsule Take 1 capsule (0.5 mcg) by mouth daily Low calcium  Tequila Rodríguez PA-C   calcium carbonate-vitamin D (OSCAL) 500-5 MG-MCG tablet Take 2 tablets by mouth 2 times daily Low calcium Tequila Rodríguez PA-C   clotrimazole (MYCELEX) 10 MG lozenge Place 1 lozenge (10 mg) inside cheek 4 times daily for 90 days History of simultaneous kidney and pancreas transplant (H) Tequila Rodríguez PA-C   magnesium oxide (MAG-OX) 400 MG tablet Take 1 tablet (400 mg) by mouth every 24 hours Low magnesium Tequila Rodríguez PA-C   metoprolol tartrate (LOPRESSOR) 25 MG tablet Take 0.5 tablets (12.5 mg) by mouth 2 times daily S/P CABG (Coronary Artery Bypass Graft) Adia Mccormack NP   Misc. Devices (PILL SPLITTER) MISC 1 applicator daily Kidney Replaced by Transplant; Pancreas Replaced by Transplant (H) Adia Mccormack NP   mycophenolate (GENERIC EQUIVALENT) 250 MG capsule Take 4 capsules (1,000 mg) by mouth 2 times daily History of  simultaneous kidney and pancreas transplant (H) Tequila Rodríguez PA-C   pantoprazole (PROTONIX) 40 MG EC tablet Take 1 tablet (40 mg) by mouth daily History of simultaneous kidney and pancreas transplant (H) Tequila Rodríguez PA-C   phosphorus tablet 250 mg 250 MG per tablet Take 1 tablet (250 mg) by mouth 2 times daily for 15 days Low phosphorus  Adia Mccormack, FORREST   polyethylene glycol (MIRALAX) 17 GM/Dose powder Take 17 g by mouth daily Constipation Tequila Rodríguez PA-C   psyllium (METAMUCIL/KONSYL) 58.6 % powder Take 6 g (1 teaspoonful) by mouth daily Can increase up to 3 TIMES DAILY if needed. History of simultaneous kidney and pancreas transplant (H); Loose Stools Jeffrey Chaparro MD   senna-docusate (SENOKOT-S/PERICOLACE) 8.6-50 MG tablet Take 1-2 tablets by mouth 2 times daily HOLD for loose stools constipation Tequila Rodríguez PA-C   sulfamethoxazole-trimethoprim (BACTRIM) 400-80 MG tablet 1 tablet by Oral or NG Tube route daily History of simultaneous kidney and pancreas transplant (H) Tequila Rodríguez PA-C   tacrolimus (GENERIC EQUIVALENT) 0.5 MG capsule Take 1 by mouth twice daily when directed by transplant team for dose titration History of simultaneous kidney and pancreas transplant (H) Tequila Rodríguez PA-C   tacrolimus (GENERIC EQUIVALENT) 1 MG capsule Take 1 capsule (1 mg) by mouth 2 times daily History of simultaneous kidney and pancreas transplant (H) Jeffrey Chaparro MD   valGANciclovir (VALCYTE) 450 MG tablet Take 1 tablet (450 mg) by mouth every other day Titrate up to 2 tablets by mouth daily as directed by transplant team (based on kidney function) History of simultaneous kidney and pancreas transplant (H) Tequila Rodríguez PA-C         Add new medications, over-the-counter drugs, herbals, vitamins, or  minerals in the blank rows below.    Medication How I take it Why I use it Prescriber                                      Allergies:      No Known  Allergies        Side effects I have had:               Other Information:              My notes and questions:

## 2023-09-25 NOTE — TELEPHONE ENCOUNTER
Jeffrey Chaparro MD Colianni, Lauren, RN  Creatinine parked at 2.2. I know he has been requiring IV fluids in [facility] for hypotension. Can we get a U/A and ultrasound of kidney please?    [phone]    [facility] called and left  for Avelina with plan for US and UA. Scheduling number for US. MyC also sent with above info.

## 2023-09-26 ENCOUNTER — TELEPHONE (OUTPATIENT)
Dept: TRANSPLANT | Facility: CLINIC | Age: 68
End: 2023-09-26

## 2023-09-26 ENCOUNTER — LAB (OUTPATIENT)
Dept: LAB | Facility: CLINIC | Age: 68
End: 2023-09-26
Payer: COMMERCIAL

## 2023-09-26 ENCOUNTER — ANCILLARY PROCEDURE (OUTPATIENT)
Dept: ULTRASOUND IMAGING | Facility: CLINIC | Age: 68
End: 2023-09-26
Attending: INTERNAL MEDICINE
Payer: COMMERCIAL

## 2023-09-26 DIAGNOSIS — Z48.298 AFTERCARE FOLLOWING ORGAN TRANSPLANT: ICD-10-CM

## 2023-09-26 DIAGNOSIS — Z94.0 HISTORY OF SIMULTANEOUS KIDNEY AND PANCREAS TRANSPLANT (H): ICD-10-CM

## 2023-09-26 DIAGNOSIS — Z94.83 HISTORY OF SIMULTANEOUS KIDNEY AND PANCREAS TRANSPLANT (H): ICD-10-CM

## 2023-09-26 LAB
ALBUMIN UR-MCNC: 20 MG/DL
APPEARANCE UR: CLEAR
BILIRUB UR QL STRIP: NEGATIVE
COLOR UR AUTO: ABNORMAL
GLUCOSE UR STRIP-MCNC: NEGATIVE MG/DL
HGB UR QL STRIP: ABNORMAL
KETONES UR STRIP-MCNC: NEGATIVE MG/DL
LEUKOCYTE ESTERASE UR QL STRIP: ABNORMAL
MUCOUS THREADS #/AREA URNS LPF: PRESENT /LPF
NITRATE UR QL: NEGATIVE
PH UR STRIP: 6.5 [PH] (ref 5–7)
RBC URINE: 5 /HPF
SP GR UR STRIP: 1.02 (ref 1–1.03)
SQUAMOUS EPITHELIAL: 1 /HPF
UROBILINOGEN UR STRIP-MCNC: NORMAL MG/DL
WBC URINE: 8 /HPF

## 2023-09-26 PROCEDURE — 81001 URINALYSIS AUTO W/SCOPE: CPT | Performed by: PATHOLOGY

## 2023-09-26 PROCEDURE — 76776 US EXAM K TRANSPL W/DOPPLER: CPT | Performed by: RADIOLOGY

## 2023-09-26 NOTE — TELEPHONE ENCOUNTER
Avelina returned phone call today  -- he had his phone on silence  Reviewed with Avelina tacrolimus  level 26   - confirmed that he         OUTCOME:   Spoke with patient, they confirm accurate trough level and current dose 1.0 mg BID. Patient confirmed dose change to 0.5 mg BID and to repeat labs in in am    Orders sent to preferred pharmacy for dose change and lab for repeat labs. Patient voiced understanding of plan.   Plan to have daily transplant  labs  to adjust tacrolimus  level - Lab appointment  on 9/27/2023 and 9/28/2023

## 2023-09-26 NOTE — TELEPHONE ENCOUNTER
Jasmyn Marion  Sister  [phone]       Called Jsamyn regarding unable to contact her brother today to lower his tacrolimus

## 2023-09-27 ENCOUNTER — TELEPHONE (OUTPATIENT)
Dept: TRANSPLANT | Facility: CLINIC | Age: 68
End: 2023-09-27

## 2023-09-27 ENCOUNTER — LAB (OUTPATIENT)
Dept: LAB | Facility: CLINIC | Age: 68
End: 2023-09-27
Payer: COMMERCIAL

## 2023-09-27 DIAGNOSIS — Z98.890 OTHER SPECIFIED POSTPROCEDURAL STATES: ICD-10-CM

## 2023-09-27 DIAGNOSIS — Z48.298 AFTERCARE FOLLOWING ORGAN TRANSPLANT: ICD-10-CM

## 2023-09-27 DIAGNOSIS — Z94.0 KIDNEY REPLACED BY TRANSPLANT: ICD-10-CM

## 2023-09-27 DIAGNOSIS — Z48.298 AFTERCARE FOLLOWING ORGAN TRANSPLANT: Primary | ICD-10-CM

## 2023-09-27 DIAGNOSIS — Z20.828 CONTACT WITH AND (SUSPECTED) EXPOSURE TO OTHER VIRAL COMMUNICABLE DISEASES: ICD-10-CM

## 2023-09-27 DIAGNOSIS — Z79.899 ENCOUNTER FOR LONG-TERM CURRENT USE OF MEDICATION: ICD-10-CM

## 2023-09-27 LAB
AMYLASE SERPL-CCNC: 52 U/L (ref 28–100)
ANION GAP SERPL CALCULATED.3IONS-SCNC: 11 MMOL/L (ref 7–15)
BUN SERPL-MCNC: 14.9 MG/DL (ref 8–23)
CALCIUM SERPL-MCNC: 8.5 MG/DL (ref 8.8–10.2)
CHLORIDE SERPL-SCNC: 107 MMOL/L (ref 98–107)
CREAT SERPL-MCNC: 2.27 MG/DL (ref 0.67–1.17)
DEPRECATED HCO3 PLAS-SCNC: 20 MMOL/L (ref 22–29)
EGFRCR SERPLBLD CKD-EPI 2021: 31 ML/MIN/1.73M2
ERYTHROCYTE [DISTWIDTH] IN BLOOD BY AUTOMATED COUNT: 14.1 % (ref 10–15)
GLUCOSE SERPL-MCNC: 96 MG/DL (ref 70–99)
HCT VFR BLD AUTO: 27.6 % (ref 40–53)
HGB BLD-MCNC: 9.1 G/DL (ref 13.3–17.7)
LIPASE SERPL-CCNC: 56 U/L (ref 13–60)
MCH RBC QN AUTO: 33.7 PG (ref 26.5–33)
MCHC RBC AUTO-ENTMCNC: 33 G/DL (ref 31.5–36.5)
MCV RBC AUTO: 102 FL (ref 78–100)
PLATELET # BLD AUTO: 205 10E3/UL (ref 150–450)
POTASSIUM SERPL-SCNC: 4.4 MMOL/L (ref 3.4–5.3)
RBC # BLD AUTO: 2.7 10E6/UL (ref 4.4–5.9)
SODIUM SERPL-SCNC: 138 MMOL/L (ref 135–145)
TACROLIMUS BLD-MCNC: 19.3 UG/L (ref 5–15)
TME LAST DOSE: ABNORMAL H
TME LAST DOSE: ABNORMAL H
WBC # BLD AUTO: 9.8 10E3/UL (ref 4–11)

## 2023-09-27 PROCEDURE — 83690 ASSAY OF LIPASE: CPT | Performed by: PATHOLOGY

## 2023-09-27 PROCEDURE — 99000 SPECIMEN HANDLING OFFICE-LAB: CPT | Performed by: PATHOLOGY

## 2023-09-27 PROCEDURE — 80048 BASIC METABOLIC PNL TOTAL CA: CPT | Performed by: PATHOLOGY

## 2023-09-27 PROCEDURE — 80197 ASSAY OF TACROLIMUS: CPT | Performed by: INTERNAL MEDICINE

## 2023-09-27 PROCEDURE — 36415 COLL VENOUS BLD VENIPUNCTURE: CPT | Performed by: PATHOLOGY

## 2023-09-27 PROCEDURE — 85027 COMPLETE CBC AUTOMATED: CPT | Performed by: PATHOLOGY

## 2023-09-27 PROCEDURE — 82150 ASSAY OF AMYLASE: CPT | Performed by: PATHOLOGY

## 2023-09-27 NOTE — TELEPHONE ENCOUNTER
Patient Call: General  Route to LPN    Reason for call: Patient would like to go over recent lab results. They also mentioned that the results from recent lab will determine future labs.     Call back needed? Yes    Return Call Needed  Same as documented in contacts section  When to return call?: Same day: Route High Priority

## 2023-09-28 ENCOUNTER — LAB (OUTPATIENT)
Dept: LAB | Facility: CLINIC | Age: 68
End: 2023-09-28
Payer: COMMERCIAL

## 2023-09-28 DIAGNOSIS — Z98.890 OTHER SPECIFIED POSTPROCEDURAL STATES: ICD-10-CM

## 2023-09-28 DIAGNOSIS — Z94.0 KIDNEY REPLACED BY TRANSPLANT: ICD-10-CM

## 2023-09-28 DIAGNOSIS — Z48.298 AFTERCARE FOLLOWING ORGAN TRANSPLANT: ICD-10-CM

## 2023-09-28 DIAGNOSIS — Z79.899 ENCOUNTER FOR LONG-TERM CURRENT USE OF MEDICATION: ICD-10-CM

## 2023-09-28 DIAGNOSIS — Z20.828 CONTACT WITH AND (SUSPECTED) EXPOSURE TO OTHER VIRAL COMMUNICABLE DISEASES: ICD-10-CM

## 2023-09-28 LAB
AMYLASE SERPL-CCNC: 52 U/L (ref 28–100)
ANION GAP SERPL CALCULATED.3IONS-SCNC: 10 MMOL/L (ref 7–15)
BUN SERPL-MCNC: 13.3 MG/DL (ref 8–23)
CALCIUM SERPL-MCNC: 8.3 MG/DL (ref 8.8–10.2)
CHLORIDE SERPL-SCNC: 107 MMOL/L (ref 98–107)
CREAT SERPL-MCNC: 2.41 MG/DL (ref 0.67–1.17)
EGFRCR SERPLBLD CKD-EPI 2021: 29 ML/MIN/1.73M2
ERYTHROCYTE [DISTWIDTH] IN BLOOD BY AUTOMATED COUNT: 14.1 % (ref 10–15)
GLUCOSE SERPL-MCNC: 103 MG/DL (ref 70–99)
HCO3 SERPL-SCNC: 20 MMOL/L (ref 22–29)
HCT VFR BLD AUTO: 27 % (ref 40–53)
HGB BLD-MCNC: 8.7 G/DL (ref 13.3–17.7)
LIPASE SERPL-CCNC: 55 U/L (ref 13–60)
MCH RBC QN AUTO: 32.6 PG (ref 26.5–33)
MCHC RBC AUTO-ENTMCNC: 32.2 G/DL (ref 31.5–36.5)
MCV RBC AUTO: 101 FL (ref 78–100)
PLATELET # BLD AUTO: 201 10E3/UL (ref 150–450)
POTASSIUM SERPL-SCNC: 5.1 MMOL/L (ref 3.4–5.3)
RBC # BLD AUTO: 2.67 10E6/UL (ref 4.4–5.9)
SODIUM SERPL-SCNC: 137 MMOL/L (ref 135–145)
TACROLIMUS BLD-MCNC: 20 UG/L (ref 5–15)
TME LAST DOSE: ABNORMAL H
TME LAST DOSE: ABNORMAL H
WBC # BLD AUTO: 11 10E3/UL (ref 4–11)

## 2023-09-28 PROCEDURE — 80048 BASIC METABOLIC PNL TOTAL CA: CPT | Performed by: PATHOLOGY

## 2023-09-28 PROCEDURE — 99000 SPECIMEN HANDLING OFFICE-LAB: CPT | Performed by: PATHOLOGY

## 2023-09-28 PROCEDURE — 80197 ASSAY OF TACROLIMUS: CPT | Performed by: INTERNAL MEDICINE

## 2023-09-28 PROCEDURE — 36415 COLL VENOUS BLD VENIPUNCTURE: CPT | Performed by: PATHOLOGY

## 2023-09-28 PROCEDURE — 83690 ASSAY OF LIPASE: CPT | Performed by: PATHOLOGY

## 2023-09-28 PROCEDURE — 82150 ASSAY OF AMYLASE: CPT | Performed by: PATHOLOGY

## 2023-09-28 PROCEDURE — 85027 COMPLETE CBC AUTOMATED: CPT | Performed by: PATHOLOGY

## 2023-09-28 RX ORDER — NYSTATIN 100000/ML
500000 SUSPENSION, ORAL (FINAL DOSE FORM) ORAL 4 TIMES DAILY
Qty: 473 ML | Refills: 1 | Status: SHIPPED | OUTPATIENT
Start: 2023-09-28 | End: 2023-10-17

## 2023-09-28 NOTE — TELEPHONE ENCOUNTER
Jeffrey Chaparro MD Colianni, Lauren, RN  Decreasing tac but still high. Please have him hold 1 dose and resume current dose without clotrimazole aramis    Patient will stom aramis's. Patient will hold tonight's dose and get labs tomorrow AM. Nystatin sent to pharmacy and lab appointment made   45

## 2023-09-29 ENCOUNTER — LAB (OUTPATIENT)
Dept: LAB | Facility: CLINIC | Age: 68
End: 2023-09-29
Payer: COMMERCIAL

## 2023-09-29 DIAGNOSIS — Z79.899 ENCOUNTER FOR LONG-TERM CURRENT USE OF MEDICATION: ICD-10-CM

## 2023-09-29 DIAGNOSIS — Z20.828 CONTACT WITH AND (SUSPECTED) EXPOSURE TO OTHER VIRAL COMMUNICABLE DISEASES: ICD-10-CM

## 2023-09-29 DIAGNOSIS — Z48.298 AFTERCARE FOLLOWING ORGAN TRANSPLANT: ICD-10-CM

## 2023-09-29 DIAGNOSIS — Z98.890 OTHER SPECIFIED POSTPROCEDURAL STATES: ICD-10-CM

## 2023-09-29 DIAGNOSIS — Z94.0 KIDNEY REPLACED BY TRANSPLANT: ICD-10-CM

## 2023-09-29 LAB
ANION GAP SERPL CALCULATED.3IONS-SCNC: 11 MMOL/L (ref 7–15)
BUN SERPL-MCNC: 15.4 MG/DL (ref 8–23)
CALCIUM SERPL-MCNC: 8.7 MG/DL (ref 8.8–10.2)
CHLORIDE SERPL-SCNC: 107 MMOL/L (ref 98–107)
CREAT SERPL-MCNC: 2.52 MG/DL (ref 0.67–1.17)
DEPRECATED HCO3 PLAS-SCNC: 19 MMOL/L (ref 22–29)
EGFRCR SERPLBLD CKD-EPI 2021: 27 ML/MIN/1.73M2
ERYTHROCYTE [DISTWIDTH] IN BLOOD BY AUTOMATED COUNT: 14 % (ref 10–15)
FERRITIN SERPL-MCNC: 1256 NG/ML (ref 31–409)
GLUCOSE SERPL-MCNC: 99 MG/DL (ref 70–99)
HCT VFR BLD AUTO: 27.4 % (ref 40–53)
HGB BLD-MCNC: 8.9 G/DL (ref 13.3–17.7)
IRON BINDING CAPACITY (ROCHE): 168 UG/DL (ref 240–430)
IRON SATN MFR SERPL: 16 % (ref 15–46)
IRON SERPL-MCNC: 27 UG/DL (ref 61–157)
MAGNESIUM SERPL-MCNC: 1.5 MG/DL (ref 1.7–2.3)
MCH RBC QN AUTO: 33.1 PG (ref 26.5–33)
MCHC RBC AUTO-ENTMCNC: 32.5 G/DL (ref 31.5–36.5)
MCV RBC AUTO: 102 FL (ref 78–100)
PHOSPHATE SERPL-MCNC: 2.4 MG/DL (ref 2.5–4.5)
PLATELET # BLD AUTO: 209 10E3/UL (ref 150–450)
POTASSIUM SERPL-SCNC: 5.2 MMOL/L (ref 3.4–5.3)
PTH-INTACT SERPL-MCNC: 103 PG/ML (ref 15–65)
RBC # BLD AUTO: 2.69 10E6/UL (ref 4.4–5.9)
SODIUM SERPL-SCNC: 137 MMOL/L (ref 135–145)
TACROLIMUS BLD-MCNC: 14.8 UG/L (ref 5–15)
TME LAST DOSE: NORMAL H
TME LAST DOSE: NORMAL H
VIT D+METAB SERPL-MCNC: 20 NG/ML (ref 20–50)
WBC # BLD AUTO: 9.1 10E3/UL (ref 4–11)

## 2023-09-29 PROCEDURE — 85027 COMPLETE CBC AUTOMATED: CPT | Performed by: PATHOLOGY

## 2023-09-29 PROCEDURE — 83970 ASSAY OF PARATHORMONE: CPT | Performed by: PATHOLOGY

## 2023-09-29 PROCEDURE — 82306 VITAMIN D 25 HYDROXY: CPT | Performed by: INTERNAL MEDICINE

## 2023-09-29 PROCEDURE — 83540 ASSAY OF IRON: CPT | Performed by: PATHOLOGY

## 2023-09-29 PROCEDURE — 83550 IRON BINDING TEST: CPT | Performed by: PATHOLOGY

## 2023-09-29 PROCEDURE — 83735 ASSAY OF MAGNESIUM: CPT | Performed by: PATHOLOGY

## 2023-09-29 PROCEDURE — 82728 ASSAY OF FERRITIN: CPT | Performed by: PATHOLOGY

## 2023-09-29 PROCEDURE — 80048 BASIC METABOLIC PNL TOTAL CA: CPT | Performed by: PATHOLOGY

## 2023-09-29 PROCEDURE — 36415 COLL VENOUS BLD VENIPUNCTURE: CPT | Performed by: PATHOLOGY

## 2023-09-29 PROCEDURE — 80197 ASSAY OF TACROLIMUS: CPT | Performed by: INTERNAL MEDICINE

## 2023-09-29 PROCEDURE — 99000 SPECIMEN HANDLING OFFICE-LAB: CPT | Performed by: PATHOLOGY

## 2023-09-29 PROCEDURE — 84100 ASSAY OF PHOSPHORUS: CPT | Performed by: PATHOLOGY

## 2023-10-02 ENCOUNTER — LAB (OUTPATIENT)
Dept: LAB | Facility: CLINIC | Age: 68
End: 2023-10-02
Payer: COMMERCIAL

## 2023-10-02 ENCOUNTER — TELEPHONE (OUTPATIENT)
Dept: LAB | Facility: CLINIC | Age: 68
End: 2023-10-02

## 2023-10-02 DIAGNOSIS — Z94.0 HISTORY OF SIMULTANEOUS KIDNEY AND PANCREAS TRANSPLANT (H): ICD-10-CM

## 2023-10-02 DIAGNOSIS — Z48.298 AFTERCARE FOLLOWING ORGAN TRANSPLANT: ICD-10-CM

## 2023-10-02 DIAGNOSIS — Z98.890 OTHER SPECIFIED POSTPROCEDURAL STATES: ICD-10-CM

## 2023-10-02 DIAGNOSIS — Z48.298 AFTERCARE FOLLOWING ORGAN TRANSPLANT: Primary | ICD-10-CM

## 2023-10-02 DIAGNOSIS — Z94.83 HISTORY OF SIMULTANEOUS KIDNEY AND PANCREAS TRANSPLANT (H): ICD-10-CM

## 2023-10-02 DIAGNOSIS — Z94.0 KIDNEY REPLACED BY TRANSPLANT: ICD-10-CM

## 2023-10-02 DIAGNOSIS — Z20.828 CONTACT WITH AND (SUSPECTED) EXPOSURE TO OTHER VIRAL COMMUNICABLE DISEASES: ICD-10-CM

## 2023-10-02 DIAGNOSIS — Z79.899 ENCOUNTER FOR LONG-TERM CURRENT USE OF MEDICATION: ICD-10-CM

## 2023-10-02 LAB
AMYLASE SERPL-CCNC: 84 U/L (ref 28–100)
ANION GAP SERPL CALCULATED.3IONS-SCNC: 11 MMOL/L (ref 7–15)
BUN SERPL-MCNC: 16.2 MG/DL (ref 8–23)
CALCIUM SERPL-MCNC: 9.3 MG/DL (ref 8.8–10.2)
CHLORIDE SERPL-SCNC: 106 MMOL/L (ref 98–107)
CREAT SERPL-MCNC: 2.31 MG/DL (ref 0.67–1.17)
DEPRECATED HCO3 PLAS-SCNC: 19 MMOL/L (ref 22–29)
EGFRCR SERPLBLD CKD-EPI 2021: 30 ML/MIN/1.73M2
ERYTHROCYTE [DISTWIDTH] IN BLOOD BY AUTOMATED COUNT: 13.9 % (ref 10–15)
GLUCOSE SERPL-MCNC: 99 MG/DL (ref 70–99)
HCT VFR BLD AUTO: 28.4 % (ref 40–53)
HGB BLD-MCNC: 9 G/DL (ref 13.3–17.7)
LIPASE SERPL-CCNC: 92 U/L (ref 13–60)
MAGNESIUM SERPL-MCNC: 1.5 MG/DL (ref 1.7–2.3)
MCH RBC QN AUTO: 32.3 PG (ref 26.5–33)
MCHC RBC AUTO-ENTMCNC: 31.7 G/DL (ref 31.5–36.5)
MCV RBC AUTO: 102 FL (ref 78–100)
PHOSPHATE SERPL-MCNC: 2.8 MG/DL (ref 2.5–4.5)
PLATELET # BLD AUTO: 201 10E3/UL (ref 150–450)
POTASSIUM SERPL-SCNC: 5.4 MMOL/L (ref 3.4–5.3)
RBC # BLD AUTO: 2.79 10E6/UL (ref 4.4–5.9)
SODIUM SERPL-SCNC: 136 MMOL/L (ref 135–145)
TACROLIMUS BLD-MCNC: 7.2 UG/L (ref 5–15)
TME LAST DOSE: NORMAL H
TME LAST DOSE: NORMAL H
WBC # BLD AUTO: 7.7 10E3/UL (ref 4–11)

## 2023-10-02 PROCEDURE — 82150 ASSAY OF AMYLASE: CPT | Performed by: PATHOLOGY

## 2023-10-02 PROCEDURE — 80048 BASIC METABOLIC PNL TOTAL CA: CPT | Performed by: PATHOLOGY

## 2023-10-02 PROCEDURE — 80197 ASSAY OF TACROLIMUS: CPT | Performed by: INTERNAL MEDICINE

## 2023-10-02 PROCEDURE — 83735 ASSAY OF MAGNESIUM: CPT | Performed by: PATHOLOGY

## 2023-10-02 PROCEDURE — 99000 SPECIMEN HANDLING OFFICE-LAB: CPT | Performed by: PATHOLOGY

## 2023-10-02 PROCEDURE — 84100 ASSAY OF PHOSPHORUS: CPT | Performed by: PATHOLOGY

## 2023-10-02 PROCEDURE — 36415 COLL VENOUS BLD VENIPUNCTURE: CPT | Performed by: PATHOLOGY

## 2023-10-02 PROCEDURE — 85027 COMPLETE CBC AUTOMATED: CPT | Performed by: PATHOLOGY

## 2023-10-02 PROCEDURE — 83690 ASSAY OF LIPASE: CPT | Performed by: PATHOLOGY

## 2023-10-02 NOTE — TELEPHONE ENCOUNTER
----- Message from Jeffrey Chaparro MD sent at 10/2/2023  8:59 AM CDT -----  Decrease bactrim to MWF, start bicarb 650mg bid.

## 2023-10-03 RX ORDER — TACROLIMUS 1 MG/1
1 CAPSULE ORAL 2 TIMES DAILY
Qty: 60 CAPSULE | Refills: 11 | Status: SHIPPED | OUTPATIENT
Start: 2023-10-03 | End: 2023-10-05

## 2023-10-03 RX ORDER — TACROLIMUS 0.5 MG/1
0.5 CAPSULE ORAL 2 TIMES DAILY
Qty: 60 CAPSULE | Refills: 11 | Status: SHIPPED | OUTPATIENT
Start: 2023-10-03 | End: 2023-10-05

## 2023-10-03 RX ORDER — SODIUM BICARBONATE 650 MG/1
650 TABLET ORAL 2 TIMES DAILY
Qty: 90 TABLET | Refills: 3 | Status: SHIPPED | OUTPATIENT
Start: 2023-10-03 | End: 2023-10-05

## 2023-10-04 ENCOUNTER — TELEPHONE (OUTPATIENT)
Dept: TRANSPLANT | Facility: CLINIC | Age: 68
End: 2023-10-04
Payer: COMMERCIAL

## 2023-10-04 DIAGNOSIS — Z94.0 HISTORY OF SIMULTANEOUS KIDNEY AND PANCREAS TRANSPLANT (H): ICD-10-CM

## 2023-10-04 DIAGNOSIS — E86.0 DEHYDRATION: ICD-10-CM

## 2023-10-04 DIAGNOSIS — Z94.83 HISTORY OF SIMULTANEOUS KIDNEY AND PANCREAS TRANSPLANT (H): ICD-10-CM

## 2023-10-04 DIAGNOSIS — Z48.298 AFTERCARE FOLLOWING ORGAN TRANSPLANT: ICD-10-CM

## 2023-10-04 DIAGNOSIS — R74.8 ELEVATED LIPASE: Primary | ICD-10-CM

## 2023-10-04 DIAGNOSIS — R79.89 ELEVATED SERUM CREATININE: ICD-10-CM

## 2023-10-04 RX ORDER — SULFAMETHOXAZOLE AND TRIMETHOPRIM 400; 80 MG/1; MG/1
1 TABLET ORAL
Qty: 30 TABLET | Refills: 4 | Status: SHIPPED | OUTPATIENT
Start: 2023-10-04 | End: 2023-10-23

## 2023-10-04 NOTE — TELEPHONE ENCOUNTER
Post Kidney and Pancreas Transplant Team Conference  Date: 10/4/2023  Transplant Coordinator: Makayla Wu     Attendees:  []  Dr. Mosquera [] Kathi Jaquez, RN [x] Marine Carreno LPN     [x]  Dr. Ruiz [x] Makayla Wu, RN [] Sarah Chiu LPN    [x] Dr. Chaparro [x] Savnaa Lazar, KARINE    [x] Dr. Yee [x] Meena Carlisle, RN [x] Rhiannon Schmitz, KARINE   [] Dr. Lofton [x] Farheen Omalley, KARINE    [x] Dr. Farnsworth [x] Suha Charlton, RN    []  Dr. Ascencio [x] Quin Pineda, KARINE    [] Dr. Reeves [x] Handy Ha RN    [] Adia Mccormack, FORREST [x] Claudia Jara RN    [x] Carolee Mcdermott NP [x] Dedra Luo RN        Verbal Plan Read Back:   OK for 1 litter of fluid  Repeat DSA  If Cr does not decrease, recommend kidney biopsy    Routed to RN Coordinator   Marine Carreno LPN

## 2023-10-05 ENCOUNTER — LAB (OUTPATIENT)
Dept: LAB | Facility: CLINIC | Age: 68
End: 2023-10-05
Payer: COMMERCIAL

## 2023-10-05 DIAGNOSIS — Z79.899 ENCOUNTER FOR LONG-TERM CURRENT USE OF MEDICATION: ICD-10-CM

## 2023-10-05 DIAGNOSIS — Z20.828 CONTACT WITH AND (SUSPECTED) EXPOSURE TO OTHER VIRAL COMMUNICABLE DISEASES: ICD-10-CM

## 2023-10-05 DIAGNOSIS — Z94.0 KIDNEY REPLACED BY TRANSPLANT: ICD-10-CM

## 2023-10-05 DIAGNOSIS — Z94.0 HISTORY OF SIMULTANEOUS KIDNEY AND PANCREAS TRANSPLANT (H): ICD-10-CM

## 2023-10-05 DIAGNOSIS — Z48.298 AFTERCARE FOLLOWING ORGAN TRANSPLANT: ICD-10-CM

## 2023-10-05 DIAGNOSIS — Z94.83 HISTORY OF SIMULTANEOUS KIDNEY AND PANCREAS TRANSPLANT (H): ICD-10-CM

## 2023-10-05 DIAGNOSIS — Z98.890 OTHER SPECIFIED POSTPROCEDURAL STATES: ICD-10-CM

## 2023-10-05 PROBLEM — E86.0 DEHYDRATION: Status: ACTIVE | Noted: 2023-10-05

## 2023-10-05 LAB
AMYLASE SERPL-CCNC: 91 U/L (ref 28–100)
ANION GAP SERPL CALCULATED.3IONS-SCNC: 12 MMOL/L (ref 7–15)
BUN SERPL-MCNC: 19.4 MG/DL (ref 8–23)
CALCIUM SERPL-MCNC: 9.3 MG/DL (ref 8.8–10.2)
CHLORIDE SERPL-SCNC: 106 MMOL/L (ref 98–107)
CREAT SERPL-MCNC: 2.63 MG/DL (ref 0.67–1.17)
DEPRECATED HCO3 PLAS-SCNC: 19 MMOL/L (ref 22–29)
EGFRCR SERPLBLD CKD-EPI 2021: 26 ML/MIN/1.73M2
ERYTHROCYTE [DISTWIDTH] IN BLOOD BY AUTOMATED COUNT: 13.9 % (ref 10–15)
GLUCOSE SERPL-MCNC: 99 MG/DL (ref 70–99)
HCT VFR BLD AUTO: 30.5 % (ref 40–53)
HGB BLD-MCNC: 9.9 G/DL (ref 13.3–17.7)
LIPASE SERPL-CCNC: 106 U/L (ref 13–60)
MAGNESIUM SERPL-MCNC: 1.6 MG/DL (ref 1.7–2.3)
MCH RBC QN AUTO: 33 PG (ref 26.5–33)
MCHC RBC AUTO-ENTMCNC: 32.5 G/DL (ref 31.5–36.5)
MCV RBC AUTO: 102 FL (ref 78–100)
PHOSPHATE SERPL-MCNC: 2.7 MG/DL (ref 2.5–4.5)
PLATELET # BLD AUTO: 238 10E3/UL (ref 150–450)
POTASSIUM SERPL-SCNC: 5.1 MMOL/L (ref 3.4–5.3)
RBC # BLD AUTO: 3 10E6/UL (ref 4.4–5.9)
SODIUM SERPL-SCNC: 137 MMOL/L (ref 135–145)
TACROLIMUS BLD-MCNC: 7.6 UG/L (ref 5–15)
TME LAST DOSE: NORMAL H
TME LAST DOSE: NORMAL H
WBC # BLD AUTO: 6.2 10E3/UL (ref 4–11)

## 2023-10-05 PROCEDURE — 84100 ASSAY OF PHOSPHORUS: CPT | Performed by: PATHOLOGY

## 2023-10-05 PROCEDURE — 82150 ASSAY OF AMYLASE: CPT | Performed by: PATHOLOGY

## 2023-10-05 PROCEDURE — 99000 SPECIMEN HANDLING OFFICE-LAB: CPT | Performed by: PATHOLOGY

## 2023-10-05 PROCEDURE — 80048 BASIC METABOLIC PNL TOTAL CA: CPT | Performed by: PATHOLOGY

## 2023-10-05 PROCEDURE — 85027 COMPLETE CBC AUTOMATED: CPT | Performed by: PATHOLOGY

## 2023-10-05 PROCEDURE — 36415 COLL VENOUS BLD VENIPUNCTURE: CPT | Performed by: PATHOLOGY

## 2023-10-05 PROCEDURE — 83690 ASSAY OF LIPASE: CPT | Performed by: PATHOLOGY

## 2023-10-05 PROCEDURE — 83735 ASSAY OF MAGNESIUM: CPT | Performed by: PATHOLOGY

## 2023-10-05 PROCEDURE — 80197 ASSAY OF TACROLIMUS: CPT | Performed by: INTERNAL MEDICINE

## 2023-10-05 RX ORDER — METHYLPREDNISOLONE SODIUM SUCCINATE 125 MG/2ML
125 INJECTION, POWDER, LYOPHILIZED, FOR SOLUTION INTRAMUSCULAR; INTRAVENOUS
Status: CANCELLED
Start: 2023-10-05

## 2023-10-05 RX ORDER — HEPARIN SODIUM,PORCINE 10 UNIT/ML
5-20 VIAL (ML) INTRAVENOUS DAILY PRN
Status: CANCELLED | OUTPATIENT
Start: 2023-10-05

## 2023-10-05 RX ORDER — HEPARIN SODIUM (PORCINE) LOCK FLUSH IV SOLN 100 UNIT/ML 100 UNIT/ML
5 SOLUTION INTRAVENOUS
Status: CANCELLED | OUTPATIENT
Start: 2023-10-05

## 2023-10-05 RX ORDER — TACROLIMUS 1 MG/1
2 CAPSULE ORAL 2 TIMES DAILY
Qty: 120 CAPSULE | Refills: 11 | Status: SHIPPED | OUTPATIENT
Start: 2023-10-05 | End: 2023-10-30

## 2023-10-05 RX ORDER — SODIUM BICARBONATE 650 MG/1
1300 TABLET ORAL 2 TIMES DAILY
Qty: 120 TABLET | Refills: 3 | Status: SHIPPED | OUTPATIENT
Start: 2023-10-05 | End: 2024-01-23

## 2023-10-05 RX ORDER — DIPHENHYDRAMINE HYDROCHLORIDE 50 MG/ML
50 INJECTION INTRAMUSCULAR; INTRAVENOUS
Status: CANCELLED
Start: 2023-10-05

## 2023-10-05 RX ORDER — EPINEPHRINE 1 MG/ML
0.3 INJECTION, SOLUTION, CONCENTRATE INTRAVENOUS EVERY 5 MIN PRN
Status: CANCELLED | OUTPATIENT
Start: 2023-10-05

## 2023-10-05 RX ORDER — ALBUTEROL SULFATE 90 UG/1
1-2 AEROSOL, METERED RESPIRATORY (INHALATION)
Status: CANCELLED
Start: 2023-10-05

## 2023-10-05 RX ORDER — MEPERIDINE HYDROCHLORIDE 25 MG/ML
25 INJECTION INTRAMUSCULAR; INTRAVENOUS; SUBCUTANEOUS EVERY 30 MIN PRN
Status: CANCELLED | OUTPATIENT
Start: 2023-10-05

## 2023-10-05 RX ORDER — ALBUTEROL SULFATE 0.83 MG/ML
2.5 SOLUTION RESPIRATORY (INHALATION)
Status: CANCELLED | OUTPATIENT
Start: 2023-10-05

## 2023-10-05 RX ORDER — TACROLIMUS 0.5 MG/1
CAPSULE ORAL
Qty: 60 CAPSULE | Refills: 11 | Status: SHIPPED | OUTPATIENT
Start: 2023-10-05 | End: 2023-10-30

## 2023-10-05 NOTE — TELEPHONE ENCOUNTER
Jeffrey Chaparro MD Colianni, Lauren, KARINE  Increasing Scr and lipase. Proceed with biopsy. Would obtain KUB to look and see if he is constipated. Increase bicarb to 1300mg bid.

## 2023-10-05 NOTE — TELEPHONE ENCOUNTER
Discussed biopsy with patient. Patient states it had been 3 days with no bm which was probably the reason for elevated lipase. Patient had large bm this AM so he will continue bowel clean out and repeat labs Monday. If lipase still elevated, patient willing to do KUB. Discussed IV fluids as well. Patient will wait for calls to schedule.

## 2023-10-09 ENCOUNTER — LAB (OUTPATIENT)
Dept: LAB | Facility: CLINIC | Age: 68
End: 2023-10-09
Payer: COMMERCIAL

## 2023-10-09 ENCOUNTER — OFFICE VISIT (OUTPATIENT)
Dept: TRANSPLANT | Facility: CLINIC | Age: 68
End: 2023-10-09
Attending: INTERNAL MEDICINE
Payer: COMMERCIAL

## 2023-10-09 VITALS
OXYGEN SATURATION: 99 % | DIASTOLIC BLOOD PRESSURE: 73 MMHG | BODY MASS INDEX: 27.69 KG/M2 | HEART RATE: 72 BPM | WEIGHT: 198.5 LBS | SYSTOLIC BLOOD PRESSURE: 119 MMHG

## 2023-10-09 DIAGNOSIS — Z94.0 KIDNEY REPLACED BY TRANSPLANT: ICD-10-CM

## 2023-10-09 DIAGNOSIS — Z79.899 ENCOUNTER FOR LONG-TERM CURRENT USE OF MEDICATION: ICD-10-CM

## 2023-10-09 DIAGNOSIS — Z48.298 AFTERCARE FOLLOWING ORGAN TRANSPLANT: ICD-10-CM

## 2023-10-09 DIAGNOSIS — Z98.890 OTHER SPECIFIED POSTPROCEDURAL STATES: ICD-10-CM

## 2023-10-09 DIAGNOSIS — Z20.828 CONTACT WITH AND (SUSPECTED) EXPOSURE TO OTHER VIRAL COMMUNICABLE DISEASES: ICD-10-CM

## 2023-10-09 LAB
AMYLASE SERPL-CCNC: 84 U/L (ref 28–100)
ANION GAP SERPL CALCULATED.3IONS-SCNC: 12 MMOL/L (ref 7–15)
BUN SERPL-MCNC: 25.5 MG/DL (ref 8–23)
CALCIUM SERPL-MCNC: 9.7 MG/DL (ref 8.8–10.2)
CHLORIDE SERPL-SCNC: 105 MMOL/L (ref 98–107)
CREAT SERPL-MCNC: 3.15 MG/DL (ref 0.67–1.17)
DEPRECATED HCO3 PLAS-SCNC: 21 MMOL/L (ref 22–29)
EGFRCR SERPLBLD CKD-EPI 2021: 21 ML/MIN/1.73M2
ERYTHROCYTE [DISTWIDTH] IN BLOOD BY AUTOMATED COUNT: 14.2 % (ref 10–15)
GLUCOSE SERPL-MCNC: 108 MG/DL (ref 70–99)
HCT VFR BLD AUTO: 30.6 % (ref 40–53)
HGB BLD-MCNC: 9.7 G/DL (ref 13.3–17.7)
LIPASE SERPL-CCNC: 89 U/L (ref 13–60)
MAGNESIUM SERPL-MCNC: 1.5 MG/DL (ref 1.7–2.3)
MCH RBC QN AUTO: 32.2 PG (ref 26.5–33)
MCHC RBC AUTO-ENTMCNC: 31.7 G/DL (ref 31.5–36.5)
MCV RBC AUTO: 102 FL (ref 78–100)
PHOSPHATE SERPL-MCNC: 2.9 MG/DL (ref 2.5–4.5)
PLATELET # BLD AUTO: 261 10E3/UL (ref 150–450)
POTASSIUM SERPL-SCNC: 5.1 MMOL/L (ref 3.4–5.3)
RBC # BLD AUTO: 3.01 10E6/UL (ref 4.4–5.9)
SODIUM SERPL-SCNC: 138 MMOL/L (ref 135–145)
TACROLIMUS BLD-MCNC: 11.8 UG/L (ref 5–15)
TME LAST DOSE: NORMAL H
TME LAST DOSE: NORMAL H
WBC # BLD AUTO: 5.4 10E3/UL (ref 4–11)

## 2023-10-09 PROCEDURE — 80197 ASSAY OF TACROLIMUS: CPT | Performed by: INTERNAL MEDICINE

## 2023-10-09 PROCEDURE — 85027 COMPLETE CBC AUTOMATED: CPT | Performed by: PATHOLOGY

## 2023-10-09 PROCEDURE — 84100 ASSAY OF PHOSPHORUS: CPT | Performed by: PATHOLOGY

## 2023-10-09 PROCEDURE — 99213 OFFICE O/P EST LOW 20 MIN: CPT | Mod: 24 | Performed by: SURGERY

## 2023-10-09 PROCEDURE — 36415 COLL VENOUS BLD VENIPUNCTURE: CPT | Performed by: PATHOLOGY

## 2023-10-09 PROCEDURE — 80048 BASIC METABOLIC PNL TOTAL CA: CPT | Performed by: PATHOLOGY

## 2023-10-09 PROCEDURE — 82150 ASSAY OF AMYLASE: CPT | Performed by: PATHOLOGY

## 2023-10-09 PROCEDURE — 83690 ASSAY OF LIPASE: CPT | Performed by: PATHOLOGY

## 2023-10-09 PROCEDURE — 83735 ASSAY OF MAGNESIUM: CPT | Performed by: PATHOLOGY

## 2023-10-09 PROCEDURE — 99000 SPECIMEN HANDLING OFFICE-LAB: CPT | Performed by: PATHOLOGY

## 2023-10-09 PROCEDURE — G0463 HOSPITAL OUTPT CLINIC VISIT: HCPCS | Performed by: SURGERY

## 2023-10-09 ASSESSMENT — PAIN SCALES - GENERAL: PAINLEVEL: NO PAIN (0)

## 2023-10-09 NOTE — LETTER
10/9/2023         RE: Avelina Marion  [address] [place] [address]        Dear Colleague,    Thank you for referring your patient, Avelina Marion, to the [facility]. Please see a copy of my visit note below.    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 Feels very well overall. Cr in mid 2s, recent increase in setting of supertherapeutic tacrolimus.   Transplant History:    Transplant Type:  DDKT (SPK)  Donor Type: Donation after Brain Death   Transplant Date:  9/14/2023 (Kidney / Pancreas)   Ureteral Stent:  Yes   Crossmatch:  negative   DSA at Tx:  No  Baseline Cr: 2.2-2.4   DeNovo DSA: No    Acute Rejection Hx:  No    Present Maintenance Immunosuppression:  Tacrolimus and Mycophenolate mofetil    CMV IgG Ab Discordance:  No  EBV IgG Ab Discordance:  No    BK Viremia:  No  EBV Viremia:  No    Transplant Coordinator: Makayla Wu     Transplant Office Phone Number: [phone]     Immunosuppressant Medications       Immunosuppressive Agents Disp Start End     mycophenolate (GENERIC EQUIVALENT) 250 MG capsule    240 capsule 10/17/2023     Sig - Route: Take 4 capsules (1,000 mg) by mouth 2 times daily - Oral    Class: E-Prescribe    Notes to Pharmacy: TXP DT 9/14/2023 (Kidney / Pancreas) TXP Dischg DT 9/21/2023 DX Kidney replaced by transplant Z94.0 TX Center [facility] ([place], [place])     tacrolimus (GENERIC) 0.5 MG capsule    60 capsule 12/5/2023     Sig: HOLD FOR FUTURE DOSE CHANGES.  Profile Rx: patient will contact pharmacy when needed    Class: E-Prescribe     tacrolimus (GENERIC) 1 MG capsule    120 capsule 12/5/2023     Sig - Route: Take 2 capsules (2 mg) by mouth 2 times daily - Oral    Class: E-Prescribe            Possible Immunosuppression-related side effects:   []             headache  []             vivid dreams  []             irritability  []              cognitive difficuties  []             fine tremor  []             nausea  []             diarrhea  []             neuropathy      []             edema  []             renal calcineurin toxicity  []             hyperkalemia  []             post-transplant diabetes  []             decreased appetite  []             increased appetite  []             other:  []             none    Prescription Medications as of 12/8/2023         Rx Number Disp Refills Start End Last Dispensed Date Next Fill Date Owning Pharmacy    aspirin 81 MG EC tablet    11/27/2023        Sig: Take 1 tablet (81 mg) by mouth daily    Class: Historical    Route: Oral    atorvastatin (LIPITOR) 20 MG tablet  30 tablet 11 10/17/2023    [facility] #[id number] - [place], [place] - [address]    Sig: Take 1 tablet (20 mg) by mouth at bedtime    Class: E-Prescribe    Route: Oral    Blood Glucose Monitoring Suppl (ACCU-CHEK GUIDE) w/Device KIT    4/2/2021    [facility] #[id number] - [place], [place] - [address]    Class: Historical    calcitRIOL (ROCALTROL) 0.25 MCG capsule  30 capsule 3 11/28/2023    [facility] #[id number] - [place], [place] - [address] AT SEC OF [facility]    Sig: Take 1 capsule (0.25 mcg) by mouth daily    Class: E-Prescribe    Route: Oral    calcium carbonate-vitamin D (OSCAL) 500-5 MG-MCG tablet  120 tablet 11 10/17/2023    [facility] #[id number] - [place], [place] - [address]    Sig: Take 2 tablets by mouth 2 times daily    Class: E-Prescribe    Route: Oral    famotidine (PEPCID) 10 MG tablet  30 tablet 3 10/17/2023    [facility] #[id number] - [place], [place] - [address]    Sig: Take 1 tablet (10 mg) by mouth every 24 hours    Class: E-Prescribe    Route: Oral    gabapentin (NEURONTIN) 100 MG capsule    11/15/2023 11/14/2024       Sig: Take 100 mg by mouth 3 times daily    Class:  Historical    Route: Oral    magnesium oxide (MAG-OX) 400 MG tablet  30 tablet 11 10/17/2023    [facility] #[id number] - [place], [place] - [address] AT [facility]    Sig: Take 1 tablet (400 mg) by mouth every 24 hours    Class: E-Prescribe    Route: Oral    metoprolol tartrate (LOPRESSOR) 25 MG tablet  30 tablet 3 10/17/2023    [facility] #[id number] - [place], [place] - [address] AT [address] [facility]    Sig: Take 0.5 tablets (12.5 mg) by mouth 2 times daily    Class: E-Prescribe    Route: Oral    Misc. Devices (PILL SPLITTER) MISC  1 each 0 2023    [facility] #[id number] - [place], [place] - [address] [facility]    Si applicator daily    Class: E-Prescribe    Route: Does not apply    mycophenolate (GENERIC EQUIVALENT) 250 MG capsule  240 capsule 11 10/17/2023    [facility] #[id number] - [place], [place] - [address] AT SEC OF [facility]    Sig: Take 4 capsules (1,000 mg) by mouth 2 times daily    Class: E-Prescribe    Notes to Pharmacy: TXP DT 2023 (Kidney / Pancreas) TXP Dischg DT 2023 DX Kidney replaced by transplant Z94.0 TX Center [facility] ([place], [place])    Route: Oral    nystatin (MYCOSTATIN) 968596 UNIT/ML suspension  473 mL 1 10/17/2023    [facility] #[id number] - [place], [place] - [address] AT [address] [facility]    Sig: Take 5 mLs (500,000 Units) by mouth 4 times daily    Class: E-Prescribe    Route: Oral    pantoprazole (PROTONIX) 40 MG EC tablet  30 tablet 3 10/17/2023    [facility] #[id number] - [place], [place] - [address] AT [address] [facility]    Sig: Take 1 tablet (40 mg) by mouth daily    Class: E-Prescribe    Route: Oral    senna-docusate (SENOKOT-S/PERICOLACE) 8.6-50 MG tablet  100 tablet 0 10/17/2023    [facility] #[id number] - [place] - [address] [facility] [address]    Sig: Take 1-2 tablets by mouth 2 times  daily HOLD for loose stools    Class: E-Prescribe    Route: Oral    sodium bicarbonate 650 MG tablet  120 tablet 3 10/5/2023    [facility] #[id number] - [place], [place] - [address] [facility]    Sig: Take 2 tablets (1,300 mg) by mouth 2 times daily    Class: E-Prescribe    Route: Oral    sulfamethoxazole-trimethoprim (BACTRIM) 400-80 MG tablet  30 tablet 0 11/20/2023    [facility] #[id number] - [place], [place] - [address] AT [facility]    Sig: Take 1 tablet by mouth daily    Class: E-Prescribe    Route: Oral    tacrolimus (GENERIC) 0.5 MG capsule  60 capsule 11 12/5/2023    [facility] #[id number] - [place], [place] - [address] [facility] AT [facility]    Sig: HOLD FOR FUTURE DOSE CHANGES.  Profile Rx: patient will contact pharmacy when needed    Class: E-Prescribe    tacrolimus (GENERIC) 1 MG capsule  120 capsule 11 12/5/2023    [facility] #[id number] - [place] - [address] [facility]    Sig: Take 2 capsules (2 mg) by mouth 2 times daily    Class: E-Prescribe    Route: Oral    valGANciclovir (VALCYTE) 450 MG tablet    11/27/2023        Sig: Take 450 mg by mouth every other day    Class: Historical    Route: Oral            O:      General Appearance: in no apparent distress.   Skin: Normal, no rashes or jaundice  Heart: regular rate and rhythm, normal S1 and S2  Lungs: easy respirations, no audible wheezing.  Abdomen: soft, rounded, nontender. Midline wound healed very well, no e/o infection, no hernia  Extremities: Tremor absent.   Edema: absent.         Latest Ref Rng & Units 12/7/2023     8:18 AM 12/4/2023     8:31 AM 11/30/2023     8:25 AM 11/27/2023     9:04 AM 11/20/2023     8:19 AM   Transplant Immunosuppression Labs   Creat 0.67 - 1.17 mg/dL 2.21  2.22  2.54  2.35  2.46    Urea Nitrogen 8.0 - 23.0 mg/dL 26.0  28.7  26.9  25.1  26.1    WBC 4.0 - 11.0 10e3/uL 6.2  5.9   7.1  5.3        Chemistries:   Recent Labs   Lab  Test 12/07/23  0818   BUN 26.0*   CR 2.21*   GFRESTIMATED 32*   GLC 96     Lab Results   Component Value Date    A1C 8.4 09/15/2023    CPEPT 7.7 08/05/2021     Recent Labs   Lab Test 10/17/23  1457   ALBUMIN 2.2*   BILITOTAL 0.2   ALKPHOS 41   AST 21   ALT 13     Urine Studies:  Recent Labs   Lab Test 10/17/23  1629   COLOR Light Yellow   APPEARANCE Clear   URINEGLC 1000*   URINEBILI Negative   URINEKETONE Negative   SG 1.005   UBLD Large*   URINEPH 6.0   PROTEIN 30*   NITRITE Negative   LEUKEST Small*   RBCU >182*   WBCU 14*     No lab results found.  Hematology:   Recent Labs   Lab Test 12/07/23  0818 12/04/23  0831 11/27/23  0904   HGB 10.0* 10.1* 10.8*    167 174   WBC 6.2 5.9 7.1     Coags:   Recent Labs   Lab Test 10/18/23  0707 10/17/23  1457   INR 1.30* 1.55*     HLA antibodies:   SA1 HI RISK ОЛЕГ   Date Value Ref Range Status   10/12/2023 None  Final     SA1 MOD RISK ОЛЕГ   Date Value Ref Range Status   10/12/2023 None  Final     SA2 HI RISK ОЛЕГ   Date Value Ref Range Status   10/12/2023 None  Final     SA2 MOD RISK ОЛЕГ   Date Value Ref Range Status   10/12/2023 None  Final       Assessment: Avelina Marion is doing well s/p DDKT (SPK):  Issues we addressed during his visit include:    Plan:    1. Graft function: Pancreas function excellent. Kidney function with elevated Cr, partially immsx related, otherwise unclear- did not settle well post surgery  2. Immunosuppression Management: No change continue tac and mpa but adjusting tac for recent high level  .  Complexity of management:Medium.  Contributing factors: CNI toxicity, organ dysfunction  Followup: as needed    Total Time: 20 min,   Counselling Time: 15 min.      Tk Ascencio MD

## 2023-10-11 ENCOUNTER — INFUSION THERAPY VISIT (OUTPATIENT)
Dept: INFUSION THERAPY | Facility: CLINIC | Age: 68
End: 2023-10-11
Attending: INTERNAL MEDICINE
Payer: COMMERCIAL

## 2023-10-11 VITALS
WEIGHT: 198.4 LBS | BODY MASS INDEX: 27.67 KG/M2 | SYSTOLIC BLOOD PRESSURE: 138 MMHG | DIASTOLIC BLOOD PRESSURE: 68 MMHG | OXYGEN SATURATION: 100 % | RESPIRATION RATE: 16 BRPM | HEART RATE: 86 BPM | TEMPERATURE: 97.5 F

## 2023-10-11 DIAGNOSIS — E86.0 DEHYDRATION: Primary | ICD-10-CM

## 2023-10-11 PROCEDURE — 96360 HYDRATION IV INFUSION INIT: CPT

## 2023-10-11 PROCEDURE — 258N000003 HC RX IP 258 OP 636: Performed by: INTERNAL MEDICINE

## 2023-10-11 RX ORDER — HEPARIN SODIUM,PORCINE 10 UNIT/ML
5-20 VIAL (ML) INTRAVENOUS DAILY PRN
Status: CANCELLED | OUTPATIENT
Start: 2023-10-11

## 2023-10-11 RX ORDER — METHYLPREDNISOLONE SODIUM SUCCINATE 125 MG/2ML
125 INJECTION, POWDER, LYOPHILIZED, FOR SOLUTION INTRAMUSCULAR; INTRAVENOUS
Status: CANCELLED
Start: 2023-10-11

## 2023-10-11 RX ORDER — ALBUTEROL SULFATE 0.83 MG/ML
2.5 SOLUTION RESPIRATORY (INHALATION)
Status: CANCELLED | OUTPATIENT
Start: 2023-10-11

## 2023-10-11 RX ORDER — ALBUTEROL SULFATE 90 UG/1
1-2 AEROSOL, METERED RESPIRATORY (INHALATION)
Status: CANCELLED
Start: 2023-10-11

## 2023-10-11 RX ORDER — DIPHENHYDRAMINE HYDROCHLORIDE 50 MG/ML
50 INJECTION INTRAMUSCULAR; INTRAVENOUS
Status: CANCELLED
Start: 2023-10-11

## 2023-10-11 RX ORDER — EPINEPHRINE 1 MG/ML
0.3 INJECTION, SOLUTION INTRAMUSCULAR; SUBCUTANEOUS EVERY 5 MIN PRN
Status: CANCELLED | OUTPATIENT
Start: 2023-10-11

## 2023-10-11 RX ORDER — HEPARIN SODIUM (PORCINE) LOCK FLUSH IV SOLN 100 UNIT/ML 100 UNIT/ML
5 SOLUTION INTRAVENOUS
Status: CANCELLED | OUTPATIENT
Start: 2023-10-11

## 2023-10-11 RX ORDER — MEPERIDINE HYDROCHLORIDE 25 MG/ML
25 INJECTION INTRAMUSCULAR; INTRAVENOUS; SUBCUTANEOUS EVERY 30 MIN PRN
Status: CANCELLED | OUTPATIENT
Start: 2023-10-11

## 2023-10-11 RX ADMIN — SODIUM CHLORIDE 1000 ML: 9 INJECTION, SOLUTION INTRAVENOUS at 08:57

## 2023-10-11 NOTE — PATIENT INSTRUCTIONS
Dear Avelina Marion    Thank you for choosing [facility] ([facility]) for your infusion.  The following information is a summary of our appointment as well as important reminders.          If you are a transplant patient and require transplant education, please click on this link: [email or web address].    We look forward in seeing you on your next appointment here at [facility] ([facility]).  Please don t hesitate to call us at [phone] to reschedule any of your appointments or to speak with one of the [facility] registered nurses.  It was a pleasure taking care of you today.    Sincerely,    [facility]  [address] [place] [address]  [address]  Phone:  [phone]

## 2023-10-11 NOTE — PROGRESS NOTES
Infusion Nursing Note:  Avelina Marion presents today for IVF.    Patient seen by provider today: No   present during visit today: Not Applicable.    Note: 1 L NS infused over an hr.    Administrations This Visit       sodium chloride 0.9% BOLUS 1,000 mL       Admin Date  10/11/2023 Action  $New Bag Dose  1,000 mL Rate  999 mL/hr Route  Intravenous Documented By  Rhiannon Little RN                   Intravenous Access:  Peripheral IV placed.    Treatment Conditions:  None.      Post Infusion Assessment:  Patient tolerated infusion without incident.  Blood return noted pre and post infusion.  Site patent and intact, free from redness, edema or discomfort.  No evidence of extravasations.  Access discontinued per protocol.       Discharge Plan:   Discharge instructions reviewed with: Patient.  Patient and/or family verbalized understanding of discharge instructions and all questions answered.  AVS to patient via [employer].  Patient will return to his provider and [facility] as needed for next appointment.   Patient discharged in stable condition accompanied by: self.  Departure Mode: Ambulatory.    /68 (BP Location: Right arm, Patient Position: Semi-Pillai's, Cuff Size: Adult Regular)   Pulse 86   Temp 97.5  F (36.4  C) (Oral)   Resp 16   Wt 90 kg (198 lb 6.4 oz)   SpO2 100%   BMI 27.67 kg/m       Rhiannon Little RN

## 2023-10-12 ENCOUNTER — LAB (OUTPATIENT)
Dept: LAB | Facility: CLINIC | Age: 68
End: 2023-10-12
Payer: COMMERCIAL

## 2023-10-12 DIAGNOSIS — Z20.828 CONTACT WITH AND (SUSPECTED) EXPOSURE TO OTHER VIRAL COMMUNICABLE DISEASES: ICD-10-CM

## 2023-10-12 DIAGNOSIS — Z98.890 OTHER SPECIFIED POSTPROCEDURAL STATES: ICD-10-CM

## 2023-10-12 DIAGNOSIS — Z48.298 AFTERCARE FOLLOWING ORGAN TRANSPLANT: ICD-10-CM

## 2023-10-12 DIAGNOSIS — Z94.0 KIDNEY REPLACED BY TRANSPLANT: ICD-10-CM

## 2023-10-12 DIAGNOSIS — Z79.899 ENCOUNTER FOR LONG-TERM CURRENT USE OF MEDICATION: ICD-10-CM

## 2023-10-12 LAB
ALBUMIN MFR UR ELPH: 15.7 MG/DL
AMYLASE SERPL-CCNC: 78 U/L (ref 28–100)
ANION GAP SERPL CALCULATED.3IONS-SCNC: 10 MMOL/L (ref 7–15)
BUN SERPL-MCNC: 22.7 MG/DL (ref 8–23)
CALCIUM SERPL-MCNC: 9.9 MG/DL (ref 8.8–10.2)
CHLORIDE SERPL-SCNC: 105 MMOL/L (ref 98–107)
CREAT SERPL-MCNC: 2.39 MG/DL (ref 0.67–1.17)
CREAT UR-MCNC: 150 MG/DL
DEPRECATED HCO3 PLAS-SCNC: 23 MMOL/L (ref 22–29)
EGFRCR SERPLBLD CKD-EPI 2021: 29 ML/MIN/1.73M2
ERYTHROCYTE [DISTWIDTH] IN BLOOD BY AUTOMATED COUNT: 14.1 % (ref 10–15)
GLUCOSE SERPL-MCNC: 104 MG/DL (ref 70–99)
HCT VFR BLD AUTO: 29.9 % (ref 40–53)
HGB BLD-MCNC: 9.6 G/DL (ref 13.3–17.7)
LIPASE SERPL-CCNC: 69 U/L (ref 13–60)
MCH RBC QN AUTO: 32.8 PG (ref 26.5–33)
MCHC RBC AUTO-ENTMCNC: 32.1 G/DL (ref 31.5–36.5)
MCV RBC AUTO: 102 FL (ref 78–100)
PLATELET # BLD AUTO: 232 10E3/UL (ref 150–450)
POTASSIUM SERPL-SCNC: 5 MMOL/L (ref 3.4–5.3)
PROT/CREAT 24H UR: 0.1 MG/MG CR (ref 0–0.2)
RBC # BLD AUTO: 2.93 10E6/UL (ref 4.4–5.9)
SODIUM SERPL-SCNC: 138 MMOL/L (ref 135–145)
TACROLIMUS BLD-MCNC: 10.6 UG/L (ref 5–15)
TME LAST DOSE: NORMAL H
TME LAST DOSE: NORMAL H
WBC # BLD AUTO: 4.5 10E3/UL (ref 4–11)

## 2023-10-12 PROCEDURE — 84156 ASSAY OF PROTEIN URINE: CPT | Performed by: PATHOLOGY

## 2023-10-12 PROCEDURE — 86828 HLA CLASS I&II ANTIBODY QUAL: CPT | Performed by: INTERNAL MEDICINE

## 2023-10-12 PROCEDURE — 80048 BASIC METABOLIC PNL TOTAL CA: CPT | Performed by: PATHOLOGY

## 2023-10-12 PROCEDURE — 80180 DRUG SCRN QUAN MYCOPHENOLATE: CPT | Performed by: INTERNAL MEDICINE

## 2023-10-12 PROCEDURE — 36415 COLL VENOUS BLD VENIPUNCTURE: CPT | Performed by: PATHOLOGY

## 2023-10-12 PROCEDURE — 83690 ASSAY OF LIPASE: CPT | Performed by: PATHOLOGY

## 2023-10-12 PROCEDURE — 80197 ASSAY OF TACROLIMUS: CPT | Performed by: INTERNAL MEDICINE

## 2023-10-12 PROCEDURE — 86833 HLA CLASS II HIGH DEFIN QUAL: CPT | Performed by: INTERNAL MEDICINE

## 2023-10-12 PROCEDURE — 87521 HEPATITIS C PROBE&RVRS TRNSC: CPT | Performed by: INTERNAL MEDICINE

## 2023-10-12 PROCEDURE — 99000 SPECIMEN HANDLING OFFICE-LAB: CPT | Performed by: PATHOLOGY

## 2023-10-12 PROCEDURE — 82150 ASSAY OF AMYLASE: CPT | Performed by: PATHOLOGY

## 2023-10-12 PROCEDURE — 85027 COMPLETE CBC AUTOMATED: CPT | Performed by: PATHOLOGY

## 2023-10-12 PROCEDURE — 86832 HLA CLASS I HIGH DEFIN QUAL: CPT | Performed by: INTERNAL MEDICINE

## 2023-10-12 PROCEDURE — 87799 DETECT AGENT NOS DNA QUANT: CPT | Performed by: INTERNAL MEDICINE

## 2023-10-13 LAB
BKV DNA # SPEC NAA+PROBE: NOT DETECTED COPIES/ML
HBV DNA SERPL QL NAA+PROBE: NORMAL
HCV RNA SERPL QL NAA+PROBE: NORMAL
HIV1+2 RNA SERPL QL NAA+PROBE: NORMAL
MYCOPHENOLATE SERPL LC/MS/MS-MCNC: 4.83 MG/L (ref 1–3.5)
MYCOPHENOLATE-G SERPL LC/MS/MS-MCNC: 192.5 MG/L (ref 30–95)
TME LAST DOSE: ABNORMAL H
TME LAST DOSE: ABNORMAL H

## 2023-10-16 ENCOUNTER — MYC MEDICAL ADVICE (OUTPATIENT)
Dept: INTERVENTIONAL RADIOLOGY/VASCULAR | Facility: CLINIC | Age: 68
End: 2023-10-16

## 2023-10-16 ENCOUNTER — LAB (OUTPATIENT)
Dept: LAB | Facility: CLINIC | Age: 68
End: 2023-10-16
Payer: COMMERCIAL

## 2023-10-16 DIAGNOSIS — Z20.828 CONTACT WITH AND (SUSPECTED) EXPOSURE TO OTHER VIRAL COMMUNICABLE DISEASES: ICD-10-CM

## 2023-10-16 DIAGNOSIS — Z79.899 ENCOUNTER FOR LONG-TERM CURRENT USE OF MEDICATION: ICD-10-CM

## 2023-10-16 DIAGNOSIS — Z98.890 OTHER SPECIFIED POSTPROCEDURAL STATES: ICD-10-CM

## 2023-10-16 DIAGNOSIS — Z94.0 KIDNEY REPLACED BY TRANSPLANT: ICD-10-CM

## 2023-10-16 DIAGNOSIS — Z48.298 AFTERCARE FOLLOWING ORGAN TRANSPLANT: ICD-10-CM

## 2023-10-16 LAB
AMYLASE SERPL-CCNC: 71 U/L (ref 28–100)
ANION GAP SERPL CALCULATED.3IONS-SCNC: 11 MMOL/L (ref 7–15)
BUN SERPL-MCNC: 25.7 MG/DL (ref 8–23)
CALCIUM SERPL-MCNC: 9.4 MG/DL (ref 8.8–10.2)
CHLORIDE SERPL-SCNC: 106 MMOL/L (ref 98–107)
CREAT SERPL-MCNC: 2.59 MG/DL (ref 0.67–1.17)
DEPRECATED HCO3 PLAS-SCNC: 22 MMOL/L (ref 22–29)
EGFRCR SERPLBLD CKD-EPI 2021: 26 ML/MIN/1.73M2
ERYTHROCYTE [DISTWIDTH] IN BLOOD BY AUTOMATED COUNT: 14.1 % (ref 10–15)
GLUCOSE SERPL-MCNC: 117 MG/DL (ref 70–99)
HCT VFR BLD AUTO: 27.3 % (ref 40–53)
HGB BLD-MCNC: 8.7 G/DL (ref 13.3–17.7)
LIPASE SERPL-CCNC: 55 U/L (ref 13–60)
MAGNESIUM SERPL-MCNC: 1.4 MG/DL (ref 1.7–2.3)
MCH RBC QN AUTO: 32.2 PG (ref 26.5–33)
MCHC RBC AUTO-ENTMCNC: 31.9 G/DL (ref 31.5–36.5)
MCV RBC AUTO: 101 FL (ref 78–100)
PHOSPHATE SERPL-MCNC: 3.2 MG/DL (ref 2.5–4.5)
PLATELET # BLD AUTO: 188 10E3/UL (ref 150–450)
POTASSIUM SERPL-SCNC: 4.8 MMOL/L (ref 3.4–5.3)
RBC # BLD AUTO: 2.7 10E6/UL (ref 4.4–5.9)
SODIUM SERPL-SCNC: 139 MMOL/L (ref 135–145)
TACROLIMUS BLD-MCNC: 8.9 UG/L (ref 5–15)
TME LAST DOSE: NORMAL H
TME LAST DOSE: NORMAL H
WBC # BLD AUTO: 7.1 10E3/UL (ref 4–11)

## 2023-10-16 PROCEDURE — 84100 ASSAY OF PHOSPHORUS: CPT | Performed by: PATHOLOGY

## 2023-10-16 PROCEDURE — 99000 SPECIMEN HANDLING OFFICE-LAB: CPT | Performed by: PATHOLOGY

## 2023-10-16 PROCEDURE — 83735 ASSAY OF MAGNESIUM: CPT | Performed by: PATHOLOGY

## 2023-10-16 PROCEDURE — 36415 COLL VENOUS BLD VENIPUNCTURE: CPT | Performed by: PATHOLOGY

## 2023-10-16 PROCEDURE — 85027 COMPLETE CBC AUTOMATED: CPT | Performed by: PATHOLOGY

## 2023-10-16 PROCEDURE — 82150 ASSAY OF AMYLASE: CPT | Performed by: PATHOLOGY

## 2023-10-16 PROCEDURE — 80048 BASIC METABOLIC PNL TOTAL CA: CPT | Performed by: PATHOLOGY

## 2023-10-16 PROCEDURE — 80197 ASSAY OF TACROLIMUS: CPT | Performed by: INTERNAL MEDICINE

## 2023-10-16 PROCEDURE — 83690 ASSAY OF LIPASE: CPT | Performed by: PATHOLOGY

## 2023-10-17 ENCOUNTER — APPOINTMENT (OUTPATIENT)
Dept: CT IMAGING | Facility: CLINIC | Age: 68
End: 2023-10-17
Payer: COMMERCIAL

## 2023-10-17 ENCOUNTER — APPOINTMENT (OUTPATIENT)
Dept: INTERVENTIONAL RADIOLOGY/VASCULAR | Facility: CLINIC | Age: 68
End: 2023-10-17
Attending: INTERNAL MEDICINE
Payer: COMMERCIAL

## 2023-10-17 ENCOUNTER — APPOINTMENT (OUTPATIENT)
Dept: INTERVENTIONAL RADIOLOGY/VASCULAR | Facility: CLINIC | Age: 68
DRG: 674 | End: 2023-10-17
Payer: COMMERCIAL

## 2023-10-17 ENCOUNTER — APPOINTMENT (OUTPATIENT)
Dept: ULTRASOUND IMAGING | Facility: CLINIC | Age: 68
DRG: 674 | End: 2023-10-17
Attending: RADIOLOGY
Payer: COMMERCIAL

## 2023-10-17 ENCOUNTER — APPOINTMENT (OUTPATIENT)
Dept: GENERAL RADIOLOGY | Facility: CLINIC | Age: 68
DRG: 674 | End: 2023-10-17
Attending: INTERNAL MEDICINE
Payer: COMMERCIAL

## 2023-10-17 ENCOUNTER — HOSPITAL ENCOUNTER (INPATIENT)
Facility: CLINIC | Age: 68
LOS: 3 days | Discharge: HOME | DRG: 674 | End: 2023-10-20
Attending: EMERGENCY MEDICINE | Admitting: INTERNAL MEDICINE
Payer: COMMERCIAL

## 2023-10-17 ENCOUNTER — HOSPITAL ENCOUNTER (OUTPATIENT)
Facility: CLINIC | Age: 68
Discharge: STILL ADMITTED OR ADMINISTRATIVE | End: 2023-10-17
Attending: INTERNAL MEDICINE | Admitting: INTERNAL MEDICINE
Payer: COMMERCIAL

## 2023-10-17 ENCOUNTER — APPOINTMENT (OUTPATIENT)
Dept: MEDSURG UNIT | Facility: CLINIC | Age: 68
End: 2023-10-17
Attending: INTERNAL MEDICINE
Payer: COMMERCIAL

## 2023-10-17 VITALS
RESPIRATION RATE: 16 BRPM | HEART RATE: 75 BPM | WEIGHT: 197.8 LBS | OXYGEN SATURATION: 98 % | TEMPERATURE: 97.8 F | BODY MASS INDEX: 26.79 KG/M2 | HEIGHT: 72 IN | SYSTOLIC BLOOD PRESSURE: 109 MMHG | DIASTOLIC BLOOD PRESSURE: 64 MMHG

## 2023-10-17 DIAGNOSIS — Z95.1 STATUS POST CORONARY ARTERY BYPASS GRAFT: ICD-10-CM

## 2023-10-17 DIAGNOSIS — Z94.0 HISTORY OF SIMULTANEOUS KIDNEY AND PANCREAS TRANSPLANT (H): ICD-10-CM

## 2023-10-17 DIAGNOSIS — Z94.0 KIDNEY TRANSPLANTED: Primary | ICD-10-CM

## 2023-10-17 DIAGNOSIS — N18.6 TYPE 2 DIABETES MELLITUS WITH ESRD (END-STAGE RENAL DISEASE) (H): Primary | ICD-10-CM

## 2023-10-17 DIAGNOSIS — Z94.83 HISTORY OF SIMULTANEOUS KIDNEY AND PANCREAS TRANSPLANT (H): ICD-10-CM

## 2023-10-17 DIAGNOSIS — Z48.298 AFTERCARE FOLLOWING ORGAN TRANSPLANT: ICD-10-CM

## 2023-10-17 DIAGNOSIS — Z94.83 PANCREAS REPLACED BY TRANSPLANT (H): ICD-10-CM

## 2023-10-17 DIAGNOSIS — D62 ANEMIA DUE TO BLOOD LOSS, ACUTE: ICD-10-CM

## 2023-10-17 DIAGNOSIS — Z95.1 S/P CABG (CORONARY ARTERY BYPASS GRAFT): ICD-10-CM

## 2023-10-17 DIAGNOSIS — Z94.0 KIDNEY REPLACED BY TRANSPLANT: ICD-10-CM

## 2023-10-17 DIAGNOSIS — R79.89 ELEVATED SERUM CREATININE: ICD-10-CM

## 2023-10-17 DIAGNOSIS — E11.22 TYPE 2 DIABETES MELLITUS WITH ESRD (END-STAGE RENAL DISEASE) (H): Primary | ICD-10-CM

## 2023-10-17 DIAGNOSIS — I97.121: ICD-10-CM

## 2023-10-17 DIAGNOSIS — Z99.2 DEPENDENCE ON RENAL DIALYSIS (H): ICD-10-CM
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
## 2023-10-17 LAB
ABO/RH(D): NORMAL
ALBUMIN SERPL BCG-MCNC: 2.2 G/DL (ref 3.5–5.2)
ALBUMIN UR-MCNC: 30 MG/DL
ALP SERPL-CCNC: 41 U/L (ref 40–129)
ALT SERPL W P-5'-P-CCNC: 13 U/L (ref 0–70)
ANION GAP SERPL CALCULATED.3IONS-SCNC: 10 MMOL/L (ref 7–15)
ANION GAP SERPL CALCULATED.3IONS-SCNC: 8 MMOL/L (ref 7–15)
ANTIBODY SCREEN: NEGATIVE
APPEARANCE UR: CLEAR
APTT PPP: 53 SECONDS (ref 22–38)
AST SERPL W P-5'-P-CCNC: 21 U/L (ref 0–45)
ATRIAL RATE - MUSE: 82 BPM
ATRIAL RATE - MUSE: 82 BPM
BASE EXCESS BLDV CALC-SCNC: -15.7 MMOL/L (ref -7.7–1.9)
BASE EXCESS BLDV CALC-SCNC: -9.3 MMOL/L (ref -7.7–1.9)
BASO+EOS+MONOS # BLD AUTO: ABNORMAL 10*3/UL
BASO+EOS+MONOS NFR BLD AUTO: ABNORMAL %
BASOPHILS # BLD AUTO: 0 10E3/UL (ref 0–0.2)
BASOPHILS NFR BLD AUTO: 1 %
BILIRUB SERPL-MCNC: 0.2 MG/DL
BILIRUB UR QL STRIP: NEGATIVE
BLD PROD TYP BPU: NORMAL
BLOOD COMPONENT TYPE: NORMAL
BUN SERPL-MCNC: 23.2 MG/DL (ref 8–23)
BUN SERPL-MCNC: 27.4 MG/DL (ref 8–23)
CA-I BLD-MCNC: 2.9 MG/DL (ref 4.4–5.2)
CA-I BLD-MCNC: 2.9 MG/DL (ref 4.4–5.2)
CA-I BLD-MCNC: 4 MG/DL (ref 4.4–5.2)
CA-I BLD-MCNC: 4.2 MG/DL (ref 4.4–5.2)
CA-I BLD-MCNC: 4.4 MG/DL (ref 4.4–5.2)
CALCIUM SERPL-MCNC: 6 MG/DL (ref 8.8–10.2)
CALCIUM SERPL-MCNC: 8.1 MG/DL (ref 8.8–10.2)
CHLORIDE SERPL-SCNC: 108 MMOL/L (ref 98–107)
CHLORIDE SERPL-SCNC: 113 MMOL/L (ref 98–107)
CODING SYSTEM: NORMAL
COLOR UR AUTO: ABNORMAL
CPB POCT: NO
CREAT BLD-MCNC: 2 MG/DL (ref 0.7–1.3)
CREAT SERPL-MCNC: 2.15 MG/DL (ref 0.67–1.17)
CREAT SERPL-MCNC: 2.44 MG/DL (ref 0.67–1.17)
CROSSMATCH: NORMAL
CROSSMATCH: NORMAL
DEPRECATED HCO3 PLAS-SCNC: 15 MMOL/L (ref 22–29)
DEPRECATED HCO3 PLAS-SCNC: 19 MMOL/L (ref 22–29)
DIASTOLIC BLOOD PRESSURE - MUSE: NORMAL MMHG
DIASTOLIC BLOOD PRESSURE - MUSE: NORMAL MMHG
EGFRCR SERPLBLD CKD-EPI 2021: 28 ML/MIN/1.73M2
EGFRCR SERPLBLD CKD-EPI 2021: 33 ML/MIN/1.73M2
EGFRCR SERPLBLD CKD-EPI 2021: 36 ML/MIN/1.73M2
EOSINOPHIL # BLD AUTO: 0.1 10E3/UL (ref 0–0.7)
EOSINOPHIL NFR BLD AUTO: 2 %
ERYTHROCYTE [DISTWIDTH] IN BLOOD BY AUTOMATED COUNT: 14.3 % (ref 10–15)
ERYTHROCYTE [DISTWIDTH] IN BLOOD BY AUTOMATED COUNT: 16.6 % (ref 10–15)
GLUCOSE BLD-MCNC: 164 MG/DL (ref 70–99)
GLUCOSE BLD-MCNC: 188 MG/DL (ref 70–99)
GLUCOSE BLD-MCNC: 56 MG/DL (ref 70–99)
GLUCOSE BLDC GLUCOMTR-MCNC: 101 MG/DL (ref 70–99)
GLUCOSE BLDC GLUCOMTR-MCNC: 117 MG/DL (ref 70–99)
GLUCOSE BLDC GLUCOMTR-MCNC: 175 MG/DL (ref 70–99)
GLUCOSE BLDC GLUCOMTR-MCNC: 56 MG/DL (ref 70–99)
GLUCOSE SERPL-MCNC: 112 MG/DL (ref 70–99)
GLUCOSE SERPL-MCNC: 221 MG/DL (ref 70–99)
GLUCOSE UR STRIP-MCNC: 1000 MG/DL
HCO3 BLDV-SCNC: 10 MMOL/L (ref 21–28)
HCO3 BLDV-SCNC: 14 MMOL/L (ref 21–28)
HCO3 BLDV-SCNC: 14 MMOL/L (ref 21–28)
HCO3 BLDV-SCNC: 16 MMOL/L (ref 21–28)
HCO3 BLDV-SCNC: 16 MMOL/L (ref 21–28)
HCO3 BLDV-SCNC: 8 MMOL/L (ref 21–28)
HCT VFR BLD AUTO: 16.2 % (ref 40–53)
HCT VFR BLD AUTO: 25.7 % (ref 40–53)
HCT VFR BLD CALC: <15 % (ref 40–53)
HGB BLD-MCNC: 5.1 G/DL (ref 13.3–17.7)
HGB BLD-MCNC: 5.1 G/DL (ref 13.3–17.7)
HGB BLD-MCNC: 8.3 G/DL (ref 13.3–17.7)
HGB BLD-MCNC: 8.3 G/DL (ref 13.3–17.7)
HGB BLD-MCNC: 8.5 G/DL (ref 13.3–17.7)
HGB BLD-MCNC: <5.1 G/DL (ref 13.3–17.7)
HGB UR QL STRIP: ABNORMAL
HOLD SPECIMEN: NORMAL
IMM GRANULOCYTES # BLD: 0.2 10E3/UL
IMM GRANULOCYTES NFR BLD: 3 %
INR PPP: 1.11 (ref 0.85–1.15)
INR PPP: 1.55 (ref 0.85–1.15)
INR PPP: 2.15 (ref 0.85–1.15)
INTERPRETATION ECG - MUSE: NORMAL
INTERPRETATION ECG - MUSE: NORMAL
ISSUE DATE AND TIME: NORMAL
KETONES UR STRIP-MCNC: NEGATIVE MG/DL
LACTATE BLD-SCNC: 0.7 MMOL/L
LACTATE SERPL-SCNC: 0.9 MMOL/L (ref 0.7–2)
LACTATE SERPL-SCNC: 1.1 MMOL/L (ref 0.7–2)
LEUKOCYTE ESTERASE UR QL STRIP: ABNORMAL
LYMPHOCYTES # BLD AUTO: 0.3 10E3/UL (ref 0.8–5.3)
LYMPHOCYTES NFR BLD AUTO: 6 %
MAGNESIUM SERPL-MCNC: 1.2 MG/DL (ref 1.7–2.3)
MCH RBC QN AUTO: 31.6 PG (ref 26.5–33)
MCH RBC QN AUTO: 32.9 PG (ref 26.5–33)
MCHC RBC AUTO-ENTMCNC: 31.1 G/DL (ref 31.5–36.5)
MCHC RBC AUTO-ENTMCNC: 32.3 G/DL (ref 31.5–36.5)
MCV RBC AUTO: 107 FL (ref 78–100)
MCV RBC AUTO: 98 FL (ref 78–100)
MONOCYTES # BLD AUTO: 0.3 10E3/UL (ref 0–1.3)
MONOCYTES NFR BLD AUTO: 5 %
NEUTROPHILS # BLD AUTO: 5.2 10E3/UL (ref 1.6–8.3)
NEUTROPHILS NFR BLD AUTO: 83 %
NITRATE UR QL: NEGATIVE
NRBC # BLD AUTO: 0 10E3/UL
NRBC BLD AUTO-RTO: 0 /100
O2/TOTAL GAS SETTING VFR VENT: 15 %
O2/TOTAL GAS SETTING VFR VENT: 21 %
OXYHGB MFR BLDV: 42 % (ref 70–75)
P AXIS - MUSE: 37 DEGREES
P AXIS - MUSE: 37 DEGREES
PCO2 BLDV: 17 MM HG (ref 40–50)
PCO2 BLDV: 20 MM HG (ref 40–50)
PCO2 BLDV: 29 MM HG (ref 40–50)
PCO2 BLDV: 29 MM HG (ref 40–50)
PCO2 BLDV: 31 MM HG (ref 40–50)
PCO2 BLDV: 32 MM HG (ref 40–50)
PH BLDV: 7.27 [PH] (ref 7.32–7.43)
PH BLDV: 7.3 [PH] (ref 7.32–7.43)
PH BLDV: 7.3 [PH] (ref 7.32–7.43)
PH BLDV: 7.31 [PH] (ref 7.32–7.43)
PH UR STRIP: 6 [PH] (ref 5–7)
PHOSPHATE SERPL-MCNC: 1.3 MG/DL (ref 2.5–4.5)
PHOSPHATE SERPL-MCNC: 2.3 MG/DL (ref 2.5–4.5)
PLATELET # BLD AUTO: 142 10E3/UL (ref 150–450)
PLATELET # BLD AUTO: 146 10E3/UL (ref 150–450)
PO2 BLDV: 18 MM HG (ref 25–47)
PO2 BLDV: 19 MM HG (ref 25–47)
PO2 BLDV: 26 MM HG (ref 25–47)
PO2 BLDV: 27 MM HG (ref 25–47)
POTASSIUM BLD-SCNC: 3.3 MMOL/L (ref 3.4–5.3)
POTASSIUM BLD-SCNC: 3.5 MMOL/L (ref 3.4–5.3)
POTASSIUM BLD-SCNC: <2 MMOL/L (ref 3.4–5.3)
POTASSIUM SERPL-SCNC: 3.5 MMOL/L (ref 3.4–5.3)
POTASSIUM SERPL-SCNC: 4.8 MMOL/L (ref 3.4–5.3)
PR INTERVAL - MUSE: 146 MS
PR INTERVAL - MUSE: 146 MS
PROT SERPL-MCNC: 3.6 G/DL (ref 6.4–8.3)
QRS DURATION - MUSE: 82 MS
QRS DURATION - MUSE: 82 MS
QT - MUSE: 380 MS
QT - MUSE: 380 MS
QTC - MUSE: 443 MS
QTC - MUSE: 443 MS
R AXIS - MUSE: 10 DEGREES
R AXIS - MUSE: 10 DEGREES
RBC # BLD AUTO: 1.52 10E6/UL (ref 4.4–5.9)
RBC # BLD AUTO: 2.63 10E6/UL (ref 4.4–5.9)
RBC URINE: >182 /HPF
SAO2 % BLDV: 24 % (ref 94–100)
SAO2 % BLDV: 25 % (ref 94–100)
SAO2 % BLDV: 25 % (ref 94–100)
SAO2 % BLDV: 41 % (ref 94–100)
SODIUM BLD-SCNC: 141 MMOL/L (ref 133–144)
SODIUM BLD-SCNC: 142 MMOL/L (ref 133–144)
SODIUM BLD-SCNC: 151 MMOL/L (ref 133–144)
SODIUM SERPL-SCNC: 136 MMOL/L (ref 135–145)
SODIUM SERPL-SCNC: 137 MMOL/L (ref 135–145)
SP GR UR STRIP: 1 (ref 1–1.03)
SPECIMEN EXPIRATION DATE: NORMAL
SYSTOLIC BLOOD PRESSURE - MUSE: NORMAL MMHG
SYSTOLIC BLOOD PRESSURE - MUSE: NORMAL MMHG
T AXIS - MUSE: 41 DEGREES
T AXIS - MUSE: 41 DEGREES
TROPONIN T BLD-MCNC: 0.01 UG/L
TROPONIN T SERPL HS-MCNC: 19 NG/L
TROPONIN T SERPL HS-MCNC: 24 NG/L
UNIT ABO/RH: NORMAL
UNIT NUMBER: NORMAL
UNIT STATUS: NORMAL
UNIT TYPE ISBT: 600
UNIT TYPE ISBT: 600
UNIT TYPE ISBT: 6200
UNIT TYPE ISBT: 9500
UROBILINOGEN UR STRIP-MCNC: NORMAL MG/DL
VENTRICULAR RATE- MUSE: 82 BPM
VENTRICULAR RATE- MUSE: 82 BPM
WBC # BLD AUTO: 13.6 10E3/UL (ref 4–11)
WBC # BLD AUTO: 6.2 10E3/UL (ref 4–11)
WBC URINE: 14 /HPF

## 2023-10-17 PROCEDURE — 250N000012 HC RX MED GY IP 250 OP 636 PS 637: Performed by: NURSE PRACTITIONER

## 2023-10-17 PROCEDURE — 272N000143 HC KIT CR3

## 2023-10-17 PROCEDURE — 36415 COLL VENOUS BLD VENIPUNCTURE: CPT | Performed by: INTERNAL MEDICINE

## 2023-10-17 PROCEDURE — 93005 ELECTROCARDIOGRAM TRACING: CPT | Mod: 76 | Performed by: EMERGENCY MEDICINE

## 2023-10-17 PROCEDURE — 93010 ELECTROCARDIOGRAM REPORT: CPT | Performed by: EMERGENCY MEDICINE

## 2023-10-17 PROCEDURE — 82330 ASSAY OF CALCIUM: CPT | Mod: 91 | Performed by: INTERNAL MEDICINE

## 2023-10-17 PROCEDURE — 70498 CT ANGIOGRAPHY NECK: CPT | Mod: 26 | Performed by: RADIOLOGY

## 2023-10-17 PROCEDURE — 85018 HEMOGLOBIN: CPT | Performed by: RADIOLOGY

## 2023-10-17 PROCEDURE — 50200 RENAL BIOPSY PERQ: CPT | Mod: LT | Performed by: PHYSICIAN ASSISTANT

## 2023-10-17 PROCEDURE — 76937 US GUIDE VASCULAR ACCESS: CPT | Mod: 26 | Performed by: RADIOLOGY

## 2023-10-17 PROCEDURE — 36415 COLL VENOUS BLD VENIPUNCTURE: CPT | Performed by: RADIOLOGY

## 2023-10-17 PROCEDURE — 84100 ASSAY OF PHOSPHORUS: CPT

## 2023-10-17 PROCEDURE — 93005 ELECTROCARDIOGRAM TRACING: CPT | Performed by: EMERGENCY MEDICINE

## 2023-10-17 PROCEDURE — C1889 IMPLANT/INSERT DEVICE, NOC: HCPCS

## 2023-10-17 PROCEDURE — 82947 ASSAY GLUCOSE BLOOD QUANT: CPT | Performed by: INTERNAL MEDICINE

## 2023-10-17 PROCEDURE — 999N000142 HC STATISTIC PROCEDURE PREP ONLY

## 2023-10-17 PROCEDURE — 37244 VASC EMBOLIZE/OCCLUDE BLEED: CPT

## 2023-10-17 PROCEDURE — 73030 X-RAY EXAM OF SHOULDER: CPT | Mod: RT

## 2023-10-17 PROCEDURE — 99291 CRITICAL CARE FIRST HOUR: CPT | Mod: 25 | Performed by: EMERGENCY MEDICINE

## 2023-10-17 PROCEDURE — 250N000011 HC RX IP 250 OP 636: Performed by: SURGERY

## 2023-10-17 PROCEDURE — 85610 PROTHROMBIN TIME: CPT | Performed by: NURSE PRACTITIONER

## 2023-10-17 PROCEDURE — 75736 ARTERY X-RAYS PELVIS: CPT

## 2023-10-17 PROCEDURE — 76942 ECHO GUIDE FOR BIOPSY: CPT | Mod: 26 | Performed by: PHYSICIAN ASSISTANT

## 2023-10-17 PROCEDURE — 82805 BLOOD GASES W/O2 SATURATION: CPT | Performed by: INTERNAL MEDICINE

## 2023-10-17 PROCEDURE — 36253 INS CATH REN ART 2ND+ UNILAT: CPT | Mod: XU | Performed by: RADIOLOGY

## 2023-10-17 PROCEDURE — 250N000011 HC RX IP 250 OP 636: Performed by: INTERNAL MEDICINE

## 2023-10-17 PROCEDURE — 84484 ASSAY OF TROPONIN QUANT: CPT

## 2023-10-17 PROCEDURE — 82803 BLOOD GASES ANY COMBINATION: CPT | Performed by: STUDENT IN AN ORGANIZED HEALTH CARE EDUCATION/TRAINING PROGRAM

## 2023-10-17 PROCEDURE — P9059 PLASMA, FRZ BETWEEN 8-24HOUR: HCPCS | Performed by: EMERGENCY MEDICINE

## 2023-10-17 PROCEDURE — 36415 COLL VENOUS BLD VENIPUNCTURE: CPT | Performed by: NURSE PRACTITIONER

## 2023-10-17 PROCEDURE — C1769 GUIDE WIRE: HCPCS

## 2023-10-17 PROCEDURE — 272N000188 HC ACCESSORY CR12

## 2023-10-17 PROCEDURE — 76776 US EXAM K TRANSPL W/DOPPLER: CPT | Mod: 26 | Performed by: RADIOLOGY

## 2023-10-17 PROCEDURE — P9016 RBC LEUKOCYTES REDUCED: HCPCS

## 2023-10-17 PROCEDURE — 70498 CT ANGIOGRAPHY NECK: CPT

## 2023-10-17 PROCEDURE — 85025 COMPLETE CBC W/AUTO DIFF WBC: CPT | Performed by: EMERGENCY MEDICINE

## 2023-10-17 PROCEDURE — 36415 COLL VENOUS BLD VENIPUNCTURE: CPT | Performed by: EMERGENCY MEDICINE

## 2023-10-17 PROCEDURE — 250N000013 HC RX MED GY IP 250 OP 250 PS 637: Performed by: NURSE PRACTITIONER

## 2023-10-17 PROCEDURE — 84100 ASSAY OF PHOSPHORUS: CPT | Performed by: INTERNAL MEDICINE

## 2023-10-17 PROCEDURE — 83605 ASSAY OF LACTIC ACID: CPT | Performed by: EMERGENCY MEDICINE

## 2023-10-17 PROCEDURE — 86901 BLOOD TYPING SEROLOGIC RH(D): CPT | Performed by: EMERGENCY MEDICINE

## 2023-10-17 PROCEDURE — 83605 ASSAY OF LACTIC ACID: CPT | Performed by: INTERNAL MEDICINE

## 2023-10-17 PROCEDURE — 87086 URINE CULTURE/COLONY COUNT: CPT | Performed by: EMERGENCY MEDICINE

## 2023-10-17 PROCEDURE — 83735 ASSAY OF MAGNESIUM: CPT | Performed by: INTERNAL MEDICINE

## 2023-10-17 PROCEDURE — 70496 CT ANGIOGRAPHY HEAD: CPT | Mod: 26 | Performed by: RADIOLOGY

## 2023-10-17 PROCEDURE — 37242 VASC EMBOLIZE/OCCLUDE ARTERY: CPT | Mod: GC | Performed by: RADIOLOGY

## 2023-10-17 PROCEDURE — 86923 COMPATIBILITY TEST ELECTRIC: CPT

## 2023-10-17 PROCEDURE — C1887 CATHETER, GUIDING: HCPCS

## 2023-10-17 PROCEDURE — 99291 CRITICAL CARE FIRST HOUR: CPT | Mod: GC | Performed by: STUDENT IN AN ORGANIZED HEALTH CARE EDUCATION/TRAINING PROGRAM

## 2023-10-17 PROCEDURE — 85018 HEMOGLOBIN: CPT | Performed by: PHARMACIST

## 2023-10-17 PROCEDURE — 82947 ASSAY GLUCOSE BLOOD QUANT: CPT | Performed by: EMERGENCY MEDICINE

## 2023-10-17 PROCEDURE — 85610 PROTHROMBIN TIME: CPT | Performed by: INTERNAL MEDICINE

## 2023-10-17 PROCEDURE — 85610 PROTHROMBIN TIME: CPT | Performed by: EMERGENCY MEDICINE

## 2023-10-17 PROCEDURE — 83605 ASSAY OF LACTIC ACID: CPT

## 2023-10-17 PROCEDURE — 255N000002 HC RX 255 OP 636: Performed by: RADIOLOGY

## 2023-10-17 PROCEDURE — 88348 ELECTRON MICROSCOPY DX: CPT | Mod: 26 | Performed by: PATHOLOGY

## 2023-10-17 PROCEDURE — 85014 HEMATOCRIT: CPT | Performed by: RADIOLOGY

## 2023-10-17 PROCEDURE — B417YZZ FLUOROSCOPY OF LEFT RENAL ARTERY USING OTHER CONTRAST: ICD-10-PCS | Performed by: RADIOLOGY

## 2023-10-17 PROCEDURE — 82962 GLUCOSE BLOOD TEST: CPT

## 2023-10-17 PROCEDURE — 81001 URINALYSIS AUTO W/SCOPE: CPT | Performed by: EMERGENCY MEDICINE

## 2023-10-17 PROCEDURE — 06LB3DZ OCCLUSION OF LEFT RENAL VEIN WITH INTRALUMINAL DEVICE, PERCUTANEOUS APPROACH: ICD-10-PCS | Performed by: RADIOLOGY

## 2023-10-17 PROCEDURE — 85018 HEMOGLOBIN: CPT | Performed by: STUDENT IN AN ORGANIZED HEALTH CARE EDUCATION/TRAINING PROGRAM

## 2023-10-17 PROCEDURE — 250N000009 HC RX 250: Performed by: RADIOLOGY

## 2023-10-17 PROCEDURE — 200N000002 HC R&B ICU UMMC

## 2023-10-17 PROCEDURE — 82565 ASSAY OF CREATININE: CPT | Mod: 91

## 2023-10-17 PROCEDURE — 84484 ASSAY OF TROPONIN QUANT: CPT | Performed by: EMERGENCY MEDICINE

## 2023-10-17 PROCEDURE — 258N000003 HC RX IP 258 OP 636: Performed by: EMERGENCY MEDICINE

## 2023-10-17 PROCEDURE — 36430 TRANSFUSION BLD/BLD COMPNT: CPT | Performed by: EMERGENCY MEDICINE

## 2023-10-17 PROCEDURE — 99223 1ST HOSP IP/OBS HIGH 75: CPT | Mod: FS

## 2023-10-17 PROCEDURE — 272N000566 HC SHEATH CR3

## 2023-10-17 PROCEDURE — 88346 IMFLUOR 1ST 1ANTB STAIN PX: CPT | Mod: 26 | Performed by: PATHOLOGY

## 2023-10-17 PROCEDURE — B41CYZZ FLUOROSCOPY OF PELVIC ARTERIES USING OTHER CONTRAST: ICD-10-PCS | Performed by: RADIOLOGY

## 2023-10-17 PROCEDURE — 88305 TISSUE EXAM BY PATHOLOGIST: CPT | Mod: 26 | Performed by: PATHOLOGY

## 2023-10-17 PROCEDURE — 88350 IMFLUOR EA ADDL 1ANTB STN PX: CPT | Mod: TC | Performed by: INTERNAL MEDICINE

## 2023-10-17 PROCEDURE — 999N000133 HC STATISTIC PP CARE STAGE 2

## 2023-10-17 PROCEDURE — 84484 ASSAY OF TROPONIN QUANT: CPT | Mod: 91 | Performed by: INTERNAL MEDICINE

## 2023-10-17 PROCEDURE — 82330 ASSAY OF CALCIUM: CPT | Performed by: INTERNAL MEDICINE

## 2023-10-17 PROCEDURE — 999N000034 HC STATISTIC CODE BLUE ACCESS REQUIRED

## 2023-10-17 PROCEDURE — 82803 BLOOD GASES ANY COMBINATION: CPT

## 2023-10-17 PROCEDURE — 75726 ARTERY X-RAYS ABDOMEN: CPT

## 2023-10-17 PROCEDURE — 272N000504 HC NEEDLE CR4

## 2023-10-17 PROCEDURE — 88313 SPECIAL STAINS GROUP 2: CPT | Mod: 26 | Performed by: PATHOLOGY

## 2023-10-17 PROCEDURE — 37241 VASC EMBOLIZE/OCCLUDE VENOUS: CPT

## 2023-10-17 PROCEDURE — 258N000003 HC RX IP 258 OP 636: Performed by: NURSE PRACTITIONER

## 2023-10-17 PROCEDURE — 73030 X-RAY EXAM OF SHOULDER: CPT | Mod: 26 | Performed by: RADIOLOGY

## 2023-10-17 PROCEDURE — 76776 US EXAM K TRANSPL W/DOPPLER: CPT

## 2023-10-17 PROCEDURE — 04LA3DZ OCCLUSION OF LEFT RENAL ARTERY WITH INTRALUMINAL DEVICE, PERCUTANEOUS APPROACH: ICD-10-PCS | Performed by: RADIOLOGY

## 2023-10-17 PROCEDURE — 36415 COLL VENOUS BLD VENIPUNCTURE: CPT | Performed by: STUDENT IN AN ORGANIZED HEALTH CARE EDUCATION/TRAINING PROGRAM

## 2023-10-17 PROCEDURE — 85730 THROMBOPLASTIN TIME PARTIAL: CPT | Performed by: INTERNAL MEDICINE

## 2023-10-17 PROCEDURE — 82803 BLOOD GASES ANY COMBINATION: CPT | Mod: 91

## 2023-10-17 PROCEDURE — 250N000013 HC RX MED GY IP 250 OP 250 PS 637: Performed by: PHARMACIST

## 2023-10-17 PROCEDURE — 86923 COMPATIBILITY TEST ELECTRIC: CPT | Performed by: EMERGENCY MEDICINE

## 2023-10-17 PROCEDURE — 82947 ASSAY GLUCOSE BLOOD QUANT: CPT

## 2023-10-17 PROCEDURE — 250N000012 HC RX MED GY IP 250 OP 636 PS 637: Performed by: PHARMACIST

## 2023-10-17 PROCEDURE — 99291 CRITICAL CARE FIRST HOUR: CPT | Mod: GC | Performed by: INTERNAL MEDICINE

## 2023-10-17 PROCEDURE — C1760 CLOSURE DEV, VASC: HCPCS

## 2023-10-17 PROCEDURE — 88350 IMFLUOR EA ADDL 1ANTB STN PX: CPT | Mod: 26 | Performed by: PATHOLOGY

## 2023-10-17 PROCEDURE — 88305 TISSUE EXAM BY PATHOLOGIST: CPT | Mod: TC | Performed by: INTERNAL MEDICINE

## 2023-10-17 PROCEDURE — 70450 CT HEAD/BRAIN W/O DYE: CPT

## 2023-10-17 PROCEDURE — 250N000011 HC RX IP 250 OP 636: Mod: JZ | Performed by: RADIOLOGY

## 2023-10-17 RX ORDER — ACETAMINOPHEN 325 MG/1
650 TABLET ORAL EVERY 4 HOURS PRN
Status: DISCONTINUED | OUTPATIENT
Start: 2023-10-17 | End: 2023-10-20 | Stop reason: HOSPADM

## 2023-10-17 RX ORDER — PANTOPRAZOLE SODIUM 40 MG/1
40 TABLET, DELAYED RELEASE ORAL DAILY
Qty: 30 TABLET | Refills: 3 | Status: SHIPPED | OUTPATIENT
Start: 2023-10-17 | End: 2024-01-23

## 2023-10-17 RX ORDER — FAMOTIDINE 10 MG
10 TABLET ORAL EVERY 24 HOURS
Qty: 30 TABLET | Refills: 3 | Status: SHIPPED | OUTPATIENT
Start: 2023-10-17 | End: 2024-01-23

## 2023-10-17 RX ORDER — NALOXONE HYDROCHLORIDE 0.4 MG/ML
0.4 INJECTION, SOLUTION INTRAMUSCULAR; INTRAVENOUS; SUBCUTANEOUS
Status: DISCONTINUED | OUTPATIENT
Start: 2023-10-17 | End: 2023-10-20 | Stop reason: HOSPADM

## 2023-10-17 RX ORDER — ATORVASTATIN CALCIUM 20 MG/1
20 TABLET, FILM COATED ORAL AT BEDTIME
Qty: 30 TABLET | Refills: 11 | Status: SHIPPED | OUTPATIENT
Start: 2023-10-17

## 2023-10-17 RX ORDER — LIDOCAINE 40 MG/G
CREAM TOPICAL
Status: DISCONTINUED | OUTPATIENT
Start: 2023-10-17 | End: 2023-10-17 | Stop reason: HOSPADM

## 2023-10-17 RX ORDER — PIPERACILLIN SODIUM, TAZOBACTAM SODIUM 3; .375 G/15ML; G/15ML
3.38 INJECTION, POWDER, LYOPHILIZED, FOR SOLUTION INTRAVENOUS ONCE
Status: DISCONTINUED | OUTPATIENT
Start: 2023-10-17 | End: 2023-10-17

## 2023-10-17 RX ORDER — ONDANSETRON 2 MG/ML
4 INJECTION INTRAMUSCULAR; INTRAVENOUS EVERY 6 HOURS PRN
Status: DISCONTINUED | OUTPATIENT
Start: 2023-10-17 | End: 2023-10-20 | Stop reason: HOSPADM

## 2023-10-17 RX ORDER — LIDOCAINE HYDROCHLORIDE 10 MG/ML
1-30 INJECTION, SOLUTION EPIDURAL; INFILTRATION; INTRACAUDAL; PERINEURAL
Status: COMPLETED | OUTPATIENT
Start: 2023-10-17 | End: 2023-10-17

## 2023-10-17 RX ORDER — MAGNESIUM SULFATE HEPTAHYDRATE 40 MG/ML
4 INJECTION, SOLUTION INTRAVENOUS ONCE
Status: COMPLETED | OUTPATIENT
Start: 2023-10-17 | End: 2023-10-18

## 2023-10-17 RX ORDER — SODIUM CHLORIDE 9 MG/ML
INJECTION, SOLUTION INTRAVENOUS CONTINUOUS
Status: DISCONTINUED | OUTPATIENT
Start: 2023-10-17 | End: 2023-10-17 | Stop reason: HOSPADM

## 2023-10-17 RX ORDER — VALGANCICLOVIR 450 MG/1
450 TABLET, FILM COATED ORAL EVERY OTHER DAY
Qty: 60 TABLET | Refills: 2 | Status: SHIPPED | OUTPATIENT
Start: 2023-10-17 | End: 2023-10-23

## 2023-10-17 RX ORDER — NALOXONE HYDROCHLORIDE 0.4 MG/ML
0.2 INJECTION, SOLUTION INTRAMUSCULAR; INTRAVENOUS; SUBCUTANEOUS
Status: DISCONTINUED | OUTPATIENT
Start: 2023-10-17 | End: 2023-10-17 | Stop reason: HOSPADM

## 2023-10-17 RX ORDER — VALGANCICLOVIR 450 MG/1
450 TABLET, FILM COATED ORAL DAILY
Status: DISCONTINUED | OUTPATIENT
Start: 2023-10-18 | End: 2023-10-18

## 2023-10-17 RX ORDER — NALOXONE HYDROCHLORIDE 0.4 MG/ML
0.4 INJECTION, SOLUTION INTRAMUSCULAR; INTRAVENOUS; SUBCUTANEOUS
Status: DISCONTINUED | OUTPATIENT
Start: 2023-10-17 | End: 2023-10-17 | Stop reason: HOSPADM

## 2023-10-17 RX ORDER — TACROLIMUS 1 MG/1
2 CAPSULE ORAL 2 TIMES DAILY
Status: DISCONTINUED | OUTPATIENT
Start: 2023-10-17 | End: 2023-10-17

## 2023-10-17 RX ORDER — NALOXONE HYDROCHLORIDE 0.4 MG/ML
0.2 INJECTION, SOLUTION INTRAMUSCULAR; INTRAVENOUS; SUBCUTANEOUS
Status: DISCONTINUED | OUTPATIENT
Start: 2023-10-17 | End: 2023-10-20 | Stop reason: HOSPADM

## 2023-10-17 RX ORDER — OXYCODONE HYDROCHLORIDE 5 MG/1
5 TABLET ORAL EVERY 6 HOURS PRN
Status: DISCONTINUED | OUTPATIENT
Start: 2023-10-17 | End: 2023-10-20 | Stop reason: HOSPADM

## 2023-10-17 RX ORDER — IODIXANOL 320 MG/ML
150 INJECTION, SOLUTION INTRAVASCULAR ONCE
Status: COMPLETED | OUTPATIENT
Start: 2023-10-17 | End: 2023-10-17

## 2023-10-17 RX ORDER — MYCOPHENOLATE MOFETIL 250 MG/1
1000 CAPSULE ORAL 2 TIMES DAILY
Status: DISCONTINUED | OUTPATIENT
Start: 2023-10-17 | End: 2023-10-20 | Stop reason: HOSPADM

## 2023-10-17 RX ORDER — IOPAMIDOL 755 MG/ML
67 INJECTION, SOLUTION INTRAVASCULAR ONCE
Status: COMPLETED | OUTPATIENT
Start: 2023-10-17 | End: 2023-10-17

## 2023-10-17 RX ORDER — DEXTROSE MONOHYDRATE 25 G/50ML
25-50 INJECTION, SOLUTION INTRAVENOUS
Status: DISCONTINUED | OUTPATIENT
Start: 2023-10-17 | End: 2023-10-20 | Stop reason: HOSPADM

## 2023-10-17 RX ORDER — NYSTATIN 100000/ML
500000 SUSPENSION, ORAL (FINAL DOSE FORM) ORAL 4 TIMES DAILY
Qty: 473 ML | Refills: 1 | Status: SHIPPED | OUTPATIENT
Start: 2023-10-17 | End: 2024-01-23

## 2023-10-17 RX ORDER — ATORVASTATIN CALCIUM 20 MG/1
20 TABLET, FILM COATED ORAL AT BEDTIME
Status: DISCONTINUED | OUTPATIENT
Start: 2023-10-17 | End: 2023-10-20 | Stop reason: HOSPADM

## 2023-10-17 RX ORDER — METOPROLOL TARTRATE 25 MG/1
12.5 TABLET, FILM COATED ORAL 2 TIMES DAILY
Qty: 30 TABLET | Refills: 3 | Status: SHIPPED | OUTPATIENT
Start: 2023-10-17 | End: 2024-09-24

## 2023-10-17 RX ORDER — CALCITRIOL 0.5 UG/1
0.5 CAPSULE, LIQUID FILLED ORAL DAILY
Qty: 30 CAPSULE | Refills: 11 | Status: SHIPPED | OUTPATIENT
Start: 2023-10-17 | End: 2023-11-28

## 2023-10-17 RX ORDER — SULFAMETHOXAZOLE AND TRIMETHOPRIM 400; 80 MG/1; MG/1
1 TABLET ORAL
Status: DISCONTINUED | OUTPATIENT
Start: 2023-10-18 | End: 2023-10-20 | Stop reason: HOSPADM

## 2023-10-17 RX ORDER — CEFAZOLIN SODIUM 2 G/100ML
2 INJECTION, SOLUTION INTRAVENOUS
Status: COMPLETED | OUTPATIENT
Start: 2023-10-17 | End: 2023-10-17

## 2023-10-17 RX ORDER — AMOXICILLIN 250 MG
1-2 CAPSULE ORAL 2 TIMES DAILY
Qty: 100 TABLET | Refills: 0 | Status: SHIPPED | OUTPATIENT
Start: 2023-10-17 | End: 2024-01-23

## 2023-10-17 RX ORDER — TACROLIMUS 0.5 MG/1
0.5 CAPSULE ORAL
Status: DISCONTINUED | OUTPATIENT
Start: 2023-10-17 | End: 2023-10-17

## 2023-10-17 RX ORDER — HEPARIN SODIUM 200 [USP'U]/100ML
1 INJECTION, SOLUTION INTRAVENOUS CONTINUOUS PRN
Status: DISCONTINUED | OUTPATIENT
Start: 2023-10-17 | End: 2023-10-17 | Stop reason: HOSPADM

## 2023-10-17 RX ORDER — MYCOPHENOLATE MOFETIL 250 MG/1
1000 CAPSULE ORAL 2 TIMES DAILY
Qty: 240 CAPSULE | Refills: 11 | Status: SHIPPED | OUTPATIENT
Start: 2023-10-17 | End: 2024-02-27

## 2023-10-17 RX ORDER — SODIUM CHLORIDE 9 MG/ML
INJECTION, SOLUTION INTRAVENOUS CONTINUOUS
Status: DISCONTINUED | OUTPATIENT
Start: 2023-10-17 | End: 2023-10-18

## 2023-10-17 RX ORDER — NICOTINE POLACRILEX 4 MG
15-30 LOZENGE BUCCAL
Status: DISCONTINUED | OUTPATIENT
Start: 2023-10-17 | End: 2023-10-20 | Stop reason: HOSPADM

## 2023-10-17 RX ORDER — MAGNESIUM OXIDE 400 MG/1
400 TABLET ORAL EVERY 24 HOURS
Qty: 30 TABLET | Refills: 11 | Status: SHIPPED | OUTPATIENT
Start: 2023-10-17

## 2023-10-17 RX ORDER — POLYETHYLENE GLYCOL 3350 17 G/17G
17 POWDER, FOR SOLUTION ORAL DAILY
Qty: 510 G | Refills: 0 | Status: ON HOLD | OUTPATIENT
Start: 2023-10-17 | End: 2023-10-20

## 2023-10-17 RX ORDER — POLYETHYLENE GLYCOL 3350 17 G/17G
17 POWDER, FOR SOLUTION ORAL DAILY
Status: DISCONTINUED | OUTPATIENT
Start: 2023-10-18 | End: 2023-10-20

## 2023-10-17 RX ORDER — FENTANYL CITRATE 50 UG/ML
25-50 INJECTION, SOLUTION INTRAMUSCULAR; INTRAVENOUS EVERY 5 MIN PRN
Status: DISCONTINUED | OUTPATIENT
Start: 2023-10-17 | End: 2023-10-17 | Stop reason: HOSPADM

## 2023-10-17 RX ADMIN — SODIUM CHLORIDE 1000 ML: 9 INJECTION, SOLUTION INTRAVENOUS at 15:27

## 2023-10-17 RX ADMIN — MYCOPHENOLATE MOFETIL 1000 MG: 250 CAPSULE ORAL at 20:29

## 2023-10-17 RX ADMIN — FENTANYL CITRATE 50 MCG: 50 INJECTION, SOLUTION INTRAMUSCULAR; INTRAVENOUS at 17:26

## 2023-10-17 RX ADMIN — LIDOCAINE HYDROCHLORIDE 10 ML: 10 INJECTION, SOLUTION EPIDURAL; INFILTRATION; INTRACAUDAL; PERINEURAL at 17:35

## 2023-10-17 RX ADMIN — OXYCODONE HYDROCHLORIDE 5 MG: 5 TABLET ORAL at 22:01

## 2023-10-17 RX ADMIN — IODIXANOL 120 ML: 320 INJECTION, SOLUTION INTRAVASCULAR at 18:50

## 2023-10-17 RX ADMIN — LIDOCAINE HYDROCHLORIDE 9 ML: 10 INJECTION, SOLUTION EPIDURAL; INFILTRATION; INTRACAUDAL; PERINEURAL at 11:43

## 2023-10-17 RX ADMIN — ACETAMINOPHEN 650 MG: 325 TABLET, FILM COATED ORAL at 21:19

## 2023-10-17 RX ADMIN — IOPAMIDOL 67 ML: 755 INJECTION, SOLUTION INTRAVENOUS at 14:41

## 2023-10-17 RX ADMIN — MAGNESIUM SULFATE IN WATER 4 G: 40 INJECTION, SOLUTION INTRAVENOUS at 23:19

## 2023-10-17 RX ADMIN — HEPARIN SODIUM 1 BAG: 200 INJECTION, SOLUTION INTRAVENOUS at 17:27

## 2023-10-17 RX ADMIN — FENTANYL CITRATE 50 MCG: 50 INJECTION, SOLUTION INTRAMUSCULAR; INTRAVENOUS at 18:36

## 2023-10-17 RX ADMIN — SODIUM CHLORIDE: 9 INJECTION, SOLUTION INTRAVENOUS at 10:33

## 2023-10-17 RX ADMIN — ATORVASTATIN CALCIUM 20 MG: 20 TABLET, FILM COATED ORAL at 22:01

## 2023-10-17 RX ADMIN — FENTANYL CITRATE 50 MCG: 50 INJECTION, SOLUTION INTRAMUSCULAR; INTRAVENOUS at 17:57

## 2023-10-17 RX ADMIN — TACROLIMUS 1.5 MG: 0.5 CAPSULE ORAL at 22:02

## 2023-10-17 RX ADMIN — CEFAZOLIN SODIUM 2 G: 2 INJECTION, SOLUTION INTRAVENOUS at 17:32

## 2023-10-17 ASSESSMENT — ACTIVITIES OF DAILY LIVING (ADL)
ADLS_ACUITY_SCORE: 45
ADLS_ACUITY_SCORE: 33
ADLS_ACUITY_SCORE: 35

## 2023-10-17 NOTE — DISCHARGE INSTRUCTIONS
[facility]    Interventional Radiology  Patient Instructions Following Biopsy    AFTER YOU GO HOME  Drink plenty of fluids   Resume your regular diet, unless otherwise instructed by your Primary Physician  Relax and take it easy for 48 hours  DO NOT do any strenuous exercise or lifting (> 10 lbs) for at least 2-3 days following your procedure  Keep the dressing dry and in place for 24 hours. Replace with Band aid for 2 days.  Never leave a wet dressing in place.  Do not take a shower for at least 12 hours following your procedure. No tub bath, hot tub, or swimming for 5 days.  There should be minimum drainage from the biopsy site    CALL THE PHYSICIAN IF:  You start bleeding from the procedure site.  If you do start to bleed from that site, lie down flat and hold pressure on the site for a minimum of 10 minutes.  Your physician will tell you if you need to return to the hospital  You develop nausea or vomiting  You have excessive swelling, redness, or tenderness at the site  You have drainage that looks like it is infected.  You experience severe pain  You develop hives or a rash or unexplained itching  You develop shortness of breath  You develop a temperature of 101 degrees F or greater  You develop bloody clots or red urine after you are discharged  You develop chest pain or cough up blood, lightheadedness or fainting    [facility] INTERVENTIONAL RADIOLOGY DEPARTMENT  Procedure Physician: ADINA Blum                Date of procedure: October 17, 2023    Telephone Numbers: [phone]    Monday-Friday 7:30 am to 4:00 pm  [phone] After 4:00 pm Monday-Friday, Weekends & Holidays.   Ask for the Interventional Radiologist on call.  Someone is on call 24 hrs/day    [facility] toll free number: [phone] Monday-Friday 8:00 am to 4:30 pm  [facility] Emergency Dept: [phone]

## 2023-10-17 NOTE — CONSULTS
[facility]    Stroke Consult Note    Reason for Consult: Stroke Code     Chief Complaint: Fall      HPI  Avelina Marion is a 68 year old male with past medical history of CAD status post CABG x2 in 2023, ESRD secondary to diabetes type 2 now status post SPK with ureteral stent placement and appendectomy 9/14/2023 who was in the procedure IR suite for kidney biopsy.  Per report after biopsy was completed a noise was heard from patient's room and the patient was ultimately found on the floor unresponsive and pulseless.  Patient was not on any monitoring at that point and CPR was done for 2 minutes until ROSC after which point her monitor showed sinus tachycardia.  At that time the patient's blood glucose was in the 50s and D50 was administered.  Patient was found to have a nonreactive left pupil hence a stroke code was initiated at 1430.  At that time the patient's blood pressure was in the 100s systolic.  Patient's last known normal was 2 PM.  On exam the patient was given an NIH stroke score of 0 other than the fact that the patient had a left nonreactive and constricted pupil.  Patient was taken to CT scanner for CT head and CTA which did not show any acute intracranial pathology or any large vessel occlusions.  The patient denied any weakness, numbness, tingling, vision changes, hearing changes, incontinence bowel/bladder, word fine difficulty, slurred speech, incoordination.  After CT was completed the patient was transferred to the ED for further management post cardiac arrest and on reevaluation the patient's pupils were back to being almost equal and reactive bilaterally.    Imaging Findings  CT head/CTA-no acute intracranial pathology neurological occlusion    Intravenous Thrombolysis  Not given due to:   - minor/isolated/quickly resolving symptoms  - surgery/trauma within the past 14 days    Endovascular Treatment  Not initiated due to absence of proximal  vessel occlusion    Impression   #Isolated resolving anisocoria with left pupil constriction and nonreactivity  Avelina Marion is a 68 year old male with past medical history of CAD status post CABG x2 in 2023, ESRD secondary to diabetes type 2 now status post SPK with ureteral stent placement and appendectomy 9/14/2023 who was in the procedure IR suite for kidney biopsy.  Post kidney biopsy the patient had a fall and cardiac arrest which required CPR for 2 minutes before obtaining ROSC and was found to have unequal pupils for which stroke code was initiated.  On arrival the patient was alert and oriented was able to move all extremities without any difficulties.  Patient was given a night stroke score of 0.  Patient had a left pupil that was constricted and fixed not reacting to light before the CT scan was obtained.  CT head and CTA did not show any acute intracranial pathology or any large vessel occlusions.  After the CT scan was completed the patient's pupils were resolving and was almost equal to the right pupil and reacting to light.  As the symptoms were minor and resolving in addition to just having received a kidney biopsy decision was not taken to administer tenecteplase.  Thrombectomy was not indicated as there was no large vessel occlusion.  Our main concerns when we saw the symptoms were flash cerebral edema causing herniation and anisocoria, and aneurysm patient on the 3rd nerve causing anisocoria or any drugs that were involved in causing anisocoria which were all negative on imaging review and chart review of medications. We think this was a isolated event that is resolving and does not need any further evaluation from a vascular neurology standpoint.     Recommendations  -No further recommendations from vascular neurology standpoint.  CT head and CTA within normal limits.  -Vascular neurology signing off now kindly call us with any questions/concerns.      Thank you for this consult. No further  "stroke evaluation is recommended, so we will sign off. Please contact us with any additional questions.     The patient was discussed with the Stroke Staff is LUIS ENRIQUE Smith.    Micheal Galarza MD  Neurology Resident  ASCOM: [id number]  ______________________________________________________    Clinically Significant Risk Factors Present on Admission        # Hypokalemia: Lowest K = 1.8 mmol/L in last 2 days, will replace as needed  # Hypernatremia: Highest Na = 151 mmol/L in last 2 days, will monitor as appropriate  # Hypocalcemia: Lowest iCa = 2.9 mg/dL in last 2 days, will monitor and replace as appropriate   # Hypomagnesemia: Lowest Mg = 1.4 mg/dL in last 2 days, will replace as needed    # Coagulation Defect: INR = 2.15 (Ref range: 0.85 - 1.15) and/or PTT = 53 Seconds (Ref range: 22 - 38 Seconds), will monitor for bleeding  # Drug Induced Platelet Defect: home medication list includes an antiplatelet medication   # Hypertension: Noted on problem list     # DMII: A1C = 8.4 % (Ref range: <5.7 %) within past 6 months    # Overweight: Estimated body mass index is 27.2 kg/m  as calculated from the following:    Height as of an earlier encounter on 10/17/23: 1.816 m (5' 11.5\").    Weight as of an earlier encounter on 10/17/23: 89.7 kg (197 lb 12.8 oz).        # History of CABG: noted on surgical history    # CKD, Stage 4 (GFR 15-29): Will monitor and treat as appropriate  - last Cr =  2.0 mg/dL (Ref range: 0.7 - 1.3 mg/dL)  - last GFR = 36 mL/min/1.73m2 (Ref range: >60 mL/min/1.73m2)        Past Medical History   Past Medical History:   Diagnosis Date    Anemia in chronic kidney disease     BCC (basal cell carcinoma), face 8/9/2022    Diabetic retinopathy of both eyes (H)     End stage renal disease (H)     Hypertension     Secondary renal hyperparathyroidism (H24)     Type 2 diabetes mellitus (H)      Past Surgical History   Past Surgical History:   Procedure Laterality Date    BENCH KIDNEY  9/14/2023    Procedure: " Bench kidney;  Surgeon: Tk Ascencio MD;  Location: [facility] OR    BENCH PANCREAS N/A 2023    Procedure: Bench pancreas;  Surgeon: Tk Ascencio MD;  Location: [facility] OR    BYPASS GRAFT ARTERY CORONARY N/A 2023    Procedure: TRANSESPHAGEAL ECHOCARDIOGRAM, MEDIAN STERNOTOMY, TAKE DOWN OF LEFT INTERNAL MAMMARY, LEFT ENDOSCOPIC GREATER SAPHENOUS VEIN HARVEST, CRYO NERVE BLOCK, CARDIOPULMONARY BYPASS, CORONARY ARTERY BYPASS GRAFT X 2.;  Surgeon: Patrice Yepez MD;  Location: [facility] OR    CREATE FISTULA ARTERIOVENOUS UPPER EXTREMITY Left     CV CORONARY ANGIOGRAM N/A 2021    Procedure: CV CORONARY ANGIOGRAM;  Surgeon: Milton Cam MD;  [facility]    CV CORONARY ANGIOGRAM N/A 2023    Procedure: Coronary Angiogram;  Surgeon: Milton Cma MD;  [facility]    CV PCI N/A 2023    Procedure: Percutaneous Coronary Intervention;  Surgeon: Milton Cam MD;  Location:  [facility]    IR RENAL BIOPSY LEFT  10/17/2023    TRANSPLANT PANCREAS, KIDNEY  DONOR, COMBINED N/A 2023    Procedure: Transplant pancreas, kidney  donor, combined, appendectomy;  Surgeon: Tk Ascencio MD;  Location: [facility] OR     Medications   Home Meds  Prior to Admission medications    Medication Sig Start Date End Date Taking? Authorizing Provider   acetaminophen (TYLENOL) 500 MG tablet Take 500-1,000 mg by mouth every 6 hours as needed for mild pain    Reported, Patient   aspirin (ASA) 325 MG tablet Take 1 tablet (325 mg) by mouth daily 23   Adia Mccormack NP   atorvastatin (LIPITOR) 20 MG tablet Take 1 tablet (20 mg) by mouth at bedtime 10/17/23   Jesu Mosquera MD   Blood Glucose Monitoring Suppl (ACCU-CHEK GUIDE) w/Device KIT  21   Reported, Patient   calcitRIOL (ROCALTROL) 0.5 MCG capsule Take 1 capsule (0.5 mcg) by mouth daily 10/17/23   Jesu Mosquera MD    calcium carbonate-vitamin D (OSCAL) 500-5 MG-MCG tablet Take 2 tablets by mouth 2 times daily 10/17/23   Jesu Mosquera MD   famotidine (PEPCID) 10 MG tablet Take 1 tablet (10 mg) by mouth every 24 hours 10/17/23   Jesu Mosquera MD   magnesium oxide (MAG-OX) 400 MG tablet Take 1 tablet (400 mg) by mouth every 24 hours 10/17/23   Jesu Mosquera MD   metoprolol tartrate (LOPRESSOR) 25 MG tablet Take 0.5 tablets (12.5 mg) by mouth 2 times daily 10/17/23   Jesu Mosquera MD   [facility]. Devices (PILL SPLITTER) MISC 1 applicator daily 9/22/23   Adia Mccormack NP   mycophenolate (GENERIC EQUIVALENT) 250 MG capsule Take 4 capsules (1,000 mg) by mouth 2 times daily 10/17/23   Jesu Mosquera MD   nystatin (MYCOSTATIN) 707264 UNIT/ML suspension Take 5 mLs (500,000 Units) by mouth 4 times daily 10/17/23   Jesu Mosquera MD   pantoprazole (PROTONIX) 40 MG EC tablet Take 1 tablet (40 mg) by mouth daily 10/17/23   Jesu Mosquera MD   polyethylene glycol (MIRALAX) 17 GM/Dose powder Take 17 g by mouth daily 10/17/23   Jesu Mosquera MD   psyllium (METAMUCIL/KONSYL) 58.6 % powder Take 6 g (1 teaspoonful) by mouth daily Can increase up to 3 TIMES DAILY if needed. 9/25/23   Jeffrey Chaparro MD   senna-docusate (SENOKOT-S/PERICOLACE) 8.6-50 MG tablet Take 1-2 tablets by mouth 2 times daily HOLD for loose stools 10/17/23   Jesu Mosquera MD   sodium bicarbonate 650 MG tablet Take 2 tablets (1,300 mg) by mouth 2 times daily 10/5/23   Jeffrey Chaparro MD   sulfamethoxazole-trimethoprim (BACTRIM) 400-80 MG tablet Take 1 tablet by mouth three times a week 10/4/23   Jeffrey Chaparro MD   tacrolimus (GENERIC) 0.5 MG capsule Hold for dose changes 10/5/23   Jeffrey Chaparro MD   tacrolimus (GENERIC) 1 MG capsule Take 2 capsules (2 mg) by mouth 2 times daily 10/5/23   Jeffrey Chaparro MD   valGANciclovir (VALCYTE) 450 MG tablet Take 1 tablet (450 mg) by mouth every other day Titrate  up to 2 tablets by mouth daily as directed by transplant team (based on kidney function) 10/17/23   Jesu Mosquera MD       Scheduled Meds   pharmacy alert - intermittent dosing  1 each Other See Admin Instructions    piperacillin-tazobactam  3.375 g Intravenous Once    sodium chloride 0.9%  1,000 mL Intravenous Once       Infusion Meds      PRN Meds      Allergies   No Known Allergies  Family History   Family History   Problem Relation Age of Onset    Diabetes Brother     Kidney Disease Brother      Social History   Social History     Tobacco Use    Smoking status: Former     Types: Cigarettes    Smokeless tobacco: Never    Tobacco comments:     Quit 1984   Vaping Use    Vaping Use: Never used   Substance Use Topics    Alcohol use: Never    Drug use: Never       Review of Systems   The 10 point Review of Systems is negative other than noted in the HPI or here.        PHYSICAL EXAMINATION  Temp:  [97.8  F (36.6  C)] 97.8  F (36.6  C)  Pulse:  [64-80] 79  Resp:  [14-24] 24  BP: (105-135)/(55-67) 108/56  Cuff Mean (mmHg):  [103] 103  SpO2:  [94 %-100 %] 97 %     General Exam  General:  patient lying in bed without any acute distress    HEENT:  normocephalic/atraumatic  Cardio:  RRR  Pulmonary:  no respiratory distress  Abdomen:  soft  Extremities:  no edema  Skin:  intact     Neuro Exam  Mental Status:  alert, oriented x 3, follows commands, speech clear and fluent, naming and repetition normal  Cranial Nerves:  visual fields intact, right pupil 4 mm reactive to light, left pupil 2 mm not reacting to light (left pupil resolved and was 3.5-4 mm after CT scan and was reactive to light), EOMI with normal smooth pursuit, facial sensation intact and symmetric, facial movements symmetric, hearing not formally tested but intact to conversation, palate elevation symmetric and uvula midline, no dysarthria, shoulder shrug strong bilaterally, tongue protrusion midline.   Motor:  normal muscle tone and bulk, no abnormal  movements, able to move all limbs spontaneously, strength 5/5 throughout upper and lower extremities, no pronator drift  Reflexes:  toes down-going  Sensory:  light touch sensation intact and symmetric throughout upper and lower extremities, no extinction on double simultaneous stimulation   Coordination:  normal finger-to-nose and heel-to-shin bilaterally without dysmetria, rapid alternating movements symmetric  Station/Gait:  deferred    Dysphagia Screen  Per Nursing    Stroke Scales    NIHSS  1a. Level of Consciousness 0-->Alert, keenly responsive   1b. LOC Questions 0-->Answers both questions correctly   1c. LOC Commands 0-->Performs both tasks correctly   2.   Best Gaze 0-->Normal   3.   Visual 0-->No visual loss   4.   Facial Palsy 0-->Normal symmetrical movements   5a. Motor Arm, Left 0-->No drift, limb holds 90 (or 45) degrees for full 10 secs   5b. Motor Arm, Right 0-->No drift, limb holds 90 (or 45) degrees for full 10 secs   6a. Motor Leg, Left 0-->No drift, leg holds 30 degree position for full 5 secs   6b. Motor Leg, right 0-->No drift, leg holds 30 degree position for full 5 secs   7.   Limb Ataxia 0-->Absent   8.   Sensory 0-->Normal, no sensory loss   9.   Best Language 0-->No aphasia, normal   10. Dysarthria 0-->Normal   11. Extinction and Inattention  0-->No abnormality   Total 0 (10/17/23 1554)       Modified Александр Score (Pre-morbid)  0-No deficits    Imaging  I personally reviewed all imaging; relevant findings per HPI.     Lab Results Data   CBC  Recent Labs   Lab 10/17/23  1517 10/17/23  1457 10/17/23  1453 10/17/23  1425 10/17/23  1425 10/16/23  0820 10/12/23  0835   WBC  --   --   --   --   --  7.1 4.5   RBC  --   --   --   --   --  2.70* 2.93*   HGB <5.1* 5.1* <5.1*   < > <5.1* 8.7* 9.6*   HCT <15*  --  <15*  --  <15* 27.3* 29.9*   PLT  --   --   --   --   --  188 232    < > = values in this interval not displayed.     Basic Metabolic Panel    Recent Labs   Lab 10/17/23  1517 10/17/23  9975  "10/17/23  1453 10/17/23  1448 10/17/23  1425 10/17/23  1422 10/16/23  0820 10/12/23  0835     --  141  --  151*  --  139 138   POTASSIUM 3.5  --  3.3*  --  <2.0*  --  4.8 5.0   CHLORIDE  --   --   --   --   --   --  106 105   CO2  --   --   --   --   --   --  22 23   BUN  --   --   --   --   --   --  25.7* 22.7   CR  --  2.0*  --   --   --   --  2.59* 2.39*   *  --  188* 175* 56*   < > 117* 104*   DAVID  --   --   --   --   --   --  9.4 9.9    < > = values in this interval not displayed.     Liver Panel  No results for input(s): \"PROTTOTAL\", \"ALBUMIN\", \"BILITOTAL\", \"ALKPHOS\", \"AST\", \"ALT\", \"BILIDIRECT\" in the last 168 hours.  INR    Recent Labs   Lab Test 10/17/23  1423 10/17/23  1032 09/15/23  0447   INR 2.15* 1.11 1.11      Lipid Profile    Recent Labs   Lab Test 12/19/22  1744   CHOL 116   HDL 51   LDL 43   TRIG 109     A1C    Recent Labs   Lab Test 09/15/23  0447 02/07/23  1644 08/09/22  0626   A1C 8.4* 7.1* 7.0*     Troponin    Recent Labs   Lab 10/17/23  1423   CTROPT 19          Stroke Code Data Data   Stroke Code Data  (for stroke code without tele)  Stroke code activated 10/17/23   1430   First stroke provider response         Last known normal         Time of discovery   (or onset of symptoms) 10/17/23   1400   Head CT read by Stroke Neuro Dr/Provider 10/17/23   1450   Was stroke code de-escalated? Yes 10/17/23 1504               "

## 2023-10-17 NOTE — PROGRESS NOTES
Code Blue was called for Avelina Marion.  I evaluated the patient at the bedside for potential ECPR in collaboration with the on-call ECPR staff and standard guidelines.  This patient is not eligible for ECPR due to the following concerns: ROSC    The patient was discussed with Dr. Cam.    Jeffrey Roland MD  Cardiology Fellow  Pager: [phone]     October 17, 2023

## 2023-10-17 NOTE — PHARMACY-CONSULT NOTE
Pharmacy Stroke Code Response    Pharmacist responded as part of the Stroke Code Team activation to patient care area ED1. The Stroke Team determined that the patient was not a candidate for IV thrombolytic therapy and the pharmacy team was dismissed at 1455.    Michelet Flowers PharmD, BCPS

## 2023-10-17 NOTE — ED NOTES
Lab called to report low HGB 3.8 - not released, advised to recollect via phlebotomy  as it was drawn from an IV line. Did not get a chance to redraw due to procedures and pt was shortly taken to IR. Report called to receiving unit MICU to obtain redraw once pt gets there

## 2023-10-17 NOTE — CODE/RAPID RESPONSE
Code blue note: Patient had an outpatient renal biopsy by IR today at 12:00 and tolerated the procedure well with minimal blood loss. Post-procedurally he was doing well and was able to eat without issue. His IV was removed at about 2pm and at that time was neurologically intact per bedside RN. A few minutes later, another RN on the unit heard what sounded like a fall from the patient's room and found the patient unresponsive on the floor without a pulse. Chest compressions were imitated and code blue was called. He received 3 minutes of CPR with ROSC and was able to state his name by the time the code team arrived. Exam was notable for fixed non-reactive left pupil at 1cm and right was 3cm and reactive without other neurologic deficits so code stroke was called. Venous Istat was notable for pH 7.267, CO2 16.6, PO2 26, Na 151, K <2.0, iCa 2.9, glucose 56, hematocrit <15 concerning for saline contamination. Labs sent for CMP, Mg, PO4, CBC, VBG. Sign out was given to Neurology and patient transferred to the ED for further evaluation.    Code initiated at 14:10pm  Attending physician: Dr. Rick  Interventions: see code record  Family notified No    King De Leon MD  Internal Medicine and Pediatrics PGY3  MICU 1 Senior Resident

## 2023-10-17 NOTE — ED TRIAGE NOTES
pt in here for procedure (kidney biopsy). Patient got up fell states he felt slightly dizziness. Pt found to have no pulse. Cpr for 3 mins. L pupil blown, right pupil reactive. Stroke code called. Received cta of head. Has c collar.

## 2023-10-17 NOTE — LETTER
"[facility] UN[address] [place] [id number]  Phone: [phone]      REPORT OF WORK ABILITY    NOTE TO EMPLOYEE: You must promptly provide a copy of this report to your  employer or worker's compensation insurer, and Qualified Rehabilitation Consultant.    Date: 10/17/2023                     Employee Name: Avelina Marion         YOB: 1955  Medical Record Number: [id number]   Soc.Sec.No: [id number]  Employer: None                Date of Injury: ***  Managed Care Organization / Insurance Company Name: {:[id number]    Diagnosis: ***  Work Related: {:637982::\"yes\"}     MMI: {:[id number]}   Permanent Partial Disability(PPD) likely: {:682848::\"unknown\"}    EMPLOYEE IS ABLE TO WORK: {:299232}     RESTRICTIONS IF ANY:     {[place] RESTRICTIONS:812420}    OTHER RESTRICTIONS: {:729576}    TREATMENT PLAN/NOTES: {[place] WORKABILITY ACTIVITIES:886711}.      {:105491}/***  "

## 2023-10-17 NOTE — PROCEDURES
Avelina F Sanam  [id number]    Completed ultrasound guided LLQ transplant kidney biopsy.  A total of three passes yielded tissue cores collected for further pathological evaluation.  Microscopy support present.  No immediate complications.  Dx: s/p kidney transplant.  Navarro/Ana.  LOCAL

## 2023-10-17 NOTE — IR NOTE
Patient Name: Avelina Marion  Medical Record Number: [id number]  Today's Date: 10/17/2023    Procedure: Diagnostic pelvic Angiogram with embolization  Proceduralist: Dr. Robertson, Dr. Montes   Pathology present: na    Procedure Start: 1716  Procedure end: 1845  Sedation medications administered:   Fentanyl 150 mcg     Total of 2 units of PRBCs given. One in ED and one in IR.  Hgb after 1st unit was 8.3  Second hgb check sent to lab at 1840  1 unit of plasma infusing.    Report given to:  RN  : demetrius    Other Notes: Pt arrived to IR room 5 from ED with one unit of blood transfusing. Consent reviewed. Pt denies any questions or concerns regarding procedure. Pt positioned supine and monitored per protocol.     Pt tolerated procedure without any noted complications.     Left groin puncture-  Angioseal deployed at 1845.  Flat Bedrest x 2 hours.  Ambulation time: 2045    Pt transferred back to  by this writer.  One bag of belongings sent with patient along with cell phone.

## 2023-10-17 NOTE — PROGRESS NOTES
Discharge instructions reviewed with patient. VSS, LLQ kidney biopsy site stable, pt denied pain. Patient voided clear, yellow urine. Patient tolerated PO intake and ambulated. PIV was removed. Writer told patient he could get dressed and exited the room. A couple of minutes later patient was found on the ground pulseless. Code blue called. CPR started by 2A staff.

## 2023-10-17 NOTE — PROGRESS NOTES
Arrived back to Unit 2a post tx kidney bx procedure in IR. VSS. Denies pain. LLQ procedure site C/D/I; no bleeding hematoma noted. Stable.

## 2023-10-17 NOTE — PROCEDURES
[facility]    Procedure: IR Procedure Note    Date/Time: 10/17/2023 6:55 PM    Performed by: Patrice Montes MD  Authorized by: Patrice Montes MD  IR Fellow Physician: Patrice Montes  Other(s) attending procedure: Miltonsilvana Brandyn      UNIVERSAL PROTOCOL   Site Marked: NA  Prior Images Obtained and Reviewed:  Yes  Required items: Required blood products, implants, devices and special equipment available    Patient identity confirmed:  Verbally with patient, arm band, provided demographic data and hospital-assigned identification number  Patient was reevaluated immediately before administering moderate or deep sedation or anesthesia  Confirmation Checklist:  Patient's identity using two indicators, relevant allergies, procedure was appropriate and matched the consent or emergent situation and correct equipment/implants were available  Time out: Immediately prior to the procedure a time out was called    Universal Protocol: the Joint Commission Universal Protocol was followed    Preparation: Patient was prepped and draped in usual sterile fashion       ANESTHESIA    Anesthesia: Local infiltration  Local Anesthetic:  Lidocaine 1% without epinephrine  Anesthetic Total (mL):  10      SEDATION    Patient Sedated: No    See dictated procedure note for full details.  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    Specimens: none    Complications: None    Condition: Stable    Plan: Return to patient care unit.  Bedrest for 2 hours with left leg straight.        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 No other evidence of vessel injury identified.  Patient Tolerance:  Patient tolerated the procedure well with no immediate complications  Length of time physician/provider  present for 1:1 monitoring during sedation: 0

## 2023-10-17 NOTE — CONSULTS
[facility]  Transplant Nephrology Consult  Date of Admission:  10/17/2023  Today's Date: 10/17/2023  Requesting physician: Louise Barros MD    Recommendations:  - Non contrast CT a/p.  - Pt to go to IR for angiogram to see if there is active bleeding from kidney txp.  - Follow-up CTA head.  - Continue current PTA immune suppression.  - Follow-up renal transplant biopsy results.  - Follow up DSA  - Serial Hb    Assessment & Plan   # DDKT (SPK): Increased, awaiting repeat labs   - Baseline Creatinine: ~ 2.2-2.5, von Scr 2.2.    - Proteinuria: Normal (<0.2 grams)   - Date DSA Last Checked: Sep/2023      Latest DSA: No DSA at time of transplant, repeat pending   - BK Viremia: No   - Kidney Tx Biopsy: Oct 17, 2023; Result: (Result Pending)     -Double J stent placed at time of kidney transplant. Removal scheduled 10/23.     # Pancreas Tx (SPK):    - Pancreatic Exocrine Drainage: Enteric drained     - Blood glucose: Euglycemia      On insulin: No   - HbA1c: Last checked at time of transplant      Latest HbA1c: 8.4%    - Pancreatic enzymes: Trend down   - Date DSA Last Checked: Sep/2023  Latest DSA: No DSA at time of transplant, repeat pending   - Pancreas Tx Biopsy: No    # Immunosuppression: Tacrolimus immediate release (goal 8-10) and Mycophenolate mofetil (dose 1000 mg every 12 hours)   - Patient is in an immunosuppressed state and will continue to monitor for efficacy and toxicity of immunosuppression medications.   - Induction with Recent Transplant:  High Intensity Protocol    - Changes: No     # Infection Prophylaxis:   - PJP: Sulfa/TMP (Bactrim)  - CMV: Valganciclovir (Valcyte), x3m, CMV -/-    # Hypertension: Hypotensive currently;  Goal BP: < 140/90 (Hospitalization goal) but MAP>65   - Volume status: Hypovolemic     - Changes:  Continue PRBCs and IV fluids . Hold metoprolol    # Concern for Acute Blood Loss with peritransplant hematoma:    -underwent  transplant renal biopsy around noon 10/17/23 and collapsed in his room around 2 PM and a code blue was called. He had ROSC after 3m of CPR and was neurologically intact   -CT A/P non con   -Return to IR now for angiogram to look for active bleed    # Concern for Stroke:   - exam after ROSC was notable for fixed non-reactive left pupil at 1cm, and right was 3cm and reactive without other neurologic deficits   -CTA head 10/17/23 was negative    # Anemia in Chronic Renal Disease: Hgb: Decreased from 8->5      MCKENNA: No   - Iron studies: Low iron saturation, but high ferritin    -Massive transfusion protocol.    -IR angio to look for active bleeding    # Mineral Bone Disorder:   - Secondary renal hyperparathyroidism; PTH level: Minimally elevated ( pg/ml) 9/29/2023        On treatment: on Calcitriol 0.5 mcg daily PTA for hypocalcemia  - Vitamin D; level: Low           On supplement: No  - Calcium; level: Normal           On supplement: No, on Oscal 2 tabs BID PTA  - Phosphorus; level: Normal          On supplement: No    # Electrolytes:   - Potassium; level: Normal        On supplement: No  - Magnesium; level: Low        On supplement: No, magnesium oxide 400 mg PO daily PTA  - Bicarbonate; level: Normal        On supplement: No     # CAD: S/p CABG 2/2023. On metoprolol 12.5 mg PO BID PTA, hold 2/2 hypotension    # PAD    #Non-melanotic skin cancer: S/p Mohs 8/2022    # Transplant History:  Etiology of Kidney Failure: Diabetes mellitus type 2  Tx: DDKT (SPK)  Transplant: 9/14/2023 (Kidney / Pancreas)  Significant changes in immunosuppression: None  Significant transplant-related complications: None    Recommendations were communicated to the primary team verbally.    Seen and discussed with Dr. Chaparro.    KWASI Nj CNP  Pager: [phone]      Physician Attestation     I saw and evaluated Avelina Marion as part of a shared APRN/PA visit.     I personally reviewed the vital signs, medications, labs and  imaging.    I personally performed the substantive portion of the medical decision making for this visit - please see the VIVI's documentation for full details.    Key management decisions made by me and carried out under my direction: Sent to IR now for urgent angiogram to look for active extravasation and bleeding from kidney transplant after biopsy today with plan to embolize if necessary.  Follow-up CMP, serial CBC, central access for massive transfusion by IR, after IR patient should go to SICU.  Monitor urine output, follow-up PSA and kidney transplant ultrasound.    I personally performed the substantive portion of the history for this visit - please see the VIVI's documentation for full details.  Key additional history findings made by me: Patient stated that he felt fine but after talking to members of interventional radiology as well as ER staff the patient had a fall about 2 hours after his biopsy, hit his head, was found on the floor, nonresponsive, pulseless, received about 3 minutes of CPR with ROSC.  Upon awakening he was neurologically intact but had a fixed pupil.  A code stroke was called at that time.  I-STAT labs showed hemoglobin of less than 5.1 but it is unclear if this was contaminated with IV fluids.  Labs have been very slow to come back unfortunately.  Most recent hemoglobin was 5.1 and patient is receiving multiple units of PRBC while in interventional radiology.    Jeffrey Chaparro MD  Date of Service (when I saw the patient): 10/17/23      REASON FOR CONSULT   History of Pancreas and Kidney Transplant    History of Present Illness   Avelina Marion is a 68 year old male with history of ESRD 2/2 DM2 s/p simultaneous pancreas and kidney transplant (9/14/2023), CAD s/p CABG (2/2023), PAD, and BCC w/p Mohs (8/2022). His creatinine reached a plateau post transplant with a von of 2.20 and favio as high as 3.15 earlier this month.  He underwent transplant renal biopsy around noon today and  collapsed in his room around 2 PM and a code blue was called.  The code report states he received 3 minutes of CPR with ROSC, exam was notable for fixed non-reactive left pupil at 1cm and right was 3cm and reactive without other neurologic deficits so code stroke was called.    SBP stable in 100s at this time of our visit this afternoon.  POC renal transplant US showed renal transplant hematoma.  Patient appeared pale.  Most recent Hgb 5.1, however it is unclear if it is an accurate sample.  PRBCs infusing. No increased WOB on room air.  Denies chest pain.              Review of Systems    The 10 point Review of Systems is negative other than noted in the HPI or here.     Past Medical History    I have reviewed this patient's medical history and updated it with pertinent information if needed.   Past Medical History:   Diagnosis Date     Anemia in chronic kidney disease      BCC (basal cell carcinoma), face 8/9/2022     Diabetic retinopathy of both eyes (H)      End stage renal disease (H)      Hypertension      Secondary renal hyperparathyroidism (H24)      Type 2 diabetes mellitus (H)        Past Surgical History   I have reviewed this patient's surgical history and updated it with pertinent information if needed.  Past Surgical History:   Procedure Laterality Date     BENCH KIDNEY  9/14/2023    Procedure: Bench kidney;  Surgeon: Tk Ascencio MD;  Location: [facility] OR     BENCH PANCREAS N/A 9/14/2023    Procedure: Bench pancreas;  Surgeon: Tk Ascencio MD;  Location: [facility] OR     BYPASS GRAFT ARTERY CORONARY N/A 2/13/2023    Procedure: TRANSESPHAGEAL ECHOCARDIOGRAM, MEDIAN STERNOTOMY, TAKE DOWN OF LEFT INTERNAL MAMMARY, LEFT ENDOSCOPIC GREATER SAPHENOUS VEIN HARVEST, CRYO NERVE BLOCK, CARDIOPULMONARY BYPASS, CORONARY ARTERY BYPASS GRAFT X 2.;  Surgeon: Patrice Yepez MD;  Location: [facility] OR     CREATE FISTULA ARTERIOVENOUS UPPER EXTREMITY Left      CV CORONARY ANGIOGRAM N/A  2021    Procedure: CV CORONARY ANGIOGRAM;  Surgeon: Milton Cam MD;  Location:  [facility]     CV CORONARY ANGIOGRAM N/A 2023    Procedure: Coronary Angiogram;  Surgeon: Milton Cam MD;  Location:  [facility]     CV PCI N/A 2023    Procedure: Percutaneous Coronary Intervention;  Surgeon: Milton Cam MD;  Location:  [facility]     IR RENAL BIOPSY LEFT  10/17/2023     TRANSPLANT PANCREAS, KIDNEY  DONOR, COMBINED N/A 2023    Procedure: Transplant pancreas, kidney  donor, combined, appendectomy;  Surgeon: Tk Ascencio MD;  Location:  OR       Family History   I have reviewed this patient's family history and updated it with pertinent information if needed.   Family History   Problem Relation Age of Onset     Diabetes Brother      Kidney Disease Brother        Social History   I have reviewed this patient's social history and updated it with pertinent information if needed. Avelina Marion  reports that he has quit smoking. His smoking use included cigarettes. He has never used smokeless tobacco. He reports that he does not drink alcohol and does not use drugs.    Allergies   No Known Allergies  Prior to Admission Medications     pharmacy alert - intermittent dosing  1 each Other See Admin Instructions     piperacillin-tazobactam  3.375 g Intravenous Once     sodium chloride 0.9%  1,000 mL Intravenous Once         Physical Exam   Temp  Av.8  F (36.6  C)  Min: 97.8  F (36.6  C)  Max: 97.8  F (36.6  C)      Pulse  Av.1  Min: 64  Max: 80 Resp  Av.1  Min: 14  Max: 24  SpO2  Av.7 %  Min: 94 %  Max: 100 %     /56   Pulse 79   Resp 24   SpO2 97%     Admit       GENERAL APPEARANCE: no distress and pale  HENT: oral mucous membranes moist  RESP: lungs clear to auscultation - no rales, rhonchi or wheezes  CV: regular rhythm, normal rate, no rub, no murmur  EDEMA: no LE edema  bilaterally  ABDOMEN: soft, nondistended, nontender, bowel sounds normal  MS: extremities normal - no gross deformities noted, no evidence of inflammation in joints, no muscle tenderness  SKIN: no suspicious lesions or rashes  NEURO: mentation intact  PSYCH: fatigued  DIALYSIS ACCESS:  LUE AV fistula +bruit/+thrill    Data   CMP  Recent Labs   Lab 10/17/23  1517 10/17/23  1455 10/17/23  1453 10/17/23  1448 10/17/23  1425 10/17/23  1423 10/17/23  1422 10/16/23  0820 10/12/23  0835     --  141  --  151*  --   --  139 138   POTASSIUM 3.5  --  3.3*  --  <2.0*  --   --  4.8 5.0   CHLORIDE  --   --   --   --   --   --   --  106 105   CO2  --   --   --   --   --   --   --  22 23   ANIONGAP  --   --   --   --   --   --   --  11 10   *  --  188* 175* 56*  --    < > 117* 104*   BUN  --   --   --   --   --   --   --  25.7* 22.7   CR  --  2.0*  --   --   --   --   --  2.59* 2.39*   GFRESTIMATED  --  36*  --   --   --   --   --  26* 29*   DAVID  --   --   --   --   --   --   --  9.4 9.9   MAG  --   --   --   --   --   --   --  1.4*  --    PHOS  --   --   --   --   --  1.3*  --  3.2  --     < > = values in this interval not displayed.     CBC  Recent Labs   Lab 10/17/23  1517 10/17/23  1457 10/17/23  1453 10/17/23  1425 10/16/23  0820 10/12/23  0835   HGB <5.1* 5.1* <5.1* <5.1* 8.7* 9.6*   WBC  --   --   --   --  7.1 4.5   RBC  --   --   --   --  2.70* 2.93*   HCT <15*  --  <15* <15* 27.3* 29.9*   MCV  --   --   --   --  101* 102*   MCH  --   --   --   --  32.2 32.8   MCHC  --   --   --   --  31.9 32.1   RDW  --   --   --   --  14.1 14.1   PLT  --   --   --   --  188 232     INR  Recent Labs   Lab 10/17/23  1423 10/17/23  1032   INR 2.15* 1.11   PTT 53*  --      ABG  Recent Labs   Lab 10/17/23  1457 10/17/23  1423   O2PER 21 15      Urine Studies  Recent Labs   Lab Test 09/26/23  1429 09/14/23  1643 02/12/23  1539 08/09/22  1017   COLOR Light Yellow Light Yellow Yellow Light Yellow   APPEARANCE Clear Clear Slightly  Cloudy* Clear   URINEGLC Negative 500* 100* 70*   URINEBILI Negative Negative Negative Negative   URINEKETONE Negative Negative Negative Negative   SG 1.016 1.017 1.015 1.017   UBLD Trace* Small* Trace* Small*   URINEPH 6.5 6.5 5.5 6.5   PROTEIN 20* 100* 100* 70*   NITRITE Negative Negative Negative Negative   LEUKEST Trace* Negative Negative Negative   RBCU 5* 1 3* 6*   WBCU 8* 1 2 1     No lab results found.  PTH  Recent Labs   Lab Test 09/29/23  0848 09/24/23  0835   PTHI 103* 7*     Iron Studies  Recent Labs   Lab Test 09/29/23  0848 09/24/23  0835 02/16/23  0834   IRON 27* 32* 24*   * 151* 121*   IRONSAT 16 21 20   TREMAINE 1,256* 1,164* 1,461*       IMAGING:  All imaging studies reviewed by me.

## 2023-10-17 NOTE — PROGRESS NOTES
Stroke Code Nurse-Responder Note    Arrival Time to Stroke Code: 1430    Stroke Code Team interventions:   - De-escalated at 1504  by Neuro Stroke    ED/Bedside Nurse providing handoff: 2A RN Danae ERICKSON    Time left for CT: 1448    Time arrived to next location (ED/Unit/IR): 1552    ED/Bedside Nurse given handoff (name/time): Report given ED KARINE ALMANZA    Code Stroke deescalated at 1504    DEEDEE NUNEZ, RN

## 2023-10-17 NOTE — H&P
[facility] ICU ADMISSION NOTE  10/17/2023    PRIMARY TEAM: Transplant  PRIMARY PHYSICIAN: Dr. Barros    REASON FOR CRITICAL CARE ADMISSION: Hemodynamic monitoring   ADMITTING PHYSICIAN: Dr. Duarte    ASSESSMENT:   Avelina Marion is a 68 year old male with past medical history of CAD status post CABG x2 in 2023, ESRD secondary to diabetes type 2 now status post SPK with ureteral stent placement and appendectomy 9/14/2023 who was in the procedure IR suite for kidney biopsy today 10/17  Per report after biopsy was completed a noise was heard from patient's room and the patient was ultimately found on the floor unresponsive and pulseless.  Patient was not on any monitoring at that point and CPR was done for 2 minutes until ROSC after which point her monitor showed sinus tachycardia. Patient was found to have a nonreactive left pupil hence a stroke code was initiated at 1430. Patient was taken to CT scanner for CT head and CTA which did not show any acute intracranial pathology or any large vessel occlusions. He underwent a coil emobolization of AV fistula in IR. He is admitted to the SICU for hemodynamic monitoring.     PLAN:     Neuro/ pain/ sedation:  #Isolated anisocoria   - Monitor neurological status. Notify provider for any acute changes in exam.  - Stroke code called for anisocoria. CT/ CTA Head with no aneurysm or stenosis of the major intracranial arteries, no stenosis of the major cervical arteries. No acute intracranial pathology.      Pulmonary :   - Supplemental oxygen to keep saturation above 92 %.     Cardiovascular:    #CAD s/p CAB x2 2023  #Cardiopulmonary arrest  - Monitor hemodynamic status.   - Found down in IR post procedure, received 3 mins of CPR with ROSC.   - MAP goal >65     GI/Nutrition:   #S/p SPK  - CLD  - Nutrition consulted. Appreciate recommendations.     Renal/ Fluid Balance:    #s/p SPK  #s/p renal biopsy 10/17  - Will monitor intake and output.  - Underwent renal biopsy 10/17  given elevated creatinine.   - PO fluids hydration  - ICU electrolyte replacement protocol  - Renal US: hematoma at biopsy site, patent flow, no hydronephrosis  - Transplant nephrology consult. Will f/u in AM if non-con CT still wanted     Endocrine:    #DM II  - Sliding scale for diabetes management.      ID:  - No indication for antibiotics.   - Immunosuppression: MMF, Tacrolimus, Prednisone  - Opportunistic pathogen prophylaxis: Valcyte, Bactrim     Hematology:     #Acute blood loss anemia  #Coagulopathy 2/2 bleeding  - Initial  hemoglobin <5. Repeat >8  - INR 2.15, PTT 53  - Products transfused: 2 units PRBC, 1 FFP     MSK:    - PT and OT consulted. Appreciate recommendations.  - R shoulder XR     Lines/ tubes/ drains:  - PIV x2, lim     Prophylaxis:    - DVT prophylaxis: Mechanical.No chemical DVT prophylaxis due to high risk of bleeding.     Code status:  - Full     Disposition:  -  ICU.     Patient seen, findings and plan discussed with ICU staff.    Chery Beasley MD, PharmD  General Surgery, PGY2  Pager [phone]    - - - - - - - - - - - - - - - - - - - - - - - - - - - - - - - - - - - - - - - - - - - - - - - - - - - - - - - - - - - - - - - - - - - - - - - -     HISTORY PRESENTING ILLNESS: Avelina Marion is a 68 year old male with past medical history of CAD status post CABG x2 in 2023, ESRD secondary to diabetes type 2 now status post SPK with ureteral stent placement and appendectomy 9/14/2023 who was in the procedure IR suite for kidney biopsy today 10/17  Per report after biopsy was completed a noise was heard from patient's room and the patient was ultimately found on the floor unresponsive and pulseless.  Patient was not on any monitoring at that point and CPR was done for 2 minutes until ROSC after which point her monitor showed sinus tachycardia. Patient was found to have a nonreactive left pupil hence a stroke code was initiated at 1430. Patient was taken to CT scanner for CT head and CTA which did  not show any acute intracranial pathology or any large vessel occlusions. He underwent a coil emobolization of AV fistula in IR. At this time, he endorses mild right shoulder pain but otherwise denies any symptoms.     REVIEW OF SYSTEMS: 10 point ROS neg other than the symptoms noted above in the HPI.    PAST MEDICAL HISTORY:   Past Medical History:   Diagnosis Date    Anemia in chronic kidney disease     BCC (basal cell carcinoma), face 2022    Diabetic retinopathy of both eyes (H)     End stage renal disease (H)     Hypertension     Secondary renal hyperparathyroidism (H24)     Type 2 diabetes mellitus (H)        SURGICAL HISTORY:   Past Surgical History:   Procedure Laterality Date    BENCH KIDNEY  2023    Procedure: Bench kidney;  Surgeon: Tk Ascencio MD;  Location: [facility] OR    BENCH PANCREAS N/A 2023    Procedure: Bench pancreas;  Surgeon: Tk Ascencio MD;  Location: [facility] OR    BYPASS GRAFT ARTERY CORONARY N/A 2023    Procedure: TRANSESPHAGEAL ECHOCARDIOGRAM, MEDIAN STERNOTOMY, TAKE DOWN OF LEFT INTERNAL MAMMARY, LEFT ENDOSCOPIC GREATER SAPHENOUS VEIN HARVEST, CRYO NERVE BLOCK, CARDIOPULMONARY BYPASS, CORONARY ARTERY BYPASS GRAFT X 2.;  Surgeon: Patrice Yepez MD;  Location: [facility] OR    CREATE FISTULA ARTERIOVENOUS UPPER EXTREMITY Left     CV CORONARY ANGIOGRAM N/A 2021    Procedure: CV CORONARY ANGIOGRAM;  Surgeon: Milton Cam MD;  Location:  [facility]ARD[facility] CATH LAB    CV CORONARY ANGIOGRAM N/A 2023    Procedure: Coronary Angiogram;  Surgeon: Milton Cam MD;  Location:  [facility] CATH LAB    CV PCI N/A 2023    Procedure: Percutaneous Coronary Intervention;  Surgeon: Milton Cam MD;  Location:  [facility] CATH LAB    IR RENAL BIOPSY LEFT  10/17/2023    TRANSPLANT PANCREAS, KIDNEY  DONOR, COMBINED N/A 2023    Procedure: Transplant pancreas, kidney  donor, combined, appendectomy;  Surgeon:  Tk Ascencio MD;  Location:  OR       SOCIAL HISTORY:   Social History     Socioeconomic History    Marital status: Single   Tobacco Use    Smoking status: Former     Types: Cigarettes    Smokeless tobacco: Never    Tobacco comments:     Quit 1984   Vaping Use    Vaping Use: Never used   Substance and Sexual Activity    Alcohol use: Never    Drug use: Never       FAMILY HISTORY: No bleeding/clotting disorders nor problems with anesthesia.    ALLERGIES:    No Known Allergies    MEDICATIONS:  [COMPLETED] iopamidol (ISOVUE-370) solution 67 mL  [COMPLETED] lidocaine (PF) (XYLOCAINE) 1 % injection 1-30 mL  [COMPLETED] sodium chloride (PF) 0.9% PF flush 90 mL    acetaminophen (TYLENOL) 500 MG tablet, Take 500-1,000 mg by mouth every 6 hours as needed for mild pain  aspirin (ASA) 325 MG tablet, Take 1 tablet (325 mg) by mouth daily  Blood Glucose Monitoring Suppl (ACCU-CHEK GUIDE) w/Device KIT,   Misc. Devices (PILL SPLITTER) MISC, 1 applicator daily  psyllium (METAMUCIL/KONSYL) 58.6 % powder, Take 6 g (1 teaspoonful) by mouth daily Can increase up to 3 TIMES DAILY if needed.  sodium bicarbonate 650 MG tablet, Take 2 tablets (1,300 mg) by mouth 2 times daily  sulfamethoxazole-trimethoprim (BACTRIM) 400-80 MG tablet, Take 1 tablet by mouth three times a week  tacrolimus (GENERIC) 0.5 MG capsule, Hold for dose changes  tacrolimus (GENERIC) 1 MG capsule, Take 2 capsules (2 mg) by mouth 2 times daily        PHYSICAL EXAMINATION:  Temp:  [97.8  F (36.6  C)] 97.8  F (36.6  C)  Pulse:  [64-80] 79  Resp:  [14-24] 24  BP: (105-135)/(55-67) 108/56  Cuff Mean (mmHg):  [103] 103  SpO2:  [94 %-100 %] 97 %    General: NAD, lying in bed  HEENT: Atraumatic, normocephalic  Neuro: No focal deficits  CV: RRR by peripheral pulse  Pulm: Breathing comfortably on 1L O2  Abd: soft, nontender, nondistended  : Uribe in place  Extremities: Right shoulder tender, limited ROM  Skin: WWP, no rashes or abrasions on exposed  skin  Incisions: C/D/I  Psych: appropriate mood and affect    LABS: Reviewed.   Arterial Blood Gases   No lab results found in last 7 days.  Complete Blood Count   Recent Labs   Lab 10/17/23  1517 10/17/23  1457 10/17/23  1453 10/17/23  1425 10/16/23  0820 10/12/23  0835   WBC  --  6.2  --   --  7.1 4.5   HGB <5.1* 5.1*  5.1* <5.1* <5.1* 8.7* 9.6*   PLT  --  142*  --   --  188 232     Basic Metabolic Panel  Recent Labs   Lab 10/17/23  1517 10/17/23  1455 10/17/23  1453 10/17/23  1448 10/17/23  1425 10/17/23  1422 10/16/23  0820 10/12/23  0835     --  141  --  151*  --  139 138   POTASSIUM 3.5  --  3.3*  --  <2.0*  --  4.8 5.0   CHLORIDE  --   --   --   --   --   --  106 105   CO2  --   --   --   --   --   --  22 23   BUN  --   --   --   --   --   --  25.7* 22.7   CR  --  2.0*  --   --   --   --  2.59* 2.39*   *  --  188* 175* 56*   < > 117* 104*    < > = values in this interval not displayed.     Liver Function Tests  Recent Labs   Lab 10/17/23  1423 10/17/23  1032   INR 2.15* 1.11     Pancreatic Enzymes  Recent Labs   Lab 10/16/23  0820 10/12/23  0835   LIPASE 55 69*   AMYLASE 71 78     Coagulation Profile  Recent Labs   Lab 10/17/23  1423 10/17/23  1032   INR 2.15* 1.11   PTT 53*  --        IMAGING:  Recent Results (from the past 24 hour(s))   IR Renal Biopsy Left    Narrative    PROCEDURE: US-guided LLQ Transplant Kidney biopsy    Procedural Personnel  Advanced practice provider(s): Joy Briceño PA-C, Jl Perez PA-C    Procedure Date: 10/17/2023    Pre-procedure diagnosis: : Elevated serum creatinine  Post-procedure diagnosis: : Elevated serum creatinine  Indication: Left lower quadrant renal transplant placed 9/15/2023,  patient has elevated creatinine with concerns for slow graft function.  Previous biopsy of same target (QCDR): No  Additional clinical history: None      Impression    IMPRESSION:  Completed ultrasound-guided left lower quadrant transplant kidney  biopsy. A total of three  kidney cores obtained. Pathology was on site  to determine adequacy of specimen.  Gelfoam pledgets administered in  biopsy track, with appropriate hemostasis.     Plan: Patient to return to 2A for 1 hour of strict bedrest, and 1 hour  of bedrest with bathroom privileges.     Complications: No immediate complications.  ___________________________________________________________    PROCEDURE SUMMARY:  - Percutaneous Ultrasound-guided coaxial core needle biopsy    PROCEDURE DETAILS:    Pre-procedure  Reference imaging for biopsy target: None  Consent: Informed consent for the procedure including risks, benefits  and alternatives was obtained and time-out was performed prior to the  procedure.  Preparation: The site was prepared and draped using maximal sterile  barrier technique including cutaneous antisepsis.    Imaging prior to biopsy  The patient was positioned supine. Initial imaging was performed.  Biopsy target:  - Organ or target location: Kidney  - Laterality: Left  Other findings: None    Biopsy   Local anesthesia was administered. Under imaging guidance as stated in  the procedure summary, the biopsy needle was advanced to the target  and biopsy was performed. Approximately 9ml of lidocaine was utilized.    Coaxial needle: 17 gauge    Core needle biopsy device:  Adjustable Coaxial Temno? (ACT) Biopsy  Device  Core needle size: 18 gauge  Number of core specimens: 3    On-site assessment of biopsy adequacy: Yes    Preliminary assessment of sample adequacy: Adequate    Needle removal  The biopsy needle was removed and a sterile dressing was applied.  Tract embolization: None    Contrast  Contrast agent: None     Additional Details  Specimens removed: Biopsy samples as detailed above  Estimated blood loss (mL): Less than 10  Standardized report: [email or web address]    Attestation  Signer name: ADINA MONTGOMERY PA         SYSTEM ID:  [id number]   CT Head w/o Contrast    Narrative    CT HEAD  W/O CONTRAST 10/17/2023 2:50 PM    History: Stroke r/o     Comparison: None    Technique: Using multidetector thin collimation helical acquisition  technique, axial, coronal and sagittal CT images from the skull base  to the vertex were obtained without intravenous contrast.   (topogram) image(s) also obtained and reviewed.    Findings: There is no intracranial hemorrhage, mass effect, or midline  shift. Gray/white matter differentiation in both cerebral hemispheres  is preserved. Ventricles are proportionate to the cerebral sulci. The  basal cisterns are clear.    The bony calvaria and the bones of the skull base are normal. The  visualized portions of the paranasal sinuses and mastoid air cells are  clear. Bilateral pseudophakia.      Impression    Impression:  No acute intracranial pathology.     I have personally reviewed the examination and initial interpretation  and I agree with the findings.    PETEY LEÓN MD         SYSTEM ID:  [id number]   CTA Head Neck with Contrast    Narrative    CTA HEAD NECK W CONTRAST 10/17/2023 2:52 PM    CT angiogram of the neck   CT angiogram of the base of the brain with contrast  Reconstruction on the 3D workstation    Provided History:  Stroke r/O    Comparison:  None.      Technique:  HEAD and NECK CTA: During rapid bolus intravenous injection of  nonionic contrast material, axial images were obtained using thin  collimation multidetector helical technique from the base of the upper  aortic arch through the [place]. This CT angiogram data was  reconstructed at thin intervals with mild overlap. Images were sent to  the 3D workstation, and 3D reconstructions were obtained. The axial  source images, multiplanar reformations, 3D reconstructions in both  maximum intensity projection display and volume rendered models were  reviewed, with reconstructions performed by the technologist and  radiologist.    Contrast: iopamidol (ISOVUE-370) solution 67  mL    Findings:    Head CTA demonstrates no aneurysm or stenosis of the major  intracranial arteries.    Neck CTA demonstrates moderate stenosis of the right internal carotid  artery approximately 48% via NASCET criteria. The origins of the great  vessels from the aortic arch are patent. The normal distal right  internal carotid artery measures 4 mm. The normal distal left internal  carotid artery measures 4 mm.     No mass within the visualized portions of the cervical soft tissues or  lung apices.       Impression    Impression:    1. Head CTA demonstrates no aneurysm or stenosis of the major  intracranial arteries.   2. Neck CTA demonstrates no hemodynamically significant stenosis of  the major cervical arteries.

## 2023-10-17 NOTE — ED PROVIDER NOTES
ED Provider Note  [facility]      History     Chief Complaint   Patient presents with    Fall     The history is provided by the patient and medical records.     Avelina Marion is a 68 year old male with history of CAD s/p CABG x 2 in 2/2023 and ESRD 2/2 DMII now s/p SPK w/ ureteral stent placement and appendectomy 9/14/23 who was being monitored post procedure in 2A after a IR kidney biopsy and now presents to the ED for evaluation following cardiac arrest. Per report staff after biopsy completed they heard a thud in his room and subsequently found the patient on the ground, unresponsive, and pulseless. He was not on a monitor at the time. CPR done for 2 min until ROSC after which point monitor showed sinus tach. BG in 50s so D50 given, but no other meds given. Noted to have nonreactive left pupil so stroke code called and CT head w/ contrast done which was reviewed by Neuro and reported normal. Patient then brought to the ED. Here he is alert and oriented x 4. Pupils equal and reactive. Patient denying pain and neck nontender. Per report K 1.8, calcium 2.9, and bicarb 10 prior to arrival in ED. Patient was here alone, but notes that his sister was supposed to be here shortly to pick him up.  Patient currently denies any headache chest pain or shortness of breath.  Patient does note some bloating feeling in his abdomen.  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  Patient had labs done yesterday in which his hemoglobin was 8.7.    Past Medical History  Past Medical History:   Diagnosis Date    Anemia in chronic kidney disease     BCC (basal cell carcinoma), face 8/9/2022    Diabetic retinopathy of both eyes (H)     End stage renal disease (H)     Hypertension     Secondary renal hyperparathyroidism (H24)     Type 2 diabetes mellitus (H)      Past Surgical History:   Procedure Laterality Date    BENCH KIDNEY  9/14/2023     Procedure: Bench kidney;  Surgeon: Tk Ascencio MD;  Location: [facility] OR    BENCH PANCREAS N/A 2023    Procedure: Bench pancreas;  Surgeon: Tk Ascencio MD;  Location: [facility] OR    BYPASS GRAFT ARTERY CORONARY N/A 2023    Procedure: TRANSESPHAGEAL ECHOCARDIOGRAM, MEDIAN STERNOTOMY, TAKE DOWN OF LEFT INTERNAL MAMMARY, LEFT ENDOSCOPIC GREATER SAPHENOUS VEIN HARVEST, CRYO NERVE BLOCK, CARDIOPULMONARY BYPASS, CORONARY ARTERY BYPASS GRAFT X 2.;  Surgeon: Patrice Yepez MD;  Location: [facility] OR    CREATE FISTULA ARTERIOVENOUS UPPER EXTREMITY Left     CV CORONARY ANGIOGRAM N/A 2021    Procedure: CV CORONARY ANGIOGRAM;  Surgeon: Milton Cam MD;  Location:  [facility] CATH LAB    CV CORONARY ANGIOGRAM N/A 2023    Procedure: Coronary Angiogram;  Surgeon: Milton Cam MD;  Location:  HE[facility]ARD[facility] CATH LAB    CV PCI N/A 2023    Procedure: Percutaneous Coronary Intervention;  Surgeon: Milton Cam MD;  Location:  HEART CARDIAC CATH LAB    IR RENAL BIOPSY LEFT  10/17/2023    TRANSPLANT PANCREAS, KIDNEY  DONOR, COMBINED N/A 2023    Procedure: Transplant pancreas, kidney  donor, combined, appendectomy;  Surgeon: Tk Ascencio MD;  Location: [facility] OR     No current outpatient medications on file.    No Known Allergies  Family History  Family History   Problem Relation Age of Onset    Diabetes Brother     Kidney Disease Brother      Social History   Social History     Tobacco Use    Smoking status: Former     Types: Cigarettes    Smokeless tobacco: Never    Tobacco comments:     Quit 1984   Vaping Use    Vaping Use: Never used   Substance Use Topics    Alcohol use: Never    Drug use: Never         A medically appropriate review of systems was performed with pertinent positives and negatives noted in the HPI, and all other systems negative.    Physical Exam   BP: 125/64  Resp: 21  SpO2: 95 %  Physical  Exam  Constitutional:       General: He is not in acute distress.     Appearance: He is ill-appearing. He is not toxic-appearing or diaphoretic.      Comments: Pale ashen appearing   HENT:      Head: Normocephalic and atraumatic.      Mouth/Throat:      Mouth: Mucous membranes are moist.   Eyes:      General: No scleral icterus.  Cardiovascular:      Rate and Rhythm: Normal rate and regular rhythm.      Comments: strong bilateral radial pulses  Pulmonary:      Effort: Pulmonary effort is normal.      Breath sounds: Normal breath sounds.   Abdominal:      General: There is no distension.      Palpations: There is no mass.      Tenderness: There is no abdominal tenderness.   Skin:     Coloration: Skin is pale.   Neurological:      General: No focal deficit present.      Mental Status: He is oriented to person, place, and time.      Gait: Gait (mental status) normal.   Psychiatric:         Mood and Affect: Mood normal.         Behavior: Behavior normal.           ED Course, Procedures, & Data      Procedures                      Results for orders placed or performed during the hospital encounter of 10/17/23   IR Renal Biopsy Left     Status: None    Narrative    PROCEDURE: US-guided LLQ Transplant Kidney biopsy    Procedural Personnel  Advanced practice provider(s): Joy Briceño PA-C, Jl Perez PA-C    Procedure Date: 10/17/2023    Pre-procedure diagnosis: : Elevated serum creatinine  Post-procedure diagnosis: : Elevated serum creatinine  Indication: Left lower quadrant renal transplant placed 9/15/2023,  patient has elevated creatinine with concerns for slow graft function.  Previous biopsy of same target (QCDR): No  Additional clinical history: None      Impression    IMPRESSION:  Completed ultrasound-guided left lower quadrant transplant kidney  biopsy. A total of three kidney cores obtained. Pathology was on site  to determine adequacy of specimen.  Gelfoam pledgets administered in  biopsy track, with  appropriate hemostasis.     Plan: Patient to return to  for 1 hour of strict bedrest, and 1 hour  of bedrest with bathroom privileges.     Complications: No immediate complications.  ___________________________________________________________    PROCEDURE SUMMARY:  - Percutaneous Ultrasound-guided coaxial core needle biopsy    PROCEDURE DETAILS:    Pre-procedure  Reference imaging for biopsy target: None  Consent: Informed consent for the procedure including risks, benefits  and alternatives was obtained and time-out was performed prior to the  procedure.  Preparation: The site was prepared and draped using maximal sterile  barrier technique including cutaneous antisepsis.    Imaging prior to biopsy  The patient was positioned supine. Initial imaging was performed.  Biopsy target:  - Organ or target location: Kidney  - Laterality: Left  Other findings: None    Biopsy   Local anesthesia was administered. Under imaging guidance as stated in  the procedure summary, the biopsy needle was advanced to the target  and biopsy was performed. Approximately 9ml of lidocaine was utilized.    Coaxial needle: 17 gauge    Core needle biopsy device:  Adjustable Coaxial Temno? (ACT) Biopsy  Device  Core needle size: 18 gauge  Number of core specimens: 3    On-site assessment of biopsy adequacy: Yes    Preliminary assessment of sample adequacy: Adequate    Needle removal  The biopsy needle was removed and a sterile dressing was applied.  Tract embolization: None    Contrast  Contrast agent: None     Additional Details  Specimens removed: Biopsy samples as detailed above  Estimated blood loss (mL): Less than 10  Standardized report: [email or web address]_v3.1    Attestation  Signer name: ADINA MONTGOMERY PA         SYSTEM ID:  [id number]   INR     Status: Normal   Result Value Ref Range    INR 1.11 0.85 - 1.15   Blood gas venous with oxyhemoglobin     Status: Abnormal   Result Value Ref Range    pH Venous 7.31 (L) 7.32  - 7.43    pCO2 Venous 20 (L) 40 - 50 mm Hg    pO2 Venous 27 25 - 47 mm Hg    Bicarbonate Venous 10 (LL) 21 - 28 mmol/L    FIO2 15     Oxyhemoglobin Venous 42 (L) 70 - 75 %    Base Excess/Deficit -15.7 (L) -7.7 - 1.9 mmol/L   Ionized Calcium     Status: Abnormal   Result Value Ref Range    Calcium Ionized Whole Blood 2.9 (LL) 4.4 - 5.2 mg/dL   Lactic acid whole blood     Status: Normal   Result Value Ref Range    Lactic Acid 0.9 0.7 - 2.0 mmol/L   iStat Gases Electrolytes ICA Glucose Venous, POCT     Status: Abnormal   Result Value Ref Range    CPB Applied No     Hematocrit POCT <15 (L) 40 - 53 %    Calcium, Ionized Whole Blood POCT 2.9 (LL) 4.4 - 5.2 mg/dL    Glucose Whole Blood POCT 56 (L) 70 - 99 mg/dL    Bicarbonate Venous POCT 8 (LL) 21 - 28 mmol/L    Hemoglobin POCT <5.1 (LL) 13.3 - 17.7 g/dL    Potassium POCT <2.0 (LL) 3.4 - 5.3 mmol/L    Sodium POCT 151 (H) 133 - 144 mmol/L    pCO2 Venous POCT 17 (LL) 40 - 50 mm Hg    pO2 Venous POCT 26 25 - 47 mm Hg    pH Venous POCT 7.27 (L) 7.32 - 7.43    O2 Sat, Venous POCT 41 (L) 94 - 100 %   Extra Tube     Status: None (In process)    Narrative    The following orders were created for panel order Extra Tube.  Procedure                               Abnormality         Status                     ---------                               -----------         ------                     Extra Red Top Tube[[id number]]                               In process                   Please view results for these tests on the individual orders.   Glucose by meter     Status: Abnormal   Result Value Ref Range    GLUCOSE BY METER POCT 56 (L) 70 - 99 mg/dL     Medications - No data to display  Labs Ordered and Resulted from Time of ED Arrival to Time of ED Departure - No data to display  No orders to display          Critical care was performed.   Critical Care Addendum  My initial assessment, based on my focused history, physical exam, and discussion with code blue team , established a  high suspicion that Avelina Marion has cardiac arrest, which requires immediate intervention, and therefore he is critically ill.     After the initial assessment, the care team initiated multiple lab tests, initiated IV fluid administration, and initiated medication therapy with PRBC blood transfusion  to provide stabilization care. Due to the critical nature of this patient, I reassessed vital signs and physical exam multiple times prior to his disposition.     Time also spent performing documentation, reviewing test results, discussion with consultants, and coordination of care.     Critical care time (excluding teaching time and procedures): 90 minutes.     Assessment & Plan    Patient is a 68-year-old male who is 5-week status post a kidney pancreas transplant who presented to the ER postcardiac arrest after receiving a transplant kidney biopsy today.  Patient says he remembers getting up to go to the bathroom and does not member anything else.  On arrival patient was ashen appearing and pale but he was alert oriented and had stable and strong pulses.  Patient's cardiac rhythm was normal sinus rhythm.  Patient had blood pressures that were in the low 100s.  There was initial concern during the postarrest.  Before transfer to the ER that he had a stroke due to some unequal pupils.  Upon presentation to the ER his pupils were equal.  Patient had a CT head and a CTA done prior to transfer to the ER.  Patient is accompanied by the stroke team.  Patient had a NIH stroke scale of 0 and his NIH stroke scale postarrest was also 0.  Patient had no signs of head bleed on the CT head.  Patient initially had lab work done into a that showed concern for possible hypocalcemia and hypokalemia.  Patient had i-STAT blood work done here that showed his calcium was 4 and his potassium was 3.3.  Patient did have a bedside fast done that showed some fluid in the right upper quadrant.  Patient also initially had a low hemoglobin  that was found into a.  We repeated the hemoglobin here and that confirmed a low hemoglobin less than 5.5.  I was concerned that patient was bleeding into his stomach due to his low hemoglobin and his positive fast.  I called and discussed the case with interventional radiology.  They came emergently to evaluate the patient.  They had recommended the patient go to IR to have an angiogram to see if they can stop any source of bleeding.  I also spoke to the transplant surgery team who came bedside.  Staff attending was bedside.  They were concerned about patient possibly bleeding as well.  They ended up going to speak with IR regarding surgical planning.  I also discussed the case with the ICU staff Dr. Venkata Fairchild.  Dr. Fairchild accepted the patient be admitted to the SICU under the transplant surgery team.  Will be admitted primarily under the transplant surgery team under Dr. Lofton.  She consented to getting blood and has been written for 2 units of O- blood in the ER.  This blood has been started.  Patient is also written for FFP.  Patient remains in critical condition.  Critical care for this patient was 90 minutes due to postarrest care and arranging ICU IR treatment    I have reviewed the nursing notes. I have reviewed the findings, diagnosis, plan and need for follow up with the patient.    New Prescriptions    No medications on file       Final diagnoses:   Postoperative cardiac arrest following non-cardiac surgery   Anemia due to blood loss, acute   History of simultaneous kidney and pancreas transplant (H)   I, Berta Rosario, am serving as a trained medical scribe to document services personally performed by Tamika Egan MD, based on the provider's statements to me.     I, Tamika Egan MD, was physically present and have reviewed and verified the accuracy of this note documented by Berta Rosario.      Tamika Egan MD  [facility] EMERGENCY  DEPARTMENT  10/17/2023     Tamika Egan MD  10/17/23 [id number]

## 2023-10-17 NOTE — PROGRESS NOTES
Pt prepped for renal biopsy. PIV in R forearm infusing NS at 75mL/hr. Awaiting lab results. Jasmyn will be ride home today.

## 2023-10-17 NOTE — PROGRESS NOTES
Patient Name: Avelina Marion  Medical Record Number: [id number]  Today's Date: 10/17/2023    Procedure: Image guided renal transplant biopsy  Proceduralist: Jl Del Rio PA-C  Pathology present: Yes  Procedure Start: 1140  Procedure end: 1201  Sedation medications administered: Local only     Report given to: Yenifer MCWILLIAMS RN  : N/a    Other Notes: Pt arrived to IR room 6 from . Consent reviewed. Pt denies any questions or concerns regarding procedure. Pt positioned supine and monitored per protocol. 3 cores obtained and sent to lab as ordered.  Torpedo gelfoam used to close biopsy needle track.  Hemastatis achieved.  Patient to be on bedrest for 2 hours.  Pt tolerated procedure without any noted complications. Pt transferred back to .

## 2023-10-18 ENCOUNTER — APPOINTMENT (OUTPATIENT)
Dept: CARDIOLOGY | Facility: CLINIC | Age: 68
DRG: 674 | End: 2023-10-18
Payer: COMMERCIAL

## 2023-10-18 ENCOUNTER — APPOINTMENT (OUTPATIENT)
Dept: GENERAL RADIOLOGY | Facility: CLINIC | Age: 68
DRG: 674 | End: 2023-10-18
Payer: COMMERCIAL

## 2023-10-18 ENCOUNTER — APPOINTMENT (OUTPATIENT)
Dept: ULTRASOUND IMAGING | Facility: CLINIC | Age: 68
DRG: 674 | End: 2023-10-18
Payer: COMMERCIAL

## 2023-10-18 PROBLEM — T86.19: Status: ACTIVE | Noted: 2023-10-17

## 2023-10-18 PROBLEM — I77.0: Status: ACTIVE | Noted: 2023-10-17

## 2023-10-18 LAB
ANION GAP SERPL CALCULATED.3IONS-SCNC: 10 MMOL/L (ref 7–15)
APTT PPP: 30 SECONDS (ref 22–38)
BLD PROD TYP BPU: NORMAL
BLOOD COMPONENT TYPE: NORMAL
BUN SERPL-MCNC: 25.3 MG/DL (ref 8–23)
CA-I BLD-MCNC: 4.2 MG/DL (ref 4.4–5.2)
CALCIUM SERPL-MCNC: 7.7 MG/DL (ref 8.8–10.2)
CHLORIDE SERPL-SCNC: 106 MMOL/L (ref 98–107)
CODING SYSTEM: NORMAL
CREAT SERPL-MCNC: 2.4 MG/DL (ref 0.67–1.17)
CROSSMATCH: NORMAL
DEPRECATED HCO3 PLAS-SCNC: 18 MMOL/L (ref 22–29)
EGFRCR SERPLBLD CKD-EPI 2021: 29 ML/MIN/1.73M2
ERYTHROCYTE [DISTWIDTH] IN BLOOD BY AUTOMATED COUNT: 17.9 % (ref 10–15)
FIBRINOGEN PPP-MCNC: 283 MG/DL (ref 170–490)
GLUCOSE BLDC GLUCOMTR-MCNC: 101 MG/DL (ref 70–99)
GLUCOSE BLDC GLUCOMTR-MCNC: 105 MG/DL (ref 70–99)
GLUCOSE BLDC GLUCOMTR-MCNC: 106 MG/DL (ref 70–99)
GLUCOSE BLDC GLUCOMTR-MCNC: 109 MG/DL (ref 70–99)
GLUCOSE BLDC GLUCOMTR-MCNC: 96 MG/DL (ref 70–99)
GLUCOSE SERPL-MCNC: 105 MG/DL (ref 70–99)
HCT VFR BLD AUTO: 20.9 % (ref 40–53)
HGB BLD-MCNC: 6.9 G/DL (ref 13.3–17.7)
HGB BLD-MCNC: 7.6 G/DL (ref 13.3–17.7)
HGB BLD-MCNC: 8.2 G/DL (ref 13.3–17.7)
HGB BLD-MCNC: 8.4 G/DL (ref 13.3–17.7)
HOLD SPECIMEN: NORMAL
INR PPP: 1.3 (ref 0.85–1.15)
LIPASE SERPL-CCNC: 35 U/L (ref 13–60)
LVEF ECHO: NORMAL
MAGNESIUM SERPL-MCNC: 2.2 MG/DL (ref 1.7–2.3)
MCH RBC QN AUTO: 31.2 PG (ref 26.5–33)
MCHC RBC AUTO-ENTMCNC: 33 G/DL (ref 31.5–36.5)
MCV RBC AUTO: 95 FL (ref 78–100)
PHOSPHATE SERPL-MCNC: 3.3 MG/DL (ref 2.5–4.5)
PLATELET # BLD AUTO: 137 10E3/UL (ref 150–450)
POTASSIUM SERPL-SCNC: 4.7 MMOL/L (ref 3.4–5.3)
RBC # BLD AUTO: 2.21 10E6/UL (ref 4.4–5.9)
SODIUM SERPL-SCNC: 134 MMOL/L (ref 135–145)
TACROLIMUS BLD-MCNC: 9 UG/L (ref 5–15)
TME LAST DOSE: NORMAL H
TME LAST DOSE: NORMAL H
TROPONIN T SERPL HS-MCNC: 34 NG/L
UNIT ABO/RH: NORMAL
UNIT NUMBER: NORMAL
UNIT STATUS: NORMAL
UNIT TYPE ISBT: 600
WBC # BLD AUTO: 9.1 10E3/UL (ref 4–11)

## 2023-10-18 PROCEDURE — 84100 ASSAY OF PHOSPHORUS: CPT | Performed by: INTERNAL MEDICINE

## 2023-10-18 PROCEDURE — 85384 FIBRINOGEN ACTIVITY: CPT

## 2023-10-18 PROCEDURE — 258N000003 HC RX IP 258 OP 636

## 2023-10-18 PROCEDURE — 999N000208 ECHOCARDIOGRAM COMPLETE

## 2023-10-18 PROCEDURE — 250N000013 HC RX MED GY IP 250 OP 250 PS 637: Performed by: PHARMACIST

## 2023-10-18 PROCEDURE — 250N000013 HC RX MED GY IP 250 OP 250 PS 637: Performed by: NURSE PRACTITIONER

## 2023-10-18 PROCEDURE — 36415 COLL VENOUS BLD VENIPUNCTURE: CPT | Performed by: PHARMACIST

## 2023-10-18 PROCEDURE — 76776 US EXAM K TRANSPL W/DOPPLER: CPT

## 2023-10-18 PROCEDURE — 85027 COMPLETE CBC AUTOMATED: CPT | Performed by: INTERNAL MEDICINE

## 2023-10-18 PROCEDURE — 85018 HEMOGLOBIN: CPT | Performed by: PHARMACIST

## 2023-10-18 PROCEDURE — 84484 ASSAY OF TROPONIN QUANT: CPT

## 2023-10-18 PROCEDURE — 250N000012 HC RX MED GY IP 250 OP 636 PS 637: Performed by: PHARMACIST

## 2023-10-18 PROCEDURE — 93005 ELECTROCARDIOGRAM TRACING: CPT

## 2023-10-18 PROCEDURE — 82330 ASSAY OF CALCIUM: CPT

## 2023-10-18 PROCEDURE — 99233 SBSQ HOSP IP/OBS HIGH 50: CPT | Performed by: INTERNAL MEDICINE

## 2023-10-18 PROCEDURE — 250N000013 HC RX MED GY IP 250 OP 250 PS 637: Performed by: SURGERY

## 2023-10-18 PROCEDURE — 258N000003 HC RX IP 258 OP 636: Performed by: SURGERY

## 2023-10-18 PROCEDURE — 36415 COLL VENOUS BLD VENIPUNCTURE: CPT | Performed by: NURSE PRACTITIONER

## 2023-10-18 PROCEDURE — 85730 THROMBOPLASTIN TIME PARTIAL: CPT

## 2023-10-18 PROCEDURE — 85610 PROTHROMBIN TIME: CPT

## 2023-10-18 PROCEDURE — 80197 ASSAY OF TACROLIMUS: CPT | Performed by: NURSE PRACTITIONER

## 2023-10-18 PROCEDURE — 258N000003 HC RX IP 258 OP 636: Performed by: NURSE PRACTITIONER

## 2023-10-18 PROCEDURE — 76776 US EXAM K TRANSPL W/DOPPLER: CPT | Mod: 26 | Performed by: STUDENT IN AN ORGANIZED HEALTH CARE EDUCATION/TRAINING PROGRAM

## 2023-10-18 PROCEDURE — 83735 ASSAY OF MAGNESIUM: CPT | Performed by: SURGERY

## 2023-10-18 PROCEDURE — 71045 X-RAY EXAM CHEST 1 VIEW: CPT

## 2023-10-18 PROCEDURE — 255N000002 HC RX 255 OP 636: Performed by: STUDENT IN AN ORGANIZED HEALTH CARE EDUCATION/TRAINING PROGRAM

## 2023-10-18 PROCEDURE — 250N000011 HC RX IP 250 OP 636: Performed by: NURSE PRACTITIONER

## 2023-10-18 PROCEDURE — 250N000011 HC RX IP 250 OP 636: Mod: JZ | Performed by: PHARMACIST

## 2023-10-18 PROCEDURE — 80048 BASIC METABOLIC PNL TOTAL CA: CPT | Performed by: INTERNAL MEDICINE

## 2023-10-18 PROCEDURE — 36415 COLL VENOUS BLD VENIPUNCTURE: CPT

## 2023-10-18 PROCEDURE — P9016 RBC LEUKOCYTES REDUCED: HCPCS | Performed by: EMERGENCY MEDICINE

## 2023-10-18 PROCEDURE — 200N000002 HC R&B ICU UMMC

## 2023-10-18 PROCEDURE — 250N000009 HC RX 250: Performed by: SURGERY

## 2023-10-18 PROCEDURE — 71045 X-RAY EXAM CHEST 1 VIEW: CPT | Mod: 26 | Performed by: RADIOLOGY

## 2023-10-18 PROCEDURE — 93306 TTE W/DOPPLER COMPLETE: CPT | Mod: 26 | Performed by: INTERNAL MEDICINE

## 2023-10-18 PROCEDURE — 83690 ASSAY OF LIPASE: CPT | Performed by: NURSE PRACTITIONER

## 2023-10-18 PROCEDURE — 99231 SBSQ HOSP IP/OBS SF/LOW 25: CPT | Mod: GC | Performed by: SURGERY

## 2023-10-18 PROCEDURE — 93010 ELECTROCARDIOGRAM REPORT: CPT | Performed by: INTERNAL MEDICINE

## 2023-10-18 PROCEDURE — 85018 HEMOGLOBIN: CPT

## 2023-10-18 PROCEDURE — 250N000011 HC RX IP 250 OP 636

## 2023-10-18 PROCEDURE — 250N000012 HC RX MED GY IP 250 OP 636 PS 637: Performed by: NURSE PRACTITIONER

## 2023-10-18 RX ORDER — VALGANCICLOVIR 450 MG/1
450 TABLET, FILM COATED ORAL EVERY OTHER DAY
Status: DISCONTINUED | OUTPATIENT
Start: 2023-10-18 | End: 2023-10-20 | Stop reason: HOSPADM

## 2023-10-18 RX ORDER — MAGNESIUM OXIDE 400 MG/1
400 TABLET ORAL EVERY 24 HOURS
Status: DISCONTINUED | OUTPATIENT
Start: 2023-10-18 | End: 2023-10-20 | Stop reason: HOSPADM

## 2023-10-18 RX ORDER — PANTOPRAZOLE SODIUM 40 MG/1
40 TABLET, DELAYED RELEASE ORAL DAILY
Status: DISCONTINUED | OUTPATIENT
Start: 2023-10-18 | End: 2023-10-20 | Stop reason: HOSPADM

## 2023-10-18 RX ORDER — CALCIUM GLUCONATE 20 MG/ML
1 INJECTION, SOLUTION INTRAVENOUS ONCE
Status: COMPLETED | OUTPATIENT
Start: 2023-10-18 | End: 2023-10-18

## 2023-10-18 RX ORDER — NYSTATIN 100000/ML
500000 SUSPENSION, ORAL (FINAL DOSE FORM) ORAL 4 TIMES DAILY
Status: DISCONTINUED | OUTPATIENT
Start: 2023-10-18 | End: 2023-10-20 | Stop reason: HOSPADM

## 2023-10-18 RX ORDER — CALCITRIOL 0.5 UG/1
0.5 CAPSULE, LIQUID FILLED ORAL DAILY
Status: DISCONTINUED | OUTPATIENT
Start: 2023-10-18 | End: 2023-10-20 | Stop reason: HOSPADM

## 2023-10-18 RX ORDER — FAMOTIDINE 10 MG
10 TABLET ORAL DAILY
Status: DISCONTINUED | OUTPATIENT
Start: 2023-10-18 | End: 2023-10-19

## 2023-10-18 RX ORDER — CALCIUM GLUCONATE 20 MG/ML
1 INJECTION, SOLUTION INTRAVENOUS ONCE
Status: CANCELLED | OUTPATIENT
Start: 2023-10-18 | End: 2023-10-18

## 2023-10-18 RX ORDER — SODIUM BICARBONATE 650 MG/1
1300 TABLET ORAL 2 TIMES DAILY
Status: DISCONTINUED | OUTPATIENT
Start: 2023-10-18 | End: 2023-10-20 | Stop reason: HOSPADM

## 2023-10-18 RX ADMIN — ACETAMINOPHEN 650 MG: 325 TABLET, FILM COATED ORAL at 21:55

## 2023-10-18 RX ADMIN — PANTOPRAZOLE SODIUM 40 MG: 40 TABLET, DELAYED RELEASE ORAL at 12:08

## 2023-10-18 RX ADMIN — HUMAN ALBUMIN MICROSPHERES AND PERFLUTREN 5 ML: 10; .22 INJECTION, SOLUTION INTRAVENOUS at 09:42

## 2023-10-18 RX ADMIN — Medication 2 TABLET: at 12:07

## 2023-10-18 RX ADMIN — SULFAMETHOXAZOLE AND TRIMETHOPRIM 1 TABLET: 400; 80 TABLET ORAL at 08:27

## 2023-10-18 RX ADMIN — SODIUM BICARBONATE 650 MG TABLET 1300 MG: at 12:06

## 2023-10-18 RX ADMIN — ACETAMINOPHEN 650 MG: 325 TABLET, FILM COATED ORAL at 04:08

## 2023-10-18 RX ADMIN — MYCOPHENOLATE MOFETIL 1000 MG: 250 CAPSULE ORAL at 21:07

## 2023-10-18 RX ADMIN — FAMOTIDINE 10 MG: 10 TABLET, FILM COATED ORAL at 12:05

## 2023-10-18 RX ADMIN — NYSTATIN 500000 UNITS: 100000 SUSPENSION ORAL at 15:33

## 2023-10-18 RX ADMIN — Medication 12.5 MG: at 21:07

## 2023-10-18 RX ADMIN — ACETAMINOPHEN 650 MG: 325 TABLET, FILM COATED ORAL at 08:13

## 2023-10-18 RX ADMIN — TACROLIMUS 1.5 MG: 0.5 CAPSULE ORAL at 08:15

## 2023-10-18 RX ADMIN — ONDANSETRON 4 MG: 2 INJECTION INTRAMUSCULAR; INTRAVENOUS at 08:35

## 2023-10-18 RX ADMIN — TACROLIMUS 1.5 MG: 0.5 CAPSULE ORAL at 18:08

## 2023-10-18 RX ADMIN — NYSTATIN 500000 UNITS: 100000 SUSPENSION ORAL at 21:07

## 2023-10-18 RX ADMIN — ATORVASTATIN CALCIUM 20 MG: 20 TABLET, FILM COATED ORAL at 21:52

## 2023-10-18 RX ADMIN — OXYCODONE HYDROCHLORIDE 5 MG: 5 TABLET ORAL at 15:33

## 2023-10-18 RX ADMIN — MAGNESIUM OXIDE TAB 400 MG (241.3 MG ELEMENTAL MG) 400 MG: 400 (241.3 MG) TAB at 12:06

## 2023-10-18 RX ADMIN — NYSTATIN 500000 UNITS: 100000 SUSPENSION ORAL at 12:08

## 2023-10-18 RX ADMIN — VALGANCICLOVIR 450 MG: 450 TABLET, FILM COATED ORAL at 08:13

## 2023-10-18 RX ADMIN — Medication 2 TABLET: at 21:52

## 2023-10-18 RX ADMIN — POLYETHYLENE GLYCOL 3350 17 G: 17 POWDER, FOR SOLUTION ORAL at 08:14

## 2023-10-18 RX ADMIN — MYCOPHENOLATE MOFETIL 1000 MG: 250 CAPSULE ORAL at 08:13

## 2023-10-18 RX ADMIN — SODIUM PHOSPHATE, MONOBASIC, MONOHYDRATE AND SODIUM PHOSPHATE, DIBASIC, ANHYDROUS 15 MMOL: 142; 276 INJECTION, SOLUTION INTRAVENOUS at 00:15

## 2023-10-18 RX ADMIN — SODIUM BICARBONATE 650 MG TABLET 1300 MG: at 21:07

## 2023-10-18 RX ADMIN — CALCIUM GLUCONATE 1 G: 20 INJECTION, SOLUTION INTRAVENOUS at 12:01

## 2023-10-18 RX ADMIN — OXYCODONE HYDROCHLORIDE 5 MG: 5 TABLET ORAL at 04:08

## 2023-10-18 RX ADMIN — CALCITRIOL 0.5 MCG: 0.5 CAPSULE ORAL at 14:03

## 2023-10-18 RX ADMIN — Medication 12.5 MG: at 12:05

## 2023-10-18 RX ADMIN — SODIUM CHLORIDE 500 MG: 9 INJECTION, SOLUTION INTRAVENOUS at 21:07

## 2023-10-18 RX ADMIN — SODIUM CHLORIDE: 9 INJECTION, SOLUTION INTRAVENOUS at 08:35

## 2023-10-18 RX ADMIN — ACETAMINOPHEN 650 MG: 325 TABLET, FILM COATED ORAL at 12:05

## 2023-10-18 ASSESSMENT — ACTIVITIES OF DAILY LIVING (ADL)
ADLS_ACUITY_SCORE: 30
ADLS_ACUITY_SCORE: 28
ADLS_ACUITY_SCORE: 30
ADLS_ACUITY_SCORE: 28
ADLS_ACUITY_SCORE: 28
ADLS_ACUITY_SCORE: 30
ADLS_ACUITY_SCORE: 28
ADLS_ACUITY_SCORE: 30
ADLS_ACUITY_SCORE: 30
ADLS_ACUITY_SCORE: 28
ADLS_ACUITY_SCORE: 30
ADLS_ACUITY_SCORE: 30

## 2023-10-18 NOTE — PROGRESS NOTES
Interventional Radiology  [facility] MISC NOTE  10/18/23   9:15 AM    Recommendations/Plan:    Reviewed events from yesterday with the patient. Given increased respiratory rate, would recommend further assessment such as EKG, troponin, CXR and echocardiogram. Team would like to hold off on any contrast imaging at this time and evaluate patient's repeat hemoglobin. Discussed patient's case with primary team.     History of Present Illness:  Avelina Marion is a 68 year old 68 year old male with history of CAD s/p CABG x 2 in 2/2023, ESRD, DM II, anemia, HTN, and secondary hyperparathyroidism.     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 Ascencio. Kidney positioned intraperitoneal.     Patient underwent ultrasound guided kidney biopsy yesterday with 3 cores obtained in the lower pole of the renal transplant in the left iliac fossa. Pathology was onsite with confirmation of appropriate specimen. Patient's procedure started at 1140 and ended at 1201. This was completed with local anesthesia only. Patient had gelfoam administered into biopsy track. No immediate issues or complications were noted. He was hemodynamically stable with normal vitals including blood pressure and heart rate.     Patient notes that prior to the biopsy, he was feeling bloated. He denied having any pain in his abdomen or feeling unwell prior to his CODE BLUE/CPR with ROSC. He stated he went to the bathroom and had clear urine, and otherwise felt well. Approximately 10 minutes later, he felt lightheaded and dizzy and felt as though he needed to have a bowel movement. He stood up to go to the bathroom, and that is when he fell, he does not remember falling. Patient was found down after nurse heard a possible fall from the patient's room. He was found to be unresponsive and pulseless. He had  CPR for 3 mintues with ROSC. He also had a fixed, non-reactive left pupil. He was transferred to ED for further evaluation of which CT of head showed no acute intracranial pathology. Patient's pre-procedure hemoglobin was 8.7. INR  on 10/17/23 prior to procedure was 1.11. His hemoglobin after his CODE blue was 5.1.     Patient was given blood products and brought to IR for evaluation of visceral angiogram. He had a diagnostic left pelvic angiogram with left renal transplant angiography along with coil embolization of lower pole renal transplant arteriovenous fistula/bleed along with closure with angioseal. Hemoglobin remained stable after blood products with Hgb of 8.3-8.5, then dropped again early this AM on 10/18/23 to 6.9. Patient has had increased respiratory rate in low 30s with saturations in the low 90s. His heart rate and blood pressure have otherwise been stable. He reports feeling some right shoulder pain along with epigastric discomfort, and spine discomfort. He is also feeling short of breath.       Pertinent Imaging Reviewed:   IR renal biopsy left 10/17/23  US renal transplant with doppler 10/17/23  IR visceral angiogram 10/17/23  IR visceral embolization 10/17/23    Expected date of discharge:  10/19/2023    Vitals:   /54   Pulse 77   Temp 98.3  F (36.8  C) (Oral)   Resp 29   Wt 91.9 kg (202 lb 8 oz)   SpO2 92%   BMI 27.85 kg/m      PHYSICAL EXAM:  GENERAL: AAO  HEENT: NC/AT  HEART: RRR  LUNGS: Increased respiratory rate, on O2.   CHEST: No chest wall pain  ABDOMEN: Slightly distended. No pain at biopsy site or in surrounding areas  EXTREMITY: Left angioseal site c/d/I, no evidence of erythema    Pertinent Labs:   Lab Results   Component Value Date    WBC 9.1 10/18/2023    WBC 13.6 (H) 10/17/2023    WBC 6.2 10/17/2023     Lab Results   Component Value Date    HGB 6.9 10/18/2023    HGB 8.5 10/17/2023    HGB 8.3 10/17/2023    HGB <5.1 10/17/2023    HGB <5.1 10/17/2023    HGB <5.1 10/17/2023      Lab Results   Component Value Date     10/18/2023     10/17/2023     10/17/2023     Lab Results   Component Value Date    INR 1.30 (H) 10/18/2023    PTT 30 10/18/2023     Lab Results   Component Value Date    POTASSIUM 4.7 10/18/2023    POTASSIUM 3.5 10/17/2023    POTASSIUM 4.4 12/30/2021        COVID-19 Antibody Results, Testing for Immunity           No data to display              COVID-19 PCR Results          12/28/2021    13:44 3/21/2022    10:52 8/8/2022    09:29 2/8/2023    02:44 2/9/2023    23:15 2/15/2023    16:03 9/14/2023    16:09   COVID-19 PCR Results   SARS CoV2 PCR Negative  Negative  Negative  Negative  Negative  Negative  Negative        Joy Briceño PA-C  Interventional Radiology  Pager: [phone]

## 2023-10-18 NOTE — PROGRESS NOTES
SURGICAL ICU PROGRESS NOTE  10/18/2023        Date of Service (when I saw the patient): 10/18/2023    ASSESSMENT:  Avelina Marion is a 68 year old male with past medical history of CAD status post CABG x2 in 2023, ESRD secondary to diabetes type 2 now status post SPK with ureteral stent placement and appendectomy 9/14/2023 who was in the procedure IR suite for kidney biopsy today 10/17  Per report after biopsy was completed a noise was heard from patient's room and the patient was ultimately found on the floor unresponsive and pulseless.  Patient was not on any monitoring at that point and CPR was done for 2 minutes until ROSC after which point her monitor showed sinus tachycardia. Patient was found to have a nonreactive left pupil hence a stroke code was initiated at 1430. Patient was taken to CT scanner for CT head and CTA which did not show any acute intracranial pathology or any large vessel occlusions. He underwent a coil emobolization of AV fistula in IR. He is admitted to the SICU for hemodynamic monitoring.     CHANGES and MAJOR THINGS TODAY:   - Repeat AM Hgb 6.9, transfused additional 1u pRBC   - Repeat Hgb at 1000 8.2   - Plan for repeat Hbg 2 PM   - Notify IR of any major hgb changes.   - Consider repeat CT abdomen/pelvis and/or repeat renal artery angiogram if concern for hemorrhage develops  - Patient complains of epigastric pain, plan for cardiac work-up per IR   - Repeat EKG unremarkable for acute infarction   - Troponin mildly elevated at 34, but not concerning for cardiac etiology   - Echo   - CXR  - ICU electrolyte replacement protocol  - Hypocalcemia AM lab recheck; Recheck ionized calcium 4.2; plan to replace for cardioprotection  - Coag panel demonstrated improving INR  - Repeat renal US w/ doppler for hematoma evaluation    PLAN:    Neurological:  # Isolated anisocoria   - Stroke code called for anisocoria. NIH stroke score of 0   - CT head/CTA-no acute intracranial pathology or any large  "vessel occlusion   - Per neuro \" isolated event that is resolving and does not need any further evaluation from a vascular neurology standpoint\"  - Monitor neurological status. Notify neurology for any acute changes in exam.  - Pain: PRN tylenol 650mg PO Q4H; PRN oxycodone 5mg PO Q6H    Pulmonary:  - no active concerns  - Supplemental oxygen to keep saturation above 92 %.     Cardiovascular:    # CAD s/p CAB x2 2023  # Cardiopulmonary arrest  Underwent transplant renal biopsy around noon 10/17/23 and collapsed in his room around 2 PM and a code blue was called. He had ROSC after 3m of CPR and was neurologically intact. Cardiopulmonary arrest likely 2/2 post-biopsy hemorrhage. Repeat EKG w/ sinus tachycardia. Cardiology initially evaluated the patient.   - Patient complains of epigastric pain, plan for cardiac work-up per IR   - Repeat EKG normal   - Troponin mildly elevated at 34, but not concerning for cardiac etiology   - Echo   - CXR  - Monitor hemodynamic status.   - MAP goal >65   - Pressors: None  - Currently holding home metoprolol 12.5 mg PO BID   - Resumed statin    GI/Nutrition:    # S/p SPK   - CLD, plan to advance diet as tolerated  - Nutrition consulted. Appreciate recommendations.    Renal/Fluids/Electrolytes:  # s/p SPK  # s/p renal biopsy 10/17  # s/p coil embolization of left transplant renal artery AV fistula   Underwent renal biopsy 10/17 given elevated creatinine. Renal US: hematoma at biopsy site, patent flow, no hydronephrosis. Left transplant renal artery angiogram demonstrated an AV fistula in the lower pole renal transplant which was successfully embolized with 1-2 mm Hydrocoil. No other evidence of vessel injury identified.   - Transplant nephrology consult and following, appreciate recs.  - Continue current PTA immune suppression.  - Follow-up renal transplant biopsy results.  - Repeat renal US w/ doppler for hematoma evaluation  - Continue daily BMP, Mg, Phos  - ICU electrolyte " replacement protocol  - Hypocalcemia AM lab recheck; Recheck ionized calcium 4.2   - Discontinue NS infusion (75mL/hr) for IV fluid hydration.   - Encourage PO fluids hydration in place    # Secondary renal hyperparathyroidism   - PTH level: Minimally elevated ( pg/ml) 9/29/2023   - On treatment: Calcitriol 0.5 mcg daily PTA &* Os-Saúl BID for hypocalcemia     Endocrine:  # Stress hyperglycemia   # Diabetes Mellitus   - Medium sliding scale for glucose management.   - Goal to keep BG < 180 for optimal wound healing     Infectious disease:   - No indication for antibiotics.   - Immunosuppression: MMF & Tacrolimus  - Opportunistic pathogen prophylaxis: Valcyte, Bactrim    Hematology:    # Acute blood loss anemia  # Coagulopathy 2/2 bleeding  - Initial hemoglobin <5. Repeat >8  - INR 2.15, PTT 53  - Products transfused: 2 units PRBC, 1 FFP  - Repeat AM Hgb 6.9, transfused additional 1u pRBC   - Repeat Hgb at 1000 8.2   - Plan for repeat Hgb PM   - Notify IR of any major hgb changes.   - Consider repeat CT abdomen/pelvis and/or repeat renal artery angiogram if concern for hemorrhage develops  - Continue monitor and trend hgb. Threshold for transfusion if hgb <7.0 or signs/symptoms of hypoperfusion.    - Daily CBC w/ plts  - Repeat Coag panel     Musculoskeletal:  - Physical and occupational therapy consults.  - Previous right shoulder XR w/o acute pathology     Skin:  - dilgent cares to prevent skin breakdown and wound formation.      General Cares/Prophylaxis:    DVT Prophylaxis: mechanical; no DVT prophylaxis due to high risk of bleeding  GI Prophylaxis: Not indicated  Restraints: Restraints for medical healing needed: NO    Lines/ tubes/ drains:  -  PIV x2, lim     Code  - Full    Disposition:  - transfer to floor when able    Patient seen, findings and plan discussed with surgical ICU staff, Dr. Fairchild.    Ian Stephens  Medical Student, MS4  [facility]  [employer]      ====================================  INTERVAL HISTORY:   Avelina Marion is a 68 year old male with past medical history of CAD status post CABG x2 in 2023, ESRD secondary to diabetes type 2 now status post SPK with ureteral stent placement and appendectomy 9/14/2023 who was in the procedure IR suite for kidney biopsy today 10/17  Per report after biopsy was completed a noise was heard from patient's room and the patient was ultimately found on the floor unresponsive and pulseless.  Patient was not on any monitoring at that point and CPR was done for 2 minutes until ROSC after which point her monitor showed sinus tachycardia. Patient was found to have a nonreactive left pupil hence a stroke code was initiated at 1430. Patient was taken to CT scanner for CT head and CTA which did not show any acute intracranial pathology or any large vessel occlusions. He underwent a coil emobolization of AV fistula in IR. At this time, he endorses mild right shoulder pain but otherwise denies any symptoms.      REVIEW OF SYSTEMS: 10 point ROS neg other than the symptoms noted above in the HPI.    OBJECTIVE:   1. VITAL SIGNS:   Temp:  [97.5  F (36.4  C)-98.7  F (37.1  C)] 98.7  F (37.1  C)  Pulse:  [64-96] 83  Resp:  [14-31] 29  BP: (105-157)/() 118/56  Cuff Mean (mmHg):  [103] 103  SpO2:  [84 %-100 %] 94 %  Resp: 29      2. INTAKE/ OUTPUT:   I/O last 3 completed shifts:  In: 1197.5 [P.O.:60; I.V.:187.5]  Out: 285 [Urine:285]    3. PHYSICAL EXAMINATION:  General: NAD, lying in bed  HEENT: Atraumatic, normocephalic  Neuro: No focal deficits  CV: RRR by peripheral pulse  Pulm: Breathing comfortably on 1L O2  Abd: soft, nontender, nondistended  : Uribe in place  Extremities: Right shoulder tender, limited ROM  Skin: WWP, no rashes or abrasions on exposed skin  Incisions: C/D/I  Psych: appropriate mood and affect    4. INVESTIGATIONS:   Arterial Blood Gases   No lab results found in last 7 days.  Complete Blood Count    Recent Labs   Lab 10/17/23  2206 10/17/23  1832 10/17/23  1644 10/17/23  1517 10/17/23  1457 10/17/23  1425 10/16/23  0820 10/12/23  0835   WBC  --  13.6*  --   --  6.2  --  7.1 4.5   HGB 8.5* 8.3* 8.3* <5.1* 5.1*  5.1*   < > 8.7* 9.6*   PLT  --  146*  --   --  142*  --  188 232    < > = values in this interval not displayed.     Basic Metabolic Panel  Recent Labs   Lab 10/18/23  0357 10/17/23  2206 10/17/23  1934 10/17/23  1731 10/17/23  1517 10/17/23  1457 10/17/23  1455 10/17/23  1453 10/17/23  1422 10/16/23  0820 10/12/23  0835   NA  --  137  --   --  142 136  --  141   < > 139 138   POTASSIUM  --  4.8  --   --  3.5 3.5  --  3.3*   < > 4.8 5.0   CHLORIDE  --  108*  --   --   --  113*  --   --   --  106 105   CO2  --  19*  --   --   --  15*  --   --   --  22 23   BUN  --  27.4*  --   --   --  23.2*  --   --   --  25.7* 22.7   CR  --  2.44*  --   --   --  2.15* 2.0*  --   --  2.59* 2.39*   * 112* 101* 117* 164* 221*  --  188*   < > 117* 104*    < > = values in this interval not displayed.     Liver Function Tests  Recent Labs   Lab 10/17/23  1457 10/17/23  1423 10/17/23  1032   AST 21  --   --    ALT 13  --   --    ALKPHOS 41  --   --    BILITOTAL 0.2  --   --    ALBUMIN 2.2*  --   --    INR 1.55* 2.15* 1.11     Pancreatic Enzymes  Recent Labs   Lab 10/16/23  0820 10/12/23  0835   LIPASE 55 69*   AMYLASE 71 78     Coagulation Profile  Recent Labs   Lab 10/17/23  1457 10/17/23  1423 10/17/23  1032   INR 1.55* 2.15* 1.11   PTT  --  53*  --          5. RADIOLOGY:   Recent Results (from the past 24 hour(s))   IR Renal Biopsy Left    Narrative    PROCEDURE: US-guided LLQ Transplant Kidney biopsy    Procedural Personnel  Advanced practice provider(s): Joy Briceño PA-C, Jl Perez PA-C    Procedure Date: 10/17/2023    Pre-procedure diagnosis: : Elevated serum creatinine  Post-procedure diagnosis: : Elevated serum creatinine  Indication: Left lower quadrant renal transplant placed 9/15/2023,  patient  has elevated creatinine with concerns for slow graft function.  Previous biopsy of same target (QCDR): No  Additional clinical history: None      Impression    IMPRESSION:  Completed ultrasound-guided left lower quadrant transplant kidney  biopsy. A total of three kidney cores obtained. Pathology was on site  to determine adequacy of specimen.  Gelfoam pledgets administered in  biopsy track, with appropriate hemostasis.     Plan: Patient to return to 2A for 1 hour of strict bedrest, and 1 hour  of bedrest with bathroom privileges.     Complications: No immediate complications.  ___________________________________________________________    PROCEDURE SUMMARY:  - Percutaneous Ultrasound-guided coaxial core needle biopsy    PROCEDURE DETAILS:    Pre-procedure  Reference imaging for biopsy target: None  Consent: Informed consent for the procedure including risks, benefits  and alternatives was obtained and time-out was performed prior to the  procedure.  Preparation: The site was prepared and draped using maximal sterile  barrier technique including cutaneous antisepsis.    Imaging prior to biopsy  The patient was positioned supine. Initial imaging was performed.  Biopsy target:  - Organ or target location: Kidney  - Laterality: Left  Other findings: None    Biopsy   Local anesthesia was administered. Under imaging guidance as stated in  the procedure summary, the biopsy needle was advanced to the target  and biopsy was performed. Approximately 9ml of lidocaine was utilized.    Coaxial needle: 17 gauge    Core needle biopsy device:  Adjustable Coaxial Temno? (ACT) Biopsy  Device  Core needle size: 18 gauge  Number of core specimens: 3    On-site assessment of biopsy adequacy: Yes    Preliminary assessment of sample adequacy: Adequate    Needle removal  The biopsy needle was removed and a sterile dressing was applied.  Tract embolization: None    Contrast  Contrast agent: None     Additional Details  Specimens removed:  Biopsy samples as detailed above  Estimated blood loss (mL): Less than 10  Standardized report: [email or web address]_v3.1    Attestation  Signer name: MICHAELADINA FREIRE PA         SYSTEM ID:  [id number]   CT Head w/o Contrast    Narrative    CT HEAD W/O CONTRAST 10/17/2023 2:50 PM    History: Stroke r/o     Comparison: None    Technique: Using multidetector thin collimation helical acquisition  technique, axial, coronal and sagittal CT images from the skull base  to the vertex were obtained without intravenous contrast.   (topogram) image(s) also obtained and reviewed.    Findings: There is no intracranial hemorrhage, mass effect, or midline  shift. Gray/white matter differentiation in both cerebral hemispheres  is preserved. Ventricles are proportionate to the cerebral sulci. The  basal cisterns are clear.    The bony calvaria and the bones of the skull base are normal. The  visualized portions of the paranasal sinuses and mastoid air cells are  clear. Bilateral pseudophakia.      Impression    Impression:  No acute intracranial pathology.     I have personally reviewed the examination and initial interpretation  and I agree with the findings.    PETEY LEÓN MD         SYSTEM ID:  [id number]   CTA Head Neck with Contrast    Narrative    CTA HEAD NECK W CONTRAST 10/17/2023 2:52 PM    CT angiogram of the neck   CT angiogram of the base of the brain with contrast  Reconstruction on the 3D workstation    Provided History:  Stroke r/O    Comparison:  None.      Technique:  HEAD and NECK CTA: During rapid bolus intravenous injection of  nonionic contrast material, axial images were obtained using thin  collimation multidetector helical technique from the base of the upper  aortic arch through the [place]. This CT angiogram data was  reconstructed at thin intervals with mild overlap. Images were sent to  the 3D workstation, and 3D reconstructions were obtained. The axial  source images,  multiplanar reformations, 3D reconstructions in both  maximum intensity projection display and volume rendered models were  reviewed, with reconstructions performed by the technologist and  radiologist.    Contrast: iopamidol (ISOVUE-370) solution 67 mL    Findings:    Head CTA demonstrates no aneurysm or stenosis of the major  intracranial arteries.    Neck CTA demonstrates moderate stenosis of the right internal carotid  artery approximately 48% via NASCET criteria. The origins of the great  vessels from the aortic arch are patent. The normal distal right  internal carotid artery measures 4 mm. The normal distal left internal  carotid artery measures 4 mm.     No mass within the visualized portions of the cervical soft tissues or  lung apices.       Impression    Impression:    1. Head CTA demonstrates no aneurysm or stenosis of the major  intracranial arteries.   2. Neck CTA demonstrates no hemodynamically significant stenosis of  the major cervical arteries.        US Renal Transplant with Doppler    Narrative    ULTRASOUND RENAL TRANSPLANT WITH DOPPLER  10/17/2023 4:16 PM    CLINICAL HISTORY: s/p renal biopsy, assess for bleed or AV fistula and  assess position of arterial anastomosis. Left lower quadrant renal  transplant placed 9/15/2023. Hypotension after biopsy today.     COMPARISONS: Ultrasound guided biopsy 10/17/2023. Ultrasound renal  transplant with Doppler 9/26/2023    REFERRING PROVIDER: LINNEA MONTAÑO    TECHNIQUE: Left lower quadrant renal transplant evaluated with  grayscale, color Doppler, and Doppler waveform ultrasound. Transplant  renal vasculature evaluated with color Doppler and Doppler waveform  ultrasound.    Cine clips through the renal transplant and bladder were saved in the  patient's record.    FINDINGS:   LEFT LOWER QUADRANT RENAL TRANSPLANT:         Fluid collections: Echogenic triangular collection at the site of  the biopsy measures 18.5 x 19.2 x 34.2 mm. Technologist did  not  measure this or evaluate it with color Doppler imaging.         Renal artery:            Hilum: 248/32 cm/s            Mid: 276/30 cm/s            Anastomosis: 484/65 cm/s (previously 324 cm/s)         Renal artery - iliac ratio: 3.06         Renal vein:            Hilum: 58 cm/s            Mid: 48 cm/s            Anastomosis: 157 cm/s (previously 69 cm/s)         Renal vein - iliac ratio: 2.71         Length: 9.3 cm         Arcuate artery resistive index (superior / mid / inferior): 0.68  / 0.84 / 0.70 (previously 0.74 / 0.75 / 0.73)         Parenchyma: Normal. No stone, mass, or hydronephrosis.    Iliac artery:       Above anastomosis: 158 cm/s       Below anastomosis: 122 cm/s    Iliac vein:       Above anastomosis: 58 cm/s       Below anastomosis: 102 cm/s    Bladder: Distended.      Impression    IMPRESSION:   1. Hematoma at the biopsy site was not evaluated by the technologist.  Active extravasation cannot be excluded.    2. Elevated velocity at the transplant arterial anastomosis exceeds  300 cm/s but the velocity ratio is less than 3.5. Distal velocities  are also elevated. Waveforms are not post stenotic in morphology.    3. Elevated velocity at the transplant venous anastomosis to 157 cm/s,  but the velocity ratio is less than 3.    4. Arcuate artery resistive indices remain elevated.    5. No transplant hydronephrosis.    GUIDELINES:  For native kidneys:       Diagnostic criteria suggestive of > 60% diameter stenosis             Renal artery:             Peak systolic velocity > 180 cm/s             RAR > 3.5             Turbulent flow         Arcuate artery Resistive Index Normal < 0.7    For renal transplants:       Renal transplant peak systolic velocity criteria range from > 180  cm/s to > 400 cm/s       Source suggests using:            Peak systolic velocity > or = 300 cm/s            Spectral broadening            Intraparenchymal arterial acceleration time > or = 0.1 s     Source: Amarilys  G, Rosales STEVEN, Kodi WEIR, et al. Screening for  transplant renal artery stenosis: Ultrasound-based stenosis  probability stratification. American Journal of Roentgenology.  2017;209: 9054-3337. [id number]    I have personally reviewed the examination and initial interpretation  and I agree with the findings.    LEIDY GUTIERREZ MD         SYSTEM ID:  [id number]   IR Visceral Angiogram    Narrative    Procedures:   1. Ultrasound guided left common femoral arterial access  2. Diagnostic left pelvic angiography.  3. Diagnostic left renal transplant angiography.  4. Coil embolization of lower pole renal transplant arteriovenous  fistula/bleed  5. Closure of arteriotomy with 5 Fr Angioseal    Attending: JANELL Robertson MD  Resident/Fellow: TERESA Montes MD    Medications: fentanyl 150 mcg IV, 1% lidocaine for local anesthesia.     Monitoring: The patient was placed on continuous monitoring. Vital  signs and sedation monitored by nursing staff under my supervision.  The patient remained stable throughout the procedure.    Sedation time: N/A    Fluoroscopy time: 19.9 minutes    Clinical indication: Hemoglobin of 5.1 after renal biopsy, free fluid  in abdomen in ER    Comparison studies: Same day ultrasound procedural images and renal  transplant ultrasound    PROCEDURE:        Consent: verbal and written informed consent obtained prior to  procedure.    Procedure details: Patient placed in supine position. Bilateral groins  prepped and draped in standard sterile fashion. The left femoral head  identified with fluoroscopy. The left common femoral artery was patent  on ultrasound and an image was saved. With ultrasound guidance the  left common femoral artery was punctured in retrograde direction using  a 21-gauge micropuncture needle and an image of the needle entering  the patient's artery was documented in the patient's record. A  guidewire was inserted under fluoroscopic guidance. Using the  Seldinger technique, a 5 Fr  short vascular sheath was placed. Left  pelvic angiography performed which demonstrated the left renal  transplant artery arising off the distal common iliac artery. On  initial angiographic images no definite extravasation or traumatic  vascular injury was identified. Next, a Bentson wire and angled glide  catheter were advanced up to the renal arterial anastomosis. A 2.0 Fr  TruSelect catheter and Synchro wire were used to catheterize the renal  transplant artery. Arteriogram from the hilum was performed which  demonstrated the vascular irregularity and possible area of  blush/extravasation in the lower pole. Microcatheter was advanced over  the wire to the area of suspected vascular injury and repeat  angiography performed demonstrated a distal arterial branch which was  fistulized to the venous system an also with a probable focus of  extravasation. A 1-2mm Hydrocoil was deployed. Postembolization  angiogram demonstrated a probable second feeder arising from another  branch. Selective angiography was performed of this branch but no  definite extravasation be identified. Multiple other digital  subtraction angiogram runs were performed to evaluate for any other  parenchymal arterial branch feeders, however, these were all negative.  Completion angiography was then repeated immediately proximal to the  embolized vessel which demonstrated interval thrombosis of the  arteriovenous fistula and without any evidence of residual  extravasation.     Catheters and wires were removed. Hemostasis obtained using a 5 [place] of [place]  Angio-Seal.    Complications: None.      Impression    IMPRESSION:   Angiography demonstrates an arteriovenous fistula with focus of  extravasation involving the lower pole of the left renal transplant.  This was successfully embolized.    Plan:  Return to patient care unit.   Bedrest for 2 hours.      I, LINNEA MONTAÑO MD, attest that I performed the entire procedure.   XR Shoulder Right Port G/E 2  Views    Narrative    EXAM: XR SHOULDER RIGHT PORT G/E 2 VIEWS  LOCATION: [facility]  DATE: 10/17/2023    INDICATION: Shoulder pain after fall.  COMPARISON: None available.      Impression    IMPRESSION: No radiographic evidence of acute fracture or malalignment. Mild-to-moderate polyarticular osteoarthritis.       =========================================    I saw and evaluated the patient in an independent visit and agree with the student's assessment and plan as written above. I was present at all times for any mentioned procedures and independently assessed the patient. Corrections to the above plan have been made by me as necessary.    Lamont Ramirez MD  PGY-1, Neurosurgery  Off-service on SICU

## 2023-10-18 NOTE — PLAN OF CARE
Admitted/transferred from: IR  Reason for admission/transfer: Post IR procedure monitoring   Patient status upon admission/transfer: Pt A&Ox4, on 2L NC, normal sinus rhythm, lim in place, skin tear on L forearm, blood products infusing, pt complains of pain in R shoulder.   Interventions:  Skin tear dressed, pain managed with tylenol/oxy, xray ordered for R shoulder pain, electrolyte replacement.  Plan: Monitor hgb and electrolytes, frequent assessment of IR site.   2 RN skin assessment: completed by Kathi VORA RN, Talisha AGUILAR RN.   Sexual Orientation and Gender Identity (SOGI) smartfom completed: Not Done  Result of skin assessment and interventions/actions: Skin tear on L forearm cleaned/dressing applied. L groin IR site monitored.   Height, weight, drug calc weight: Done  Patient belongings (see Flowsheet - Adult Profile for details):   MDRO education (if applicable):  NA

## 2023-10-18 NOTE — PROGRESS NOTES
Transplant Surgery  Inpatient Daily Progress Note  10/18/2023    Assessment & Plan: Avelina Marion is a 68 year old male with PMH significant for CAD s/p CABG x2 2/2023, BCC, ESRD on HD via L AVF, Type II DM who is now s/p SPK with ureteral stent placement and appendectomy on 9/14/23 with Tk Ascencio MD. He presented as an OP for transplant kidney biopsy for persistently elevated creatinine. Post biopsy, pt was prepping to leave when a loud noise heard from room. Pt. Was found unresponsive, pulseless. CPR x  3min with ROSC. Exam notable for fixed non-reactive left pupil without other neuro deficits. Stroke code called. CToH negative. Received 2 PRBCs transferred back to IR where a renal artery angiogram demonstrated an AV fistula in lower pole of transplant and was embolized. He was transferred to ICU for closer monitoring.      Graft function:SPK 34  Kidney transplant with ureteral stent placement: SCr Cezar 2.2, b/l  2.2-2.5    Borderline ACR:  -Renal tx biopsy 10/17/23: Prelim path t3i1 borderline ACR -treat with SM 500mg x3     -AV fistula of transplanted kidney:Postprocedure with syncopal episode, ABL, fluid collection . Returned to IR for renal artery angiogram- AV fistula in lower pole, underwent embolization.   SCr 2.4 this am.  Monitor UOP.    -Post biopsy US:Hematoma at the biopsy site. Active extravasation cannot be excluded.No transplant hydronephrosis   Elevated velocity at the transplant arterial anastomosis     Pancreas: -110s. Euglycemic, off insulin. Lipase 20-30,Trend daily.     Immunosuppression management:   Induction:Thymoglobulin 600 mg (6.5 mg/kg).     Maintenance:   - MMF 1000 mg BID   - Tacrolimus 2.5mg BID. Goal level 8-10.     Rejection:  Solumedrol 500mg x3 10/18-10/20    Neuro: Syncopal episode, anisocoria : stroke code called   10/17: CToH/CTA head-neg, no aneurysm/stenosis  Hematology: Anemia of chronic disease/Acute blood loss due to bleeding post renal biopsy: Hgb 5.1  post procedure, transfused 2 PRBCs, Hgb 6.9 this am, will transfuse additonal PRBC today. Serial hgbs.  Thrombocytopenia: Plts <150. Trend.   Cardiorespiratory:   CAD s/p CABG x2 2023: Continue ASA, statin, metoprolol.   Chest pain: likely related to compressions. Troponin and ECHO.  GI/Nutrition: CLD , ADAT Reg.  Endocrine: see above  Fluid/Electrolytes: MIVF: NS @ 75  Electrolytes per ICU  : Rony present for accurate monitoring-ok to remove. Monitor PVR, strict I/O  Infectious disease: Afebrile, WBC normal  Continue bactrim and valcyte PPx  MS:  Right shoulder pain: likely related to unwitnessed fall 10/17- XR mild ot mod OA but neg fx.    Prophylaxis: DVT, fall, GI  Disposition: SICU      Medical Decision Making: High  Subsequent visit [id number] (high level decision making)    VIVI/Fellow/Resident Provider: Jessica Christiansen NP    Faculty: Louise Barros MD    _________________________________________________________________  Transplant History: Admitted 10/17/2023 for Anemia due to blood loss, acute [D62]  History of simultaneous kidney and pancreas transplant (H) [Z94.0, Z94.83]  Postoperative cardiac arrest following non-cardiac surgery [I97.121]  .  9/14/2023 (Kidney / Pancreas), Postoperative day: 34     Interval History: History is obtained from the patient  Overnight events: c/o right shoulder pain. Likely from fall. Denies HA/head pain, blurred/double vision.  C/o abdominal bloating and pressure.    ROS:   A 10-point review of systems was negative except as noted above.    Meds:   atorvastatin  20 mg Oral or Feeding Tube At Bedtime    insulin aspart  1-7 Units Subcutaneous TID AC    insulin aspart  1-5 Units Subcutaneous At Bedtime    mycophenolate  1,000 mg Oral BID    polyethylene glycol  17 g Oral Daily    sulfamethoxazole-trimethoprim  1 tablet Oral Once per day on Monday Wednesday Friday    tacrolimus  1.5 mg Oral BID IS    valGANciclovir  450 mg Oral Daily       Physical Exam:     Admit Weight:  92 kg (202 lb 12.8 oz)    Current vitals:   /57   Pulse 86   Temp 98.7  F (37.1  C) (Oral)   Resp (!) 39   Wt 91.9 kg (202 lb 8 oz)   SpO2 92%   BMI 27.85 kg/m           Vital sign ranges:    Temp:  [97.5  F (36.4  C)-98.7  F (37.1  C)] 98.7  F (37.1  C)  Pulse:  [64-96] 86  Resp:  [14-39] 39  BP: (105-157)/() 137/57  Cuff Mean (mmHg):  [103] 103  SpO2:  [84 %-100 %] 92 %  Patient Vitals for the past 24 hrs:   BP Temp Temp src Pulse Resp SpO2 Weight   10/18/23 0600 137/57 -- -- 86 (!) 39 92 % 91.9 kg (202 lb 8 oz)   10/18/23 0500 118/56 -- -- 83 29 94 % --   10/18/23 0400 124/57 98.7  F (37.1  C) Oral 84 (!) 31 95 % --   10/18/23 0300 126/52 -- -- 82 28 98 % --   10/18/23 0200 120/54 -- -- 81 27 99 % --   10/18/23 0100 120/51 -- -- 83 26 92 % --   10/18/23 0000 116/53 97.5  F (36.4  C) Oral 80 24 94 % --   10/17/23 2315 -- -- -- 80 24 96 % --   10/17/23 2300 124/50 -- -- 81 24 94 % --   10/17/23 2230 (!) 141/54 -- -- 82 24 93 % --   10/17/23 2200 (!) 157/51 -- -- 96 28 (!) 89 % --   10/17/23 2120 134/63 -- -- 82 27 96 % --   10/17/23 2100 (!) 152/138 -- -- 79 22 96 % --   10/17/23 2030 (!) 148/67 -- -- 81 29 96 % --   10/17/23 2015 -- -- -- 80 28 96 % --   10/17/23 2000 (!) 152/59 97.7  F (36.5  C) Oral 83 27 97 % 92 kg (202 lb 12.8 oz)   10/17/23 1930 (!) 143/62 -- -- 79 16 97 % --   10/17/23 1845 135/70 -- -- 78 -- 97 % --   10/17/23 1840 (!) 142/69 -- -- 81 -- 98 % --   10/17/23 1835 (!) 142/66 -- -- 80 -- 99 % --   10/17/23 1830 (!) 145/66 -- -- 80 -- 96 % --   10/17/23 1825 137/64 -- -- 76 -- 97 % --   10/17/23 1820 138/65 -- -- 76 -- 97 % --   10/17/23 1815 135/68 -- -- 77 -- 97 % --   10/17/23 1810 128/62 97.7  F (36.5  C) Oral 75 -- 98 % --   10/17/23 1800 132/64 -- -- 77 -- 98 % --   10/17/23 1755 129/66 -- -- 78 -- 97 % --   10/17/23 1750 (!) 141/60 -- -- 76 -- 98 % --   10/17/23 1745 137/66 -- -- 75 -- 97 % --   10/17/23 1740 137/59 -- -- 77 -- 91 % --   10/17/23 1735 (!) 141/60 -- -- 78  -- 95 % --   10/17/23 1730 (!) 144/67 -- -- 80 -- (!) 84 % --   10/17/23 1725 (!) 149/65 -- -- 78 -- 96 % --   10/17/23 1720 111/51 -- -- 82 -- 94 % --   10/17/23 1715 (!) 144/64 -- -- 80 -- 99 % --   10/17/23 1710 (!) 141/61 -- -- 80 -- 97 % --   10/17/23 1705 (!) 140/65 -- -- 80 -- 99 % --   10/17/23 1700 139/63 97.8  F (36.6  C) Oral 80 -- 97 % --   10/17/23 1630 126/59 -- -- 77 -- 100 % --   10/17/23 1615 108/52 -- -- -- -- -- --   10/17/23 1600 113/57 -- -- 82 -- -- --   10/17/23 1514 108/56 -- -- 79 24 97 % --   10/17/23 1509 107/55 -- -- 80 20 94 % --   10/17/23 1504 118/55 -- -- 80 14 96 % --   10/17/23 1451 125/64 -- -- -- 21 95 % --     General Appearance: in no apparent distress.   Skin: normal, warm, dry  Heart: regular rate and rhythm  Lungs: clear to auscultation, without wheezes,1LNC  Abdomen: The abdomen is soft, NTTP, ND, dull to percussion,  he is not tender over the kidney graft. The wound is Healing well and well approximated.  : lim is present.    Urine has no gross hematuria.   Extremities: edema: present bilateral. 1+  Neurologic: awake, alert, and oriented.      Data:   CMP  Recent Labs   Lab 10/18/23  0357 10/17/23  2206 10/17/23  1731 10/17/23  1517 10/17/23  1457 10/17/23  1425 10/17/23  1423 10/17/23  1422 10/16/23  0820 10/12/23  0835 10/12/23  0835   NA  --  137  --  142 136   < >  --   --  139  --  138   POTASSIUM  --  4.8  --  3.5 3.5   < >  --   --  4.8  --  5.0   CHLORIDE  --  108*  --   --  113*  --   --   --  106  --  105   CO2  --  19*  --   --  15*  --   --   --  22  --  23   * 112*   < > 164* 221*   < >  --    < > 117*  --  104*   BUN  --  27.4*  --   --  23.2*  --   --   --  25.7*  --  22.7   CR  --  2.44*  --   --  2.15*   < >  --   --  2.59*  --  2.39*   GFRESTIMATED  --  28*  --   --  33*   < >  --   --  26*  --  29*   DAVID  --  8.1*  --   --  6.0*  --   --   --  9.4  --  9.9   ICAW  --  4.4  --  4.2*  --    < > 2.9*  --   --   --   --    MAG  --  1.2*  --   --    --   --   --   --  1.4*  --   --    PHOS  --  2.3*  --   --   --   --  1.3*  --  3.2   < >  --    AMYLASE  --   --   --   --   --   --   --   --  71  --  78   LIPASE  --   --   --   --   --   --   --   --  55  --  69*   ALBUMIN  --   --   --   --  2.2*  --   --   --   --   --   --    BILITOTAL  --   --   --   --  0.2  --   --   --   --   --   --    ALKPHOS  --   --   --   --  41  --   --   --   --   --   --    AST  --   --   --   --  21  --   --   --   --   --   --    ALT  --   --   --   --  13  --   --   --   --   --   --     < > = values in this interval not displayed.     CBC  Recent Labs   Lab 10/17/23  2206 10/17/23  1832 10/17/23  1517 10/17/23  1457   HGB 8.5* 8.3*   < > 5.1*  5.1*   WBC  --  13.6*  --  6.2   PLT  --  146*  --  142*    < > = values in this interval not displayed.     COAGS  Recent Labs   Lab 10/17/23  1457 10/17/23  1423   INR 1.55* 2.15*   PTT  --  53*      Urinalysis  Recent Labs   Lab Test 10/17/23  1629 09/26/23  1429   COLOR Light Yellow Light Yellow   APPEARANCE Clear Clear   URINEGLC 1000* Negative   URINEBILI Negative Negative   URINEKETONE Negative Negative   SG 1.005 1.016   UBLD Large* Trace*   URINEPH 6.0 6.5   PROTEIN 30* 20*   NITRITE Negative Negative   LEUKEST Small* Trace*   RBCU >182* 5*   WBCU 14* 8*     Virology:  Hepatitis C Antibody   Date Value Ref Range Status   09/14/2023 Nonreactive Nonreactive Final     BK Virus DNA copies/mL   Date Value Ref Range Status   10/12/2023 Not Detected Not Detected copies/mL Final

## 2023-10-18 NOTE — PLAN OF CARE
ICU End of Shift Summary. See flowsheets for vital signs and detailed assessment.    Changes this shift: Oxygen weaned to 1 lpm via oxymask. Patient is a mouth breather. Attempted room air but de-sated to 87%. Patient still unable to take deep breathes d/t residual pain from compressions. PRN Tylenol given twice and oxycodone once for right shoulder pain that radiates to back, chest and abdomen. Aqua k-pad behind right shoulder. Nauseous after taking some Miralax. PRN IV Zofran given. Chest xray, ECHO, 12 lead EKG and US of renal done. Uribe removed. Voiding adequately with no concerns for retention. Post void residuals fine. Diet changed to regular. No appetite. Calcium gluconate given for ionized calcium of 4.2. Unit of PRBCs given for a hemoglobin of 6.9. Rechecks of hemoglobin have been stable. Transfer orders placed to .    Plan: Hemoglobin check at 2000. Follow-up renal US with doppler tomorrow. Transfer to  once a bed becomes available. Monitor hemoglobin. Continue with POC and notify team of any changes or concerns.    Goal Outcome Evaluation:    Plan of Care Reviewed With: patient    Overall Patient Progress: improving    Outcome Evaluation: Patient's hemoglobin stable. Transfer orders out of the ICU.

## 2023-10-18 NOTE — PROGRESS NOTES
[facility]  Transplant Nephrology Follow Up  Date of Admission:  10/17/2023  Today's Date: 10/18/2023  Requesting physician: Louise Barros MD    Recommendations:  - Continue serial Hb checks. If Hb decreases again would obtain non con CT A/P and call IR  -Restart bicarb 1300mg bid, nystatin, metoprolol 12.5mg bid, calcitriol 0.5mcg daily, calcium +vitamin D 2 tabs bid  -Continue to hold ASA  -Pt found to have t3i1 borderline ACR on biopsy. Plan for solumedrol 500mg IV daily x3 days.   -Hold fluids. Consider IV diuretics if O2 sats decrease  -Calcium gluconate 1g IV x1    Assessment & Plan   # DDKT (SPK): Stable, increased   - Baseline Creatinine: ~ 2.2-2.5, von Scr 2.2.    - Proteinuria: Normal (<0.2 grams)   - Date DSA Last Checked: Sep/2023      Latest DSA: No DSA at time of transplant, repeat pending   - BK Viremia: No   - Kidney Tx Biopsy: Oct 17, 2023; Result: borderline ACR t3i1. Starting solumedrol 500mg IV daily x3d    -Double J stent placed at time of kidney transplant. Removal scheduled 10/23.     # Pancreas Tx (SPK):    - Pancreatic Exocrine Drainage: Enteric drained     - Blood glucose: Euglycemia      On insulin: No   - HbA1c: Last checked at time of transplant      Latest HbA1c: 8.4%    - Pancreatic enzymes: Trend down   - Date DSA Last Checked: Sep/2023  Latest DSA: No DSA at time of transplant, repeat pending   - Pancreas Tx Biopsy: No    # Immunosuppression: Tacrolimus immediate release (goal 8-10) and Mycophenolate mofetil (dose 1000 mg every 12 hours)   - Patient is in an immunosuppressed state and will continue to monitor for efficacy and toxicity of immunosuppression medications.   - Induction with Recent Transplant:  High Intensity Protocol    - Changes: No     # Infection Prophylaxis:   - PJP: Sulfa/TMP (Bactrim)  - CMV: Valganciclovir (Valcyte), x3m, CMV -/-    # Hypertension: Borderline control currently;  Goal BP: < 140/90  (Hospitalization goal)    - Volume status: Hypovolemic     - Changes: Yes - stop fluids, restart metoprolol 12.5mg bid.    # Concern for Acute Blood Loss with peritransplant hematoma:    -underwent transplant renal biopsy around noon 10/17/23 and collapsed in his room around 2 PM and a code blue was called. He had ROSC after 3m of CPR and was neurologically intact   -Found to have AV fistula with extravasation on angiography by IR on 10/17, s/p coiling   -Transfuse for Hb <7, serial Hb.    -If Hb continues to decrease would obtain CT A/P non con and call IR    # Concern for Stroke:   - exam after ROSC was notable for fixed non-reactive left pupil at 1cm, and right was 3cm and reactive without other neurologic deficits   -CTA head 10/17/23 was negative    # Anemia in Chronic Renal Disease: Hgb: Trend down     MCKENNA: No   - Iron studies: Low iron saturation, but high ferritin    -Transfuse for Hb<7    # Increasing O2 req:   -Found on CXR on 10/18 to have increasing interstitial infiltrates   -Stop IVF   -Consider diuretics if increase in O2 req    # Mineral Bone Disorder:   - Secondary renal hyperparathyroidism; PTH level: Minimally elevated ( pg/ml) 9/29/2023        On treatment: on Calcitriol 0.5 mcg daily PTA for hypocalcemia  - Vitamin D; level: Low           On supplement: No  - Calcium; level: Low           On supplement: No, on Oscal 2 tabs BID, calcium gluconate 1g IV x1  - Phosphorus; level: Normal          On supplement: No    # Electrolytes:   - Potassium; level: Normal        On supplement: No  - Magnesium; level: Low        On supplement: No, magnesium oxide 400 mg PO daily   - Bicarbonate; level: Normal        On supplement: No     # CAD: S/p CABG 2/2023. On metoprolol 12.5 mg PO BID, restart    # PAD    #Non-melanotic skin cancer: S/p Mohs 8/2022    # Transplant History:  Etiology of Kidney Failure: Diabetes mellitus type 2  Tx: SEBASK  Transplant: 9/14/2023 (Kidney / Pancreas)  Significant changes in  immunosuppression: None  Significant transplant-related complications: None    Recommendations were communicated to the primary team verbally.    Jeffery Chaparro MD  Pager: [phone]    Interval events:  Pt is now s/p angiography with coiling. Having discomfort around LLQ site, +chest pain, O2 sats decreasing with increased WOB        Review of Systems    The 4 point Review of Systems is negative other than noted above or here.     Past Medical History    I have reviewed this patient's medical history and updated it with pertinent information if needed.   Past Medical History:   Diagnosis Date     Anemia in chronic kidney disease      BCC (basal cell carcinoma), face 8/9/2022     Diabetic retinopathy of both eyes (H)      End stage renal disease (H)      Hypertension      Secondary renal hyperparathyroidism (H24)      Type 2 diabetes mellitus (H)        Past Surgical History   I have reviewed this patient's surgical history and updated it with pertinent information if needed.  Past Surgical History:   Procedure Laterality Date     BENCH KIDNEY  9/14/2023    Procedure: Bench kidney;  Surgeon: Tk Ascencio MD;  Location:  OR     BENCH PANCREAS N/A 9/14/2023    Procedure: Bench pancreas;  Surgeon: Tk Ascencio MD;  Location: [facility] OR     BYPASS GRAFT ARTERY CORONARY N/A 2/13/2023    Procedure: TRANSESPHAGEAL ECHOCARDIOGRAM, MEDIAN STERNOTOMY, TAKE DOWN OF LEFT INTERNAL MAMMARY, LEFT ENDOSCOPIC GREATER SAPHENOUS VEIN HARVEST, CRYO NERVE BLOCK, CARDIOPULMONARY BYPASS, CORONARY ARTERY BYPASS GRAFT X 2.;  Surgeon: Patrice Yepez MD;  Location: [facility] OR     CREATE FISTULA ARTERIOVENOUS UPPER EXTREMITY Left      CV CORONARY ANGIOGRAM N/A 12/30/2021    Procedure: CV CORONARY ANGIOGRAM;  Surgeon: Milton Cam MD;  Location:  HEART CARDIAC CATH LAB     CV CORONARY ANGIOGRAM N/A 2/7/2023    Procedure: Coronary Angiogram;  Surgeon: Milton Cam MD;  Location: [facility] CARDIAC  CATH LAB     CV PCI N/A 2023    Procedure: Percutaneous Coronary Intervention;  Surgeon: Milton Cam MD;  [facility]     IR RENAL BIOPSY LEFT  10/17/2023     TRANSPLANT PANCREAS, KIDNEY  DONOR, COMBINED N/A 2023    Procedure: Transplant pancreas, kidney  donor, combined, appendectomy;  Surgeon: Tk Ascencio MD;  [facility]       Family History   I have reviewed this patient's family history and updated it with pertinent information if needed.   Family History   Problem Relation Age of Onset     Diabetes Brother      Kidney Disease Brother        Social History   I have reviewed this patient's social history and updated it with pertinent information if needed. Avelina Marion  reports that he has quit smoking. His smoking use included cigarettes. He has never used smokeless tobacco. He reports that he does not drink alcohol and does not use drugs.    Allergies   No Known Allergies  Prior to Admission Medications     atorvastatin  20 mg Oral or Feeding Tube At Bedtime     calcitRIOL  0.5 mcg Oral Daily     calcium carbonate-vitamin D  2 tablet Oral BID     famotidine  10 mg Oral Daily     insulin aspart  1-7 Units Subcutaneous TID AC     insulin aspart  1-5 Units Subcutaneous At Bedtime     magnesium oxide  400 mg Oral Q24H     metoprolol tartrate  12.5 mg Oral BID     mycophenolate  1,000 mg Oral BID     nystatin  500,000 Units Swish & Swallow 4x Daily     pantoprazole  40 mg Oral Daily     polyethylene glycol  17 g Oral Daily     sodium bicarbonate  1,300 mg Oral BID     sulfamethoxazole-trimethoprim  1 tablet Oral Once per day on      tacrolimus  1.5 mg Oral BID IS     valGANciclovir  450 mg Oral Every Other Day         Physical Exam   Temp  Av.8  F (36.6  C)  Min: 97.8  F (36.6  C)  Max: 97.8  F (36.6  C)      Pulse  Av.1  Min: 64  Max: 80 Resp  Av.1  Min: 14  Max: 24  SpO2  Av.7 %  Min: 94 %   Max: 100 %     BP (!) 150/59   Pulse 80   Temp 98  F (36.7  C) (Oral)   Resp 26   Wt 91.9 kg (202 lb 8 oz)   SpO2 97%   BMI 27.85 kg/m      Admit       GENERAL APPEARANCE: no distress   HENT: oral mucous membranes moist  RESP:rhonchi bilaterally, pain to palpation of chest wall  CV: regular rhythm, normal rate, no rub, no murmur  EDEMA: no LE edema bilaterally  ABDOMEN: soft, nondistended, nontender, bowel sounds normal  MS: extremities normal - no gross deformities noted, no evidence of inflammation in joints, no muscle tenderness  SKIN: no suspicious lesions or rashes  NEURO: mentation intact  PSYCH: fatigued  DIALYSIS ACCESS:  LUE AV fistula +bruit/+thrill    Data   CMP  Recent Labs   Lab 10/18/23  1200 10/18/23  0812 10/18/23  0523 10/18/23  0357 10/17/23  2206 10/17/23  1731 10/17/23  1517 10/17/23  1457 10/17/23  1455 10/17/23  1425 10/17/23  1423 10/17/23  1422 10/16/23  0820   NA  --   --  134*  --  137  --  142 136  --    < >  --   --  139   POTASSIUM  --   --  4.7  --  4.8  --  3.5 3.5  --    < >  --   --  4.8   CHLORIDE  --   --  106  --  108*  --   --  113*  --   --   --   --  106   CO2  --   --  18*  --  19*  --   --  15*  --   --   --   --  22   ANIONGAP  --   --  10  --  10  --   --  8  --   --   --   --  11   GLC 96 106* 105* 109* 112*   < > 164* 221*  --    < >  --    < > 117*   BUN  --   --  25.3*  --  27.4*  --   --  23.2*  --   --   --   --  25.7*   CR  --   --  2.40*  --  2.44*  --   --  2.15* 2.0*  --   --   --  2.59*   GFRESTIMATED  --   --  29*  --  28*  --   --  33* 36*  --   --   --  26*   DAVID  --   --  7.7*  --  8.1*  --   --  6.0*  --   --   --   --  9.4   MAG  --   --  2.2  --  1.2*  --   --   --   --   --   --   --  1.4*   PHOS  --   --  3.3  --  2.3*  --   --   --   --   --  1.3*  --  3.2   PROTTOTAL  --   --   --   --   --   --   --  3.6*  --   --   --   --   --    ALBUMIN  --   --   --   --   --   --   --  2.2*  --   --   --   --   --    BILITOTAL  --   --   --   --   --   --    --  0.2  --   --   --   --   --    ALKPHOS  --   --   --   --   --   --   --  41  --   --   --   --   --    AST  --   --   --   --   --   --   --  21  --   --   --   --   --    ALT  --   --   --   --   --   --   --  13  --   --   --   --   --     < > = values in this interval not displayed.     CBC  Recent Labs   Lab 10/18/23  0952 10/18/23  0523 10/17/23  2206 10/17/23  1832 10/17/23  1644 10/17/23  1517 10/17/23  1457 10/17/23  1425 10/16/23  0820   HGB 8.2* 6.9* 8.5* 8.3*   < > <5.1* 5.1*  5.1*   < > 8.7*   WBC  --  9.1  --  13.6*  --   --  6.2  --  7.1   RBC  --  2.21*  --  2.63*  --   --  1.52*  --  2.70*   HCT  --  20.9*  --  25.7*  --  <15* 16.2*   < > 27.3*   MCV  --  95  --  98  --   --  107*  --  101*   MCH  --  31.2  --  31.6  --   --  32.9  --  32.2   MCHC  --  33.0  --  32.3  --   --  31.1*  --  31.9   RDW  --  17.9*  --  16.6*  --   --  14.3  --  14.1   PLT  --  137*  --  146*  --   --  142*  --  188    < > = values in this interval not displayed.     INR  Recent Labs   Lab 10/18/23  0707 10/17/23  1457 10/17/23  1423 10/17/23  1032   INR 1.30* 1.55* 2.15* 1.11   PTT 30  --  53*  --      ABG  Recent Labs   Lab 10/17/23  1457 10/17/23  1423   O2PER 21 15      Urine Studies  Recent Labs   Lab Test 10/17/23  1629 09/26/23  1429 09/14/23  1643 02/12/23  1539   COLOR Light Yellow Light Yellow Light Yellow Yellow   APPEARANCE Clear Clear Clear Slightly Cloudy*   URINEGLC 1000* Negative 500* 100*   URINEBILI Negative Negative Negative Negative   URINEKETONE Negative Negative Negative Negative   SG 1.005 1.016 1.017 1.015   UBLD Large* Trace* Small* Trace*   URINEPH 6.0 6.5 6.5 5.5   PROTEIN 30* 20* 100* 100*   NITRITE Negative Negative Negative Negative   LEUKEST Small* Trace* Negative Negative   RBCU >182* 5* 1 3*   WBCU 14* 8* 1 2     No lab results found.  PTH  Recent Labs   Lab Test 09/29/23  0848 09/24/23  0835   PTHI 103* 7*     Iron Studies  Recent Labs   Lab Test 09/29/23  0848 09/24/23  0835  02/16/23  0834   IRON 27* 32* 24*   * 151* 121*   IRONSAT 16 21 20   TREMAINE 1,256* 1,164* 1,461*       IMAGING:  All imaging studies reviewed by me.

## 2023-10-19 ENCOUNTER — APPOINTMENT (OUTPATIENT)
Dept: ULTRASOUND IMAGING | Facility: CLINIC | Age: 68
DRG: 674 | End: 2023-10-19
Attending: NURSE PRACTITIONER
Payer: COMMERCIAL

## 2023-10-19 LAB
ANION GAP SERPL CALCULATED.3IONS-SCNC: 9 MMOL/L (ref 7–15)
BACTERIA UR CULT: NO GROWTH
BUN SERPL-MCNC: 27 MG/DL (ref 8–23)
CALCIUM SERPL-MCNC: 8.1 MG/DL (ref 8.8–10.2)
CHLORIDE SERPL-SCNC: 105 MMOL/L (ref 98–107)
CREAT SERPL-MCNC: 2.3 MG/DL (ref 0.67–1.17)
DEPRECATED HCO3 PLAS-SCNC: 19 MMOL/L (ref 22–29)
EGFRCR SERPLBLD CKD-EPI 2021: 31 ML/MIN/1.73M2
ERYTHROCYTE [DISTWIDTH] IN BLOOD BY AUTOMATED COUNT: 17.5 % (ref 10–15)
GLUCOSE BLDC GLUCOMTR-MCNC: 120 MG/DL (ref 70–99)
GLUCOSE BLDC GLUCOMTR-MCNC: 132 MG/DL (ref 70–99)
GLUCOSE BLDC GLUCOMTR-MCNC: 135 MG/DL (ref 70–99)
GLUCOSE BLDC GLUCOMTR-MCNC: 136 MG/DL (ref 70–99)
GLUCOSE BLDC GLUCOMTR-MCNC: 137 MG/DL (ref 70–99)
GLUCOSE SERPL-MCNC: 135 MG/DL (ref 70–99)
HCT VFR BLD AUTO: 24.8 % (ref 40–53)
HGB BLD-MCNC: 8.1 G/DL (ref 13.3–17.7)
HGB BLD-MCNC: 8.5 G/DL (ref 13.3–17.7)
LIPASE SERPL-CCNC: 27 U/L (ref 13–60)
MAGNESIUM SERPL-MCNC: 2 MG/DL (ref 1.7–2.3)
MCH RBC QN AUTO: 30.7 PG (ref 26.5–33)
MCHC RBC AUTO-ENTMCNC: 32.7 G/DL (ref 31.5–36.5)
MCV RBC AUTO: 94 FL (ref 78–100)
PHOSPHATE SERPL-MCNC: 2.5 MG/DL (ref 2.5–4.5)
PLATELET # BLD AUTO: 124 10E3/UL (ref 150–450)
POTASSIUM SERPL-SCNC: 5.1 MMOL/L (ref 3.4–5.3)
RBC # BLD AUTO: 2.64 10E6/UL (ref 4.4–5.9)
SODIUM SERPL-SCNC: 133 MMOL/L (ref 135–145)
WBC # BLD AUTO: 9 10E3/UL (ref 4–11)

## 2023-10-19 PROCEDURE — 36415 COLL VENOUS BLD VENIPUNCTURE: CPT | Performed by: PHARMACIST

## 2023-10-19 PROCEDURE — 250N000012 HC RX MED GY IP 250 OP 636 PS 637: Performed by: NURSE PRACTITIONER

## 2023-10-19 PROCEDURE — 250N000013 HC RX MED GY IP 250 OP 250 PS 637: Performed by: NURSE PRACTITIONER

## 2023-10-19 PROCEDURE — 83690 ASSAY OF LIPASE: CPT | Performed by: NURSE PRACTITIONER

## 2023-10-19 PROCEDURE — 76776 US EXAM K TRANSPL W/DOPPLER: CPT

## 2023-10-19 PROCEDURE — 76776 US EXAM K TRANSPL W/DOPPLER: CPT | Mod: 26 | Performed by: RADIOLOGY

## 2023-10-19 PROCEDURE — 258N000003 HC RX IP 258 OP 636: Performed by: NURSE PRACTITIONER

## 2023-10-19 PROCEDURE — 85018 HEMOGLOBIN: CPT | Performed by: PHARMACIST

## 2023-10-19 PROCEDURE — 250N000013 HC RX MED GY IP 250 OP 250 PS 637: Performed by: PHARMACIST

## 2023-10-19 PROCEDURE — 250N000012 HC RX MED GY IP 250 OP 636 PS 637: Performed by: PHARMACIST

## 2023-10-19 PROCEDURE — 84100 ASSAY OF PHOSPHORUS: CPT | Performed by: INTERNAL MEDICINE

## 2023-10-19 PROCEDURE — 120N000011 HC R&B TRANSPLANT UMMC

## 2023-10-19 PROCEDURE — 250N000009 HC RX 250: Performed by: NURSE PRACTITIONER

## 2023-10-19 PROCEDURE — 99233 SBSQ HOSP IP/OBS HIGH 50: CPT | Performed by: INTERNAL MEDICINE

## 2023-10-19 PROCEDURE — 250N000011 HC RX IP 250 OP 636: Performed by: NURSE PRACTITIONER

## 2023-10-19 PROCEDURE — 85027 COMPLETE CBC AUTOMATED: CPT | Performed by: INTERNAL MEDICINE

## 2023-10-19 PROCEDURE — 36415 COLL VENOUS BLD VENIPUNCTURE: CPT | Performed by: INTERNAL MEDICINE

## 2023-10-19 PROCEDURE — 80048 BASIC METABOLIC PNL TOTAL CA: CPT | Performed by: INTERNAL MEDICINE

## 2023-10-19 PROCEDURE — 83735 ASSAY OF MAGNESIUM: CPT | Performed by: INTERNAL MEDICINE

## 2023-10-19 PROCEDURE — 250N000013 HC RX MED GY IP 250 OP 250 PS 637: Performed by: SURGERY

## 2023-10-19 RX ORDER — MAGNESIUM OXIDE 400 MG/1
400 TABLET ORAL EVERY 4 HOURS
Qty: 2 TABLET | Refills: 0 | Status: COMPLETED | OUTPATIENT
Start: 2023-10-19 | End: 2023-10-19

## 2023-10-19 RX ORDER — FAMOTIDINE 20 MG/1
20 TABLET, FILM COATED ORAL DAILY
Status: DISCONTINUED | OUTPATIENT
Start: 2023-10-19 | End: 2023-10-20 | Stop reason: HOSPADM

## 2023-10-19 RX ORDER — GINSENG 100 MG
CAPSULE ORAL 2 TIMES DAILY
Status: DISCONTINUED | OUTPATIENT
Start: 2023-10-19 | End: 2023-10-20 | Stop reason: HOSPADM

## 2023-10-19 RX ADMIN — Medication 2 TABLET: at 11:25

## 2023-10-19 RX ADMIN — ACETAMINOPHEN 650 MG: 325 TABLET, FILM COATED ORAL at 15:59

## 2023-10-19 RX ADMIN — ACETAMINOPHEN 650 MG: 325 TABLET, FILM COATED ORAL at 08:33

## 2023-10-19 RX ADMIN — POTASSIUM & SODIUM PHOSPHATES POWDER PACK 280-160-250 MG 1 PACKET: 280-160-250 PACK at 11:25

## 2023-10-19 RX ADMIN — SODIUM BICARBONATE 650 MG TABLET 1300 MG: at 20:43

## 2023-10-19 RX ADMIN — Medication 12.5 MG: at 20:43

## 2023-10-19 RX ADMIN — NYSTATIN 500000 UNITS: 100000 SUSPENSION ORAL at 15:58

## 2023-10-19 RX ADMIN — TACROLIMUS 1.5 MG: 0.5 CAPSULE ORAL at 08:33

## 2023-10-19 RX ADMIN — MYCOPHENOLATE MOFETIL 1000 MG: 250 CAPSULE ORAL at 08:33

## 2023-10-19 RX ADMIN — SODIUM BICARBONATE 650 MG TABLET 1300 MG: at 08:33

## 2023-10-19 RX ADMIN — NYSTATIN 500000 UNITS: 100000 SUSPENSION ORAL at 11:26

## 2023-10-19 RX ADMIN — FAMOTIDINE 20 MG: 20 TABLET ORAL at 08:33

## 2023-10-19 RX ADMIN — ACETAMINOPHEN 650 MG: 325 TABLET, FILM COATED ORAL at 19:12

## 2023-10-19 RX ADMIN — MAGNESIUM OXIDE TAB 400 MG (241.3 MG ELEMENTAL MG) 400 MG: 400 (241.3 MG) TAB at 11:25

## 2023-10-19 RX ADMIN — MAGNESIUM OXIDE TAB 400 MG (241.3 MG ELEMENTAL MG) 400 MG: 400 (241.3 MG) TAB at 10:38

## 2023-10-19 RX ADMIN — POTASSIUM & SODIUM PHOSPHATES POWDER PACK 280-160-250 MG 1 PACKET: 280-160-250 PACK at 08:33

## 2023-10-19 RX ADMIN — TACROLIMUS 1.5 MG: 0.5 CAPSULE ORAL at 18:16

## 2023-10-19 RX ADMIN — SODIUM CHLORIDE 500 MG: 9 INJECTION, SOLUTION INTRAVENOUS at 21:51

## 2023-10-19 RX ADMIN — BACITRACIN: 500 OINTMENT TOPICAL at 20:48

## 2023-10-19 RX ADMIN — NYSTATIN 500000 UNITS: 100000 SUSPENSION ORAL at 08:32

## 2023-10-19 RX ADMIN — ACETAMINOPHEN 650 MG: 325 TABLET, FILM COATED ORAL at 22:51

## 2023-10-19 RX ADMIN — NYSTATIN 500000 UNITS: 100000 SUSPENSION ORAL at 20:43

## 2023-10-19 RX ADMIN — MYCOPHENOLATE MOFETIL 1000 MG: 250 CAPSULE ORAL at 20:43

## 2023-10-19 RX ADMIN — ACETAMINOPHEN 650 MG: 325 TABLET, FILM COATED ORAL at 11:25

## 2023-10-19 RX ADMIN — PANTOPRAZOLE SODIUM 40 MG: 40 TABLET, DELAYED RELEASE ORAL at 08:33

## 2023-10-19 RX ADMIN — MAGNESIUM OXIDE TAB 400 MG (241.3 MG ELEMENTAL MG) 400 MG: 400 (241.3 MG) TAB at 15:58

## 2023-10-19 RX ADMIN — Medication 12.5 MG: at 08:33

## 2023-10-19 RX ADMIN — ATORVASTATIN CALCIUM 20 MG: 20 TABLET, FILM COATED ORAL at 21:52

## 2023-10-19 RX ADMIN — Medication 2 TABLET: at 21:52

## 2023-10-19 RX ADMIN — CALCITRIOL 0.5 MCG: 0.5 CAPSULE ORAL at 08:34

## 2023-10-19 ASSESSMENT — ACTIVITIES OF DAILY LIVING (ADL)
ADLS_ACUITY_SCORE: 30
DEPENDENT_IADLS:: INDEPENDENT
ADLS_ACUITY_SCORE: 30

## 2023-10-19 NOTE — PROGRESS NOTES
Transplant Surgery  Inpatient Daily Progress Note  10/19/2023    Assessment & Plan: Avelina Marion is a 68 year old male with PMH significant for CAD s/p CABG x2 2/2023, BCC, ESRD on HD via L AVF, Type II DM who is now s/p SPK with ureteral stent placement and appendectomy on 9/14/23 with Tk Ascencio MD. He presented as an OP for transplant kidney biopsy for persistently elevated creatinine. Post biopsy, pt was prepping to leave when a loud noise heard from room. Pt. Was found unresponsive, pulseless. CPR x  3min with ROSC. Exam notable for fixed non-reactive left pupil without other neuro deficits. Stroke code called. CToH negative. Received 2 PRBCs transferred back to IR where a renal artery angiogram demonstrated an AV fistula in lower pole of transplant and was embolized. He was transferred to ICU for closer monitoring.      Graft function:SPK 35  Kidney transplant with ureteral stent placement: SCr Cezar 2.2, b/l  2.2-2.5    Borderline ACR:  -Renal tx biopsy 10/17/23: Prelim path t3i1 borderline ACR-final pending. -treat with SM 500mg x3     -AV fistula of transplanted kidney:Postprocedure with syncopal episode, ABL, fluid collection . Returned to IR for renal artery angiogram- AV fistula in lower pole, underwent embolization.   SCr 2.4 this am.  Monitor UOP.    -Post biopsy US:Hematoma at the biopsy site. Active extravasation cannot be excluded.No transplant hydronephrosis   Elevated velocity at the transplant arterial anastomosis   Repeat US 10/18: post Embolization-kidney patent vasculature, echogenicity on lateral aspect of kidney increased in size with concern for enlarging/evolving hematoma  US today: Free fluid medial to the transplant. Collection lateral to the  transplant thought to be a post biopsy hematoma. Otherwise negative    Pancreas: FBS <140 on steroids. Euglycemic, off insulin.   Lipase 20-30,Trend daily.     Immunosuppression management:   Induction:Thymoglobulin 600 mg (6.5 mg/kg).      Maintenance:   - MMF 1000 mg BID   - Tacrolimus 1.5mg BID. Goal level 8-10.     Borderline Rejection:  Solumedrol 500mg x3 10/18-10/20    Neuro: Syncopal episode, anisocoria : stroke code called   10/17: CToH/CTA head-neg, no aneurysm/stenosis  Hematology: Anemia of chronic disease/Acute blood loss due to bleeding post renal biopsy: Hgb 5.1 post procedure, transfused 2 PRBCs, Hgb 6.9 on 10/18 and received additional 1 PRBC. Serial hgbs stable,hgb~8 .  Thrombocytopenia: Plts <150. Trend.   Cardiorespiratory:   CAD s/p CABG x2 2023: Continue ASA, statin, metoprolol.   Chest pain: likely related to compressions. Troponin and ECHO.  GI/Nutrition: Reg.   Constipation: Encourage bowel meds/mobility   Endocrine: see above  Fluid/Electrolytes:    Hyponatremia: Na 133-trend  : Rony present for accurate monitoring-ok to remove. Monitor PVR, strict I/O  Infectious disease: Afebrile, WBC normal  Continue bactrim and valcyte PPx  MS:  Right shoulder pain: likely related to unwitnessed fall 10/17- XR mild ot mod OA but neg fx.    Prophylaxis: DVT, fall, GI  Disposition: SICU (boarding) Transfer to     Medical Decision Making: High  Subsequent visit 73682 (high level decision making)    VIVI/Fellow/Resident Provider: Jessica Christiansen NP    Faculty: Louise Barros MD    _________________________________________________________________  Transplant History: Admitted 10/17/2023 for Anemia due to blood loss, acute [D62]  History of simultaneous kidney and pancreas transplant (H) [Z94.0, Z94.83]  Postoperative cardiac arrest following non-cardiac surgery [I97.121]  .  9/14/2023 (Kidney / Pancreas), Postoperative day: 35     Interval History: History is obtained from the patient    Overnight events: C/o right shoulder pain. Likely from fall. Denies HA/head pain, blurred/double vision.  C/o abdominal bloating and mild pressure. Reporting poor po intake. No BM since PTA.    ROS:   A 10-point review of systems was negative  except as noted above.    Meds:   atorvastatin  20 mg Oral or Feeding Tube At Bedtime    calcitRIOL  0.5 mcg Oral Daily    calcium carbonate-vitamin D  2 tablet Oral BID    famotidine  20 mg Oral Daily    insulin aspart  1-7 Units Subcutaneous TID AC    insulin aspart  1-5 Units Subcutaneous At Bedtime    magnesium oxide  400 mg Oral Q4H    magnesium oxide  400 mg Oral Q24H    methylPREDNISolone  500 mg Intravenous Q24H    metoprolol tartrate  12.5 mg Oral BID    mycophenolate  1,000 mg Oral BID    nystatin  500,000 Units Swish & Swallow 4x Daily    pantoprazole  40 mg Oral Daily    polyethylene glycol  17 g Oral Daily    potassium & sodium phosphates  1 packet Oral or Feeding Tube Q4H    sodium bicarbonate  1,300 mg Oral BID    sulfamethoxazole-trimethoprim  1 tablet Oral Once per day on Monday Wednesday Friday    tacrolimus  1.5 mg Oral BID IS    valGANciclovir  450 mg Oral Every Other Day       Physical Exam:     Admit Weight: 92 kg (202 lb 12.8 oz)    Current vitals:   /63 (BP Location: Right arm)   Pulse 69   Temp 98.3  F (36.8  C) (Oral)   Resp 26   Wt 91.9 kg (202 lb 8 oz)   SpO2 98%   BMI 27.85 kg/m           Vital sign ranges:    Temp:  [98  F (36.7  C)-98.6  F (37  C)] 98.3  F (36.8  C)  Pulse:  [68-88] 69  Resp:  [17-43] 26  BP: (124-155)/(54-74) 138/63  SpO2:  [92 %-99 %] 98 %  Patient Vitals for the past 24 hrs:   BP Temp Temp src Pulse Resp SpO2   10/19/23 0800 138/63 98.3  F (36.8  C) Oral 69 26 98 %   10/19/23 0400 129/62 98.1  F (36.7  C) Oral 68 17 98 %   10/19/23 0000 (!) 142/60 -- -- 76 21 97 %   10/18/23 2200 -- -- -- 76 (!) 31 97 %   10/18/23 2100 -- -- -- 84 (!) 43 94 %   10/18/23 2000 124/60 98.1  F (36.7  C) Oral 73 24 99 %   10/18/23 1900 -- -- -- 75 23 97 %   10/18/23 1600 137/62 98.6  F (37  C) Oral 81 26 97 %   10/18/23 1500 (!) 153/74 -- -- 83 26 94 %   10/18/23 1400 (!) 155/59 -- -- 88 26 94 %   10/18/23 1300 (!) 150/59 -- -- 80 24 97 %   10/18/23 1200 133/59 98  F (36.7   C) Oral 83 26 94 %   10/18/23 1100 (!) 150/67 -- -- 83 22 93 %   10/18/23 1000 (!) 143/62 -- -- 78 17 98 %   10/18/23 0900 133/54 98.3  F (36.8  C) Oral 77 29 92 %     General Appearance: in no apparent distress.   Skin: normal, warm, dry  Heart: regular rate and rhythm  Lungs: clear to auscultation, without wheezes,RA  Abdomen: The abdomen is soft, NTTP, ND, dull to percussion,  he is not tender over the kidney graft. The surgical wound is healing well and well approximated.  : voiding.   Extremities: Left forearm Skin tear. edema: present bilateral. 1+, Left groin dressing CDI.  Neurologic: awake, alert, and oriented.      Data:   CMP  Recent Labs   Lab 10/19/23  0831 10/19/23  0553 10/18/23  1200 10/18/23  0952 10/18/23  0812 10/18/23  0523 10/18/23  0357 10/17/23  2206 10/17/23  1517 10/17/23  1457 10/17/23  1422 10/16/23  0820   NA  --  133*  --   --   --  134*  --  137   < > 136   < > 139   POTASSIUM  --  5.1  --   --   --  4.7  --  4.8   < > 3.5   < > 4.8   CHLORIDE  --  105  --   --   --  106  --  108*  --  113*  --  106   CO2  --  19*  --   --   --  18*  --  19*  --  15*  --  22   * 135*   < >  --    < > 105*   < > 112*   < > 221*   < > 117*   BUN  --  27.0*  --   --   --  25.3*  --  27.4*  --  23.2*  --  25.7*   CR  --  2.30*  --   --   --  2.40*  --  2.44*  --  2.15*   < > 2.59*   GFRESTIMATED  --  31*  --   --   --  29*  --  28*  --  33*   < > 26*   DAVID  --  8.1*  --   --   --  7.7*  --  8.1*  --  6.0*  --  9.4   ICAW  --   --   --  4.2*  --   --   --  4.4   < >  --    < >  --    MAG  --  2.0  --   --   --  2.2  --  1.2*  --   --   --  1.4*   PHOS  --  2.5  --   --   --  3.3  --  2.3*  --   --    < > 3.2   AMYLASE  --   --   --   --   --   --   --   --   --   --   --  71   LIPASE  --   --   --   --   --  35  --   --   --   --   --  55   ALBUMIN  --   --   --   --   --   --   --   --   --  2.2*  --   --    BILITOTAL  --   --   --   --   --   --   --   --   --  0.2  --   --    ALKPHOS  --   --    --   --   --   --   --   --   --  41  --   --    AST  --   --   --   --   --   --   --   --   --  21  --   --    ALT  --   --   --   --   --   --   --   --   --  13  --   --     < > = values in this interval not displayed.     CBC  Recent Labs   Lab 10/19/23  0553 10/19/23  0053 10/18/23  0952 10/18/23  0523   HGB 8.1* 8.5*   < > 6.9*   WBC 9.0  --   --  9.1   *  --   --  137*    < > = values in this interval not displayed.     COAGS  Recent Labs   Lab 10/18/23  0707 10/17/23  1457 10/17/23  1423   INR 1.30* 1.55* 2.15*   PTT 30  --  53*      Urinalysis  Recent Labs   Lab Test 10/17/23  1629 09/26/23  1429   COLOR Light Yellow Light Yellow   APPEARANCE Clear Clear   URINEGLC 1000* Negative   URINEBILI Negative Negative   URINEKETONE Negative Negative   SG 1.005 1.016   UBLD Large* Trace*   URINEPH 6.0 6.5   PROTEIN 30* 20*   NITRITE Negative Negative   LEUKEST Small* Trace*   RBCU >182* 5*   WBCU 14* 8*     Virology:  Hepatitis C Antibody   Date Value Ref Range Status   09/14/2023 Nonreactive Nonreactive Final     BK Virus DNA copies/mL   Date Value Ref Range Status   10/12/2023 Not Detected Not Detected copies/mL Final

## 2023-10-19 NOTE — PROGRESS NOTES
ICU End of Shift Summary. See flowsheets for vital signs and detailed assessment.    Changes this shift: No acute changes overnight. Reported pain in shoulder. PRN tylenol given for pain with good result. Following commands without issue. Continues to have right sided weakness and soreness. NS with rates in 60-80s all night. Meeting MAP goal without issue. Oxy mask of 1L overnight. Voiding without issue. Hgb recheck AM 8.1    Plan:  Transfer to floor when bed open .

## 2023-10-19 NOTE — PLAN OF CARE
"ICU End of Shift Summary. See flowsheets for vital signs and detailed assessment.    Changes this shift: Patient weaned to room air. Pain manageable with PRN Tylenol Q4hr. No oxycodone given. Renal US done. Phosphorus replaced for a level of 2.5 and magnesium replaced for a level of 2.0. Appetite fair. LBM 10/17; however, patient \"feels as if something is coming.\" Episode of dizziness from lying to sitting at the edge of the bed. Vitally stable at the time. No dizziness with sitting at the edge of the bed to standing. Up in the chair with SBA.     Plan: Encourage OOB activity and still needing to ambulate. Transfer to  once a bed becomes available. Possible discharge in the next few days. Continue with POC and notify team of any changes or concerns.    Goal Outcome Evaluation:    Plan of Care Reviewed With: patient    Overall Patient Progress: improving    Outcome Evaluation: Patient weaned to room air. Pain manageable with PRN Tylenol. Hemoglobin stable.      "

## 2023-10-19 NOTE — PROGRESS NOTES
[facility]  Transplant Nephrology Follow Up  Date of Admission:  10/17/2023  Today's Date: 10/19/2023  Requesting physician: Louise Barros MD    Recommendations:  - Continue serial Hb checks.  -Continue solumedrol 500mg IV daily x3 days with today being day 2. Discussed with Dr. Barros. Given that lipase decreased prior to steroids I am not concerned about pancreas transplant rejection at this point and would not give rATG    Assessment & Plan   # DDKT (SPK): Stable, increased   - Baseline Creatinine: ~ 2.2-2.5, von Scr 2.2.    - Proteinuria: Normal (<0.2 grams)   - Date DSA Last Checked: Sep/2023      Latest DSA: No DSA at time of transplant, repeat pending   - BK Viremia: No   - Kidney Tx Biopsy: Oct 17, 2023; Result: borderline ACR t3i1. Giving solumedrol 500mg IV daily x3d    -Discussed with Dr. Barros. Given that lipase decreased prior to steroids I am not concerned about pancreas transplant rejection at this point and would not give rATG at the current time     -Double J stent placed at time of kidney transplant. Removal scheduled 10/23.     # Pancreas Tx (SPK):    - Pancreatic Exocrine Drainage: Enteric drained     - Blood glucose: Euglycemia      On insulin: No   - HbA1c: Last checked at time of transplant      Latest HbA1c: 8.4%    - Pancreatic enzymes: Trend down   - Date DSA Last Checked: Sep/2023  Latest DSA: No DSA at time of transplant, repeat pending   - Pancreas Tx Biopsy: No    # Immunosuppression: Tacrolimus immediate release (goal 8-10) and Mycophenolate mofetil (dose 1000 mg every 12 hours)   - Patient is in an immunosuppressed state and will continue to monitor for efficacy and toxicity of immunosuppression medications.   - Induction with Recent Transplant:  High Intensity Protocol    - Changes: No     # Infection Prophylaxis:   - PJP: Sulfa/TMP (Bactrim)  - CMV: Valganciclovir (Valcyte), x3m, CMV -/-    # Hypertension:  Controlled;  Goal BP: < 140/90 (Hospitalization goal)    - Volume status: Mildly hypervolemic     - Changes: Not at this time. Continue metoprolol 12.5mg bid.    # Concern for Acute Blood Loss with peritransplant hematoma:    -underwent transplant renal biopsy around noon 10/17/23 and collapsed in his room around 2 PM and a code blue was called. He had ROSC after 3m of CPR and was neurologically intact   -Found to have AV fistula with extravasation on angiography by IR on 10/17, s/p coiling   -Transfuse for Hb <7, serial Hb have been stable   -If Hb continues to decrease would obtain CT A/P non con and call IR    # Concern for Stroke:   - exam after ROSC was notable for fixed non-reactive left pupil at 1cm, and right was 3cm and reactive without other neurologic deficits   -CTA head 10/17/23 was negative    # Anemia in Chronic Renal Disease: Hgb: Stable     MCKENNA: No   - Iron studies: Low iron saturation, but high ferritin    -Transfuse for Hb<7    # Increasing O2 req:   -Found on CXR on 10/18 to have increasing interstitial infiltrates   -Now off O2   -Consider diuretics if increase in O2 req    # Mineral Bone Disorder:   - Secondary renal hyperparathyroidism; PTH level: Minimally elevated ( pg/ml) 9/29/2023        On treatment: on Calcitriol 0.5 mcg daily PTA for hypocalcemia  - Vitamin D; level: Low           On supplement: No  - Calcium; level: Low           On supplement: No, on Oscal 2 tabs BID, calcium gluconate 1g IV x1  - Phosphorus; level: Normal          On supplement: No    # Electrolytes:   - Potassium; level: High normal        On supplement: No  - Magnesium; level: Normal        On supplement: Yes, magnesium oxide 400 mg PO daily   - Bicarbonate; level: Low normal        On supplement: No     # CAD: S/p CABG 2/2023. On metoprolol 12.5 mg PO BID    # PAD    #Non-melanotic skin cancer: S/p Mohs 8/2022    # Transplant History:  Etiology of Kidney Failure: Diabetes mellitus type 2  Tx:  SPK  Transplant: 9/14/2023 (Kidney / Pancreas)  Significant changes in immunosuppression: None  Significant transplant-related complications: None    Recommendations were communicated to the primary team verbally.    Jeffrey Chaparro MD  Pager: [phone]    Interval events:  Mr. Marion's creatinine is 2.30 (10/19 0553); Trend down.  Good urine output.  Other significant labs/tests/vitals: Hb stable, started solumedrol for borderline rejection  No events overnight.  No chest pain or shortness of breath.  No leg swelling.  No nausea and vomiting.  Bowel movements are normal.  No fever, sweats or chills.        Review of Systems    The 4 point Review of Systems is negative other than noted above or here.     Past Medical History    I have reviewed this patient's medical history and updated it with pertinent information if needed.   Past Medical History:   Diagnosis Date     Anemia in chronic kidney disease      BCC (basal cell carcinoma), face 8/9/2022     Diabetic retinopathy of both eyes (H)      End stage renal disease (H)      Hypertension      Secondary renal hyperparathyroidism (H24)      Type 2 diabetes mellitus (H)        Past Surgical History   I have reviewed this patient's surgical history and updated it with pertinent information if needed.  Past Surgical History:   Procedure Laterality Date     BENCH KIDNEY  9/14/2023    Procedure: Bench kidney;  Surgeon: Tk Ascencio MD;  Location: [facility] OR     BENCH PANCREAS N/A 9/14/2023    Procedure: Bench pancreas;  Surgeon: Tk Ascencio MD;  Location: [facility] OR     BYPASS GRAFT ARTERY CORONARY N/A 2/13/2023    Procedure: TRANSESPHAGEAL ECHOCARDIOGRAM, MEDIAN STERNOTOMY, TAKE DOWN OF LEFT INTERNAL MAMMARY, LEFT ENDOSCOPIC GREATER SAPHENOUS VEIN HARVEST, CRYO NERVE BLOCK, CARDIOPULMONARY BYPASS, CORONARY ARTERY BYPASS GRAFT X 2.;  Surgeon: Patrice Yepez MD;  Location: [facility] OR     CREATE FISTULA ARTERIOVENOUS UPPER EXTREMITY Left       CV CORONARY ANGIOGRAM N/A 2021    Procedure: CV CORONARY ANGIOGRAM;  Surgeon: Milton Cam MD;  [facility]     CV CORONARY ANGIOGRAM N/A 2023    Procedure: Coronary Angiogram;  Surgeon: Milton Cam MD;  Location:  [facility]     CV PCI N/A 2023    Procedure: Percutaneous Coronary Intervention;  Surgeon: Milton Cam MD;  Location:  [facility]     IR RENAL BIOPSY LEFT  10/17/2023     TRANSPLANT PANCREAS, KIDNEY  DONOR, COMBINED N/A 2023    Procedure: Transplant pancreas, kidney  donor, combined, appendectomy;  Surgeon: Tk Ascencio MD;  Location:  OR       Family History   I have reviewed this patient's family history and updated it with pertinent information if needed.   Family History   Problem Relation Age of Onset     Diabetes Brother      Kidney Disease Brother        Social History   I have reviewed this patient's social history and updated it with pertinent information if needed. Avelina Marion  reports that he has quit smoking. His smoking use included cigarettes. He has never used smokeless tobacco. He reports that he does not drink alcohol and does not use drugs.    Allergies   No Known Allergies  Prior to Admission Medications     atorvastatin  20 mg Oral or Feeding Tube At Bedtime     calcitRIOL  0.5 mcg Oral Daily     calcium carbonate-vitamin D  2 tablet Oral BID     famotidine  20 mg Oral Daily     insulin aspart  1-7 Units Subcutaneous TID AC     insulin aspart  1-5 Units Subcutaneous At Bedtime     magnesium oxide  400 mg Oral Q4H     magnesium oxide  400 mg Oral Q24H     methylPREDNISolone  500 mg Intravenous Q24H     metoprolol tartrate  12.5 mg Oral BID     mycophenolate  1,000 mg Oral BID     nystatin  500,000 Units Swish & Swallow 4x Daily     pantoprazole  40 mg Oral Daily     polyethylene glycol  17 g Oral Daily     sodium bicarbonate  1,300 mg Oral BID      sulfamethoxazole-trimethoprim  1 tablet Oral Once per day on      tacrolimus  1.5 mg Oral BID IS     valGANciclovir  450 mg Oral Every Other Day         Physical Exam   Temp  Av.8  F (36.6  C)  Min: 97.8  F (36.6  C)  Max: 97.8  F (36.6  C)      Pulse  Av.1  Min: 64  Max: 80 Resp  Av.1  Min: 14  Max: 24  SpO2  Av.7 %  Min: 94 %  Max: 100 %     /58 (BP Location: Right arm)   Pulse 77   Temp 98.4  F (36.9  C) (Oral)   Resp 23   Wt 91.9 kg (202 lb 8 oz)   SpO2 93%   BMI 27.85 kg/m      Admit       GENERAL APPEARANCE: no distress   HENT: oral mucous membranes moist  RESP:rhonchi bilaterally, pain to palpation of chest wall  CV: regular rhythm, normal rate, no rub, no murmur  EDEMA: no LE edema bilaterally  ABDOMEN: soft, nondistended, nontender, bowel sounds normal  MS: extremities normal - no gross deformities noted, no evidence of inflammation in joints, no muscle tenderness  SKIN: no suspicious lesions or rashes  NEURO: mentation intact  PSYCH: fatigued  DIALYSIS ACCESS:  LUE AV fistula +bruit/+thrill    Data   CMP  Recent Labs   Lab 10/19/23  1131 10/19/23  0831 10/19/23  0553 10/19/23  0500 10/18/23  0812 10/18/23  0523 10/18/23  0357 10/17/23  2206 10/17/23  1731 10/17/23  1517 10/17/23  1457 10/17/23  1425 10/17/23  1423 10/17/23  1422 10/16/23  0820   NA  --   --  133*  --   --  134*  --  137  --  142 136   < >  --   --  139   POTASSIUM  --   --  5.1  --   --  4.7  --  4.8  --  3.5 3.5   < >  --   --  4.8   CHLORIDE  --   --  105  --   --  106  --  108*  --   --  113*  --   --   --  106   CO2  --   --  19*  --   --  18*  --  19*  --   --  15*  --   --   --  22   ANIONGAP  --   --  9  --   --  10  --  10  --   --  8  --   --   --  11   * 135* 135* 136*   < > 105*   < > 112*   < > 164* 221*   < >  --    < > 117*   BUN  --   --  27.0*  --   --  25.3*  --  27.4*  --   --  23.2*  --   --   --  25.7*   CR  --   --  2.30*  --   --  2.40*  --  2.44*  --   --   2.15*   < >  --   --  2.59*   GFRESTIMATED  --   --  31*  --   --  29*  --  28*  --   --  33*   < >  --   --  26*   DAVID  --   --  8.1*  --   --  7.7*  --  8.1*  --   --  6.0*  --   --   --  9.4   MAG  --   --  2.0  --   --  2.2  --  1.2*  --   --   --   --   --   --  1.4*   PHOS  --   --  2.5  --   --  3.3  --  2.3*  --   --   --   --  1.3*  --  3.2   PROTTOTAL  --   --   --   --   --   --   --   --   --   --  3.6*  --   --   --   --    ALBUMIN  --   --   --   --   --   --   --   --   --   --  2.2*  --   --   --   --    BILITOTAL  --   --   --   --   --   --   --   --   --   --  0.2  --   --   --   --    ALKPHOS  --   --   --   --   --   --   --   --   --   --  41  --   --   --   --    AST  --   --   --   --   --   --   --   --   --   --  21  --   --   --   --    ALT  --   --   --   --   --   --   --   --   --   --  13  --   --   --   --     < > = values in this interval not displayed.     CBC  Recent Labs   Lab 10/19/23  0553 10/19/23  0053 10/18/23  1922 10/18/23  1400 10/18/23  0952 10/18/23  0523 10/17/23  2206 10/17/23  1832 10/17/23  1644 10/17/23  1517 10/17/23  1457   HGB 8.1* 8.5* 7.6* 8.4*   < > 6.9*   < > 8.3*   < > <5.1* 5.1*  5.1*   WBC 9.0  --   --   --   --  9.1  --  13.6*  --   --  6.2   RBC 2.64*  --   --   --   --  2.21*  --  2.63*  --   --  1.52*   HCT 24.8*  --   --   --   --  20.9*  --  25.7*  --  <15* 16.2*   MCV 94  --   --   --   --  95  --  98  --   --  107*   MCH 30.7  --   --   --   --  31.2  --  31.6  --   --  32.9   MCHC 32.7  --   --   --   --  33.0  --  32.3  --   --  31.1*   RDW 17.5*  --   --   --   --  17.9*  --  16.6*  --   --  14.3   *  --   --   --   --  137*  --  146*  --   --  142*    < > = values in this interval not displayed.     INR  Recent Labs   Lab 10/18/23  0707 10/17/23  1457 10/17/23  1423 10/17/23  1032   INR 1.30* 1.55* 2.15* 1.11   PTT 30  --  53*  --      ABG  Recent Labs   Lab 10/17/23  1457 10/17/23  1423   O2PER 21 15      Urine Studies  Recent Labs    Lab Test 10/17/23  1629 09/26/23  1429 09/14/23  1643 02/12/23  1539   COLOR Light Yellow Light Yellow Light Yellow Yellow   APPEARANCE Clear Clear Clear Slightly Cloudy*   URINEGLC 1000* Negative 500* 100*   URINEBILI Negative Negative Negative Negative   URINEKETONE Negative Negative Negative Negative   SG 1.005 1.016 1.017 1.015   UBLD Large* Trace* Small* Trace*   URINEPH 6.0 6.5 6.5 5.5   PROTEIN 30* 20* 100* 100*   NITRITE Negative Negative Negative Negative   LEUKEST Small* Trace* Negative Negative   RBCU >182* 5* 1 3*   WBCU 14* 8* 1 2     No lab results found.  PTH  Recent Labs   Lab Test 09/29/23  0848 09/24/23  0835   PTHI 103* 7*     Iron Studies  Recent Labs   Lab Test 09/29/23  0848 09/24/23  0835 02/16/23  0834   IRON 27* 32* 24*   * 151* 121*   IRONSAT 16 21 20   TREMAINE 1,256* 1,164* 1,461*       IMAGING:  All imaging studies reviewed by me.

## 2023-10-19 NOTE — CONSULTS
Care Management Initial Consult    General Information  Assessment completed with: Patient, VM-chart review,    Type of CM/SW Visit: Initial Assessment    Primary Care Provider verified and updated as needed: Yes   Readmission within the last 30 days: unable to assess      Reason for Consult: discharge planning  Advance Care Planning: Advance Care Planning Reviewed: no concerns identified          Communication Assessment  Patient's communication style: spoken language (English or Bilingual)    Hearing Difficulty or Deaf: no   Wear Glasses or Blind: no    Cognitive  Cognitive/Neuro/Behavioral: WDL  Level of Consciousness: alert  Arousal Level: opens eyes spontaneously  Orientation: oriented x 4  Mood/Behavior: calm, cooperative, behavior appropriate to situation  Best Language: 0 - No aphasia  Speech: clear, spontaneous, logical    Living Environment:   People in home: sibling(s), other relative(s)     Current living Arrangements: house      Able to return to prior arrangements: yes       Family/Social Support:  Care provided by: other (see comments), self (Siblings)  Provides care for: no one, unable/limited ability to care for self  Marital Status: Single  Sibling(s)          Description of Support System: Supportive, Involved    Support Assessment: Adequate family and caregiver support    Current Resources:   Patient receiving home care services: No     Community Resources: Dialysis Services, Other (see comment) (History of Dialysis Services)  Equipment currently used at home: none  Supplies currently used at home: None    Employment/Financial:  Employment Status: retired        Financial Concerns: none   Referral to Financial Worker: No       Does the patient's insurance plan have a 3 day qualifying hospital stay waiver?  No    Lifestyle & Psychosocial Needs:  Social Determinants of Health     Food Insecurity: Not on file   Depression: Not at risk (10/9/2023)    PHQ-2     PHQ-2 Score: 0   Housing Stability: Not  on file   Tobacco Use: Medium Risk (10/17/2023)    Patient History     Smoking Tobacco Use: Former     Smokeless Tobacco Use: Never     Passive Exposure: Not on file   Financial Resource Strain: Not on file   Alcohol Use: Not At Risk (6/2/2021)    AUDIT-C     Frequency of Alcohol Consumption: Never     Average Number of Drinks: Not asked     Frequency of Binge Drinking: Never   Transportation Needs: Not on file   Physical Activity: Not on file   Interpersonal Safety: Not on file   Stress: Not on file   Social Connections: Not on file       Functional Status:  Prior to admission patient needed assistance:   Dependent ADLs:: Independent  Dependent IADLs:: Independent  Assesssment of Functional Status: At functional baseline    Mental Health Status:  Mental Health Status: No Current Concerns       Chemical Dependency Status:  Chemical Dependency Status: No Current Concerns             Values/Beliefs:  Spiritual, Cultural Beliefs, Mandaen Practices, Values that affect care:                 Additional Information:  SW met with patient at his bedside to complete initial care management assessment. Patient plans to return home with his siblings who help care for him when he is discharged. He does not currently have any home care services or equipment. [facility] [place]. VAN will continue to follow for support and discharge planning.     Adia Escalera [facility]  ICU   Phone: [phone]  Pager: [phone]

## 2023-10-20 ENCOUNTER — APPOINTMENT (OUTPATIENT)
Dept: PHYSICAL THERAPY | Facility: CLINIC | Age: 68
DRG: 674 | End: 2023-10-20
Attending: NURSE PRACTITIONER
Payer: COMMERCIAL

## 2023-10-20 VITALS
DIASTOLIC BLOOD PRESSURE: 69 MMHG | WEIGHT: 202.5 LBS | RESPIRATION RATE: 16 BRPM | HEART RATE: 70 BPM | TEMPERATURE: 97.7 F | SYSTOLIC BLOOD PRESSURE: 137 MMHG | OXYGEN SATURATION: 94 % | BODY MASS INDEX: 27.85 KG/M2

## 2023-10-20 LAB
ANION GAP SERPL CALCULATED.3IONS-SCNC: 11 MMOL/L (ref 7–15)
ATRIAL RATE - MUSE: 79 BPM
BUN SERPL-MCNC: 33.3 MG/DL (ref 8–23)
CALCIUM SERPL-MCNC: 8.1 MG/DL (ref 8.8–10.2)
CHLORIDE SERPL-SCNC: 103 MMOL/L (ref 98–107)
CREAT SERPL-MCNC: 2.16 MG/DL (ref 0.67–1.17)
DEPRECATED HCO3 PLAS-SCNC: 20 MMOL/L (ref 22–29)
DIASTOLIC BLOOD PRESSURE - MUSE: NORMAL MMHG
DONOR IDENTIFICATION: NORMAL
DSA COMMENTS: NORMAL
DSA PRESENT: NO
DSA TEST METHOD: NORMAL
EGFRCR SERPLBLD CKD-EPI 2021: 33 ML/MIN/1.73M2
ERYTHROCYTE [DISTWIDTH] IN BLOOD BY AUTOMATED COUNT: 17.2 % (ref 10–15)
GLUCOSE BLDC GLUCOMTR-MCNC: 122 MG/DL (ref 70–99)
GLUCOSE BLDC GLUCOMTR-MCNC: 128 MG/DL (ref 70–99)
GLUCOSE BLDC GLUCOMTR-MCNC: 134 MG/DL (ref 70–99)
GLUCOSE SERPL-MCNC: 133 MG/DL (ref 70–99)
HCT VFR BLD AUTO: 23.1 % (ref 40–53)
HGB BLD-MCNC: 7.5 G/DL (ref 13.3–17.7)
INT SUB RESULT: NORMAL
INTERF SUBSTANCE: NORMAL
INTERPRETATION ECG - MUSE: NORMAL
INTSUB TEST METHOD: NORMAL
LIPASE SERPL-CCNC: 20 U/L (ref 13–60)
MAGNESIUM SERPL-MCNC: 2 MG/DL (ref 1.7–2.3)
MCH RBC QN AUTO: 31.8 PG (ref 26.5–33)
MCHC RBC AUTO-ENTMCNC: 32.5 G/DL (ref 31.5–36.5)
MCV RBC AUTO: 98 FL (ref 78–100)
ORGAN: NORMAL
P AXIS - MUSE: 38 DEGREES
PATH REPORT.COMMENTS IMP SPEC: NORMAL
PATH REPORT.FINAL DX SPEC: NORMAL
PATH REPORT.GROSS SPEC: NORMAL
PATH REPORT.MICROSCOPIC SPEC OTHER STN: NORMAL
PATH REPORT.RELEVANT HX SPEC: NORMAL
PHOSPHATE SERPL-MCNC: 2.3 MG/DL (ref 2.5–4.5)
PHOTO IMAGE: NORMAL
PLATELET # BLD AUTO: 128 10E3/UL (ref 150–450)
POTASSIUM SERPL-SCNC: 5.2 MMOL/L (ref 3.4–5.3)
PR INTERVAL - MUSE: 134 MS
QRS DURATION - MUSE: 86 MS
QT - MUSE: 372 MS
QTC - MUSE: 426 MS
R AXIS - MUSE: -3 DEGREES
RBC # BLD AUTO: 2.36 10E6/UL (ref 4.4–5.9)
SA 1 CELL: NORMAL
SA 1 TEST METHOD: NORMAL
SA 2 CELL: NORMAL
SA 2 TEST METHOD: NORMAL
SA1 HI RISK ABY: NORMAL
SA1 MOD RISK ABY: NORMAL
SA2 HI RISK ABY: NORMAL
SA2 MOD RISK ABY: NORMAL
SODIUM SERPL-SCNC: 134 MMOL/L (ref 135–145)
SYSTOLIC BLOOD PRESSURE - MUSE: NORMAL MMHG
T AXIS - MUSE: 11 DEGREES
TACROLIMUS BLD-MCNC: 8.2 UG/L (ref 5–15)
TME LAST DOSE: NORMAL H
TME LAST DOSE: NORMAL H
UNACCEPTABLE ANTIGENS: NORMAL
UNOS CPRA: 23
VENTRICULAR RATE- MUSE: 79 BPM
WBC # BLD AUTO: 12.1 10E3/UL (ref 4–11)
ZZZINT SUB COMMENTS: NORMAL
ZZZSA 1  COMMENTS: NORMAL
ZZZSA 2 COMMENTS: NORMAL

## 2023-10-20 PROCEDURE — 85014 HEMATOCRIT: CPT | Performed by: INTERNAL MEDICINE

## 2023-10-20 PROCEDURE — 250N000012 HC RX MED GY IP 250 OP 636 PS 637: Performed by: PHARMACIST

## 2023-10-20 PROCEDURE — 250N000013 HC RX MED GY IP 250 OP 250 PS 637: Performed by: NURSE PRACTITIONER

## 2023-10-20 PROCEDURE — 97161 PT EVAL LOW COMPLEX 20 MIN: CPT | Mod: GP

## 2023-10-20 PROCEDURE — 97116 GAIT TRAINING THERAPY: CPT | Mod: GP

## 2023-10-20 PROCEDURE — 250N000013 HC RX MED GY IP 250 OP 250 PS 637: Performed by: SURGERY

## 2023-10-20 PROCEDURE — 250N000012 HC RX MED GY IP 250 OP 636 PS 637: Performed by: NURSE PRACTITIONER

## 2023-10-20 PROCEDURE — 250N000013 HC RX MED GY IP 250 OP 250 PS 637: Performed by: PHARMACIST

## 2023-10-20 PROCEDURE — 84100 ASSAY OF PHOSPHORUS: CPT | Performed by: INTERNAL MEDICINE

## 2023-10-20 PROCEDURE — 83735 ASSAY OF MAGNESIUM: CPT | Performed by: INTERNAL MEDICINE

## 2023-10-20 PROCEDURE — 99238 HOSP IP/OBS DSCHRG MGMT 30/<: CPT | Mod: 24 | Performed by: NURSE PRACTITIONER

## 2023-10-20 PROCEDURE — 83690 ASSAY OF LIPASE: CPT | Performed by: NURSE PRACTITIONER

## 2023-10-20 PROCEDURE — 36415 COLL VENOUS BLD VENIPUNCTURE: CPT | Performed by: NURSE PRACTITIONER

## 2023-10-20 PROCEDURE — 99232 SBSQ HOSP IP/OBS MODERATE 35: CPT

## 2023-10-20 PROCEDURE — 80197 ASSAY OF TACROLIMUS: CPT | Performed by: NURSE PRACTITIONER

## 2023-10-20 PROCEDURE — 82310 ASSAY OF CALCIUM: CPT | Performed by: INTERNAL MEDICINE

## 2023-10-20 RX ORDER — ACETAMINOPHEN 325 MG/1
650 TABLET ORAL EVERY 6 HOURS PRN
COMMUNITY
Start: 2023-10-20 | End: 2023-11-27

## 2023-10-20 RX ORDER — ASPIRIN 325 MG
325 TABLET ORAL DAILY
Qty: 90 TABLET | Refills: 0 | Status: SHIPPED | OUTPATIENT
Start: 2023-10-23 | End: 2023-11-27

## 2023-10-20 RX ORDER — SENNOSIDES 8.6 MG
8.6 TABLET ORAL 2 TIMES DAILY PRN
Status: DISCONTINUED | OUTPATIENT
Start: 2023-10-20 | End: 2023-10-20 | Stop reason: HOSPADM

## 2023-10-20 RX ADMIN — Medication 2 TABLET: at 11:44

## 2023-10-20 RX ADMIN — NYSTATIN 500000 UNITS: 100000 SUSPENSION ORAL at 08:53

## 2023-10-20 RX ADMIN — MAGNESIUM OXIDE TAB 400 MG (241.3 MG ELEMENTAL MG) 400 MG: 400 (241.3 MG) TAB at 11:44

## 2023-10-20 RX ADMIN — TACROLIMUS 1.5 MG: 0.5 CAPSULE ORAL at 08:53

## 2023-10-20 RX ADMIN — ACETAMINOPHEN 650 MG: 325 TABLET, FILM COATED ORAL at 13:16

## 2023-10-20 RX ADMIN — ACETAMINOPHEN 650 MG: 325 TABLET, FILM COATED ORAL at 08:53

## 2023-10-20 RX ADMIN — FAMOTIDINE 20 MG: 20 TABLET ORAL at 08:53

## 2023-10-20 RX ADMIN — SODIUM BICARBONATE 650 MG TABLET 1300 MG: at 08:54

## 2023-10-20 RX ADMIN — SULFAMETHOXAZOLE AND TRIMETHOPRIM 1 TABLET: 400; 80 TABLET ORAL at 09:06

## 2023-10-20 RX ADMIN — SENNOSIDES 8.6 MG: 8.6 TABLET, FILM COATED ORAL at 11:44

## 2023-10-20 RX ADMIN — VALGANCICLOVIR 450 MG: 450 TABLET, FILM COATED ORAL at 08:53

## 2023-10-20 RX ADMIN — PANTOPRAZOLE SODIUM 40 MG: 40 TABLET, DELAYED RELEASE ORAL at 08:53

## 2023-10-20 RX ADMIN — CALCITRIOL 0.5 MCG: 0.5 CAPSULE ORAL at 08:53

## 2023-10-20 RX ADMIN — Medication 12.5 MG: at 08:53

## 2023-10-20 RX ADMIN — BACITRACIN: 500 OINTMENT TOPICAL at 11:43

## 2023-10-20 RX ADMIN — NYSTATIN 500000 UNITS: 100000 SUSPENSION ORAL at 11:44

## 2023-10-20 RX ADMIN — MYCOPHENOLATE MOFETIL 1000 MG: 250 CAPSULE ORAL at 08:53

## 2023-10-20 ASSESSMENT — ACTIVITIES OF DAILY LIVING (ADL)
ADLS_ACUITY_SCORE: 30
ADLS_ACUITY_SCORE: 28
ADLS_ACUITY_SCORE: 30
ADLS_ACUITY_SCORE: 28
ADLS_ACUITY_SCORE: 30

## 2023-10-20 NOTE — PROGRESS NOTES
[facility]  Transplant Nephrology Follow Up  Date of Admission:  10/17/2023  Today's Date: 10/20/2023  Requesting physician: Louise Barros MD    Recommendations:  - No new recommendations at this time.    Assessment & Plan   # DDKT (SPK): Stable, increased   - Baseline Creatinine: ~ 2.2-2.5, von Scr 2.15.    - Proteinuria: Normal (<0.2 grams)   - Date DSA Last Checked: Sep/2023      Latest DSA: No DSA at time of transplant, repeat pending   - BK Viremia: No   - Kidney Tx Biopsy: Oct 17, 2023; Result: borderline ACR [id number] . Giving solumedrol 500mg IV daily x3d    -Discussed with Dr. Barros. Given that lipase decreased prior to steroids I am not concerned about pancreas transplant rejection at this point and would not give rATG at the current time     -Double J stent placed at time of kidney transplant. Removal scheduled 10/23.     # Pancreas Tx (SPK):    - Pancreatic Exocrine Drainage: Enteric drained     - Blood glucose: Euglycemia      On insulin: No   - HbA1c: Last checked at time of transplant      Latest HbA1c: 8.4%    - Pancreatic enzymes: Stable   - Date DSA Last Checked: Sep/2023  Latest DSA: No DSA at time of transplant, repeat pending   - Pancreas Tx Biopsy: No    # Immunosuppression: Tacrolimus immediate release (goal 8-10) and Mycophenolate mofetil (dose 1000 mg every 12 hours)   - Patient is in an immunosuppressed state and will continue to monitor for efficacy and toxicity of immunosuppression medications.   - Induction with Recent Transplant:  High Intensity Protocol    - Changes: Not at this time. Tacrolimus level pending.     # Infection Prophylaxis:   - PJP: Sulfa/TMP (Bactrim)  - CMV: Valganciclovir (Valcyte), x3m, CMV -/-    # Hypertension: Controlled;  Goal BP: < 140/90 (Hospitalization goal)    - Volume status: Euvolemic     - Changes: Not at this time. Continue metoprolol 12.5mg bid.    # Concern for Acute Blood Loss with  peritransplant hematoma:    -underwent transplant renal biopsy around noon 10/17/23 and collapsed in his room around 2 PM and a code blue was called. He had ROSC after 3m of CPR and was neurologically intact   -Found to have AV fistula with extravasation on angiography by IR on 10/17, s/p coiling   -Transfuse for Hb <7, serial Hb have been stable   -If Hb continues to decrease would obtain CT A/P non con and call IR    # Concern for Stroke:   - exam after ROSC was notable for fixed non-reactive left pupil at 1cm, and right was 3cm and reactive without other neurologic deficits   -CTA head 10/17/23 was negative    # Anemia in Chronic Renal Disease: Hgb: Stable     MCKENNA: No   - Iron studies: Low iron saturation, but high ferritin    -Transfuse for Hb<7    # Increasing O2 requirements (resolved):   -Found on CXR on 10/18 to have increasing interstitial infiltrates   -Now off O2    # Mineral Bone Disorder:   - Secondary renal hyperparathyroidism; PTH level: Minimally elevated ( pg/ml) 9/29/2023        On treatment: on Calcitriol 0.5 mcg daily PTA for hypocalcemia  - Vitamin D; level: Low           On supplement: No  - Calcium; level: Low           On supplement: No, on Oscal 2 tabs BID  - Phosphorus; level: Low (will monitor)          On supplement: No    # Electrolytes:   - Potassium; level: High normal          On supplement: No  - Magnesium; level: Normal          On supplement: Yes, magnesium oxide 400 mg PO daily   - Bicarbonate; level: Low (will monitor)         On supplement: No     # CAD: S/p CABG 2/2023. On metoprolol 12.5 mg PO BID    # PAD    #Non-melanotic skin cancer: S/p Mohs 8/2022    # Transplant History:  Etiology of Kidney Failure: Diabetes mellitus type 2  Tx: SPK  Transplant: 9/14/2023 (Kidney / Pancreas)  Significant changes in immunosuppression: None  Significant transplant-related complications: None    Recommendations were communicated to the primary team verbally.    Discussed with   KWASI Bowen CNP  Pager: [phone]    Interval events:  Third dose of methylprednisolone today.  Mr. Marion's creatinine is 2.16 (10/20 0501); Trend down from 2.30 yesterday.   I/O last 3 completed shifts:  In: 820 [P.O.:720; I.V.:100]  Out: 925 [Urine:925]   Other significant labs/tests/vitals: SBP 120s - 140 overnight. Afebrile. Lipase normal.   - 10/19/23 Transplant Renal Ultrasound: Free fluid medial to the transplant. Collection lateral to the transplant thought to be a post biopsy hematoma. Otherwise negative grayscale appearance of the renal transplant.     No acute events overnight.  No chest pain or shortness of breath.  No leg swelling.  No N/V/D.      Review of Systems    The 4 point Review of Systems is negative other than noted above or here.     Past Medical History    I have reviewed this patient's medical history and updated it with pertinent information if needed.   Past Medical History:   Diagnosis Date    Anemia in chronic kidney disease     BCC (basal cell carcinoma), face 8/9/2022    Diabetic retinopathy of both eyes (H)     End stage renal disease (H)     Hypertension     Secondary renal hyperparathyroidism (H24)     Type 2 diabetes mellitus (H)        Past Surgical History   I have reviewed this patient's surgical history and updated it with pertinent information if needed.  Past Surgical History:   Procedure Laterality Date    BENCH KIDNEY  9/14/2023    Procedure: Bench kidney;  Surgeon: Tk Ascencio MD;  Location: [facility] OR    BENCH PANCREAS N/A 9/14/2023    Procedure: Bench pancreas;  Surgeon: Tk Ascencio MD;  Location: [facility] OR    BYPASS GRAFT ARTERY CORONARY N/A 2/13/2023    Procedure: TRANSESPHAGEAL ECHOCARDIOGRAM, MEDIAN STERNOTOMY, TAKE DOWN OF LEFT INTERNAL MAMMARY, LEFT ENDOSCOPIC GREATER SAPHENOUS VEIN HARVEST, CRYO NERVE BLOCK, CARDIOPULMONARY BYPASS, CORONARY ARTERY BYPASS GRAFT X 2.;  Surgeon: Patrice Yepez MD;   Location: [facility] OR    CREATE FISTULA ARTERIOVENOUS UPPER EXTREMITY Left     CV CORONARY ANGIOGRAM N/A 2021    Procedure: CV CORONARY ANGIOGRAM;  Surgeon: Milton Cam MD;  Location: [facility] HE[facility]ARD[facility]ATH LAB    CV CORONARY ANGIOGRAM N/A 2023    Procedure: Coronary Angiogram;  Surgeon: Milton Cam MD;  Location: [facility]    CV PCI N/A 2023    Procedure: Percutaneous Coronary Intervention;  Surgeon: Milton Cam MD;  Location: [facility]    IR RENAL BIOPSY LEFT  10/17/2023    TRANSPLANT PANCREAS, KIDNEY  DONOR, COMBINED N/A 2023    Procedure: Transplant pancreas, kidney  donor, combined, appendectomy;  Surgeon: Tk Ascencio MD;  Location: [facility] OR       Family History   I have reviewed this patient's family history and updated it with pertinent information if needed.   Family History   Problem Relation Age of Onset    Diabetes Brother     Kidney Disease Brother        Social History   I have reviewed this patient's social history and updated it with pertinent information if needed. Avelina Marion  reports that he has quit smoking. His smoking use included cigarettes. He has never used smokeless tobacco. He reports that he does not drink alcohol and does not use drugs.    Allergies   No Known Allergies  Prior to Admission Medications    atorvastatin  20 mg Oral or Feeding Tube At Bedtime    bacitracin   Topical BID    calcitRIOL  0.5 mcg Oral Daily    calcium carbonate-vitamin D  2 tablet Oral BID    famotidine  20 mg Oral Daily    insulin aspart  1-7 Units Subcutaneous TID AC    insulin aspart  1-5 Units Subcutaneous At Bedtime    magnesium oxide  400 mg Oral Q24H    methylPREDNISolone  500 mg Intravenous Q24H    metoprolol tartrate  12.5 mg Oral BID    mycophenolate  1,000 mg Oral BID    nystatin  500,000 Units Swish & Swallow 4x Daily    pantoprazole  40 mg Oral Daily    polyethylene glycol  17 g Oral Daily    sodium  bicarbonate  1,300 mg Oral BID    sulfamethoxazole-trimethoprim  1 tablet Oral Once per day on     tacrolimus  1.5 mg Oral BID IS    valGANciclovir  450 mg Oral Every Other Day         Physical Exam   Temp  Av.8  F (36.6  C)  Min: 97.8  F (36.6  C)  Max: 97.8  F (36.6  C)      Pulse  Av.1  Min: 64  Max: 80 Resp  Av.1  Min: 14  Max: 24  SpO2  Av.7 %  Min: 94 %  Max: 100 %     /64 (BP Location: Right arm)   Pulse 67   Temp 97.4  F (36.3  C) (Oral)   Resp 18   Wt 91.9 kg (202 lb 8 oz)   SpO2 94%   BMI 27.85 kg/m      Admit       GENERAL APPEARANCE: no distress   HENT: oral mucous membranes moist  RESP:rhonchi bilaterally, pain to palpation of chest wall  CV: regular rhythm, normal rate, no rub, no murmur  EDEMA: no LE edema bilaterally  ABDOMEN: soft, nondistended, nontender, bowel sounds normal  MS: extremities normal - no gross deformities noted, no evidence of inflammation in joints, no muscle tenderness  SKIN: no suspicious lesions or rashes  NEURO: mentation intact  PSYCH: fatigued  DIALYSIS ACCESS:  LUE AV fistula +bruit/+thrill    Data   CMP  Recent Labs   Lab 10/20/23  0501 10/20/23  0125 10/19/23  2147 10/19/23  1602 10/19/23  0831 10/19/23  0553 10/18/23  0812 10/18/23  0523 10/18/23  0357 10/17/23  2206 10/17/23  1517 10/17/23  1457   *  --   --   --   --  133*  --  134*  --  137   < > 136   POTASSIUM 5.2  --   --   --   --  5.1  --  4.7  --  4.8   < > 3.5   CHLORIDE 103  --   --   --   --  105  --  106  --  108*  --  113*   CO2 20*  --   --   --   --  19*  --  18*  --  19*  --  15*   ANIONGAP 11  --   --   --   --  9  --  10  --  10  --  8   * 122* 137* 120*   < > 135*   < > 105*   < > 112*   < > 221*   BUN 33.3*  --   --   --   --  27.0*  --  25.3*  --  27.4*  --  23.2*   CR 2.16*  --   --   --   --  2.30*  --  2.40*  --  2.44*  --  2.15*   GFRESTIMATED 33*  --   --   --   --  31*  --  29*  --  28*  --  33*   DAVID 8.1*  --   --   --   --   8.1*  --  7.7*  --  8.1*  --  6.0*   MAG 2.0  --   --   --   --  2.0  --  2.2  --  1.2*  --   --    PHOS 2.3*  --   --   --   --  2.5  --  3.3  --  2.3*  --   --    PROTTOTAL  --   --   --   --   --   --   --   --   --   --   --  3.6*   ALBUMIN  --   --   --   --   --   --   --   --   --   --   --  2.2*   BILITOTAL  --   --   --   --   --   --   --   --   --   --   --  0.2   ALKPHOS  --   --   --   --   --   --   --   --   --   --   --  41   AST  --   --   --   --   --   --   --   --   --   --   --  21   ALT  --   --   --   --   --   --   --   --   --   --   --  13    < > = values in this interval not displayed.     CBC  Recent Labs   Lab 10/20/23  0501 10/19/23  0553 10/19/23  0053 10/18/23  1922 10/18/23  0952 10/18/23  0523 10/17/23  2206 10/17/23  1832   HGB 7.5* 8.1* 8.5* 7.6*   < > 6.9*   < > 8.3*   WBC 12.1* 9.0  --   --   --  9.1  --  13.6*   RBC 2.36* 2.64*  --   --   --  2.21*  --  2.63*   HCT 23.1* 24.8*  --   --   --  20.9*  --  25.7*   MCV 98 94  --   --   --  95  --  98   MCH 31.8 30.7  --   --   --  31.2  --  31.6   MCHC 32.5 32.7  --   --   --  33.0  --  32.3   RDW 17.2* 17.5*  --   --   --  17.9*  --  16.6*   * 124*  --   --   --  137*  --  146*    < > = values in this interval not displayed.     INR  Recent Labs   Lab 10/18/23  0707 10/17/23  1457 10/17/23  1423 10/17/23  1032   INR 1.30* 1.55* 2.15* 1.11   PTT 30  --  53*  --      ABG  Recent Labs   Lab 10/17/23  1457 10/17/23  1423   O2PER 21 15      Urine Studies  Recent Labs   Lab Test 10/17/23  1629 09/26/23  1429 09/14/23  1643 02/12/23  1539   COLOR Light Yellow Light Yellow Light Yellow Yellow   APPEARANCE Clear Clear Clear Slightly Cloudy*   URINEGLC 1000* Negative 500* 100*   URINEBILI Negative Negative Negative Negative   URINEKETONE Negative Negative Negative Negative   SG 1.005 1.016 1.017 1.015   UBLD Large* Trace* Small* Trace*   URINEPH 6.0 6.5 6.5 5.5   PROTEIN 30* 20* 100* 100*   NITRITE Negative Negative Negative Negative    LEUKEST Small* Trace* Negative Negative   RBCU >182* 5* 1 3*   WBCU 14* 8* 1 2     No lab results found.  PTH  Recent Labs   Lab Test 09/29/23  0848 09/24/23  0835   PTHI 103* 7*     Iron Studies  Recent Labs   Lab Test 09/29/23  0848 09/24/23  0835 02/16/23  0834   IRON 27* 32* 24*   * 151* 121*   IRONSAT 16 21 20   TREMAINE 1,256* 1,164* 1,461*       IMAGING:  All imaging studies reviewed by me.

## 2023-10-20 NOTE — PROGRESS NOTES
Interventional Radiology  [facility] Progress Note  10/20/23   9:43 AM      Recommendations/Plan:    -Please contact IR if there are any further concerns regarding patient.       History of Present Illness:  Avelina Marion is a 68 year old male with history of CAD s/p CABG x 2 in 2/2023, ESRD, DM II, anemia, HTN, and secondary hyperparathyroidism who was admitted on 10/17/23 after an outpatient left iliac fossa kidney transplant biopsy after a CODE BLUE/CPR w/ ROSC. Patient had a drop of Hgb, was given blood products, and brought to the IR suite for a visceral angiogram which resulted in a diagnostic left pelvic angiogram with left renal transplant angiography along with coil embolization of lower pole renal transplant arteriovenous fistula/bleed along with closure with angioseal.     Patient reports he is still having some shoulder pain, back pain, and epigastric/sternal discomfort from CPR. He continues to have some dizziness with standing. He denies any pain or swelling at biopsy site or angiogram site.     Pertinent Imaging Reviewed:   US renal transplant with doppler 10/19/23  US renal transplant with doppler 10/18/23  IR renal biopsy left 10/17/23  US renal transplant with doppler 10/17/23  IR visceral angiogram 10/17/23  IR visceral embolization 10/17/23    Expected date of discharge:  10/19/2023    Vitals:   /64 (BP Location: Right arm)   Pulse 67   Temp 97.4  F (36.3  C) (Oral)   Resp 18   Wt 91.9 kg (202 lb 8 oz)   SpO2 94%   BMI 27.85 kg/m      PHYSICAL EXAM:  GENERAL: AAO  HEENT: NC/AT  HEART: RRR  LUNGS: Lungs CTAB  ABDOMEN: No pain at biopsy site or in surrounding areas  EXTREMITY: Left angioseal site c/d/I, no evidence of erythema, ecchymosis or other concerns.    Pertinent Labs:   Lab Results   Component Value Date    WBC 12.1 (H) 10/20/2023    WBC 9.0 10/19/2023    WBC 9.1 10/18/2023     Lab Results   Component Value Date    HGB 7.5 10/20/2023    HGB 8.1 10/19/2023     HGB 8.5 10/19/2023     Lab Results   Component Value Date     10/20/2023     10/19/2023     10/18/2023     Lab Results   Component Value Date    INR 1.30 (H) 10/18/2023    PTT 30 10/18/2023     Lab Results   Component Value Date    POTASSIUM 5.2 10/20/2023    POTASSIUM 3.5 10/17/2023    POTASSIUM 4.4 12/30/2021        COVID-19 Antibody Results, Testing for Immunity           No data to display              COVID-19 PCR Results          12/28/2021    13:44 3/21/2022    10:52 8/8/2022    09:29 2/8/2023    02:44 2/9/2023    23:15 2/15/2023    16:03 9/14/2023    16:09   COVID-19 PCR Results   SARS CoV2 PCR Negative  Negative  Negative  Negative  Negative  Negative  Negative        Joy Briceño PA-C  Interventional Radiology  Pager: [phone]

## 2023-10-20 NOTE — PLAN OF CARE
/69 (BP Location: Right arm)   Pulse 70   Temp 97.7  F (36.5  C) (Oral)   Resp 16   Wt 91.9 kg (202 lb 8 oz)   SpO2 94%   BMI 27.85 kg/m       DISCHARGE:  D: Patient with orders to discharge home with family this evening.  I: Discharge instructions, medications, & follow ups reviewed with Avelina. Copy of discharge summary given to Avelina. PIV removed. All belongings packed & sent with patient. Medications filled & picked up at discharge pharmacy. Paperwork faxed.   A: Patient in stable condition. AVSS. Avelina had no further questions regarding discharge instructions and medications. Patient transferred out by wheelchair & left with transportation @ 1650.  P: Plan for follow-up with outpatient clinic Monday AM @ 0900 for lab draw. Will hold Aspirin until seen at outpatient clinic.

## 2023-10-20 NOTE — PROGRESS NOTES
/64 (BP Location: Right arm)   Pulse 67   Temp 97.4  F (36.3  C) (Oral)   Resp 18   Wt 91.9 kg (202 lb 8 oz)   SpO2 94%   BMI 27.85 kg/m      2300 - 0730    AVSS on RA, R shoulder pain managed w/ PRN tylenol. Pt was able to pass gas for the first time since K biopsy on 10/17, still feels like he needs to have a BM. Monitoring Cr, which is down-trending and Hgb.    No significant events overnight.

## 2023-10-20 NOTE — PROGRESS NOTES
"   10/20/23 7712   Appointment Info   Signing Clinician's Name / Credentials (PT) RICHARD Guillaume   Student Supervision Direct supervision provided;Direct Patient Contact Provided;Therapy services provided with the co-signing licensed therapist guiding and directing the services, and providing the skilled judgement and assessment throughout the session   Rehab Comments (PT) abd prec   Living Environment   People in Home sibling(s);child(florence), dependent   Current Living Arrangements house   Home Accessibility stairs to enter home;stairs within home   Number of Stairs, Main Entrance 5   Stair Railings, Main Entrance railings on both sides of stairs   Number of Stairs, Within Home, Primary greater than 10 stairs   Stair Railings, Within Home, Primary railings safe and in good condition   Transportation Anticipated family or friend will provide   Living Environment Comments Pt reports living with adult brother and sister. All needs met on main level. Pt has single bed with raising head of bed in living room.   Self-Care   Usual Activity Tolerance good   Current Activity Tolerance moderate   Regular Exercise No   Equipment Currently Used at Home none   Fall history within last six months yes   Number of times patient has fallen within last six months 1   Activity/Exercise/Self-Care Comment IND in all activities at baseline. Reports walking with sister in community before transplant one month ago.   General Information   Onset of Illness/Injury or Date of Surgery 10/17/23   Referring Physician Jessica Christiansen NP   Patient/Family Therapy Goals Statement (PT) return home   Pertinent History of Current Problem (include personal factors and/or comorbidities that impact the POC) Per EMR: \"Avelina Marion is a 68 year old male with PMH significant for CAD s/p CABG x2 2/2023, BCC, ESRD on HD via L AVF, Type II DM who is now s/p SPK with ureteral stent placement and appendectomy on 9/14/23 with Tk Ascencio MD. He " "presented as an OP for transplant kidney biopsy for persistently elevated creatinine. Post biopsy, pt was prepping to leave when a loud noise heard from room. Pt. Was found unresponsive, pulseless. CPR x  3min with ROSC. Exam notable for fixed non-reactive left pupil without other neuro deficits. Stroke code called. CToH negative. Received 2 PRBCs transferred back to IR where a renal artery angiogram demonstrated an AV fistula in lower pole of transplant and was embolized. He was transferred to ICU for closer monitoring.\"   Existing Precautions/Restrictions abdominal   Weight-Bearing Status - LUE full weight-bearing   Weight-Bearing Status - RUE full weight-bearing   Weight-Bearing Status - LLE full weight-bearing   Weight-Bearing Status - RLE full weight-bearing   Cognition   Affect/Mental Status (Cognition) WFL   Orientation Status (Cognition) oriented x 4   Follows Commands (Cognition) WFL   Integumentary/Edema   Integumentary/Edema no deficits were identifed   Posture    Posture Forward head position;Protracted shoulders   Range of Motion (ROM)   Range of Motion ROM is WFL   Strength (Manual Muscle Testing)   Strength (Manual Muscle Testing) strength is WFL   Bed Mobility   Bed Mobility no deficits identified;rolling left   Rolling Left [place] (Bed Mobility) independent   Assistive Device (Bed Mobility) other (see comments)   Comment, (Bed Mobility) pt has bed with mechanism to lift head of bed   Transfers   Transfers no deficits identified   Maintains Weight-bearing Status (Transfers) able to maintain   Gait/Stairs (Locomotion)   [place] Level (Gait) modified independence;supervision   Assistive Device (Gait) walker, front-wheeled   Distance in Feet (Gait) 10'   Pattern (Gait) other (see comments)  (step thru to contralateral metatarsals)   Deviations/Abnormal Patterns (Gait) base of support, narrow;gait speed decreased;stride length decreased   Negotiation (Stairs) stairs independence   [place] " Level (Stairs) modified independence   Clinical Impression   Criteria for Skilled Therapeutic Intervention Yes, treatment indicated   PT Diagnosis (PT) impaired functional mobility   Influenced by the following impairments decreeased strength, activity tolerance, and balance   Functional limitations due to impairments ambulation   Clinical Presentation (PT Evaluation Complexity) stable   Clinical Presentation Rationale Clinical judgment   Clinical Decision Making (Complexity) low complexity   Planned Therapy Interventions (PT) balance training;gait training;neuromuscular re-education;patient/family education;strengthening;transfer training   Risk & Benefits of therapy have been explained evaluation/treatment results reviewed;care plan/treatment goals reviewed;risks/benefits reviewed;current/potential barriers reviewed;participants voiced agreement with care plan;participants included;patient   PT Total Evaluation Time   PT Eval, Low Complexity Minutes (92695) 6   Physical Therapy Goals   PT Frequency 3x/week   PT Predicted Duration/Target Date for Goal Attainment 11/24/23   PT Goals Gait;Aerobic Activity   PT: Gait Independent;Greater than 200 feet;Within precautions   PT: Perform aerobic activity with stable cardiovascular response continuous activity;10 minutes;NuStep;treadmill;ambulation   Interventions   Interventions Quick Adds Gait Training;Therapeutic Activity   Therapeutic Activity   Therapeutic Activities: dynamic activities to improve functional performance Minutes (46575) 3   Symptoms Noted During/After Treatment None   Treatment Detail/Skilled Intervention Pt greeting in supine upon arrival, agreeable to therapy, ok'd per RN. Pt vitals at onset: HR 72, /64, O2 95%. Pt performed supine<>sit IND utilizing log roll technique to stay within abd prec with no need for cueing. Pt performed sit<>stand Mod-I to FWW with no need for cueing. Discussed POC with pt and answered all pt questions. Discussed using  FWW at home but pt delined d/t home set up. Pt supine in bed at end of session with call light within reach.   Gait Training   Gait Training Minutes (02503) 10   Symptoms Noted During/After Treatment (Gait Training) fatigue   Treatment Detail/Skilled Intervention Facilitated gait training to increase IND and safety with ambulation. Pt ambulated 150' with FWW and SBA. Pt demonstrated decreased gait speed, narrow MONICA, decreased heel strike, and step to pattern to contralateral metatarsals. Vcing to increase gait speed and MONICA with good execution and fair carryover. Pt performed 2x4 ascending/descending stairs with B railings for UE support with SBA and no need for cueing. Pt performed step to pattern both ascending and descending. Pt performed stairs at a decreased speed, but showed no LOB or functional strength deficit. Pt ambulated with no ' with LOB but able to self correct. Pt demonstrated increased narrow BOW w/o FWW and increased laterality in gait. Pt ambulated 150' back to room with FWW. Vcing to ambulate closer within FWW to maintain upright posture with good execution and carryover.   PT Discharge Planning   PT Plan Progress ambulation without AD, dynamic balance   PT Discharge Recommendation (DC Rec) home with outpatient physical therapy   PT Rationale for DC Rec Pt mobilizing up to SBA for bed mobility, transfers, ambulation, and stairs. Pt has adult siblings at home that can assist and home set up to have all needs met on main level. Recommend home with OP PT to further progress activity tolerance, strength, and balance.   PT Brief overview of current status up to SBA   Total Session Time   Timed Code Treatment Minutes 13   Total Session Time (sum of timed and untimed services) 19

## 2023-10-20 NOTE — PROGRESS NOTES
Transplant Surgery  Inpatient Daily Progress Note  10/20/2023    Assessment & Plan: Avelina Marion is a 68 year old male with PMH significant for CAD s/p CABG x2 2/2023, BCC, ESRD on HD via L AVF, Type II DM who is now s/p SPK with ureteral stent placement and appendectomy on 9/14/23 with Tk Ascencio MD. He presented as an OP for transplant kidney biopsy for persistently elevated creatinine. Post biopsy, pt was prepping to leave when a loud noise heard from room. Pt. Was found unresponsive, pulseless. CPR x  3min with ROSC. Exam notable for fixed non-reactive left pupil without other neuro deficits. Stroke code called. CToH negative. Received 2 PRBCs transferred back to IR where a renal artery angiogram demonstrated an AV fistula in lower pole of transplant and was embolized. He was transferred to ICU for closer monitoring.      Graft function:SPK 36  Kidney transplant with ureteral stent placement: SCr [place] 2.2, b/l  2.2-2.5    Borderline ACR:  -Renal tx biopsy 10/17/23: Prelim path t3i1 borderline ACR-final pending. -treat with SM 500mg x3 .   -DSA 10/12 pending.     -AV fistula of transplanted kidney:Postprocedure with syncopal episode, ABL, fluid collection . Returned to IR for renal artery angiogram- AV fistula in lower pole, underwent embolization.    -Post biopsy US:Hematoma at the biopsy site. Active extravasation cannot be excluded.No transplant hydronephrosis   Elevated velocity at the transplant arterial anastomosis   Repeat US 10/18: post Embolization-kidney patent vasculature, echogenicity on lateral aspect of kidney increased in size with concern for enlarging/evolving hematoma  US 10/19: Free fluid medial to the  transplant. Collection lateral to the  transplant thought to be a post biopsy hematoma. Otherwise negative    Pancreas: FBS <140 on steroids. Euglycemic, off insulin.   Lipase 20-30,Trend daily.     Immunosuppression management:   Induction:Thymoglobulin 600 mg (6.5 mg/kg).      Maintenance:   - MMF 1000 mg BID   - Tacrolimus 1.5mg BID. Goal level 8-10. Level pending today.    Borderline Rejection:  Solumedrol 500mg x3 10/18-10/20    Neuro: Syncopal episode, anisocoria : stroke code called   10/17: CToH/CTA head-neg, no aneurysm/stenosis  Hematology: Anemia of chronic disease/Acute blood loss due to bleeding post renal biopsy: Hgb 5.1 post procedure, transfused 2 PRBCs, Hgb 6.9 on 10/18 and received additional 1 PRBC. Hgb 7.5 today.  Thrombocytopenia: Plts stable 128. Trend.   Cardiorespiratory:   CAD s/p CABG x2 2023:  Continue ASA, statin, metoprolol.   Chest pain: likely related to compressions. Troponin and ECHO normal LVEF 60-65%, no WMA  GI/Nutrition: Reg.   Constipation: Encourage bowel meds/mobility. Senna per PTA.  Endocrine: see above  Fluid/Electrolytes:    Hyponatremia: Na 134, improving  : Voiding.  strict I/O  Infectious disease: Afebrile,   Leukocytosis:WBC 12-likely steroid induced.  Continue bactrim and valcyte PPx  MS:  Right shoulder pain: likely related to unwitnessed fall 10/17- XR mild ot mod OA but neg fx.  PT to eval.  Prophylaxis: DVT, fall, GI  Disposition:  7A, discharge today or tomorrow.    Medical Decision Making: Medium  Subsequent visit 75752 (moderate level decision making)      VIVI/Fellow/Resident Provider: Jessica Christiansen NP    Faculty: Louise Barros MD    _________________________________________________________________  Transplant History: Admitted 10/17/2023 for Anemia due to blood loss, acute [D62]  History of simultaneous kidney and pancreas transplant (H) [Z94.0, Z94.83]  Postoperative cardiac arrest following non-cardiac surgery [I97.121]  .  9/14/2023 (Kidney / Pancreas), Postoperative day: 36     Interval History: History is obtained from the patient    Overnight events: C/o continued decreased appetite. Tolerating regular. Reporting some nausea with miralax for bowel regimen. Transition to senna per pt.reqest. Small BM this am. Up  to bathroom only.     ROS:   A 10-point review of systems was negative except as noted above.    Meds:   atorvastatin  20 mg Oral or Feeding Tube At Bedtime    bacitracin   Topical BID    calcitRIOL  0.5 mcg Oral Daily    calcium carbonate-vitamin D  2 tablet Oral BID    famotidine  20 mg Oral Daily    insulin aspart  1-7 Units Subcutaneous TID AC    insulin aspart  1-5 Units Subcutaneous At Bedtime    magnesium oxide  400 mg Oral Q24H    methylPREDNISolone  500 mg Intravenous Q24H    metoprolol tartrate  12.5 mg Oral BID    mycophenolate  1,000 mg Oral BID    nystatin  500,000 Units Swish & Swallow 4x Daily    pantoprazole  40 mg Oral Daily    sodium bicarbonate  1,300 mg Oral BID    sulfamethoxazole-trimethoprim  1 tablet Oral Once per day on Monday Wednesday Friday    tacrolimus  1.5 mg Oral BID IS    valGANciclovir  450 mg Oral Every Other Day       Physical Exam:     Admit Weight: 92 kg (202 lb 12.8 oz)    Current vitals:   /68 (BP Location: Right arm)   Pulse 69   Temp 97.9  F (36.6  C) (Oral)   Resp 16   Wt 91.9 kg (202 lb 8 oz)   SpO2 94%   BMI 27.85 kg/m           Vital sign ranges:    Temp:  [97.4  F (36.3  C)-98.4  F (36.9  C)] 97.9  F (36.6  C)  Pulse:  [67-77] 69  Resp:  [16-29] 16  BP: (123-140)/(56-68) 134/68  SpO2:  [92 %-97 %] 94 %  Patient Vitals for the past 24 hrs:   BP Temp Temp src Pulse Resp SpO2   10/20/23 1024 134/68 97.9  F (36.6  C) Oral 69 16 94 %   10/20/23 0545 132/64 97.4  F (36.3  C) Oral 67 18 94 %   10/20/23 0119 (!) 140/65 97.6  F (36.4  C) Oral 71 16 --   10/19/23 2145 123/56 98  F (36.7  C) Oral 74 16 93 %   10/19/23 2026 123/62 98.1  F (36.7  C) Oral 77 16 92 %   10/19/23 1600 132/59 98.3  F (36.8  C) Oral 75 28 97 %   10/19/23 1200 129/58 98.4  F (36.9  C) Oral 67 29 94 %   10/19/23 1030 (!) 140/62 -- -- 77 23 93 %     General Appearance: in no apparent distress.   Skin: normal, warm, dry  Heart: regular rate and rhythm  Lungs: clear to auscultation, without  wheezes,RA  Abdomen: The abdomen is soft, NTTP, ND, dull to percussion,  he is not tender over the kidney graft. The surgical wound is healing well and well approximated.  : voiding.   Extremities: Left forearm Skin tear. edema: present bilateral. trace, Left groin dressing CDI-removed. No obvious deformities or skin changes to right shoulder. FROM.  Neurologic: awake, alert, and oriented.      Data:   CMP  Recent Labs   Lab 10/20/23  0900 10/20/23  0501 10/19/23  0831 10/19/23  0553 10/18/23  1200 10/18/23  0952 10/18/23  0357 10/17/23  2206 10/17/23  1517 10/17/23  1457 10/17/23  1422 10/16/23  0820   NA  --  134*  --  133*  --   --    < > 137   < > 136   < > 139   POTASSIUM  --  5.2  --  5.1  --   --    < > 4.8   < > 3.5   < > 4.8   CHLORIDE  --  103  --  105  --   --    < > 108*  --  113*  --  106   CO2  --  20*  --  19*  --   --    < > 19*  --  15*  --  22   * 133*   < > 135*   < >  --    < > 112*   < > 221*   < > 117*   BUN  --  33.3*  --  27.0*  --   --    < > 27.4*  --  23.2*  --  25.7*   CR  --  2.16*  --  2.30*  --   --    < > 2.44*  --  2.15*   < > 2.59*   GFRESTIMATED  --  33*  --  31*  --   --    < > 28*  --  33*   < > 26*   DAVID  --  8.1*  --  8.1*  --   --    < > 8.1*  --  6.0*  --  9.4   ICAW  --   --   --   --   --  4.2*  --  4.4   < >  --    < >  --    MAG  --  2.0  --  2.0  --   --    < > 1.2*  --   --   --  1.4*   PHOS  --  2.3*  --  2.5  --   --    < > 2.3*  --   --    < > 3.2   AMYLASE  --   --   --   --   --   --   --   --   --   --   --  71   LIPASE  --  20  --  27  --   --    < >  --   --   --   --  55   ALBUMIN  --   --   --   --   --   --   --   --   --  2.2*  --   --    BILITOTAL  --   --   --   --   --   --   --   --   --  0.2  --   --    ALKPHOS  --   --   --   --   --   --   --   --   --  41  --   --    AST  --   --   --   --   --   --   --   --   --  21  --   --    ALT  --   --   --   --   --   --   --   --   --  13  --   --     < > = values in this interval not displayed.      CBC  Recent Labs   Lab 10/20/23  0501 10/19/23  0553   HGB 7.5* 8.1*   WBC 12.1* 9.0   * 124*     COAGS  Recent Labs   Lab 10/18/23  0707 10/17/23  1457 10/17/23  1423   INR 1.30* 1.55* 2.15*   PTT 30  --  53*      Urinalysis  Recent Labs   Lab Test 10/17/23  1629 09/26/23  1429   COLOR Light Yellow Light Yellow   APPEARANCE Clear Clear   URINEGLC 1000* Negative   URINEBILI Negative Negative   URINEKETONE Negative Negative   SG 1.005 1.016   UBLD Large* Trace*   URINEPH 6.0 6.5   PROTEIN 30* 20*   NITRITE Negative Negative   LEUKEST Small* Trace*   RBCU >182* 5*   WBCU 14* 8*     Virology:  Hepatitis C Antibody   Date Value Ref Range Status   09/14/2023 Nonreactive Nonreactive Final     BK Virus DNA copies/mL   Date Value Ref Range Status   10/12/2023 Not Detected Not Detected copies/mL Final

## 2023-10-20 NOTE — PROGRESS NOTES
Admitted/transferred from: 4C  Time of arrival on unit 2030  2 RN full  skin assessment completed by Bernadette ALMANZA and Amaury PLAZA  Skin assessment finding: issues found L forearm skin tear, L leg abrasion, L groin angio-seal, midline healed incision, 4 lap sites, CABG chest healed incision     Interventions/actions: skin interventions bacitracin on L forearm skin tear covered with primapore dressing       Will continue to monitor.

## 2023-10-20 NOTE — DISCHARGE SUMMARY
[facility]    Discharge Summary  Solid Organ Transplant    Date of Admission:  10/17/2023  Date of Discharge:  10/20/2023  Discharging Provider: Jessica Christiansen NP    Discharge Diagnoses   Principal Problem:    History of simultaneous kidney and pancreas transplant (H)  Active Problems:    Anemia due to blood loss, acute    Postoperative cardiac arrest following non-cardiac surgery    Arteriovenous fistula of transplanted kidney (H24)     Follow-ups Needed After Discharge   Follow-up Appointments     Adult [facility] Follow-up and recommended labs and tests      [facility] FOLLOW UP:     1. Follow up with Transplant Nephrologist as scheduled. 10/23/23     2. Ureteral stent removal in 4-6 weeks, to be scheduled by Transplant   Coordinator. If a  does not contact you for this, please contact   your transplant coordinator.      3. Follow up with your primary care provider as previously arranged.     Remember to always bring an updated medication list to all appointments.          Call your Transplant Coordinator ([phone]) with questions about   Transplant Center appointment scheduling.       LABS:     CBC, BMP, magnesium, phosphorus, lipase, tacrolimus level to be drawn   daily while in ATC, then every Monday and Thursday by home health care   nurse if arranged, or at an outpatient lab.        Appointments on [place] and/or [facility] (with [facility] or [facility]   provider or service). Call [phone] if you haven't heard regarding   these appointments within 7 days of discharge.               Discharge Disposition   Discharged to home  Condition at discharge: Stable    Hospital Course   Avelina Marion is a 68 year old male with PMH significant for CAD s/p CABG x2 2/2023, BCC, ESRD on HD via L AVF, Type II DM who is now s/p SPK with Ureteral stent placement and appendectomy on 9/14/23 with Tk Ascencio MD. He presented as an OP for  transplant kidney biopsy for persistently elevated creatinine. Post biopsy, pt collapsed in his room and was found unresponsive, pulseless. Per code report he received CPR x  3min with ROSC. Exam at that time was notable for fixed non-reactive left pupil without other neuro deficits. Stroke code called. CToH negative. Received 2 PRBCs transferred back to IR where a renal artery angiogram demonstrated an AV fistula in lower pole of transplant and he returned to IR for embolization. Post procedure he was admitted to the ICU 10/17/23 for closer monitoring.   The following problems were addressed during his hospitalization:    Kidney transplant with ureteral stent placement: SCr 2.16, (new von) at discharge. Baseline  2.2-2.5. Voiding without retention. No gross hematuria.     Borderline ACR:  -Renal tx biopsy 10/17/23:  Borderline rejection (as below). Treated with Solumedrol 500mg IV x3 (10/18-20)      A. Kidney allograft biopsy (needle):     Severe tubulitis in isolated tubules, mild interstitial inflammation, and no arteritis, best classified as  borderline changes  in the [place] system (t3 i1 v0)     Mild signs of endothelial injury on ultrastructural examination, associated with no other significant signs of antibody-mediated rejection, suggesting early active antibody-mediated rejection only in the presence of donor-specific antibodies (g0 ptc0 v0 c4d0)     Chronic changes of the parenchyma, including:   - focal global glomerulosclerosis (3% of glomeruli)   - no significant tubular atrophy and interstitial fibrosis   - no significant arterial sclerosis and arteriolar hyalinosis     (ct0 ci0 cv0 ah0 cg0 mm0)        -AV fistula of transplanted kidney due to renal biopsy:  Returned to IR post-biopsy 10/17 for renal artery angiogram-identified AV fistula in lower pole, with extravasation, underwent embolization.      PROCEDURE  Describe Procedure: Left transplant renal artery angiogram demonstrates an AV fistula in  the lower pole renal transplant which was successfully embolized with 1-2 mm Hydrocoil. No other evidence of vessel injury identified.     -Post biopsy US:Hematoma at the biopsy site. Active extravasation cannot be excluded.No transplant hydronephrosis   Elevated velocity at the transplant arterial anastomosis   Repeat US 10/18: post Embolization-kidney patent vasculature, echogenicity on lateral aspect of kidney increased in size with concern for enlarging/evolving hematoma  US today: Free fluid medial to the transplant. Collection lateral to the  transplant thought to be a post biopsy hematoma. Otherwise negative     Pancreas: Euglycemic without insulin.   Lipase stable 20-30      Immunosuppression management:   Maintenance:   - MMF 1000 mg BID   - Tacrolimus 2mg BID. Goal level 8-10.      Borderline Rejection:  Solumedrol 500mg x3 10/18-10/20     Neuro:   Syncopal episode, anisocoria : stroke code called, CToH/CTA head-neg, no aneurysm/stenosis    Hematology:   Anemia of chronic disease/Acute blood loss due to bleeding post renal biopsy: Hgb 5.1 post procedure, transfused 2 PRBCs, Hgb 6.9 in the am post embolization and received an additional 1 PRBC. Serial hgbs remained stable, 7.5-8 .  Thrombocytopenia: Plts <150.     Cardiorespiratory:   CAD s/p CABG x2 2023: Resume  statin and metoprolol. Hold ASA until seen in follow up clinic 10/23.   Chest pain: likely related to compressions. Troponin and ECHO normal LVEF 60-65%, no WMA.  GI/Nutrition: Reg.   Constipation: Encourage bowel meds/mobility. Resume Senna per PTA  Fluid/Electrolytes:    Hyponatremia: improving at discharge.     Infectious disease: Afebrile, WBC normal  Continue bactrim and valcyte PPx    MS:  Right shoulder pain: likely related to unwitnessed fall 10/17- XR mild ot mod OA but neg fx.     Consultations This Hospital Stay   CARE MANAGEMENT / SOCIAL WORK IP CONSULT  PHYSICAL THERAPY ADULT IP CONSULT    Code Status   Full Code    Time Spent on this  Encounter   I, Jessica Christiansen NP, personally saw the patient today and spent greater than 30 minutes discharging this patient.       Jessica Christiansen NP  [facility]  ______________________________________________________________________    Physical Exam   Temp: 97.7  F (36.5  C) Temp src: Oral BP: 137/69 Pulse: 70   Resp: 16 SpO2: 94 % O2 Device: None (Room air)    Vitals:    10/17/23 2000 10/18/23 0600   Weight: 92 kg (202 lb 12.8 oz) 91.9 kg (202 lb 8 oz)     Vital Signs with Ranges  Temp:  [97.4  F (36.3  C)-98.1  F (36.7  C)] 97.7  F (36.5  C)  Pulse:  [67-77] 70  Resp:  [16-18] 16  BP: (123-140)/(56-69) 137/69  SpO2:  [92 %-94 %] 94 %  I/O last 3 completed shifts:  In: 730 [P.O.:630; I.V.:100]  Out: 800 [Urine:800]     General Appearance: in no apparent distress.   Skin: normal, warm, dry  Heart: regular rate and rhythm  Lungs: clear to auscultation, without wheezes,RA  Abdomen: The abdomen is soft, NTTP, ND, dull to percussion,  he is not tender over the kidney graft. The surgical wound is healing well and well approximated.  : voiding.   Extremities: Left forearm Skin tear. edema: present bilateral. trace, Left groin (angio site) CDI. No obvious deformities or skin changes to right shoulder. FROM.  Neurologic: awake, alert, and oriented.       Primary Care Physician   Amy Truong    Discharge Orders      Reason for your hospital stay    Anemia due to blood loss, acute [D62]  History of simultaneous kidney and pancreas transplant (H) [Z94.0, Z94.83]  Postoperative cardiac arrest following non-cardiac surgery [I97.121]     Activity    Walk at least four times a day, lift no greater than 10 pounds for 8 weeks from the time of surgery.  No driving while taking narcotics or 3 weeks after surgery.     When to contact your care team    WHEN TO CONTACT YOUR  COORDINATOR:     Transplant Coordinator [phone]     Notify your coordinator if you have pain over  your kidney or pancreas, increased redness or drainage from your incision, fever greater than 100F, decreased urine output or new or increased amount of blood in urine.     Notify your coordinator immediately if you are ever unable to take your immunosuppressive medications for any reason.     If you have URGENT concerns after office hours, please call the hospital switchboard at [phone] and ask to have the organ transplant nurse on-call paged. If you have a life-threatening emergency, go to the nearest emergency room.     Adult [facility] Follow-up and recommended labs and tests    [facility] FOLLOW UP:     1. Follow up with Transplant Nephrologist as scheduled. 10/23/23     2. Ureteral stent removal in 4-6 weeks, to be scheduled by Transplant Coordinator. If a  does not contact you for this, please contact your transplant coordinator.      3. Follow up with your primary care provider as previously arranged.     Remember to always bring an updated medication list to all appointments.        Call your Transplant Coordinator ([phone]) with questions about Transplant Center appointment scheduling.       LABS:     CBC, BMP, magnesium, phosphorus, lipase, tacrolimus level to be drawn daily while in ATC, then every Monday and Thursday by home health care nurse if arranged, or at an outpatient lab.        Appointments on [place] and/or [facility] (with [facility] or [facility] provider or service). Call [phone] if you haven't heard regarding these appointments within 7 days of discharge.     Diet    Diet recommendations post-transplant: Heart healthy dietary habits long term (low saturated/trans fat, low sodium). High protein diet x 8 weeks. Practice food safety precautions.       Significant Results and Procedures   Most Recent 3 CBC's:  Recent Labs   Lab Test 10/20/23  0501 10/19/23  0553 10/19/23  0053 10/18/23  0952 10/18/23  0523   WBC 12.1* 9.0  --   --  9.1   HGB 7.5* 8.1* 8.5*   < >  6.9*   MCV 98 94  --   --  95   * 124*  --   --  137*    < > = values in this interval not displayed.     Most Recent 3 BMP's:  Recent Labs   Lab Test 10/20/23  1447 10/20/23  0900 10/20/23  0501 10/19/23  0831 10/19/23  0553 10/18/23  0812 10/18/23  0523   NA  --   --  134*  --  133*  --  134*   POTASSIUM  --   --  5.2  --  5.1  --  4.7   CHLORIDE  --   --  103  --  105  --  106   CO2  --   --  20*  --  19*  --  18*   BUN  --   --  33.3*  --  27.0*  --  25.3*   CR  --   --  2.16*  --  2.30*  --  2.40*   ANIONGAP  --   --  11  --  9  --  10   DAVID  --   --  8.1*  --  8.1*  --  7.7*   * 134* 133*   < > 135*   < > 105*    < > = values in this interval not displayed.     Most Recent 3 Troponin's:No lab results found.,   Results for orders placed or performed during the hospital encounter of 10/17/23   XR Shoulder Right Port G/E 2 Views    Narrative    EXAM: XR SHOULDER RIGHT PORT G/E 2 VIEWS  LOCATION: [facility]  DATE: 10/17/2023    INDICATION: Shoulder pain after fall.  COMPARISON: None available.      Impression    IMPRESSION: No radiographic evidence of acute fracture or malalignment. Mild-to-moderate polyarticular osteoarthritis.   US Renal Transplant with Doppler    Narrative    EXAMINATION: US RENAL TRANSPLANT,  10/18/2023 11:42 AM     COMPARISON: US 10/17/2023, 9/26/2023    HISTORY: POD#1 from IR embolization of AVF in lower renal pole of  transplanted kidney, hematoma eval    TECHNIQUE:  Grey-scale, color Doppler and spectral flow analysis.    FINDINGS:    The transplant kidney is located in left lower quadrant, and measures  7.5 cm. Parenchyma is of normal thickness and echogenicity. No focal  lesions. No hydronephrosis.     Heterogenous pararenal hypoechogenicity along the lateral aspect of  transplanted kidney, measures about 3.8 x 2.2 cm compared to 2.9 x 1.9  cm, measured on longitudinal cine images. Trace adjacent free fluid to  the transplanted  kidney.    Renal artery flow:   139 cm/sec peak systolic at hilum.  401 peak systolic at anastomosis.  Arcuate artery resistive indices (upper to lower): 0.73, 0.76, 0.76    Renal Vein Flow:  55at hilum.   69 at anastomosis.     Iliac artery flow:  199 peak systolic above anastomosis.  188 peak systolic below anastomosis.    Iliac vein flow:  Patent above and below the anastomosis.      Impression    IMPRESSION:       1. Patent renal transplant vasculature by Doppler evaluation.  2. Heterogenous pararenal hypoechogenicity along the lateral aspect of  transplanted kidney, mildly increased in size compared to prior  ultrasound from 10/17/2023, may represent enlarging/evolving hematoma,  trace adjacent free fluid; if concern for ongoing bleed or  extravasation, CT angiogram may be performed.          I have personally reviewed the examination and initial interpretation  and I agree with the findings.    FRANCINE LANDEROS MD         SYSTEM ID:  [id number]   XR Chest Port 1 View    Narrative    Exam: XR CHEST PORT 1 VIEW, 10/18/2023 11:09 AM    Comparison: Chest radiographs 9/15/2023,     History: increased WOB and epigastric discomfort    Findings:  Portable AP view of the chest. Postsurgical changes of CABG with  intact median sternotomy wires and mediastinal surgical clips.  Interval removal of right IJ catheter and enteric tube. Low lung  volumes. Trachea is approximately midline. Cardiac silhouette is  within normal limits. Blunting of the costophrenic angles bilaterally.  New diffuse patchy pulmonary opacities, most prominent in the lung  bases. No acute osseous abnormality.      Impression    Impression: Diffuse bilateral patchy infiltrates may represent edema  and/or atelectasis, but cannot exclude infectious process. Small  bilateral pleural effusions. Persistent low lung volumes. Stable  postsurgical changes of the chest.     I have personally reviewed the examination and initial interpretation  and I agree with  the findings.    MARYURI COFFMAN MD         SYSTEM ID:  [id number]   US Renal Transplant with Doppler    Narrative    ULTRASOUND RENAL TRANSPLANT WITH DOPPLER  10/19/2023 8:56 AM    CLINICAL HISTORY: Post biopsy bleeding embolized with coil.  Re-evaluate    COMPARISONS: Ultrasound renal transplant with Doppler 10/18/2023.  Angiography and embolization 10/17/2023. Ultrasound with renal  transplant with Doppler 9/26/2023.    REFERRING PROVIDER: INGRID KUNZ ROSE    TECHNIQUE: Left lower quadrant renal transplant evaluated with  grayscale, color Doppler, and Doppler waveform ultrasound. Transplant  renal vasculature evaluated with color Doppler and Doppler waveform  ultrasound.    Cine clips through the renal transplant and bladder were saved in the  patient's record.    FINDINGS:   LEFT LOWER QUADRANT RENAL TRANSPLANT:         Fluid collections:             #1: Medial: Free fluid. Hypoechoic. 3.6 x 2.8 x 5.8 cm. No  internal flow by color Doppler imaging.            #2: Lateral: Hypoechoic with echoes. 2.6 x 2.5 x 4.3 cm. No  internal flow by color Doppler imaging.           Renal artery:            Hilum: 127/18 cm/s            Anastomosis: 233/29 cm/s (previously 401/52 cm/s)         Renal artery - iliac ratio: 2.01 (previously 2.02)         Renal vein:            Hilum: 38 cm/s            Anastomosis: 75 cm/s (previously 69 cm/s)         Length: 9.0 cm         Arcuate artery resistive index (superior / mid / inferior): 0.74  / 0.70 / 0.76  (previously 0.73 / 0.76 / 0.76)         Parenchyma: Normal. No stone, mass, or hydronephrosis.    Iliac artery:       Above anastomosis: 111 cm/s       Below anastomosis: 147 cm/s    Iliac vein:       Above anastomosis: 32 cm/s       Below anastomosis: 44 cm/s    Bladder: Partially distended.      Impression    IMPRESSION:   1. No renal transplant arterial or venous stenosis demonstrated.    2. Arcuate artery resistive indices remain elevated.    3. Free fluid medial to the  transplant. Collection lateral to the  transplant thought to be a post biopsy hematoma. Otherwise negative  grayscale appearance of the renal transplant.    GUIDELINES:  For native kidneys:       Diagnostic criteria suggestive of > 60% diameter stenosis             Renal artery:             Peak systolic velocity > 180 cm/s             RAR > 3.5             Turbulent flow         Arcuate artery Resistive Index Normal < 0.7    For renal transplants:       Renal transplant peak systolic velocity criteria range from > 180  cm/s to > 400 cm/s       Source suggests using:            Peak systolic velocity > or = 300 cm/s            Spectral broadening            Intraparenchymal arterial acceleration time > or = 0.1 s     Source: Amarilys WORTHY, Rosales HARRIS, Kodi WEIR, et al. Screening for  transplant renal artery stenosis: Ultrasound-based stenosis  probability stratification. American Journal of Roentgenology.  2017;209: 4423-0570. [id number]    LEIDY GUTIERREZ MD         SYSTEM ID:  [id number]   Echo Complete     Value    LVEF  60-65%    Narrative    [id number]  [id number]  [id number]  [id number]^STEPH     [facility],[place]  Echocardiography Laboratory  [address] [place] [id number]     Name: MR. MARCIA HEBERT  MRN: [id number]  : 1955  Study Date: 10/18/2023 09:30 AM  Age: 68 yrs  Gender: Male  [facility] [facility]  Reason For Study: Chest Discomfort  Ordering Physician: STEPH STEWARD  Performed By: Rhiannon Abreu RDCS     BSA: 2.1 m2  Height: 71 in  Weight: 202 lb  HR: 78  BP: 133/54 mmHg  ______________________________________________________________________________  Procedure  Complete Portable Echo Adult. Contrast Optison. Optison (NDC #5813-6851-98)  given intravenously. Patient was given 5 ml mixture of 3 ml Optison and 6 ml  saline. 4 ml wasted.  ______________________________________________________________________________  Interpretation Summary  Global and  regional left ventricular function is normal with an EF of 60-65%.  Right ventricular function, chamber size, wall motion, and thickness are  normal.  Pulmonary artery systolic pressure is normal.  The inferior vena cava cannot be assessed.  No pericardial effusion is present.  ______________________________________________________________________________  Left Ventricle  Global and regional left ventricular function is normal with an EF of 60-65%.  Left ventricular size is normal. Mild concentric wall thickening consistent  with left ventricular hypertrophy is present. Left ventricular diastolic  function is normal. No regional wall motion abnormalities are seen.     Right Ventricle  Right ventricular function, chamber size, wall motion, and thickness are  normal.     Atria  Both atria appear normal. The atrial septum is intact as assessed by color  Doppler .     Mitral Valve  The mitral valve is normal. Trace mitral insufficiency is present.     Aortic Valve  Aortic valve sclerosis is present.     Tricuspid Valve  The tricuspid valve is normal. Trace tricuspid insufficiency is present. The  right ventricular systolic pressure is approximated at 28.8 mmHg plus the  right atrial pressure. Pulmonary artery systolic pressure is normal.     Pulmonic Valve  The pulmonic valve is normal.     Vessels  The thoracic aorta is normal. The pulmonary artery is normal. The inferior  vena cava cannot be assessed.     Pericardium  No pericardial effusion is present.     ______________________________________________________________________________  MMode/2D Measurements & Calculations  RVDd: 5.1 cm  IVSd: 1.2 cm  LVIDd: 4.4 cm  LVIDs: 2.5 cm  LVPWd: 1.2 cm  FS: 44.5 %     LV mass(C)d: 193.4 grams  LV mass(C)dI: 91.3 grams/m2  Ao root diam: 3.4 cm  asc Aorta Diam: 3.5 cm  LVOT diam: 2.3 cm  LVOT area: 4.3 cm2  Ao root diam index Ht(cm/m): 1.9  Ao root diam index BSA (cm/m2): 1.6  Asc Ao diam index BSA (cm/m2): 1.7  Asc Ao diam  index Ht(cm/m): 1.9  LA Volume (BP): 85.1 ml  LA Volume Index (BP): 40.1 ml/m2     RWT: 0.52  TAPSE: 1.1 cm     Doppler Measurements & Calculations  MV E max vinicio: 97.6 cm/sec  MV A max vinicio: 71.6 cm/sec  MV E/A: 1.4  MV dec slope: 447.0 cm/sec2  MV dec time: 0.22 sec  PA acc time: 0.10 sec  TR max vinicio: 268.1 cm/sec  TR max P.8 mmHg  E/E' av.1  Lateral E/e': 5.6  Medial E/e': 12.5  RV S Vinicio: 7.9 cm/sec     ______________________________________________________________________________  Report approved by: Cira Banda 10/18/2023 11:20 AM             Discharge Medications   Current Discharge Medication List        START taking these medications    Details   !! acetaminophen (TYLENOL) 325 MG tablet Take 2 tablets (650 mg) by mouth every 6 hours as needed for mild pain       !! - Potential duplicate medications found. Please discuss with provider.        CONTINUE these medications which have CHANGED    Details   aspirin (ASA) 325 MG tablet Take 1 tablet (325 mg) by mouth daily  Qty: 90 tablet, Refills: 0    Associated Diagnoses: Kidney replaced by transplant; Pancreas replaced by transplant (H)           CONTINUE these medications which have NOT CHANGED    Details   !! acetaminophen (TYLENOL) 500 MG tablet Take 500-1,000 mg by mouth every 6 hours as needed for mild pain      atorvastatin (LIPITOR) 20 MG tablet Take 1 tablet (20 mg) by mouth at bedtime  Qty: 30 tablet, Refills: 11    Associated Diagnoses: S/P CABG (coronary artery bypass graft); History of simultaneous kidney and pancreas transplant (H); Aftercare following organ transplant; Kidney transplanted      Blood Glucose Monitoring Suppl (ACCU-CHEK GUIDE) w/Device KIT       calcitRIOL (ROCALTROL) 0.5 MCG capsule Take 1 capsule (0.5 mcg) by mouth daily  Qty: 30 capsule, Refills: 11    Associated Diagnoses: S/P CABG (coronary artery bypass graft); History of simultaneous kidney and pancreas transplant (H); Aftercare following organ transplant; Kidney  transplanted      calcium carbonate-vitamin D (OSCAL) 500-5 MG-MCG tablet Take 2 tablets by mouth 2 times daily  Qty: 120 tablet, Refills: 11    Associated Diagnoses: S/P CABG (coronary artery bypass graft); History of simultaneous kidney and pancreas transplant (H); Aftercare following organ transplant; Kidney transplanted      famotidine (PEPCID) 10 MG tablet Take 1 tablet (10 mg) by mouth every 24 hours  Qty: 30 tablet, Refills: 3    Associated Diagnoses: S/P CABG (coronary artery bypass graft); History of simultaneous kidney and pancreas transplant (H); Aftercare following organ transplant; Kidney transplanted      magnesium oxide (MAG-OX) 400 MG tablet Take 1 tablet (400 mg) by mouth every 24 hours  Qty: 30 tablet, Refills: 11    Associated Diagnoses: S/P CABG (coronary artery bypass graft); History of simultaneous kidney and pancreas transplant (H); Aftercare following organ transplant; Kidney transplanted      metoprolol tartrate (LOPRESSOR) 25 MG tablet Take 0.5 tablets (12.5 mg) by mouth 2 times daily  Qty: 30 tablet, Refills: 3    Associated Diagnoses: S/P CABG (coronary artery bypass graft); History of simultaneous kidney and pancreas transplant (H); Aftercare following organ transplant; Kidney transplanted      Misc. Devices (PILL SPLITTER) MISC 1 applicator daily  Qty: 1 each, Refills: 0    Associated Diagnoses: Kidney replaced by transplant; Pancreas replaced by transplant (H)      mycophenolate (GENERIC EQUIVALENT) 250 MG capsule Take 4 capsules (1,000 mg) by mouth 2 times daily  Qty: 240 capsule, Refills: 11    Comments: TXP DT 9/14/2023 (Kidney / Pancreas) TXP Dischg DT 9/21/2023 DX Kidney replaced by transplant Z94.0 TX Center [facility] ([place], [place])  Associated Diagnoses: S/P CABG (coronary artery bypass graft); History of simultaneous kidney and pancreas transplant (H); Aftercare following organ transplant; Kidney transplanted      nystatin (MYCOSTATIN)  184046 UNIT/ML suspension Take 5 mLs (500,000 Units) by mouth 4 times daily  Qty: 473 mL, Refills: 1    Associated Diagnoses: S/P CABG (coronary artery bypass graft); History of simultaneous kidney and pancreas transplant (H); Aftercare following organ transplant; Kidney transplanted      pantoprazole (PROTONIX) 40 MG EC tablet Take 1 tablet (40 mg) by mouth daily  Qty: 30 tablet, Refills: 3    Associated Diagnoses: S/P CABG (coronary artery bypass graft); History of simultaneous kidney and pancreas transplant (H); Aftercare following organ transplant; Kidney transplanted      psyllium (METAMUCIL/KONSYL) 58.6 % powder Take 6 g (1 teaspoonful) by mouth daily Can increase up to 3 TIMES DAILY if needed.  Qty: 500 g, Refills: 3    Associated Diagnoses: History of simultaneous kidney and pancreas transplant (H); Loose stools      senna-docusate (SENOKOT-S/PERICOLACE) 8.6-50 MG tablet Take 1-2 tablets by mouth 2 times daily HOLD for loose stools  Qty: 100 tablet, Refills: 0    Associated Diagnoses: S/P CABG (coronary artery bypass graft); History of simultaneous kidney and pancreas transplant (H); Aftercare following organ transplant; Kidney transplanted      sodium bicarbonate 650 MG tablet Take 2 tablets (1,300 mg) by mouth 2 times daily  Qty: 120 tablet, Refills: 3    Associated Diagnoses: Aftercare following organ transplant      sulfamethoxazole-trimethoprim (BACTRIM) 400-80 MG tablet Take 1 tablet by mouth three times a week  Qty: 30 tablet, Refills: 4    Associated Diagnoses: History of simultaneous kidney and pancreas transplant (H)      !! tacrolimus (GENERIC) 0.5 MG capsule Hold for dose changes  Qty: 60 capsule, Refills: 11    Comments: TXP DT 9/14/2023 (Kidney / Pancreas) TXP Dischg DT 9/21/2023 DX Kidney replaced by transplant Z94.0 TX [facility] ([place], [place])  Associated Diagnoses: History of simultaneous kidney and pancreas transplant (H)      !! tacrolimus  (GENERIC) 1 MG capsule Take 2 capsules (2 mg) by mouth 2 times daily  Qty: 120 capsule, Refills: 11    Comments: TXP DT 9/14/2023 (Kidney / Pancreas) TXP Dischg DT 9/21/2023 DX Kidney replaced by transplant Z94.0 TX [facility] ([place], [place])  Associated Diagnoses: History of simultaneous kidney and pancreas transplant (H)      valGANciclovir (VALCYTE) 450 MG tablet Take 1 tablet (450 mg) by mouth every other day Titrate up to 2 tablets by mouth daily as directed by transplant team (based on kidney function)  Qty: 60 tablet, Refills: 2    Associated Diagnoses: S/P CABG (coronary artery bypass graft); History of simultaneous kidney and pancreas transplant (H); Aftercare following organ transplant; Kidney transplanted       !! - Potential duplicate medications found. Please discuss with provider.        Allergies   No Known Allergies

## 2023-10-21 ENCOUNTER — HEALTH MAINTENANCE LETTER (OUTPATIENT)
Age: 68
End: 2023-10-21

## 2023-10-21 DIAGNOSIS — Z94.0 KIDNEY REPLACED BY TRANSPLANT: ICD-10-CM

## 2023-10-23 ENCOUNTER — OFFICE VISIT (OUTPATIENT)
Dept: TRANSPLANT | Facility: CLINIC | Age: 68
End: 2023-10-23
Attending: INTERNAL MEDICINE
Payer: COMMERCIAL

## 2023-10-23 ENCOUNTER — LAB (OUTPATIENT)
Dept: LAB | Facility: CLINIC | Age: 68
End: 2023-10-23
Attending: INTERNAL MEDICINE
Payer: COMMERCIAL

## 2023-10-23 ENCOUNTER — ANCILLARY PROCEDURE (OUTPATIENT)
Dept: GENERAL RADIOLOGY | Facility: CLINIC | Age: 68
End: 2023-10-23
Attending: INTERNAL MEDICINE
Payer: COMMERCIAL

## 2023-10-23 VITALS
DIASTOLIC BLOOD PRESSURE: 70 MMHG | WEIGHT: 201 LBS | SYSTOLIC BLOOD PRESSURE: 128 MMHG | BODY MASS INDEX: 27.22 KG/M2 | HEIGHT: 72 IN

## 2023-10-23 DIAGNOSIS — Z48.298 AFTERCARE FOLLOWING ORGAN TRANSPLANT: ICD-10-CM

## 2023-10-23 DIAGNOSIS — Z94.0 HTN, KIDNEY TRANSPLANT RELATED: ICD-10-CM

## 2023-10-23 DIAGNOSIS — N18.9 HISTORY OF ANEMIA DUE TO CKD: ICD-10-CM

## 2023-10-23 DIAGNOSIS — Z29.89 NEED FOR PNEUMOCYSTIS PROPHYLAXIS: ICD-10-CM

## 2023-10-23 DIAGNOSIS — I25.84 CORONARY ATHEROSCLEROSIS DUE TO SEVERELY CALCIFIED CORONARY LESION: ICD-10-CM

## 2023-10-23 DIAGNOSIS — Z94.0 HISTORY OF SIMULTANEOUS KIDNEY AND PANCREAS TRANSPLANT (H): ICD-10-CM

## 2023-10-23 DIAGNOSIS — R74.8 ELEVATED LIPASE: ICD-10-CM

## 2023-10-23 DIAGNOSIS — Z96.0 URETERAL STENT RETAINED: Primary | ICD-10-CM

## 2023-10-23 DIAGNOSIS — I77.0: ICD-10-CM

## 2023-10-23 DIAGNOSIS — Z94.0 KIDNEY TRANSPLANTED: ICD-10-CM

## 2023-10-23 DIAGNOSIS — Z95.1 S/P CABG (CORONARY ARTERY BYPASS GRAFT): ICD-10-CM

## 2023-10-23 DIAGNOSIS — Z94.0 KIDNEY REPLACED BY TRANSPLANT: Primary | ICD-10-CM

## 2023-10-23 DIAGNOSIS — I97.121: ICD-10-CM

## 2023-10-23 DIAGNOSIS — Z79.899 ENCOUNTER FOR LONG-TERM CURRENT USE OF MEDICATION: ICD-10-CM

## 2023-10-23 DIAGNOSIS — D84.9 IMMUNOSUPPRESSION (H): ICD-10-CM

## 2023-10-23 DIAGNOSIS — T86.19: ICD-10-CM

## 2023-10-23 DIAGNOSIS — Z98.890 OTHER SPECIFIED POSTPROCEDURAL STATES: ICD-10-CM

## 2023-10-23 DIAGNOSIS — N25.81 SECONDARY HYPERPARATHYROIDISM OF RENAL ORIGIN (H): ICD-10-CM

## 2023-10-23 DIAGNOSIS — Z86.2 HISTORY OF ANEMIA DUE TO CKD: ICD-10-CM

## 2023-10-23 DIAGNOSIS — Z94.83 HISTORY OF SIMULTANEOUS KIDNEY AND PANCREAS TRANSPLANT (H): ICD-10-CM

## 2023-10-23 DIAGNOSIS — Z94.0 KIDNEY REPLACED BY TRANSPLANT: ICD-10-CM

## 2023-10-23 DIAGNOSIS — Z20.828 CONTACT WITH AND (SUSPECTED) EXPOSURE TO OTHER VIRAL COMMUNICABLE DISEASES: ICD-10-CM

## 2023-10-23 DIAGNOSIS — I15.1 HTN, KIDNEY TRANSPLANT RELATED: ICD-10-CM

## 2023-10-23 LAB
AMYLASE SERPL-CCNC: 52 U/L (ref 28–100)
ANION GAP SERPL CALCULATED.3IONS-SCNC: 10 MMOL/L (ref 7–15)
BUN SERPL-MCNC: 28.2 MG/DL (ref 8–23)
CALCIUM SERPL-MCNC: 8.7 MG/DL (ref 8.8–10.2)
CHLORIDE SERPL-SCNC: 102 MMOL/L (ref 98–107)
CREAT SERPL-MCNC: 1.92 MG/DL (ref 0.67–1.17)
DEPRECATED HCO3 PLAS-SCNC: 24 MMOL/L (ref 22–29)
EGFRCR SERPLBLD CKD-EPI 2021: 38 ML/MIN/1.73M2
ERYTHROCYTE [DISTWIDTH] IN BLOOD BY AUTOMATED COUNT: 15.2 % (ref 10–15)
GLUCOSE SERPL-MCNC: 97 MG/DL (ref 70–99)
HCT VFR BLD AUTO: 32.6 % (ref 40–53)
HGB BLD-MCNC: 10.7 G/DL (ref 13.3–17.7)
LIPASE SERPL-CCNC: 52 U/L (ref 13–60)
MAGNESIUM SERPL-MCNC: 1.7 MG/DL (ref 1.7–2.3)
MCH RBC QN AUTO: 31.4 PG (ref 26.5–33)
MCHC RBC AUTO-ENTMCNC: 32.8 G/DL (ref 31.5–36.5)
MCV RBC AUTO: 96 FL (ref 78–100)
PHOSPHATE SERPL-MCNC: 2.4 MG/DL (ref 2.5–4.5)
PLATELET # BLD AUTO: 141 10E3/UL (ref 150–450)
POTASSIUM SERPL-SCNC: 4.7 MMOL/L (ref 3.4–5.3)
RBC # BLD AUTO: 3.41 10E6/UL (ref 4.4–5.9)
SODIUM SERPL-SCNC: 136 MMOL/L (ref 135–145)
TACROLIMUS BLD-MCNC: 10.9 UG/L (ref 5–15)
TME LAST DOSE: NORMAL H
TME LAST DOSE: NORMAL H
WBC # BLD AUTO: 7.9 10E3/UL (ref 4–11)

## 2023-10-23 PROCEDURE — 83735 ASSAY OF MAGNESIUM: CPT | Performed by: PATHOLOGY

## 2023-10-23 PROCEDURE — 85027 COMPLETE CBC AUTOMATED: CPT | Performed by: PATHOLOGY

## 2023-10-23 PROCEDURE — 83690 ASSAY OF LIPASE: CPT | Performed by: PATHOLOGY

## 2023-10-23 PROCEDURE — 99000 SPECIMEN HANDLING OFFICE-LAB: CPT | Performed by: PATHOLOGY

## 2023-10-23 PROCEDURE — 80197 ASSAY OF TACROLIMUS: CPT | Performed by: INTERNAL MEDICINE

## 2023-10-23 PROCEDURE — 250N000009 HC RX 250: Performed by: NURSE PRACTITIONER

## 2023-10-23 PROCEDURE — 52310 CYSTOSCOPY AND TREATMENT: CPT | Performed by: NURSE PRACTITIONER

## 2023-10-23 PROCEDURE — 80180 DRUG SCRN QUAN MYCOPHENOLATE: CPT | Performed by: INTERNAL MEDICINE

## 2023-10-23 PROCEDURE — 84100 ASSAY OF PHOSPHORUS: CPT | Performed by: PATHOLOGY

## 2023-10-23 PROCEDURE — 80048 BASIC METABOLIC PNL TOTAL CA: CPT | Performed by: PATHOLOGY

## 2023-10-23 PROCEDURE — G0463 HOSPITAL OUTPT CLINIC VISIT: HCPCS | Performed by: INTERNAL MEDICINE

## 2023-10-23 PROCEDURE — 250N000013 HC RX MED GY IP 250 OP 250 PS 637: Performed by: NURSE PRACTITIONER

## 2023-10-23 PROCEDURE — 74018 RADEX ABDOMEN 1 VIEW: CPT | Mod: GC | Performed by: RADIOLOGY

## 2023-10-23 PROCEDURE — 36415 COLL VENOUS BLD VENIPUNCTURE: CPT | Performed by: PATHOLOGY

## 2023-10-23 PROCEDURE — 99215 OFFICE O/P EST HI 40 MIN: CPT | Mod: 24 | Performed by: INTERNAL MEDICINE

## 2023-10-23 PROCEDURE — 82150 ASSAY OF AMYLASE: CPT | Performed by: PATHOLOGY

## 2023-10-23 RX ORDER — VALGANCICLOVIR 450 MG/1
450 TABLET, FILM COATED ORAL DAILY
Qty: 60 TABLET | Refills: 2 | Status: SHIPPED | OUTPATIENT
Start: 2023-10-23 | End: 2023-11-27

## 2023-10-23 RX ORDER — SULFAMETHOXAZOLE AND TRIMETHOPRIM 400; 80 MG/1; MG/1
1 TABLET ORAL DAILY
Qty: 30 TABLET | Refills: 0 | Status: SHIPPED | OUTPATIENT
Start: 2023-10-23 | End: 2023-11-20

## 2023-10-23 RX ORDER — LIDOCAINE HYDROCHLORIDE 20 MG/ML
JELLY TOPICAL ONCE
Status: COMPLETED | OUTPATIENT
Start: 2023-10-23 | End: 2023-10-23

## 2023-10-23 RX ORDER — SENNA AND DOCUSATE SODIUM 50; 8.6 MG/1; MG/1
1 TABLET, FILM COATED ORAL AT BEDTIME
Qty: 90 TABLET | Refills: 0 | Status: SHIPPED | OUTPATIENT
Start: 2023-10-23 | End: 2023-11-27

## 2023-10-23 RX ORDER — LEVOFLOXACIN 250 MG/1
500 TABLET, FILM COATED ORAL ONCE
Status: COMPLETED | OUTPATIENT
Start: 2023-10-23 | End: 2023-10-23

## 2023-10-23 RX ADMIN — LIDOCAINE HYDROCHLORIDE: 20 JELLY TOPICAL at 12:12

## 2023-10-23 RX ADMIN — LEVOFLOXACIN 500 MG: 250 TABLET, FILM COATED ORAL at 12:11

## 2023-10-23 ASSESSMENT — PAIN SCALES - GENERAL: PAINLEVEL: SEVERE PAIN (6)

## 2023-10-23 NOTE — PATIENT INSTRUCTIONS
Increase bactrim  to 1 tab daily    Increase valcyte to 1 tab daily     Take senna daily (once or twice) to avoid constipation and have 2-3 bowel movements a day. If you need additional meds, can send miralax     I will check this week if you could be back on baby aspirin    Schedule vaccines in December Flu, COVID, RSV    Transplant Patient Information  Your Post Transplant Coordinator is: Makayla Wu  You and your care team can contact your transplant coordinator Monday - Friday, 8am - 5pm at [phone] (Option 2 to reach the coordinator or Option 4 to schedule an appointment).  You can also reach your care team online via Split.  After hours for urgent matters, please call [facility] at [phone].

## 2023-10-23 NOTE — PROCEDURES
Transplant Surgery  Operative Note    Preop dx: Status post  Donor kidney transplant.  Post op dx: same   Procedure: Flexible cystoscopy and ureteral stent removal   Surgeon: salinas lim CNP  Assistant: benitez morales RN   Anesthesia: local  EBL: 0   Specimens: none.  Findings: none abnormal. Stent inspected and noted to be intact.   Indication: The patient is status post kidney transplant and the ureteral stent is no longer needed.    Procedure: The patient was positioned supine on the table.  The groin was sterilely prepped and draped in the usual fashion. Time out was done. Urojet was applied to the urethra. A flexible cystoscope was inserted and advanced thru the urethra into the bladder. The stent was visualized and grasped. The cystoscope, grasper and stent were removed en-mass. The stent was visualized to be intact.  The bladder mucosa was normal in appearance. Antibiotics were administered. The patient tolerated the procedure well and was asked to void before leaving the clinic.

## 2023-10-23 NOTE — NURSING NOTE
Cystoscopy - Ureteral stent removal    Prepped patient for procedure with no complications.   2% lidocaine gel injected into urethra via urojet.   Assisted Adia Mccormack CNP with stent removal.  Administered 500mg Levaquin PO after procedure.  Patient was discharged in stable condition.    128/70 per CMA      No Known Allergies      ENEIDA FERNANDEZ RN on 10/23/2023 at 11:48 AM

## 2023-10-23 NOTE — LETTER
10/23/2023         RE: Avelina Marion  [address] [place] [address]        Dear Colleague,    Thank you for referring your patient, Avleina Marion, to the [facility]. Please see a copy of my visit note below.    See procedure note       Again, thank you for allowing me to participate in the care of your patient.        Sincerely,        Adia Mccormack NP

## 2023-10-23 NOTE — NURSING NOTE
"Chief Complaint   Patient presents with    Follow Up     Follow up-Kidney Txp       BP (!) 167/73   Ht 1.816 m (5' 11.5\")   Wt 91.2 kg (201 lb)   BMI 27.64 kg/m    Chery Ross CMA on 10/23/2023 at 10:31 AM    "

## 2023-10-23 NOTE — LETTER
10/23/2023         RE: Avelina Marion  [address] [place] [address]        Dear Colleague,    Thank you for referring your patient, Avelina Marion, to the [facility] TRANSPL[facility]. Please see a copy of my visit note below.    TRANSPLANT NEPHROLOGY EARLY POST TRANSPLANT VISIT    Assessment & Plan  # DDKT (SPK): downtrend  - Baseline Creatinine: ~ TBD, new von 1.9. with multiple ischemic insults:complicated course by post-biopsy hematoma s/p embolization and AVF. Contrast exposure as well as partial loss of renal function post embolization              - Proteinuria: Normal (<0.2 grams)              - Date DSA Last Checked: Sep/2023      Latest DSA: No DSA at time of transplant, repeat pending              - BK Viremia: No              - Kidney Tx Biopsy: Oct 17, 2023; Result: borderline ACR [id number] . solumedrol 500mg IV daily x3d             -Double J stent placed at time of kidney transplant. Removal scheduled 10/23.      # Pancreas Tx (SPK):               - Pancreatic Exocrine Drainage: Enteric drained                     - Blood glucose: Euglycemia       On insulin: No              - HbA1c: Last checked at time of transplant      Latest HbA1c: 8.4%               - Pancreatic enzymes: uptrend likely due to constipation, monitor after bowel regimen              - Date DSA Last Checked: Oct/2023             Latest DSA: No DSA at time of transplant              - Pancreas Tx Biopsy: No     # Immunosuppression: Tacrolimus immediate release (goal 8-10) and Mycophenolate mofetil (dose 1000 mg every 12 hours)              - Patient is in an immunosuppressed state and will continue to monitor for efficacy and toxicity of immunosuppression medications.              - Induction with Recent Transplant:  High Intensity Protocol               - Changes: Not at this time. Tacrolimus level pending.      # Infection Prophylaxis:   - PJP: Sulfa/TMP (Bactrim) MWF, increase to 1 tab daily  - CMV: Valganciclovir  (Valcyte), x3m, CMV -/-, increase to 450 mg po every day  Estimated Creatinine Clearance: 48.8 mL/min (A) (based on SCr of 1.92 mg/dL (H)).       # Hypertension: Controlled;   Goal BP: < 140/90               - Volume status: Euvolemic                              - Changes: Not at this time. Continue metoprolol 12.5mg bid.     #  Tessa-transplant hematoma Post Kidney biopsy:    -s/p transplant renal biopsy 10/17/23 complicated by hemorrhage s/p arrest s/p 3m of CPR               -Found to have AV fistula with extravasation on angiography by IR on 10/17, s/p coiling               - uptrend and stable Hb      # Anemia in Chronic Renal Disease: Hgb: uptrend    MCKENNA: No              - Iron studies: Low iron saturation, but high ferritin               - if stable Hb on next check will discuss resuming low dose ASA     # Mineral Bone Disorder:   - Secondary renal hyperparathyroidism; PTH level: Minimally elevated ( pg/ml) 9/29/2023        On treatment: on Calcitriol 0.5 mcg daily   - Vitamin D; level: Low                                    On supplement: No  - Calcium; level: Low                                      On supplement: No, on Oscal 2 tabs BID  - Phosphorus; level: Low (will monitor)                      On supplement: No     # Electrolytes:   - Potassium; level: High normal                      On supplement: No  - Magnesium; level: Normal                            On supplement: Yes, magnesium oxide 400 mg PO daily   - Bicarbonate; level: Low (will monitor)          On supplement: No      # CAD: S/p CABG 2/2023. On metoprolol 12.5 mg PO BID. Resume aspirin 81 mg po qd if next Hb is stable     # PAD     # Non-melanotic skin cancer: S/p Mohs 8/2022     # Transplant History:  Etiology of Kidney Failure: Diabetes mellitus type 2  Tx: SPK  Transplant: 9/14/2023 (Kidney / Pancreas)  Significant changes in immunosuppression: None  Significant transplant-related complications: None       Transplant Office Phone  Number: [phone]    Assessment and plan was discussed with the patient and he voiced his understanding and agreement.    Return visit: 1 month    Leia Ruiz MD    Chief Complaint  Mr. Marion is a 68 year old here for kidney transplant and immunosuppression management.     History of Present Illness     Feels ok overall after a complicated hospital stay  He underwent kidney biopsy for cause for elevated Cr and course was complicated by hemorrhage and arrest requiring CPR> IR embolization. Renal artery angiogram demonstrated an AV fistula in lower pole of transplant. He needed IR embolization.  He still feels sore over the shoulder, ribs likely post CPR. No graft pain, urinary symptoms, no hematuria or dysuria. Appetite remains poor, no nausea, vomiting, and no significant weight loss. He reports constipation but he is only using stool softeners every other day.     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, with long-term current use of insulin (H)    Hypertension    Proliferative diabetic retinopathy (H)    Anemia in stage 5 chronic kidney disease, not on chronic dialysis (H)    Secondary hyperparathyroidism of renal origin (H24)    Cardiac arrhythmia    CKD (chronic kidney disease) stage 5, GFR less than 15 ml/min (H)    Diabetes mellitus type II, uncontrolled    Glaucoma suspect of right eye    Hearing loss on right    Hyperlipidemia with target LDL less than 100    Hyperphosphatemia    Normal anion gap metabolic acidosis    Orthostatic hypotension    Status post cataract extraction of both eyes with insertion of intraocular lens    Type II diabetes mellitus with neurological manifestations (H)     Uremia    Vitamin D deficiency    Status post coronary angiogram    Pancreas transplant candidate    BCC (basal cell carcinoma), face    Pre-operative cardiovascular examination    CAD (coronary artery disease)    Abnormal cardiovascular stress test    Organ transplant candidate    Coronary atherosclerosis due to severely calcified coronary lesion    Encounter for pre-transplant evaluation for kidney and pancreas transplant    History of simultaneous kidney and pancreas transplant (H)    Immunosuppressed status (H24)    Dehydration    Anemia due to blood loss, acute    Postoperative cardiac arrest following non-cardiac surgery    Arteriovenous fistula of transplanted kidney (H24)       Allergies  No Known Allergies    Medications  Current Outpatient Medications   Medication Sig    acetaminophen (TYLENOL) 325 MG tablet Take 2 tablets (650 mg) by mouth every 6 hours as needed for mild pain    acetaminophen (TYLENOL) 500 MG tablet Take 500-1,000 mg by mouth every 6 hours as needed for mild pain    aspirin (ASA) 325 MG tablet Take 1 tablet (325 mg) by mouth daily    atorvastatin (LIPITOR) 20 MG tablet Take 1 tablet (20 mg) by mouth at bedtime    Blood Glucose Monitoring Suppl (ACCU-CHEK GUIDE) w/Device KIT     calcitRIOL (ROCALTROL) 0.5 MCG capsule Take 1 capsule (0.5 mcg) by mouth daily    calcium carbonate-vitamin D (OSCAL) 500-5 MG-MCG tablet Take 2 tablets by mouth 2 times daily    famotidine (PEPCID) 10 MG tablet Take 1 tablet (10 mg) by mouth every 24 hours    magnesium oxide (MAG-OX) 400 MG tablet Take 1 tablet (400 mg) by mouth every 24 hours    metoprolol tartrate (LOPRESSOR) 25 MG tablet Take 0.5 tablets (12.5 mg) by mouth 2 times daily    Misc. Devices (PILL SPLITTER) MISC 1 applicator daily    mycophenolate (GENERIC EQUIVALENT) 250 MG capsule Take 4 capsules (1,000 mg) by mouth 2 times daily    nystatin (MYCOSTATIN) 088642 UNIT/ML suspension Take 5 mLs (500,000 Units) by mouth 4 times daily    pantoprazole  "(PROTONIX) 40 MG EC tablet Take 1 tablet (40 mg) by mouth daily    psyllium (METAMUCIL/KONSYL) 58.6 % powder Take 6 g (1 teaspoonful) by mouth daily Can increase up to 3 TIMES DAILY if needed.    senna-docusate (SENOKOT-S/PERICOLACE) 8.6-50 MG tablet Take 1-2 tablets by mouth 2 times daily HOLD for loose stools    sodium bicarbonate 650 MG tablet Take 2 tablets (1,300 mg) by mouth 2 times daily    sulfamethoxazole-trimethoprim (BACTRIM) 400-80 MG tablet Take 1 tablet by mouth three times a week    tacrolimus (GENERIC) 0.5 MG capsule Hold for dose changes    tacrolimus (GENERIC) 1 MG capsule Take 2 capsules (2 mg) by mouth 2 times daily    valGANciclovir (VALCYTE) 450 MG tablet Take 1 tablet (450 mg) by mouth every other day Titrate up to 2 tablets by mouth daily as directed by transplant team (based on kidney function)     No current facility-administered medications for this visit.     There are no discontinued medications.    Physical Exam  Vital Signs: /68   Ht 1.816 m (5' 11.5\")   Wt 91.2 kg (201 lb)   BMI 27.64 kg/m      GENERAL APPEARANCE: alert and no distress  HENT: mouth without ulcers or lesions  RESP: lungs clear to auscultation - no rales, rhonchi or wheezes  CV: regular rhythm, normal rate, no rub, no murmur  EDEMA: no LE edema bilaterally  ABDOMEN: soft, nondistended, nontender, bowel sounds normal  MS: extremities normal - no gross deformities noted, no evidence of inflammation in joints, no muscle tenderness  SKIN: no rash  Access LUE AVF +thrill/bruit    Data        Latest Ref Rng & Units 10/23/2023     9:44 AM 10/20/2023     2:47 PM 10/20/2023     9:00 AM   Renal   Sodium 135 - 145 mmol/L 136      K 3.4 - 5.3 mmol/L 4.7      Cl 98 - 107 mmol/L 102      Cl (external) 98 - 107 mmol/L 102      CO2 22 - 29 mmol/L 24      Urea Nitrogen 8.0 - 23.0 mg/dL 28.2      Creatinine 0.67 - 1.17 mg/dL 1.92      Glucose 70 - 99 mg/dL 97  128  134    Calcium 8.8 - 10.2 mg/dL 8.7      Magnesium 1.7 - 2.3 " mg/dL 1.7            Latest Ref Rng & Units 10/23/2023     9:44 AM 10/20/2023     5:01 AM 10/19/2023     5:53 AM   Bone Health   Phosphorus 2.5 - 4.5 mg/dL 2.4  2.3  2.5          Latest Ref Rng & Units 10/23/2023     9:44 AM 10/20/2023     5:01 AM 10/19/2023     5:53 AM   Heme   WBC 4.0 - 11.0 10e3/uL 7.9  12.1  9.0    Hgb 13.3 - 17.7 g/dL 10.7  7.5  8.1    Plt 150 - 450 10e3/uL 141  128  124          Latest Ref Rng & Units 10/17/2023     2:57 PM 9/14/2023     3:39 PM 2/14/2023     1:11 AM   Liver   AP 40 - 129 U/L 41  60  35    TBili <=1.2 mg/dL 0.2  0.9  0.3    ALT 0 - 70 U/L 13  6  10    AST 0 - 45 U/L 21  22  46    Tot Protein 6.4 - 8.3 g/dL 3.6  7.3  4.5    Albumin 3.5 - 5.2 g/dL 2.2  4.3  2.9          Latest Ref Rng & Units 10/23/2023     9:44 AM 10/20/2023     5:01 AM 10/19/2023     5:53 AM   Pancreas   Amylase 28 - 100 U/L 52      Lipase (Roche) 13 - 60 U/L 52  20  27          Latest Ref Rng & Units 9/29/2023     8:48 AM 9/24/2023     8:35 AM 2/16/2023     8:34 AM   Iron studies   Iron 61 - 157 ug/dL 27  32  24    Iron Sat Index 15 - 46 % 16  21  20    Ferritin 31 - 409 ng/mL 1,256  1,164  1,461          Latest Ref Rng & Units 9/14/2023     3:39 PM 8/9/2022     6:26 AM 8/5/2021     2:23 AM   [facility] Txp Virology   EBV CAPSID ANTIBODY IGG No detectable antibody. Positive  Positive  Positive        Recent Labs   Lab Test 10/09/23  0819 10/12/23  0835 10/16/23  0820 10/18/23  0523 10/20/23  0501   DOSTAC 10/8/2023 10/11/2023 10/15/2023  --   --    TACROL 11.8 10.6 8.9 9.0 8.2     Recent Labs   Lab Test 10/12/23  0835   MPACID 4.83*   MPAG 192.5*         Again, thank you for allowing me to participate in the care of your patient.        Sincerely,        Leia Ruiz MD

## 2023-10-23 NOTE — PROGRESS NOTES
TRANSPLANT NEPHROLOGY EARLY POST TRANSPLANT VISIT    Assessment & Plan   # DDKT (SPK): downtrend  - Baseline Creatinine: ~ TBD, new von 1.9. with multiple ischemic insults:complicated course by post-biopsy hematoma s/p embolization and AVF. Contrast exposure as well as partial loss of renal function post embolization              - Proteinuria: Normal (<0.2 grams)              - Date DSA Last Checked: Sep/2023      Latest DSA: No DSA at time of transplant, repeat pending              - BK Viremia: No              - Kidney Tx Biopsy: Oct 17, 2023; Result: borderline ACR [id number] . solumedrol 500mg IV daily x3d             -Double J stent placed at time of kidney transplant. Removal scheduled 10/23.      # Pancreas Tx (SPK):               - Pancreatic Exocrine Drainage: Enteric drained                     - Blood glucose: Euglycemia       On insulin: No              - HbA1c: Last checked at time of transplant      Latest HbA1c: 8.4%               - Pancreatic enzymes: uptrend likely due to constipation, monitor after bowel regimen              - Date DSA Last Checked: Oct/2023             Latest DSA: No DSA at time of transplant              - Pancreas Tx Biopsy: No     # Immunosuppression: Tacrolimus immediate release (goal 8-10) and Mycophenolate mofetil (dose 1000 mg every 12 hours)              - Patient is in an immunosuppressed state and will continue to monitor for efficacy and toxicity of immunosuppression medications.              - Induction with Recent Transplant:  High Intensity Protocol               - Changes: Not at this time. Tacrolimus level pending.      # Infection Prophylaxis:   - PJP: Sulfa/TMP (Bactrim) MWF, increase to 1 tab daily  - CMV: Valganciclovir (Valcyte), x3m, CMV -/-, increase to 450 mg po every day  Estimated Creatinine Clearance: 48.8 mL/min (A) (based on SCr of 1.92 mg/dL (H)).       # Hypertension: Controlled;   Goal BP: < 140/90               - Volume status: Euvolemic                               - Changes: Not at this time. Continue metoprolol 12.5mg bid.     #  Tessa-transplant hematoma Post Kidney biopsy:    -s/p transplant renal biopsy 10/17/23 complicated by hemorrhage s/p arrest s/p 3m of CPR               -Found to have AV fistula with extravasation on angiography by IR on 10/17, s/p coiling               - uptrend and stable Hb      # Anemia in Chronic Renal Disease: Hgb: uptrend    MCKENNA: No              - Iron studies: Low iron saturation, but high ferritin               - if stable Hb on next check will discuss resuming low dose ASA     # Mineral Bone Disorder:   - Secondary renal hyperparathyroidism; PTH level: Minimally elevated ( pg/ml) 9/29/2023        On treatment: on Calcitriol 0.5 mcg daily   - Vitamin D; level: Low                                    On supplement: No  - Calcium; level: Low                                      On supplement: No, on Oscal 2 tabs BID  - Phosphorus; level: Low (will monitor)                      On supplement: No     # Electrolytes:   - Potassium; level: High normal                      On supplement: No  - Magnesium; level: Normal                            On supplement: Yes, magnesium oxide 400 mg PO daily   - Bicarbonate; level: Low (will monitor)          On supplement: No      # CAD: S/p CABG 2/2023. On metoprolol 12.5 mg PO BID. Resume aspirin 81 mg po qd if next Hb is stable     # PAD     # Non-melanotic skin cancer: S/p Mohs 8/2022     # Transplant History:  Etiology of Kidney Failure: Diabetes mellitus type 2  Tx: SPK  Transplant: 9/14/2023 (Kidney / Pancreas)  Significant changes in immunosuppression: None  Significant transplant-related complications: None       Transplant Office Phone Number: [phone]    Assessment and plan was discussed with the patient and he voiced his understanding and agreement.    Return visit: 1 month    Leia Ruiz MD    Chief Complaint   Mr. Marion is a 68 year old here for kidney transplant and  immunosuppression management.     History of Present Illness      Feels ok overall after a complicated hospital stay  He underwent kidney biopsy for cause for elevated Cr and course was complicated by hemorrhage and arrest requiring CPR> IR embolization. Renal artery angiogram demonstrated an AV fistula in lower pole of transplant. He needed IR embolization.  He still feels sore over the shoulder, ribs likely post CPR. No graft pain, urinary symptoms, no hematuria or dysuria. Appetite remains poor, no nausea, vomiting, and no significant weight loss. He reports constipation but he is only using stool softeners every other day.     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, with long-term current use of insulin (H)    Hypertension    Proliferative diabetic retinopathy (H)    Anemia in stage 5 chronic kidney disease, not on chronic dialysis (H)    Secondary hyperparathyroidism of renal origin (H24)    Cardiac arrhythmia    CKD (chronic kidney disease) stage 5, GFR less than 15 ml/min (H)    Diabetes mellitus type II, uncontrolled    Glaucoma suspect of right eye    Hearing loss on right    Hyperlipidemia with target LDL less than 100    Hyperphosphatemia    Normal anion gap metabolic acidosis    Orthostatic hypotension    Status post cataract extraction of both eyes with insertion of intraocular lens    Type II diabetes mellitus with neurological manifestations (H)    Uremia    Vitamin D deficiency    Status post coronary angiogram    Pancreas transplant candidate    BCC (basal cell carcinoma), face    Pre-operative cardiovascular examination    CAD (coronary artery disease)    Abnormal cardiovascular stress test     Organ transplant candidate    Coronary atherosclerosis due to severely calcified coronary lesion    Encounter for pre-transplant evaluation for kidney and pancreas transplant    History of simultaneous kidney and pancreas transplant (H)    Immunosuppressed status (H24)    Dehydration    Anemia due to blood loss, acute    Postoperative cardiac arrest following non-cardiac surgery    Arteriovenous fistula of transplanted kidney (H24)       Allergies   No Known Allergies    Medications   Current Outpatient Medications   Medication Sig    acetaminophen (TYLENOL) 325 MG tablet Take 2 tablets (650 mg) by mouth every 6 hours as needed for mild pain    acetaminophen (TYLENOL) 500 MG tablet Take 500-1,000 mg by mouth every 6 hours as needed for mild pain    aspirin (ASA) 325 MG tablet Take 1 tablet (325 mg) by mouth daily    atorvastatin (LIPITOR) 20 MG tablet Take 1 tablet (20 mg) by mouth at bedtime    Blood Glucose Monitoring Suppl (ACCU-CHEK GUIDE) w/Device KIT     calcitRIOL (ROCALTROL) 0.5 MCG capsule Take 1 capsule (0.5 mcg) by mouth daily    calcium carbonate-vitamin D (OSCAL) 500-5 MG-MCG tablet Take 2 tablets by mouth 2 times daily    famotidine (PEPCID) 10 MG tablet Take 1 tablet (10 mg) by mouth every 24 hours    magnesium oxide (MAG-OX) 400 MG tablet Take 1 tablet (400 mg) by mouth every 24 hours    metoprolol tartrate (LOPRESSOR) 25 MG tablet Take 0.5 tablets (12.5 mg) by mouth 2 times daily    Misc. Devices (PILL SPLITTER) MISC 1 applicator daily    mycophenolate (GENERIC EQUIVALENT) 250 MG capsule Take 4 capsules (1,000 mg) by mouth 2 times daily    nystatin (MYCOSTATIN) 527339 UNIT/ML suspension Take 5 mLs (500,000 Units) by mouth 4 times daily    pantoprazole (PROTONIX) 40 MG EC tablet Take 1 tablet (40 mg) by mouth daily    psyllium (METAMUCIL/KONSYL) 58.6 % powder Take 6 g (1 teaspoonful) by mouth daily Can increase up to 3 TIMES DAILY if needed.    senna-docusate (SENOKOT-S/PERICOLACE) 8.6-50 MG  "tablet Take 1-2 tablets by mouth 2 times daily HOLD for loose stools    sodium bicarbonate 650 MG tablet Take 2 tablets (1,300 mg) by mouth 2 times daily    sulfamethoxazole-trimethoprim (BACTRIM) 400-80 MG tablet Take 1 tablet by mouth three times a week    tacrolimus (GENERIC) 0.5 MG capsule Hold for dose changes    tacrolimus (GENERIC) 1 MG capsule Take 2 capsules (2 mg) by mouth 2 times daily    valGANciclovir (VALCYTE) 450 MG tablet Take 1 tablet (450 mg) by mouth every other day Titrate up to 2 tablets by mouth daily as directed by transplant team (based on kidney function)     No current facility-administered medications for this visit.     There are no discontinued medications.    Physical Exam   Vital Signs: /68   Ht 1.816 m (5' 11.5\")   Wt 91.2 kg (201 lb)   BMI 27.64 kg/m      GENERAL APPEARANCE: alert and no distress  HENT: mouth without ulcers or lesions  RESP: lungs clear to auscultation - no rales, rhonchi or wheezes  CV: regular rhythm, normal rate, no rub, no murmur  EDEMA: no LE edema bilaterally  ABDOMEN: soft, nondistended, nontender, bowel sounds normal  MS: extremities normal - no gross deformities noted, no evidence of inflammation in joints, no muscle tenderness  SKIN: no rash  Access LUE AVF +thrill/bruit    Data         Latest Ref Rng & Units 10/23/2023     9:44 AM 10/20/2023     2:47 PM 10/20/2023     9:00 AM   Renal   Sodium 135 - 145 mmol/L 136      K 3.4 - 5.3 mmol/L 4.7      Cl 98 - 107 mmol/L 102      Cl (external) 98 - 107 mmol/L 102      CO2 22 - 29 mmol/L 24      Urea Nitrogen 8.0 - 23.0 mg/dL 28.2      Creatinine 0.67 - 1.17 mg/dL 1.92      Glucose 70 - 99 mg/dL 97  128  134    Calcium 8.8 - 10.2 mg/dL 8.7      Magnesium 1.7 - 2.3 mg/dL 1.7            Latest Ref Rng & Units 10/23/2023     9:44 AM 10/20/2023     5:01 AM 10/19/2023     5:53 AM   Bone Health   Phosphorus 2.5 - 4.5 mg/dL 2.4  2.3  2.5          Latest Ref Rng & Units 10/23/2023     9:44 AM 10/20/2023     5:01 " AM 10/19/2023     5:53 AM   Heme   WBC 4.0 - 11.0 10e3/uL 7.9  12.1  9.0    Hgb 13.3 - 17.7 g/dL 10.7  7.5  8.1    Plt 150 - 450 10e3/uL 141  128  124          Latest Ref Rng & Units 10/17/2023     2:57 PM 9/14/2023     3:39 PM 2/14/2023     1:11 AM   Liver   AP 40 - 129 U/L 41  60  35    TBili <=1.2 mg/dL 0.2  0.9  0.3    ALT 0 - 70 U/L 13  6  10    AST 0 - 45 U/L 21  22  46    Tot Protein 6.4 - 8.3 g/dL 3.6  7.3  4.5    Albumin 3.5 - 5.2 g/dL 2.2  4.3  2.9          Latest Ref Rng & Units 10/23/2023     9:44 AM 10/20/2023     5:01 AM 10/19/2023     5:53 AM   Pancreas   Amylase 28 - 100 U/L 52      Lipase (Roche) 13 - 60 U/L 52  20  27          Latest Ref Rng & Units 9/29/2023     8:48 AM 9/24/2023     8:35 AM 2/16/2023     8:34 AM   Iron studies   Iron 61 - 157 ug/dL 27  32  24    Iron Sat Index 15 - 46 % 16  21  20    Ferritin 31 - 409 ng/mL 1,256  1,164  1,461          Latest Ref Rng & Units 9/14/2023     3:39 PM 8/9/2022     6:26 AM 8/5/2021     2:23 AM   [facility] Txp Virology   EBV CAPSID ANTIBODY IGG No detectable antibody. Positive  Positive  Positive        Recent Labs   Lab Test 10/09/23  0819 10/12/23  0835 10/16/23  0820 10/18/23  0523 10/20/23  0501   DOSTAC 10/8/2023 10/11/2023 10/15/2023  --   --    TACROL 11.8 10.6 8.9 9.0 8.2     Recent Labs   Lab Test 10/12/23  0835   MPACID 4.83*   MPAG 192.5*

## 2023-10-24 LAB
MYCOPHENOLATE SERPL LC/MS/MS-MCNC: 3.83 MG/L (ref 1–3.5)
MYCOPHENOLATE-G SERPL LC/MS/MS-MCNC: 133.4 MG/L (ref 30–95)
TME LAST DOSE: ABNORMAL H
TME LAST DOSE: ABNORMAL H

## 2023-10-26 ENCOUNTER — LAB (OUTPATIENT)
Dept: LAB | Facility: CLINIC | Age: 68
End: 2023-10-26
Payer: COMMERCIAL

## 2023-10-26 ENCOUNTER — TELEPHONE (OUTPATIENT)
Dept: TRANSPLANT | Facility: CLINIC | Age: 68
End: 2023-10-26

## 2023-10-26 DIAGNOSIS — Z94.0 KIDNEY REPLACED BY TRANSPLANT: ICD-10-CM

## 2023-10-26 DIAGNOSIS — Z79.899 ENCOUNTER FOR LONG-TERM CURRENT USE OF MEDICATION: ICD-10-CM

## 2023-10-26 DIAGNOSIS — Z20.828 CONTACT WITH AND (SUSPECTED) EXPOSURE TO OTHER VIRAL COMMUNICABLE DISEASES: ICD-10-CM

## 2023-10-26 DIAGNOSIS — Z48.298 AFTERCARE FOLLOWING ORGAN TRANSPLANT: ICD-10-CM

## 2023-10-26 DIAGNOSIS — Z98.890 OTHER SPECIFIED POSTPROCEDURAL STATES: ICD-10-CM

## 2023-10-26 LAB
AMYLASE SERPL-CCNC: 54 U/L (ref 28–100)
ANION GAP SERPL CALCULATED.3IONS-SCNC: 10 MMOL/L (ref 7–15)
BUN SERPL-MCNC: 21.8 MG/DL (ref 8–23)
CALCIUM SERPL-MCNC: 8.5 MG/DL (ref 8.8–10.2)
CHLORIDE SERPL-SCNC: 103 MMOL/L (ref 98–107)
CREAT SERPL-MCNC: 2.12 MG/DL (ref 0.67–1.17)
DEPRECATED HCO3 PLAS-SCNC: 23 MMOL/L (ref 22–29)
EGFRCR SERPLBLD CKD-EPI 2021: 34 ML/MIN/1.73M2
ERYTHROCYTE [DISTWIDTH] IN BLOOD BY AUTOMATED COUNT: 15.7 % (ref 10–15)
GLUCOSE SERPL-MCNC: 101 MG/DL (ref 70–99)
HCT VFR BLD AUTO: 33.9 % (ref 40–53)
HGB BLD-MCNC: 11.1 G/DL (ref 13.3–17.7)
LIPASE SERPL-CCNC: 56 U/L (ref 13–60)
MCH RBC QN AUTO: 31.5 PG (ref 26.5–33)
MCHC RBC AUTO-ENTMCNC: 32.7 G/DL (ref 31.5–36.5)
MCV RBC AUTO: 96 FL (ref 78–100)
PLATELET # BLD AUTO: 187 10E3/UL (ref 150–450)
POTASSIUM SERPL-SCNC: 4.4 MMOL/L (ref 3.4–5.3)
RBC # BLD AUTO: 3.52 10E6/UL (ref 4.4–5.9)
SODIUM SERPL-SCNC: 136 MMOL/L (ref 135–145)
TACROLIMUS BLD-MCNC: 12 UG/L (ref 5–15)
TME LAST DOSE: NORMAL H
TME LAST DOSE: NORMAL H
WBC # BLD AUTO: 10.4 10E3/UL (ref 4–11)

## 2023-10-26 PROCEDURE — 85027 COMPLETE CBC AUTOMATED: CPT | Performed by: PATHOLOGY

## 2023-10-26 PROCEDURE — 36415 COLL VENOUS BLD VENIPUNCTURE: CPT | Performed by: PATHOLOGY

## 2023-10-26 PROCEDURE — 82150 ASSAY OF AMYLASE: CPT | Performed by: PATHOLOGY

## 2023-10-26 PROCEDURE — 80197 ASSAY OF TACROLIMUS: CPT | Performed by: INTERNAL MEDICINE

## 2023-10-26 PROCEDURE — 83690 ASSAY OF LIPASE: CPT | Performed by: PATHOLOGY

## 2023-10-26 PROCEDURE — 99000 SPECIMEN HANDLING OFFICE-LAB: CPT | Performed by: PATHOLOGY

## 2023-10-26 PROCEDURE — 80048 BASIC METABOLIC PNL TOTAL CA: CPT | Performed by: PATHOLOGY

## 2023-10-26 NOTE — TELEPHONE ENCOUNTER
ISSUE:   Tacrolimus IR level 12 on 10/25, goal 8-10, dose 2 mg BID.    PLAN:   Please call patient and confirm this was an accurate 12-hour trough. Verify Tacrolimus IR dose 2 mg BID. Confirm no new medications or illness. Confirm no missed doses. If accurate trough and accurate dose, decrease Tacrolimus IR dose to 1.5 mg BID and repeat labs as scheduled.     Savana Lazar RN, BSN  Post-Kidney/Pancreas Transplant Coordinator   [phone]

## 2023-10-30 ENCOUNTER — LAB (OUTPATIENT)
Dept: LAB | Facility: CLINIC | Age: 68
End: 2023-10-30
Payer: COMMERCIAL

## 2023-10-30 ENCOUNTER — TELEPHONE (OUTPATIENT)
Dept: TRANSPLANT | Facility: CLINIC | Age: 68
End: 2023-10-30

## 2023-10-30 DIAGNOSIS — Z98.890 OTHER SPECIFIED POSTPROCEDURAL STATES: ICD-10-CM

## 2023-10-30 DIAGNOSIS — Z94.0 HISTORY OF SIMULTANEOUS KIDNEY AND PANCREAS TRANSPLANT (H): ICD-10-CM

## 2023-10-30 DIAGNOSIS — Z94.0 KIDNEY REPLACED BY TRANSPLANT: ICD-10-CM

## 2023-10-30 DIAGNOSIS — Z94.83 HISTORY OF SIMULTANEOUS KIDNEY AND PANCREAS TRANSPLANT (H): ICD-10-CM

## 2023-10-30 DIAGNOSIS — Z48.298 AFTERCARE FOLLOWING ORGAN TRANSPLANT: ICD-10-CM

## 2023-10-30 DIAGNOSIS — Z79.899 ENCOUNTER FOR LONG-TERM CURRENT USE OF MEDICATION: ICD-10-CM

## 2023-10-30 DIAGNOSIS — Z20.828 CONTACT WITH AND (SUSPECTED) EXPOSURE TO OTHER VIRAL COMMUNICABLE DISEASES: ICD-10-CM

## 2023-10-30 LAB
AMYLASE SERPL-CCNC: 61 U/L (ref 28–100)
ANION GAP SERPL CALCULATED.3IONS-SCNC: 11 MMOL/L (ref 7–15)
BUN SERPL-MCNC: 18.6 MG/DL (ref 8–23)
CALCIUM SERPL-MCNC: 8.8 MG/DL (ref 8.8–10.2)
CHLORIDE SERPL-SCNC: 106 MMOL/L (ref 98–107)
CREAT SERPL-MCNC: 2.34 MG/DL (ref 0.67–1.17)
DEPRECATED HCO3 PLAS-SCNC: 21 MMOL/L (ref 22–29)
EGFRCR SERPLBLD CKD-EPI 2021: 30 ML/MIN/1.73M2
ERYTHROCYTE [DISTWIDTH] IN BLOOD BY AUTOMATED COUNT: 15.4 % (ref 10–15)
GLUCOSE SERPL-MCNC: 94 MG/DL (ref 70–99)
HCT VFR BLD AUTO: 33.3 % (ref 40–53)
HGB BLD-MCNC: 10.7 G/DL (ref 13.3–17.7)
LIPASE SERPL-CCNC: 66 U/L (ref 13–60)
MAGNESIUM SERPL-MCNC: 1.5 MG/DL (ref 1.7–2.3)
MCH RBC QN AUTO: 30.9 PG (ref 26.5–33)
MCHC RBC AUTO-ENTMCNC: 32.1 G/DL (ref 31.5–36.5)
MCV RBC AUTO: 96 FL (ref 78–100)
PHOSPHATE SERPL-MCNC: 3.2 MG/DL (ref 2.5–4.5)
PLATELET # BLD AUTO: 225 10E3/UL (ref 150–450)
POTASSIUM SERPL-SCNC: 4.7 MMOL/L (ref 3.4–5.3)
RBC # BLD AUTO: 3.46 10E6/UL (ref 4.4–5.9)
SODIUM SERPL-SCNC: 138 MMOL/L (ref 135–145)
TACROLIMUS BLD-MCNC: 11 UG/L (ref 5–15)
TME LAST DOSE: NORMAL H
TME LAST DOSE: NORMAL H
WBC # BLD AUTO: 7 10E3/UL (ref 4–11)

## 2023-10-30 PROCEDURE — 36415 COLL VENOUS BLD VENIPUNCTURE: CPT | Performed by: PATHOLOGY

## 2023-10-30 PROCEDURE — 83690 ASSAY OF LIPASE: CPT | Performed by: PATHOLOGY

## 2023-10-30 PROCEDURE — 85027 COMPLETE CBC AUTOMATED: CPT | Performed by: PATHOLOGY

## 2023-10-30 PROCEDURE — 84100 ASSAY OF PHOSPHORUS: CPT | Performed by: PATHOLOGY

## 2023-10-30 PROCEDURE — 82150 ASSAY OF AMYLASE: CPT | Performed by: PATHOLOGY

## 2023-10-30 PROCEDURE — 83735 ASSAY OF MAGNESIUM: CPT | Performed by: PATHOLOGY

## 2023-10-30 PROCEDURE — 80048 BASIC METABOLIC PNL TOTAL CA: CPT | Performed by: PATHOLOGY

## 2023-10-30 PROCEDURE — 99000 SPECIMEN HANDLING OFFICE-LAB: CPT | Performed by: PATHOLOGY

## 2023-10-30 PROCEDURE — 80197 ASSAY OF TACROLIMUS: CPT | Performed by: INTERNAL MEDICINE

## 2023-10-30 RX ORDER — TACROLIMUS 1 MG/1
1 CAPSULE ORAL 2 TIMES DAILY
Qty: 60 CAPSULE | Refills: 11 | Status: SHIPPED | OUTPATIENT
Start: 2023-10-30 | End: 2023-12-05

## 2023-10-30 RX ORDER — TACROLIMUS 0.5 MG/1
0.5 CAPSULE ORAL 2 TIMES DAILY
Qty: 60 CAPSULE | Refills: 11 | Status: SHIPPED | OUTPATIENT
Start: 2023-10-30 | End: 2023-12-05

## 2023-10-30 NOTE — TELEPHONE ENCOUNTER
ISSUE:   Tacrolimus IR level 11 on 10/30, goal 8-10, dose 2 mg BID.    PLAN:   Please call patient and confirm this was an accurate 12-hour trough. Verify Tacrolimus IR dose 2 mg BID. Confirm no new medications or illness. Confirm no missed doses. If accurate trough and accurate dose, decrease Tacrolimus IR dose to 1.5 mg BID and repeat labs in 3 days    OUTCOME:   Spoke with patient, they confirm accurate trough level and current dose 2 mg BID. Patient confirmed dose change to 1.5 mg BID and to repeat labs in 3 days. Orders sent to preferred pharmacy for dose change and lab for repeat labs. Patient voiced understanding of plan.

## 2023-11-01 NOTE — Clinical Note
LCA Cine(s)  injected and visualized utilizing power injector system. Have Your Skin Lesions Been Treated?: not been treated Is This A New Presentation, Or A Follow-Up?: Skin Lesions Which Family Member (Optional)?: Father AKs

## 2023-11-02 ENCOUNTER — LAB (OUTPATIENT)
Dept: LAB | Facility: CLINIC | Age: 68
End: 2023-11-02
Payer: COMMERCIAL

## 2023-11-02 DIAGNOSIS — Z79.899 ENCOUNTER FOR LONG-TERM CURRENT USE OF MEDICATION: ICD-10-CM

## 2023-11-02 DIAGNOSIS — Z94.0 KIDNEY REPLACED BY TRANSPLANT: ICD-10-CM

## 2023-11-02 DIAGNOSIS — Z20.828 CONTACT WITH AND (SUSPECTED) EXPOSURE TO OTHER VIRAL COMMUNICABLE DISEASES: ICD-10-CM

## 2023-11-02 DIAGNOSIS — Z98.890 OTHER SPECIFIED POSTPROCEDURAL STATES: ICD-10-CM

## 2023-11-02 DIAGNOSIS — Z48.298 AFTERCARE FOLLOWING ORGAN TRANSPLANT: ICD-10-CM

## 2023-11-02 LAB
AMYLASE SERPL-CCNC: 61 U/L (ref 28–100)
ANION GAP SERPL CALCULATED.3IONS-SCNC: 10 MMOL/L (ref 7–15)
BUN SERPL-MCNC: 17.1 MG/DL (ref 8–23)
CALCIUM SERPL-MCNC: 8.9 MG/DL (ref 8.8–10.2)
CHLORIDE SERPL-SCNC: 104 MMOL/L (ref 98–107)
CREAT SERPL-MCNC: 2.25 MG/DL (ref 0.67–1.17)
DEPRECATED HCO3 PLAS-SCNC: 22 MMOL/L (ref 22–29)
EGFRCR SERPLBLD CKD-EPI 2021: 31 ML/MIN/1.73M2
ERYTHROCYTE [DISTWIDTH] IN BLOOD BY AUTOMATED COUNT: 15.6 % (ref 10–15)
GLUCOSE SERPL-MCNC: 101 MG/DL (ref 70–99)
HCT VFR BLD AUTO: 33.2 % (ref 40–53)
HGB BLD-MCNC: 10.6 G/DL (ref 13.3–17.7)
LIPASE SERPL-CCNC: 54 U/L (ref 13–60)
MCH RBC QN AUTO: 31.2 PG (ref 26.5–33)
MCHC RBC AUTO-ENTMCNC: 31.9 G/DL (ref 31.5–36.5)
MCV RBC AUTO: 98 FL (ref 78–100)
PLATELET # BLD AUTO: 218 10E3/UL (ref 150–450)
POTASSIUM SERPL-SCNC: 4.6 MMOL/L (ref 3.4–5.3)
RBC # BLD AUTO: 3.4 10E6/UL (ref 4.4–5.9)
SODIUM SERPL-SCNC: 136 MMOL/L (ref 135–145)
TACROLIMUS BLD-MCNC: 8.1 UG/L (ref 5–15)
TME LAST DOSE: NORMAL H
TME LAST DOSE: NORMAL H
WBC # BLD AUTO: 5.3 10E3/UL (ref 4–11)

## 2023-11-02 PROCEDURE — 80048 BASIC METABOLIC PNL TOTAL CA: CPT | Performed by: PATHOLOGY

## 2023-11-02 PROCEDURE — 85027 COMPLETE CBC AUTOMATED: CPT | Performed by: PATHOLOGY

## 2023-11-02 PROCEDURE — 80197 ASSAY OF TACROLIMUS: CPT | Performed by: INTERNAL MEDICINE

## 2023-11-02 PROCEDURE — 36415 COLL VENOUS BLD VENIPUNCTURE: CPT | Performed by: PATHOLOGY

## 2023-11-02 PROCEDURE — 82150 ASSAY OF AMYLASE: CPT | Performed by: PATHOLOGY

## 2023-11-02 PROCEDURE — 99000 SPECIMEN HANDLING OFFICE-LAB: CPT | Performed by: PATHOLOGY

## 2023-11-02 PROCEDURE — 83690 ASSAY OF LIPASE: CPT | Performed by: PATHOLOGY

## 2023-11-06 ENCOUNTER — LAB (OUTPATIENT)
Dept: LAB | Facility: CLINIC | Age: 68
End: 2023-11-06
Payer: COMMERCIAL

## 2023-11-06 DIAGNOSIS — Z79.899 ENCOUNTER FOR LONG-TERM CURRENT USE OF MEDICATION: ICD-10-CM

## 2023-11-06 DIAGNOSIS — Z98.890 OTHER SPECIFIED POSTPROCEDURAL STATES: ICD-10-CM

## 2023-11-06 DIAGNOSIS — Z20.828 CONTACT WITH AND (SUSPECTED) EXPOSURE TO OTHER VIRAL COMMUNICABLE DISEASES: ICD-10-CM

## 2023-11-06 DIAGNOSIS — Z94.0 KIDNEY REPLACED BY TRANSPLANT: ICD-10-CM

## 2023-11-06 DIAGNOSIS — Z48.298 AFTERCARE FOLLOWING ORGAN TRANSPLANT: ICD-10-CM

## 2023-11-06 PROBLEM — T86.11 KIDNEY TRANSPLANT REJECTION: Status: ACTIVE | Noted: 2023-10-17

## 2023-11-06 LAB
AMYLASE SERPL-CCNC: 71 U/L (ref 28–100)
ANION GAP SERPL CALCULATED.3IONS-SCNC: 11 MMOL/L (ref 7–15)
BUN SERPL-MCNC: 21.7 MG/DL (ref 8–23)
CALCIUM SERPL-MCNC: 9.1 MG/DL (ref 8.8–10.2)
CHLORIDE SERPL-SCNC: 105 MMOL/L (ref 98–107)
CREAT SERPL-MCNC: 2.23 MG/DL (ref 0.67–1.17)
DEPRECATED HCO3 PLAS-SCNC: 22 MMOL/L (ref 22–29)
EGFRCR SERPLBLD CKD-EPI 2021: 32 ML/MIN/1.73M2
ERYTHROCYTE [DISTWIDTH] IN BLOOD BY AUTOMATED COUNT: 15.1 % (ref 10–15)
GLUCOSE SERPL-MCNC: 99 MG/DL (ref 70–99)
HCT VFR BLD AUTO: 35.3 % (ref 40–53)
HGB BLD-MCNC: 11.2 G/DL (ref 13.3–17.7)
LIPASE SERPL-CCNC: 56 U/L (ref 13–60)
MAGNESIUM SERPL-MCNC: 1.5 MG/DL (ref 1.7–2.3)
MCH RBC QN AUTO: 31.2 PG (ref 26.5–33)
MCHC RBC AUTO-ENTMCNC: 31.7 G/DL (ref 31.5–36.5)
MCV RBC AUTO: 98 FL (ref 78–100)
PHOSPHATE SERPL-MCNC: 2.8 MG/DL (ref 2.5–4.5)
PLATELET # BLD AUTO: 221 10E3/UL (ref 150–450)
POTASSIUM SERPL-SCNC: 4.9 MMOL/L (ref 3.4–5.3)
RBC # BLD AUTO: 3.59 10E6/UL (ref 4.4–5.9)
SODIUM SERPL-SCNC: 138 MMOL/L (ref 135–145)
TACROLIMUS BLD-MCNC: 8.8 UG/L (ref 5–15)
TME LAST DOSE: NORMAL H
TME LAST DOSE: NORMAL H
WBC # BLD AUTO: 4.5 10E3/UL (ref 4–11)

## 2023-11-06 PROCEDURE — 99000 SPECIMEN HANDLING OFFICE-LAB: CPT | Performed by: PATHOLOGY

## 2023-11-06 PROCEDURE — 82150 ASSAY OF AMYLASE: CPT | Performed by: PATHOLOGY

## 2023-11-06 PROCEDURE — 84100 ASSAY OF PHOSPHORUS: CPT | Performed by: PATHOLOGY

## 2023-11-06 PROCEDURE — 36415 COLL VENOUS BLD VENIPUNCTURE: CPT | Performed by: PATHOLOGY

## 2023-11-06 PROCEDURE — 83735 ASSAY OF MAGNESIUM: CPT | Performed by: PATHOLOGY

## 2023-11-06 PROCEDURE — 85027 COMPLETE CBC AUTOMATED: CPT | Performed by: PATHOLOGY

## 2023-11-06 PROCEDURE — 80048 BASIC METABOLIC PNL TOTAL CA: CPT | Performed by: PATHOLOGY

## 2023-11-06 PROCEDURE — 83690 ASSAY OF LIPASE: CPT | Performed by: PATHOLOGY

## 2023-11-06 PROCEDURE — 80197 ASSAY OF TACROLIMUS: CPT | Performed by: INTERNAL MEDICINE

## 2023-11-09 ENCOUNTER — LAB (OUTPATIENT)
Dept: LAB | Facility: CLINIC | Age: 68
End: 2023-11-09
Payer: COMMERCIAL

## 2023-11-09 DIAGNOSIS — Z20.828 CONTACT WITH AND (SUSPECTED) EXPOSURE TO OTHER VIRAL COMMUNICABLE DISEASES: ICD-10-CM

## 2023-11-09 DIAGNOSIS — Z48.298 AFTERCARE FOLLOWING ORGAN TRANSPLANT: ICD-10-CM

## 2023-11-09 DIAGNOSIS — Z94.0 KIDNEY REPLACED BY TRANSPLANT: ICD-10-CM

## 2023-11-09 DIAGNOSIS — Z98.890 OTHER SPECIFIED POSTPROCEDURAL STATES: ICD-10-CM

## 2023-11-09 DIAGNOSIS — Z79.899 ENCOUNTER FOR LONG-TERM CURRENT USE OF MEDICATION: ICD-10-CM

## 2023-11-09 LAB
AMYLASE SERPL-CCNC: 67 U/L (ref 28–100)
ANION GAP SERPL CALCULATED.3IONS-SCNC: 13 MMOL/L (ref 7–15)
BUN SERPL-MCNC: 24.2 MG/DL (ref 8–23)
CALCIUM SERPL-MCNC: 9.2 MG/DL (ref 8.8–10.2)
CHLORIDE SERPL-SCNC: 106 MMOL/L (ref 98–107)
CREAT SERPL-MCNC: 2.28 MG/DL (ref 0.67–1.17)
DEPRECATED HCO3 PLAS-SCNC: 19 MMOL/L (ref 22–29)
EGFRCR SERPLBLD CKD-EPI 2021: 31 ML/MIN/1.73M2
ERYTHROCYTE [DISTWIDTH] IN BLOOD BY AUTOMATED COUNT: 15.3 % (ref 10–15)
GLUCOSE SERPL-MCNC: 102 MG/DL (ref 70–99)
HCT VFR BLD AUTO: 34.8 % (ref 40–53)
HGB BLD-MCNC: 11.4 G/DL (ref 13.3–17.7)
LIPASE SERPL-CCNC: 55 U/L (ref 13–60)
MCH RBC QN AUTO: 31.6 PG (ref 26.5–33)
MCHC RBC AUTO-ENTMCNC: 32.8 G/DL (ref 31.5–36.5)
MCV RBC AUTO: 96 FL (ref 78–100)
PLATELET # BLD AUTO: 200 10E3/UL (ref 150–450)
POTASSIUM SERPL-SCNC: 4.6 MMOL/L (ref 3.4–5.3)
RBC # BLD AUTO: 3.61 10E6/UL (ref 4.4–5.9)
SODIUM SERPL-SCNC: 138 MMOL/L (ref 135–145)
TACROLIMUS BLD-MCNC: 8.2 UG/L (ref 5–15)
TME LAST DOSE: NORMAL H
TME LAST DOSE: NORMAL H
WBC # BLD AUTO: 4.1 10E3/UL (ref 4–11)

## 2023-11-09 PROCEDURE — 99000 SPECIMEN HANDLING OFFICE-LAB: CPT | Performed by: PATHOLOGY

## 2023-11-09 PROCEDURE — 80048 BASIC METABOLIC PNL TOTAL CA: CPT | Performed by: PATHOLOGY

## 2023-11-09 PROCEDURE — 80180 DRUG SCRN QUAN MYCOPHENOLATE: CPT | Performed by: INTERNAL MEDICINE

## 2023-11-09 PROCEDURE — 80197 ASSAY OF TACROLIMUS: CPT | Performed by: INTERNAL MEDICINE

## 2023-11-09 PROCEDURE — 83690 ASSAY OF LIPASE: CPT | Performed by: PATHOLOGY

## 2023-11-09 PROCEDURE — 85027 COMPLETE CBC AUTOMATED: CPT | Performed by: PATHOLOGY

## 2023-11-09 PROCEDURE — 36415 COLL VENOUS BLD VENIPUNCTURE: CPT | Performed by: PATHOLOGY

## 2023-11-09 PROCEDURE — 82150 ASSAY OF AMYLASE: CPT | Performed by: PATHOLOGY

## 2023-11-10 LAB
MYCOPHENOLATE SERPL LC/MS/MS-MCNC: 2.73 MG/L (ref 1–3.5)
MYCOPHENOLATE-G SERPL LC/MS/MS-MCNC: 136.1 MG/L (ref 30–95)
TME LAST DOSE: ABNORMAL H
TME LAST DOSE: ABNORMAL H

## 2023-11-13 ENCOUNTER — LAB (OUTPATIENT)
Dept: LAB | Facility: CLINIC | Age: 68
End: 2023-11-13
Payer: COMMERCIAL

## 2023-11-13 ENCOUNTER — TELEPHONE (OUTPATIENT)
Dept: TRANSPLANT | Facility: CLINIC | Age: 68
End: 2023-11-13

## 2023-11-13 DIAGNOSIS — Z98.890 OTHER SPECIFIED POSTPROCEDURAL STATES: ICD-10-CM

## 2023-11-13 DIAGNOSIS — Z94.0 KIDNEY REPLACED BY TRANSPLANT: ICD-10-CM

## 2023-11-13 DIAGNOSIS — Z20.828 CONTACT WITH AND (SUSPECTED) EXPOSURE TO OTHER VIRAL COMMUNICABLE DISEASES: ICD-10-CM

## 2023-11-13 DIAGNOSIS — Z79.899 ENCOUNTER FOR LONG-TERM CURRENT USE OF MEDICATION: ICD-10-CM

## 2023-11-13 DIAGNOSIS — Z48.298 AFTERCARE FOLLOWING ORGAN TRANSPLANT: ICD-10-CM

## 2023-11-13 DIAGNOSIS — R79.89 ELEVATED SERUM CREATININE: Primary | ICD-10-CM

## 2023-11-13 LAB
AMYLASE SERPL-CCNC: 68 U/L (ref 28–100)
ANION GAP SERPL CALCULATED.3IONS-SCNC: 12 MMOL/L (ref 7–15)
BUN SERPL-MCNC: 24.7 MG/DL (ref 8–23)
CALCIUM SERPL-MCNC: 9.5 MG/DL (ref 8.8–10.2)
CHLORIDE SERPL-SCNC: 103 MMOL/L (ref 98–107)
CREAT SERPL-MCNC: 2.33 MG/DL (ref 0.67–1.17)
DEPRECATED HCO3 PLAS-SCNC: 21 MMOL/L (ref 22–29)
EGFRCR SERPLBLD CKD-EPI 2021: 30 ML/MIN/1.73M2
ERYTHROCYTE [DISTWIDTH] IN BLOOD BY AUTOMATED COUNT: 15.2 % (ref 10–15)
GLUCOSE SERPL-MCNC: 99 MG/DL (ref 70–99)
HCT VFR BLD AUTO: 33.5 % (ref 40–53)
HGB BLD-MCNC: 10.9 G/DL (ref 13.3–17.7)
LIPASE SERPL-CCNC: 59 U/L (ref 13–60)
MAGNESIUM SERPL-MCNC: 1.4 MG/DL (ref 1.7–2.3)
MCH RBC QN AUTO: 31.3 PG (ref 26.5–33)
MCHC RBC AUTO-ENTMCNC: 32.5 G/DL (ref 31.5–36.5)
MCV RBC AUTO: 96 FL (ref 78–100)
PHOSPHATE SERPL-MCNC: 3.2 MG/DL (ref 2.5–4.5)
PLATELET # BLD AUTO: 188 10E3/UL (ref 150–450)
POTASSIUM SERPL-SCNC: 4.7 MMOL/L (ref 3.4–5.3)
RBC # BLD AUTO: 3.48 10E6/UL (ref 4.4–5.9)
SODIUM SERPL-SCNC: 136 MMOL/L (ref 135–145)
TACROLIMUS BLD-MCNC: 8.1 UG/L (ref 5–15)
TME LAST DOSE: NORMAL H
TME LAST DOSE: NORMAL H
WBC # BLD AUTO: 4.6 10E3/UL (ref 4–11)

## 2023-11-13 PROCEDURE — 87799 DETECT AGENT NOS DNA QUANT: CPT | Performed by: INTERNAL MEDICINE

## 2023-11-13 PROCEDURE — 80197 ASSAY OF TACROLIMUS: CPT | Performed by: INTERNAL MEDICINE

## 2023-11-13 PROCEDURE — 85027 COMPLETE CBC AUTOMATED: CPT | Performed by: PATHOLOGY

## 2023-11-13 PROCEDURE — 83690 ASSAY OF LIPASE: CPT | Performed by: PATHOLOGY

## 2023-11-13 PROCEDURE — 84100 ASSAY OF PHOSPHORUS: CPT | Performed by: PATHOLOGY

## 2023-11-13 PROCEDURE — 36415 COLL VENOUS BLD VENIPUNCTURE: CPT | Performed by: PATHOLOGY

## 2023-11-13 PROCEDURE — 83735 ASSAY OF MAGNESIUM: CPT | Performed by: PATHOLOGY

## 2023-11-13 PROCEDURE — 82150 ASSAY OF AMYLASE: CPT | Performed by: PATHOLOGY

## 2023-11-13 PROCEDURE — 80048 BASIC METABOLIC PNL TOTAL CA: CPT | Performed by: PATHOLOGY

## 2023-11-13 PROCEDURE — 99000 SPECIMEN HANDLING OFFICE-LAB: CPT | Performed by: PATHOLOGY

## 2023-11-14 LAB — BKV DNA # SPEC NAA+PROBE: NOT DETECTED COPIES/ML

## 2023-11-15 NOTE — TELEPHONE ENCOUNTER
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 He already had CTA in the hospital and there was no significant changes  Can you look into coverage for envarsus for him in the meantime and if covered can switch as long as Fk is therapeutic and he is on a stable dose

## 2023-11-15 NOTE — TELEPHONE ENCOUNTER
Spoke with patient, he will work with his PCP regarding his left hand weakness to ensure all bases are covered. Since this is single handed, we discussed the change from tac BID might not solve the issue and patient felt he wanted to stay on current dose that is covered by insurance. [facility] stated that this could be an option if weakness persists. Discussed need for biopsy, patient agreeable to plan.

## 2023-11-16 ENCOUNTER — LAB (OUTPATIENT)
Dept: LAB | Facility: CLINIC | Age: 68
End: 2023-11-16
Payer: COMMERCIAL

## 2023-11-16 ENCOUNTER — DOCUMENTATION ONLY (OUTPATIENT)
Dept: INTERVENTIONAL RADIOLOGY/VASCULAR | Facility: CLINIC | Age: 68
End: 2023-11-16

## 2023-11-16 DIAGNOSIS — Z48.298 AFTERCARE FOLLOWING ORGAN TRANSPLANT: ICD-10-CM

## 2023-11-16 DIAGNOSIS — Z79.899 ENCOUNTER FOR LONG-TERM CURRENT USE OF MEDICATION: ICD-10-CM

## 2023-11-16 DIAGNOSIS — Z98.890 OTHER SPECIFIED POSTPROCEDURAL STATES: ICD-10-CM

## 2023-11-16 DIAGNOSIS — Z94.0 KIDNEY REPLACED BY TRANSPLANT: ICD-10-CM

## 2023-11-16 DIAGNOSIS — Z20.828 CONTACT WITH AND (SUSPECTED) EXPOSURE TO OTHER VIRAL COMMUNICABLE DISEASES: ICD-10-CM

## 2023-11-16 LAB
AMYLASE SERPL-CCNC: 68 U/L (ref 28–100)
ANION GAP SERPL CALCULATED.3IONS-SCNC: 11 MMOL/L (ref 7–15)
BUN SERPL-MCNC: 29.6 MG/DL (ref 8–23)
CALCIUM SERPL-MCNC: 9.8 MG/DL (ref 8.8–10.2)
CHLORIDE SERPL-SCNC: 104 MMOL/L (ref 98–107)
CREAT SERPL-MCNC: 2.57 MG/DL (ref 0.67–1.17)
DEPRECATED HCO3 PLAS-SCNC: 23 MMOL/L (ref 22–29)
EGFRCR SERPLBLD CKD-EPI 2021: 27 ML/MIN/1.73M2
ERYTHROCYTE [DISTWIDTH] IN BLOOD BY AUTOMATED COUNT: 15.2 % (ref 10–15)
GLUCOSE SERPL-MCNC: 107 MG/DL (ref 70–99)
HCT VFR BLD AUTO: 34.6 % (ref 40–53)
HGB BLD-MCNC: 11.3 G/DL (ref 13.3–17.7)
LIPASE SERPL-CCNC: 64 U/L (ref 13–60)
MAGNESIUM SERPL-MCNC: 1.5 MG/DL (ref 1.7–2.3)
MCH RBC QN AUTO: 31.2 PG (ref 26.5–33)
MCHC RBC AUTO-ENTMCNC: 32.7 G/DL (ref 31.5–36.5)
MCV RBC AUTO: 96 FL (ref 78–100)
PHOSPHATE SERPL-MCNC: 3.2 MG/DL (ref 2.5–4.5)
PLATELET # BLD AUTO: 199 10E3/UL (ref 150–450)
POTASSIUM SERPL-SCNC: 4.3 MMOL/L (ref 3.4–5.3)
RBC # BLD AUTO: 3.62 10E6/UL (ref 4.4–5.9)
SODIUM SERPL-SCNC: 138 MMOL/L (ref 135–145)
TACROLIMUS BLD-MCNC: 8.8 UG/L (ref 5–15)
TME LAST DOSE: NORMAL H
TME LAST DOSE: NORMAL H
WBC # BLD AUTO: 4.1 10E3/UL (ref 4–11)

## 2023-11-16 PROCEDURE — 84100 ASSAY OF PHOSPHORUS: CPT | Performed by: PATHOLOGY

## 2023-11-16 PROCEDURE — 99000 SPECIMEN HANDLING OFFICE-LAB: CPT | Performed by: PATHOLOGY

## 2023-11-16 PROCEDURE — 83735 ASSAY OF MAGNESIUM: CPT | Performed by: PATHOLOGY

## 2023-11-16 PROCEDURE — 85027 COMPLETE CBC AUTOMATED: CPT | Performed by: PATHOLOGY

## 2023-11-16 PROCEDURE — 36415 COLL VENOUS BLD VENIPUNCTURE: CPT | Performed by: PATHOLOGY

## 2023-11-16 PROCEDURE — 83690 ASSAY OF LIPASE: CPT | Performed by: PATHOLOGY

## 2023-11-16 PROCEDURE — 80197 ASSAY OF TACROLIMUS: CPT | Performed by: INTERNAL MEDICINE

## 2023-11-16 PROCEDURE — 80048 BASIC METABOLIC PNL TOTAL CA: CPT | Performed by: PATHOLOGY

## 2023-11-16 PROCEDURE — 82150 ASSAY OF AMYLASE: CPT | Performed by: PATHOLOGY

## 2023-11-16 NOTE — PROGRESS NOTES
Outpatient IR Biopsy Referral    Patient to have transplant renal closure biopsy, ok to hold ASA 5 days piror and 3 days post per Dr. Chaparro.     Patient bled post prior bx 10/17/23       Sis Morgan DNP, APRN  Interventional Radiology   IR on-call pager: [phone]

## 2023-11-20 ENCOUNTER — LAB (OUTPATIENT)
Dept: LAB | Facility: CLINIC | Age: 68
End: 2023-11-20
Payer: COMMERCIAL

## 2023-11-20 ENCOUNTER — HOSPITAL ENCOUNTER (OUTPATIENT)
Facility: CLINIC | Age: 68
End: 2023-11-20
Admitting: INTERNAL MEDICINE
Payer: COMMERCIAL

## 2023-11-20 DIAGNOSIS — Z94.0 KIDNEY REPLACED BY TRANSPLANT: ICD-10-CM

## 2023-11-20 DIAGNOSIS — Z48.298 AFTERCARE FOLLOWING ORGAN TRANSPLANT: ICD-10-CM

## 2023-11-20 DIAGNOSIS — Z20.828 CONTACT WITH AND (SUSPECTED) EXPOSURE TO OTHER VIRAL COMMUNICABLE DISEASES: ICD-10-CM

## 2023-11-20 DIAGNOSIS — Z79.899 ENCOUNTER FOR LONG-TERM CURRENT USE OF MEDICATION: ICD-10-CM

## 2023-11-20 DIAGNOSIS — Z98.890 OTHER SPECIFIED POSTPROCEDURAL STATES: ICD-10-CM

## 2023-11-20 DIAGNOSIS — Z94.0 HISTORY OF SIMULTANEOUS KIDNEY AND PANCREAS TRANSPLANT (H): ICD-10-CM

## 2023-11-20 DIAGNOSIS — Z94.83 HISTORY OF SIMULTANEOUS KIDNEY AND PANCREAS TRANSPLANT (H): ICD-10-CM

## 2023-11-20 LAB
AMYLASE SERPL-CCNC: 57 U/L (ref 28–100)
ANION GAP SERPL CALCULATED.3IONS-SCNC: 10 MMOL/L (ref 7–15)
BUN SERPL-MCNC: 26.1 MG/DL (ref 8–23)
CALCIUM SERPL-MCNC: 9.2 MG/DL (ref 8.8–10.2)
CHLORIDE SERPL-SCNC: 104 MMOL/L (ref 98–107)
CREAT SERPL-MCNC: 2.46 MG/DL (ref 0.67–1.17)
DEPRECATED HCO3 PLAS-SCNC: 23 MMOL/L (ref 22–29)
EGFRCR SERPLBLD CKD-EPI 2021: 28 ML/MIN/1.73M2
ERYTHROCYTE [DISTWIDTH] IN BLOOD BY AUTOMATED COUNT: 14.9 % (ref 10–15)
GLUCOSE SERPL-MCNC: 102 MG/DL (ref 70–99)
HCT VFR BLD AUTO: 31.5 % (ref 40–53)
HGB BLD-MCNC: 10.2 G/DL (ref 13.3–17.7)
LIPASE SERPL-CCNC: 55 U/L (ref 13–60)
MAGNESIUM SERPL-MCNC: 1.5 MG/DL (ref 1.7–2.3)
MCH RBC QN AUTO: 31.4 PG (ref 26.5–33)
MCHC RBC AUTO-ENTMCNC: 32.4 G/DL (ref 31.5–36.5)
MCV RBC AUTO: 97 FL (ref 78–100)
PHOSPHATE SERPL-MCNC: 2.8 MG/DL (ref 2.5–4.5)
PLATELET # BLD AUTO: 178 10E3/UL (ref 150–450)
POTASSIUM SERPL-SCNC: 4.5 MMOL/L (ref 3.4–5.3)
RBC # BLD AUTO: 3.25 10E6/UL (ref 4.4–5.9)
SODIUM SERPL-SCNC: 137 MMOL/L (ref 135–145)
TACROLIMUS BLD-MCNC: 9.4 UG/L (ref 5–15)
TME LAST DOSE: NORMAL H
TME LAST DOSE: NORMAL H
WBC # BLD AUTO: 5.3 10E3/UL (ref 4–11)

## 2023-11-20 PROCEDURE — 80048 BASIC METABOLIC PNL TOTAL CA: CPT | Performed by: PATHOLOGY

## 2023-11-20 PROCEDURE — 99000 SPECIMEN HANDLING OFFICE-LAB: CPT | Performed by: PATHOLOGY

## 2023-11-20 PROCEDURE — 83690 ASSAY OF LIPASE: CPT | Performed by: PATHOLOGY

## 2023-11-20 PROCEDURE — 80197 ASSAY OF TACROLIMUS: CPT | Performed by: INTERNAL MEDICINE

## 2023-11-20 PROCEDURE — 82150 ASSAY OF AMYLASE: CPT | Performed by: PATHOLOGY

## 2023-11-20 PROCEDURE — 36415 COLL VENOUS BLD VENIPUNCTURE: CPT | Performed by: PATHOLOGY

## 2023-11-20 PROCEDURE — 84100 ASSAY OF PHOSPHORUS: CPT | Performed by: PATHOLOGY

## 2023-11-20 PROCEDURE — 85027 COMPLETE CBC AUTOMATED: CPT | Performed by: PATHOLOGY

## 2023-11-20 PROCEDURE — 83735 ASSAY OF MAGNESIUM: CPT | Performed by: PATHOLOGY

## 2023-11-20 RX ORDER — SULFAMETHOXAZOLE AND TRIMETHOPRIM 400; 80 MG/1; MG/1
1 TABLET ORAL DAILY
Qty: 30 TABLET | Refills: 0 | Status: SHIPPED | OUTPATIENT
Start: 2023-11-20 | End: 2023-12-26

## 2023-11-22 RX ORDER — LIDOCAINE 40 MG/G
CREAM TOPICAL
Status: CANCELLED | OUTPATIENT
Start: 2023-11-22

## 2023-11-22 RX ORDER — SODIUM CHLORIDE 9 MG/ML
INJECTION, SOLUTION INTRAVENOUS CONTINUOUS
Status: CANCELLED | OUTPATIENT
Start: 2023-11-22

## 2023-11-27 ENCOUNTER — LAB (OUTPATIENT)
Dept: LAB | Facility: CLINIC | Age: 68
End: 2023-11-27
Attending: INTERNAL MEDICINE
Payer: COMMERCIAL

## 2023-11-27 ENCOUNTER — OFFICE VISIT (OUTPATIENT)
Dept: TRANSPLANT | Facility: CLINIC | Age: 68
End: 2023-11-27
Attending: INTERNAL MEDICINE
Payer: COMMERCIAL

## 2023-11-27 VITALS
HEIGHT: 71 IN | TEMPERATURE: 97.9 F | BODY MASS INDEX: 26.64 KG/M2 | SYSTOLIC BLOOD PRESSURE: 120 MMHG | OXYGEN SATURATION: 100 % | HEART RATE: 70 BPM | WEIGHT: 190.3 LBS | RESPIRATION RATE: 16 BRPM | DIASTOLIC BLOOD PRESSURE: 69 MMHG

## 2023-11-27 DIAGNOSIS — I15.1 HTN, KIDNEY TRANSPLANT RELATED: ICD-10-CM

## 2023-11-27 DIAGNOSIS — Z98.890 OTHER SPECIFIED POSTPROCEDURAL STATES: ICD-10-CM

## 2023-11-27 DIAGNOSIS — Z79.899 ENCOUNTER FOR LONG-TERM CURRENT USE OF MEDICATION: ICD-10-CM

## 2023-11-27 DIAGNOSIS — Z94.0 KIDNEY REPLACED BY TRANSPLANT: Primary | ICD-10-CM

## 2023-11-27 DIAGNOSIS — T86.11 KIDNEY TRANSPLANT REJECTION: ICD-10-CM

## 2023-11-27 DIAGNOSIS — Z48.298 AFTERCARE FOLLOWING ORGAN TRANSPLANT: ICD-10-CM

## 2023-11-27 DIAGNOSIS — D84.9 IMMUNOSUPPRESSED STATUS (H): ICD-10-CM

## 2023-11-27 DIAGNOSIS — Z94.0 HTN, KIDNEY TRANSPLANT RELATED: ICD-10-CM

## 2023-11-27 DIAGNOSIS — Z20.828 CONTACT WITH AND (SUSPECTED) EXPOSURE TO OTHER VIRAL COMMUNICABLE DISEASES: ICD-10-CM

## 2023-11-27 DIAGNOSIS — Z94.0 KIDNEY REPLACED BY TRANSPLANT: ICD-10-CM

## 2023-11-27 DIAGNOSIS — Z94.83 PANCREAS REPLACED BY TRANSPLANT (H): ICD-10-CM

## 2023-11-27 DIAGNOSIS — D62 ANEMIA DUE TO BLOOD LOSS, ACUTE: ICD-10-CM

## 2023-11-27 PROBLEM — Z98.41 STATUS POST CATARACT EXTRACTION OF BOTH EYES WITH INSERTION OF INTRAOCULAR LENS: Status: RESOLVED | Noted: 2018-03-23 | Resolved: 2023-11-27

## 2023-11-27 PROBLEM — I49.9 CARDIAC ARRHYTHMIA: Status: RESOLVED | Noted: 2021-04-16 | Resolved: 2023-11-27

## 2023-11-27 PROBLEM — Z01.818 ENCOUNTER FOR PRE-TRANSPLANT EVALUATION FOR KIDNEY AND PANCREAS TRANSPLANT: Status: RESOLVED | Noted: 2023-02-07 | Resolved: 2023-11-27

## 2023-11-27 PROBLEM — Z98.42 STATUS POST CATARACT EXTRACTION OF BOTH EYES WITH INSERTION OF INTRAOCULAR LENS: Status: RESOLVED | Noted: 2018-03-23 | Resolved: 2023-11-27

## 2023-11-27 PROBLEM — N19 UREMIA: Status: RESOLVED | Noted: 2021-03-31 | Resolved: 2023-11-27

## 2023-11-27 PROBLEM — N18.5 CKD (CHRONIC KIDNEY DISEASE) STAGE 5, GFR LESS THAN 15 ML/MIN (H): Status: RESOLVED | Noted: 2019-06-04 | Resolved: 2023-11-27

## 2023-11-27 PROBLEM — I77.0: Status: RESOLVED | Noted: 2023-10-17 | Resolved: 2023-11-27

## 2023-11-27 PROBLEM — I95.1 ORTHOSTATIC HYPOTENSION: Status: RESOLVED | Noted: 2019-06-04 | Resolved: 2023-11-27

## 2023-11-27 PROBLEM — T86.19: Status: RESOLVED | Noted: 2023-10-17 | Resolved: 2023-11-27

## 2023-11-27 PROBLEM — H40.001 GLAUCOMA SUSPECT OF RIGHT EYE: Status: RESOLVED | Noted: 2018-03-23 | Resolved: 2023-11-27

## 2023-11-27 PROBLEM — N18.6 END STAGE RENAL DISEASE (H): Status: RESOLVED | Noted: 2018-09-20 | Resolved: 2023-11-27

## 2023-11-27 PROBLEM — R94.39 ABNORMAL CARDIOVASCULAR STRESS TEST: Status: RESOLVED | Noted: 2023-02-07 | Resolved: 2023-11-27

## 2023-11-27 PROBLEM — C44.310 BCC (BASAL CELL CARCINOMA), FACE: Status: RESOLVED | Noted: 2022-08-09 | Resolved: 2023-11-27

## 2023-11-27 PROBLEM — I25.84 CORONARY ATHEROSCLEROSIS DUE TO SEVERELY CALCIFIED CORONARY LESION: Status: RESOLVED | Noted: 2023-02-07 | Resolved: 2023-11-27

## 2023-11-27 PROBLEM — Z01.810 PRE-OPERATIVE CARDIOVASCULAR EXAMINATION: Status: RESOLVED | Noted: 2023-02-07 | Resolved: 2023-11-27

## 2023-11-27 PROBLEM — Z96.1 STATUS POST CATARACT EXTRACTION OF BOTH EYES WITH INSERTION OF INTRAOCULAR LENS: Status: RESOLVED | Noted: 2018-03-23 | Resolved: 2023-11-27

## 2023-11-27 PROBLEM — E83.39 HYPERPHOSPHATEMIA: Status: RESOLVED | Noted: 2019-06-04 | Resolved: 2023-11-27

## 2023-11-27 PROBLEM — Z76.82 PANCREAS TRANSPLANT CANDIDATE: Status: RESOLVED | Noted: 2022-08-09 | Resolved: 2023-11-27

## 2023-11-27 PROBLEM — Z76.82 ORGAN TRANSPLANT CANDIDATE: Status: RESOLVED | Noted: 2023-02-07 | Resolved: 2023-11-27

## 2023-11-27 LAB
AMYLASE SERPL-CCNC: 59 U/L (ref 28–100)
ANION GAP SERPL CALCULATED.3IONS-SCNC: 9 MMOL/L (ref 7–15)
BUN SERPL-MCNC: 25.1 MG/DL (ref 8–23)
CALCIUM SERPL-MCNC: 9.8 MG/DL (ref 8.8–10.2)
CHLORIDE SERPL-SCNC: 105 MMOL/L (ref 98–107)
CREAT SERPL-MCNC: 2.35 MG/DL (ref 0.67–1.17)
DEPRECATED HCO3 PLAS-SCNC: 23 MMOL/L (ref 22–29)
EGFRCR SERPLBLD CKD-EPI 2021: 30 ML/MIN/1.73M2
ERYTHROCYTE [DISTWIDTH] IN BLOOD BY AUTOMATED COUNT: 14.7 % (ref 10–15)
GLUCOSE SERPL-MCNC: 102 MG/DL (ref 70–99)
HCT VFR BLD AUTO: 34.3 % (ref 40–53)
HGB BLD-MCNC: 10.8 G/DL (ref 13.3–17.7)
LIPASE SERPL-CCNC: 42 U/L (ref 13–60)
MCH RBC QN AUTO: 30.9 PG (ref 26.5–33)
MCHC RBC AUTO-ENTMCNC: 31.5 G/DL (ref 31.5–36.5)
MCV RBC AUTO: 98 FL (ref 78–100)
PLATELET # BLD AUTO: 174 10E3/UL (ref 150–450)
POTASSIUM SERPL-SCNC: 5 MMOL/L (ref 3.4–5.3)
RBC # BLD AUTO: 3.5 10E6/UL (ref 4.4–5.9)
SODIUM SERPL-SCNC: 137 MMOL/L (ref 135–145)
TACROLIMUS BLD-MCNC: 10.4 UG/L (ref 5–15)
TME LAST DOSE: NORMAL H
TME LAST DOSE: NORMAL H
WBC # BLD AUTO: 7.1 10E3/UL (ref 4–11)

## 2023-11-27 PROCEDURE — 36415 COLL VENOUS BLD VENIPUNCTURE: CPT | Performed by: PATHOLOGY

## 2023-11-27 PROCEDURE — G0463 HOSPITAL OUTPT CLINIC VISIT: HCPCS | Performed by: INTERNAL MEDICINE

## 2023-11-27 PROCEDURE — 99215 OFFICE O/P EST HI 40 MIN: CPT | Mod: 24 | Performed by: INTERNAL MEDICINE

## 2023-11-27 PROCEDURE — 80197 ASSAY OF TACROLIMUS: CPT | Performed by: INTERNAL MEDICINE

## 2023-11-27 PROCEDURE — 80048 BASIC METABOLIC PNL TOTAL CA: CPT | Performed by: PATHOLOGY

## 2023-11-27 PROCEDURE — 83690 ASSAY OF LIPASE: CPT | Performed by: PATHOLOGY

## 2023-11-27 PROCEDURE — 99000 SPECIMEN HANDLING OFFICE-LAB: CPT | Performed by: PATHOLOGY

## 2023-11-27 PROCEDURE — 82150 ASSAY OF AMYLASE: CPT | Performed by: PATHOLOGY

## 2023-11-27 PROCEDURE — 85027 COMPLETE CBC AUTOMATED: CPT | Performed by: PATHOLOGY

## 2023-11-27 RX ORDER — ASPIRIN 81 MG/1
81 TABLET ORAL DAILY
COMMUNITY
Start: 2023-11-27

## 2023-11-27 RX ORDER — GABAPENTIN 100 MG/1
300 CAPSULE ORAL 3 TIMES DAILY
COMMUNITY
Start: 2023-11-15 | End: 2024-11-14

## 2023-11-27 RX ORDER — VALGANCICLOVIR 450 MG/1
450 TABLET, FILM COATED ORAL EVERY OTHER DAY
COMMUNITY
Start: 2023-11-27 | End: 2024-01-23

## 2023-11-27 ASSESSMENT — PAIN SCALES - GENERAL: PAINLEVEL: NO PAIN (0)

## 2023-11-27 NOTE — PROGRESS NOTES
TRANSPLANT NEPHROLOGY EARLY POST TRANSPLANT VISIT    Assessment & Plan   # DDKT (SPK): Trend down but still elevated from previous baseline. He has had multiple ischemic insults complicated by post biopsy hematoma s/p embolization and AVF as well as partial loss of renal function post embolization. Given downtrend in Scr and concerns regarding bleeding post biopsy will hold off for now and consider biopsy again for Scr >2.6    - Baseline Creatinine: ~ 2.2-2.5   - Proteinuria: Normal (<0.2 grams)   - Date DSA Last Checked: Oct/2023      Latest DSA: No   - BK Viremia: No   - Kidney Tx Biopsy: Oct 17, 2023; Result: [id number], borderline ACR.  Treated with solumedrol 500mg IV daily x3 days   - Transplant Ureteral Stent: Removed 10/23/23     # Pancreas Tx (SPK):    - Pancreatic Exocrine Drainage: Enteric drained     - Blood glucose: Euglycemia      On insulin: No   - HbA1c: Last checked at time of transplant      Latest HbA1c: 8.4%   - Pancreatic enzymes: Stable   - Date DSA Last Checked: Oct/2023  Latest DSA: No   - Pancreas Tx Biopsy: No   -Restart ASA 81mg daily as no biopsy planned    # Immunosuppression: Tacrolimus immediate release (goal 8-10) and Mycophenolate mofetil (dose 1000 mg every 12 hours)   - Induction with Recent Transplant:  High Intensity Protocol   - Continue with intensive monitoring of immunosuppression for efficacy and toxicity.   - Changes: Not at this time    #  Tessa-transplant hematoma Post Kidney biopsy:    -s/p transplant renal biopsy 10/17/23 complicated by hemorrhage s/p arrest s/p 3 min of CPR               - Found to have AV fistula with extravasation on angiography by IR on 10/17, s/p coiling     # Possible peripheral neuropathy:   -Had EMG on 11/21 but can't see results. Appears to be a L ulnar neuropathy.   -Management per PCP    # CAD: S/p CABG 2/2023. On metoprolol 12.5 mg PO BID. Aspirin 81 mg po qd   # PAD     # Non-melanotic skin cancer: S/p Mohs 8/2022    # Infection Prophylaxis:   -  PJP: Sulfa/TMP (Bactrim)  - CMV: Valganciclovir (Valcyte) 450mg daily. Decrease to every other day.  Estimated Creatinine Clearance: 37.7 mL/min (A) (based on SCr of 2.35 mg/dL (H)).      # Hypertension: Controlled;  Goal BP: < 130/80   - Volume status: Euvolemic     - Changes: Not at this time. Continue metoprolol 12.5mg bid     # Anemia in Chronic Renal Disease: Hgb: Stable      MCKENNA: No   - Iron studies: Replete    # Mineral Bone Disorder:    - Secondary renal hyperparathyroidism; PTH level: Minimally elevated ( pg/ml)        On treatment: Calcitriol 0.5mcg daily   - Vitamin D; level: Low normal        On supplement: Yes   - Calcium; level: Normal        On supplement: Yes   - Phosphorus; level: Normal        On supplement: No     # Electrolytes:   - Potassium; level: Normal        On supplement: No  - Magnesium; level: Low normal        On supplement: Yes, mag ox 400mg daily   - Bicarbonate; level: Normal        On supplement: Yes, bicarb 1300mg bid    # Medical Compliance: Yes    # Health Maintenance and Vaccination Review: Recommend:  vaccinations at 6m post txp    # Transplant History:  Etiology of Kidney Failure: Diabetes mellitus type 2  Tx: SPK  Transplant: 9/14/2023 (Kidney / Pancreas)  Donor Type: Donation after Brain Death Donor Class:   Crossmatch at time of Tx: negative  DSA at time of Tx: No  Significant changes in immunosuppression: None  CMV IgG Ab High Risk Discordance (D+/R-): No  EBV IgG Ab High Risk Discordance (D+/R-): No  Significant transplant-related complications: Acute cellular-mediated rejection (borderline ACR of kidney)    Transplant Office Phone Number: [phone]    Assessment and plan was discussed with the patient and he voiced his understanding and agreement.    Return visit: Return in 8 weeks (on 1/23/2024) for previously scheduled visit, 4 month post transplant visit.    Jeffrey Chaparro MD    Chief Complaint   Mr. Marion is a 68 year old here for kidney transplant,  immunosuppression management and routine follow up.     History of Present Illness    Avelina has started to notice that immediately after transplant he started noting L ulnar neuropathy. He has both numbness and weakness. He was seen by neurology for EMG on 11/21 and the results were sent to PCP but I am unable to see them.     The patient overall feels well. He denies nausea, vomiting, diarrhea, fever, chills, shortness of breath, LE edema, unintentional weight loss, nights sweats, dysuria, hematuria. He still has some rib pain from CPR but that is improving.       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, with long-term current use of insulin (H)    Hypertension    Proliferative diabetic retinopathy (H)    Anemia in stage 5 chronic kidney disease, not on chronic dialysis (H)    Secondary hyperparathyroidism of renal origin (H24)    Cardiac arrhythmia    CKD (chronic kidney disease) stage 5, GFR less than 15 ml/min (H)    Diabetes mellitus type II, uncontrolled    Glaucoma suspect of right eye    Hearing loss on right    Hyperlipidemia with target LDL less than 100    Hyperphosphatemia    Normal anion gap metabolic acidosis    Orthostatic hypotension    Status post cataract extraction of both eyes with insertion of intraocular lens    Type II diabetes mellitus with neurological manifestations (H)    Uremia    Vitamin D deficiency    Status post coronary angiogram    Pancreas transplant candidate    BCC (basal cell carcinoma), face    Pre-operative cardiovascular examination    CAD (coronary artery disease)    Abnormal cardiovascular stress test    Organ transplant candidate    Coronary atherosclerosis due to severely calcified coronary lesion    Encounter for pre-transplant evaluation for kidney and pancreas transplant    History of simultaneous  kidney and pancreas transplant (H)    Immunosuppressed status (H24)    Dehydration    Anemia due to blood loss, acute    Postoperative cardiac arrest following non-cardiac surgery    Arteriovenous fistula of transplanted kidney (H24)    Kidney transplant rejection       Allergies   No Known Allergies    Medications   Current Outpatient Medications   Medication Sig    acetaminophen (TYLENOL) 325 MG tablet Take 2 tablets (650 mg) by mouth every 6 hours as needed for mild pain    acetaminophen (TYLENOL) 500 MG tablet Take 500-1,000 mg by mouth every 6 hours as needed for mild pain    aspirin (ASA) 325 MG tablet Take 1 tablet (325 mg) by mouth daily    atorvastatin (LIPITOR) 20 MG tablet Take 1 tablet (20 mg) by mouth at bedtime    Blood Glucose Monitoring Suppl (ACCU-CHEK GUIDE) w/Device KIT     calcitRIOL (ROCALTROL) 0.5 MCG capsule Take 1 capsule (0.5 mcg) by mouth daily    calcium carbonate-vitamin D (OSCAL) 500-5 MG-MCG tablet Take 2 tablets by mouth 2 times daily    famotidine (PEPCID) 10 MG tablet Take 1 tablet (10 mg) by mouth every 24 hours    magnesium oxide (MAG-OX) 400 MG tablet Take 1 tablet (400 mg) by mouth every 24 hours    metoprolol tartrate (LOPRESSOR) 25 MG tablet Take 0.5 tablets (12.5 mg) by mouth 2 times daily    Misc. Devices (PILL SPLITTER) MISC 1 applicator daily    mycophenolate (GENERIC EQUIVALENT) 250 MG capsule Take 4 capsules (1,000 mg) by mouth 2 times daily    nystatin (MYCOSTATIN) 497974 UNIT/ML suspension Take 5 mLs (500,000 Units) by mouth 4 times daily    pantoprazole (PROTONIX) 40 MG EC tablet Take 1 tablet (40 mg) by mouth daily    psyllium (METAMUCIL/KONSYL) 58.6 % powder Take 6 g (1 teaspoonful) by mouth daily Can increase up to 3 TIMES DAILY if needed.    senna-docusate (SENOKOT-S/PERICOLACE) 8.6-50 MG tablet Take 1-2 tablets by mouth 2 times daily HOLD for loose stools    SENNA-docusate sodium (SENNA S) 8.6-50 MG tablet Take 1 tablet by mouth at bedtime for 90 days    sodium  "bicarbonate 650 MG tablet Take 2 tablets (1,300 mg) by mouth 2 times daily    sulfamethoxazole-trimethoprim (BACTRIM) 400-80 MG tablet Take 1 tablet by mouth daily    tacrolimus (GENERIC) 0.5 MG capsule Take 1 capsule (0.5 mg) by mouth 2 times daily Total dose: 1.5mg twice daily    tacrolimus (GENERIC) 1 MG capsule Take 1 capsule (1 mg) by mouth 2 times daily Total dose: 1.5mg twice daily    valGANciclovir (VALCYTE) 450 MG tablet Take 1 tablet (450 mg) by mouth daily for 52 days Titrate up to 2 tablets by mouth daily as directed by transplant team (based on kidney function)     No current facility-administered medications for this visit.     There are no discontinued medications.    Physical Exam   Vital Signs: /69 (BP Location: Right arm, Patient Position: Sitting, Cuff Size: Adult Regular)   Pulse 70   Temp 97.9  F (36.6  C) (Oral)   Resp 16   Ht 1.816 m (5' 11.5\")   Wt 86.3 kg (190 lb 4.8 oz)   SpO2 100%   BMI 26.17 kg/m      GENERAL APPEARANCE: alert and no distress  HENT: mouth without ulcers or lesions  RESP: lungs clear to auscultation - no rales, rhonchi or wheezes  CV: regular rhythm, normal rate, no rub, no murmur  EDEMA: no LE edema bilaterally  ABDOMEN: soft, nondistended, nontender, bowel sounds normal  MS: extremities normal - no gross deformities noted, no evidence of inflammation in joints, no muscle tenderness  SKIN: no rash  NEURO: normal strength and tone, sensory exam grossly normal, mentation intact and speech normal  TX KIDNEY: normal  DIALYSIS ACCESS:  LUE AV fistula with good thrill and bruit    Data         Latest Ref Rng & Units 11/20/2023     8:19 AM 11/16/2023     8:10 AM 11/13/2023     8:25 AM   Renal   Sodium 135 - 145 mmol/L 137  138  136    K 3.4 - 5.3 mmol/L 4.5  4.3  4.7    Cl 98 - 107 mmol/L 104  104  103    Cl (external) 98 - 107 mmol/L 104  104  103    CO2 22 - 29 mmol/L 23  23  21    Urea Nitrogen 8.0 - 23.0 mg/dL 26.1  29.6  24.7    Creatinine 0.67 - 1.17 mg/dL " 2.46  2.57  2.33    Glucose 70 - 99 mg/dL 102  107  99    Calcium 8.8 - 10.2 mg/dL 9.2  9.8  9.5    Magnesium 1.7 - 2.3 mg/dL 1.5  1.5  1.4          Latest Ref Rng & Units 11/20/2023     8:19 AM 11/16/2023     8:10 AM 11/13/2023     8:25 AM   Bone Health   Phosphorus 2.5 - 4.5 mg/dL 2.8  3.2  3.2          Latest Ref Rng & Units 11/20/2023     8:19 AM 11/16/2023     8:10 AM 11/13/2023     8:25 AM   Heme   WBC 4.0 - 11.0 10e3/uL 5.3  4.1  4.6    Hgb 13.3 - 17.7 g/dL 10.2  11.3  10.9    Plt 150 - 450 10e3/uL 178  199  188          Latest Ref Rng & Units 10/17/2023     2:57 PM 9/14/2023     3:39 PM 2/14/2023     1:11 AM   Liver   AP 40 - 129 U/L 41  60  35    TBili <=1.2 mg/dL 0.2  0.9  0.3    ALT 0 - 70 U/L 13  6  10    AST 0 - 45 U/L 21  22  46    Tot Protein 6.4 - 8.3 g/dL 3.6  7.3  4.5    Albumin 3.5 - 5.2 g/dL 2.2  4.3  2.9          Latest Ref Rng & Units 11/20/2023     8:19 AM 11/16/2023     8:10 AM 11/13/2023     8:25 AM   Pancreas   Amylase 28 - 100 U/L 57  68  68    Lipase (Roche) 13 - 60 U/L 55  64  59          Latest Ref Rng & Units 9/29/2023     8:48 AM 9/24/2023     8:35 AM 2/16/2023     8:34 AM   Iron studies   Iron 61 - 157 ug/dL 27  32  24    Iron Sat Index 15 - 46 % 16  21  20    Ferritin 31 - 409 ng/mL 1,256  1,164  1,461          Latest Ref Rng & Units 9/14/2023     3:39 PM 8/9/2022     6:26 AM 8/5/2021     2:23 AM   [facility] Virology   EBV CAPSID ANTIBODY IGG No detectable antibody. Positive  Positive  Positive         Recent Labs   Lab Test 11/13/23  0825 11/16/23  0810 11/20/23  0819   DOSTAC 11/12/2023 11/15/2023 11/19/2023   TACROL 8.1 8.8 9.4     Recent Labs   Lab Test 10/12/23  0835 10/23/23  0944 11/09/23  0832   DOSMPA  --  10/22/2023   9:45 PM 11/8/2023   9:30 PM   MPACID 4.83* 3.83* 2.73   MPAG 192.5* 133.4* 136.1*

## 2023-11-27 NOTE — PATIENT INSTRUCTIONS
Patient Recommendations:  - Restart aspirin 81mg daily  -Decrease valcyte to 450mg every other day     Transplant Patient Information  Your Post Transplant Coordinator is: Makayla Wu  For non urgent items, we encourage you to contact your coordinator/care team online via [employer]  You and your care team can also contact your transplant coordinator Monday - Friday, 8am - 5pm at [phone] (Option 2 to reach the coordinator or Option 4 to schedule an appointment).  After hours for urgent matters, please call [facility] at [phone].

## 2023-11-27 NOTE — LETTER
11/27/2023         RE: Avelina Marion  [address] [place] [address]        Dear Colleague,    Thank you for referring your patient, Avelina Marion, to the [facility]PL[facility]. Please see a copy of my visit note below.    TRANSPLANT NEPHROLOGY EARLY POST TRANSPLANT VISIT    Assessment & Plan  # DDKT (SPK): Trend down but still elevated from previous baseline. He has had multiple ischemic insults complicated by post biopsy hematoma s/p embolization and AVF as well as partial loss of renal function post embolization. Given downtrend in Scr and concerns regarding bleeding post biopsy will hold off for now and consider biopsy again for Scr >2.6    - Baseline Creatinine: ~ 2.2-2.5   - Proteinuria: Normal (<0.2 grams)   - Date DSA Last Checked: Oct/2023      Latest DSA: No   - BK Viremia: No   - Kidney Tx Biopsy: Oct 17, 2023; Result: [id number], borderline ACR.  Treated with solumedrol 500mg IV daily x3 days   - Transplant Ureteral Stent: Removed 10/23/23     # Pancreas Tx (SPK):    - Pancreatic Exocrine Drainage: Enteric drained     - Blood glucose: Euglycemia      On insulin: No   - HbA1c: Last checked at time of transplant      Latest HbA1c: 8.4%   - Pancreatic enzymes: Stable   - Date DSA Last Checked: Oct/2023  Latest DSA: No   - Pancreas Tx Biopsy: No   -Restart ASA 81mg daily as no biopsy planned    # Immunosuppression: Tacrolimus immediate release (goal 8-10) and Mycophenolate mofetil (dose 1000 mg every 12 hours)   - Induction with Recent Transplant:  High Intensity Protocol   - Continue with intensive monitoring of immunosuppression for efficacy and toxicity.   - Changes: Not at this time    #  Tessa-transplant hematoma Post Kidney biopsy:    -s/p transplant renal biopsy 10/17/23 complicated by hemorrhage s/p arrest s/p 3 min of CPR               - Found to have AV fistula with extravasation on angiography by IR on 10/17, s/p coiling     # Possible peripheral neuropathy:   -Had EMG on  11/21 but can't see results. Appears to be a L ulnar neuropathy.   -Management per PCP    # CAD: S/p CABG 2/2023. On metoprolol 12.5 mg PO BID. Aspirin 81 mg po qd   # PAD     # Non-melanotic skin cancer: S/p Mohs 8/2022    # Infection Prophylaxis:   - PJP: Sulfa/TMP (Bactrim)  - CMV: Valganciclovir (Valcyte) 450mg daily. Decrease to every other day.  Estimated Creatinine Clearance: 37.7 mL/min (A) (based on SCr of 2.35 mg/dL (H)).      # Hypertension: Controlled;  Goal BP: < 130/80   - Volume status: Euvolemic     - Changes: Not at this time. Continue metoprolol 12.5mg bid     # Anemia in Chronic Renal Disease: Hgb: Stable      MCKENNA: No   - Iron studies: Replete    # Mineral Bone Disorder:    - Secondary renal hyperparathyroidism; PTH level: Minimally elevated ( pg/ml)        On treatment: Calcitriol 0.5mcg daily   - Vitamin D; level: Low normal        On supplement: Yes   - Calcium; level: Normal        On supplement: Yes   - Phosphorus; level: Normal        On supplement: No     # Electrolytes:   - Potassium; level: Normal        On supplement: No  - Magnesium; level: Low normal        On supplement: Yes, mag ox 400mg daily   - Bicarbonate; level: Normal        On supplement: Yes, bicarb 1300mg bid    # Medical Compliance: Yes    # Health Maintenance and Vaccination Review: Recommend:  vaccinations at 6m post txp    # Transplant History:  Etiology of Kidney Failure: Diabetes mellitus type 2  Tx: SPK  Transplant: 9/14/2023 (Kidney / Pancreas)  Donor Type: Donation after Brain Death Donor Class:   Crossmatch at time of Tx: negative  DSA at time of Tx: No  Significant changes in immunosuppression: None  CMV IgG Ab High Risk Discordance (D+/R-): No  EBV IgG Ab High Risk Discordance (D+/R-): No  Significant transplant-related complications: Acute cellular-mediated rejection (borderline ACR of kidney)    Transplant Office Phone Number: [phone]    Assessment and plan was discussed with the patient and he  voiced his understanding and agreement.    Return visit: Return in 8 weeks (on 1/23/2024) for previously scheduled visit, 4 month post transplant visit.    Jeffrey Chaparro MD    Chief Complaint  Mr. Marion is a 68 year old here for kidney transplant, immunosuppression management and routine follow up.     History of Present Illness   Avelina has started to notice that immediately after transplant he started noting L ulnar neuropathy. He has both numbness and weakness. He was seen by neurology for EMG on 11/21 and the results were sent to PCP but I am unable to see them.     The patient overall feels well. He denies nausea, vomiting, diarrhea, fever, chills, shortness of breath, LE edema, unintentional weight loss, nights sweats, dysuria, hematuria. He still has some rib pain from CPR but that is improving.       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, with long-term current use of insulin (H)     Hypertension     Proliferative diabetic retinopathy (H)     Anemia in stage 5 chronic kidney disease, not on chronic dialysis (H)     Secondary hyperparathyroidism of renal origin (H24)     Cardiac arrhythmia     CKD (chronic kidney disease) stage 5, GFR less than 15 ml/min (H)     Diabetes mellitus type II, uncontrolled     Glaucoma suspect of right eye     Hearing loss on right     Hyperlipidemia with target LDL less than 100     Hyperphosphatemia     Normal anion gap metabolic acidosis     Orthostatic hypotension     Status post cataract extraction of both eyes with insertion of intraocular lens     Type II diabetes mellitus with neurological manifestations (H)     Uremia     Vitamin D deficiency     Status post coronary angiogram     Pancreas transplant candidate     BCC (basal cell carcinoma), face     Pre-operative cardiovascular examination     CAD  (coronary artery disease)     Abnormal cardiovascular stress test     Organ transplant candidate     Coronary atherosclerosis due to severely calcified coronary lesion     Encounter for pre-transplant evaluation for kidney and pancreas transplant     History of simultaneous kidney and pancreas transplant (H)     Immunosuppressed status (H24)     Dehydration     Anemia due to blood loss, acute     Postoperative cardiac arrest following non-cardiac surgery     Arteriovenous fistula of transplanted kidney (H24)     Kidney transplant rejection       Allergies  No Known Allergies    Medications  Current Outpatient Medications   Medication Sig     acetaminophen (TYLENOL) 325 MG tablet Take 2 tablets (650 mg) by mouth every 6 hours as needed for mild pain     acetaminophen (TYLENOL) 500 MG tablet Take 500-1,000 mg by mouth every 6 hours as needed for mild pain     aspirin (ASA) 325 MG tablet Take 1 tablet (325 mg) by mouth daily     atorvastatin (LIPITOR) 20 MG tablet Take 1 tablet (20 mg) by mouth at bedtime     Blood Glucose Monitoring Suppl (ACCU-CHEK GUIDE) w/Device KIT      calcitRIOL (ROCALTROL) 0.5 MCG capsule Take 1 capsule (0.5 mcg) by mouth daily     calcium carbonate-vitamin D (OSCAL) 500-5 MG-MCG tablet Take 2 tablets by mouth 2 times daily     famotidine (PEPCID) 10 MG tablet Take 1 tablet (10 mg) by mouth every 24 hours     magnesium oxide (MAG-OX) 400 MG tablet Take 1 tablet (400 mg) by mouth every 24 hours     metoprolol tartrate (LOPRESSOR) 25 MG tablet Take 0.5 tablets (12.5 mg) by mouth 2 times daily     Misc. Devices (PILL SPLITTER) MISC 1 applicator daily     mycophenolate (GENERIC EQUIVALENT) 250 MG capsule Take 4 capsules (1,000 mg) by mouth 2 times daily     nystatin (MYCOSTATIN) 737291 UNIT/ML suspension Take 5 mLs (500,000 Units) by mouth 4 times daily     pantoprazole (PROTONIX) 40 MG EC tablet Take 1 tablet (40 mg) by mouth daily     psyllium (METAMUCIL/KONSYL) 58.6 % powder Take 6 g (1  "teaspoonful) by mouth daily Can increase up to 3 TIMES DAILY if needed.     senna-docusate (SENOKOT-S/PERICOLACE) 8.6-50 MG tablet Take 1-2 tablets by mouth 2 times daily HOLD for loose stools     SENNA-docusate sodium (SENNA S) 8.6-50 MG tablet Take 1 tablet by mouth at bedtime for 90 days     sodium bicarbonate 650 MG tablet Take 2 tablets (1,300 mg) by mouth 2 times daily     sulfamethoxazole-trimethoprim (BACTRIM) 400-80 MG tablet Take 1 tablet by mouth daily     tacrolimus (GENERIC) 0.5 MG capsule Take 1 capsule (0.5 mg) by mouth 2 times daily Total dose: 1.5mg twice daily     tacrolimus (GENERIC) 1 MG capsule Take 1 capsule (1 mg) by mouth 2 times daily Total dose: 1.5mg twice daily     valGANciclovir (VALCYTE) 450 MG tablet Take 1 tablet (450 mg) by mouth daily for 52 days Titrate up to 2 tablets by mouth daily as directed by transplant team (based on kidney function)     No current facility-administered medications for this visit.     There are no discontinued medications.    Physical Exam  Vital Signs: /69 (BP Location: Right arm, Patient Position: Sitting, Cuff Size: Adult Regular)   Pulse 70   Temp 97.9  F (36.6  C) (Oral)   Resp 16   Ht 1.816 m (5' 11.5\")   Wt 86.3 kg (190 lb 4.8 oz)   SpO2 100%   BMI 26.17 kg/m      GENERAL APPEARANCE: alert and no distress  HENT: mouth without ulcers or lesions  RESP: lungs clear to auscultation - no rales, rhonchi or wheezes  CV: regular rhythm, normal rate, no rub, no murmur  EDEMA: no LE edema bilaterally  ABDOMEN: soft, nondistended, nontender, bowel sounds normal  MS: extremities normal - no gross deformities noted, no evidence of inflammation in joints, no muscle tenderness  SKIN: no rash  NEURO: normal strength and tone, sensory exam grossly normal, mentation intact and speech normal  TX KIDNEY: normal  DIALYSIS ACCESS:  LUE AV fistula with good thrill and bruit    Data        Latest Ref Rng & Units 11/20/2023     8:19 AM 11/16/2023     8:10 AM " 11/13/2023     8:25 AM   Renal   Sodium 135 - 145 mmol/L 137  138  136    K 3.4 - 5.3 mmol/L 4.5  4.3  4.7    Cl 98 - 107 mmol/L 104  104  103    Cl (external) 98 - 107 mmol/L 104  104  103    CO2 22 - 29 mmol/L 23  23  21    Urea Nitrogen 8.0 - 23.0 mg/dL 26.1  29.6  24.7    Creatinine 0.67 - 1.17 mg/dL 2.46  2.57  2.33    Glucose 70 - 99 mg/dL 102  107  99    Calcium 8.8 - 10.2 mg/dL 9.2  9.8  9.5    Magnesium 1.7 - 2.3 mg/dL 1.5  1.5  1.4          Latest Ref Rng & Units 11/20/2023     8:19 AM 11/16/2023     8:10 AM 11/13/2023     8:25 AM   Bone Health   Phosphorus 2.5 - 4.5 mg/dL 2.8  3.2  3.2          Latest Ref Rng & Units 11/20/2023     8:19 AM 11/16/2023     8:10 AM 11/13/2023     8:25 AM   Heme   WBC 4.0 - 11.0 10e3/uL 5.3  4.1  4.6    Hgb 13.3 - 17.7 g/dL 10.2  11.3  10.9    Plt 150 - 450 10e3/uL 178  199  188          Latest Ref Rng & Units 10/17/2023     2:57 PM 9/14/2023     3:39 PM 2/14/2023     1:11 AM   Liver   AP 40 - 129 U/L 41  60  35    TBili <=1.2 mg/dL 0.2  0.9  0.3    ALT 0 - 70 U/L 13  6  10    AST 0 - 45 U/L 21  22  46    Tot Protein 6.4 - 8.3 g/dL 3.6  7.3  4.5    Albumin 3.5 - 5.2 g/dL 2.2  4.3  2.9          Latest Ref Rng & Units 11/20/2023     8:19 AM 11/16/2023     8:10 AM 11/13/2023     8:25 AM   Pancreas   Amylase 28 - 100 U/L 57  68  68    Lipase (Roche) 13 - 60 U/L 55  64  59          Latest Ref Rng & Units 9/29/2023     8:48 AM 9/24/2023     8:35 AM 2/16/2023     8:34 AM   Iron studies   Iron 61 - 157 ug/dL 27  32  24    Iron Sat Index 15 - 46 % 16  21  20    Ferritin 31 - 409 ng/mL 1,256  1,164  1,461          Latest Ref Rng & Units 9/14/2023     3:39 PM 8/9/2022     6:26 AM 8/5/2021     2:23 AM   [facility] Txp Virology   EBV CAPSID ANTIBODY IGG No detectable antibody. Positive  Positive  Positive         Recent Labs   Lab Test 11/13/23  0825 11/16/23  0810 11/20/23  0819   DOSTAC 11/12/2023 11/15/2023 11/19/2023   TACROL 8.1 8.8 9.4     Recent Labs   Lab Test 10/12/23  0835  10/23/23  0944 11/09/23  0832   DOSMPA  --  10/22/2023   9:45 PM 11/8/2023   9:30 PM   MPACID 4.83* 3.83* 2.73   MPAG 192.5* 133.4* 136.1*         Again, thank you for allowing me to participate in the care of your patient.        Sincerely,        Jeffrey Chaparro MD

## 2023-11-27 NOTE — NURSING NOTE
"Chief Complaint   Patient presents with    RECHECK     S/P Kidney/Pancreas TX 9/14/2023     Vital signs:  Temp: 97.9  F (36.6  C) Temp src: Oral BP: 120/69 Pulse: 70   Resp: 16 SpO2: 100 %     Height: 181.6 cm (5' 11.5\") Weight: 86.3 kg (190 lb 4.8 oz)  Estimated body mass index is 26.17 kg/m  as calculated from the following:    Height as of this encounter: 1.816 m (5' 11.5\").    Weight as of this encounter: 86.3 kg (190 lb 4.8 oz).      Lucero Ramirez, [facility]  11/27/2023 9:21 AM    "

## 2023-11-28 ENCOUNTER — VIRTUAL VISIT (OUTPATIENT)
Dept: PHARMACY | Facility: CLINIC | Age: 68
End: 2023-11-28
Attending: INTERNAL MEDICINE
Payer: COMMERCIAL

## 2023-11-28 DIAGNOSIS — Z95.1 S/P CABG (CORONARY ARTERY BYPASS GRAFT): ICD-10-CM

## 2023-11-28 DIAGNOSIS — Z94.0 HISTORY OF SIMULTANEOUS KIDNEY AND PANCREAS TRANSPLANT (H): ICD-10-CM

## 2023-11-28 DIAGNOSIS — G62.9 NEUROPATHY: ICD-10-CM

## 2023-11-28 DIAGNOSIS — Z78.9 TAKES DIETARY SUPPLEMENTS: ICD-10-CM

## 2023-11-28 DIAGNOSIS — E78.5 HYPERLIPIDEMIA WITH TARGET LDL LESS THAN 100: ICD-10-CM

## 2023-11-28 DIAGNOSIS — K21.9 GASTROESOPHAGEAL REFLUX DISEASE, UNSPECIFIED WHETHER ESOPHAGITIS PRESENT: ICD-10-CM

## 2023-11-28 DIAGNOSIS — I10 ESSENTIAL HYPERTENSION: ICD-10-CM

## 2023-11-28 DIAGNOSIS — Z94.0 KIDNEY TRANSPLANTED: ICD-10-CM

## 2023-11-28 DIAGNOSIS — Z48.298 AFTERCARE FOLLOWING ORGAN TRANSPLANT: Primary | ICD-10-CM

## 2023-11-28 DIAGNOSIS — Z94.83 HISTORY OF SIMULTANEOUS KIDNEY AND PANCREAS TRANSPLANT (H): ICD-10-CM

## 2023-11-28 PROCEDURE — 99607 MTMS BY PHARM ADDL 15 MIN: CPT | Performed by: PHARMACIST

## 2023-11-28 PROCEDURE — 99606 MTMS BY PHARM EST 15 MIN: CPT | Performed by: PHARMACIST

## 2023-11-28 RX ORDER — CALCITRIOL 0.25 UG/1
0.25 CAPSULE, LIQUID FILLED ORAL DAILY
Qty: 30 CAPSULE | Refills: 3 | Status: SHIPPED | OUTPATIENT
Start: 2023-11-28 | End: 2024-03-25

## 2023-11-28 NOTE — Clinical Note
Dr. Chaparro... 1. Recommend reducing Calcitriol (currently 0.5mcg) and Calcium (currently 1000mg twice daily).  Lab Results      Component                Value               Date                      PTHI                     103 (H)             09/29/2023                PTHI                     7 (L)               09/24/2023                DAVID                      9.8                 11/27/2023                DAVID                      9.2                 11/20/2023                DAVID                      9.8                 11/16/2023                DAVID                      9.5                 11/13/2023                DAVID                      9.2                 11/09/2023                DAVID                      9.1                 11/06/2023                DAVID                      8.9                 11/02/2023                DAVID                      8.8                 10/30/2023                DAVID                      8.5 (L)             10/26/2023

## 2023-11-28 NOTE — PROGRESS NOTES
Medication Therapy Management (MTM) Encounter    ASSESSMENT:                            Medication Adherence/Access: No issues identified    Kidney and Pancreas Transplant:    Discussed discontinuation dates of Valcyte and Nystatin (3 months post txp)     Supplements/MBD:   Current calcium levels at the high end of normal, PTH only moderated elevated at ~100. When we reduce Pantoprazole I would expect mineral absorption to increase. Will recommend to Dr. Chaparro reducing patient's Calcitriol and calcium dose.     GERD:   Patient denies any recent GERD sx or hx of gastrointestinal bleed. Patient has completed 2 months of Pantoprazole post pancreas txp, will taper for a month, then stop.     Hypertension BP at goal <140/90 for 2-4 months post kidney txp.     Hyperlipidemia   Stable.     Neuropathy:   Stable.     PLAN:                            After 12/14 you can stop Valcyte 450mg and Nystatin.  Pantoprazole 40mg every other day for 1 month, then stop by 1/1/24.     Dr. Chaparro...  Recommend reducing Calcitriol (currently 0.5mcg) and Calcium (currently 1000mg twice daily).    Follow-up: 1/23 at 1 PM    SUBJECTIVE/OBJECTIVE:                          Avelina Marion is a 68 year old male called for a follow-up visit from 9/25.       Reason for visit: 2 months post txp.    Allergies/ADRs: Reviewed in chart  Past Medical History: Reviewed in chart  Tobacco: He reports that he has quit smoking. His smoking use included cigarettes. He has never used smokeless tobacco.  Alcohol: not currently using    Medication Adherence/Access: no issues reported     Kidney and Pancreas Transplant:    Tacrolimus 1.5 mg twice daily  Mycophenolate Mofetil 1000 mg twice daily.   Pt does have a little numbness in left hand that started after transplant. No change in this.   Transplant date: 9/14/23  Vascular Prophylaxis: Aspirin 81mg daily. Denies gastrointestinal bleed sx.   CMV prophylaxis: CrCl 25 to 39 mL/minute: Valcyte 450 mg every other  day Treat 3 months post tx.  PJP prophylaxis: Bactrim SS daily  Antifungal Prophylaxis: Nystatin 4 TIMES DAILY for 3 months.   Tx Coordinator: Ayala Quiroga MD: Dr. Chaparro, Using Med Card: Yes  Immunizations: annual flu shot unknown; Pneumovax 23:  2017, 6/24/2022; Prevnar 20: none on file; TDaP:  2010; Shingrix: x2, HBV: indeterminate, COVID: Primary x 4    Estimated Creatinine Clearance: 37.7 mL/min (A) (based on SCr of 2.35 mg/dL (H)).     Supplements/ MBD:   Mag Oxide 400mg daily ( 2 hours from MMF)  Calcium/D 1000mg (2 tabs) twice daily  Calcitriol 0.5mcg daily  Sodium Bicarb 650mg twice daily.  Lab Results   Component Value Date    DAVID 9.8 11/27/2023    DAVID 9.2 11/20/2023    DAVID 9.8 11/16/2023    DAVID 9.5 11/13/2023    DAVID 9.2 11/09/2023    MAG 1.5 (L) 11/20/2023    MAG 1.5 (L) 11/16/2023    CO2 23 11/27/2023    CO2 23 11/20/2023    PTHI 103 (H) 09/29/2023    PTHI 7 (L) 09/24/2023   Vitamin D Deficiency Screening Results:  Lab Results   Component Value Date    VITDT 20 09/29/2023    VITDT 20 09/24/2023     GERD:   Pantoprazole 40 mg once daily  Famotidine 10 mg once daily   Patient reports no current symptoms.   Patient feels that current regimen is effective.  The patient does not have a history of GI bleed.  The patient does not notice symptoms if they miss a dose.    Hypertension   Metoprolol Tartrate 12.5mg twice daily  Patient reports no current medication side effects  Patient self monitors blood pressure.  Home BP monitoring 121/85 .       BP Readings from Last 3 Encounters:   11/27/23 120/69   10/23/23 128/70   10/20/23 137/69     Pulse Readings from Last 3 Encounters:   11/27/23 70   10/20/23 70   10/17/23 75     Hyperlipidemia   Atorvastatin 20mg daily  Patient reports no significant myalgias or other side effects.  Hx of CVD.   Recent Labs   Lab Test 12/19/22  1744   CHOL 116   HDL 51   LDL 43   TRIG 109     Neuropathy:   Gabapentin 100mg 3 TIMES DAILY.  Patient has had numbness left  hand since transplant. Started Gabapentin last Monday- no change in symptoms. Can move his little finger a little more.     Today's Vitals: There were no vitals taken for this visit.  ----------------    I spent 23 minutes with this patient today. All changes were made via collaborative practice agreement with Dr. Chaparro. A copy of the visit note was provided to the patient's provider(s).    A summary of these recommendations was sent via [employer].    Kg Grewal, PharmD  [facility] Pharmacist    Phone: [phone]     Telemedicine Visit Details  Type of service:  Telephone visit  Start Time: 9:02 AM  End Time: 9:25 AM     Medication Therapy Recommendations  Aftercare following organ transplant    Current Medication: valGANciclovir (VALCYTE) 450 MG tablet   Rationale: Does not understand instructions - Adherence - Adherence   Recommendation: Provide Education   Status: Patient Agreed - Adherence/Education         Gastroesophageal reflux disease, unspecified whether esophagitis present    Current Medication: pantoprazole (PROTONIX) 40 MG EC tablet   Rationale: No medical indication at this time - Unnecessary medication therapy - Indication   Recommendation: Discontinue Medication   Status: Accepted per CPA         Takes dietary supplements    Current Medication: calcitRIOL (ROCALTROL) 0.5 MCG capsule   Rationale: Dose too high - Dosage too high - Safety   Recommendation: Decrease Dose   Status: Contact Provider - Awaiting Response

## 2023-11-28 NOTE — PROGRESS NOTES
Jeffrey Chaparro MD Griffin, Peter Alberto, [facility]; Makayla Wu RN  Ok let's decrease the calcitriol to 0.25mcg daily please          Previous Messages       ----- Message -----  From: Kg Grewal [facility]  Sent: 11/28/2023   9:33 AM CST  To: Jeffrey Chaparro MD; KARINE Quiroga Dr....  1. Recommend reducing Calcitriol (currently 0.5mcg) and Calcium (currently 1000mg twice daily).    Lab Results       Component                Value               Date                       PTHI                     103 (H)             09/29/2023                 PTHI                     7 (L)               09/24/2023                 DAVID                      9.8                 11/27/2023                 DAVID                      9.2                 11/20/2023                 DAVID                      9.8                 11/16/2023                 DAVID                      9.5                 11/13/2023                 DAVID                      9.2                 11/09/2023                 DAVID                      9.1                 11/06/2023                 DAVID                      8.9                 11/02/2023                 DAVID                      8.8                 10/30/2023                 DAVID                      8.5 (L)             10/26/2023

## 2023-11-28 NOTE — PATIENT INSTRUCTIONS
"Recommendations from today's MTM visit:                                                       After 12/14 you can stop Valcyte 450mg and Nystatin.  Pantoprazole 40mg every other day for 1 month, then stop by 1/1/24.     Follow-up: 1/23 at 1 PM    It was great speaking with you today.  I value your experience and would be very thankful for your time in providing feedback in our clinic survey. In the next few days, you may receive an email or text message from [employer] with a link to a survey related to your  clinical pharmacist.\"     To schedule another MTM appointment, please call the clinic directly or you may call the MTM scheduling line at [phone] or toll-free at [phone].     My Clinical Pharmacist's contact information:                                                      Please feel free to contact me with any questions or concerns you have.      Kg Grewal, PharmD  MTM Pharmacist    Phone: [phone]     "

## 2023-11-30 ENCOUNTER — LAB (OUTPATIENT)
Dept: LAB | Facility: CLINIC | Age: 68
End: 2023-11-30
Payer: COMMERCIAL

## 2023-11-30 DIAGNOSIS — Z79.899 ENCOUNTER FOR LONG-TERM CURRENT USE OF MEDICATION: ICD-10-CM

## 2023-11-30 DIAGNOSIS — R79.89 ELEVATED SERUM CREATININE: ICD-10-CM

## 2023-11-30 DIAGNOSIS — Z94.0 KIDNEY REPLACED BY TRANSPLANT: ICD-10-CM

## 2023-11-30 DIAGNOSIS — Z20.828 CONTACT WITH AND (SUSPECTED) EXPOSURE TO OTHER VIRAL COMMUNICABLE DISEASES: ICD-10-CM

## 2023-11-30 DIAGNOSIS — Z48.298 AFTERCARE FOLLOWING ORGAN TRANSPLANT: ICD-10-CM

## 2023-11-30 DIAGNOSIS — Z98.890 OTHER SPECIFIED POSTPROCEDURAL STATES: ICD-10-CM

## 2023-11-30 LAB
AMYLASE SERPL-CCNC: 58 U/L (ref 28–100)
ANION GAP SERPL CALCULATED.3IONS-SCNC: 11 MMOL/L (ref 7–15)
BUN SERPL-MCNC: 26.9 MG/DL (ref 8–23)
CALCIUM SERPL-MCNC: 9.1 MG/DL (ref 8.8–10.2)
CHLORIDE SERPL-SCNC: 104 MMOL/L (ref 98–107)
CREAT SERPL-MCNC: 2.54 MG/DL (ref 0.67–1.17)
DEPRECATED HCO3 PLAS-SCNC: 23 MMOL/L (ref 22–29)
EGFRCR SERPLBLD CKD-EPI 2021: 27 ML/MIN/1.73M2
GLUCOSE SERPL-MCNC: 91 MG/DL (ref 70–99)
HOLD SPECIMEN: NORMAL
LIPASE SERPL-CCNC: 40 U/L (ref 13–60)
POTASSIUM SERPL-SCNC: 4.4 MMOL/L (ref 3.4–5.3)
SODIUM SERPL-SCNC: 138 MMOL/L (ref 135–145)
TACROLIMUS BLD-MCNC: 7.6 UG/L (ref 5–15)
TME LAST DOSE: NORMAL H
TME LAST DOSE: NORMAL H

## 2023-11-30 PROCEDURE — 83690 ASSAY OF LIPASE: CPT | Performed by: PATHOLOGY

## 2023-11-30 PROCEDURE — 80048 BASIC METABOLIC PNL TOTAL CA: CPT | Performed by: PATHOLOGY

## 2023-11-30 PROCEDURE — 80197 ASSAY OF TACROLIMUS: CPT | Performed by: INTERNAL MEDICINE

## 2023-11-30 PROCEDURE — 86833 HLA CLASS II HIGH DEFIN QUAL: CPT | Performed by: INTERNAL MEDICINE

## 2023-11-30 PROCEDURE — 36415 COLL VENOUS BLD VENIPUNCTURE: CPT | Performed by: PATHOLOGY

## 2023-11-30 PROCEDURE — 82150 ASSAY OF AMYLASE: CPT | Performed by: PATHOLOGY

## 2023-11-30 PROCEDURE — 86832 HLA CLASS I HIGH DEFIN QUAL: CPT | Performed by: INTERNAL MEDICINE

## 2023-11-30 PROCEDURE — 99000 SPECIMEN HANDLING OFFICE-LAB: CPT | Performed by: PATHOLOGY

## 2023-12-04 ENCOUNTER — LAB (OUTPATIENT)
Dept: LAB | Facility: CLINIC | Age: 68
End: 2023-12-04
Payer: COMMERCIAL

## 2023-12-04 DIAGNOSIS — Z20.828 CONTACT WITH AND (SUSPECTED) EXPOSURE TO OTHER VIRAL COMMUNICABLE DISEASES: ICD-10-CM

## 2023-12-04 DIAGNOSIS — Z48.298 AFTERCARE FOLLOWING ORGAN TRANSPLANT: ICD-10-CM

## 2023-12-04 DIAGNOSIS — Z79.899 ENCOUNTER FOR LONG-TERM CURRENT USE OF MEDICATION: ICD-10-CM

## 2023-12-04 DIAGNOSIS — Z98.890 OTHER SPECIFIED POSTPROCEDURAL STATES: ICD-10-CM

## 2023-12-04 DIAGNOSIS — Z94.0 KIDNEY REPLACED BY TRANSPLANT: ICD-10-CM

## 2023-12-04 LAB
AMYLASE SERPL-CCNC: 63 U/L (ref 28–100)
ANION GAP SERPL CALCULATED.3IONS-SCNC: 10 MMOL/L (ref 7–15)
BUN SERPL-MCNC: 28.7 MG/DL (ref 8–23)
CALCIUM SERPL-MCNC: 9.2 MG/DL (ref 8.8–10.2)
CHLORIDE SERPL-SCNC: 106 MMOL/L (ref 98–107)
CREAT SERPL-MCNC: 2.22 MG/DL (ref 0.67–1.17)
DEPRECATED HCO3 PLAS-SCNC: 22 MMOL/L (ref 22–29)
EGFRCR SERPLBLD CKD-EPI 2021: 32 ML/MIN/1.73M2
ERYTHROCYTE [DISTWIDTH] IN BLOOD BY AUTOMATED COUNT: 14.3 % (ref 10–15)
GLUCOSE SERPL-MCNC: 101 MG/DL (ref 70–99)
HCT VFR BLD AUTO: 31.4 % (ref 40–53)
HGB BLD-MCNC: 10.1 G/DL (ref 13.3–17.7)
LIPASE SERPL-CCNC: 41 U/L (ref 13–60)
MAGNESIUM SERPL-MCNC: 1.5 MG/DL (ref 1.7–2.3)
MCH RBC QN AUTO: 30.8 PG (ref 26.5–33)
MCHC RBC AUTO-ENTMCNC: 32.2 G/DL (ref 31.5–36.5)
MCV RBC AUTO: 96 FL (ref 78–100)
MYCOPHENOLATE SERPL LC/MS/MS-MCNC: 8.55 MG/L (ref 1–3.5)
MYCOPHENOLATE-G SERPL LC/MS/MS-MCNC: 199.8 MG/L (ref 30–95)
PHOSPHATE SERPL-MCNC: 2.9 MG/DL (ref 2.5–4.5)
PLATELET # BLD AUTO: 167 10E3/UL (ref 150–450)
POTASSIUM SERPL-SCNC: 4.4 MMOL/L (ref 3.4–5.3)
RBC # BLD AUTO: 3.28 10E6/UL (ref 4.4–5.9)
SODIUM SERPL-SCNC: 138 MMOL/L (ref 135–145)
TACROLIMUS BLD-MCNC: 6.9 UG/L (ref 5–15)
TME LAST DOSE: ABNORMAL H
TME LAST DOSE: ABNORMAL H
TME LAST DOSE: NORMAL H
TME LAST DOSE: NORMAL H
WBC # BLD AUTO: 5.9 10E3/UL (ref 4–11)

## 2023-12-04 PROCEDURE — 80197 ASSAY OF TACROLIMUS: CPT | Performed by: INTERNAL MEDICINE

## 2023-12-04 PROCEDURE — 82150 ASSAY OF AMYLASE: CPT | Performed by: PATHOLOGY

## 2023-12-04 PROCEDURE — 36415 COLL VENOUS BLD VENIPUNCTURE: CPT | Performed by: PATHOLOGY

## 2023-12-04 PROCEDURE — 87799 DETECT AGENT NOS DNA QUANT: CPT | Performed by: INTERNAL MEDICINE

## 2023-12-04 PROCEDURE — 83735 ASSAY OF MAGNESIUM: CPT | Performed by: PATHOLOGY

## 2023-12-04 PROCEDURE — 85027 COMPLETE CBC AUTOMATED: CPT | Performed by: PATHOLOGY

## 2023-12-04 PROCEDURE — 80180 DRUG SCRN QUAN MYCOPHENOLATE: CPT | Performed by: INTERNAL MEDICINE

## 2023-12-04 PROCEDURE — 99000 SPECIMEN HANDLING OFFICE-LAB: CPT | Performed by: PATHOLOGY

## 2023-12-04 PROCEDURE — 80048 BASIC METABOLIC PNL TOTAL CA: CPT | Performed by: PATHOLOGY

## 2023-12-04 PROCEDURE — 83690 ASSAY OF LIPASE: CPT | Performed by: PATHOLOGY

## 2023-12-04 PROCEDURE — 84100 ASSAY OF PHOSPHORUS: CPT | Performed by: PATHOLOGY

## 2023-12-05 DIAGNOSIS — Z94.83 HISTORY OF SIMULTANEOUS KIDNEY AND PANCREAS TRANSPLANT (H): Primary | ICD-10-CM

## 2023-12-05 DIAGNOSIS — Z94.0 HISTORY OF SIMULTANEOUS KIDNEY AND PANCREAS TRANSPLANT (H): Primary | ICD-10-CM

## 2023-12-05 LAB — BKV DNA # SPEC NAA+PROBE: NOT DETECTED COPIES/ML

## 2023-12-05 RX ORDER — TACROLIMUS 1 MG/1
2 CAPSULE ORAL 2 TIMES DAILY
Qty: 120 CAPSULE | Refills: 11 | Status: SHIPPED | OUTPATIENT
Start: 2023-12-05 | End: 2023-12-08

## 2023-12-05 RX ORDER — TACROLIMUS 0.5 MG/1
CAPSULE ORAL
Qty: 60 CAPSULE | Refills: 11 | Status: SHIPPED | OUTPATIENT
Start: 2023-12-05 | End: 2024-02-20

## 2023-12-05 NOTE — TELEPHONE ENCOUNTER
Spoke to patient who confirm this was an accurate 12-hour trough. Verify Tacrolimus IR dose 1.5 mg BID. Confirm no new medications or illness. Confirmed no missed doses. Patient confirms increase Tacrolimus IR dose to 2 mg BID and repeat labs in 1 weeks

## 2023-12-05 NOTE — TELEPHONE ENCOUNTER
ISSUE:   Tacrolimus IR level 6.9 on 12/4, goal 8-10, dose 1.5 mg BID.    PLAN:   Please call patient and confirm this was an accurate 12-hour trough. Verify Tacrolimus IR dose 1.5 mg BID. Confirm no new medications or illness. Confirm no missed doses. If accurate trough and accurate dose, increase Tacrolimus IR dose to 2 mg BID and repeat labs in 1 weeks    .

## 2023-12-07 ENCOUNTER — LAB (OUTPATIENT)
Dept: LAB | Facility: CLINIC | Age: 68
End: 2023-12-07
Payer: COMMERCIAL

## 2023-12-07 DIAGNOSIS — Z79.899 ENCOUNTER FOR LONG-TERM CURRENT USE OF MEDICATION: ICD-10-CM

## 2023-12-07 DIAGNOSIS — Z94.0 HISTORY OF SIMULTANEOUS KIDNEY AND PANCREAS TRANSPLANT (H): Primary | ICD-10-CM

## 2023-12-07 DIAGNOSIS — Z94.83 HISTORY OF SIMULTANEOUS KIDNEY AND PANCREAS TRANSPLANT (H): Primary | ICD-10-CM

## 2023-12-07 DIAGNOSIS — Z98.890 OTHER SPECIFIED POSTPROCEDURAL STATES: ICD-10-CM

## 2023-12-07 DIAGNOSIS — Z48.298 AFTERCARE FOLLOWING ORGAN TRANSPLANT: ICD-10-CM

## 2023-12-07 DIAGNOSIS — Z94.0 KIDNEY REPLACED BY TRANSPLANT: ICD-10-CM

## 2023-12-07 DIAGNOSIS — Z20.828 CONTACT WITH AND (SUSPECTED) EXPOSURE TO OTHER VIRAL COMMUNICABLE DISEASES: ICD-10-CM

## 2023-12-07 LAB
AMYLASE SERPL-CCNC: 61 U/L (ref 28–100)
ANION GAP SERPL CALCULATED.3IONS-SCNC: 9 MMOL/L (ref 7–15)
BUN SERPL-MCNC: 26 MG/DL (ref 8–23)
CALCIUM SERPL-MCNC: 8.9 MG/DL (ref 8.8–10.2)
CHLORIDE SERPL-SCNC: 106 MMOL/L (ref 98–107)
CREAT SERPL-MCNC: 2.21 MG/DL (ref 0.67–1.17)
DEPRECATED HCO3 PLAS-SCNC: 23 MMOL/L (ref 22–29)
EGFRCR SERPLBLD CKD-EPI 2021: 32 ML/MIN/1.73M2
ERYTHROCYTE [DISTWIDTH] IN BLOOD BY AUTOMATED COUNT: 14.6 % (ref 10–15)
GLUCOSE SERPL-MCNC: 96 MG/DL (ref 70–99)
HCT VFR BLD AUTO: 31.5 % (ref 40–53)
HGB BLD-MCNC: 10 G/DL (ref 13.3–17.7)
LIPASE SERPL-CCNC: 39 U/L (ref 13–60)
MCH RBC QN AUTO: 31.1 PG (ref 26.5–33)
MCHC RBC AUTO-ENTMCNC: 31.7 G/DL (ref 31.5–36.5)
MCV RBC AUTO: 98 FL (ref 78–100)
PLATELET # BLD AUTO: 175 10E3/UL (ref 150–450)
POTASSIUM SERPL-SCNC: 4.4 MMOL/L (ref 3.4–5.3)
RBC # BLD AUTO: 3.22 10E6/UL (ref 4.4–5.9)
SODIUM SERPL-SCNC: 138 MMOL/L (ref 135–145)
TACROLIMUS BLD-MCNC: 6.7 UG/L (ref 5–15)
TME LAST DOSE: NORMAL H
TME LAST DOSE: NORMAL H
WBC # BLD AUTO: 6.2 10E3/UL (ref 4–11)

## 2023-12-07 PROCEDURE — 36415 COLL VENOUS BLD VENIPUNCTURE: CPT | Performed by: PATHOLOGY

## 2023-12-07 PROCEDURE — 82150 ASSAY OF AMYLASE: CPT | Performed by: PATHOLOGY

## 2023-12-07 PROCEDURE — 99000 SPECIMEN HANDLING OFFICE-LAB: CPT | Performed by: PATHOLOGY

## 2023-12-07 PROCEDURE — 85027 COMPLETE CBC AUTOMATED: CPT | Performed by: PATHOLOGY

## 2023-12-07 PROCEDURE — 83690 ASSAY OF LIPASE: CPT | Performed by: PATHOLOGY

## 2023-12-07 PROCEDURE — 80197 ASSAY OF TACROLIMUS: CPT | Performed by: INTERNAL MEDICINE

## 2023-12-07 PROCEDURE — 80048 BASIC METABOLIC PNL TOTAL CA: CPT | Performed by: PATHOLOGY

## 2023-12-07 NOTE — TELEPHONE ENCOUNTER
ISSUE:   Tacrolimus IR level 6.7 on 12/7, goal 8-10, dose 2 mg BID.    PLAN:   Please call patient and confirm this was an accurate 12-hour trough. Verify Tacrolimus IR dose 2 mg BID. Confirm no new medications or illness. Confirm no missed doses. If accurate trough and accurate dose, increase Tacrolimus IR dose to 3 mg BID and repeat labs in 3 days

## 2023-12-08 RX ORDER — TACROLIMUS 1 MG/1
3 CAPSULE ORAL 2 TIMES DAILY
Qty: 180 CAPSULE | Refills: 11 | Status: SHIPPED | OUTPATIENT
Start: 2023-12-08 | End: 2024-02-06

## 2023-12-08 NOTE — PROGRESS NOTES
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 Feels very well overall. Cr in mid 2s, recent increase in setting of supertherapeutic tacrolimus.   Transplant History:    Transplant Type:  DDKT (SPK)  Donor Type: Donation after Brain Death   Transplant Date:  9/14/2023 (Kidney / Pancreas)   Ureteral Stent:  Yes   Crossmatch:  negative   DSA at Tx:  No  Baseline Cr: 2.2-2.4   DeNovo DSA: No    Acute Rejection Hx:  No    Present Maintenance Immunosuppression:  Tacrolimus and Mycophenolate mofetil    CMV IgG Ab Discordance:  No  EBV IgG Ab Discordance:  No    BK Viremia:  No  EBV Viremia:  No    Transplant Coordinator: Makayla Wu     Transplant Office Phone Number: [phone]     Immunosuppressant Medications       Immunosuppressive Agents Disp Start End     mycophenolate (GENERIC EQUIVALENT) 250 MG capsule    240 capsule 10/17/2023     Sig - Route: Take 4 capsules (1,000 mg) by mouth 2 times daily - Oral    Class: E-Prescribe    Notes to Pharmacy: TXP DT 9/14/2023 (Kidney / Pancreas) TXP Dischg DT 9/21/2023 DX Kidney replaced by transplant Z94.0 TX Center [facility] ([place], [place])     tacrolimus (GENERIC) 0.5 MG capsule    60 capsule 12/5/2023     Sig: HOLD FOR FUTURE DOSE CHANGES.  Profile Rx: patient will contact pharmacy when needed    Class: E-Prescribe     tacrolimus (GENERIC) 1 MG capsule    120 capsule 12/5/2023     Sig - Route: Take 2 capsules (2 mg) by mouth 2 times daily - Oral    Class: E-Prescribe            Possible Immunosuppression-related side effects:   []             headache  []             vivid dreams  []             irritability  []             cognitive difficuties  []             fine tremor  []             nausea  []             diarrhea  []             neuropathy      []             edema  []             renal calcineurin toxicity  []             hyperkalemia  []             post-transplant  diabetes  []             decreased appetite  []             increased appetite  []             other:  []             none    Prescription Medications as of 12/8/2023         Rx Number Disp Refills Start End Last Dispensed Date Next Fill Date Owning Pharmacy    aspirin 81 MG EC tablet    11/27/2023        Sig: Take 1 tablet (81 mg) by mouth daily    Class: Historical    Route: Oral    atorvastatin (LIPITOR) 20 MG tablet  30 tablet 11 10/17/2023    [facility] #[id number] - [place], [place] - [address] AT [address] [facility]    Sig: Take 1 tablet (20 mg) by mouth at bedtime    Class: E-Prescribe    Route: Oral    Blood Glucose Monitoring Suppl (ACCU-CHEK GUIDE) w/Device KIT    4/2/2021    [facility] STORE #[id number] - [place], [place] - [address] AT [address] [facility]    Class: Historical    calcitRIOL (ROCALTROL) 0.25 MCG capsule  30 capsule 3 11/28/2023    [facility] #[id number] - [place], [place] - [address] AT [address] [facility]    Sig: Take 1 capsule (0.25 mcg) by mouth daily    Class: E-Prescribe    Route: Oral    calcium carbonate-vitamin D (OSCAL) 500-5 MG-MCG tablet  120 tablet 11 10/17/2023    [facility] #[id number] - [place], [place] - [address] AT [address] [facility]    Sig: Take 2 tablets by mouth 2 times daily    Class: E-Prescribe    Route: Oral    famotidine (PEPCID) 10 MG tablet  30 tablet 3 10/17/2023    [facility] #[id number] - [place], [place] - [address] AT [address] [facility]    Sig: Take 1 tablet (10 mg) by mouth every 24 hours    Class: E-Prescribe    Route: Oral    gabapentin (NEURONTIN) 100 MG capsule    11/15/2023 11/14/2024       Sig: Take 100 mg by mouth 3 times daily    Class: Historical    Route: Oral    magnesium oxide (MAG-OX) 400 MG tablet  30 tablet 11 10/17/2023    [facility] #[id number] - [place], [place] - [address] AT [address] [facility]    Sig: Take 1 tablet (400 mg) by mouth every 24 hours    Class:  E-Prescribe    Route: Oral    metoprolol tartrate (LOPRESSOR) 25 MG tablet  30 tablet 3 10/17/2023    [email or web address] #[id number] - [place], [place] - [address] AT [address] [facility]    Sig: Take 0.5 tablets (12.5 mg) by mouth 2 times daily    Class: E-Prescribe    Route: Oral    Misc. Devices (PILL SPLITTER) MISC  1 each 0 2023    [facility] #[id number] - [place], [place] - [address] [facility]    Si applicator daily    Class: E-Prescribe    Route: Does not apply    mycophenolate (GENERIC EQUIVALENT) 250 MG capsule  240 capsule 11 10/17/2023    [email or web address] #[id number] - [place], [place] - [address] [facility]    Sig: Take 4 capsules (1,000 mg) by mouth 2 times daily    Class: E-Prescribe    Notes to Pharmacy: TXP DT 2023 (Kidney / Pancreas) TXP Dischg DT 2023 DX Kidney replaced by transplant Z94.0 TX Center [facility] ([place], [place])    Route: Oral    nystatin (MYCOSTATIN) 075441 UNIT/ML suspension  473 mL 1 10/17/2023    [email or web address] #[id number] - [place], [place] - [address] AT [address] [facility]    Sig: Take 5 mLs (500,000 Units) by mouth 4 times daily    Class: E-Prescribe    Route: Oral    pantoprazole (PROTONIX) 40 MG EC tablet  30 tablet 3 10/17/2023    [email or web address] ST[facility] #[id number] - [place], [place] - [address] AT [address] [facility]    Sig: Take 1 tablet (40 mg) by mouth daily    Class: E-Prescribe    Route: Oral    senna-docusate (SENOKOT-S/PERICOLACE) 8.6-50 MG tablet  100 tablet 0 10/17/2023    [email or web address] ST[facility] #[id number] - [place], [place] - [address] AT [address] [facility]    Sig: Take 1-2 tablets by mouth 2 times daily HOLD for loose stools    Class: E-Prescribe    Route: Oral    sodium bicarbonate 650 MG tablet  120 tablet 3 10/5/2023    [facility] #[id number] - [place], [place] - [address] [address]    Sig: Take 2 tablets (1,300  mg) by mouth 2 times daily    Class: E-Prescribe    Route: Oral    sulfamethoxazole-trimethoprim (BACTRIM) 400-80 MG tablet  30 tablet 0 11/20/2023    [facility] #[id number] - [place], [place] - [address] AT [facility]    Sig: Take 1 tablet by mouth daily    Class: E-Prescribe    Route: Oral    tacrolimus (GENERIC) 0.5 MG capsule  60 capsule 11 12/5/2023    [facility] #[id number] - [place], [place] - [address] [facility]    Sig: HOLD FOR FUTURE DOSE CHANGES.  Profile Rx: patient will contact pharmacy when needed    Class: E-Prescribe    tacrolimus (GENERIC) 1 MG capsule  120 capsule 11 12/5/2023    [facility] #[id number] - [place], [place] - [address] AT [facility]    Sig: Take 2 capsules (2 mg) by mouth 2 times daily    Class: E-Prescribe    Route: Oral    valGANciclovir (VALCYTE) 450 MG tablet    11/27/2023        Sig: Take 450 mg by mouth every other day    Class: Historical    Route: Oral            O:      General Appearance: in no apparent distress.   Skin: Normal, no rashes or jaundice  Heart: regular rate and rhythm, normal S1 and S2  Lungs: easy respirations, no audible wheezing.  Abdomen: soft, rounded, nontender. Midline wound healed very well, no e/o infection, no hernia  Extremities: Tremor absent.   Edema: absent.         Latest Ref Rng & Units 12/7/2023     8:18 AM 12/4/2023     8:31 AM 11/30/2023     8:25 AM 11/27/2023     9:04 AM 11/20/2023     8:19 AM   Transplant Immunosuppression Labs   Creat 0.67 - 1.17 mg/dL 2.21  2.22  2.54  2.35  2.46    Urea Nitrogen 8.0 - 23.0 mg/dL 26.0  28.7  26.9  25.1  26.1    WBC 4.0 - 11.0 10e3/uL 6.2  5.9   7.1  5.3        Chemistries:   Recent Labs   Lab Test 12/07/23  0818   BUN 26.0*   CR 2.21*   GFRESTIMATED 32*   GLC 96     Lab Results   Component Value Date    A1C 8.4 09/15/2023    CPEPT 7.7 08/05/2021     Recent Labs   Lab Test 10/17/23  1457   ALBUMIN 2.2*   BILITOTAL 0.2   ALKPHOS 41   AST  21   ALT 13     Urine Studies:  Recent Labs   Lab Test 10/17/23  1629   COLOR Light Yellow   APPEARANCE Clear   URINEGLC 1000*   URINEBILI Negative   URINEKETONE Negative   SG 1.005   UBLD Large*   URINEPH 6.0   PROTEIN 30*   NITRITE Negative   LEUKEST Small*   RBCU >182*   WBCU 14*     No lab results found.  Hematology:   Recent Labs   Lab Test 12/07/23  0818 12/04/23  0831 11/27/23  0904   HGB 10.0* 10.1* 10.8*    167 174   WBC 6.2 5.9 7.1     Coags:   Recent Labs   Lab Test 10/18/23  0707 10/17/23  1457   INR 1.30* 1.55*     HLA antibodies:   SA1 HI RISK ОЛЕГ   Date Value Ref Range Status   10/12/2023 None  Final     SA1 MOD RISK ОЛЕГ   Date Value Ref Range Status   10/12/2023 None  Final     SA2 HI RISK ОЛЕГ   Date Value Ref Range Status   10/12/2023 None  Final     SA2 MOD RISK ОЛЕГ   Date Value Ref Range Status   10/12/2023 None  Final       Assessment: Avelina Marion is doing well s/p DDKT (SPK):  Issues we addressed during his visit include:    Plan:    1. Graft function: Pancreas function excellent. Kidney function with elevated Cr, partially immsx related, otherwise unclear- did not settle well post surgery  2. Immunosuppression Management: No change continue tac and mpa but adjusting tac for recent high level  .  Complexity of management:Medium.  Contributing factors: CNI toxicity, organ dysfunction  Followup: as needed    Total Time: 20 min,   Counselling Time: 15 min.      Tk Ascencio MD

## 2023-12-08 NOTE — TELEPHONE ENCOUNTER
Profex message sent to patient regarding:  Tacrolimus IR level 6.7 on 12/7, goal 8-10, dose 2 mg BID.     PLAN:   Please call patient and confirm this was an accurate 12-hour trough. Verify Tacrolimus IR dose 2 mg BID. Confirm no new medications or illness. Confirm no missed doses. If accurate trough and accurate dose, increase Tacrolimus IR dose to 3 mg BID and repeat labs in 3 days      No

## 2023-12-08 NOTE — TELEPHONE ENCOUNTER
Spoke to patient who confirms this was an accurate 12-hour trough. Verified Tacrolimus IR dose 2 mg BID. Confirmed no new medications or illness. Confirmed no missed doses. Patient confirms increase Tacrolimus IR dose to 3 mg BID and repeat labs in 3 days

## 2023-12-11 ENCOUNTER — LAB (OUTPATIENT)
Dept: LAB | Facility: CLINIC | Age: 68
End: 2023-12-11
Payer: COMMERCIAL

## 2023-12-11 DIAGNOSIS — Z48.298 AFTERCARE FOLLOWING ORGAN TRANSPLANT: ICD-10-CM

## 2023-12-11 DIAGNOSIS — Z20.828 CONTACT WITH AND (SUSPECTED) EXPOSURE TO OTHER VIRAL COMMUNICABLE DISEASES: ICD-10-CM

## 2023-12-11 DIAGNOSIS — Z79.899 ENCOUNTER FOR LONG-TERM CURRENT USE OF MEDICATION: ICD-10-CM

## 2023-12-11 DIAGNOSIS — Z98.890 OTHER SPECIFIED POSTPROCEDURAL STATES: ICD-10-CM

## 2023-12-11 DIAGNOSIS — Z94.0 KIDNEY REPLACED BY TRANSPLANT: ICD-10-CM

## 2023-12-11 LAB
AMYLASE SERPL-CCNC: 55 U/L (ref 28–100)
ANION GAP SERPL CALCULATED.3IONS-SCNC: 9 MMOL/L (ref 7–15)
BUN SERPL-MCNC: 24.4 MG/DL (ref 8–23)
CALCIUM SERPL-MCNC: 8.7 MG/DL (ref 8.8–10.2)
CHLORIDE SERPL-SCNC: 108 MMOL/L (ref 98–107)
CREAT SERPL-MCNC: 2 MG/DL (ref 0.67–1.17)
DEPRECATED HCO3 PLAS-SCNC: 22 MMOL/L (ref 22–29)
EGFRCR SERPLBLD CKD-EPI 2021: 36 ML/MIN/1.73M2
GLUCOSE SERPL-MCNC: 91 MG/DL (ref 70–99)
LIPASE SERPL-CCNC: 30 U/L (ref 13–60)
POTASSIUM SERPL-SCNC: 4 MMOL/L (ref 3.4–5.3)
SODIUM SERPL-SCNC: 139 MMOL/L (ref 135–145)
TACROLIMUS BLD-MCNC: 12.3 UG/L (ref 5–15)
TME LAST DOSE: NORMAL H
TME LAST DOSE: NORMAL H

## 2023-12-11 PROCEDURE — 99000 SPECIMEN HANDLING OFFICE-LAB: CPT | Performed by: PATHOLOGY

## 2023-12-11 PROCEDURE — 80048 BASIC METABOLIC PNL TOTAL CA: CPT | Performed by: PATHOLOGY

## 2023-12-11 PROCEDURE — 80197 ASSAY OF TACROLIMUS: CPT | Performed by: INTERNAL MEDICINE

## 2023-12-11 PROCEDURE — 82150 ASSAY OF AMYLASE: CPT | Performed by: PATHOLOGY

## 2023-12-11 PROCEDURE — 36415 COLL VENOUS BLD VENIPUNCTURE: CPT | Performed by: PATHOLOGY

## 2023-12-11 PROCEDURE — 83690 ASSAY OF LIPASE: CPT | Performed by: PATHOLOGY

## 2023-12-12 LAB
DONOR IDENTIFICATION: NORMAL
DSA COMMENTS: NORMAL
DSA PRESENT: NO
DSA TEST METHOD: NORMAL
ORGAN: NORMAL
SA 1 CELL: NORMAL
SA 1 TEST METHOD: NORMAL
SA 2 CELL: NORMAL
SA 2 TEST METHOD: NORMAL
SA1 HI RISK ABY: NORMAL
SA1 MOD RISK ABY: NORMAL
SA2 HI RISK ABY: NORMAL
SA2 MOD RISK ABY: NORMAL
UNACCEPTABLE ANTIGENS: NORMAL
UNOS CPRA: 23
ZZZSA 1  COMMENTS: NORMAL
ZZZSA 2 COMMENTS: NORMAL

## 2023-12-14 ENCOUNTER — LAB (OUTPATIENT)
Dept: LAB | Facility: CLINIC | Age: 68
End: 2023-12-14
Payer: COMMERCIAL

## 2023-12-14 DIAGNOSIS — Z98.890 OTHER SPECIFIED POSTPROCEDURAL STATES: ICD-10-CM

## 2023-12-14 DIAGNOSIS — Z94.0 KIDNEY REPLACED BY TRANSPLANT: ICD-10-CM

## 2023-12-14 DIAGNOSIS — Z48.298 AFTERCARE FOLLOWING ORGAN TRANSPLANT: ICD-10-CM

## 2023-12-14 DIAGNOSIS — Z20.828 CONTACT WITH AND (SUSPECTED) EXPOSURE TO OTHER VIRAL COMMUNICABLE DISEASES: ICD-10-CM

## 2023-12-14 DIAGNOSIS — Z79.899 ENCOUNTER FOR LONG-TERM CURRENT USE OF MEDICATION: ICD-10-CM

## 2023-12-14 LAB
AMYLASE SERPL-CCNC: 49 U/L (ref 28–100)
ANION GAP SERPL CALCULATED.3IONS-SCNC: 9 MMOL/L (ref 7–15)
BUN SERPL-MCNC: 24.7 MG/DL (ref 8–23)
CALCIUM SERPL-MCNC: 9 MG/DL (ref 8.8–10.2)
CHLORIDE SERPL-SCNC: 108 MMOL/L (ref 98–107)
CREAT SERPL-MCNC: 2.01 MG/DL (ref 0.67–1.17)
DEPRECATED HCO3 PLAS-SCNC: 22 MMOL/L (ref 22–29)
EGFRCR SERPLBLD CKD-EPI 2021: 36 ML/MIN/1.73M2
ERYTHROCYTE [DISTWIDTH] IN BLOOD BY AUTOMATED COUNT: 14.4 % (ref 10–15)
GLUCOSE SERPL-MCNC: 96 MG/DL (ref 70–99)
HCT VFR BLD AUTO: 29.4 % (ref 40–53)
HGB BLD-MCNC: 9.3 G/DL (ref 13.3–17.7)
LIPASE SERPL-CCNC: 29 U/L (ref 13–60)
MAGNESIUM SERPL-MCNC: 1.5 MG/DL (ref 1.7–2.3)
MCH RBC QN AUTO: 31.2 PG (ref 26.5–33)
MCHC RBC AUTO-ENTMCNC: 31.6 G/DL (ref 31.5–36.5)
MCV RBC AUTO: 99 FL (ref 78–100)
PHOSPHATE SERPL-MCNC: 3.3 MG/DL (ref 2.5–4.5)
PLATELET # BLD AUTO: 159 10E3/UL (ref 150–450)
POTASSIUM SERPL-SCNC: 4.5 MMOL/L (ref 3.4–5.3)
RBC # BLD AUTO: 2.98 10E6/UL (ref 4.4–5.9)
SODIUM SERPL-SCNC: 139 MMOL/L (ref 135–145)
TACROLIMUS BLD-MCNC: 10.7 UG/L (ref 5–15)
TME LAST DOSE: NORMAL H
TME LAST DOSE: NORMAL H
WBC # BLD AUTO: 3.6 10E3/UL (ref 4–11)

## 2023-12-14 PROCEDURE — 85027 COMPLETE CBC AUTOMATED: CPT | Performed by: PATHOLOGY

## 2023-12-14 PROCEDURE — 82150 ASSAY OF AMYLASE: CPT | Performed by: PATHOLOGY

## 2023-12-14 PROCEDURE — 87799 DETECT AGENT NOS DNA QUANT: CPT | Performed by: INTERNAL MEDICINE

## 2023-12-14 PROCEDURE — 83690 ASSAY OF LIPASE: CPT | Performed by: PATHOLOGY

## 2023-12-14 PROCEDURE — 83735 ASSAY OF MAGNESIUM: CPT | Performed by: PATHOLOGY

## 2023-12-14 PROCEDURE — 99000 SPECIMEN HANDLING OFFICE-LAB: CPT | Performed by: PATHOLOGY

## 2023-12-14 PROCEDURE — 84100 ASSAY OF PHOSPHORUS: CPT | Performed by: PATHOLOGY

## 2023-12-14 PROCEDURE — 80197 ASSAY OF TACROLIMUS: CPT | Performed by: INTERNAL MEDICINE

## 2023-12-14 PROCEDURE — 36415 COLL VENOUS BLD VENIPUNCTURE: CPT | Performed by: PATHOLOGY

## 2023-12-14 PROCEDURE — 80048 BASIC METABOLIC PNL TOTAL CA: CPT | Performed by: PATHOLOGY

## 2023-12-15 LAB — BKV DNA # SPEC NAA+PROBE: NOT DETECTED COPIES/ML

## 2023-12-26 DIAGNOSIS — Z94.0 HISTORY OF SIMULTANEOUS KIDNEY AND PANCREAS TRANSPLANT (H): Primary | ICD-10-CM

## 2023-12-26 DIAGNOSIS — Z94.83 HISTORY OF SIMULTANEOUS KIDNEY AND PANCREAS TRANSPLANT (H): Primary | ICD-10-CM

## 2023-12-26 RX ORDER — SULFAMETHOXAZOLE AND TRIMETHOPRIM 400; 80 MG/1; MG/1
1 TABLET ORAL DAILY
Qty: 30 TABLET | Refills: 11 | Status: SHIPPED | OUTPATIENT
Start: 2023-12-26 | End: 2024-02-19

## 2023-12-29 ENCOUNTER — TELEPHONE (OUTPATIENT)
Dept: TRANSPLANT | Facility: CLINIC | Age: 68
End: 2023-12-29
Payer: COMMERCIAL

## 2023-12-29 NOTE — TELEPHONE ENCOUNTER
Left Voicemail (1st Attempt) for the patient to call back and schedule the following:    Appointment type: AKT  Provider: CHET YEE EL RIFAI  Return date: 6/21/24  Specialty phone number: [phone]  Additional appointment(s) needed: LAB  Additonal Notes: RESCHEDULE NEEDED. Please reschedule pt with Dr. Yee, Dr. Mosquera, or Dr. Zane Hill.

## 2023-12-30 ENCOUNTER — HEALTH MAINTENANCE LETTER (OUTPATIENT)
Age: 68
End: 2023-12-30

## 2024-01-03 ENCOUNTER — TELEPHONE (OUTPATIENT)
Dept: TRANSPLANT | Facility: CLINIC | Age: 69
End: 2024-01-03
Payer: COMMERCIAL

## 2024-01-03 NOTE — TELEPHONE ENCOUNTER
Left Voicemail (2nd Attempt) for the patient to call back and schedule the following:    Appointment type: AKT  Provider: LIZA REILLY SPONG  Return date: 6/21/24  Specialty phone number: [phone]  Additional appointment(s) needed: LAB  Additonal Notes: RESCHEDULE NEEDED. Appts rescheduled. Reschedule with Dr. Mosquera, Dr. Zane Hill, or Dr. Yee.

## 2024-01-16 ENCOUNTER — TELEPHONE (OUTPATIENT)
Dept: TRANSPLANT | Facility: CLINIC | Age: 69
End: 2024-01-16
Payer: COMMERCIAL

## 2024-01-18 NOTE — TELEPHONE ENCOUNTER
Called patient and left voicemail that he is behind on lab work. Message states he should be getting weekly labs until mid-march. Asked patient to return call to touch base about transplant care.

## 2024-01-18 NOTE — TELEPHONE ENCOUNTER
Spoke with patient about lapse in labs. He was unaware that he needed to get labs weekly, and will schedule as soon as possible. Patient states he feels slightly lightheaded in the AM when first waking up but has not checked his blood pressure. Will create BP log and bring to his next appointment with Dr. AMILCAR Frederick on 1/23. Overall, patient feels well with no complaints.

## 2024-01-19 RX ORDER — SOD PHOS DI, MONO/K PHOS MONO 250 MG
TABLET ORAL
Qty: 30 TABLET | Refills: 0 | OUTPATIENT
Start: 2024-01-19

## 2024-01-23 ENCOUNTER — VIRTUAL VISIT (OUTPATIENT)
Dept: PHARMACY | Facility: CLINIC | Age: 69
End: 2024-01-23
Payer: COMMERCIAL

## 2024-01-23 ENCOUNTER — LAB (OUTPATIENT)
Dept: LAB | Facility: CLINIC | Age: 69
End: 2024-01-23
Attending: INTERNAL MEDICINE
Payer: COMMERCIAL

## 2024-01-23 ENCOUNTER — OFFICE VISIT (OUTPATIENT)
Dept: TRANSPLANT | Facility: CLINIC | Age: 69
End: 2024-01-23
Attending: INTERNAL MEDICINE
Payer: COMMERCIAL

## 2024-01-23 VITALS
DIASTOLIC BLOOD PRESSURE: 62 MMHG | SYSTOLIC BLOOD PRESSURE: 110 MMHG | HEART RATE: 67 BPM | BODY MASS INDEX: 26.89 KG/M2 | OXYGEN SATURATION: 100 % | WEIGHT: 195.5 LBS

## 2024-01-23 DIAGNOSIS — Z23 NEED FOR COVID-19 VACCINE: Primary | ICD-10-CM

## 2024-01-23 DIAGNOSIS — Z48.298 AFTERCARE FOLLOWING ORGAN TRANSPLANT: ICD-10-CM

## 2024-01-23 DIAGNOSIS — Z95.1 S/P CABG (CORONARY ARTERY BYPASS GRAFT): ICD-10-CM

## 2024-01-23 DIAGNOSIS — E78.5 HYPERLIPIDEMIA WITH TARGET LDL LESS THAN 100: ICD-10-CM

## 2024-01-23 DIAGNOSIS — Z94.0 HISTORY OF SIMULTANEOUS KIDNEY AND PANCREAS TRANSPLANT (H): ICD-10-CM

## 2024-01-23 DIAGNOSIS — Z98.890 OTHER SPECIFIED POSTPROCEDURAL STATES: ICD-10-CM

## 2024-01-23 DIAGNOSIS — I10 ESSENTIAL HYPERTENSION: ICD-10-CM

## 2024-01-23 DIAGNOSIS — T86.11 KIDNEY TRANSPLANT REJECTION: ICD-10-CM

## 2024-01-23 DIAGNOSIS — Z86.2 HISTORY OF ANEMIA DUE TO CKD: ICD-10-CM

## 2024-01-23 DIAGNOSIS — Z79.899 ENCOUNTER FOR LONG-TERM CURRENT USE OF MEDICATION: ICD-10-CM

## 2024-01-23 DIAGNOSIS — I15.1 HTN, KIDNEY TRANSPLANT RELATED: ICD-10-CM

## 2024-01-23 DIAGNOSIS — D84.9 IMMUNOSUPPRESSION (H): ICD-10-CM

## 2024-01-23 DIAGNOSIS — Z94.0 KIDNEY REPLACED BY TRANSPLANT: ICD-10-CM

## 2024-01-23 DIAGNOSIS — G62.9 NEUROPATHY: ICD-10-CM

## 2024-01-23 DIAGNOSIS — Z94.83 HISTORY OF SIMULTANEOUS KIDNEY AND PANCREAS TRANSPLANT (H): ICD-10-CM

## 2024-01-23 DIAGNOSIS — N18.9 HISTORY OF ANEMIA DUE TO CKD: ICD-10-CM

## 2024-01-23 DIAGNOSIS — Z48.298 AFTERCARE FOLLOWING ORGAN TRANSPLANT: Primary | ICD-10-CM

## 2024-01-23 DIAGNOSIS — Z94.0 HTN, KIDNEY TRANSPLANT RELATED: ICD-10-CM

## 2024-01-23 DIAGNOSIS — K21.9 GASTROESOPHAGEAL REFLUX DISEASE, UNSPECIFIED WHETHER ESOPHAGITIS PRESENT: ICD-10-CM

## 2024-01-23 DIAGNOSIS — Z29.89 NEED FOR PNEUMOCYSTIS PROPHYLAXIS: ICD-10-CM

## 2024-01-23 DIAGNOSIS — Z78.9 TAKES DIETARY SUPPLEMENTS: ICD-10-CM

## 2024-01-23 DIAGNOSIS — Z20.828 CONTACT WITH AND (SUSPECTED) EXPOSURE TO OTHER VIRAL COMMUNICABLE DISEASES: ICD-10-CM

## 2024-01-23 DIAGNOSIS — Z94.83 PANCREAS REPLACED BY TRANSPLANT (H): ICD-10-CM

## 2024-01-23 LAB
ALBUMIN MFR UR ELPH: 18.8 MG/DL
AMYLASE SERPL-CCNC: 60 U/L (ref 28–100)
ANION GAP SERPL CALCULATED.3IONS-SCNC: 8 MMOL/L (ref 7–15)
BK VIRUS SPECIMEN TYPE: NORMAL
BKV DNA # SPEC NAA+PROBE: NOT DETECTED IU/ML
BUN SERPL-MCNC: 26.3 MG/DL (ref 8–23)
CALCIUM SERPL-MCNC: 9.1 MG/DL (ref 8.8–10.2)
CHLORIDE SERPL-SCNC: 107 MMOL/L (ref 98–107)
CREAT SERPL-MCNC: 2.17 MG/DL (ref 0.67–1.17)
CREAT UR-MCNC: 343 MG/DL
DEPRECATED HCO3 PLAS-SCNC: 24 MMOL/L (ref 22–29)
EGFRCR SERPLBLD CKD-EPI 2021: 33 ML/MIN/1.73M2
ERYTHROCYTE [DISTWIDTH] IN BLOOD BY AUTOMATED COUNT: 13.1 % (ref 10–15)
GLUCOSE SERPL-MCNC: 91 MG/DL (ref 70–99)
HCT VFR BLD AUTO: 32.1 % (ref 40–53)
HGB BLD-MCNC: 9.9 G/DL (ref 13.3–17.7)
LIPASE SERPL-CCNC: 30 U/L (ref 13–60)
MCH RBC QN AUTO: 30.6 PG (ref 26.5–33)
MCHC RBC AUTO-ENTMCNC: 30.8 G/DL (ref 31.5–36.5)
MCV RBC AUTO: 99 FL (ref 78–100)
PLATELET # BLD AUTO: 209 10E3/UL (ref 150–450)
POTASSIUM SERPL-SCNC: 4.9 MMOL/L (ref 3.4–5.3)
PROT/CREAT 24H UR: 0.05 MG/MG CR (ref 0–0.2)
PTH-INTACT SERPL-MCNC: 62 PG/ML (ref 15–65)
RBC # BLD AUTO: 3.24 10E6/UL (ref 4.4–5.9)
SODIUM SERPL-SCNC: 139 MMOL/L (ref 135–145)
TACROLIMUS BLD-MCNC: 8.7 UG/L (ref 5–15)
TME LAST DOSE: NORMAL H
TME LAST DOSE: NORMAL H
WBC # BLD AUTO: 5.6 10E3/UL (ref 4–11)

## 2024-01-23 PROCEDURE — G2211 COMPLEX E/M VISIT ADD ON: HCPCS | Performed by: INTERNAL MEDICINE

## 2024-01-23 PROCEDURE — 99214 OFFICE O/P EST MOD 30 MIN: CPT | Performed by: INTERNAL MEDICINE

## 2024-01-23 PROCEDURE — 87799 DETECT AGENT NOS DNA QUANT: CPT | Performed by: INTERNAL MEDICINE

## 2024-01-23 PROCEDURE — 91320 SARSCV2 VAC 30MCG TRS-SUC IM: CPT | Performed by: INTERNAL MEDICINE

## 2024-01-23 PROCEDURE — 250N000011 HC RX IP 250 OP 636: Performed by: INTERNAL MEDICINE

## 2024-01-23 PROCEDURE — 83970 ASSAY OF PARATHORMONE: CPT | Performed by: PATHOLOGY

## 2024-01-23 PROCEDURE — 36415 COLL VENOUS BLD VENIPUNCTURE: CPT | Performed by: PATHOLOGY

## 2024-01-23 PROCEDURE — 90480 ADMN SARSCOV2 VAC 1/ONLY CMP: CPT | Performed by: INTERNAL MEDICINE

## 2024-01-23 PROCEDURE — G0463 HOSPITAL OUTPT CLINIC VISIT: HCPCS | Performed by: INTERNAL MEDICINE

## 2024-01-23 PROCEDURE — 80197 ASSAY OF TACROLIMUS: CPT | Performed by: INTERNAL MEDICINE

## 2024-01-23 PROCEDURE — 84156 ASSAY OF PROTEIN URINE: CPT | Performed by: PATHOLOGY

## 2024-01-23 PROCEDURE — 99605 MTMS BY PHARM NP 15 MIN: CPT | Mod: 93 | Performed by: PHARMACIST

## 2024-01-23 PROCEDURE — 99607 MTMS BY PHARM ADDL 15 MIN: CPT | Mod: 93 | Performed by: PHARMACIST

## 2024-01-23 PROCEDURE — 83690 ASSAY OF LIPASE: CPT | Performed by: PATHOLOGY

## 2024-01-23 PROCEDURE — 86833 HLA CLASS II HIGH DEFIN QUAL: CPT | Performed by: INTERNAL MEDICINE

## 2024-01-23 PROCEDURE — 85027 COMPLETE CBC AUTOMATED: CPT | Performed by: PATHOLOGY

## 2024-01-23 PROCEDURE — 80048 BASIC METABOLIC PNL TOTAL CA: CPT | Performed by: PATHOLOGY

## 2024-01-23 PROCEDURE — 86832 HLA CLASS I HIGH DEFIN QUAL: CPT | Performed by: INTERNAL MEDICINE

## 2024-01-23 PROCEDURE — 80180 DRUG SCRN QUAN MYCOPHENOLATE: CPT | Performed by: INTERNAL MEDICINE

## 2024-01-23 PROCEDURE — 82150 ASSAY OF AMYLASE: CPT | Performed by: PATHOLOGY

## 2024-01-23 PROCEDURE — 99000 SPECIMEN HANDLING OFFICE-LAB: CPT | Performed by: PATHOLOGY

## 2024-01-23 RX ORDER — SODIUM BICARBONATE 650 MG/1
650 TABLET ORAL 2 TIMES DAILY
COMMUNITY
End: 2024-03-25

## 2024-01-23 RX ADMIN — COVID-19 VACCINE, MRNA 30 MCG: 0.05 INJECTION, SUSPENSION INTRAMUSCULAR at 09:22

## 2024-01-23 ASSESSMENT — PAIN SCALES - GENERAL: PAINLEVEL: NO PAIN (0)

## 2024-01-23 NOTE — LETTER
_  Medication List        Prepared on: 01/23/2024     Bring your Medication List when you go to the doctor, hospital, or   emergency room. And, share it with your family or caregivers.     Note any changes to how you take your medications.  Cross out medications when you no longer use them.    Medication How I take it Why I use it Prescriber   aspirin 81 MG EC tablet Take 1 tablet (81 mg) by mouth daily  Blood thinner Jeffrey Chaparro MD   atorvastatin (LIPITOR) 20 MG tablet Take 1 tablet (20 mg) by mouth at bedtime S/P CABG (Coronary Artery Bypass Graft); History of simultaneous kidney and pancreas transplant (H); Aftercare Following Organ Transplant; Kidney Transplanted Jesu Mosquera MD   calcitRIOL (ROCALTROL) 0.25 MCG capsule Take 1 capsule (0.25 mcg) by mouth daily Aftercare Following Organ Transplant;  History of simultaneous kidney and pancreas transplant (H); Kidney Transplanted Jeffrey Chaparro MD   calcium carbonate-vitamin D (OSCAL) 500-5 MG-MCG tablet Take 2 tablets by mouth 2 times daily supplement Jesu Mosquera MD   gabapentin (NEURONTIN) 100 MG capsule Take 200 mg by mouth 3 times daily  neuropathy Patient Reported   magnesium oxide (MAG-OX) 400 MG tablet Take 1 tablet (400 mg) by mouth every 24 hours Low magnesium Jesu Mosquera MD   metoprolol tartrate (LOPRESSOR) 25 MG tablet Take 0.5 tablets (12.5 mg) by mouth 2 times daily S/P CABG (Coronary Artery Bypass Graft); hypertension Jesu Mosquera MD   [facility] Devices (PILL SPLITTER) MISC 1 applicator daily Kidney Replaced by Transplant; Pancreas Replaced by Transplant (H) Adia Mccormack, FORREST   mycophenolate (GENERIC EQUIVALENT) 250 MG capsule Take 4 capsules (1,000 mg) by mouth 2 times daily S/P CABG (Coronary Artery Bypass Graft); History of simultaneous kidney and pancreas transplant (H); Aftercare Following Organ Transplant; Kidney Transplanted Jesu Mosquera MD   sodium bicarbonate 650 MG tablet Take 650 mg by mouth 2  times daily  Blood acid Patient Reported   sulfamethoxazole-trimethoprim (BACTRIM) 400-80 MG tablet Take 1 tablet by mouth daily History of simultaneous kidney and pancreas transplant (H) Leia Hill MD   tacrolimus (GENERIC) 0.5 MG capsule HOLD FOR FUTURE DOSE CHANGES.  Profile Rx: patient will contact pharmacy when needed History of simultaneous kidney and pancreas transplant (H) Jeffrey Chaparro MD   tacrolimus (GENERIC) 1 MG capsule Take 3 capsules (3 mg) by mouth 2 times daily History of simultaneous kidney and pancreas transplant (H) Jeffrey Chaparro MD         Add new medications, over-the-counter drugs, herbals, vitamins, or  minerals in the blank rows below.    Medication How I take it Why I use it Prescriber                                      Allergies:      No Known Allergies        Side effects I have had:               Other Information:              My notes and questions:

## 2024-01-23 NOTE — LETTER
"Recommended To-Do List      Prepared on: 01/23/2024       You can get the best results from your medications by completing the items on this \"To-Do List.\"      Bring your To-Do List when you go to your doctor. And, share it with your family or caregivers.    My To-Do List:  What we talked about: What I should do:   Your medication dosage being too high    Pharmacist will talk to your transplant team about decreasing your dosage of calcium carbonate-vitamin D (OSCAL)          What we talked about: What I should do:   A new medication that may be of benefit to you    Get the following vaccine(s): Flu shot due now.  After 3/14/24 you will be due for Prevnar 20, RSV, Tetanus. After that get a hepatitis B booster.            What we talked about: What I should do:                     "

## 2024-01-23 NOTE — PATIENT INSTRUCTIONS
COVID booster today    Continue weekly labs    Transplant Patient Information  Your Post Transplant Coordinator is: Makayla Wu  You and your care team can contact your transplant coordinator Monday - Friday, 8am - 5pm at [phone] (Option 2 to reach the coordinator or Option 4 to schedule an appointment).  You can also reach your care team online via [employer].  After hours for urgent matters, please call [facility] at [phone].

## 2024-01-23 NOTE — PROGRESS NOTES
Medication Therapy Management (MTM) Encounter    ASSESSMENT:                            Medication Adherence/Access: No issues identified    Kidney and Pancreas Transplant:    Discussed short and long term vaccines with patient     Supplements/ MBD:   Patient's calcium WNL, likely does not need calcium carb 1000mg twice daily at this point. Will talk to team about decreasing to 500mg twice daily.     GERD:   GERD meds removed from med list today, no issues.     Hypertension   Stable.     Hyperlipidemia   Stable.     Neuropathy:   Stable.     PLAN:                            Txp team...  You okay reducing patient's calcium to 500mg twice daily rather than 1000mg twice daily.     Pt to..  Flu shot due now.  After 3/14/24 you will be due for Prevnar 20, RSV, Tetanus. After that get a hepatitis B booster.     Follow-up: as needed    SUBJECTIVE/OBJECTIVE:                          Avelina Marion is a 68 year old male called for a follow-up visit from 11/28.       Reason for visit: 4 months post transplant.    Allergies/ADRs: Reviewed in chart  Past Medical History: Reviewed in chart  Tobacco: He reports that he has quit smoking. His smoking use included cigarettes. He has never used smokeless tobacco.  Alcohol: not currently using    Medication Adherence/Access: no issues reported     Kidney and Pancreas Transplant:    Tacrolimus 2 mg twice daily  Mycophenolate Mofetil 1000 mg twice daily.   Pt does have a little numbness in left hand that started after transplant. No change in this.   Transplant date: 9/14/23  Vascular Prophylaxis: Aspirin 81mg daily. Denies gastrointestinal bleed sx.   CMV prophylaxis: Completed.   PJP prophylaxis: Bactrim SS daily  Antifungal Prophylaxis: Completed  Tx Coordinator: Ayala Quiroga MD: Dr. Chaparro, Using Med Card: Yes  Immunizations: annual flu shot unknown; Pneumovax 23:  2017, 6/24/2022; Prevnar 20: none on file; TDaP:  2010; Shingrix: x2, HBV: indeterminate, COVID: Primary x 4 ,  23-24 x1  Estimated Creatinine Clearance: 42 mL/min (A) (based on SCr of 2.17 mg/dL (H)).     Supplements/ MBD:   Mag Oxide 400mg daily ( 2 hours from MMF)  Calcium/D 1000mg (2 tabs) twice daily  Calcitriol 0.25mcg daily  Sodium Bicarb 650mg twice daily.  Lab Results   Component Value Date    MAG 1.5 (L) 12/14/2023    MAG 1.5 (L) 12/04/2023    DAVID 9.1 01/23/2024    PTHI 62 01/23/2024    CO2 24 01/23/2024    CO2 22 12/14/2023     GERD:   Not taking Pantoprazole 40 mg once daily  Not taking Famotidine 10 mg once daily   Patient reports no current symptoms.   Patient feels that current regimen is effective.    Hypertension Metoprolol Tartrate 12.5mg twice daily  Patient reports no current medication side effects  Patient self monitors blood pressure. 120/70-80s    BP Readings from Last 3 Encounters:   01/23/24 110/62   11/27/23 120/69   10/23/23 128/70     Pulse Readings from Last 3 Encounters:   01/23/24 67   11/27/23 70   10/20/23 70     Hyperlipidemia Atorvastatin 20mg daily  Patient reports no significant myalgias or other side effects.  Hx of CVD.   Recent Labs   Lab Test 12/19/22  1744   CHOL 116   HDL 51   LDL 43   TRIG 109     Neuropathy:   Gabapentin 200mg 3x daily. No concerns today.     Today's Vitals: There were no vitals taken for this visit.  ----------------    I spent 17 minutes with this patient today. I offer these suggestions for consideration by txp team. A copy of the visit note was provided to the patient's provider(s).    A summary of these recommendations was sent via [employer].    Kg Grewal PharmD  MTM Pharmacist    Phone: [phone]     Telemedicine Visit Details  Type of service:  Telephone visit  Start Time: 10:09  AM  End Time: 10:26 AM     Medication Therapy Recommendations  Aftercare following organ transplant    Rationale: Preventive therapy - Needs additional medication therapy - Indication   Recommendation: Order Vaccine - Prevnar 20 0.5 ML Hui   Status: Accepted - no  CPA Needed         Takes dietary supplements    Current Medication: calcium carbonate-vitamin D (OSCAL) 500-5 MG-MCG tablet   Rationale: Dose too high - Dosage too high - Safety   Recommendation: Decrease Dose   Status: Contact Provider - Awaiting Response

## 2024-01-23 NOTE — Clinical Note
Recommend reducing calcium to 500mg twice daily. Calcium levels WNL currently.  Lab Results      Component                Value               Date                      DAVID                      9.1                 01/23/2024                DAVID                      9.0                 12/14/2023                DAVID                      8.7 (L)             12/11/2023

## 2024-01-23 NOTE — LETTER
1/23/2024         RE: Avelina Marion  [address] [place] [address]        Dear Colleague,    Thank you for referring your patient, Avelina Marion, to the [facility]. Please see a copy of my visit note below.    TRANSPLANT NEPHROLOGY EARLY POST TRANSPLANT VISIT    Assessment & Plan  # DDKT (SPK): stable with recent downtrend Cr down to 2-2.1 on the most recent checks  - multiple ischemic insults complicated by post biopsy hematoma s/p embolization and AVF as well as partial loss of renal function post embolization. Given downtrend in Scr and concerns regarding bleeding post biopsy will hold off for now and consider biopsy again for Scr >2.6    - Baseline Creatinine: ~ 2.2-2.5   - Proteinuria: Normal (<0.2 grams)   - Date DSA Last Checked: Oct/2023      Latest DSA: No   - BK Viremia: No   - Kidney Tx Biopsy: Oct 17, 2023; Result: t3i1v0, borderline ACR.  Treated with solumedrol 500mg IV daily x3 days   - Transplant Ureteral Stent: Removed 10/23/23     # Pancreas Tx (SPK):    - Pancreatic Exocrine Drainage: Enteric drained     - Blood glucose: Euglycemia      On insulin: No   - HbA1c: Last checked at time of transplant      Latest HbA1c: 8.4% Sep 2023, due for update   - Pancreatic enzymes: Stable   - Date DSA Last Checked: Oct/2023  Latest DSA: No   - Pancreas Tx Biopsy: No   -on ASA 81mg daily     # Immunosuppression: Tacrolimus immediate release (goal 8-10) and Mycophenolate mofetil (dose 1000 mg every 12 hours)   - Induction with Recent Transplant:  High Intensity Protocol   - Continue with intensive monitoring of immunosuppression for efficacy and toxicity.   - Changes: Not at this time    #  Tessa-transplant hematoma Post Kidney biopsy:    -s/p transplant renal biopsy 10/17/23 complicated by hemorrhage s/p arrest s/p 3 min of CPR               - Found to have AV fistula with extravasation on angiography by IR on 10/2023 s/p coiling     # Possible peripheral neuropathy:   -EMG  11/2023: L median n neuropathy.   -on low dose gabapentin  - Management per PCP    # CAD: S/p CABG 2/2023. On metoprolol 12.5 mg PO BID. Aspirin 81 mg po qd   # PAD     # Non-melanotic skin cancer: S/p Mohs 8/2022   - continue regular dermatology follow up    # Infection Prophylaxis:   - PJP: Sulfa/TMP (Bactrim)    # Hypertension: Controlled;  Goal BP: < 130/80   - Volume status: Euvolemic     - Changes: Not at this time. Continue metoprolol 12.5mg bid     # Anemia in Chronic Renal Disease: Hgb: Stable      MCKENNA: No   - Iron studies: Replete    # Mineral Bone Disorder:    - Secondary renal hyperparathyroidism; PTH level: Minimally elevated ( pg/ml)        On treatment: Calcitriol 0.5mcg daily   - Vitamin D; level: Low normal        On supplement: Yes   - Calcium; level: Normal        On supplement: Yes   - Phosphorus; level: Normal        On supplement: No     # Electrolytes:   - Potassium; level: Normal        On supplement: No  - Magnesium; level: Low normal        On supplement: Yes, mag ox 400mg daily   - Bicarbonate; level: Normal        On supplement: Yes, bicarb 1300mg bid    # Medical Compliance: Yes    # Health Maintenance and Vaccination Review: Recommend:  vaccinations at 6m post txp    # Transplant History:  Etiology of Kidney Failure: Diabetes mellitus type 2  Tx: SPK  Transplant: 9/14/2023 (Kidney / Pancreas)  Donor Type: Donation after Brain Death Donor Class:   Crossmatch at time of Tx: negative  DSA at time of Tx: No  Significant changes in immunosuppression: None  CMV IgG Ab High Risk Discordance (D+/R-): No  EBV IgG Ab High Risk Discordance (D+/R-): No  Significant transplant-related complications: Acute cellular-mediated rejection (borderline ACR of kidney)    Transplant Office Phone Number: [phone]    Assessment and plan was discussed with the patient and he voiced his understanding and agreement.    Return visit: No follow-ups on file.    Leia Ruiz MD    Chief Complaint    Sanam is a 68 year old here for kidney transplant, immunosuppression management and routine follow up.     History of Present Illness   Feels good overall. Reports stable neuropathic pain in L hand, since his transplant surgery, symptoms controlled on low dose gabapentin. Energy level is good. Denies any fevers, chills, weight loss, night sweats. No nausea, vomiting, abdominal pain, diarrhea. No chest pain, sob, leg swelling. No recent illness or hospitalization.       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)     Vitamin D deficiency     CAD (coronary artery disease)     Kidney replaced by transplant     Immunosuppressed status (H24)     Dehydration     Anemia due to blood loss, acute     Postoperative cardiac arrest following non-cardiac surgery     Kidney transplant rejection     Pancreas replaced by transplant (H)     Aftercare following organ transplant       Allergies  No Known Allergies    Medications  Current Outpatient Medications   Medication Sig     aspirin 81 MG EC tablet Take 1 tablet (81 mg) by mouth daily     atorvastatin (LIPITOR) 20 MG tablet Take 1 tablet (20 mg) by mouth at bedtime     Blood Glucose Monitoring Suppl (ACCU-CHEK GUIDE) w/Device KIT      calcitRIOL (ROCALTROL) 0.25 MCG capsule Take 1 capsule (0.25 mcg) by mouth daily     calcium carbonate-vitamin D (OSCAL) 500-5 MG-MCG tablet Take 2 tablets by mouth 2 times daily     gabapentin (NEURONTIN) 100 MG capsule Take 100  mg by mouth 3 times daily     magnesium oxide (MAG-OX) 400 MG tablet Take 1 tablet (400 mg) by mouth every 24 hours     metoprolol tartrate (LOPRESSOR) 25 MG tablet Take 0.5 tablets (12.5 mg) by mouth 2 times daily     Misc. Devices (PILL SPLITTER) MISC 1 applicator daily     mycophenolate (GENERIC EQUIVALENT) 250 MG capsule Take 4 capsules (1,000 mg) by mouth 2 times daily     pantoprazole (PROTONIX) 40 MG EC tablet Take 1 tablet (40 mg) by mouth daily     senna-docusate (SENOKOT-S/PERICOLACE) 8.6-50 MG tablet Take 1-2 tablets by mouth 2 times daily HOLD for loose stools     sodium bicarbonate 650 MG tablet Take 2 tablets (1,300 mg) by mouth 2 times daily (Patient taking differently: Take 650 mg by mouth 2 times daily)     sulfamethoxazole-trimethoprim (BACTRIM) 400-80 MG tablet Take 1 tablet by mouth daily     tacrolimus (GENERIC) 0.5 MG capsule HOLD FOR FUTURE DOSE CHANGES.  Profile Rx: patient will contact pharmacy when needed     tacrolimus (GENERIC) 1 MG capsule Take 3 capsules (3 mg) by mouth 2 times daily     valGANciclovir (VALCYTE) 450 MG tablet Take 450 mg by mouth every other day     famotidine (PEPCID) 10 MG tablet Take 1 tablet (10 mg) by mouth every 24 hours (Patient not taking: Reported on 1/23/2024)     nystatin (MYCOSTATIN) 256834 UNIT/ML suspension Take 5 mLs (500,000 Units) by mouth 4 times daily (Patient not taking: Reported on 1/23/2024)     No current facility-administered medications for this visit.     There are no discontinued medications.    Physical Exam  Vital Signs: /62 (BP Location: Right arm, Patient Position: Sitting, Cuff Size: Adult Regular)   Pulse 67   Wt 88.7 kg (195 lb 8 oz)   SpO2 100%   BMI 26.89 kg/m      GENERAL APPEARANCE: alert and no distress  HENT: mouth without ulcers or lesions  RESP: lungs clear to auscultation - no rales, rhonchi or wheezes  CV: regular rhythm, normal rate, no rub, no murmur  EDEMA: no LE edema bilaterally  ABDOMEN: soft, nondistended,  nontender, bowel sounds normal  MS: extremities normal - no gross deformities noted, no evidence of inflammation in joints, no muscle tenderness  SKIN: no rash  NEURO: normal strength and tone, sensory exam grossly normal, mentation intact and speech normal  TX KIDNEY: normal  DIALYSIS ACCESS:  LUE AV fistula with good thrill and bruit    Data        Latest Ref Rng & Units 1/23/2024     7:52 AM 12/14/2023     8:17 AM 12/11/2023     8:32 AM   Renal   Sodium 135 - 145 mmol/L 139  139  139    K 3.4 - 5.3 mmol/L 4.9  4.5  4.0    Cl 98 - 107 mmol/L 107  108  108    Cl (external) 98 - 107 mmol/L 107  108  108    CO2 22 - 29 mmol/L 24  22  22    Urea Nitrogen 8.0 - 23.0 mg/dL 26.3  24.7  24.4    Creatinine 0.67 - 1.17 mg/dL 2.17  2.01  2.00    Glucose 70 - 99 mg/dL 91  96  91    Calcium 8.8 - 10.2 mg/dL 9.1  9.0  8.7    Magnesium 1.7 - 2.3 mg/dL  1.5           Latest Ref Rng & Units 12/14/2023     8:17 AM 12/4/2023     8:31 AM 11/20/2023     8:19 AM   Bone Health   Phosphorus 2.5 - 4.5 mg/dL 3.3  2.9  2.8          Latest Ref Rng & Units 1/23/2024     7:52 AM 12/14/2023     8:17 AM 12/7/2023     8:18 AM   Heme   WBC 4.0 - 11.0 10e3/uL 5.6  3.6  6.2    Hgb 13.3 - 17.7 g/dL 9.9  9.3  10.0    Plt 150 - 450 10e3/uL 209  159  175          Latest Ref Rng & Units 10/17/2023     2:57 PM 9/14/2023     3:39 PM 2/14/2023     1:11 AM   Liver   AP 40 - 129 U/L 41  60  35    TBili <=1.2 mg/dL 0.2  0.9  0.3    ALT 0 - 70 U/L 13  6  10    AST 0 - 45 U/L 21  22  46    Tot Protein 6.4 - 8.3 g/dL 3.6  7.3  4.5    Albumin 3.5 - 5.2 g/dL 2.2  4.3  2.9          Latest Ref Rng & Units 1/23/2024     7:52 AM 12/14/2023     8:17 AM 12/11/2023     8:32 AM   Pancreas   Amylase 28 - 100 U/L 60  49  55    Lipase (Roche) 13 - 60 U/L 30  29  30          Latest Ref Rng & Units 9/29/2023     8:48 AM 9/24/2023     8:35 AM 2/16/2023     8:34 AM   Iron studies   Iron 61 - 157 ug/dL 27  32  24    Iron Sat Index 15 - 46 % 16  21  20    Ferritin 31 - 409 ng/mL  1,256  1,164  1,461          Latest Ref Rng & Units 9/14/2023     3:39 PM 8/9/2022     6:26 AM 8/5/2021     2:23 AM   [facility] Txp Virology   EBV CAPSID ANTIBODY IGG No detectable antibody. Positive  Positive  Positive        Recent Labs   Lab Test 12/07/23  0818 12/11/23  0832 12/14/23  0817   DOSTAC 12/6/2023 12/10/2023 12/13/2023   TACROL 6.7 12.3 10.7     Recent Labs   Lab Test 10/23/23  0944 11/09/23  0832 12/04/23  0831   DOSMPA 10/22/2023   9:45 PM 11/8/2023   9:30 PM  --    MPACID 3.83* 2.73 8.55*   MPAG 133.4* 136.1* 199.8*     The longitudinal plan of care for Avelina Marion was addressed during this visit. Due to the added complexity in care, I will continue to support Avelina in the subsequent management of this condition(s) and with the ongoing continuity of care of this condition(s).      Again, thank you for allowing me to participate in the care of your patient.        Sincerely,        Leia Ruiz MD

## 2024-01-23 NOTE — PROGRESS NOTES
TRANSPLANT NEPHROLOGY EARLY POST TRANSPLANT VISIT    Assessment & Plan   # DDKT (SPK): stable with recent downtrend Cr down to 2-2.1 on the most recent checks  - multiple ischemic insults complicated by post biopsy hematoma s/p embolization and AVF as well as partial loss of renal function post embolization. Given downtrend in Scr and concerns regarding bleeding post biopsy will hold off for now and consider biopsy again for Scr >2.6    - Baseline Creatinine: ~ 2.2-2.5   - Proteinuria: Normal (<0.2 grams)   - Date DSA Last Checked: Oct/2023      Latest DSA: No   - BK Viremia: No   - Kidney Tx Biopsy: Oct 17, 2023; Result: t3i1v0, borderline ACR.  Treated with solumedrol 500mg IV daily x3 days   - Transplant Ureteral Stent: Removed 10/23/23     # Pancreas Tx (SPK):    - Pancreatic Exocrine Drainage: Enteric drained     - Blood glucose: Euglycemia      On insulin: No   - HbA1c: Last checked at time of transplant      Latest HbA1c: 8.4% Sep 2023, due for update   - Pancreatic enzymes: Stable   - Date DSA Last Checked: Oct/2023  Latest DSA: No   - Pancreas Tx Biopsy: No   -on ASA 81mg daily     # Immunosuppression: Tacrolimus immediate release (goal 8-10) and Mycophenolate mofetil (dose 1000 mg every 12 hours)   - Induction with Recent Transplant:  High Intensity Protocol   - Continue with intensive monitoring of immunosuppression for efficacy and toxicity.   - Changes: Not at this time    #  Tessa-transplant hematoma Post Kidney biopsy:    -s/p transplant renal biopsy 10/17/23 complicated by hemorrhage s/p arrest s/p 3 min of CPR               - Found to have AV fistula with extravasation on angiography by IR on 10/2023 s/p coiling     # Possible peripheral neuropathy:   -EMG 11/2023: L median n neuropathy.   -on low dose gabapentin  - Management per PCP    # CAD: S/p CABG 2/2023. On metoprolol 12.5 mg PO BID. Aspirin 81 mg po qd   # PAD     # Non-melanotic skin cancer: S/p Mohs 8/2022   - continue regular dermatology  follow up    # Infection Prophylaxis:   - PJP: Sulfa/TMP (Bactrim)    # Hypertension: Controlled;  Goal BP: < 130/80   - Volume status: Euvolemic     - Changes: Not at this time. Continue metoprolol 12.5mg bid     # Anemia in Chronic Renal Disease: Hgb: Stable      MCKENNA: No   - Iron studies: Replete    # Mineral Bone Disorder:    - Secondary renal hyperparathyroidism; PTH level: Minimally elevated ( pg/ml)        On treatment: Calcitriol 0.5mcg daily   - Vitamin D; level: Low normal        On supplement: Yes   - Calcium; level: Normal        On supplement: Yes   - Phosphorus; level: Normal        On supplement: No     # Electrolytes:   - Potassium; level: Normal        On supplement: No  - Magnesium; level: Low normal        On supplement: Yes, mag ox 400mg daily   - Bicarbonate; level: Normal        On supplement: Yes, bicarb 1300mg bid    # Medical Compliance: Yes    # Health Maintenance and Vaccination Review: Recommend:  vaccinations at 6m post txp    # Transplant History:  Etiology of Kidney Failure: Diabetes mellitus type 2  Tx: SPK  Transplant: 9/14/2023 (Kidney / Pancreas)  Donor Type: Donation after Brain Death Donor Class:   Crossmatch at time of Tx: negative  DSA at time of Tx: No  Significant changes in immunosuppression: None  CMV IgG Ab High Risk Discordance (D+/R-): No  EBV IgG Ab High Risk Discordance (D+/R-): No  Significant transplant-related complications: Acute cellular-mediated rejection (borderline ACR of kidney)    Transplant Office Phone Number: [phone]    Assessment and plan was discussed with the patient and he voiced his understanding and agreement.    Return visit: No follow-ups on file.    Leia Ruiz MD    Chief Complaint   Mr. Marion is a 68 year old here for kidney transplant, immunosuppression management and routine follow up.     History of Present Illness    Feels good overall. Reports stable neuropathic pain in L hand, since his transplant surgery, symptoms  controlled on low dose gabapentin. Energy level is good. Denies any fevers, chills, weight loss, night sweats. No nausea, vomiting, abdominal pain, diarrhea. No chest pain, sob, leg swelling. No recent illness or hospitalization.       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)    Vitamin D deficiency    CAD (coronary artery disease)    Kidney replaced by transplant    Immunosuppressed status (H24)    Dehydration    Anemia due to blood loss, acute    Postoperative cardiac arrest following non-cardiac surgery    Kidney transplant rejection    Pancreas replaced by transplant (H)    Aftercare following organ transplant       Allergies   No Known Allergies    Medications   Current Outpatient Medications   Medication Sig    aspirin 81 MG EC tablet Take 1 tablet (81 mg) by mouth daily    atorvastatin (LIPITOR) 20 MG tablet Take 1 tablet (20 mg) by mouth at bedtime    Blood Glucose Monitoring Suppl (ACCU-CHEK GUIDE) w/Device KIT     calcitRIOL (ROCALTROL) 0.25 MCG capsule Take 1 capsule (0.25 mcg) by mouth daily    calcium carbonate-vitamin D (OSCAL) 500-5 MG-MCG tablet Take 2 tablets by mouth 2 times daily    gabapentin (NEURONTIN) 100 MG capsule Take 100 mg by mouth 3 times daily    magnesium oxide (MAG-OX) 400 MG tablet Take 1 tablet (400 mg) by mouth every 24 hours    metoprolol tartrate (LOPRESSOR) 25 MG tablet Take 0.5 tablets (12.5 mg) by mouth 2 times daily    Misc. Devices (PILL SPLITTER) MISC 1  applicator daily    mycophenolate (GENERIC EQUIVALENT) 250 MG capsule Take 4 capsules (1,000 mg) by mouth 2 times daily    pantoprazole (PROTONIX) 40 MG EC tablet Take 1 tablet (40 mg) by mouth daily    senna-docusate (SENOKOT-S/PERICOLACE) 8.6-50 MG tablet Take 1-2 tablets by mouth 2 times daily HOLD for loose stools    sodium bicarbonate 650 MG tablet Take 2 tablets (1,300 mg) by mouth 2 times daily (Patient taking differently: Take 650 mg by mouth 2 times daily)    sulfamethoxazole-trimethoprim (BACTRIM) 400-80 MG tablet Take 1 tablet by mouth daily    tacrolimus (GENERIC) 0.5 MG capsule HOLD FOR FUTURE DOSE CHANGES.  Profile Rx: patient will contact pharmacy when needed    tacrolimus (GENERIC) 1 MG capsule Take 3 capsules (3 mg) by mouth 2 times daily    valGANciclovir (VALCYTE) 450 MG tablet Take 450 mg by mouth every other day    famotidine (PEPCID) 10 MG tablet Take 1 tablet (10 mg) by mouth every 24 hours (Patient not taking: Reported on 1/23/2024)    nystatin (MYCOSTATIN) 609046 UNIT/ML suspension Take 5 mLs (500,000 Units) by mouth 4 times daily (Patient not taking: Reported on 1/23/2024)     No current facility-administered medications for this visit.     There are no discontinued medications.    Physical Exam   Vital Signs: /62 (BP Location: Right arm, Patient Position: Sitting, Cuff Size: Adult Regular)   Pulse 67   Wt 88.7 kg (195 lb 8 oz)   SpO2 100%   BMI 26.89 kg/m      GENERAL APPEARANCE: alert and no distress  HENT: mouth without ulcers or lesions  RESP: lungs clear to auscultation - no rales, rhonchi or wheezes  CV: regular rhythm, normal rate, no rub, no murmur  EDEMA: no LE edema bilaterally  ABDOMEN: soft, nondistended, nontender, bowel sounds normal  MS: extremities normal - no gross deformities noted, no evidence of inflammation in joints, no muscle tenderness  SKIN: no rash  NEURO: normal strength and tone, sensory exam grossly normal, mentation intact and speech normal  TX  KIDNEY: normal  DIALYSIS ACCESS:  LUE AV fistula with good thrill and bruit    Data         Latest Ref Rng & Units 1/23/2024     7:52 AM 12/14/2023     8:17 AM 12/11/2023     8:32 AM   Renal   Sodium 135 - 145 mmol/L 139  139  139    K 3.4 - 5.3 mmol/L 4.9  4.5  4.0    Cl 98 - 107 mmol/L 107  108  108    Cl (external) 98 - 107 mmol/L 107  108  108    CO2 22 - 29 mmol/L 24  22  22    Urea Nitrogen 8.0 - 23.0 mg/dL 26.3  24.7  24.4    Creatinine 0.67 - 1.17 mg/dL 2.17  2.01  2.00    Glucose 70 - 99 mg/dL 91  96  91    Calcium 8.8 - 10.2 mg/dL 9.1  9.0  8.7    Magnesium 1.7 - 2.3 mg/dL  1.5           Latest Ref Rng & Units 12/14/2023     8:17 AM 12/4/2023     8:31 AM 11/20/2023     8:19 AM   Bone Health   Phosphorus 2.5 - 4.5 mg/dL 3.3  2.9  2.8          Latest Ref Rng & Units 1/23/2024     7:52 AM 12/14/2023     8:17 AM 12/7/2023     8:18 AM   Heme   WBC 4.0 - 11.0 10e3/uL 5.6  3.6  6.2    Hgb 13.3 - 17.7 g/dL 9.9  9.3  10.0    Plt 150 - 450 10e3/uL 209  159  175          Latest Ref Rng & Units 10/17/2023     2:57 PM 9/14/2023     3:39 PM 2/14/2023     1:11 AM   Liver   AP 40 - 129 U/L 41  60  35    TBili <=1.2 mg/dL 0.2  0.9  0.3    ALT 0 - 70 U/L 13  6  10    AST 0 - 45 U/L 21  22  46    Tot Protein 6.4 - 8.3 g/dL 3.6  7.3  4.5    Albumin 3.5 - 5.2 g/dL 2.2  4.3  2.9          Latest Ref Rng & Units 1/23/2024     7:52 AM 12/14/2023     8:17 AM 12/11/2023     8:32 AM   Pancreas   Amylase 28 - 100 U/L 60  49  55    Lipase (Roche) 13 - 60 U/L 30  29  30          Latest Ref Rng & Units 9/29/2023     8:48 AM 9/24/2023     8:35 AM 2/16/2023     8:34 AM   Iron studies   Iron 61 - 157 ug/dL 27  32  24    Iron Sat Index 15 - 46 % 16  21  20    Ferritin 31 - 409 ng/mL 1,256  1,164  1,461          Latest Ref Rng & Units 9/14/2023     3:39 PM 8/9/2022     6:26 AM 8/5/2021     2:23 AM   [facility] Txp Virology   EBV CAPSID ANTIBODY IGG No detectable antibody. Positive  Positive  Positive        Recent Labs   Lab Test 12/07/23  0818  12/11/23  0832 12/14/23  0817   DOSTAC 12/6/2023 12/10/2023 12/13/2023   TACROL 6.7 12.3 10.7     Recent Labs   Lab Test 10/23/23  0944 11/09/23  0832 12/04/23  0831   DOSMPA 10/22/2023   9:45 PM 11/8/2023   9:30 PM  --    MPACID 3.83* 2.73 8.55*   MPAG 133.4* 136.1* 199.8*     The longitudinal plan of care for Avelina Marion was addressed during this visit. Due to the added complexity in care, I will continue to support Avelina in the subsequent management of this condition(s) and with the ongoing continuity of care of this condition(s).

## 2024-01-23 NOTE — PATIENT INSTRUCTIONS
"Recommendations from today's MTM visit:                                                      Flu shot due now.  After 3/14/24 you will be due for Prevnar 20, RSV, Tetanus. After that get a hepatitis B booster.     Follow-up: as needed     It was great speaking with you today.  I value your experience and would be very thankful for your time in providing feedback in our clinic survey. In the next few days, you may receive an email or text message from [employer] with a link to a survey related to your  clinical pharmacist.\"     To schedule another MTM appointment, please call the clinic directly or you may call the MTM scheduling line at [phone] or toll-free at [phone].     My Clinical Pharmacist's contact information:                                                      Please feel free to contact me with any questions or concerns you have.      Kg Grewal, PharmD  MTM Pharmacist    Phone: [phone]     "

## 2024-01-23 NOTE — LETTER
January 23, 2024  Avelina Marion  [address] [place] [address]    Dear Mr. Marion, [facility]     Thank you for talking with me on Jan 23, 2024 about your health and medications. As a follow-up to our conversation, I have included two documents:      1. Your Recommended To-Do List has steps you should take to get the best results from your medications.  2. Your Medication List will help you keep track of your medications and how to take them.    If you want to talk about these documents, please call Kg Grewal RPH at phone: [phone], Monday-Friday 8-4:30pm.    I look forward to working with you and your doctors to make sure your medications work well for you.    Sincerely,  Kg Grewal RPH  [facility] Pharmacist, [facility]

## 2024-01-23 NOTE — NURSING NOTE
Chief Complaint   Patient presents with    RECHECK     Follow up     /62 (BP Location: Right arm, Patient Position: Sitting, Cuff Size: Adult Regular)   Pulse 67   Wt 88.7 kg (195 lb 8 oz)   SpO2 100%   BMI 26.89 kg/m    Porsha Arriaza on 1/23/2024 at 8:19 AM

## 2024-01-24 LAB
MYCOPHENOLATE SERPL LC/MS/MS-MCNC: 3.68 MG/L (ref 1–3.5)
MYCOPHENOLATE-G SERPL LC/MS/MS-MCNC: 141 MG/L (ref 30–95)
TME LAST DOSE: ABNORMAL H
TME LAST DOSE: ABNORMAL H

## 2024-01-26 ENCOUNTER — TELEPHONE (OUTPATIENT)
Dept: TRANSPLANT | Facility: CLINIC | Age: 69
End: 2024-01-26
Payer: COMMERCIAL

## 2024-01-29 ENCOUNTER — LAB (OUTPATIENT)
Dept: LAB | Facility: CLINIC | Age: 69
End: 2024-01-29
Payer: COMMERCIAL

## 2024-01-29 DIAGNOSIS — Z20.828 CONTACT WITH AND (SUSPECTED) EXPOSURE TO OTHER VIRAL COMMUNICABLE DISEASES: ICD-10-CM

## 2024-01-29 DIAGNOSIS — Z48.298 AFTERCARE FOLLOWING ORGAN TRANSPLANT: ICD-10-CM

## 2024-01-29 DIAGNOSIS — Z94.0 KIDNEY REPLACED BY TRANSPLANT: ICD-10-CM

## 2024-01-29 DIAGNOSIS — Z98.890 OTHER SPECIFIED POSTPROCEDURAL STATES: ICD-10-CM

## 2024-01-29 DIAGNOSIS — Z79.899 ENCOUNTER FOR LONG-TERM CURRENT USE OF MEDICATION: ICD-10-CM

## 2024-01-29 LAB
AMYLASE SERPL-CCNC: 51 U/L (ref 28–100)
ANION GAP SERPL CALCULATED.3IONS-SCNC: 10 MMOL/L (ref 7–15)
BUN SERPL-MCNC: 26.6 MG/DL (ref 8–23)
CALCIUM SERPL-MCNC: 9 MG/DL (ref 8.8–10.2)
CHLORIDE SERPL-SCNC: 107 MMOL/L (ref 98–107)
CREAT SERPL-MCNC: 2.05 MG/DL (ref 0.67–1.17)
DEPRECATED HCO3 PLAS-SCNC: 23 MMOL/L (ref 22–29)
DONOR IDENTIFICATION: NORMAL
DSA COMMENTS: NORMAL
DSA PRESENT: NO
DSA TEST METHOD: NORMAL
EGFRCR SERPLBLD CKD-EPI 2021: 35 ML/MIN/1.73M2
ERYTHROCYTE [DISTWIDTH] IN BLOOD BY AUTOMATED COUNT: 13 % (ref 10–15)
GLUCOSE SERPL-MCNC: 81 MG/DL (ref 70–99)
HCT VFR BLD AUTO: 30.4 % (ref 40–53)
HGB BLD-MCNC: 9.4 G/DL (ref 13.3–17.7)
LIPASE SERPL-CCNC: 26 U/L (ref 13–60)
MCH RBC QN AUTO: 30.6 PG (ref 26.5–33)
MCHC RBC AUTO-ENTMCNC: 30.9 G/DL (ref 31.5–36.5)
MCV RBC AUTO: 99 FL (ref 78–100)
ORGAN: NORMAL
PLATELET # BLD AUTO: 162 10E3/UL (ref 150–450)
POTASSIUM SERPL-SCNC: 4.8 MMOL/L (ref 3.4–5.3)
RBC # BLD AUTO: 3.07 10E6/UL (ref 4.4–5.9)
SA 1 CELL: NORMAL
SA 1 TEST METHOD: NORMAL
SA 2 CELL: NORMAL
SA 2 TEST METHOD: NORMAL
SA1 HI RISK ABY: NORMAL
SA1 MOD RISK ABY: NORMAL
SA2 HI RISK ABY: NORMAL
SA2 MOD RISK ABY: NORMAL
SODIUM SERPL-SCNC: 140 MMOL/L (ref 135–145)
UNACCEPTABLE ANTIGENS: NORMAL
UNOS CPRA: 23
WBC # BLD AUTO: 6.5 10E3/UL (ref 4–11)
ZZZSA 1  COMMENTS: NORMAL
ZZZSA 2 COMMENTS: NORMAL

## 2024-01-29 PROCEDURE — 82150 ASSAY OF AMYLASE: CPT | Performed by: PATHOLOGY

## 2024-01-29 PROCEDURE — 36415 COLL VENOUS BLD VENIPUNCTURE: CPT | Performed by: PATHOLOGY

## 2024-01-29 PROCEDURE — 99000 SPECIMEN HANDLING OFFICE-LAB: CPT | Performed by: PATHOLOGY

## 2024-01-29 PROCEDURE — 80048 BASIC METABOLIC PNL TOTAL CA: CPT | Performed by: PATHOLOGY

## 2024-01-29 PROCEDURE — 83690 ASSAY OF LIPASE: CPT | Performed by: PATHOLOGY

## 2024-01-29 PROCEDURE — 80180 DRUG SCRN QUAN MYCOPHENOLATE: CPT | Performed by: INTERNAL MEDICINE

## 2024-01-29 PROCEDURE — 85027 COMPLETE CBC AUTOMATED: CPT | Performed by: PATHOLOGY

## 2024-01-30 LAB
MYCOPHENOLATE SERPL LC/MS/MS-MCNC: 5.17 MG/L (ref 1–3.5)
MYCOPHENOLATE-G SERPL LC/MS/MS-MCNC: 116.1 MG/L (ref 30–95)
TME LAST DOSE: ABNORMAL H
TME LAST DOSE: ABNORMAL H

## 2024-02-05 ENCOUNTER — LAB (OUTPATIENT)
Dept: LAB | Facility: CLINIC | Age: 69
End: 2024-02-05
Payer: COMMERCIAL

## 2024-02-05 DIAGNOSIS — Z48.298 AFTERCARE FOLLOWING ORGAN TRANSPLANT: ICD-10-CM

## 2024-02-05 DIAGNOSIS — Z94.0 KIDNEY REPLACED BY TRANSPLANT: ICD-10-CM

## 2024-02-05 DIAGNOSIS — Z98.890 OTHER SPECIFIED POSTPROCEDURAL STATES: ICD-10-CM

## 2024-02-05 DIAGNOSIS — Z20.828 CONTACT WITH AND (SUSPECTED) EXPOSURE TO OTHER VIRAL COMMUNICABLE DISEASES: ICD-10-CM

## 2024-02-05 DIAGNOSIS — Z79.899 ENCOUNTER FOR LONG-TERM CURRENT USE OF MEDICATION: ICD-10-CM

## 2024-02-05 LAB
AMYLASE SERPL-CCNC: 52 U/L (ref 28–100)
ANION GAP SERPL CALCULATED.3IONS-SCNC: 7 MMOL/L (ref 7–15)
BUN SERPL-MCNC: 27.7 MG/DL (ref 8–23)
CALCIUM SERPL-MCNC: 9 MG/DL (ref 8.8–10.2)
CHLORIDE SERPL-SCNC: 111 MMOL/L (ref 98–107)
CREAT SERPL-MCNC: 2.14 MG/DL (ref 0.67–1.17)
DEPRECATED HCO3 PLAS-SCNC: 24 MMOL/L (ref 22–29)
EGFRCR SERPLBLD CKD-EPI 2021: 33 ML/MIN/1.73M2
ERYTHROCYTE [DISTWIDTH] IN BLOOD BY AUTOMATED COUNT: 13.2 % (ref 10–15)
GLUCOSE SERPL-MCNC: 81 MG/DL (ref 70–99)
HCT VFR BLD AUTO: 27.9 % (ref 40–53)
HGB BLD-MCNC: 8.6 G/DL (ref 13.3–17.7)
LIPASE SERPL-CCNC: 29 U/L (ref 13–60)
MAGNESIUM SERPL-MCNC: 1.6 MG/DL (ref 1.7–2.3)
MCH RBC QN AUTO: 30.7 PG (ref 26.5–33)
MCHC RBC AUTO-ENTMCNC: 30.8 G/DL (ref 31.5–36.5)
MCV RBC AUTO: 100 FL (ref 78–100)
MYCOPHENOLATE SERPL LC/MS/MS-MCNC: 5.45 MG/L (ref 1–3.5)
MYCOPHENOLATE-G SERPL LC/MS/MS-MCNC: 112.3 MG/L (ref 30–95)
PHOSPHATE SERPL-MCNC: 2.8 MG/DL (ref 2.5–4.5)
PLATELET # BLD AUTO: 156 10E3/UL (ref 150–450)
POTASSIUM SERPL-SCNC: 4.8 MMOL/L (ref 3.4–5.3)
RBC # BLD AUTO: 2.8 10E6/UL (ref 4.4–5.9)
SODIUM SERPL-SCNC: 142 MMOL/L (ref 135–145)
TACROLIMUS BLD-MCNC: 5.7 UG/L (ref 5–15)
TME LAST DOSE: ABNORMAL H
TME LAST DOSE: ABNORMAL H
TME LAST DOSE: NORMAL H
TME LAST DOSE: NORMAL H
WBC # BLD AUTO: 5.2 10E3/UL (ref 4–11)

## 2024-02-05 PROCEDURE — 84100 ASSAY OF PHOSPHORUS: CPT | Performed by: PATHOLOGY

## 2024-02-05 PROCEDURE — 36415 COLL VENOUS BLD VENIPUNCTURE: CPT | Performed by: PATHOLOGY

## 2024-02-05 PROCEDURE — 80048 BASIC METABOLIC PNL TOTAL CA: CPT | Performed by: PATHOLOGY

## 2024-02-05 PROCEDURE — 99000 SPECIMEN HANDLING OFFICE-LAB: CPT | Performed by: PATHOLOGY

## 2024-02-05 PROCEDURE — 85027 COMPLETE CBC AUTOMATED: CPT | Performed by: PATHOLOGY

## 2024-02-05 PROCEDURE — 83690 ASSAY OF LIPASE: CPT | Performed by: PATHOLOGY

## 2024-02-05 PROCEDURE — 83735 ASSAY OF MAGNESIUM: CPT | Performed by: PATHOLOGY

## 2024-02-05 PROCEDURE — 82150 ASSAY OF AMYLASE: CPT | Performed by: PATHOLOGY

## 2024-02-05 PROCEDURE — 80180 DRUG SCRN QUAN MYCOPHENOLATE: CPT | Performed by: INTERNAL MEDICINE

## 2024-02-05 PROCEDURE — 80197 ASSAY OF TACROLIMUS: CPT | Performed by: INTERNAL MEDICINE

## 2024-02-06 DIAGNOSIS — Z94.0 HISTORY OF SIMULTANEOUS KIDNEY AND PANCREAS TRANSPLANT (H): ICD-10-CM

## 2024-02-06 DIAGNOSIS — Z94.83 HISTORY OF SIMULTANEOUS KIDNEY AND PANCREAS TRANSPLANT (H): ICD-10-CM

## 2024-02-06 RX ORDER — TACROLIMUS 1 MG/1
3 CAPSULE ORAL 2 TIMES DAILY
Qty: 180 CAPSULE | Refills: 11 | Status: SHIPPED | OUTPATIENT
Start: 2024-02-06 | End: 2024-02-20

## 2024-02-06 NOTE — TELEPHONE ENCOUNTER
ISSUE:   Tacrolimus IR level 5.4 on 2/6, goal 8-10, dose 2 mg BID.    PLAN:   Call Patientand confirm this was an accurate 12-hour trough.   Verify Tacrolimus IR dose 2 mg BID.   Confirm no new medications or illness.   Confirm no missed doses.   If accurate trough and accurate dose, increase Tacrolimus IR dose to 3 mg BID     Is this more than a 50% increase or decrease in current IS dose: No  If YES, justification: na    Repeat labs in 1 weeks.  *If > 50% change in immunosuppression dose, repeat labs in 1 week.

## 2024-02-06 NOTE — TELEPHONE ENCOUNTER
Call placed to patient. Patient confirms current dose and accurate trough level. Denies any recent illness, diarrhea or medication changes. Patient v\u to increase tacrolimus dose to 3 mg bid and repeat level in one week. Rx sent

## 2024-02-12 ENCOUNTER — LAB (OUTPATIENT)
Dept: LAB | Facility: CLINIC | Age: 69
End: 2024-02-12
Payer: COMMERCIAL

## 2024-02-12 DIAGNOSIS — Z98.890 OTHER SPECIFIED POSTPROCEDURAL STATES: ICD-10-CM

## 2024-02-12 DIAGNOSIS — Z20.828 CONTACT WITH AND (SUSPECTED) EXPOSURE TO OTHER VIRAL COMMUNICABLE DISEASES: ICD-10-CM

## 2024-02-12 DIAGNOSIS — Z48.298 AFTERCARE FOLLOWING ORGAN TRANSPLANT: ICD-10-CM

## 2024-02-12 DIAGNOSIS — Z79.899 ENCOUNTER FOR LONG-TERM CURRENT USE OF MEDICATION: ICD-10-CM

## 2024-02-12 DIAGNOSIS — Z94.0 KIDNEY REPLACED BY TRANSPLANT: ICD-10-CM

## 2024-02-12 LAB
AMYLASE SERPL-CCNC: 46 U/L (ref 28–100)
ANION GAP SERPL CALCULATED.3IONS-SCNC: 8 MMOL/L (ref 7–15)
BK VIRUS SPECIMEN TYPE: ABNORMAL
BKV DNA # SPEC NAA+PROBE: <22 IU/ML
BKV DNA SPEC NAA+PROBE-LOG#: <1.3 {LOG_COPIES}/ML
BUN SERPL-MCNC: 22.9 MG/DL (ref 8–23)
CALCIUM SERPL-MCNC: 9 MG/DL (ref 8.8–10.2)
CHLORIDE SERPL-SCNC: 106 MMOL/L (ref 98–107)
CREAT SERPL-MCNC: 2.2 MG/DL (ref 0.67–1.17)
DEPRECATED HCO3 PLAS-SCNC: 23 MMOL/L (ref 22–29)
EGFRCR SERPLBLD CKD-EPI 2021: 32 ML/MIN/1.73M2
ERYTHROCYTE [DISTWIDTH] IN BLOOD BY AUTOMATED COUNT: 13.3 % (ref 10–15)
GLUCOSE SERPL-MCNC: 92 MG/DL (ref 70–99)
HCT VFR BLD AUTO: 29.8 % (ref 40–53)
HGB BLD-MCNC: 9.3 G/DL (ref 13.3–17.7)
LIPASE SERPL-CCNC: 22 U/L (ref 13–60)
MAGNESIUM SERPL-MCNC: 1.6 MG/DL (ref 1.7–2.3)
MCH RBC QN AUTO: 30.7 PG (ref 26.5–33)
MCHC RBC AUTO-ENTMCNC: 31.2 G/DL (ref 31.5–36.5)
MCV RBC AUTO: 98 FL (ref 78–100)
PHOSPHATE SERPL-MCNC: 3.6 MG/DL (ref 2.5–4.5)
PLATELET # BLD AUTO: 199 10E3/UL (ref 150–450)
POTASSIUM SERPL-SCNC: 5.1 MMOL/L (ref 3.4–5.3)
RBC # BLD AUTO: 3.03 10E6/UL (ref 4.4–5.9)
SODIUM SERPL-SCNC: 137 MMOL/L (ref 135–145)
TACROLIMUS BLD-MCNC: 11.9 UG/L (ref 5–15)
TME LAST DOSE: NORMAL H
TME LAST DOSE: NORMAL H
WBC # BLD AUTO: 5.5 10E3/UL (ref 4–11)

## 2024-02-12 PROCEDURE — 80180 DRUG SCRN QUAN MYCOPHENOLATE: CPT | Performed by: INTERNAL MEDICINE

## 2024-02-12 PROCEDURE — 83690 ASSAY OF LIPASE: CPT | Performed by: PATHOLOGY

## 2024-02-12 PROCEDURE — 83735 ASSAY OF MAGNESIUM: CPT | Performed by: PATHOLOGY

## 2024-02-12 PROCEDURE — 84100 ASSAY OF PHOSPHORUS: CPT | Performed by: PATHOLOGY

## 2024-02-12 PROCEDURE — 80048 BASIC METABOLIC PNL TOTAL CA: CPT | Performed by: INTERNAL MEDICINE

## 2024-02-12 PROCEDURE — 99000 SPECIMEN HANDLING OFFICE-LAB: CPT | Performed by: PATHOLOGY

## 2024-02-12 PROCEDURE — 36415 COLL VENOUS BLD VENIPUNCTURE: CPT | Performed by: PATHOLOGY

## 2024-02-12 PROCEDURE — 82150 ASSAY OF AMYLASE: CPT | Performed by: PATHOLOGY

## 2024-02-12 PROCEDURE — 87799 DETECT AGENT NOS DNA QUANT: CPT | Performed by: INTERNAL MEDICINE

## 2024-02-12 PROCEDURE — 85027 COMPLETE CBC AUTOMATED: CPT | Performed by: PATHOLOGY

## 2024-02-12 PROCEDURE — 80197 ASSAY OF TACROLIMUS: CPT | Performed by: INTERNAL MEDICINE

## 2024-02-13 LAB
MYCOPHENOLATE SERPL LC/MS/MS-MCNC: 4.4 MG/L (ref 1–3.5)
MYCOPHENOLATE-G SERPL LC/MS/MS-MCNC: 124.4 MG/L (ref 30–95)
TME LAST DOSE: ABNORMAL H
TME LAST DOSE: ABNORMAL H

## 2024-02-19 ENCOUNTER — TELEPHONE (OUTPATIENT)
Dept: TRANSPLANT | Facility: CLINIC | Age: 69
End: 2024-02-19

## 2024-02-19 ENCOUNTER — LAB (OUTPATIENT)
Dept: LAB | Facility: CLINIC | Age: 69
End: 2024-02-19
Payer: COMMERCIAL

## 2024-02-19 DIAGNOSIS — Z48.298 AFTERCARE FOLLOWING ORGAN TRANSPLANT: ICD-10-CM

## 2024-02-19 DIAGNOSIS — Z94.0 KIDNEY REPLACED BY TRANSPLANT: ICD-10-CM

## 2024-02-19 DIAGNOSIS — Z94.83 PANCREAS REPLACED BY TRANSPLANT (H): ICD-10-CM

## 2024-02-19 DIAGNOSIS — Z94.83 PANCREAS REPLACED BY TRANSPLANT (H): Primary | ICD-10-CM

## 2024-02-19 DIAGNOSIS — Z98.890 OTHER SPECIFIED POSTPROCEDURAL STATES: ICD-10-CM

## 2024-02-19 DIAGNOSIS — Z79.899 ENCOUNTER FOR LONG-TERM CURRENT USE OF MEDICATION: ICD-10-CM

## 2024-02-19 DIAGNOSIS — Z94.83 HISTORY OF SIMULTANEOUS KIDNEY AND PANCREAS TRANSPLANT (H): ICD-10-CM

## 2024-02-19 DIAGNOSIS — Z94.0 HISTORY OF SIMULTANEOUS KIDNEY AND PANCREAS TRANSPLANT (H): ICD-10-CM

## 2024-02-19 DIAGNOSIS — Z20.828 CONTACT WITH AND (SUSPECTED) EXPOSURE TO OTHER VIRAL COMMUNICABLE DISEASES: ICD-10-CM

## 2024-02-19 LAB
AMYLASE SERPL-CCNC: 63 U/L (ref 28–100)
ANION GAP SERPL CALCULATED.3IONS-SCNC: 12 MMOL/L (ref 7–15)
BUN SERPL-MCNC: 31.3 MG/DL (ref 8–23)
CALCIUM SERPL-MCNC: 10.1 MG/DL (ref 8.8–10.2)
CHLORIDE SERPL-SCNC: 108 MMOL/L (ref 98–107)
CREAT SERPL-MCNC: 2.24 MG/DL (ref 0.67–1.17)
DEPRECATED HCO3 PLAS-SCNC: 19 MMOL/L (ref 22–29)
EGFRCR SERPLBLD CKD-EPI 2021: 32 ML/MIN/1.73M2
ERYTHROCYTE [DISTWIDTH] IN BLOOD BY AUTOMATED COUNT: 13.1 % (ref 10–15)
GLUCOSE SERPL-MCNC: 90 MG/DL (ref 70–99)
HCT VFR BLD AUTO: 32 % (ref 40–53)
HGB BLD-MCNC: 9.8 G/DL (ref 13.3–17.7)
HOLD SPECIMEN: NORMAL
HOLD SPECIMEN: NORMAL
LIPASE SERPL-CCNC: 32 U/L (ref 13–60)
MCH RBC QN AUTO: 30.2 PG (ref 26.5–33)
MCHC RBC AUTO-ENTMCNC: 30.6 G/DL (ref 31.5–36.5)
MCV RBC AUTO: 99 FL (ref 78–100)
PLATELET # BLD AUTO: 257 10E3/UL (ref 150–450)
POTASSIUM SERPL-SCNC: 5.4 MMOL/L (ref 3.4–5.3)
RBC # BLD AUTO: 3.24 10E6/UL (ref 4.4–5.9)
SODIUM SERPL-SCNC: 139 MMOL/L (ref 135–145)
TACROLIMUS BLD-MCNC: 11.6 UG/L (ref 5–15)
TME LAST DOSE: NORMAL H
TME LAST DOSE: NORMAL H
WBC # BLD AUTO: 4.7 10E3/UL (ref 4–11)

## 2024-02-19 PROCEDURE — 85027 COMPLETE CBC AUTOMATED: CPT | Performed by: PATHOLOGY

## 2024-02-19 PROCEDURE — 82150 ASSAY OF AMYLASE: CPT | Performed by: PATHOLOGY

## 2024-02-19 PROCEDURE — 80197 ASSAY OF TACROLIMUS: CPT | Performed by: INTERNAL MEDICINE

## 2024-02-19 PROCEDURE — 83690 ASSAY OF LIPASE: CPT | Performed by: PATHOLOGY

## 2024-02-19 PROCEDURE — 36415 COLL VENOUS BLD VENIPUNCTURE: CPT | Performed by: PATHOLOGY

## 2024-02-19 PROCEDURE — 80048 BASIC METABOLIC PNL TOTAL CA: CPT | Performed by: PATHOLOGY

## 2024-02-19 PROCEDURE — 99000 SPECIMEN HANDLING OFFICE-LAB: CPT | Performed by: PATHOLOGY

## 2024-02-19 RX ORDER — SULFAMETHOXAZOLE AND TRIMETHOPRIM 400; 80 MG/1; MG/1
1 TABLET ORAL
Qty: 30 TABLET | Refills: 11 | Status: SHIPPED | OUTPATIENT
Start: 2024-02-20

## 2024-02-19 NOTE — TELEPHONE ENCOUNTER
ISSUE:  potassium level mildly elevated with labs today.  Jeffrey Chaparro MD Colianni, Lauren, RN  Please decrease bactrim to MWF    OUTCOME:  phone call to pt.  He verbalized understanding of medication change & updated med card during the phone call.  Pt had no additional questions.

## 2024-02-20 DIAGNOSIS — Z94.83 HISTORY OF SIMULTANEOUS KIDNEY AND PANCREAS TRANSPLANT (H): ICD-10-CM

## 2024-02-20 DIAGNOSIS — Z94.0 HISTORY OF SIMULTANEOUS KIDNEY AND PANCREAS TRANSPLANT (H): ICD-10-CM

## 2024-02-20 RX ORDER — TACROLIMUS 0.5 MG/1
0.5 CAPSULE ORAL 2 TIMES DAILY
Qty: 60 CAPSULE | Refills: 11 | Status: SHIPPED | OUTPATIENT
Start: 2024-02-20 | End: 2024-03-06

## 2024-02-20 RX ORDER — TACROLIMUS 1 MG/1
2 CAPSULE ORAL 2 TIMES DAILY
Qty: 120 CAPSULE | Refills: 11 | Status: SHIPPED | OUTPATIENT
Start: 2024-02-20 | End: 2024-03-06

## 2024-02-20 NOTE — TELEPHONE ENCOUNTER
ISSUE:   Tacrolimus IR level 11.8 on 2/20, goal 8-10, dose 3 mg BID.    PLAN:   Call Patientand confirm this was an accurate 12-hour trough.   Verify Tacrolimus IR dose 3 mg BID.   Confirm no new medications or illness.   Confirm no missed doses.   If accurate trough and accurate dose, decrease Tacrolimus IR dose to 2.5 mg BID     Is this more than a 50% increase or decrease in current IS dose: No  If YES, justification: na    Repeat labs in 1 weeks.  *If > 50% change in immunosuppression dose, repeat labs in 1 week.     OUTCOME:

## 2024-02-20 NOTE — TELEPHONE ENCOUNTER
Call placed to patient. Patient confirms current dose and accurate trough level. Denies any recent illness, diarrhea or medication changes. Patient v\u to decrease dose to 2.5 mg bid and repeat level in one week. Rx sent

## 2024-02-26 ENCOUNTER — LAB (OUTPATIENT)
Dept: LAB | Facility: CLINIC | Age: 69
End: 2024-02-26
Payer: COMMERCIAL

## 2024-02-26 DIAGNOSIS — Z79.899 ENCOUNTER FOR LONG-TERM CURRENT USE OF MEDICATION: ICD-10-CM

## 2024-02-26 DIAGNOSIS — Z20.828 CONTACT WITH AND (SUSPECTED) EXPOSURE TO OTHER VIRAL COMMUNICABLE DISEASES: ICD-10-CM

## 2024-02-26 DIAGNOSIS — Z98.890 OTHER SPECIFIED POSTPROCEDURAL STATES: ICD-10-CM

## 2024-02-26 DIAGNOSIS — Z94.0 KIDNEY REPLACED BY TRANSPLANT: ICD-10-CM

## 2024-02-26 DIAGNOSIS — Z94.83 PANCREAS REPLACED BY TRANSPLANT (H): ICD-10-CM

## 2024-02-26 DIAGNOSIS — Z48.298 AFTERCARE FOLLOWING ORGAN TRANSPLANT: ICD-10-CM

## 2024-02-26 LAB
AMYLASE SERPL-CCNC: 67 U/L (ref 28–100)
ANION GAP SERPL CALCULATED.3IONS-SCNC: 9 MMOL/L (ref 7–15)
BUN SERPL-MCNC: 32 MG/DL (ref 8–23)
CALCIUM SERPL-MCNC: 9.2 MG/DL (ref 8.8–10.2)
CHLORIDE SERPL-SCNC: 110 MMOL/L (ref 98–107)
CREAT SERPL-MCNC: 2.17 MG/DL (ref 0.67–1.17)
DEPRECATED HCO3 PLAS-SCNC: 21 MMOL/L (ref 22–29)
EGFRCR SERPLBLD CKD-EPI 2021: 33 ML/MIN/1.73M2
ERYTHROCYTE [DISTWIDTH] IN BLOOD BY AUTOMATED COUNT: 13.1 % (ref 10–15)
GLUCOSE SERPL-MCNC: 99 MG/DL (ref 70–99)
HCT VFR BLD AUTO: 30.5 % (ref 40–53)
HGB BLD-MCNC: 9.4 G/DL (ref 13.3–17.7)
LIPASE SERPL-CCNC: 34 U/L (ref 13–60)
MCH RBC QN AUTO: 30.6 PG (ref 26.5–33)
MCHC RBC AUTO-ENTMCNC: 30.8 G/DL (ref 31.5–36.5)
MCV RBC AUTO: 99 FL (ref 78–100)
MYCOPHENOLATE SERPL LC/MS/MS-MCNC: 9.39 MG/L (ref 1–3.5)
MYCOPHENOLATE-G SERPL LC/MS/MS-MCNC: 129.8 MG/L (ref 30–95)
PLATELET # BLD AUTO: 193 10E3/UL (ref 150–450)
POTASSIUM SERPL-SCNC: 5.4 MMOL/L (ref 3.4–5.3)
RBC # BLD AUTO: 3.07 10E6/UL (ref 4.4–5.9)
SODIUM SERPL-SCNC: 140 MMOL/L (ref 135–145)
TACROLIMUS BLD-MCNC: 8.7 UG/L (ref 5–15)
TME LAST DOSE: ABNORMAL H
TME LAST DOSE: ABNORMAL H
TME LAST DOSE: NORMAL H
TME LAST DOSE: NORMAL H
WBC # BLD AUTO: 7.1 10E3/UL (ref 4–11)

## 2024-02-26 PROCEDURE — 80180 DRUG SCRN QUAN MYCOPHENOLATE: CPT | Performed by: INTERNAL MEDICINE

## 2024-02-26 PROCEDURE — 80048 BASIC METABOLIC PNL TOTAL CA: CPT | Performed by: INTERNAL MEDICINE

## 2024-02-26 PROCEDURE — 85027 COMPLETE CBC AUTOMATED: CPT | Performed by: PATHOLOGY

## 2024-02-26 PROCEDURE — 82150 ASSAY OF AMYLASE: CPT | Performed by: INTERNAL MEDICINE

## 2024-02-26 PROCEDURE — 80197 ASSAY OF TACROLIMUS: CPT | Performed by: INTERNAL MEDICINE

## 2024-02-26 PROCEDURE — 83690 ASSAY OF LIPASE: CPT | Performed by: INTERNAL MEDICINE

## 2024-02-26 PROCEDURE — 99000 SPECIMEN HANDLING OFFICE-LAB: CPT | Performed by: PATHOLOGY

## 2024-02-26 PROCEDURE — 36415 COLL VENOUS BLD VENIPUNCTURE: CPT | Performed by: PATHOLOGY

## 2024-02-27 DIAGNOSIS — Z94.0 KIDNEY TRANSPLANTED: Primary | ICD-10-CM

## 2024-02-27 DIAGNOSIS — Z94.83 HISTORY OF SIMULTANEOUS KIDNEY AND PANCREAS TRANSPLANT (H): ICD-10-CM

## 2024-02-27 DIAGNOSIS — Z95.1 S/P CABG (CORONARY ARTERY BYPASS GRAFT): ICD-10-CM

## 2024-02-27 DIAGNOSIS — Z48.298 AFTERCARE FOLLOWING ORGAN TRANSPLANT: ICD-10-CM

## 2024-02-27 DIAGNOSIS — Z94.0 HISTORY OF SIMULTANEOUS KIDNEY AND PANCREAS TRANSPLANT (H): ICD-10-CM

## 2024-02-28 RX ORDER — MYCOPHENOLATE MOFETIL 250 MG/1
750 CAPSULE ORAL 2 TIMES DAILY
Qty: 180 CAPSULE | Refills: 11 | Status: SHIPPED | OUTPATIENT
Start: 2024-02-28 | End: 2024-06-21

## 2024-03-04 ENCOUNTER — LAB (OUTPATIENT)
Dept: LAB | Facility: CLINIC | Age: 69
End: 2024-03-04
Payer: COMMERCIAL

## 2024-03-04 DIAGNOSIS — Z94.83 PANCREAS REPLACED BY TRANSPLANT (H): ICD-10-CM

## 2024-03-04 DIAGNOSIS — Z79.899 ENCOUNTER FOR LONG-TERM CURRENT USE OF MEDICATION: ICD-10-CM

## 2024-03-04 DIAGNOSIS — Z20.828 CONTACT WITH AND (SUSPECTED) EXPOSURE TO OTHER VIRAL COMMUNICABLE DISEASES: ICD-10-CM

## 2024-03-04 DIAGNOSIS — Z94.0 KIDNEY REPLACED BY TRANSPLANT: ICD-10-CM

## 2024-03-04 DIAGNOSIS — Z48.298 AFTERCARE FOLLOWING ORGAN TRANSPLANT: ICD-10-CM

## 2024-03-04 DIAGNOSIS — Z98.890 OTHER SPECIFIED POSTPROCEDURAL STATES: ICD-10-CM

## 2024-03-04 LAB
AMYLASE SERPL-CCNC: 56 U/L (ref 28–100)
ANION GAP SERPL CALCULATED.3IONS-SCNC: 9 MMOL/L (ref 7–15)
BUN SERPL-MCNC: 32.5 MG/DL (ref 8–23)
CALCIUM SERPL-MCNC: 9.1 MG/DL (ref 8.8–10.2)
CHLORIDE SERPL-SCNC: 110 MMOL/L (ref 98–107)
CREAT SERPL-MCNC: 2.26 MG/DL (ref 0.67–1.17)
DEPRECATED HCO3 PLAS-SCNC: 22 MMOL/L (ref 22–29)
EGFRCR SERPLBLD CKD-EPI 2021: 31 ML/MIN/1.73M2
ERYTHROCYTE [DISTWIDTH] IN BLOOD BY AUTOMATED COUNT: 13.1 % (ref 10–15)
GLUCOSE SERPL-MCNC: 99 MG/DL (ref 70–99)
HCT VFR BLD AUTO: 30.2 % (ref 40–53)
HGB BLD-MCNC: 9.2 G/DL (ref 13.3–17.7)
LIPASE SERPL-CCNC: 28 U/L (ref 13–60)
MCH RBC QN AUTO: 30.3 PG (ref 26.5–33)
MCHC RBC AUTO-ENTMCNC: 30.5 G/DL (ref 31.5–36.5)
MCV RBC AUTO: 99 FL (ref 78–100)
PLATELET # BLD AUTO: 162 10E3/UL (ref 150–450)
POTASSIUM SERPL-SCNC: 4.8 MMOL/L (ref 3.4–5.3)
RBC # BLD AUTO: 3.04 10E6/UL (ref 4.4–5.9)
SODIUM SERPL-SCNC: 141 MMOL/L (ref 135–145)
TACROLIMUS BLD-MCNC: 7.5 UG/L (ref 5–15)
TME LAST DOSE: NORMAL H
TME LAST DOSE: NORMAL H
WBC # BLD AUTO: 5.9 10E3/UL (ref 4–11)

## 2024-03-04 PROCEDURE — 36415 COLL VENOUS BLD VENIPUNCTURE: CPT | Performed by: PATHOLOGY

## 2024-03-04 PROCEDURE — 85027 COMPLETE CBC AUTOMATED: CPT | Performed by: PATHOLOGY

## 2024-03-04 PROCEDURE — 83690 ASSAY OF LIPASE: CPT | Performed by: PATHOLOGY

## 2024-03-04 PROCEDURE — 80197 ASSAY OF TACROLIMUS: CPT | Performed by: INTERNAL MEDICINE

## 2024-03-04 PROCEDURE — 80048 BASIC METABOLIC PNL TOTAL CA: CPT | Performed by: PATHOLOGY

## 2024-03-04 PROCEDURE — 82150 ASSAY OF AMYLASE: CPT | Performed by: PATHOLOGY

## 2024-03-04 PROCEDURE — 99000 SPECIMEN HANDLING OFFICE-LAB: CPT | Performed by: PATHOLOGY

## 2024-03-05 DIAGNOSIS — Z94.0 HISTORY OF SIMULTANEOUS KIDNEY AND PANCREAS TRANSPLANT (H): ICD-10-CM

## 2024-03-05 DIAGNOSIS — Z94.83 HISTORY OF SIMULTANEOUS KIDNEY AND PANCREAS TRANSPLANT (H): ICD-10-CM

## 2024-03-05 NOTE — TELEPHONE ENCOUNTER
ISSUE:   Tacrolimus IR level 7.5 on 3/5, goal 8-10, dose 2.5 mg BID.    PLAN:   Call Patientand confirm this was an accurate 12-hour trough.   Verify Tacrolimus IR dose 2.5 mg BID.   Confirm no new medications or illness.   Confirm no missed doses.   If accurate trough and accurate dose, increase Tacrolimus IR dose to 3 mg BID     Is this more than a 50% increase or decrease in current IS dose: No  If YES, justification: na    Repeat labs in 1 weeks.  *If > 50% change in immunosuppression dose, repeat labs in 1 week.     OUTCOME:

## 2024-03-06 RX ORDER — TACROLIMUS 1 MG/1
3 CAPSULE ORAL 2 TIMES DAILY
Qty: 180 CAPSULE | Refills: 11 | Status: SHIPPED | OUTPATIENT
Start: 2024-03-06 | End: 2024-04-09

## 2024-03-06 RX ORDER — TACROLIMUS 0.5 MG/1
CAPSULE ORAL
Status: SHIPPED
Start: 2024-03-06 | End: 2024-04-09

## 2024-03-06 NOTE — TELEPHONE ENCOUNTER
Call placed to patient. Patient confirm current tacrolimus dose at 2.5 mg bid and accurate trough level. Denies any recent illness, diarrhea or medication changes. Patient v\u to increase tacrolimus dose to 3 mg bid and repeat level in one week. Order/rx sent

## 2024-03-11 ENCOUNTER — LAB (OUTPATIENT)
Dept: LAB | Facility: CLINIC | Age: 69
End: 2024-03-11
Payer: COMMERCIAL

## 2024-03-11 DIAGNOSIS — Z94.0 KIDNEY REPLACED BY TRANSPLANT: ICD-10-CM

## 2024-03-11 DIAGNOSIS — Z98.890 OTHER SPECIFIED POSTPROCEDURAL STATES: ICD-10-CM

## 2024-03-11 DIAGNOSIS — Z94.83 PANCREAS REPLACED BY TRANSPLANT (H): ICD-10-CM

## 2024-03-11 DIAGNOSIS — Z48.298 AFTERCARE FOLLOWING ORGAN TRANSPLANT: ICD-10-CM

## 2024-03-11 DIAGNOSIS — Z79.899 ENCOUNTER FOR LONG-TERM CURRENT USE OF MEDICATION: ICD-10-CM

## 2024-03-11 DIAGNOSIS — Z20.828 CONTACT WITH AND (SUSPECTED) EXPOSURE TO OTHER VIRAL COMMUNICABLE DISEASES: ICD-10-CM

## 2024-03-11 LAB
AMYLASE SERPL-CCNC: 51 U/L (ref 28–100)
ANION GAP SERPL CALCULATED.3IONS-SCNC: 10 MMOL/L (ref 7–15)
BK VIRUS SPECIMEN TYPE: NORMAL
BKV DNA # SPEC NAA+PROBE: NOT DETECTED IU/ML
BUN SERPL-MCNC: 27.9 MG/DL (ref 8–23)
CALCIUM SERPL-MCNC: 8.9 MG/DL (ref 8.8–10.2)
CHLORIDE SERPL-SCNC: 109 MMOL/L (ref 98–107)
CREAT SERPL-MCNC: 1.9 MG/DL (ref 0.67–1.17)
DEPRECATED HCO3 PLAS-SCNC: 23 MMOL/L (ref 22–29)
EGFRCR SERPLBLD CKD-EPI 2021: 38 ML/MIN/1.73M2
ERYTHROCYTE [DISTWIDTH] IN BLOOD BY AUTOMATED COUNT: 13.2 % (ref 10–15)
GLUCOSE SERPL-MCNC: 99 MG/DL (ref 70–99)
HCT VFR BLD AUTO: 31.9 % (ref 40–53)
HGB BLD-MCNC: 9.9 G/DL (ref 13.3–17.7)
LIPASE SERPL-CCNC: 24 U/L (ref 13–60)
MCH RBC QN AUTO: 30.5 PG (ref 26.5–33)
MCHC RBC AUTO-ENTMCNC: 31 G/DL (ref 31.5–36.5)
MCV RBC AUTO: 98 FL (ref 78–100)
PLATELET # BLD AUTO: 183 10E3/UL (ref 150–450)
POTASSIUM SERPL-SCNC: 4.3 MMOL/L (ref 3.4–5.3)
RBC # BLD AUTO: 3.25 10E6/UL (ref 4.4–5.9)
SODIUM SERPL-SCNC: 142 MMOL/L (ref 135–145)
TACROLIMUS BLD-MCNC: 7.7 UG/L (ref 5–15)
TME LAST DOSE: NORMAL H
TME LAST DOSE: NORMAL H
WBC # BLD AUTO: 6.1 10E3/UL (ref 4–11)

## 2024-03-11 PROCEDURE — 85027 COMPLETE CBC AUTOMATED: CPT | Performed by: PATHOLOGY

## 2024-03-11 PROCEDURE — 82150 ASSAY OF AMYLASE: CPT | Performed by: PATHOLOGY

## 2024-03-11 PROCEDURE — 87799 DETECT AGENT NOS DNA QUANT: CPT | Performed by: INTERNAL MEDICINE

## 2024-03-11 PROCEDURE — 80048 BASIC METABOLIC PNL TOTAL CA: CPT | Performed by: PATHOLOGY

## 2024-03-11 PROCEDURE — 36415 COLL VENOUS BLD VENIPUNCTURE: CPT | Performed by: PATHOLOGY

## 2024-03-11 PROCEDURE — 83690 ASSAY OF LIPASE: CPT | Performed by: PATHOLOGY

## 2024-03-11 PROCEDURE — 80197 ASSAY OF TACROLIMUS: CPT | Performed by: INTERNAL MEDICINE

## 2024-03-11 PROCEDURE — 99000 SPECIMEN HANDLING OFFICE-LAB: CPT | Performed by: PATHOLOGY

## 2024-03-12 ENCOUNTER — TELEPHONE (OUTPATIENT)
Dept: TRANSPLANT | Facility: CLINIC | Age: 69
End: 2024-03-12
Payer: COMMERCIAL

## 2024-03-12 NOTE — TELEPHONE ENCOUNTER
Patient confirmed current dose but states that he missed dose the evening prior to lab draw.  Please advise if you want dose changed.   [Consultation] : a consultation visit

## 2024-03-12 NOTE — TELEPHONE ENCOUNTER
Left message and sent [employer] message to patient regarding:  Tacrolimus IR level 7.7 on 3/11, goal 8-10, dose 3 mg BID.     PLAN:   Call Patientand confirm this was an accurate 12-hour trough.   Verify Tacrolimus IR dose 3 mg BID.   Confirm no new medications or illness.   Confirm no missed doses.   If accurate trough and accurate dose, increase Tacrolimus IR dose to 3.5 mg BID      Is this more than a 50% increase or decrease in current IS dose: No  If YES, justification: na     Repeat labs in 1-2 weeks.

## 2024-03-12 NOTE — TELEPHONE ENCOUNTER
ISSUE:   Tacrolimus IR level 7.7 on 3/11, goal 8-10, dose 3 mg BID.    PLAN:   Call Patientand confirm this was an accurate 12-hour trough.   Verify Tacrolimus IR dose 3 mg BID.   Confirm no new medications or illness.   Confirm no missed doses.   If accurate trough and accurate dose, increase Tacrolimus IR dose to 3.5 mg BID     Is this more than a 50% increase or decrease in current IS dose: No  If YES, justification: na    Repeat labs in 1-2 weeks.  *If > 50% change in immunosuppression dose, repeat labs in 1 week.     OUTCOME:

## 2024-03-18 ENCOUNTER — LAB (OUTPATIENT)
Dept: LAB | Facility: CLINIC | Age: 69
End: 2024-03-18
Payer: COMMERCIAL

## 2024-03-18 DIAGNOSIS — Z98.890 OTHER SPECIFIED POSTPROCEDURAL STATES: ICD-10-CM

## 2024-03-18 DIAGNOSIS — Z20.828 CONTACT WITH AND (SUSPECTED) EXPOSURE TO OTHER VIRAL COMMUNICABLE DISEASES: ICD-10-CM

## 2024-03-18 DIAGNOSIS — Z94.0 KIDNEY REPLACED BY TRANSPLANT: ICD-10-CM

## 2024-03-18 DIAGNOSIS — Z79.899 ENCOUNTER FOR LONG-TERM CURRENT USE OF MEDICATION: ICD-10-CM

## 2024-03-18 DIAGNOSIS — Z94.83 PANCREAS REPLACED BY TRANSPLANT (H): ICD-10-CM

## 2024-03-18 DIAGNOSIS — Z48.298 AFTERCARE FOLLOWING ORGAN TRANSPLANT: ICD-10-CM

## 2024-03-18 LAB
AMYLASE SERPL-CCNC: 59 U/L (ref 28–100)
ANION GAP SERPL CALCULATED.3IONS-SCNC: 10 MMOL/L (ref 7–15)
BUN SERPL-MCNC: 30.7 MG/DL (ref 8–23)
CALCIUM SERPL-MCNC: 9.1 MG/DL (ref 8.8–10.2)
CHLORIDE SERPL-SCNC: 110 MMOL/L (ref 98–107)
CREAT SERPL-MCNC: 1.98 MG/DL (ref 0.67–1.17)
DEPRECATED HCO3 PLAS-SCNC: 22 MMOL/L (ref 22–29)
EGFRCR SERPLBLD CKD-EPI 2021: 37 ML/MIN/1.73M2
ERYTHROCYTE [DISTWIDTH] IN BLOOD BY AUTOMATED COUNT: 13.2 % (ref 10–15)
GLUCOSE SERPL-MCNC: 94 MG/DL (ref 70–99)
HCT VFR BLD AUTO: 36.8 % (ref 40–53)
HGB BLD-MCNC: 11.1 G/DL (ref 13.3–17.7)
LIPASE SERPL-CCNC: 27 U/L (ref 13–60)
MCH RBC QN AUTO: 30.2 PG (ref 26.5–33)
MCHC RBC AUTO-ENTMCNC: 30.2 G/DL (ref 31.5–36.5)
MCV RBC AUTO: 100 FL (ref 78–100)
PLATELET # BLD AUTO: 229 10E3/UL (ref 150–450)
POTASSIUM SERPL-SCNC: 5 MMOL/L (ref 3.4–5.3)
RBC # BLD AUTO: 3.68 10E6/UL (ref 4.4–5.9)
SODIUM SERPL-SCNC: 142 MMOL/L (ref 135–145)
TACROLIMUS BLD-MCNC: 12.2 UG/L (ref 5–15)
TME LAST DOSE: NORMAL H
TME LAST DOSE: NORMAL H
WBC # BLD AUTO: 5.7 10E3/UL (ref 4–11)

## 2024-03-18 PROCEDURE — 83690 ASSAY OF LIPASE: CPT | Performed by: PATHOLOGY

## 2024-03-18 PROCEDURE — 36415 COLL VENOUS BLD VENIPUNCTURE: CPT | Performed by: PATHOLOGY

## 2024-03-18 PROCEDURE — 85027 COMPLETE CBC AUTOMATED: CPT | Performed by: PATHOLOGY

## 2024-03-18 PROCEDURE — 80197 ASSAY OF TACROLIMUS: CPT | Performed by: INTERNAL MEDICINE

## 2024-03-18 PROCEDURE — 82150 ASSAY OF AMYLASE: CPT | Performed by: PATHOLOGY

## 2024-03-18 PROCEDURE — 99000 SPECIMEN HANDLING OFFICE-LAB: CPT | Performed by: PATHOLOGY

## 2024-03-18 PROCEDURE — 80048 BASIC METABOLIC PNL TOTAL CA: CPT | Performed by: PATHOLOGY

## 2024-03-25 ENCOUNTER — OFFICE VISIT (OUTPATIENT)
Dept: TRANSPLANT | Facility: CLINIC | Age: 69
End: 2024-03-25
Attending: INTERNAL MEDICINE
Payer: COMMERCIAL

## 2024-03-25 ENCOUNTER — LAB (OUTPATIENT)
Dept: LAB | Facility: CLINIC | Age: 69
End: 2024-03-25
Attending: INTERNAL MEDICINE
Payer: COMMERCIAL

## 2024-03-25 VITALS
OXYGEN SATURATION: 99 % | HEART RATE: 70 BPM | WEIGHT: 203.9 LBS | DIASTOLIC BLOOD PRESSURE: 75 MMHG | SYSTOLIC BLOOD PRESSURE: 128 MMHG | BODY MASS INDEX: 28.05 KG/M2

## 2024-03-25 DIAGNOSIS — Z48.298 AFTERCARE FOLLOWING ORGAN TRANSPLANT: ICD-10-CM

## 2024-03-25 DIAGNOSIS — Z23 ENCOUNTER FOR VACCINATION: ICD-10-CM

## 2024-03-25 DIAGNOSIS — Z94.83 PANCREAS REPLACED BY TRANSPLANT (H): Primary | ICD-10-CM

## 2024-03-25 DIAGNOSIS — Z79.899 ENCOUNTER FOR LONG-TERM CURRENT USE OF MEDICATION: ICD-10-CM

## 2024-03-25 DIAGNOSIS — D84.9 IMMUNOSUPPRESSED STATUS (H): ICD-10-CM

## 2024-03-25 DIAGNOSIS — Z20.828 CONTACT WITH AND (SUSPECTED) EXPOSURE TO OTHER VIRAL COMMUNICABLE DISEASES: ICD-10-CM

## 2024-03-25 DIAGNOSIS — Z94.0 HTN, KIDNEY TRANSPLANT RELATED: ICD-10-CM

## 2024-03-25 DIAGNOSIS — I15.1 HTN, KIDNEY TRANSPLANT RELATED: ICD-10-CM

## 2024-03-25 DIAGNOSIS — Z94.0 KIDNEY REPLACED BY TRANSPLANT: ICD-10-CM

## 2024-03-25 DIAGNOSIS — Z98.890 OTHER SPECIFIED POSTPROCEDURAL STATES: ICD-10-CM

## 2024-03-25 PROBLEM — D62 ANEMIA DUE TO BLOOD LOSS, ACUTE: Status: RESOLVED | Noted: 2023-10-17 | Resolved: 2024-03-25

## 2024-03-25 PROBLEM — E87.20 METABOLIC ACIDOSIS: Status: RESOLVED | Noted: 2018-09-18 | Resolved: 2024-03-25

## 2024-03-25 LAB
ALBUMIN MFR UR ELPH: 11.7 MG/DL
ALBUMIN SERPL BCG-MCNC: 4.1 G/DL (ref 3.5–5.2)
ALP SERPL-CCNC: 89 U/L (ref 40–150)
ALT SERPL W P-5'-P-CCNC: <5 U/L (ref 0–70)
AMYLASE SERPL-CCNC: 61 U/L (ref 28–100)
ANION GAP SERPL CALCULATED.3IONS-SCNC: 11 MMOL/L (ref 7–15)
AST SERPL W P-5'-P-CCNC: 15 U/L (ref 0–45)
BILIRUB DIRECT SERPL-MCNC: <0.2 MG/DL (ref 0–0.3)
BILIRUB SERPL-MCNC: 0.5 MG/DL
BUN SERPL-MCNC: 33.6 MG/DL (ref 8–23)
CALCIUM SERPL-MCNC: 9.4 MG/DL (ref 8.8–10.2)
CHLORIDE SERPL-SCNC: 108 MMOL/L (ref 98–107)
CHOLEST SERPL-MCNC: 103 MG/DL
CREAT SERPL-MCNC: 2.08 MG/DL (ref 0.67–1.17)
CREAT UR-MCNC: 231 MG/DL
DEPRECATED HCO3 PLAS-SCNC: 21 MMOL/L (ref 22–29)
EGFRCR SERPLBLD CKD-EPI 2021: 34 ML/MIN/1.73M2
ERYTHROCYTE [DISTWIDTH] IN BLOOD BY AUTOMATED COUNT: 12.9 % (ref 10–15)
FASTING STATUS PATIENT QL REPORTED: YES
GLUCOSE SERPL-MCNC: 89 MG/DL (ref 70–99)
HBA1C MFR BLD: 4.9 %
HCT VFR BLD AUTO: 37.5 % (ref 40–53)
HDLC SERPL-MCNC: 56 MG/DL
HGB BLD-MCNC: 11.4 G/DL (ref 13.3–17.7)
LDLC SERPL CALC-MCNC: 34 MG/DL
LIPASE SERPL-CCNC: 25 U/L (ref 13–60)
MAGNESIUM SERPL-MCNC: 1.6 MG/DL (ref 1.7–2.3)
MCH RBC QN AUTO: 30.2 PG (ref 26.5–33)
MCHC RBC AUTO-ENTMCNC: 30.4 G/DL (ref 31.5–36.5)
MCV RBC AUTO: 99 FL (ref 78–100)
NONHDLC SERPL-MCNC: 47 MG/DL
PHOSPHATE SERPL-MCNC: 4.2 MG/DL (ref 2.5–4.5)
PLATELET # BLD AUTO: 213 10E3/UL (ref 150–450)
POTASSIUM SERPL-SCNC: 4.7 MMOL/L (ref 3.4–5.3)
PROT SERPL-MCNC: 6.6 G/DL (ref 6.4–8.3)
PROT/CREAT 24H UR: 0.05 MG/MG CR (ref 0–0.2)
RBC # BLD AUTO: 3.78 10E6/UL (ref 4.4–5.9)
SODIUM SERPL-SCNC: 140 MMOL/L (ref 135–145)
TACROLIMUS BLD-MCNC: 11.3 UG/L (ref 5–15)
TME LAST DOSE: NORMAL H
TME LAST DOSE: NORMAL H
TRIGL SERPL-MCNC: 65 MG/DL
URATE SERPL-MCNC: 6.5 MG/DL (ref 3.4–7)
WBC # BLD AUTO: 6.3 10E3/UL (ref 4–11)

## 2024-03-25 PROCEDURE — G0463 HOSPITAL OUTPT CLINIC VISIT: HCPCS | Mod: 25 | Performed by: INTERNAL MEDICINE

## 2024-03-25 PROCEDURE — 84156 ASSAY OF PROTEIN URINE: CPT | Performed by: PATHOLOGY

## 2024-03-25 PROCEDURE — 83690 ASSAY OF LIPASE: CPT | Performed by: PATHOLOGY

## 2024-03-25 PROCEDURE — 36415 COLL VENOUS BLD VENIPUNCTURE: CPT | Performed by: PATHOLOGY

## 2024-03-25 PROCEDURE — 83036 HEMOGLOBIN GLYCOSYLATED A1C: CPT | Performed by: INTERNAL MEDICINE

## 2024-03-25 PROCEDURE — 80197 ASSAY OF TACROLIMUS: CPT | Performed by: INTERNAL MEDICINE

## 2024-03-25 PROCEDURE — 99000 SPECIMEN HANDLING OFFICE-LAB: CPT | Performed by: PATHOLOGY

## 2024-03-25 PROCEDURE — G0009 ADMIN PNEUMOCOCCAL VACCINE: HCPCS | Performed by: INTERNAL MEDICINE

## 2024-03-25 PROCEDURE — 83735 ASSAY OF MAGNESIUM: CPT | Performed by: PATHOLOGY

## 2024-03-25 PROCEDURE — 90677 PCV20 VACCINE IM: CPT | Performed by: INTERNAL MEDICINE

## 2024-03-25 PROCEDURE — 80061 LIPID PANEL: CPT | Performed by: PATHOLOGY

## 2024-03-25 PROCEDURE — G2211 COMPLEX E/M VISIT ADD ON: HCPCS | Performed by: INTERNAL MEDICINE

## 2024-03-25 PROCEDURE — 90662 IIV NO PRSV INCREASED AG IM: CPT | Performed by: INTERNAL MEDICINE

## 2024-03-25 PROCEDURE — 85027 COMPLETE CBC AUTOMATED: CPT | Performed by: PATHOLOGY

## 2024-03-25 PROCEDURE — 84100 ASSAY OF PHOSPHORUS: CPT | Performed by: PATHOLOGY

## 2024-03-25 PROCEDURE — 80180 DRUG SCRN QUAN MYCOPHENOLATE: CPT | Performed by: INTERNAL MEDICINE

## 2024-03-25 PROCEDURE — 84550 ASSAY OF BLOOD/URIC ACID: CPT | Performed by: PATHOLOGY

## 2024-03-25 PROCEDURE — G0008 ADMIN INFLUENZA VIRUS VAC: HCPCS | Performed by: INTERNAL MEDICINE

## 2024-03-25 PROCEDURE — 99214 OFFICE O/P EST MOD 30 MIN: CPT | Performed by: INTERNAL MEDICINE

## 2024-03-25 PROCEDURE — 82150 ASSAY OF AMYLASE: CPT | Performed by: PATHOLOGY

## 2024-03-25 PROCEDURE — 250N000011 HC RX IP 250 OP 636: Performed by: INTERNAL MEDICINE

## 2024-03-25 PROCEDURE — 82248 BILIRUBIN DIRECT: CPT | Performed by: PATHOLOGY

## 2024-03-25 PROCEDURE — 80053 COMPREHEN METABOLIC PANEL: CPT | Performed by: PATHOLOGY

## 2024-03-25 RX ORDER — CALCITRIOL 0.25 UG/1
0.25 CAPSULE, LIQUID FILLED ORAL EVERY OTHER DAY
COMMUNITY
Start: 2024-03-25 | End: 2024-04-09

## 2024-03-25 RX ADMIN — INFLUENZA A VIRUS A/VICTORIA/4897/2022 IVR-238 (H1N1) ANTIGEN (FORMALDEHYDE INACTIVATED), INFLUENZA A VIRUS A/DARWIN/9/2021 SAN-010 (H3N2) ANTIGEN (FORMALDEHYDE INACTIVATED), INFLUENZA B VIRUS B/PHUKET/3073/2013 ANTIGEN (FORMALDEHYDE INACTIVATED), AND INFLUENZA B VIRUS B/MICHIGAN/01/2021 ANTIGEN (FORMALDEHYDE INACTIVATED) 0.7 ML: 60; 60; 60; 60 INJECTION, SUSPENSION INTRAMUSCULAR at 09:00

## 2024-03-25 RX ADMIN — PNEUMOCOCCAL 20-VALENT CONJUGATE VACCINE 0.5 ML
2.2; 2.2; 2.2; 2.2; 2.2; 2.2; 2.2; 2.2; 2.2; 2.2; 2.2; 2.2; 2.2; 2.2; 2.2; 2.2; 4.4; 2.2; 2.2; 2.2 INJECTION, SUSPENSION INTRAMUSCULAR at 08:59

## 2024-03-25 ASSESSMENT — PAIN SCALES - GENERAL: PAINLEVEL: NO PAIN (0)

## 2024-03-25 NOTE — NURSING NOTE
Chief Complaint   Patient presents with    RECHECK     Acute TXP. Wants to talk about lowering oyster + 500.     Vitals:    03/25/24 0815 03/25/24 0819 03/25/24 0821 03/25/24 0822   BP: 122/75 129/74 132/77 128/75   BP Location: Right arm Right arm Right arm    Patient Position: Sitting Sitting Sitting    Cuff Size: Adult Regular Adult Regular Adult Regular    Pulse: 70      SpO2: 99%      Weight: 92.5 kg (203 lb 14.4 oz)          BP Readings from Last 3 Encounters:   03/25/24 128/75   01/23/24 110/62   11/27/23 120/69       /75   Pulse 70   Wt 92.5 kg (203 lb 14.4 oz)   SpO2 99%   BMI 28.05 kg/m       Porsha Arriaza

## 2024-03-25 NOTE — PROGRESS NOTES
TRANSPLANT NEPHROLOGY CHRONIC POST TRANSPLANT VISIT    Assessment & Plan   # DDKT (SPK): Stable  - multiple ischemic insults complicated by post biopsy (10/2023) hematoma s/p embolization and AVF as well as partial loss of renal function post embolization.   - Baseline Creatinine: ~ 1.9-2.2   - Proteinuria: Normal (<0.2 grams)   - Date DSA Last Checked: Jan/2024      Latest DSA: No   - BK Viremia: No   - Kidney Tx Biopsy: Oct 17, 2023; Result: [id number], borderline ACR.  Treated with solumedrol 500mg IV daily x3 days     # Pancreas Tx (SPK):    - Pancreatic Exocrine Drainage: Enteric drained     - Blood glucose: Euglycemia      On insulin: No   - HbA1c: Last checked at time of transplant      Latest HbA1c: 8.4% Sep 2023, repeat now    - Pancreatic enzymes: Stable   - Date DSA Last Checked: Jan/2024  Latest DSA: No   - Pancreas Tx Biopsy: No   -on ASA 81mg daily     # Immunosuppression: Tacrolimus immediate release (goal 8-10) and Mycophenolate mofetil (dose 750 mg every 12 hours)   - Continue with intensive monitoring of immunosuppression for efficacy and toxicity.   - Changes: Not at this time. If no improvement in stools with psyllium would consider changing MMF to MPA    #  Tessa-transplant hematoma Post Kidney biopsy:    -s/p transplant renal biopsy 10/17/23 complicated by hemorrhage and cardiac arrest s/p 3 min of CPR               - Found to have AV fistula with extravasation on angiography by IR on 10/2023 s/p coiling     # Possible peripheral neuropathy:   -EMG 11/2023: L median n neuropathy.   -on gabapentin 300mg tid  -Pt to see hand surgeon on 4/5/24    # CAD: S/p CABG 2/2023. On metoprolol 12.5 mg PO BID, Aspirin 81 mg po every day, and atorvastatin 20mg daily   # PAD    # Infection Prophylaxis:   - PJP: Sulfa/TMP (Bactrim) MWF    # Hypertension: Controlled;  Goal BP: < 130/80   - Changes: Not at this time. Continue metoprolol 12.5mg bid     # Anemia in Chronic Renal Disease: Hgb: Trend up      MCKENNA: No   -  Iron studies: Replete    # Mineral Bone Disorder:    - Secondary renal hyperparathyroidism; PTH level: Normal (15-65 pg/ml)        On treatment: Calcitriol 0.25mcg daily, decrease to every other day   - Vitamin D; level: Low normal        On supplement: Yes   - Calcium; level: Normal        On supplement: Yes   - Phosphorus; level: Normal        On supplement: No     # Electrolytes:   - Potassium; level: High normal        On supplement: No  - Magnesium; level: Low normal        On supplement: Yes, mag ox 400mg daily   - Bicarbonate; level: Normal        On supplement: No    # Loose stools:   -Start psyllium based fiber   -If no improvement would change MMF to MPA    # Medical Compliance: Yes     # Skin Cancer: New lesions: none   - Discussed sun protection and recommend regular follow up with Dermatology.    -S/p Mohs 8/2022 for NMSC    # Health Maintenance and Vaccination Review: Recommend:   flu, TDap, PCV 20, RSV    # Transplant History:  Etiology of Kidney Failure: Diabetes mellitus type 2  Tx: SPK  Transplant: 9/14/2023 (Kidney / Pancreas)  Significant changes in immunosuppression: None  Significant transplant-related complications: Acute cellular-mediated rejection (borderline ACR of kidney)    Transplant Office Phone Number: [phone]    Assessment and plan was discussed with the patient and he voiced his understanding and agreement.    Return visit: Return in 3 months (on 6/21/2024) for previously scheduled visit.    Jeffrey Chaparro MD    The longitudinal plan of care for kidney/pancreas transplant was addressed during this visit. Due to the added complexity in care, I will continue to support Avelina Marion in the subsequent management of this condition(s) and with the ongoing continuity of care of this condition(s).       Chief Complaint   Mr. Marion is a 68 year old here for kidney transplant, immunosuppression management and routine follow up.     History of Present Illness   The patient overall feels  well. He denies any recent hospitalizations. He denies nausea, vomiting, fever, chills, shortness of breath, chest pain, LE edema, unintentional weight loss, nights sweats, dysuria, hematuria. He is having loose stools with water in it every time he goes to the restroom. This was not improved by decreasing MMF dose.       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, with long-term current use of insulin (H)    HTN, kidney transplant related    Proliferative diabetic retinopathy (H)    Secondary hyperparathyroidism of renal origin (H24)    Hearing loss on right    Hyperlipidemia with target LDL less than 100    Metabolic acidosis    Type II diabetes mellitus with neurological manifestations (H)    Vitamin D deficiency    CAD (coronary artery disease)    Kidney replaced by transplant    Immunosuppressed status (H24)    Dehydration    Anemia due to blood loss, acute    Postoperative cardiac arrest following non-cardiac surgery    Kidney transplant rejection    Pancreas replaced by transplant (H)    Aftercare following organ transplant       Allergies   No Known Allergies    Medications   Current Outpatient Medications   Medication Sig    calcitRIOL (ROCALTROL) 0.25 MCG capsule Take 1 capsule (0.25 mcg) by mouth every other day    calcium carbonate-vitamin D (OSCAL) 500-5 MG-MCG tablet Take 1 tablet by mouth 2 times daily    psyllium (METAMUCIL/KONSYL) capsule Take 1 capsule by mouth daily    aspirin 81 MG EC tablet Take 1 tablet (81 mg) by mouth daily    atorvastatin (LIPITOR) 20 MG tablet Take 1 tablet (20 mg) by mouth at bedtime    Blood Glucose Monitoring Suppl (ACCU-CHEK GUIDE) w/Device KIT     gabapentin (NEURONTIN) 100 MG capsule Take 300 mg by mouth 3 times daily    magnesium oxide (MAG-OX) 400 MG tablet Take 1 tablet (400 mg) by mouth every 24 hours    metoprolol tartrate  (LOPRESSOR) 25 MG tablet Take 0.5 tablets (12.5 mg) by mouth 2 times daily    Misc. Devices (PILL SPLITTER) MISC 1 applicator daily    mycophenolate (GENERIC EQUIVALENT) 250 MG capsule Take 3 capsules (750 mg) by mouth 2 times daily    sulfamethoxazole-trimethoprim (BACTRIM) 400-80 MG tablet Take 1 tablet by mouth three times a week    tacrolimus (GENERIC) 0.5 MG capsule HOLD    tacrolimus (GENERIC) 1 MG capsule Take 3 capsules (3 mg) by mouth 2 times daily     No current facility-administered medications for this visit.     Medications Discontinued During This Encounter   Medication Reason    calcium carbonate-vitamin D (OSCAL) 500-5 MG-MCG tablet     sodium bicarbonate 650 MG tablet     calcitRIOL (ROCALTROL) 0.25 MCG capsule        Physical Exam   Vital Signs: /75   Pulse 70   Wt 92.5 kg (203 lb 14.4 oz)   SpO2 99%   BMI 28.05 kg/m      GENERAL APPEARANCE: alert and no distress  HENT: mouth without ulcers or lesions  RESP: lungs clear to auscultation - no rales, rhonchi or wheezes  CV: regular rhythm, normal rate, no rub, no murmur  EDEMA: no LE edema bilaterally  ABDOMEN: soft, nondistended, nontender, bowel sounds normal  MS: extremities normal - no gross deformities noted, no evidence of inflammation in joints, no muscle tenderness  SKIN: no rash  NEURO: normal strength and tone, sensory exam grossly normal, mentation intact and speech normal  TX KIDNEY: normal  DIALYSIS ACCESS:  LUE AV fistula with good thrill and bruit    Data         Latest Ref Rng & Units 3/18/2024     8:33 AM 3/11/2024     8:38 AM 3/4/2024     9:04 AM   Renal   Sodium 135 - 145 mmol/L 142  142  141    K 3.4 - 5.3 mmol/L 5.0  4.3  4.8    Cl 98 - 107 mmol/L 110  109  110    Cl (external) 98 - 107 mmol/L 110  109  110    CO2 22 - 29 mmol/L 22  23  22    Urea Nitrogen 8.0 - 23.0 mg/dL 30.7  27.9  32.5    Creatinine 0.67 - 1.17 mg/dL 1.98  1.90  2.26    Glucose 70 - 99 mg/dL 94  99  99    Calcium 8.8 - 10.2 mg/dL 9.1  8.9  9.1           Latest Ref Rng & Units 2/12/2024     9:16 AM 2/5/2024     9:10 AM 1/23/2024     7:52 AM   Bone Health   Phosphorus 2.5 - 4.5 mg/dL 3.6  2.8     Parathyroid Hormone Intact 15 - 65 pg/mL   62          Latest Ref Rng & Units 3/18/2024     8:33 AM 3/11/2024     8:38 AM 3/4/2024     9:04 AM   Heme   WBC 4.0 - 11.0 10e3/uL 5.7  6.1  5.9    Hgb 13.3 - 17.7 g/dL 11.1  9.9  9.2    Plt 150 - 450 10e3/uL 229  183  162          Latest Ref Rng & Units 10/17/2023     2:57 PM 9/14/2023     3:39 PM 2/14/2023     1:11 AM   Liver   AP 40 - 129 U/L 41  60  35    TBili <=1.2 mg/dL 0.2  0.9  0.3    ALT 0 - 70 U/L 13  6  10    AST 0 - 45 U/L 21  22  46    Tot Protein 6.4 - 8.3 g/dL 3.6  7.3  4.5    Albumin 3.5 - 5.2 g/dL 2.2  4.3  2.9          Latest Ref Rng & Units 3/18/2024     8:33 AM 3/11/2024     8:38 AM 3/4/2024     9:04 AM   Pancreas   Amylase 28 - 100 U/L 59  51  56    Lipase (Roche) 13 - 60 U/L 27  24  28          Latest Ref Rng & Units 9/29/2023     8:48 AM 9/24/2023     8:35 AM 2/16/2023     8:34 AM   Iron studies   Iron 61 - 157 ug/dL 27  32  24    Iron Sat Index 15 - 46 % 16  21  20    Ferritin 31 - 409 ng/mL 1,256  1,164  1,461          Latest Ref Rng & Units 9/14/2023     3:39 PM 8/9/2022     6:26 AM 8/5/2021     2:23 AM   [facility] Txp Virology   EBV CAPSID ANTIBODY IGG No detectable antibody. Positive  Positive  Positive         Recent Labs   Lab Test 03/04/24  0904 03/11/24  0838 03/18/24  0833   DOSTAC 3/3/2024 3/10/2024 3/17/2024   TACROL 7.5 7.7 12.2     Recent Labs   Lab Test 02/05/24  0910 02/12/24  0916 02/26/24  0942   DOSMPA 2/4/2024   9:45 PM 2/11/2024   7:35 PM  --    MPACID 5.45* 4.40* 9.39*   MPAG 112.3* 124.4* 129.8*

## 2024-03-25 NOTE — PATIENT INSTRUCTIONS
Patient Recommendations:  - Decrease calcium and vitamin D to 1 tablet twice daily   -Decrease calcitriol to 0.25mcg to every other day   -I recommend starting psyllium fiber (capsule, gummy, or powder in water) daily regardless of stools  -I recommend the following vaccines: flu, Tdap, PCV 20 (Prevnar 20), and RSV. In 1 month would get Tdap and RSV vaccines  -After April 14th you can decrease labs to every 2 weeks    Transplant Patient Information  Your Post Transplant Coordinator is: Makayla Wu  For non urgent items, we encourage you to contact your coordinator/care team online via [employer]  You and your care team can also contact your transplant coordinator Monday - Friday, 8am - 5pm at [phone] (Option 2 to reach the coordinator or Option 4 to schedule an appointment).  After hours for urgent matters, please call [facility] at [phone].

## 2024-03-25 NOTE — LETTER
3/25/2024         RE: Avelina Marion  [address] [place] [address]        Dear Colleague,    Thank you for referring your patient, Avelina Marion, to the [facility]. Please see a copy of my visit note below.    TRANSPLANT NEPHROLOGY CHRONIC POST TRANSPLANT VISIT    Assessment & Plan  # DDKT (SPK): Stable  - multiple ischemic insults complicated by post biopsy (10/2023) hematoma s/p embolization and AVF as well as partial loss of renal function post embolization.   - Baseline Creatinine: ~ 1.9-2.2   - Proteinuria: Normal (<0.2 grams)   - Date DSA Last Checked: Jan/2024      Latest DSA: No   - BK Viremia: No   - Kidney Tx Biopsy: Oct 17, 2023; Result: t3i1v0, borderline ACR.  Treated with solumedrol 500mg IV daily x3 days     # Pancreas Tx (SPK):    - Pancreatic Exocrine Drainage: Enteric drained     - Blood glucose: Euglycemia      On insulin: No   - HbA1c: Last checked at time of transplant      Latest HbA1c: 8.4% Sep 2023, repeat now    - Pancreatic enzymes: Stable   - Date DSA Last Checked: Jan/2024  Latest DSA: No   - Pancreas Tx Biopsy: No   -on ASA 81mg daily     # Immunosuppression: Tacrolimus immediate release (goal 8-10) and Mycophenolate mofetil (dose 750 mg every 12 hours)   - Continue with intensive monitoring of immunosuppression for efficacy and toxicity.   - Changes: Not at this time. If no improvement in stools with psyllium would consider changing MMF to MPA    #  Tessa-transplant hematoma Post Kidney biopsy:    -s/p transplant renal biopsy 10/17/23 complicated by hemorrhage and cardiac arrest s/p 3 min of CPR               - Found to have AV fistula with extravasation on angiography by IR on 10/2023 s/p coiling     # Possible peripheral neuropathy:   -EMG 11/2023: L median n neuropathy.   -on gabapentin 300mg tid  -Pt to see hand surgeon on 4/5/24    # CAD: S/p CABG 2/2023. On metoprolol 12.5 mg PO BID, Aspirin 81 mg po every day, and atorvastatin 20mg daily   #  Detail Level: Detailed PAD    # Infection Prophylaxis:   - PJP: Sulfa/TMP (Bactrim) MWF    # Hypertension: Controlled;  Goal BP: < 130/80   - Changes: Not at this time. Continue metoprolol 12.5mg bid     # Anemia in Chronic Renal Disease: Hgb: Trend up      MCKENNA: No   - Iron studies: Replete    # Mineral Bone Disorder:    - Secondary renal hyperparathyroidism; PTH level: Normal (15-65 pg/ml)        On treatment: Calcitriol 0.25mcg daily, decrease to every other day   - Vitamin D; level: Low normal        On supplement: Yes   - Calcium; level: Normal        On supplement: Yes   - Phosphorus; level: Normal        On supplement: No     # Electrolytes:   - Potassium; level: High normal        On supplement: No  - Magnesium; level: Low normal        On supplement: Yes, mag ox 400mg daily   - Bicarbonate; level: Normal        On supplement: No    # Loose stools:   -Start psyllium based fiber   -If no improvement would change MMF to MPA    # Medical Compliance: Yes     # Skin Cancer: New lesions: none   - Discussed sun protection and recommend regular follow up with Dermatology.    -S/p Mohs 8/2022 for NMSC    # Health Maintenance and Vaccination Review: Recommend:   flu, TDap, PCV 20, RSV    # Transplant History:  Etiology of Kidney Failure: Diabetes mellitus type 2  Tx: SPK  Transplant: 9/14/2023 (Kidney / Pancreas)  Significant changes in immunosuppression: None  Significant transplant-related complications: Acute cellular-mediated rejection (borderline ACR of kidney)    Transplant Office Phone Number: [phone]    Assessment and plan was discussed with the patient and he voiced his understanding and agreement.    Return visit: Return in 3 months (on 6/21/2024) for previously scheduled visit.    Jeffrey Chaparro MD    The longitudinal plan of care for kidney/pancreas transplant was addressed during this visit. Due to the added complexity in care, I will continue to support Avelina Marion in the subsequent management of this condition(s) and  with the ongoing continuity of care of this condition(s).       Chief Complaint  Mr. Marion is a 68 year old here for kidney transplant, immunosuppression management and routine follow up.     History of Present Illness  The patient overall feels well. He denies any recent hospitalizations. He denies nausea, vomiting, fever, chills, shortness of breath, chest pain, LE edema, unintentional weight loss, nights sweats, dysuria, hematuria. He is having loose stools with water in it every time he goes to the restroom. This was not improved by decreasing MMF dose.       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, with long-term current use of insulin (H)     HTN, kidney transplant related     Proliferative diabetic retinopathy (H)     Secondary hyperparathyroidism of renal origin (H24)     Hearing loss on right     Hyperlipidemia with target LDL less than 100     Metabolic acidosis     Type II diabetes mellitus with neurological manifestations (H)     Vitamin D deficiency     CAD (coronary artery disease)     Kidney replaced by transplant     Immunosuppressed status (H24)     Dehydration     Anemia due to blood loss, acute     Postoperative cardiac arrest following non-cardiac surgery     Kidney transplant rejection     Pancreas replaced by transplant (H)     Aftercare following organ transplant       Allergies  No Known Allergies    Medications  Current Outpatient Medications   Medication Sig     calcitRIOL (ROCALTROL) 0.25 MCG capsule Take 1 capsule (0.25 mcg) by mouth every other day     calcium carbonate-vitamin D (OSCAL) 500-5 MG-MCG tablet Take 1 tablet by mouth 2 times daily     psyllium (METAMUCIL/KONSYL) capsule Take 1 capsule by mouth daily     aspirin 81 MG EC tablet Take 1 tablet (81 mg) by mouth daily     atorvastatin (LIPITOR) 20 MG tablet Take 1 tablet (20 mg) by mouth at  bedtime     Blood Glucose Monitoring Suppl (ACCU-CHEK GUIDE) w/Device KIT      gabapentin (NEURONTIN) 100 MG capsule Take 300 mg by mouth 3 times daily     magnesium oxide (MAG-OX) 400 MG tablet Take 1 tablet (400 mg) by mouth every 24 hours     metoprolol tartrate (LOPRESSOR) 25 MG tablet Take 0.5 tablets (12.5 mg) by mouth 2 times daily     Misc. Devices (PILL SPLITTER) MISC 1 applicator daily     mycophenolate (GENERIC EQUIVALENT) 250 MG capsule Take 3 capsules (750 mg) by mouth 2 times daily     sulfamethoxazole-trimethoprim (BACTRIM) 400-80 MG tablet Take 1 tablet by mouth three times a week     tacrolimus (GENERIC) 0.5 MG capsule HOLD     tacrolimus (GENERIC) 1 MG capsule Take 3 capsules (3 mg) by mouth 2 times daily     No current facility-administered medications for this visit.     Medications Discontinued During This Encounter   Medication Reason     calcium carbonate-vitamin D (OSCAL) 500-5 MG-MCG tablet      sodium bicarbonate 650 MG tablet      calcitRIOL (ROCALTROL) 0.25 MCG capsule        Physical Exam  Vital Signs: /75   Pulse 70   Wt 92.5 kg (203 lb 14.4 oz)   SpO2 99%   BMI 28.05 kg/m      GENERAL APPEARANCE: alert and no distress  HENT: mouth without ulcers or lesions  RESP: lungs clear to auscultation - no rales, rhonchi or wheezes  CV: regular rhythm, normal rate, no rub, no murmur  EDEMA: no LE edema bilaterally  ABDOMEN: soft, nondistended, nontender, bowel sounds normal  MS: extremities normal - no gross deformities noted, no evidence of inflammation in joints, no muscle tenderness  SKIN: no rash  NEURO: normal strength and tone, sensory exam grossly normal, mentation intact and speech normal  TX KIDNEY: normal  DIALYSIS ACCESS:  LUE AV fistula with good thrill and bruit    Data        Latest Ref Rng & Units 3/18/2024     8:33 AM 3/11/2024     8:38 AM 3/4/2024     9:04 AM   Renal   Sodium 135 - 145 mmol/L 142  142  141    K 3.4 - 5.3 mmol/L 5.0  4.3  4.8    Cl 98 - 107 mmol/L 110   109  110    Cl (external) 98 - 107 mmol/L 110  109  110    CO2 22 - 29 mmol/L 22  23  22    Urea Nitrogen 8.0 - 23.0 mg/dL 30.7  27.9  32.5    Creatinine 0.67 - 1.17 mg/dL 1.98  1.90  2.26    Glucose 70 - 99 mg/dL 94  99  99    Calcium 8.8 - 10.2 mg/dL 9.1  8.9  9.1          Latest Ref Rng & Units 2/12/2024     9:16 AM 2/5/2024     9:10 AM 1/23/2024     7:52 AM   Bone Health   Phosphorus 2.5 - 4.5 mg/dL 3.6  2.8     Parathyroid Hormone Intact 15 - 65 pg/mL   62          Latest Ref Rng & Units 3/18/2024     8:33 AM 3/11/2024     8:38 AM 3/4/2024     9:04 AM   Heme   WBC 4.0 - 11.0 10e3/uL 5.7  6.1  5.9    Hgb 13.3 - 17.7 g/dL 11.1  9.9  9.2    Plt 150 - 450 10e3/uL 229  183  162          Latest Ref Rng & Units 10/17/2023     2:57 PM 9/14/2023     3:39 PM 2/14/2023     1:11 AM   Liver   AP 40 - 129 U/L 41  60  35    TBili <=1.2 mg/dL 0.2  0.9  0.3    ALT 0 - 70 U/L 13  6  10    AST 0 - 45 U/L 21  22  46    Tot Protein 6.4 - 8.3 g/dL 3.6  7.3  4.5    Albumin 3.5 - 5.2 g/dL 2.2  4.3  2.9          Latest Ref Rng & Units 3/18/2024     8:33 AM 3/11/2024     8:38 AM 3/4/2024     9:04 AM   Pancreas   Amylase 28 - 100 U/L 59  51  56    Lipase (Roche) 13 - 60 U/L 27  24  28          Latest Ref Rng & Units 9/29/2023     8:48 AM 9/24/2023     8:35 AM 2/16/2023     8:34 AM   Iron studies   Iron 61 - 157 ug/dL 27  32  24    Iron Sat Index 15 - 46 % 16  21  20    Ferritin 31 - 409 ng/mL 1,256  1,164  1,461          Latest Ref Rng & Units 9/14/2023     3:39 PM 8/9/2022     6:26 AM 8/5/2021     2:23 AM   [facility] Txp Virology   EBV CAPSID ANTIBODY IGG No detectable antibody. Positive  Positive  Positive         Recent Labs   Lab Test 03/04/24  0904 03/11/24  0838 03/18/24  0833   DOSTAC 3/3/2024 3/10/2024 3/17/2024   TACROL 7.5 7.7 12.2     Recent Labs   Lab Test 02/05/24  0910 02/12/24  0916 02/26/24  0942   DOSMPA 2/4/2024   9:45 PM 2/11/2024   7:35 PM  --    MPACID 5.45* 4.40* 9.39*   MPAG 112.3* 124.4* 129.8*     Again, thank you  for allowing me to participate in the care of your patient.        Sincerely,        Jeffrey Chaparro MD

## 2024-03-26 LAB
MYCOPHENOLATE SERPL LC/MS/MS-MCNC: 2.52 MG/L (ref 1–3.5)
MYCOPHENOLATE-G SERPL LC/MS/MS-MCNC: 90.2 MG/L (ref 30–95)
TME LAST DOSE: NORMAL H
TME LAST DOSE: NORMAL H

## 2024-04-01 ENCOUNTER — TELEPHONE (OUTPATIENT)
Dept: TRANSPLANT | Facility: CLINIC | Age: 69
End: 2024-04-01

## 2024-04-01 ENCOUNTER — LAB (OUTPATIENT)
Dept: LAB | Facility: CLINIC | Age: 69
End: 2024-04-01
Payer: COMMERCIAL

## 2024-04-01 DIAGNOSIS — Z48.298 AFTERCARE FOLLOWING ORGAN TRANSPLANT: ICD-10-CM

## 2024-04-01 DIAGNOSIS — Z94.0 KIDNEY REPLACED BY TRANSPLANT: ICD-10-CM

## 2024-04-01 DIAGNOSIS — Z79.899 ENCOUNTER FOR LONG-TERM CURRENT USE OF MEDICATION: ICD-10-CM

## 2024-04-01 DIAGNOSIS — Z94.83 PANCREAS REPLACED BY TRANSPLANT (H): ICD-10-CM

## 2024-04-01 DIAGNOSIS — Z98.890 OTHER SPECIFIED POSTPROCEDURAL STATES: ICD-10-CM

## 2024-04-01 LAB
AMYLASE SERPL-CCNC: 52 U/L (ref 28–100)
ANION GAP SERPL CALCULATED.3IONS-SCNC: 10 MMOL/L (ref 7–15)
BUN SERPL-MCNC: 26.7 MG/DL (ref 8–23)
CALCIUM SERPL-MCNC: 9.1 MG/DL (ref 8.8–10.2)
CHLORIDE SERPL-SCNC: 109 MMOL/L (ref 98–107)
CREAT SERPL-MCNC: 1.89 MG/DL (ref 0.67–1.17)
DEPRECATED HCO3 PLAS-SCNC: 21 MMOL/L (ref 22–29)
EGFRCR SERPLBLD CKD-EPI 2021: 39 ML/MIN/1.73M2
ERYTHROCYTE [DISTWIDTH] IN BLOOD BY AUTOMATED COUNT: 12.8 % (ref 10–15)
GLUCOSE SERPL-MCNC: 93 MG/DL (ref 70–99)
HCT VFR BLD AUTO: 36 % (ref 40–53)
HGB BLD-MCNC: 10.9 G/DL (ref 13.3–17.7)
LIPASE SERPL-CCNC: 25 U/L (ref 13–60)
MCH RBC QN AUTO: 29.7 PG (ref 26.5–33)
MCHC RBC AUTO-ENTMCNC: 30.3 G/DL (ref 31.5–36.5)
MCV RBC AUTO: 98 FL (ref 78–100)
PLATELET # BLD AUTO: 184 10E3/UL (ref 150–450)
POTASSIUM SERPL-SCNC: 5.1 MMOL/L (ref 3.4–5.3)
RBC # BLD AUTO: 3.67 10E6/UL (ref 4.4–5.9)
SODIUM SERPL-SCNC: 140 MMOL/L (ref 135–145)
TACROLIMUS BLD-MCNC: 11.9 UG/L (ref 5–15)
TME LAST DOSE: NORMAL H
TME LAST DOSE: NORMAL H
WBC # BLD AUTO: 6 10E3/UL (ref 4–11)

## 2024-04-01 PROCEDURE — 36415 COLL VENOUS BLD VENIPUNCTURE: CPT | Performed by: PATHOLOGY

## 2024-04-01 PROCEDURE — 80197 ASSAY OF TACROLIMUS: CPT | Performed by: INTERNAL MEDICINE

## 2024-04-01 PROCEDURE — 85027 COMPLETE CBC AUTOMATED: CPT | Performed by: PATHOLOGY

## 2024-04-01 PROCEDURE — 99000 SPECIMEN HANDLING OFFICE-LAB: CPT | Performed by: PATHOLOGY

## 2024-04-01 PROCEDURE — 80048 BASIC METABOLIC PNL TOTAL CA: CPT | Performed by: PATHOLOGY

## 2024-04-01 PROCEDURE — 82150 ASSAY OF AMYLASE: CPT | Performed by: PATHOLOGY

## 2024-04-01 PROCEDURE — 83690 ASSAY OF LIPASE: CPT | Performed by: PATHOLOGY

## 2024-04-01 NOTE — TELEPHONE ENCOUNTER
Patient Call: General  Route to LPN    Reason for call: Patient called with some medication questions. No details on questions were given. More details with call back.     Call back needed? Yes    Return Call Needed  Same as documented in contacts section  When to return call?: Same day: Route High Priority

## 2024-04-08 ENCOUNTER — LAB (OUTPATIENT)
Dept: LAB | Facility: CLINIC | Age: 69
End: 2024-04-08
Payer: COMMERCIAL

## 2024-04-08 DIAGNOSIS — Z20.828 CONTACT WITH AND (SUSPECTED) EXPOSURE TO OTHER VIRAL COMMUNICABLE DISEASES: ICD-10-CM

## 2024-04-08 DIAGNOSIS — Z94.0 KIDNEY REPLACED BY TRANSPLANT: ICD-10-CM

## 2024-04-08 DIAGNOSIS — Z79.899 ENCOUNTER FOR LONG-TERM CURRENT USE OF MEDICATION: ICD-10-CM

## 2024-04-08 DIAGNOSIS — Z94.83 PANCREAS REPLACED BY TRANSPLANT (H): ICD-10-CM

## 2024-04-08 DIAGNOSIS — Z98.890 OTHER SPECIFIED POSTPROCEDURAL STATES: ICD-10-CM

## 2024-04-08 DIAGNOSIS — Z48.298 AFTERCARE FOLLOWING ORGAN TRANSPLANT: ICD-10-CM

## 2024-04-08 LAB
AMYLASE SERPL-CCNC: 63 U/L (ref 28–100)
ANION GAP SERPL CALCULATED.3IONS-SCNC: 9 MMOL/L (ref 7–15)
BK VIRUS SPECIMEN TYPE: NORMAL
BKV DNA # SPEC NAA+PROBE: NOT DETECTED IU/ML
BUN SERPL-MCNC: 35.6 MG/DL (ref 8–23)
CALCIUM SERPL-MCNC: 9 MG/DL (ref 8.8–10.2)
CHLORIDE SERPL-SCNC: 111 MMOL/L (ref 98–107)
CREAT SERPL-MCNC: 2 MG/DL (ref 0.67–1.17)
DEPRECATED HCO3 PLAS-SCNC: 22 MMOL/L (ref 22–29)
EGFRCR SERPLBLD CKD-EPI 2021: 36 ML/MIN/1.73M2
ERYTHROCYTE [DISTWIDTH] IN BLOOD BY AUTOMATED COUNT: 12.8 % (ref 10–15)
GLUCOSE SERPL-MCNC: 96 MG/DL (ref 70–99)
HCT VFR BLD AUTO: 37.9 % (ref 40–53)
HGB BLD-MCNC: 11.7 G/DL (ref 13.3–17.7)
LIPASE SERPL-CCNC: 29 U/L (ref 13–60)
MAGNESIUM SERPL-MCNC: 1.8 MG/DL (ref 1.7–2.3)
MCH RBC QN AUTO: 30.3 PG (ref 26.5–33)
MCHC RBC AUTO-ENTMCNC: 30.9 G/DL (ref 31.5–36.5)
MCV RBC AUTO: 98 FL (ref 78–100)
PHOSPHATE SERPL-MCNC: 3.8 MG/DL (ref 2.5–4.5)
PLATELET # BLD AUTO: 209 10E3/UL (ref 150–450)
POTASSIUM SERPL-SCNC: 5.3 MMOL/L (ref 3.4–5.3)
RBC # BLD AUTO: 3.86 10E6/UL (ref 4.4–5.9)
SODIUM SERPL-SCNC: 142 MMOL/L (ref 135–145)
TACROLIMUS BLD-MCNC: 14.7 UG/L (ref 5–15)
TME LAST DOSE: NORMAL H
TME LAST DOSE: NORMAL H
WBC # BLD AUTO: 6 10E3/UL (ref 4–11)

## 2024-04-08 PROCEDURE — 82150 ASSAY OF AMYLASE: CPT | Performed by: PATHOLOGY

## 2024-04-08 PROCEDURE — 80048 BASIC METABOLIC PNL TOTAL CA: CPT | Performed by: PATHOLOGY

## 2024-04-08 PROCEDURE — 83735 ASSAY OF MAGNESIUM: CPT | Performed by: PATHOLOGY

## 2024-04-08 PROCEDURE — 99000 SPECIMEN HANDLING OFFICE-LAB: CPT | Performed by: PATHOLOGY

## 2024-04-08 PROCEDURE — 80197 ASSAY OF TACROLIMUS: CPT | Performed by: INTERNAL MEDICINE

## 2024-04-08 PROCEDURE — 83690 ASSAY OF LIPASE: CPT | Performed by: PATHOLOGY

## 2024-04-08 PROCEDURE — 85027 COMPLETE CBC AUTOMATED: CPT | Performed by: PATHOLOGY

## 2024-04-08 PROCEDURE — 36415 COLL VENOUS BLD VENIPUNCTURE: CPT | Performed by: PATHOLOGY

## 2024-04-08 PROCEDURE — 84100 ASSAY OF PHOSPHORUS: CPT | Performed by: PATHOLOGY

## 2024-04-08 PROCEDURE — 80180 DRUG SCRN QUAN MYCOPHENOLATE: CPT | Performed by: INTERNAL MEDICINE

## 2024-04-08 PROCEDURE — 87799 DETECT AGENT NOS DNA QUANT: CPT | Performed by: INTERNAL MEDICINE

## 2024-04-09 DIAGNOSIS — Z94.0 KIDNEY REPLACED BY TRANSPLANT: ICD-10-CM

## 2024-04-09 DIAGNOSIS — Z94.0 HISTORY OF SIMULTANEOUS KIDNEY AND PANCREAS TRANSPLANT (H): ICD-10-CM

## 2024-04-09 DIAGNOSIS — Z94.83 HISTORY OF SIMULTANEOUS KIDNEY AND PANCREAS TRANSPLANT (H): ICD-10-CM

## 2024-04-09 DIAGNOSIS — Z48.298 AFTERCARE FOLLOWING ORGAN TRANSPLANT: ICD-10-CM

## 2024-04-09 LAB
MYCOPHENOLATE SERPL LC/MS/MS-MCNC: 4.02 MG/L (ref 1–3.5)
MYCOPHENOLATE-G SERPL LC/MS/MS-MCNC: 106.7 MG/L (ref 30–95)
TME LAST DOSE: ABNORMAL H
TME LAST DOSE: ABNORMAL H

## 2024-04-09 RX ORDER — CALCITRIOL 0.25 UG/1
0.25 CAPSULE, LIQUID FILLED ORAL EVERY OTHER DAY
Qty: 30 CAPSULE | Refills: 3 | Status: SHIPPED | OUTPATIENT
Start: 2024-04-09 | End: 2024-09-24

## 2024-04-09 RX ORDER — TACROLIMUS 0.5 MG/1
0.5 CAPSULE ORAL 2 TIMES DAILY
Qty: 60 CAPSULE | Refills: 11 | Status: SHIPPED | OUTPATIENT
Start: 2024-04-09 | End: 2024-08-22

## 2024-04-09 RX ORDER — TACROLIMUS 1 MG/1
2 CAPSULE ORAL 2 TIMES DAILY
Qty: 120 CAPSULE | Refills: 11 | Status: SHIPPED | OUTPATIENT
Start: 2024-04-09 | End: 2024-08-22

## 2024-04-15 ENCOUNTER — LAB (OUTPATIENT)
Dept: LAB | Facility: CLINIC | Age: 69
End: 2024-04-15
Payer: COMMERCIAL

## 2024-04-15 DIAGNOSIS — Z48.298 AFTERCARE FOLLOWING ORGAN TRANSPLANT: ICD-10-CM

## 2024-04-15 DIAGNOSIS — Z98.890 OTHER SPECIFIED POSTPROCEDURAL STATES: ICD-10-CM

## 2024-04-15 DIAGNOSIS — Z94.83 PANCREAS REPLACED BY TRANSPLANT (H): ICD-10-CM

## 2024-04-15 DIAGNOSIS — Z79.899 ENCOUNTER FOR LONG-TERM CURRENT USE OF MEDICATION: ICD-10-CM

## 2024-04-15 DIAGNOSIS — Z20.828 CONTACT WITH AND (SUSPECTED) EXPOSURE TO OTHER VIRAL COMMUNICABLE DISEASES: ICD-10-CM

## 2024-04-15 DIAGNOSIS — Z94.0 KIDNEY REPLACED BY TRANSPLANT: ICD-10-CM

## 2024-04-15 LAB
AMYLASE SERPL-CCNC: 61 U/L (ref 28–100)
ANION GAP SERPL CALCULATED.3IONS-SCNC: 10 MMOL/L (ref 7–15)
BUN SERPL-MCNC: 38.6 MG/DL (ref 8–23)
CALCIUM SERPL-MCNC: 9.2 MG/DL (ref 8.8–10.2)
CHLORIDE SERPL-SCNC: 110 MMOL/L (ref 98–107)
CREAT SERPL-MCNC: 2.24 MG/DL (ref 0.67–1.17)
DEPRECATED HCO3 PLAS-SCNC: 21 MMOL/L (ref 22–29)
EGFRCR SERPLBLD CKD-EPI 2021: 32 ML/MIN/1.73M2
ERYTHROCYTE [DISTWIDTH] IN BLOOD BY AUTOMATED COUNT: 12.6 % (ref 10–15)
GLUCOSE SERPL-MCNC: 96 MG/DL (ref 70–99)
HCT VFR BLD AUTO: 36.3 % (ref 40–53)
HGB BLD-MCNC: 11.1 G/DL (ref 13.3–17.7)
LIPASE SERPL-CCNC: 32 U/L (ref 13–60)
MCH RBC QN AUTO: 29.8 PG (ref 26.5–33)
MCHC RBC AUTO-ENTMCNC: 30.6 G/DL (ref 31.5–36.5)
MCV RBC AUTO: 98 FL (ref 78–100)
PLATELET # BLD AUTO: 200 10E3/UL (ref 150–450)
POTASSIUM SERPL-SCNC: 5.3 MMOL/L (ref 3.4–5.3)
RBC # BLD AUTO: 3.72 10E6/UL (ref 4.4–5.9)
SODIUM SERPL-SCNC: 141 MMOL/L (ref 135–145)
TACROLIMUS BLD-MCNC: 10.4 UG/L (ref 5–15)
TME LAST DOSE: NORMAL H
TME LAST DOSE: NORMAL H
WBC # BLD AUTO: 5.4 10E3/UL (ref 4–11)

## 2024-04-15 PROCEDURE — 99000 SPECIMEN HANDLING OFFICE-LAB: CPT | Performed by: PATHOLOGY

## 2024-04-15 PROCEDURE — 82150 ASSAY OF AMYLASE: CPT | Performed by: PATHOLOGY

## 2024-04-15 PROCEDURE — 36415 COLL VENOUS BLD VENIPUNCTURE: CPT | Performed by: PATHOLOGY

## 2024-04-15 PROCEDURE — 80048 BASIC METABOLIC PNL TOTAL CA: CPT | Performed by: PATHOLOGY

## 2024-04-15 PROCEDURE — 83690 ASSAY OF LIPASE: CPT | Performed by: PATHOLOGY

## 2024-04-15 PROCEDURE — 80180 DRUG SCRN QUAN MYCOPHENOLATE: CPT | Performed by: INTERNAL MEDICINE

## 2024-04-15 PROCEDURE — 85027 COMPLETE CBC AUTOMATED: CPT | Performed by: PATHOLOGY

## 2024-04-15 PROCEDURE — 80197 ASSAY OF TACROLIMUS: CPT | Performed by: INTERNAL MEDICINE

## 2024-04-16 LAB
MYCOPHENOLATE SERPL LC/MS/MS-MCNC: 4.78 MG/L (ref 1–3.5)
MYCOPHENOLATE-G SERPL LC/MS/MS-MCNC: 103 MG/L (ref 30–95)
TME LAST DOSE: ABNORMAL H
TME LAST DOSE: ABNORMAL H

## 2024-04-22 ENCOUNTER — LAB (OUTPATIENT)
Dept: LAB | Facility: CLINIC | Age: 69
End: 2024-04-22
Payer: COMMERCIAL

## 2024-04-22 DIAGNOSIS — Z98.890 OTHER SPECIFIED POSTPROCEDURAL STATES: ICD-10-CM

## 2024-04-22 DIAGNOSIS — Z94.0 KIDNEY REPLACED BY TRANSPLANT: ICD-10-CM

## 2024-04-22 DIAGNOSIS — Z94.83 PANCREAS REPLACED BY TRANSPLANT (H): ICD-10-CM

## 2024-04-22 DIAGNOSIS — Z20.828 CONTACT WITH AND (SUSPECTED) EXPOSURE TO OTHER VIRAL COMMUNICABLE DISEASES: ICD-10-CM

## 2024-04-22 DIAGNOSIS — Z48.298 AFTERCARE FOLLOWING ORGAN TRANSPLANT: ICD-10-CM

## 2024-04-22 DIAGNOSIS — Z79.899 ENCOUNTER FOR LONG-TERM CURRENT USE OF MEDICATION: ICD-10-CM

## 2024-04-22 LAB
AMYLASE SERPL-CCNC: 67 U/L (ref 28–100)
ANION GAP SERPL CALCULATED.3IONS-SCNC: 9 MMOL/L (ref 7–15)
BUN SERPL-MCNC: 36.3 MG/DL (ref 8–23)
CALCIUM SERPL-MCNC: 9.1 MG/DL (ref 8.8–10.2)
CHLORIDE SERPL-SCNC: 111 MMOL/L (ref 98–107)
CREAT SERPL-MCNC: 2.05 MG/DL (ref 0.67–1.17)
DEPRECATED HCO3 PLAS-SCNC: 21 MMOL/L (ref 22–29)
EGFRCR SERPLBLD CKD-EPI 2021: 35 ML/MIN/1.73M2
ERYTHROCYTE [DISTWIDTH] IN BLOOD BY AUTOMATED COUNT: 12.7 % (ref 10–15)
GLUCOSE SERPL-MCNC: 90 MG/DL (ref 70–99)
HCT VFR BLD AUTO: 37.4 % (ref 40–53)
HGB BLD-MCNC: 11.6 G/DL (ref 13.3–17.7)
LIPASE SERPL-CCNC: 31 U/L (ref 13–60)
MCH RBC QN AUTO: 29.8 PG (ref 26.5–33)
MCHC RBC AUTO-ENTMCNC: 31 G/DL (ref 31.5–36.5)
MCV RBC AUTO: 96 FL (ref 78–100)
PLATELET # BLD AUTO: 195 10E3/UL (ref 150–450)
POTASSIUM SERPL-SCNC: 4.8 MMOL/L (ref 3.4–5.3)
RBC # BLD AUTO: 3.89 10E6/UL (ref 4.4–5.9)
SODIUM SERPL-SCNC: 141 MMOL/L (ref 135–145)
TACROLIMUS BLD-MCNC: 8.4 UG/L (ref 5–15)
TME LAST DOSE: NORMAL H
TME LAST DOSE: NORMAL H
WBC # BLD AUTO: 4.4 10E3/UL (ref 4–11)

## 2024-04-22 PROCEDURE — 82150 ASSAY OF AMYLASE: CPT | Performed by: PATHOLOGY

## 2024-04-22 PROCEDURE — 80048 BASIC METABOLIC PNL TOTAL CA: CPT | Performed by: PATHOLOGY

## 2024-04-22 PROCEDURE — 99000 SPECIMEN HANDLING OFFICE-LAB: CPT | Performed by: PATHOLOGY

## 2024-04-22 PROCEDURE — 36415 COLL VENOUS BLD VENIPUNCTURE: CPT | Performed by: PATHOLOGY

## 2024-04-22 PROCEDURE — 86832 HLA CLASS I HIGH DEFIN QUAL: CPT | Performed by: INTERNAL MEDICINE

## 2024-04-22 PROCEDURE — 83690 ASSAY OF LIPASE: CPT | Performed by: PATHOLOGY

## 2024-04-22 PROCEDURE — 86833 HLA CLASS II HIGH DEFIN QUAL: CPT | Performed by: INTERNAL MEDICINE

## 2024-04-22 PROCEDURE — 85027 COMPLETE CBC AUTOMATED: CPT | Performed by: PATHOLOGY

## 2024-04-22 PROCEDURE — 80197 ASSAY OF TACROLIMUS: CPT | Performed by: INTERNAL MEDICINE

## 2024-05-01 ENCOUNTER — TELEPHONE (OUTPATIENT)
Dept: TRANSPLANT | Facility: CLINIC | Age: 69
End: 2024-05-01
Payer: COMMERCIAL

## 2024-05-01 NOTE — TELEPHONE ENCOUNTER
Left Voicemail (1st Attempt) for the patient to call back and reschedule the following:    Appointment type: RKT  Provider: Joselito  Return date: 6/21/24  Specialty phone number: [phone]  Additional appointment(s) needed: lab  Additonal Notes: reschedule need, can reschedule to virtual visit with Dr. Chaparro.

## 2024-05-02 LAB
DONOR IDENTIFICATION: NORMAL
DSA COMMENTS: NORMAL
DSA PRESENT: NO
DSA TEST METHOD: NORMAL
ORGAN: NORMAL
SA 1  COMMENTS: NORMAL
SA 1 CELL: NORMAL
SA 1 TEST METHOD: NORMAL
SA 2 CELL: NORMAL
SA 2 COMMENTS: NORMAL
SA 2 TEST METHOD: NORMAL
SA1 HI RISK ABY: NORMAL
SA1 MOD RISK ABY: NORMAL
SA2 HI RISK ABY: NORMAL
SA2 MOD RISK ABY: NORMAL
UNACCEPTABLE ANTIGENS: NORMAL
UNOS CPRA: 23

## 2024-05-06 ENCOUNTER — LAB (OUTPATIENT)
Dept: LAB | Facility: CLINIC | Age: 69
End: 2024-05-06
Payer: COMMERCIAL

## 2024-05-06 DIAGNOSIS — Z94.0 KIDNEY REPLACED BY TRANSPLANT: ICD-10-CM

## 2024-05-06 DIAGNOSIS — Z20.828 CONTACT WITH AND (SUSPECTED) EXPOSURE TO OTHER VIRAL COMMUNICABLE DISEASES: ICD-10-CM

## 2024-05-06 DIAGNOSIS — Z98.890 OTHER SPECIFIED POSTPROCEDURAL STATES: ICD-10-CM

## 2024-05-06 DIAGNOSIS — Z79.899 ENCOUNTER FOR LONG-TERM CURRENT USE OF MEDICATION: ICD-10-CM

## 2024-05-06 DIAGNOSIS — Z94.83 PANCREAS REPLACED BY TRANSPLANT (H): ICD-10-CM

## 2024-05-06 DIAGNOSIS — Z48.298 AFTERCARE FOLLOWING ORGAN TRANSPLANT: ICD-10-CM

## 2024-05-06 LAB
AMYLASE SERPL-CCNC: 64 U/L (ref 28–100)
ANION GAP SERPL CALCULATED.3IONS-SCNC: 10 MMOL/L (ref 7–15)
BK VIRUS SPECIMEN TYPE: NORMAL
BKV DNA # SPEC NAA+PROBE: NOT DETECTED IU/ML
BUN SERPL-MCNC: 37.3 MG/DL (ref 8–23)
CALCIUM SERPL-MCNC: 9.1 MG/DL (ref 8.8–10.2)
CHLORIDE SERPL-SCNC: 110 MMOL/L (ref 98–107)
CREAT SERPL-MCNC: 2.03 MG/DL (ref 0.67–1.17)
DEPRECATED HCO3 PLAS-SCNC: 21 MMOL/L (ref 22–29)
EGFRCR SERPLBLD CKD-EPI 2021: 35 ML/MIN/1.73M2
ERYTHROCYTE [DISTWIDTH] IN BLOOD BY AUTOMATED COUNT: 12.5 % (ref 10–15)
GLUCOSE SERPL-MCNC: 100 MG/DL (ref 70–99)
HCT VFR BLD AUTO: 36.2 % (ref 40–53)
HGB BLD-MCNC: 11.1 G/DL (ref 13.3–17.7)
LIPASE SERPL-CCNC: 27 U/L (ref 13–60)
Lab: NORMAL
MAGNESIUM SERPL-MCNC: 1.8 MG/DL (ref 1.7–2.3)
MCH RBC QN AUTO: 29.2 PG (ref 26.5–33)
MCHC RBC AUTO-ENTMCNC: 30.7 G/DL (ref 31.5–36.5)
MCV RBC AUTO: 95 FL (ref 78–100)
PERFORMING LABORATORY: NORMAL
PHOSPHATE SERPL-MCNC: 3.9 MG/DL (ref 2.5–4.5)
PLATELET # BLD AUTO: 174 10E3/UL (ref 150–450)
POTASSIUM SERPL-SCNC: 4.9 MMOL/L (ref 3.4–5.3)
RBC # BLD AUTO: 3.8 10E6/UL (ref 4.4–5.9)
SODIUM SERPL-SCNC: 141 MMOL/L (ref 135–145)
SPECIMEN STATUS: NORMAL
TACROLIMUS BLD-MCNC: 10.3 UG/L (ref 5–15)
TEST NAME: NORMAL
TME LAST DOSE: NORMAL H
TME LAST DOSE: NORMAL H
WBC # BLD AUTO: 3.9 10E3/UL (ref 4–11)

## 2024-05-06 PROCEDURE — 80180 DRUG SCRN QUAN MYCOPHENOLATE: CPT | Performed by: INTERNAL MEDICINE

## 2024-05-06 PROCEDURE — 80048 BASIC METABOLIC PNL TOTAL CA: CPT | Performed by: PATHOLOGY

## 2024-05-06 PROCEDURE — 82150 ASSAY OF AMYLASE: CPT | Performed by: PATHOLOGY

## 2024-05-06 PROCEDURE — 99000 SPECIMEN HANDLING OFFICE-LAB: CPT | Performed by: PATHOLOGY

## 2024-05-06 PROCEDURE — 85027 COMPLETE CBC AUTOMATED: CPT | Performed by: PATHOLOGY

## 2024-05-06 PROCEDURE — 83735 ASSAY OF MAGNESIUM: CPT | Performed by: PATHOLOGY

## 2024-05-06 PROCEDURE — 87799 DETECT AGENT NOS DNA QUANT: CPT | Performed by: INTERNAL MEDICINE

## 2024-05-06 PROCEDURE — 80197 ASSAY OF TACROLIMUS: CPT | Performed by: INTERNAL MEDICINE

## 2024-05-06 PROCEDURE — 84100 ASSAY OF PHOSPHORUS: CPT | Performed by: PATHOLOGY

## 2024-05-06 PROCEDURE — 36415 COLL VENOUS BLD VENIPUNCTURE: CPT | Performed by: PATHOLOGY

## 2024-05-06 PROCEDURE — 83690 ASSAY OF LIPASE: CPT | Performed by: PATHOLOGY

## 2024-05-07 NOTE — TELEPHONE ENCOUNTER
SUDEEPM to notify pt of appt change from 6/21/24 w/ Dr. Yee at 9 AM to 6/21/24 w/ Dr. Chaparro RKT video visit at 10:30 AM.

## 2024-05-09 LAB — MISCELLANEOUS TEST 1 (ARUP): ABNORMAL

## 2024-05-20 ENCOUNTER — LAB (OUTPATIENT)
Dept: LAB | Facility: CLINIC | Age: 69
End: 2024-05-20
Payer: COMMERCIAL

## 2024-05-20 DIAGNOSIS — Z98.890 OTHER SPECIFIED POSTPROCEDURAL STATES: ICD-10-CM

## 2024-05-20 DIAGNOSIS — Z48.298 AFTERCARE FOLLOWING ORGAN TRANSPLANT: ICD-10-CM

## 2024-05-20 DIAGNOSIS — Z79.899 ENCOUNTER FOR LONG-TERM CURRENT USE OF MEDICATION: ICD-10-CM

## 2024-05-20 DIAGNOSIS — Z94.83 PANCREAS REPLACED BY TRANSPLANT (H): ICD-10-CM

## 2024-05-20 DIAGNOSIS — Z94.0 KIDNEY REPLACED BY TRANSPLANT: ICD-10-CM

## 2024-05-20 DIAGNOSIS — Z20.828 CONTACT WITH AND (SUSPECTED) EXPOSURE TO OTHER VIRAL COMMUNICABLE DISEASES: Primary | ICD-10-CM

## 2024-05-20 LAB
AMYLASE SERPL-CCNC: 58 U/L (ref 28–100)
ANION GAP SERPL CALCULATED.3IONS-SCNC: 9 MMOL/L (ref 7–15)
BUN SERPL-MCNC: 32.5 MG/DL (ref 8–23)
CALCIUM SERPL-MCNC: 9 MG/DL (ref 8.8–10.2)
CHLORIDE SERPL-SCNC: 110 MMOL/L (ref 98–107)
CREAT SERPL-MCNC: 2 MG/DL (ref 0.67–1.17)
DEPRECATED HCO3 PLAS-SCNC: 23 MMOL/L (ref 22–29)
EGFRCR SERPLBLD CKD-EPI 2021: 36 ML/MIN/1.73M2
ERYTHROCYTE [DISTWIDTH] IN BLOOD BY AUTOMATED COUNT: 12.8 % (ref 10–15)
GLUCOSE SERPL-MCNC: 94 MG/DL (ref 70–99)
HCT VFR BLD AUTO: 36.5 % (ref 40–53)
HGB BLD-MCNC: 11.4 G/DL (ref 13.3–17.7)
LIPASE SERPL-CCNC: 28 U/L (ref 13–60)
Lab: NORMAL
MCH RBC QN AUTO: 29.6 PG (ref 26.5–33)
MCHC RBC AUTO-ENTMCNC: 31.2 G/DL (ref 31.5–36.5)
MCV RBC AUTO: 95 FL (ref 78–100)
PERFORMING LABORATORY: NORMAL
PLATELET # BLD AUTO: 172 10E3/UL (ref 150–450)
POTASSIUM SERPL-SCNC: 5.2 MMOL/L (ref 3.4–5.3)
RBC # BLD AUTO: 3.85 10E6/UL (ref 4.4–5.9)
SODIUM SERPL-SCNC: 142 MMOL/L (ref 135–145)
TACROLIMUS BLD-MCNC: 10 UG/L (ref 5–15)
TEST NAME: NORMAL
TME LAST DOSE: NORMAL H
TME LAST DOSE: NORMAL H
WBC # BLD AUTO: 3.7 10E3/UL (ref 4–11)

## 2024-05-20 PROCEDURE — 82150 ASSAY OF AMYLASE: CPT | Performed by: PATHOLOGY

## 2024-05-20 PROCEDURE — 80180 DRUG SCRN QUAN MYCOPHENOLATE: CPT | Performed by: INTERNAL MEDICINE

## 2024-05-20 PROCEDURE — 85027 COMPLETE CBC AUTOMATED: CPT | Performed by: PATHOLOGY

## 2024-05-20 PROCEDURE — 80048 BASIC METABOLIC PNL TOTAL CA: CPT | Performed by: PATHOLOGY

## 2024-05-20 PROCEDURE — 99000 SPECIMEN HANDLING OFFICE-LAB: CPT | Performed by: PATHOLOGY

## 2024-05-20 PROCEDURE — 36415 COLL VENOUS BLD VENIPUNCTURE: CPT | Performed by: PATHOLOGY

## 2024-05-20 PROCEDURE — 83690 ASSAY OF LIPASE: CPT | Performed by: PATHOLOGY

## 2024-05-20 PROCEDURE — 80197 ASSAY OF TACROLIMUS: CPT | Performed by: INTERNAL MEDICINE

## 2024-05-23 LAB — MISCELLANEOUS TEST 1 (ARUP): NORMAL

## 2024-06-03 ENCOUNTER — LAB (OUTPATIENT)
Dept: LAB | Facility: CLINIC | Age: 69
End: 2024-06-03
Payer: COMMERCIAL

## 2024-06-03 DIAGNOSIS — Z94.83 PANCREAS REPLACED BY TRANSPLANT (H): ICD-10-CM

## 2024-06-03 DIAGNOSIS — Z48.298 AFTERCARE FOLLOWING ORGAN TRANSPLANT: ICD-10-CM

## 2024-06-03 DIAGNOSIS — Z98.890 OTHER SPECIFIED POSTPROCEDURAL STATES: ICD-10-CM

## 2024-06-03 DIAGNOSIS — Z20.828 CONTACT WITH AND (SUSPECTED) EXPOSURE TO OTHER VIRAL COMMUNICABLE DISEASES: Primary | ICD-10-CM

## 2024-06-03 DIAGNOSIS — Z79.899 ENCOUNTER FOR LONG-TERM CURRENT USE OF MEDICATION: ICD-10-CM

## 2024-06-03 DIAGNOSIS — Z94.0 KIDNEY REPLACED BY TRANSPLANT: ICD-10-CM

## 2024-06-03 LAB
AMYLASE SERPL-CCNC: 61 U/L (ref 28–100)
ANION GAP SERPL CALCULATED.3IONS-SCNC: 8 MMOL/L (ref 7–15)
BK VIRUS SPECIMEN TYPE: NORMAL
BKV DNA # SPEC NAA+PROBE: NOT DETECTED IU/ML
BUN SERPL-MCNC: 35 MG/DL (ref 8–23)
CALCIUM SERPL-MCNC: 9 MG/DL (ref 8.8–10.2)
CHLORIDE SERPL-SCNC: 109 MMOL/L (ref 98–107)
CREAT SERPL-MCNC: 2.07 MG/DL (ref 0.67–1.17)
DEPRECATED HCO3 PLAS-SCNC: 23 MMOL/L (ref 22–29)
EGFRCR SERPLBLD CKD-EPI 2021: 35 ML/MIN/1.73M2
ERYTHROCYTE [DISTWIDTH] IN BLOOD BY AUTOMATED COUNT: 12.5 % (ref 10–15)
GLUCOSE SERPL-MCNC: 100 MG/DL (ref 70–99)
HCT VFR BLD AUTO: 36.9 % (ref 40–53)
HGB BLD-MCNC: 11.1 G/DL (ref 13.3–17.7)
LIPASE SERPL-CCNC: 27 U/L (ref 13–60)
MAGNESIUM SERPL-MCNC: 1.7 MG/DL (ref 1.7–2.3)
MCH RBC QN AUTO: 29 PG (ref 26.5–33)
MCHC RBC AUTO-ENTMCNC: 30.1 G/DL (ref 31.5–36.5)
MCV RBC AUTO: 96 FL (ref 78–100)
PHOSPHATE SERPL-MCNC: 4 MG/DL (ref 2.5–4.5)
PLATELET # BLD AUTO: 183 10E3/UL (ref 150–450)
POTASSIUM SERPL-SCNC: 5.1 MMOL/L (ref 3.4–5.3)
RBC # BLD AUTO: 3.83 10E6/UL (ref 4.4–5.9)
SODIUM SERPL-SCNC: 140 MMOL/L (ref 135–145)
TACROLIMUS BLD-MCNC: 10.9 UG/L (ref 5–15)
TME LAST DOSE: NORMAL H
TME LAST DOSE: NORMAL H
WBC # BLD AUTO: 3.5 10E3/UL (ref 4–11)

## 2024-06-03 PROCEDURE — 87799 DETECT AGENT NOS DNA QUANT: CPT | Performed by: INTERNAL MEDICINE

## 2024-06-03 PROCEDURE — 82150 ASSAY OF AMYLASE: CPT | Performed by: PATHOLOGY

## 2024-06-03 PROCEDURE — 84100 ASSAY OF PHOSPHORUS: CPT | Performed by: PATHOLOGY

## 2024-06-03 PROCEDURE — 36415 COLL VENOUS BLD VENIPUNCTURE: CPT | Performed by: PATHOLOGY

## 2024-06-03 PROCEDURE — 85027 COMPLETE CBC AUTOMATED: CPT | Performed by: PATHOLOGY

## 2024-06-03 PROCEDURE — 80048 BASIC METABOLIC PNL TOTAL CA: CPT | Performed by: PATHOLOGY

## 2024-06-03 PROCEDURE — 80197 ASSAY OF TACROLIMUS: CPT | Performed by: INTERNAL MEDICINE

## 2024-06-03 PROCEDURE — 83690 ASSAY OF LIPASE: CPT | Performed by: PATHOLOGY

## 2024-06-03 PROCEDURE — 83735 ASSAY OF MAGNESIUM: CPT | Performed by: PATHOLOGY

## 2024-06-03 PROCEDURE — 80180 DRUG SCRN QUAN MYCOPHENOLATE: CPT | Performed by: INTERNAL MEDICINE

## 2024-06-03 PROCEDURE — 99000 SPECIMEN HANDLING OFFICE-LAB: CPT | Performed by: PATHOLOGY

## 2024-06-04 LAB
Lab: NORMAL
PERFORMING LABORATORY: NORMAL
TEST NAME: NORMAL

## 2024-06-08 LAB — MISCELLANEOUS TEST 1 (ARUP): NORMAL

## 2024-06-11 ENCOUNTER — TELEPHONE (OUTPATIENT)
Dept: TRANSPLANT | Facility: CLINIC | Age: 69
End: 2024-06-11
Payer: COMMERCIAL

## 2024-06-11 ASSESSMENT — ENCOUNTER SYMPTOMS: NEW SYMPTOMS OF CORONARY ARTERY DISEASE: 0

## 2024-06-17 ENCOUNTER — LAB (OUTPATIENT)
Dept: LAB | Facility: CLINIC | Age: 69
End: 2024-06-17
Payer: COMMERCIAL

## 2024-06-17 DIAGNOSIS — Z20.828 CONTACT WITH AND (SUSPECTED) EXPOSURE TO OTHER VIRAL COMMUNICABLE DISEASES: ICD-10-CM

## 2024-06-17 DIAGNOSIS — Z98.890 OTHER SPECIFIED POSTPROCEDURAL STATES: ICD-10-CM

## 2024-06-17 DIAGNOSIS — Z12.5 PROSTATE CANCER SCREENING: ICD-10-CM

## 2024-06-17 DIAGNOSIS — Z94.0 KIDNEY REPLACED BY TRANSPLANT: ICD-10-CM

## 2024-06-17 DIAGNOSIS — E11.9 DIABETES MELLITUS, TYPE 2 (H): Primary | ICD-10-CM

## 2024-06-17 DIAGNOSIS — Z76.82 ORGAN TRANSPLANT CANDIDATE: ICD-10-CM

## 2024-06-17 DIAGNOSIS — N18.6 ESRD (END STAGE RENAL DISEASE) (H): ICD-10-CM

## 2024-06-17 DIAGNOSIS — Z79.899 ENCOUNTER FOR LONG-TERM CURRENT USE OF MEDICATION: ICD-10-CM

## 2024-06-17 DIAGNOSIS — Z48.298 AFTERCARE FOLLOWING ORGAN TRANSPLANT: ICD-10-CM

## 2024-06-17 LAB
AMYLASE SERPL-CCNC: 60 U/L (ref 28–100)
ANION GAP SERPL CALCULATED.3IONS-SCNC: 8 MMOL/L (ref 7–15)
BUN SERPL-MCNC: 31.6 MG/DL (ref 8–23)
CALCIUM SERPL-MCNC: 8.9 MG/DL (ref 8.8–10.2)
CHLORIDE SERPL-SCNC: 111 MMOL/L (ref 98–107)
CREAT SERPL-MCNC: 2 MG/DL (ref 0.67–1.17)
DEPRECATED HCO3 PLAS-SCNC: 22 MMOL/L (ref 22–29)
EGFRCR SERPLBLD CKD-EPI 2021: 36 ML/MIN/1.73M2
ERYTHROCYTE [DISTWIDTH] IN BLOOD BY AUTOMATED COUNT: 12.7 % (ref 10–15)
GLUCOSE SERPL-MCNC: 95 MG/DL (ref 70–99)
HCT VFR BLD AUTO: 36.7 % (ref 40–53)
HGB BLD-MCNC: 11.5 G/DL (ref 13.3–17.7)
LIPASE SERPL-CCNC: 28 U/L (ref 13–60)
Lab: NORMAL
MCH RBC QN AUTO: 29.7 PG (ref 26.5–33)
MCHC RBC AUTO-ENTMCNC: 31.3 G/DL (ref 31.5–36.5)
MCV RBC AUTO: 95 FL (ref 78–100)
PERFORMING LABORATORY: NORMAL
PLATELET # BLD AUTO: 183 10E3/UL (ref 150–450)
POTASSIUM SERPL-SCNC: 5.3 MMOL/L (ref 3.4–5.3)
RBC # BLD AUTO: 3.87 10E6/UL (ref 4.4–5.9)
SODIUM SERPL-SCNC: 141 MMOL/L (ref 135–145)
TEST NAME: NORMAL
WBC # BLD AUTO: 4 10E3/UL (ref 4–11)

## 2024-06-17 PROCEDURE — 83690 ASSAY OF LIPASE: CPT | Performed by: PATHOLOGY

## 2024-06-17 PROCEDURE — 80180 DRUG SCRN QUAN MYCOPHENOLATE: CPT | Performed by: INTERNAL MEDICINE

## 2024-06-17 PROCEDURE — 82150 ASSAY OF AMYLASE: CPT | Performed by: PATHOLOGY

## 2024-06-17 PROCEDURE — 99000 SPECIMEN HANDLING OFFICE-LAB: CPT | Performed by: PATHOLOGY

## 2024-06-17 PROCEDURE — 36415 COLL VENOUS BLD VENIPUNCTURE: CPT | Performed by: PATHOLOGY

## 2024-06-17 PROCEDURE — 85027 COMPLETE CBC AUTOMATED: CPT | Performed by: PATHOLOGY

## 2024-06-17 PROCEDURE — 80048 BASIC METABOLIC PNL TOTAL CA: CPT | Performed by: PATHOLOGY

## 2024-06-21 ENCOUNTER — TELEPHONE (OUTPATIENT)
Dept: TRANSPLANT | Facility: CLINIC | Age: 69
End: 2024-06-21

## 2024-06-21 ENCOUNTER — VIRTUAL VISIT (OUTPATIENT)
Dept: TRANSPLANT | Facility: CLINIC | Age: 69
End: 2024-06-21
Attending: INTERNAL MEDICINE
Payer: COMMERCIAL

## 2024-06-21 DIAGNOSIS — Z98.890 OTHER SPECIFIED POSTPROCEDURAL STATES: ICD-10-CM

## 2024-06-21 DIAGNOSIS — Z94.0 KIDNEY REPLACED BY TRANSPLANT: Primary | ICD-10-CM

## 2024-06-21 DIAGNOSIS — I15.1 HTN, KIDNEY TRANSPLANT RELATED: ICD-10-CM

## 2024-06-21 DIAGNOSIS — D84.9 IMMUNOSUPPRESSED STATUS (H): ICD-10-CM

## 2024-06-21 DIAGNOSIS — Z48.298 AFTERCARE FOLLOWING ORGAN TRANSPLANT: ICD-10-CM

## 2024-06-21 DIAGNOSIS — Z94.0 HTN, KIDNEY TRANSPLANT RELATED: ICD-10-CM

## 2024-06-21 DIAGNOSIS — Z94.83 PANCREAS REPLACED BY TRANSPLANT (H): ICD-10-CM

## 2024-06-21 DIAGNOSIS — Z79.899 ENCOUNTER FOR LONG-TERM CURRENT USE OF MEDICATION: ICD-10-CM

## 2024-06-21 DIAGNOSIS — Z48.298 AFTERCARE FOLLOWING ORGAN TRANSPLANT: Primary | ICD-10-CM

## 2024-06-21 PROCEDURE — G2211 COMPLEX E/M VISIT ADD ON: HCPCS | Mod: 95 | Performed by: INTERNAL MEDICINE

## 2024-06-21 PROCEDURE — 99214 OFFICE O/P EST MOD 30 MIN: CPT | Mod: 95 | Performed by: INTERNAL MEDICINE

## 2024-06-21 RX ORDER — MYCOPHENOLATE MOFETIL 250 MG/1
500 CAPSULE ORAL 2 TIMES DAILY
COMMUNITY
Start: 2024-06-21 | End: 2024-08-22

## 2024-06-21 ASSESSMENT — ENCOUNTER SYMPTOMS: NEW SYMPTOMS OF CORONARY ARTERY DISEASE: 0

## 2024-06-21 NOTE — PROGRESS NOTES
Virtual Visit Details    Type of service:  Video Visit   Video Start Time: 10:30 AM  Video End Time:10:43 AM    Originating Location (pt. Location): Home    Distant Location (provider location):  Off-site  Platform used for Video Visit: [facility]    TRANSPLANT NEPHROLOGY CHRONIC POST TRANSPLANT VISIT    Assessment & Plan   # DDKT (SPK): Stable  - multiple ischemic insults complicated by post biopsy (10/2023) hematoma s/p embolization and AVF as well as partial loss of renal function post embolization.   - Baseline Creatinine: ~ 1.9-2.2   - Proteinuria: Normal (<0.2 grams)   - Date DSA Last Checked: Apr/2024      Latest DSA: No   - BK Viremia: No   - Kidney Tx Biopsy: Oct 17, 2023; Result: t3i1v0, borderline ACR.  Treated with solumedrol 500mg IV daily x3 days     # Pancreas Tx (SPK):    - Pancreatic Exocrine Drainage: Enteric drained     - Blood glucose: Euglycemia      On insulin: No   - HbA1c: Trend down      Latest HbA1c: 4.9%    - Pancreatic enzymes: Stable   - Date DSA Last Checked: Apr/2024  Latest DSA: No   - Pancreas Tx Biopsy: No   -on ASA 81mg daily     # Immunosuppression: Tacrolimus immediate release (goal 8-10) and Mycophenolate mofetil (dose 750 mg every 12 hours)   - Continue with intensive monitoring of immunosuppression for efficacy and toxicity.   - Changes: Yes - decrease MMF to 500mg bid with elevated level and loose stools. If still with loose stools change MMF to MPA . At 1y post txp decrease tac goal to 5-8. Repeat MPA level 2 weeks after decreasing MMF dose     #  Tessa-transplant hematoma Post Kidney biopsy:    -s/p transplant renal biopsy 10/17/23 complicated by hemorrhage and cardiac arrest s/p 3 min of CPR               - Found to have AV fistula with extravasation on angiography by IR on 10/2023 s/p coiling     # Possible peripheral neuropathy:   -EMG 4/2024 with signs of denervation potential in L flexor digitorum profundus 4 and 5 as well as flexor carpi ulnaris. U/S 5/30/24 showed loss of  normal fasicular nerve patterns which were strong predictors of cubital tunnel syndrome.    -Plan for L carpal and cubital release on 9/16/24   -on gabapentin 300mg tid    # CAD: S/p CABG 2/2023. On metoprolol 12.5 mg PO BID, Aspirin 81 mg po every day, and atorvastatin 20mg daily   # PAD    # Infection Prophylaxis:   - PJP: Sulfa/TMP (Bactrim) MWF. Check T cell subset due to high K     # Hypertension: Controlled;  Goal BP: < 130/80   - Changes: Not at this time. Continue metoprolol 12.5mg bid     # Anemia in Chronic Renal Disease: Hgb: Trend up      MCKENNA: No   - Iron studies: Replete    # Mineral Bone Disorder:    - Secondary renal hyperparathyroidism; PTH level: Normal (15-65 pg/ml)        On treatment: Calcitriol 0.25mcg every other day   - Vitamin D; level: Low normal        On supplement: Yes   - Calcium; level: Normal        On supplement: Yes   - Phosphorus; level: Normal        On supplement: No     # Electrolytes:   - Potassium; level: High normal        On supplement: No  - Magnesium; level: Normal        On supplement: Yes, mag ox 400mg daily   - Bicarbonate; level: Low normal        On supplement: No    # Loose stools:   -Continue psyllium based fiber   -Decrease MMF to 500mg bid. Check MPA level. If stools are still loose with this change would change MMF to MPA     # Medical Compliance: Yes     # Skin Cancer: New lesions: none   - Discussed sun protection and recommend regular follow up with Dermatology.    -S/p Mohs 8/2022 for NMSC    # Health Maintenance and Vaccination Review: Recommend:   TDap, PCV 20, RSV, COVID booster    # Transplant History:  Etiology of Kidney Failure: Diabetes mellitus type 2  Tx: SPK  Transplant: 9/14/2023 (Kidney / Pancreas)  Significant changes in immunosuppression: None  Significant transplant-related complications: Acute cellular-mediated rejection (borderline ACR of kidney)    Transplant Office Phone Number: [phone]    Assessment and plan was discussed with the  patient and he voiced his understanding and agreement.    Return visit: Return in about 3 months (around 9/21/2024).    Jeffrey Chaparro MD    The longitudinal plan of care for kidney/pancreas transplant was addressed during this visit. Due to the added complexity in care, I will continue to support Avelina Marion in the subsequent management of this condition(s) and with the ongoing continuity of care of this condition(s).       Chief Complaint   Mr. Marion is a 68 year old here for kidney transplant, immunosuppression management and routine follow up.     History of Present Illness   Mr. Marion has been working with hand surgery and there is a plan for carpal and cubital tunnel release due to neuropathy.     The patient overall feels well. He denies any recent hospitalizations. He denies nausea, vomiting, fever, chills, shortness of breath, chest pain, LE edema, unintentional weight loss, nights sweats, dysuria, hematuria. He does continue to have loose stool right after he eats.       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)    Vitamin D deficiency    CAD (coronary artery disease)    Kidney replaced by transplant    Immunosuppressed status (H24)    Dehydration    Postoperative cardiac arrest following non-cardiac surgery    Kidney transplant rejection    Pancreas replaced by transplant (H)    Aftercare following organ transplant       Allergies   No Known Allergies    Medications   Current Outpatient Medications   Medication Sig Dispense Refill    aspirin 81 MG EC tablet Take 1 tablet (81 mg) by mouth  daily      atorvastatin (LIPITOR) 20 MG tablet Take 1 tablet (20 mg) by mouth at bedtime 30 tablet 11    Blood Glucose Monitoring Suppl (ACCU-CHEK GUIDE) w/Device KIT       calcitRIOL (ROCALTROL) 0.25 MCG capsule Take 1 capsule (0.25 mcg) by mouth every other day 30 capsule 3    calcium carbonate-vitamin D (OSCAL) 500-5 MG-MCG tablet Take 1 tablet by mouth 2 times daily 60 tablet 11    gabapentin (NEURONTIN) 100 MG capsule Take 300 mg by mouth 3 times daily      magnesium oxide (MAG-OX) 400 MG tablet Take 1 tablet (400 mg) by mouth every 24 hours 30 tablet 11    metoprolol tartrate (LOPRESSOR) 25 MG tablet Take 0.5 tablets (12.5 mg) by mouth 2 times daily 30 tablet 3    Misc. Devices (PILL SPLITTER) MISC 1 applicator daily 1 each 0    mycophenolate (GENERIC EQUIVALENT) 250 MG capsule Take 3 capsules (750 mg) by mouth 2 times daily 180 capsule 11    psyllium (METAMUCIL/KONSYL) capsule Take 1 capsule by mouth daily      sulfamethoxazole-trimethoprim (BACTRIM) 400-80 MG tablet Take 1 tablet by mouth three times a week 30 tablet 11    tacrolimus (GENERIC EQUIVALENT) 0.5 MG capsule Take 1 capsule (0.5 mg) by mouth 2 times daily Total dose: 2.5mg twice daily 60 capsule 11    tacrolimus (GENERIC EQUIVALENT) 1 MG capsule Take 2 capsules (2 mg) by mouth 2 times daily Total dose: 2.5mg twice daily 120 capsule 11     No current facility-administered medications for this visit.     There are no discontinued medications.      Physical Exam   Vital Signs: There were no vitals taken for this visit.    GENERAL APPEARANCE: alert and no distress  HENT: no obvious abnormalities on appearance  RESP: breathing appears unremarkable with normal rate, no audible wheezing or cough and no apparent shortness of breath with conversation  MS: extremities normal - no gross deformities noted  SKIN: no apparent rash and normal skin tone  NEURO: speech is clear with no obvious neurological deficits  PSYCH: mentation appears normal and affect normal      Data         Latest Ref Rng & Units 6/17/2024     9:05 AM 6/3/2024     9:11 AM 5/20/2024     8:30 AM   Renal   Sodium 135 - 145 mmol/L 141  140  142    K 3.4 - 5.3 mmol/L 5.3  5.1  5.2    Cl 98 - 107 mmol/L 111  109  110    Cl (external) 98 - 107 mmol/L 111  109  110    CO2 22 - 29 mmol/L 22  23  23    Urea Nitrogen 8.0 - 23.0 mg/dL 31.6  35.0  32.5    Creatinine 0.67 - 1.17 mg/dL 2.00  2.07  2.00    Glucose 70 - 99 mg/dL 95  100  94    Calcium 8.8 - 10.2 mg/dL 8.9  9.0  9.0    Magnesium 1.7 - 2.3 mg/dL  1.7           Latest Ref Rng & Units 6/3/2024     9:11 AM 5/6/2024     8:39 AM 4/8/2024     8:24 AM   Bone Health   Phosphorus 2.5 - 4.5 mg/dL 4.0  3.9  3.8          Latest Ref Rng & Units 6/17/2024     9:05 AM 6/3/2024     9:11 AM 5/20/2024     8:30 AM   Heme   WBC 4.0 - 11.0 10e3/uL 4.0  3.5  3.7    Hgb 13.3 - 17.7 g/dL 11.5  11.1  11.4    Plt 150 - 450 10e3/uL 183  183  172          Latest Ref Rng & Units 3/25/2024     7:59 AM 10/17/2023     2:57 PM 9/14/2023     3:39 PM   Liver   AP 40 - 150 U/L 89  41  60    TBili <=1.2 mg/dL 0.5  0.2  0.9    Bilirubin Direct 0.00 - 0.30 mg/dL <0.20      ALT 0 - 70 U/L <5  13  6    AST 0 - 45 U/L 15  21  22    Tot Protein 6.4 - 8.3 g/dL 6.6  3.6  7.3    Albumin 3.5 - 5.2 g/dL 4.1  2.2  4.3          Latest Ref Rng & Units 6/17/2024     9:05 AM 6/3/2024     9:11 AM 5/20/2024     8:30 AM   Pancreas   Amylase 28 - 100 U/L 60  61  58    Lipase (Roche) 13 - 60 U/L 28  27  28          Latest Ref Rng & Units 9/29/2023     8:48 AM 9/24/2023     8:35 AM 2/16/2023     8:34 AM   Iron studies   Iron 61 - 157 ug/dL 27  32  24    Iron Sat Index 15 - 46 % 16  21  20    Ferritin 31 - 409 ng/mL 1,256  1,164  1,461          Latest Ref Rng & Units 9/14/2023     3:39 PM 8/9/2022     6:26 AM 8/5/2021     2:23 AM   [facility] Txp Virology   EBV CAPSID ANTIBODY IGG No detectable antibody. Positive  Positive  Positive      Failed to redirect to the Timeline version of the REVFS SmartLink.  Recent Labs    Lab Test 05/06/24  0839 05/20/24  0830 06/03/24  0911   DOSTAC 5/5/2024 5/19/2024 6/2/2024   TACROL 10.3 10.0 10.9     Recent Labs   Lab Test 03/25/24  0759 04/08/24  0824 04/15/24  0758   DOSMPA 3/24/2024   9:45 PM 4/7/2024   9:45 PM  --    MPACID 2.52 4.02* 4.78*   MPAG 90.2 106.7* 103.0*

## 2024-06-21 NOTE — NURSING NOTE
Is the patient currently in the state of [place]? YES    Visit mode:VIDEO    If the visit is dropped, the patient can be reconnected by: VIDEO VISIT: Send to e-mail at: [email or web address]    Will anyone else be joining the visit? NO  (If patient encounters technical issues they should call [phone]    How would you like to obtain your AVS? MyChart    Are changes needed to the allergy or medication list? No    Are refills needed on medications prescribed by this physician? NO    Reason for visit: RECHECK    Yesica COUCH

## 2024-06-21 NOTE — TELEPHONE ENCOUNTER
----- Message from Jeffrey Chaparro sent at 6/21/2024 10:44 AM CDT -----  Regarding: plan  I decreased MMF to 500mg bid due to high level and continued loose stools. Please repeat MPA level in ~2 weeks. If he continues to have loose stools would change MMF to MPA.     Please check T cell subset with next labs. If CD4>200 would stop bactrim due to elevated K.     He should be seen at his 1y post txp. He was supposed to see [facility] in September and it was cancelled. Please have him rescheduled. Thanks.    Jeffrey

## 2024-06-21 NOTE — PATIENT INSTRUCTIONS
Patient Recommendations:  - Decrease mycophenolate to 500mg twice daily   -I recommend the following vaccines: Tdap (Tetanus), PCV 20 (prevnar 20), RSV, and COVID booster. I recommend only getting 2 shots at once and then going back a month later for 2 more.     Transplant Patient Information  Your Post Transplant Coordinator is: Makayla Wu  For non urgent items, we encourage you to contact your coordinator/care team online via [employer]  You and your care team can also contact your transplant coordinator Monday - Friday, 8am - 5pm at [phone] (Option 2 to reach the coordinator or Option 4 to schedule an appointment).  After hours for urgent matters, please call [facility] at [phone].

## 2024-06-21 NOTE — LETTER
6/21/2024      Avelina Marion  [address] [place] [address]      Dear Colleague,    Thank you for referring your patient, Avelina Marion, to the [facility]. Please see a copy of my visit note below.    Virtual Visit Details    Type of service:  Video Visit   Video Start Time: 10:30 AM  Video End Time:10:43 AM    Originating Location (pt. Location): Home    Distant Location (provider location):  Off-site  Platform used for Video Visit: [facility]    TRANSPLANT NEPHROLOGY CHRONIC POST TRANSPLANT VISIT    Assessment & Plan  # DDKT (SPK): Stable  - multiple ischemic insults complicated by post biopsy (10/2023) hematoma s/p embolization and AVF as well as partial loss of renal function post embolization.   - Baseline Creatinine: ~ 1.9-2.2   - Proteinuria: Normal (<0.2 grams)   - Date DSA Last Checked: Apr/2024      Latest DSA: No   - BK Viremia: No   - Kidney Tx Biopsy: Oct 17, 2023; Result: [id number], borderline ACR.  Treated with solumedrol 500mg IV daily x3 days     # Pancreas Tx (SPK):    - Pancreatic Exocrine Drainage: Enteric drained     - Blood glucose: Euglycemia      On insulin: No   - HbA1c: Trend down      Latest HbA1c: 4.9%    - Pancreatic enzymes: Stable   - Date DSA Last Checked: Apr/2024  Latest DSA: No   - Pancreas Tx Biopsy: No   -on ASA 81mg daily     # Immunosuppression: Tacrolimus immediate release (goal 8-10) and Mycophenolate mofetil (dose 750 mg every 12 hours)   - Continue with intensive monitoring of immunosuppression for efficacy and toxicity.   - Changes: Yes - decrease MMF to 500mg bid with elevated level and loose stools. If still with loose stools change MMF to MPA . At 1y post txp decrease tac goal to 5-8. Repeat MPA level 2 weeks after decreasing MMF dose     #  Tessa-transplant hematoma Post Kidney biopsy:    -s/p transplant renal biopsy 10/17/23 complicated by hemorrhage and cardiac arrest s/p 3 min of CPR               - Found to have AV fistula with  extravasation on angiography by IR on 10/2023 s/p coiling     # Possible peripheral neuropathy:   -EMG 4/2024 with signs of denervation potential in L flexor digitorum profundus 4 and 5 as well as flexor carpi ulnaris. U/S 5/30/24 showed loss of normal fasicular nerve patterns which were strong predictors of cubital tunnel syndrome.    -Plan for L carpal and cubital release on 9/16/24   -on gabapentin 300mg tid    # CAD: S/p CABG 2/2023. On metoprolol 12.5 mg PO BID, Aspirin 81 mg po every day, and atorvastatin 20mg daily   # PAD    # Infection Prophylaxis:   - PJP: Sulfa/TMP (Bactrim) MWF. Check T cell subset due to high K     # Hypertension: Controlled;  Goal BP: < 130/80   - Changes: Not at this time. Continue metoprolol 12.5mg bid     # Anemia in Chronic Renal Disease: Hgb: Trend up      MCKENNA: No   - Iron studies: Replete    # Mineral Bone Disorder:    - Secondary renal hyperparathyroidism; PTH level: Normal (15-65 pg/ml)        On treatment: Calcitriol 0.25mcg every other day   - Vitamin D; level: Low normal        On supplement: Yes   - Calcium; level: Normal        On supplement: Yes   - Phosphorus; level: Normal        On supplement: No     # Electrolytes:   - Potassium; level: High normal        On supplement: No  - Magnesium; level: Normal        On supplement: Yes, mag ox 400mg daily   - Bicarbonate; level: Low normal        On supplement: No    # Loose stools:   -Continue psyllium based fiber   -Decrease MMF to 500mg bid. Check MPA level. If stools are still loose with this change would change MMF to MPA     # Medical Compliance: Yes     # Skin Cancer: New lesions: none   - Discussed sun protection and recommend regular follow up with Dermatology.    -S/p Mohs 8/2022 for NMSC    # Health Maintenance and Vaccination Review: Recommend:   TDap, PCV 20, RSV, COVID booster    # Transplant History:  Etiology of Kidney Failure: Diabetes mellitus type 2  Tx: SPK  Transplant: 9/14/2023 (Kidney /  Pancreas)  Significant changes in immunosuppression: None  Significant transplant-related complications: Acute cellular-mediated rejection (borderline ACR of kidney)    Transplant Office Phone Number: [phone]    Assessment and plan was discussed with the patient and he voiced his understanding and agreement.    Return visit: Return in about 3 months (around 9/21/2024).    Jeffrey Chaparro MD    The longitudinal plan of care for kidney/pancreas transplant was addressed during this visit. Due to the added complexity in care, I will continue to support Avelina Marion in the subsequent management of this condition(s) and with the ongoing continuity of care of this condition(s).       Chief Complaint  Mr. Marion is a 68 year old here for kidney transplant, immunosuppression management and routine follow up.     History of Present Illness  Mr. Marion has been working with hand surgery and there is a plan for carpal and cubital tunnel release due to neuropathy.     The patient overall feels well. He denies any recent hospitalizations. He denies nausea, vomiting, fever, chills, shortness of breath, chest pain, LE edema, unintentional weight loss, nights sweats, dysuria, hematuria. He does continue to have loose stool right after he eats.       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, with long-term current use of insulin (H)     HTN, kidney transplant related     Proliferative diabetic retinopathy (H)     Secondary hyperparathyroidism of renal origin (H24)     Hyperlipidemia with target LDL less than 100     Type II diabetes mellitus with neurological manifestations (H)     Vitamin D deficiency     CAD (coronary artery disease)     Kidney replaced by transplant     Immunosuppressed status (H24)     Dehydration     Postoperative cardiac arrest following non-cardiac surgery     Kidney  transplant rejection     Pancreas replaced by transplant (H)     Aftercare following organ transplant       Allergies  No Known Allergies    Medications  Current Outpatient Medications   Medication Sig Dispense Refill     aspirin 81 MG EC tablet Take 1 tablet (81 mg) by mouth daily       atorvastatin (LIPITOR) 20 MG tablet Take 1 tablet (20 mg) by mouth at bedtime 30 tablet 11     Blood Glucose Monitoring Suppl (ACCU-CHEK GUIDE) w/Device KIT        calcitRIOL (ROCALTROL) 0.25 MCG capsule Take 1 capsule (0.25 mcg) by mouth every other day 30 capsule 3     calcium carbonate-vitamin D (OSCAL) 500-5 MG-MCG tablet Take 1 tablet by mouth 2 times daily 60 tablet 11     gabapentin (NEURONTIN) 100 MG capsule Take 300 mg by mouth 3 times daily       magnesium oxide (MAG-OX) 400 MG tablet Take 1 tablet (400 mg) by mouth every 24 hours 30 tablet 11     metoprolol tartrate (LOPRESSOR) 25 MG tablet Take 0.5 tablets (12.5 mg) by mouth 2 times daily 30 tablet 3     Misc. Devices (PILL SPLITTER) MISC 1 applicator daily 1 each 0     mycophenolate (GENERIC EQUIVALENT) 250 MG capsule Take 3 capsules (750 mg) by mouth 2 times daily 180 capsule 11     psyllium (METAMUCIL/KONSYL) capsule Take 1 capsule by mouth daily       sulfamethoxazole-trimethoprim (BACTRIM) 400-80 MG tablet Take 1 tablet by mouth three times a week 30 tablet 11     tacrolimus (GENERIC EQUIVALENT) 0.5 MG capsule Take 1 capsule (0.5 mg) by mouth 2 times daily Total dose: 2.5mg twice daily 60 capsule 11     tacrolimus (GENERIC EQUIVALENT) 1 MG capsule Take 2 capsules (2 mg) by mouth 2 times daily Total dose: 2.5mg twice daily 120 capsule 11     No current facility-administered medications for this visit.     There are no discontinued medications.      Physical Exam  Vital Signs: There were no vitals taken for this visit.    GENERAL APPEARANCE: alert and no distress  HENT: no obvious abnormalities on appearance  RESP: breathing appears unremarkable with normal rate, no  audible wheezing or cough and no apparent shortness of breath with conversation  MS: extremities normal - no gross deformities noted  SKIN: no apparent rash and normal skin tone  NEURO: speech is clear with no obvious neurological deficits  PSYCH: mentation appears normal and affect normal     Data        Latest Ref Rng & Units 6/17/2024     9:05 AM 6/3/2024     9:11 AM 5/20/2024     8:30 AM   Renal   Sodium 135 - 145 mmol/L 141  140  142    K 3.4 - 5.3 mmol/L 5.3  5.1  5.2    Cl 98 - 107 mmol/L 111  109  110    Cl (external) 98 - 107 mmol/L 111  109  110    CO2 22 - 29 mmol/L 22  23  23    Urea Nitrogen 8.0 - 23.0 mg/dL 31.6  35.0  32.5    Creatinine 0.67 - 1.17 mg/dL 2.00  2.07  2.00    Glucose 70 - 99 mg/dL 95  100  94    Calcium 8.8 - 10.2 mg/dL 8.9  9.0  9.0    Magnesium 1.7 - 2.3 mg/dL  1.7           Latest Ref Rng & Units 6/3/2024     9:11 AM 5/6/2024     8:39 AM 4/8/2024     8:24 AM   Bone Health   Phosphorus 2.5 - 4.5 mg/dL 4.0  3.9  3.8          Latest Ref Rng & Units 6/17/2024     9:05 AM 6/3/2024     9:11 AM 5/20/2024     8:30 AM   Heme   WBC 4.0 - 11.0 10e3/uL 4.0  3.5  3.7    Hgb 13.3 - 17.7 g/dL 11.5  11.1  11.4    Plt 150 - 450 10e3/uL 183  183  172          Latest Ref Rng & Units 3/25/2024     7:59 AM 10/17/2023     2:57 PM 9/14/2023     3:39 PM   Liver   AP 40 - 150 U/L 89  41  60    TBili <=1.2 mg/dL 0.5  0.2  0.9    Bilirubin Direct 0.00 - 0.30 mg/dL <0.20      ALT 0 - 70 U/L <5  13  6    AST 0 - 45 U/L 15  21  22    Tot Protein 6.4 - 8.3 g/dL 6.6  3.6  7.3    Albumin 3.5 - 5.2 g/dL 4.1  2.2  4.3          Latest Ref Rng & Units 6/17/2024     9:05 AM 6/3/2024     9:11 AM 5/20/2024     8:30 AM   Pancreas   Amylase 28 - 100 U/L 60  61  58    Lipase (Roche) 13 - 60 U/L 28  27  28          Latest Ref Rng & Units 9/29/2023     8:48 AM 9/24/2023     8:35 AM 2/16/2023     8:34 AM   Iron studies   Iron 61 - 157 ug/dL 27  32  24    Iron Sat Index 15 - 46 % 16  21  20    Ferritin 31 - 409 ng/mL 1,256  1,164   1,461          Latest Ref Rng & Units 9/14/2023     3:39 PM 8/9/2022     6:26 AM 8/5/2021     2:23 AM   [facility] Txp Virology   EBV CAPSID ANTIBODY IGG No detectable antibody. Positive  Positive  Positive      Failed to redirect to the Timeline version of the REVFS SmartLink.  Recent Labs   Lab Test 05/06/24  0839 05/20/24  0830 06/03/24  0911   DOSTAC 5/5/2024 5/19/2024 6/2/2024   TACROL 10.3 10.0 10.9     Recent Labs   Lab Test 03/25/24  0759 04/08/24  0824 04/15/24  0758   DOSMPA 3/24/2024   9:45 PM 4/7/2024   9:45 PM  --    MPACID 2.52 4.02* 4.78*   MPAG 90.2 106.7* 103.0*       Again, thank you for allowing me to participate in the care of your patient.        Sincerely,        Jeffrey Chaparro MD

## 2024-06-24 LAB — MISCELLANEOUS TEST 1 (ARUP): NORMAL

## 2024-07-01 ENCOUNTER — LAB (OUTPATIENT)
Dept: LAB | Facility: CLINIC | Age: 69
End: 2024-07-01
Payer: COMMERCIAL

## 2024-07-01 DIAGNOSIS — Z94.0 KIDNEY REPLACED BY TRANSPLANT: ICD-10-CM

## 2024-07-01 DIAGNOSIS — Z48.298 AFTERCARE FOLLOWING ORGAN TRANSPLANT: ICD-10-CM

## 2024-07-01 DIAGNOSIS — Z94.83 PANCREAS REPLACED BY TRANSPLANT (H): ICD-10-CM

## 2024-07-01 DIAGNOSIS — Z79.899 ENCOUNTER FOR LONG-TERM CURRENT USE OF MEDICATION: ICD-10-CM

## 2024-07-01 DIAGNOSIS — Z98.890 OTHER SPECIFIED POSTPROCEDURAL STATES: ICD-10-CM

## 2024-07-01 LAB
AMYLASE SERPL-CCNC: 64 U/L (ref 28–100)
ANION GAP SERPL CALCULATED.3IONS-SCNC: 9 MMOL/L (ref 7–15)
BK VIRUS SPECIMEN TYPE: NORMAL
BKV DNA # SPEC NAA+PROBE: NOT DETECTED IU/ML
BUN SERPL-MCNC: 40.7 MG/DL (ref 8–23)
CALCIUM SERPL-MCNC: 8.9 MG/DL (ref 8.8–10.2)
CD3 CELLS # BLD: 341 CELLS/UL (ref 603–2990)
CD3 CELLS NFR BLD: 69 % (ref 49–84)
CD3+CD4+ CELLS # BLD: 128 CELLS/UL (ref 441–2156)
CD3+CD4+ CELLS NFR BLD: 26 % (ref 28–63)
CD3+CD4+ CELLS/CD3+CD8+ CLL BLD: 0.69 % (ref 1.4–2.6)
CD3+CD8+ CELLS # BLD: 184 CELLS/UL (ref 125–1312)
CD3+CD8+ CELLS NFR BLD: 38 % (ref 10–40)
CHLORIDE SERPL-SCNC: 111 MMOL/L (ref 98–107)
CREAT SERPL-MCNC: 2.23 MG/DL (ref 0.67–1.17)
DEPRECATED HCO3 PLAS-SCNC: 20 MMOL/L (ref 22–29)
EGFRCR SERPLBLD CKD-EPI 2021: 32 ML/MIN/1.73M2
ERYTHROCYTE [DISTWIDTH] IN BLOOD BY AUTOMATED COUNT: 13.2 % (ref 10–15)
GLUCOSE SERPL-MCNC: 98 MG/DL (ref 70–99)
HCT VFR BLD AUTO: 37.1 % (ref 40–53)
HGB BLD-MCNC: 11.5 G/DL (ref 13.3–17.7)
LIPASE SERPL-CCNC: 31 U/L (ref 13–60)
MCH RBC QN AUTO: 29.3 PG (ref 26.5–33)
MCHC RBC AUTO-ENTMCNC: 31 G/DL (ref 31.5–36.5)
MCV RBC AUTO: 95 FL (ref 78–100)
PLATELET # BLD AUTO: 168 10E3/UL (ref 150–450)
POTASSIUM SERPL-SCNC: 5 MMOL/L (ref 3.4–5.3)
RBC # BLD AUTO: 3.92 10E6/UL (ref 4.4–5.9)
SODIUM SERPL-SCNC: 140 MMOL/L (ref 135–145)
T CELL COMMENT: ABNORMAL
TACROLIMUS BLD-MCNC: 9.7 UG/L (ref 5–15)
TME LAST DOSE: NORMAL H
TME LAST DOSE: NORMAL H
WBC # BLD AUTO: 5.2 10E3/UL (ref 4–11)

## 2024-07-01 PROCEDURE — 82150 ASSAY OF AMYLASE: CPT | Performed by: PATHOLOGY

## 2024-07-01 PROCEDURE — 83690 ASSAY OF LIPASE: CPT | Performed by: PATHOLOGY

## 2024-07-01 PROCEDURE — 99000 SPECIMEN HANDLING OFFICE-LAB: CPT | Performed by: PATHOLOGY

## 2024-07-01 PROCEDURE — 87799 DETECT AGENT NOS DNA QUANT: CPT | Performed by: INTERNAL MEDICINE

## 2024-07-01 PROCEDURE — 36415 COLL VENOUS BLD VENIPUNCTURE: CPT | Performed by: PATHOLOGY

## 2024-07-01 PROCEDURE — 80197 ASSAY OF TACROLIMUS: CPT | Performed by: INTERNAL MEDICINE

## 2024-07-01 PROCEDURE — 85027 COMPLETE CBC AUTOMATED: CPT | Performed by: PATHOLOGY

## 2024-07-01 PROCEDURE — 86359 T CELLS TOTAL COUNT: CPT | Performed by: INTERNAL MEDICINE

## 2024-07-01 PROCEDURE — 80048 BASIC METABOLIC PNL TOTAL CA: CPT | Performed by: PATHOLOGY

## 2024-07-15 ENCOUNTER — LAB (OUTPATIENT)
Dept: LAB | Facility: CLINIC | Age: 69
End: 2024-07-15
Payer: COMMERCIAL

## 2024-07-15 DIAGNOSIS — Z79.899 ENCOUNTER FOR LONG-TERM CURRENT USE OF MEDICATION: ICD-10-CM

## 2024-07-15 DIAGNOSIS — Z94.83 PANCREAS REPLACED BY TRANSPLANT (H): ICD-10-CM

## 2024-07-15 DIAGNOSIS — Z98.890 OTHER SPECIFIED POSTPROCEDURAL STATES: ICD-10-CM

## 2024-07-15 DIAGNOSIS — Z94.0 KIDNEY REPLACED BY TRANSPLANT: ICD-10-CM

## 2024-07-15 DIAGNOSIS — Z48.298 AFTERCARE FOLLOWING ORGAN TRANSPLANT: ICD-10-CM

## 2024-07-15 LAB
AMYLASE SERPL-CCNC: 59 U/L (ref 28–100)
ANION GAP SERPL CALCULATED.3IONS-SCNC: 8 MMOL/L (ref 7–15)
BK VIRUS SPECIMEN TYPE: ABNORMAL
BKV DNA # SPEC NAA+PROBE: <22 IU/ML
BKV DNA SPEC NAA+PROBE-LOG#: <1.3 {LOG_COPIES}/ML
BUN SERPL-MCNC: 35 MG/DL (ref 8–23)
CALCIUM SERPL-MCNC: 9.1 MG/DL (ref 8.8–10.2)
CHLORIDE SERPL-SCNC: 111 MMOL/L (ref 98–107)
CREAT SERPL-MCNC: 2.13 MG/DL (ref 0.67–1.17)
DEPRECATED HCO3 PLAS-SCNC: 22 MMOL/L (ref 22–29)
EGFRCR SERPLBLD CKD-EPI 2021: 34 ML/MIN/1.73M2
ERYTHROCYTE [DISTWIDTH] IN BLOOD BY AUTOMATED COUNT: 13.1 % (ref 10–15)
GLUCOSE SERPL-MCNC: 102 MG/DL (ref 70–99)
HCT VFR BLD AUTO: 39.2 % (ref 40–53)
HGB BLD-MCNC: 12.2 G/DL (ref 13.3–17.7)
LIPASE SERPL-CCNC: 29 U/L (ref 13–60)
MCH RBC QN AUTO: 29.1 PG (ref 26.5–33)
MCHC RBC AUTO-ENTMCNC: 31.1 G/DL (ref 31.5–36.5)
MCV RBC AUTO: 94 FL (ref 78–100)
PLATELET # BLD AUTO: 207 10E3/UL (ref 150–450)
POTASSIUM SERPL-SCNC: 5.1 MMOL/L (ref 3.4–5.3)
RBC # BLD AUTO: 4.19 10E6/UL (ref 4.4–5.9)
SODIUM SERPL-SCNC: 141 MMOL/L (ref 135–145)
TACROLIMUS BLD-MCNC: 10.2 UG/L (ref 5–15)
TME LAST DOSE: NORMAL H
TME LAST DOSE: NORMAL H
WBC # BLD AUTO: 4.8 10E3/UL (ref 4–11)

## 2024-07-15 PROCEDURE — 83690 ASSAY OF LIPASE: CPT | Performed by: PATHOLOGY

## 2024-07-15 PROCEDURE — 80197 ASSAY OF TACROLIMUS: CPT | Performed by: INTERNAL MEDICINE

## 2024-07-15 PROCEDURE — 80048 BASIC METABOLIC PNL TOTAL CA: CPT | Performed by: PATHOLOGY

## 2024-07-15 PROCEDURE — 36415 COLL VENOUS BLD VENIPUNCTURE: CPT | Performed by: PATHOLOGY

## 2024-07-15 PROCEDURE — 87799 DETECT AGENT NOS DNA QUANT: CPT | Performed by: INTERNAL MEDICINE

## 2024-07-15 PROCEDURE — 99000 SPECIMEN HANDLING OFFICE-LAB: CPT | Performed by: PATHOLOGY

## 2024-07-15 PROCEDURE — 82150 ASSAY OF AMYLASE: CPT | Performed by: PATHOLOGY

## 2024-07-15 PROCEDURE — 85027 COMPLETE CBC AUTOMATED: CPT | Performed by: PATHOLOGY

## 2024-07-29 ENCOUNTER — LAB (OUTPATIENT)
Dept: LAB | Facility: CLINIC | Age: 69
End: 2024-07-29
Payer: COMMERCIAL

## 2024-07-29 DIAGNOSIS — Z98.890 OTHER SPECIFIED POSTPROCEDURAL STATES: ICD-10-CM

## 2024-07-29 DIAGNOSIS — Z79.899 ENCOUNTER FOR LONG-TERM CURRENT USE OF MEDICATION: ICD-10-CM

## 2024-07-29 DIAGNOSIS — Z48.298 AFTERCARE FOLLOWING ORGAN TRANSPLANT: ICD-10-CM

## 2024-07-29 DIAGNOSIS — Z94.83 PANCREAS REPLACED BY TRANSPLANT (H): ICD-10-CM

## 2024-07-29 DIAGNOSIS — Z76.82 AWAITING ORGAN TRANSPLANT: ICD-10-CM

## 2024-07-29 DIAGNOSIS — Z94.0 KIDNEY REPLACED BY TRANSPLANT: ICD-10-CM

## 2024-07-29 LAB
AMYLASE SERPL-CCNC: 61 U/L (ref 28–100)
ANION GAP SERPL CALCULATED.3IONS-SCNC: 8 MMOL/L (ref 7–15)
BUN SERPL-MCNC: 36.1 MG/DL (ref 8–23)
CALCIUM SERPL-MCNC: 8.8 MG/DL (ref 8.8–10.4)
CHLORIDE SERPL-SCNC: 111 MMOL/L (ref 98–107)
CREAT SERPL-MCNC: 2.26 MG/DL (ref 0.67–1.17)
EGFRCR SERPLBLD CKD-EPI 2021: 31 ML/MIN/1.73M2
ERYTHROCYTE [DISTWIDTH] IN BLOOD BY AUTOMATED COUNT: 13.2 % (ref 10–15)
GLUCOSE SERPL-MCNC: 97 MG/DL (ref 70–99)
HCO3 SERPL-SCNC: 23 MMOL/L (ref 22–29)
HCT VFR BLD AUTO: 37.6 % (ref 40–53)
HGB BLD-MCNC: 11.6 G/DL (ref 13.3–17.7)
LIPASE SERPL-CCNC: 32 U/L (ref 13–60)
MCH RBC QN AUTO: 29.2 PG (ref 26.5–33)
MCHC RBC AUTO-ENTMCNC: 30.9 G/DL (ref 31.5–36.5)
MCV RBC AUTO: 95 FL (ref 78–100)
PLATELET # BLD AUTO: 176 10E3/UL (ref 150–450)
POTASSIUM SERPL-SCNC: 4.8 MMOL/L (ref 3.4–5.3)
RBC # BLD AUTO: 3.97 10E6/UL (ref 4.4–5.9)
SODIUM SERPL-SCNC: 142 MMOL/L (ref 135–145)
TACROLIMUS BLD-MCNC: 10.6 UG/L (ref 5–15)
TME LAST DOSE: NORMAL H
TME LAST DOSE: NORMAL H
WBC # BLD AUTO: 4.8 10E3/UL (ref 4–11)

## 2024-07-29 PROCEDURE — 36415 COLL VENOUS BLD VENIPUNCTURE: CPT | Performed by: PATHOLOGY

## 2024-07-29 PROCEDURE — 86832 HLA CLASS I HIGH DEFIN QUAL: CPT | Performed by: SURGERY

## 2024-07-29 PROCEDURE — 80197 ASSAY OF TACROLIMUS: CPT | Performed by: INTERNAL MEDICINE

## 2024-07-29 PROCEDURE — 82150 ASSAY OF AMYLASE: CPT | Performed by: PATHOLOGY

## 2024-07-29 PROCEDURE — 86833 HLA CLASS II HIGH DEFIN QUAL: CPT | Performed by: SURGERY

## 2024-07-29 PROCEDURE — 99000 SPECIMEN HANDLING OFFICE-LAB: CPT | Performed by: PATHOLOGY

## 2024-07-29 PROCEDURE — 85027 COMPLETE CBC AUTOMATED: CPT | Performed by: PATHOLOGY

## 2024-07-29 PROCEDURE — 80048 BASIC METABOLIC PNL TOTAL CA: CPT | Performed by: PATHOLOGY

## 2024-07-29 PROCEDURE — 83690 ASSAY OF LIPASE: CPT | Performed by: PATHOLOGY

## 2024-08-20 ENCOUNTER — LAB (OUTPATIENT)
Dept: LAB | Facility: CLINIC | Age: 69
End: 2024-08-20
Payer: COMMERCIAL

## 2024-08-20 DIAGNOSIS — Z94.83 PANCREAS REPLACED BY TRANSPLANT (H): ICD-10-CM

## 2024-08-20 DIAGNOSIS — Z94.0 KIDNEY REPLACED BY TRANSPLANT: ICD-10-CM

## 2024-08-20 DIAGNOSIS — Z98.890 OTHER SPECIFIED POSTPROCEDURAL STATES: ICD-10-CM

## 2024-08-20 DIAGNOSIS — Z48.298 AFTERCARE FOLLOWING ORGAN TRANSPLANT: ICD-10-CM

## 2024-08-20 DIAGNOSIS — Z79.899 ENCOUNTER FOR LONG-TERM CURRENT USE OF MEDICATION: ICD-10-CM

## 2024-08-20 LAB
AMYLASE SERPL-CCNC: 58 U/L (ref 28–100)
ANION GAP SERPL CALCULATED.3IONS-SCNC: 11 MMOL/L (ref 7–15)
BK VIRUS DNA IU/ML, INSTRUMENT (6800): ABNORMAL IU/ML
BK VIRUS SPECIMEN TYPE: ABNORMAL
BKV DNA SPEC NAA+PROBE-LOG#: 4.9 {LOG_COPIES}/ML
BUN SERPL-MCNC: 44.4 MG/DL (ref 8–23)
CALCIUM SERPL-MCNC: 9.2 MG/DL (ref 8.8–10.4)
CHLORIDE SERPL-SCNC: 109 MMOL/L (ref 98–107)
CREAT SERPL-MCNC: 2.72 MG/DL (ref 0.67–1.17)
EGFRCR SERPLBLD CKD-EPI 2021: 25 ML/MIN/1.73M2
ERYTHROCYTE [DISTWIDTH] IN BLOOD BY AUTOMATED COUNT: 13.2 % (ref 10–15)
GLUCOSE SERPL-MCNC: 100 MG/DL (ref 70–99)
HCO3 SERPL-SCNC: 22 MMOL/L (ref 22–29)
HCT VFR BLD AUTO: 38.2 % (ref 40–53)
HGB BLD-MCNC: 12 G/DL (ref 13.3–17.7)
LIPASE SERPL-CCNC: 32 U/L (ref 13–60)
MCH RBC QN AUTO: 29.7 PG (ref 26.5–33)
MCHC RBC AUTO-ENTMCNC: 31.4 G/DL (ref 31.5–36.5)
MCV RBC AUTO: 95 FL (ref 78–100)
PLATELET # BLD AUTO: 174 10E3/UL (ref 150–450)
POTASSIUM SERPL-SCNC: 5.3 MMOL/L (ref 3.4–5.3)
RBC # BLD AUTO: 4.04 10E6/UL (ref 4.4–5.9)
SODIUM SERPL-SCNC: 142 MMOL/L (ref 135–145)
TACROLIMUS BLD-MCNC: 12.1 UG/L (ref 5–15)
TME LAST DOSE: NORMAL H
TME LAST DOSE: NORMAL H
WBC # BLD AUTO: 4.4 10E3/UL (ref 4–11)

## 2024-08-20 PROCEDURE — 80048 BASIC METABOLIC PNL TOTAL CA: CPT | Performed by: PATHOLOGY

## 2024-08-20 PROCEDURE — 80197 ASSAY OF TACROLIMUS: CPT | Performed by: INTERNAL MEDICINE

## 2024-08-20 PROCEDURE — 85027 COMPLETE CBC AUTOMATED: CPT | Performed by: PATHOLOGY

## 2024-08-20 PROCEDURE — 36415 COLL VENOUS BLD VENIPUNCTURE: CPT | Performed by: PATHOLOGY

## 2024-08-20 PROCEDURE — 87799 DETECT AGENT NOS DNA QUANT: CPT | Performed by: INTERNAL MEDICINE

## 2024-08-20 PROCEDURE — 99000 SPECIMEN HANDLING OFFICE-LAB: CPT | Performed by: PATHOLOGY

## 2024-08-20 PROCEDURE — 82150 ASSAY OF AMYLASE: CPT | Performed by: PATHOLOGY

## 2024-08-20 PROCEDURE — 83690 ASSAY OF LIPASE: CPT | Performed by: PATHOLOGY

## 2024-08-21 ENCOUNTER — TELEPHONE (OUTPATIENT)
Dept: TRANSPLANT | Facility: CLINIC | Age: 69
End: 2024-08-21
Payer: COMMERCIAL

## 2024-08-21 ENCOUNTER — TEAM CONFERENCE (OUTPATIENT)
Dept: TRANSPLANT | Facility: CLINIC | Age: 69
End: 2024-08-21
Payer: COMMERCIAL

## 2024-08-21 DIAGNOSIS — Z94.0 KIDNEY REPLACED BY TRANSPLANT: ICD-10-CM

## 2024-08-21 DIAGNOSIS — Z94.0 HISTORY OF SIMULTANEOUS KIDNEY AND PANCREAS TRANSPLANT (H): ICD-10-CM

## 2024-08-21 DIAGNOSIS — Z48.298 AFTERCARE FOLLOWING ORGAN TRANSPLANT: Primary | ICD-10-CM

## 2024-08-21 DIAGNOSIS — Z94.83 HISTORY OF SIMULTANEOUS KIDNEY AND PANCREAS TRANSPLANT (H): ICD-10-CM

## 2024-08-21 NOTE — TELEPHONE ENCOUNTER
----- Message from Jesu Mosquera sent at 8/21/2024  9:29 AM CDT -----  New BK viremia and recommend decreasing mycophenolate mofetil to 250 mg twice daily and targeting tacrolimus at 5-7.  Would check IgG level and repeat BK in 2 weeks, per protocol.

## 2024-08-21 NOTE — TELEPHONE ENCOUNTER
Post Kidney and Pancreas Transplant Team Conference  Date: 8/21/2024  Transplant Coordinator: Makayla Wu     Attendees:  [x]  Dr. Mosquera [] Kathi Jaquez, KARINE [x] Chery Ha LPN     []  Dr. Ruiz [x] Makayla Wu, KARINE [x] Deisy Chiu LPN    [x] Dr. Crum [x] Savana Lazar RN    [] Dr. Yee [x] Meena Carlisle, RN [] Rhianonn Schmitz, KARINE   [x] Dr. Lofton [x] Farheen Omalley, RN    [] Dr. Farnsworth [] Suha Charlton, RN    [x]  Dr. Ascencio [x] Quin Pineda, KARINE    [] Dr. Reeves [x] Handy Ha RN    [] Adia Mccormack, FORREST [x] Claudia Jara RN    [x] Carolee Mcdermott NP [] Dedra Fuentes RN        Verbal Plan Read Back:   /  Kidney US    Routed to RN Coordinator   Deisy Chiu LPN

## 2024-08-22 RX ORDER — TACROLIMUS 1 MG/1
2 CAPSULE ORAL 2 TIMES DAILY
Qty: 120 CAPSULE | Refills: 11 | Status: SHIPPED | OUTPATIENT
Start: 2024-08-22

## 2024-08-22 RX ORDER — TACROLIMUS 0.5 MG/1
CAPSULE ORAL
Qty: 60 CAPSULE | Refills: 11 | Status: SHIPPED | OUTPATIENT
Start: 2024-08-22

## 2024-08-22 RX ORDER — MYCOPHENOLATE MOFETIL 250 MG/1
250 CAPSULE ORAL 2 TIMES DAILY
Qty: 60 CAPSULE | Refills: 11 | Status: SHIPPED | OUTPATIENT
Start: 2024-08-22

## 2024-09-03 ENCOUNTER — LAB (OUTPATIENT)
Dept: LAB | Facility: CLINIC | Age: 69
End: 2024-09-03
Payer: COMMERCIAL

## 2024-09-03 ENCOUNTER — TELEPHONE (OUTPATIENT)
Dept: TRANSPLANT | Facility: CLINIC | Age: 69
End: 2024-09-03

## 2024-09-03 DIAGNOSIS — Z98.890 OTHER SPECIFIED POSTPROCEDURAL STATES: ICD-10-CM

## 2024-09-03 DIAGNOSIS — Z94.83 PANCREAS REPLACED BY TRANSPLANT (H): ICD-10-CM

## 2024-09-03 DIAGNOSIS — Z94.0 KIDNEY REPLACED BY TRANSPLANT: ICD-10-CM

## 2024-09-03 DIAGNOSIS — Z79.899 ENCOUNTER FOR LONG-TERM CURRENT USE OF MEDICATION: ICD-10-CM

## 2024-09-03 DIAGNOSIS — Z48.298 AFTERCARE FOLLOWING ORGAN TRANSPLANT: ICD-10-CM

## 2024-09-03 LAB
AMYLASE SERPL-CCNC: 51 U/L (ref 28–100)
ANION GAP SERPL CALCULATED.3IONS-SCNC: 10 MMOL/L (ref 7–15)
BK VIRUS DNA IU/ML, INSTRUMENT (6800): ABNORMAL IU/ML
BK VIRUS SPECIMEN TYPE: ABNORMAL
BKV DNA SPEC NAA+PROBE-LOG#: 4.5 {LOG_COPIES}/ML
BUN SERPL-MCNC: 36.1 MG/DL (ref 8–23)
CALCIUM SERPL-MCNC: 9.4 MG/DL (ref 8.8–10.4)
CHLORIDE SERPL-SCNC: 109 MMOL/L (ref 98–107)
CREAT SERPL-MCNC: 2.35 MG/DL (ref 0.67–1.17)
EGFRCR SERPLBLD CKD-EPI 2021: 30 ML/MIN/1.73M2
ERYTHROCYTE [DISTWIDTH] IN BLOOD BY AUTOMATED COUNT: 13.7 % (ref 10–15)
GLUCOSE SERPL-MCNC: 100 MG/DL (ref 70–99)
HCO3 SERPL-SCNC: 21 MMOL/L (ref 22–29)
HCT VFR BLD AUTO: 37.2 % (ref 40–53)
HGB BLD-MCNC: 11.6 G/DL (ref 13.3–17.7)
IGG SERPL-MCNC: 877 MG/DL (ref 610–1616)
LIPASE SERPL-CCNC: 29 U/L (ref 13–60)
MCH RBC QN AUTO: 29.7 PG (ref 26.5–33)
MCHC RBC AUTO-ENTMCNC: 31.2 G/DL (ref 31.5–36.5)
MCV RBC AUTO: 95 FL (ref 78–100)
PLATELET # BLD AUTO: 195 10E3/UL (ref 150–450)
POTASSIUM SERPL-SCNC: 6.3 MMOL/L (ref 3.4–5.3)
RBC # BLD AUTO: 3.9 10E6/UL (ref 4.4–5.9)
SODIUM SERPL-SCNC: 140 MMOL/L (ref 135–145)
TACROLIMUS BLD-MCNC: 7.5 UG/L (ref 5–15)
TME LAST DOSE: NORMAL H
TME LAST DOSE: NORMAL H
WBC # BLD AUTO: 4 10E3/UL (ref 4–11)

## 2024-09-03 PROCEDURE — 99000 SPECIMEN HANDLING OFFICE-LAB: CPT | Performed by: PATHOLOGY

## 2024-09-03 PROCEDURE — 82150 ASSAY OF AMYLASE: CPT | Performed by: PATHOLOGY

## 2024-09-03 PROCEDURE — 85027 COMPLETE CBC AUTOMATED: CPT | Performed by: PATHOLOGY

## 2024-09-03 PROCEDURE — 82784 ASSAY IGA/IGD/IGG/IGM EACH: CPT | Performed by: INTERNAL MEDICINE

## 2024-09-03 PROCEDURE — 83690 ASSAY OF LIPASE: CPT | Performed by: PATHOLOGY

## 2024-09-03 PROCEDURE — 36415 COLL VENOUS BLD VENIPUNCTURE: CPT | Performed by: PATHOLOGY

## 2024-09-03 PROCEDURE — 87799 DETECT AGENT NOS DNA QUANT: CPT | Performed by: INTERNAL MEDICINE

## 2024-09-03 PROCEDURE — 80048 BASIC METABOLIC PNL TOTAL CA: CPT | Performed by: PATHOLOGY

## 2024-09-03 PROCEDURE — 80197 ASSAY OF TACROLIMUS: CPT | Performed by: INTERNAL MEDICINE

## 2024-09-03 ASSESSMENT — ENCOUNTER SYMPTOMS: NEW SYMPTOMS OF CORONARY ARTERY DISEASE: 0

## 2024-09-03 NOTE — TELEPHONE ENCOUNTER
Spoke to Avelina to give recommendation of ER to patient. He states he is on his way to a PCP appointment and will get direction from them. Patient is hesitant to go to the ER. RNCC encouraged patient that although he feels no symptoms, it is important to not ignore this value. Patient wants to get the advice of his PCP and he will call if he has questions, or proceed to ER after appointment if PCP agrees.

## 2024-09-03 NOTE — TELEPHONE ENCOUNTER
DATE:  9/3/2024     TIME OF RECEIPT FROM LAB:  10:42 am    ORDERING PROVIDER: Demetri    LAB TEST:  Potassium    LAB VALUE:  6.3

## 2024-09-16 ENCOUNTER — TELEPHONE (OUTPATIENT)
Dept: TRANSPLANT | Facility: CLINIC | Age: 69
End: 2024-09-16

## 2024-09-16 ENCOUNTER — HOSPITAL ENCOUNTER (EMERGENCY)
Facility: CLINIC | Age: 69
Discharge: HOME | End: 2024-09-16
Attending: EMERGENCY MEDICINE | Admitting: EMERGENCY MEDICINE
Payer: COMMERCIAL

## 2024-09-16 ENCOUNTER — TELEPHONE (OUTPATIENT)
Dept: TRANSPLANT | Facility: CLINIC | Age: 69
End: 2024-09-16
Payer: COMMERCIAL

## 2024-09-16 VITALS
RESPIRATION RATE: 16 BRPM | DIASTOLIC BLOOD PRESSURE: 70 MMHG | BODY MASS INDEX: 28.8 KG/M2 | WEIGHT: 205.69 LBS | HEART RATE: 68 BPM | OXYGEN SATURATION: 98 % | TEMPERATURE: 98.1 F | HEIGHT: 71 IN | SYSTOLIC BLOOD PRESSURE: 134 MMHG

## 2024-09-16 DIAGNOSIS — Z48.298 AFTERCARE FOLLOWING ORGAN TRANSPLANT: Primary | ICD-10-CM

## 2024-09-16 DIAGNOSIS — E87.5 HYPERKALEMIA: ICD-10-CM

## 2024-09-16 LAB
ANION GAP SERPL CALCULATED.3IONS-SCNC: 9 MMOL/L (ref 7–15)
ATRIAL RATE - MUSE: 69 BPM
BASOPHILS # BLD AUTO: ABNORMAL 10*3/UL
BASOPHILS # BLD MANUAL: 0 10E3/UL (ref 0–0.2)
BASOPHILS NFR BLD AUTO: ABNORMAL %
BASOPHILS NFR BLD MANUAL: 0 %
BUN SERPL-MCNC: 38.5 MG/DL (ref 8–23)
BURR CELLS BLD QL SMEAR: SLIGHT
CALCIUM SERPL-MCNC: 8.4 MG/DL (ref 8.8–10.4)
CHLORIDE SERPL-SCNC: 112 MMOL/L (ref 98–107)
CREAT SERPL-MCNC: 2.14 MG/DL (ref 0.67–1.17)
DIASTOLIC BLOOD PRESSURE - MUSE: NORMAL MMHG
EGFRCR SERPLBLD CKD-EPI 2021: 33 ML/MIN/1.73M2
EOSINOPHIL # BLD AUTO: ABNORMAL 10*3/UL
EOSINOPHIL # BLD MANUAL: 0.5 10E3/UL (ref 0–0.7)
EOSINOPHIL NFR BLD AUTO: ABNORMAL %
EOSINOPHIL NFR BLD MANUAL: 10 %
ERYTHROCYTE [DISTWIDTH] IN BLOOD BY AUTOMATED COUNT: 13.9 % (ref 10–15)
GLUCOSE SERPL-MCNC: 84 MG/DL (ref 70–99)
HCO3 SERPL-SCNC: 18 MMOL/L (ref 22–29)
HCT VFR BLD AUTO: 36.9 % (ref 40–53)
HGB BLD-MCNC: 11.3 G/DL (ref 13.3–17.7)
HOLD SPECIMEN: NORMAL
HOLD SPECIMEN: NORMAL
IMM GRANULOCYTES # BLD: ABNORMAL 10*3/UL
IMM GRANULOCYTES NFR BLD: ABNORMAL %
INTERPRETATION ECG - MUSE: NORMAL
LYMPHOCYTES # BLD AUTO: ABNORMAL 10*3/UL
LYMPHOCYTES # BLD MANUAL: 0.7 10E3/UL (ref 0.8–5.3)
LYMPHOCYTES NFR BLD AUTO: ABNORMAL %
LYMPHOCYTES NFR BLD MANUAL: 14 %
MCH RBC QN AUTO: 29.7 PG (ref 26.5–33)
MCHC RBC AUTO-ENTMCNC: 30.6 G/DL (ref 31.5–36.5)
MCV RBC AUTO: 97 FL (ref 78–100)
MONOCYTES # BLD AUTO: ABNORMAL 10*3/UL
MONOCYTES # BLD MANUAL: 0.5 10E3/UL (ref 0–1.3)
MONOCYTES NFR BLD AUTO: ABNORMAL %
MONOCYTES NFR BLD MANUAL: 11 %
NEUTROPHILS # BLD AUTO: ABNORMAL 10*3/UL
NEUTROPHILS # BLD MANUAL: 3.1 10E3/UL (ref 1.6–8.3)
NEUTROPHILS NFR BLD AUTO: ABNORMAL %
NEUTROPHILS NFR BLD MANUAL: 65 %
NRBC # BLD AUTO: 0 10E3/UL
NRBC BLD AUTO-RTO: 0 /100
P AXIS - MUSE: 39 DEGREES
PLAT MORPH BLD: ABNORMAL
PLATELET # BLD AUTO: 173 10E3/UL (ref 150–450)
POTASSIUM SERPL-SCNC: 5.5 MMOL/L (ref 3.4–5.3)
PR INTERVAL - MUSE: 160 MS
QRS DURATION - MUSE: 82 MS
QT - MUSE: 388 MS
QTC - MUSE: 406 MS
R AXIS - MUSE: 17 DEGREES
RBC # BLD AUTO: 3.8 10E6/UL (ref 4.4–5.9)
RBC MORPH BLD: ABNORMAL
SODIUM SERPL-SCNC: 139 MMOL/L (ref 135–145)
SYSTOLIC BLOOD PRESSURE - MUSE: NORMAL MMHG
T AXIS - MUSE: 54 DEGREES
VENTRICULAR RATE- MUSE: 66 BPM
WBC # BLD AUTO: 4.8 10E3/UL (ref 4–11)

## 2024-09-16 PROCEDURE — 36415 COLL VENOUS BLD VENIPUNCTURE: CPT | Performed by: EMERGENCY MEDICINE

## 2024-09-16 PROCEDURE — 99284 EMERGENCY DEPT VISIT MOD MDM: CPT | Performed by: EMERGENCY MEDICINE

## 2024-09-16 PROCEDURE — 85014 HEMATOCRIT: CPT | Performed by: EMERGENCY MEDICINE

## 2024-09-16 PROCEDURE — 93005 ELECTROCARDIOGRAM TRACING: CPT | Performed by: EMERGENCY MEDICINE

## 2024-09-16 PROCEDURE — 93010 ELECTROCARDIOGRAM REPORT: CPT | Performed by: EMERGENCY MEDICINE

## 2024-09-16 PROCEDURE — 80048 BASIC METABOLIC PNL TOTAL CA: CPT | Performed by: EMERGENCY MEDICINE

## 2024-09-16 PROCEDURE — 85007 BL SMEAR W/DIFF WBC COUNT: CPT | Performed by: EMERGENCY MEDICINE

## 2024-09-16 ASSESSMENT — ACTIVITIES OF DAILY LIVING (ADL)
ADLS_ACUITY_SCORE: 38
ADLS_ACUITY_SCORE: 38

## 2024-09-16 ASSESSMENT — COLUMBIA-SUICIDE SEVERITY RATING SCALE - C-SSRS
6. HAVE YOU EVER DONE ANYTHING, STARTED TO DO ANYTHING, OR PREPARED TO DO ANYTHING TO END YOUR LIFE?: NO
1. IN THE PAST MONTH, HAVE YOU WISHED YOU WERE DEAD OR WISHED YOU COULD GO TO SLEEP AND NOT WAKE UP?: NO
2. HAVE YOU ACTUALLY HAD ANY THOUGHTS OF KILLING YOURSELF IN THE PAST MONTH?: NO

## 2024-09-16 NOTE — ED PROVIDER NOTES
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 Patient reports he was about to have a carpel tunnel surgery but was found to have a potassium of 6.2 on visit to surgery. Patient was sent to the ED shortly after. Patient states he had a potassium of 5.8 on a pre operation physical with primary care. Patient last took medication at 9:30 PM last night and last had food at 9:00 PM last night. Patient denies fevers, cough, nausea, and any other symptoms.     Past Medical History  Past Medical History:   Diagnosis Date    Anemia in chronic kidney disease     BCC (basal cell carcinoma), face 08/09/2022    Diabetic retinopathy of both eyes (H)     End stage renal disease (H)     Hypertension     Kidney transplant rejection 10/17/2023    Secondary renal hyperparathyroidism (H24)     Type 2 diabetes mellitus (H)      Past Surgical History:   Procedure Laterality Date    BENCH KIDNEY  9/14/2023    Procedure: Bench kidney;  Surgeon: Tk Ascencio MD;  Location: [facility] OR    BENCH PANCREAS N/A 9/14/2023    Procedure: Bench pancreas;  Surgeon: Tk Ascencio MD;  Location: [facility] OR    BYPASS GRAFT ARTERY CORONARY N/A 2/13/2023    Procedure: TRANSESPHAGEAL ECHOCARDIOGRAM, MEDIAN STERNOTOMY, TAKE DOWN OF LEFT INTERNAL MAMMARY, LEFT ENDOSCOPIC GREATER SAPHENOUS VEIN HARVEST, CRYO NERVE BLOCK, CARDIOPULMONARY BYPASS, CORONARY ARTERY BYPASS GRAFT X 2.;  Surgeon: Patrice Yepez MD;  Location: [facility] OR    CREATE FISTULA ARTERIOVENOUS UPPER EXTREMITY Left     CV CORONARY ANGIOGRAM N/A  2021    Procedure: CV CORONARY ANGIOGRAM;  Surgeon: Milton Cam MD;  [facility]: [facility]    CV CORONARY ANGIOGRAM N/A 2023    Procedure: Coronary Angiogram;  Surgeon: Milton Cam MD;  [facility]    CV PCI N/A 2023    Procedure: Percutaneous Coronary Intervention;  Surgeon: Milton Cam MD;  [facility]    IR RENAL BIOPSY LEFT  10/17/2023    IR VISCERAL ANGIOGRAM  10/17/2023    IR VISCERAL EMBOLIZATION  10/17/2023    TRANSPLANT PANCREAS, KIDNEY  DONOR, COMBINED N/A 2023    Procedure: Transplant pancreas, kidney  donor, combined, appendectomy;  Surgeon: Tk Ascencio MD;  Location: [facility] OR     aspirin 81 MG EC tablet  atorvastatin (LIPITOR) 20 MG tablet  Blood Glucose Monitoring Suppl (ACCU-CHEK GUIDE) w/Device KIT  calcitRIOL (ROCALTROL) 0.25 MCG capsule  calcium carbonate-vitamin D (OSCAL) 500-5 MG-MCG tablet  gabapentin (NEURONTIN) 100 MG capsule  magnesium oxide (MAG-OX) 400 MG tablet  metoprolol tartrate (LOPRESSOR) 25 MG tablet  Misc. Devices (PILL SPLITTER) MISC  mycophenolate (GENERIC EQUIVALENT) 250 MG capsule  psyllium (METAMUCIL/KONSYL) capsule  sulfamethoxazole-trimethoprim (BACTRIM) 400-80 MG tablet  tacrolimus (GENERIC EQUIVALENT) 0.5 MG capsule  tacrolimus (GENERIC EQUIVALENT) 1 MG capsule      No Known Allergies  Family History  Family History   Problem Relation Age of Onset    Diabetes Brother     Kidney Disease Brother      Social History   Social History     Tobacco Use    Smoking status: Former     Types: Cigarettes    Smokeless tobacco: Never    Tobacco comments:     Quit    Vaping Use    Vaping status: Never Used   Substance Use Topics    Alcohol use: Never    Drug use: Never      A complete review of systems was performed with pertinent positives and negatives noted in the HPI, and all other systems negative.    Physical Exam   BP: (!) 130/110  Pulse: 68  Temp:  "98.1  F (36.7  C)  Resp: 16  Height: 180.3 cm (5' 11\")  Weight: 93.3 kg (205 lb 11 oz)  SpO2: 98 %  Physical Exam  Vitals reviewed.   Constitutional:       General: He is not in acute distress.     Appearance: He is not diaphoretic.   HENT:      Head: Normocephalic and atraumatic.      Right Ear: External ear normal.      Left Ear: External ear normal.      Nose: Nose normal. No rhinorrhea.      Mouth/Throat:      Mouth: Mucous membranes are moist.   Eyes:      General: No scleral icterus.     Extraocular Movements: Extraocular movements intact.      Conjunctiva/sclera: Conjunctivae normal.   Cardiovascular:      Rate and Rhythm: Normal rate and regular rhythm.      Heart sounds: Normal heart sounds.   Pulmonary:      Effort: No respiratory distress.      Breath sounds: Normal breath sounds.   Abdominal:      General: Bowel sounds are normal.      Palpations: Abdomen is soft.      Tenderness: There is no abdominal tenderness.   Musculoskeletal:         General: No deformity. Normal range of motion.      Cervical back: Normal range of motion and neck supple.   Skin:     General: Skin is warm.      Findings: No rash.   Neurological:      Mental Status: Mental status is at baseline.   Psychiatric:         Mood and Affect: Mood normal.         Behavior: Behavior normal.           ED Course, Procedures, & Data      Procedures            EKG Interpretation:      Interpreted by Jose Alicia DO  Time reviewed: 1043  Symptoms at time of EKG:   Rhythm: normal sinus   Rate: normal  Axis: normal  Ectopy: none  Conduction: normal  ST Segments/ T Waves: No ST-T wave changes  Q Waves: none  Comparison to prior: Unchanged    Clinical Impression: normal EKG  Results for orders placed or performed during the hospital encounter of 09/16/24   Basic metabolic panel     Status: Abnormal   Result Value Ref Range    Sodium 139 135 - 145 mmol/L    Potassium 5.5 (H) 3.4 - 5.3 mmol/L    Chloride 112 (H) 98 - 107 mmol/L    Carbon " Dioxide (CO2) 18 (L) 22 - 29 mmol/L    Anion Gap 9 7 - 15 mmol/L    Urea Nitrogen 38.5 (H) 8.0 - 23.0 mg/dL    Creatinine 2.14 (H) 0.67 - 1.17 mg/dL    GFR Estimate 33 (L) >60 mL/min/1.73m2    Calcium 8.4 (L) 8.8 - 10.4 mg/dL    Glucose 84 70 - 99 mg/dL   CBC with platelets and differential     Status: Abnormal   Result Value Ref Range    WBC Count 4.8 4.0 - 11.0 10e3/uL    RBC Count 3.80 (L) 4.40 - 5.90 10e6/uL    Hemoglobin 11.3 (L) 13.3 - 17.7 g/dL    Hematocrit 36.9 (L) 40.0 - 53.0 %    MCV 97 78 - 100 fL    MCH 29.7 26.5 - 33.0 pg    MCHC 30.6 (L) 31.5 - 36.5 g/dL    RDW 13.9 10.0 - 15.0 %    Platelet Count 173 150 - 450 10e3/uL    % Neutrophils      % Lymphocytes      % Monocytes      % Eosinophils      % Basophils      % Immature Granulocytes      NRBCs per 100 WBC 0 <1 /100    Absolute Neutrophils      Absolute Lymphocytes      Absolute Monocytes      Absolute Eosinophils      Absolute Basophils      Absolute Immature Granulocytes      Absolute NRBCs 0.0 10e3/uL   [place] Draw     Status: None    Narrative    The following orders were created for panel order [place] Draw.  Procedure                               Abnormality         Status                     ---------                               -----------         ------                     Extra Blue Top Tube[[id number]]                              Final result               Extra Red Top Tube[[id number]]                               Final result                 Please view results for these tests on the individual orders.   Extra Blue Top Tube     Status: None   Result Value Ref Range    Hold Specimen JIC    Extra Red Top Tube     Status: None   Result Value Ref Range    Hold Specimen JIC    Manual Differential     Status: Abnormal   Result Value Ref Range    % Neutrophils 65 %    % Lymphocytes 14 %    % Monocytes 11 %    % Eosinophils 10 %    % Basophils 0 %    Absolute Neutrophils 3.1 1.6 - 8.3 10e3/uL    Absolute Lymphocytes 0.7 (L) 0.8 - 5.3 10e3/uL     Absolute Monocytes 0.5 0.0 - 1.3 10e3/uL    Absolute Eosinophils 0.5 0.0 - 0.7 10e3/uL    Absolute Basophils 0.0 0.0 - 0.2 10e3/uL    RBC Morphology Confirmed RBC Indices     Platelet Assessment  Automated Count Confirmed. Platelet morphology is normal.     Automated Count Confirmed. Platelet morphology is normal.    Cohagen Cells Slight (A) None Seen   EKG 12-lead, tracing only     Status: None   Result Value Ref Range    Systolic Blood Pressure  mmHg    Diastolic Blood Pressure  mmHg    Ventricular Rate 66 BPM    Atrial Rate 69 BPM    MS Interval 160 ms    QRS Duration 82 ms     ms    QTc 406 ms    P Axis 39 degrees    R AXIS 17 degrees    T Axis 54 degrees    Interpretation ECG       Sinus rhythm with sinus arrhythmia with occasional Premature ventricular complexes  Otherwise normal ECG  Unconfirmed report - interpretation of this ECG is computer generated - see medical record for final interpretation  Confirmed by - EMERGENCY ROOM, PHYSICIAN ([id number]),  DEJAN BLUM ([id number]) on 9/16/2024 11:23:59 AM     CBC with Platelets & Differential     Status: Abnormal    Narrative    The following orders were created for panel order CBC with Platelets & Differential.  Procedure                               Abnormality         Status                     ---------                               -----------         ------                     CBC with platelets and d...[[id number]]  Abnormal            Final result               Manual Differential[[id number]]          Abnormal            Final result                 Please view results for these tests on the individual orders.          Results for orders placed or performed during the hospital encounter of 09/16/24   Basic metabolic panel     Status: Abnormal   Result Value Ref Range    Sodium 139 135 - 145 mmol/L    Potassium 5.5 (H) 3.4 - 5.3 mmol/L    Chloride 112 (H) 98 - 107 mmol/L    Carbon Dioxide (CO2) 18 (L) 22 - 29 mmol/L    Anion Gap 9 7 - 15 mmol/L     Urea Nitrogen 38.5 (H) 8.0 - 23.0 mg/dL    Creatinine 2.14 (H) 0.67 - 1.17 mg/dL    GFR Estimate 33 (L) >60 mL/min/1.73m2    Calcium 8.4 (L) 8.8 - 10.4 mg/dL    Glucose 84 70 - 99 mg/dL   CBC with platelets and differential     Status: Abnormal   Result Value Ref Range    WBC Count 4.8 4.0 - 11.0 10e3/uL    RBC Count 3.80 (L) 4.40 - 5.90 10e6/uL    Hemoglobin 11.3 (L) 13.3 - 17.7 g/dL    Hematocrit 36.9 (L) 40.0 - 53.0 %    MCV 97 78 - 100 fL    MCH 29.7 26.5 - 33.0 pg    MCHC 30.6 (L) 31.5 - 36.5 g/dL    RDW 13.9 10.0 - 15.0 %    Platelet Count 173 150 - 450 10e3/uL    % Neutrophils      % Lymphocytes      % Monocytes      % Eosinophils      % Basophils      % Immature Granulocytes      NRBCs per 100 WBC 0 <1 /100    Absolute Neutrophils      Absolute Lymphocytes      Absolute Monocytes      Absolute Eosinophils      Absolute Basophils      Absolute Immature Granulocytes      Absolute NRBCs 0.0 10e3/uL   [place] Draw     Status: None    Narrative    The following orders were created for panel order [place] Draw.  Procedure                               Abnormality         Status                     ---------                               -----------         ------                     Extra Blue Top Tube[[id number]]                              Final result               Extra Red Top Tube[[id number]]                               Final result                 Please view results for these tests on the individual orders.   Extra Blue Top Tube     Status: None   Result Value Ref Range    Hold Specimen JIC    Extra Red Top Tube     Status: None   Result Value Ref Range    Hold Specimen JIC    Manual Differential     Status: Abnormal   Result Value Ref Range    % Neutrophils 65 %    % Lymphocytes 14 %    % Monocytes 11 %    % Eosinophils 10 %    % Basophils 0 %    Absolute Neutrophils 3.1 1.6 - 8.3 10e3/uL    Absolute Lymphocytes 0.7 (L) 0.8 - 5.3 10e3/uL    Absolute Monocytes 0.5 0.0 - 1.3 10e3/uL    Absolute Eosinophils  0.5 0.0 - 0.7 10e3/uL    Absolute Basophils 0.0 0.0 - 0.2 10e3/uL    RBC Morphology Confirmed RBC Indices     Platelet Assessment  Automated Count Confirmed. Platelet morphology is normal.     Automated Count Confirmed. Platelet morphology is normal.    [place] Cells Slight (A) None Seen   EKG 12-lead, tracing only     Status: None   Result Value Ref Range    Systolic Blood Pressure  mmHg    Diastolic Blood Pressure  mmHg    Ventricular Rate 66 BPM    Atrial Rate 69 BPM    CA Interval 160 ms    QRS Duration 82 ms     ms    QTc 406 ms    P Axis 39 degrees    R AXIS 17 degrees    T Axis 54 degrees    Interpretation ECG       Sinus rhythm with sinus arrhythmia with occasional Premature ventricular complexes  Otherwise normal ECG  Unconfirmed report - interpretation of this ECG is computer generated - see medical record for final interpretation  Confirmed by - EMERGENCY ROOM, PHYSICIAN ([id number]),  DEJAN BLUM ([id number]) on 9/16/2024 11:23:59 AM     CBC with Platelets & Differential     Status: Abnormal    Narrative    The following orders were created for panel order CBC with Platelets & Differential.  Procedure                               Abnormality         Status                     ---------                               -----------         ------                     CBC with platelets and d...[[id number]]  Abnormal            Final result               Manual Differential[[id number]]          Abnormal            Final result                 Please view results for these tests on the individual orders.     Medications - No data to display  Labs Ordered and Resulted from Time of ED Arrival to Time of ED Departure   BASIC METABOLIC PANEL - Abnormal       Result Value    Sodium 139      Potassium 5.5 (*)     Chloride 112 (*)     Carbon Dioxide (CO2) 18 (*)     Anion Gap 9      Urea Nitrogen 38.5 (*)     Creatinine 2.14 (*)     GFR Estimate 33 (*)     Calcium 8.4 (*)     Glucose 84     CBC WITH PLATELETS AND  DIFFERENTIAL - Abnormal    WBC Count 4.8      RBC Count 3.80 (*)     Hemoglobin 11.3 (*)     Hematocrit 36.9 (*)     MCV 97      MCH 29.7      MCHC 30.6 (*)     RDW 13.9      Platelet Count 173      % Neutrophils        % Lymphocytes        % Monocytes        % Eosinophils        % Basophils        % Immature Granulocytes        NRBCs per 100 WBC 0      Absolute Neutrophils        Absolute Lymphocytes        Absolute Monocytes        Absolute Eosinophils        Absolute Basophils        Absolute Immature Granulocytes        Absolute NRBCs 0.0     DIFFERENTIAL - Abnormal    % Neutrophils 65      % Lymphocytes 14      % Monocytes 11      % Eosinophils 10      % Basophils 0      Absolute Neutrophils 3.1      Absolute Lymphocytes 0.7 (*)     Absolute Monocytes 0.5      Absolute Eosinophils 0.5      Absolute Basophils 0.0      RBC Morphology Confirmed RBC Indices      Platelet Assessment        Value: Automated Count Confirmed. Platelet morphology is normal.    Cinda Cells Slight (*)      No orders to display      2023 Emergency Medicine Coding Guide from [employer]  on 9/16/2024  ** All calculations should be rechecked by clinician prior to use **    RESULT SUMMARY:  4 Estimated Level of Service  Problems: High (5)    Risk: Low (3)    Data: Moderate (4)    NARRATIVE MDM:  This patient's problem complexity is High as patient: may have an acute or chronic illness/injury posing a threat to life or body function.   This patient's risk is Low due to: overall presentation requiring evaluation for a potentially Low-risk process.  This patient's data complexity is Moderate due to:   -external notes reviewed        INPUTS:  Number and Complexity --> 2 = 5: illness/injury w/life or body threat (b)  Risk level --> 2 = Low  Tests ordered --> 1 = 1  Tests results reviewed (excluding labs) --> 1 = 1  Prior external notes reviewed --> 1 = 1  Assessment requiring and independent historian --> 0 = No  Independent interpretation of tests  --> 0 = No  Discussed management/test interpretation w/external professional --> 0 = No      Assessment & Plan    69-year-old male presents to us with chief complaint of hyperkalemia.  At the outpatient surgical site for his carpal tunnel surgery they did a point-of-care potassium that was elevated at 6.3.  Repeat here was at 5.5.  This is mildly elevated but does not need any further action here except for a recheck with primary care.  We will discharge him home.    I have reviewed the nursing notes. I have reviewed the findings, diagnosis, plan and need for follow up with the patient.    New Prescriptions    No medications on file       Final diagnoses:   Hyperkalemia       Jose Alicia DO  [facility] EMERGENCY DEPARTMENT  9/16/2024     Jose Alicia DO  09/16/24 1200

## 2024-09-16 NOTE — TELEPHONE ENCOUNTER
Spoke to patient about his cancelled procedure due to elevated potassium. Patient states they sent him to the ER but his potassium was near normal range then. Patient will get procedure rescheduled and follow up with his 1 year appointment next week.

## 2024-09-16 NOTE — ED TRIAGE NOTES
Triage Assessment (Adult)       Row Name 09/16/24 1022          Triage Assessment    Airway WDL WDL        Respiratory WDL    Respiratory WDL WDL        Skin Circulation/Temperature WDL    Skin Circulation/Temperature WDL WDL        Cardiac WDL    Cardiac WDL WDL        Peripheral/Neurovascular WDL    Peripheral Neurovascular WDL WDL        Cognitive/Neuro/Behavioral WDL    Cognitive/Neuro/Behavioral WDL WDL

## 2024-09-16 NOTE — TELEPHONE ENCOUNTER
General  Route to LPN  Reason for call: Pt said he was supposed to have surgery and wants to update tiffany with what is going on     Call back needed? Yes    Return Call Needed  Same as documented in contacts section  When to return call?: Greater than one day: Route standard priority

## 2024-09-16 NOTE — ED TRIAGE NOTES
BIBA carpal tunnel surgery on L wrist at [facility] same day surgery. , hyperkalemic 6.2. kidney/kidney transplant 2023, here for nephrology transplant.  BG 87.

## 2024-09-16 NOTE — TELEPHONE ENCOUNTER
Provider Call: General  Route to LPN    Reason for call: Call from  from  [facility]  pt has elevated potassium  - Sent Dr to -  line per Rhiannon S recommendation     Call back needed? Yes    Return Call Needed  Same as documented in contacts section  When to return call?: Same day: Route High Priority

## 2024-09-16 NOTE — DISCHARGE INSTRUCTIONS
Your potassium is mildly elevated 5.5. please follow-up with your primary care provider to recheck this.  Return the emergency department as needed.

## 2024-09-17 RX ORDER — SODIUM BICARBONATE 650 MG/1
1350 TABLET ORAL 2 TIMES DAILY
Qty: 120 TABLET | Refills: 11 | Status: SHIPPED | OUTPATIENT
Start: 2024-09-17

## 2024-09-17 NOTE — TELEPHONE ENCOUNTER
Michael Rogers MD Colianni, Lauren, KARINE Benavides,    I was going to see this gentleman in the ER, but they discharged him before I could see him. Has another provider given you recommendations?    If not, I would put him on sodium bicarbonate 1300 mg twice a day and Lokelma 10 G every day in the afternoon. Check G6PD and repeat labs in one week.    Michael Richardson

## 2024-09-24 ENCOUNTER — LAB (OUTPATIENT)
Dept: LAB | Facility: CLINIC | Age: 69
End: 2024-09-24
Attending: INTERNAL MEDICINE
Payer: COMMERCIAL

## 2024-09-24 ENCOUNTER — OFFICE VISIT (OUTPATIENT)
Dept: TRANSPLANT | Facility: CLINIC | Age: 69
End: 2024-09-24
Attending: INTERNAL MEDICINE
Payer: COMMERCIAL

## 2024-09-24 VITALS
HEART RATE: 60 BPM | BODY MASS INDEX: 30.28 KG/M2 | TEMPERATURE: 97.6 F | DIASTOLIC BLOOD PRESSURE: 77 MMHG | OXYGEN SATURATION: 98 % | WEIGHT: 217.1 LBS | SYSTOLIC BLOOD PRESSURE: 143 MMHG

## 2024-09-24 DIAGNOSIS — D84.9 IMMUNOSUPPRESSED STATUS (H): ICD-10-CM

## 2024-09-24 DIAGNOSIS — N18.32 STAGE 3B CHRONIC KIDNEY DISEASE (H): ICD-10-CM

## 2024-09-24 DIAGNOSIS — E55.9 VITAMIN D DEFICIENCY: ICD-10-CM

## 2024-09-24 DIAGNOSIS — I15.1 HTN, KIDNEY TRANSPLANT RELATED: ICD-10-CM

## 2024-09-24 DIAGNOSIS — E83.42 HYPOMAGNESEMIA: Primary | ICD-10-CM

## 2024-09-24 DIAGNOSIS — Z29.89 NEED FOR PNEUMOCYSTIS PROPHYLAXIS: ICD-10-CM

## 2024-09-24 DIAGNOSIS — D63.1 ANEMIA IN STAGE 3B CHRONIC KIDNEY DISEASE (H): ICD-10-CM

## 2024-09-24 DIAGNOSIS — E83.42 HYPOMAGNESEMIA: ICD-10-CM

## 2024-09-24 DIAGNOSIS — Z94.0 HTN, KIDNEY TRANSPLANT RELATED: ICD-10-CM

## 2024-09-24 DIAGNOSIS — N18.32 ANEMIA IN STAGE 3B CHRONIC KIDNEY DISEASE (H): ICD-10-CM

## 2024-09-24 DIAGNOSIS — Z79.899 ENCOUNTER FOR LONG-TERM CURRENT USE OF MEDICATION: ICD-10-CM

## 2024-09-24 DIAGNOSIS — Z48.298 AFTERCARE FOLLOWING ORGAN TRANSPLANT: ICD-10-CM

## 2024-09-24 DIAGNOSIS — Z98.890 OTHER SPECIFIED POSTPROCEDURAL STATES: ICD-10-CM

## 2024-09-24 DIAGNOSIS — Z94.0 KIDNEY REPLACED BY TRANSPLANT: ICD-10-CM

## 2024-09-24 DIAGNOSIS — Z94.83 PANCREAS REPLACED BY TRANSPLANT (H): ICD-10-CM

## 2024-09-24 DIAGNOSIS — Z20.828 CONTACT WITH AND (SUSPECTED) EXPOSURE TO OTHER VIRAL COMMUNICABLE DISEASES: ICD-10-CM

## 2024-09-24 DIAGNOSIS — B34.8 BK VIREMIA: ICD-10-CM

## 2024-09-24 PROBLEM — E86.0 DEHYDRATION: Status: RESOLVED | Noted: 2023-10-05 | Resolved: 2024-09-24

## 2024-09-24 LAB
ALBUMIN MFR UR ELPH: 12.5 MG/DL
ALBUMIN SERPL BCG-MCNC: 4 G/DL (ref 3.5–5.2)
ALBUMIN UR-MCNC: 10 MG/DL
ALP SERPL-CCNC: 77 U/L (ref 40–150)
ALT SERPL W P-5'-P-CCNC: 6 U/L (ref 0–70)
AMYLASE SERPL-CCNC: 60 U/L (ref 28–100)
ANION GAP SERPL CALCULATED.3IONS-SCNC: 9 MMOL/L (ref 7–15)
APPEARANCE UR: CLEAR
AST SERPL W P-5'-P-CCNC: 19 U/L (ref 0–45)
BILIRUB DIRECT SERPL-MCNC: <0.2 MG/DL (ref 0–0.3)
BILIRUB SERPL-MCNC: 0.5 MG/DL
BILIRUB UR QL STRIP: NEGATIVE
BK VIRUS DNA IU/ML, INSTRUMENT (6800): ABNORMAL IU/ML
BK VIRUS SPECIMEN TYPE: ABNORMAL
BKV DNA SPEC NAA+PROBE-LOG#: 4.9 {LOG_COPIES}/ML
BUN SERPL-MCNC: 31.5 MG/DL (ref 8–23)
CALCIUM SERPL-MCNC: 8.9 MG/DL (ref 8.8–10.4)
CD3 CELLS # BLD: 227 CELLS/UL (ref 603–2990)
CD3 CELLS NFR BLD: 62 % (ref 49–84)
CD3+CD4+ CELLS # BLD: 96 CELLS/UL (ref 441–2156)
CD3+CD4+ CELLS NFR BLD: 26 % (ref 28–63)
CD3+CD4+ CELLS/CD3+CD8+ CLL BLD: 0.86 % (ref 1.4–2.6)
CD3+CD8+ CELLS # BLD: 112 CELLS/UL (ref 125–1312)
CD3+CD8+ CELLS NFR BLD: 30 % (ref 10–40)
CHLORIDE SERPL-SCNC: 109 MMOL/L (ref 98–107)
CHOLEST SERPL-MCNC: 96 MG/DL
COLOR UR AUTO: ABNORMAL
CREAT SERPL-MCNC: 2.3 MG/DL (ref 0.67–1.17)
CREAT UR-MCNC: 201 MG/DL
EGFRCR SERPLBLD CKD-EPI 2021: 30 ML/MIN/1.73M2
ERYTHROCYTE [DISTWIDTH] IN BLOOD BY AUTOMATED COUNT: 13.7 % (ref 10–15)
EST. AVERAGE GLUCOSE BLD GHB EST-MCNC: 114 MG/DL
FASTING STATUS PATIENT QL REPORTED: ABNORMAL
FASTING STATUS PATIENT QL REPORTED: NORMAL
FERRITIN SERPL-MCNC: 833 NG/ML (ref 31–409)
GLUCOSE SERPL-MCNC: 91 MG/DL (ref 70–99)
GLUCOSE UR STRIP-MCNC: NEGATIVE MG/DL
HBA1C MFR BLD: 5.6 %
HCO3 SERPL-SCNC: 25 MMOL/L (ref 22–29)
HCT VFR BLD AUTO: 37.7 % (ref 40–53)
HDLC SERPL-MCNC: 46 MG/DL
HGB BLD-MCNC: 11.6 G/DL (ref 13.3–17.7)
HGB UR QL STRIP: NEGATIVE
IRON BINDING CAPACITY (ROCHE): 206 UG/DL (ref 240–430)
IRON SATN MFR SERPL: 28 % (ref 15–46)
IRON SERPL-MCNC: 57 UG/DL (ref 61–157)
KETONES UR STRIP-MCNC: NEGATIVE MG/DL
LDLC SERPL CALC-MCNC: 37 MG/DL
LEUKOCYTE ESTERASE UR QL STRIP: NEGATIVE
LIPASE SERPL-CCNC: 32 U/L (ref 13–60)
MAGNESIUM SERPL-MCNC: 1.7 MG/DL (ref 1.7–2.3)
MCH RBC QN AUTO: 29.3 PG (ref 26.5–33)
MCHC RBC AUTO-ENTMCNC: 30.8 G/DL (ref 31.5–36.5)
MCV RBC AUTO: 95 FL (ref 78–100)
NITRATE UR QL: NEGATIVE
NONHDLC SERPL-MCNC: 50 MG/DL
PH UR STRIP: 6.5 [PH] (ref 5–7)
PLATELET # BLD AUTO: 155 10E3/UL (ref 150–450)
POTASSIUM SERPL-SCNC: 4.8 MMOL/L (ref 3.4–5.3)
PROT SERPL-MCNC: 6.6 G/DL (ref 6.4–8.3)
PROT/CREAT 24H UR: 0.06 MG/MG CR (ref 0–0.2)
PTH-INTACT SERPL-MCNC: 88 PG/ML (ref 15–65)
RBC # BLD AUTO: 3.96 10E6/UL (ref 4.4–5.9)
RBC URINE: 3 /HPF
SODIUM SERPL-SCNC: 143 MMOL/L (ref 135–145)
SP GR UR STRIP: 1.02 (ref 1–1.03)
SPERM #/AREA URNS HPF: PRESENT /HPF
T CELL COMMENT: ABNORMAL
TACROLIMUS BLD-MCNC: 8.3 UG/L (ref 5–15)
TME LAST DOSE: NORMAL H
TME LAST DOSE: NORMAL H
TRIGL SERPL-MCNC: 64 MG/DL
URATE SERPL-MCNC: 7.1 MG/DL (ref 3.4–7)
UROBILINOGEN UR STRIP-MCNC: NORMAL MG/DL
VIT D+METAB SERPL-MCNC: 34 NG/ML (ref 20–50)
WBC # BLD AUTO: 3.8 10E3/UL (ref 4–11)
WBC URINE: 1 /HPF

## 2024-09-24 PROCEDURE — 86832 HLA CLASS I HIGH DEFIN QUAL: CPT | Performed by: INTERNAL MEDICINE

## 2024-09-24 PROCEDURE — G0463 HOSPITAL OUTPT CLINIC VISIT: HCPCS | Performed by: INTERNAL MEDICINE

## 2024-09-24 PROCEDURE — 80197 ASSAY OF TACROLIMUS: CPT | Performed by: INTERNAL MEDICINE

## 2024-09-24 PROCEDURE — 84156 ASSAY OF PROTEIN URINE: CPT | Performed by: PATHOLOGY

## 2024-09-24 PROCEDURE — G2211 COMPLEX E/M VISIT ADD ON: HCPCS | Performed by: INTERNAL MEDICINE

## 2024-09-24 PROCEDURE — 85027 COMPLETE CBC AUTOMATED: CPT | Performed by: PATHOLOGY

## 2024-09-24 PROCEDURE — 83970 ASSAY OF PARATHORMONE: CPT | Performed by: PATHOLOGY

## 2024-09-24 PROCEDURE — 84550 ASSAY OF BLOOD/URIC ACID: CPT | Performed by: PATHOLOGY

## 2024-09-24 PROCEDURE — 83550 IRON BINDING TEST: CPT | Performed by: PATHOLOGY

## 2024-09-24 PROCEDURE — 86360 T CELL ABSOLUTE COUNT/RATIO: CPT | Performed by: INTERNAL MEDICINE

## 2024-09-24 PROCEDURE — 87799 DETECT AGENT NOS DNA QUANT: CPT | Performed by: INTERNAL MEDICINE

## 2024-09-24 PROCEDURE — 83540 ASSAY OF IRON: CPT | Performed by: PATHOLOGY

## 2024-09-24 PROCEDURE — 80053 COMPREHEN METABOLIC PANEL: CPT | Performed by: PATHOLOGY

## 2024-09-24 PROCEDURE — 83690 ASSAY OF LIPASE: CPT | Performed by: PATHOLOGY

## 2024-09-24 PROCEDURE — 99214 OFFICE O/P EST MOD 30 MIN: CPT | Performed by: INTERNAL MEDICINE

## 2024-09-24 PROCEDURE — 82150 ASSAY OF AMYLASE: CPT | Performed by: PATHOLOGY

## 2024-09-24 PROCEDURE — 82248 BILIRUBIN DIRECT: CPT | Performed by: PATHOLOGY

## 2024-09-24 PROCEDURE — 83036 HEMOGLOBIN GLYCOSYLATED A1C: CPT | Performed by: INTERNAL MEDICINE

## 2024-09-24 PROCEDURE — 83735 ASSAY OF MAGNESIUM: CPT | Performed by: PATHOLOGY

## 2024-09-24 PROCEDURE — 36415 COLL VENOUS BLD VENIPUNCTURE: CPT | Performed by: PATHOLOGY

## 2024-09-24 PROCEDURE — 82728 ASSAY OF FERRITIN: CPT | Performed by: PATHOLOGY

## 2024-09-24 PROCEDURE — 81001 URINALYSIS AUTO W/SCOPE: CPT | Performed by: PATHOLOGY

## 2024-09-24 PROCEDURE — 82306 VITAMIN D 25 HYDROXY: CPT | Performed by: INTERNAL MEDICINE

## 2024-09-24 PROCEDURE — 86833 HLA CLASS II HIGH DEFIN QUAL: CPT | Performed by: INTERNAL MEDICINE

## 2024-09-24 PROCEDURE — 86359 T CELLS TOTAL COUNT: CPT | Performed by: INTERNAL MEDICINE

## 2024-09-24 PROCEDURE — 99000 SPECIMEN HANDLING OFFICE-LAB: CPT | Performed by: PATHOLOGY

## 2024-09-24 PROCEDURE — 80061 LIPID PANEL: CPT | Performed by: PATHOLOGY

## 2024-09-24 RX ORDER — CARVEDILOL 6.25 MG/1
6.25 TABLET ORAL 2 TIMES DAILY WITH MEALS
Qty: 180 TABLET | Refills: 3 | Status: SHIPPED | OUTPATIENT
Start: 2024-09-24

## 2024-09-24 ASSESSMENT — PAIN SCALES - GENERAL: PAINLEVEL: NO PAIN (0)

## 2024-09-24 NOTE — NURSING NOTE
Chief Complaint   Patient presents with    RECHECK     TXP follow up.      Vitals:    09/24/24 1126 09/24/24 1128   BP: (!) 151/75 (!) 143/77   BP Location: Right arm Right arm   Patient Position: Sitting Sitting   Cuff Size: Adult Regular Adult Regular   Pulse: 60    Temp: 97.6  F (36.4  C)    TempSrc: Oral    SpO2: 98%    Weight: 98.5 kg (217 lb 1.6 oz)        BP Readings from Last 3 Encounters:   09/24/24 (!) 143/77   09/16/24 134/70   03/25/24 128/75       BP (!) 143/77 (BP Location: Right arm, Patient Position: Sitting, Cuff Size: Adult Regular)   Pulse 60   Temp 97.6  F (36.4  C) (Oral)   Wt 98.5 kg (217 lb 1.6 oz)   SpO2 98%   BMI 30.28 kg/m       Sunshine Heymkamari

## 2024-09-24 NOTE — LETTER
9/24/2024      RE: Avelina Marion  [address] [place] [address]       TRANSPLANT NEPHROLOGY CLINIC VISIT     Assessment & Plan  # DDKT (SPK): CKD Stage 3b - Stable creatinine, right at upper end of baseline.  Patient with h/o multiple ischemic insults complicated by post kidney transplant biopsy (10/2023) hematoma and AV fistula, s/p embolization with partial loss of renal function post embolization.    - Baseline Creatinine: ~ 1.9-2.3   - Proteinuria: Normal (<0.2 grams)   - DSA Hx: No DSA   - Last cPRA: 23%   - BK Viremia: Yes, moderately elevated (10-100K)   - Kidney Tx Biopsy Hx: No history of acute rejection and no BK nephropathy .    # Pancreas Tx (SPK):    - Pancreatic Exocrine Drainage: Enteric drained     - Blood glucose: Euglycemia      On insulin: No   - HbA1c: Trend down      Latest HbA1c: 4.9%   - Pancreatic enzymes: Stable   - DSA Hx: No DSA   - Pancreas Tx Biopsy Hx: No biopsy history    # Immunosuppression: Tacrolimus immediate release (goal 5-7) and Mycophenolate mofetil (dose 250 mg every 12 hours)   - Induction with Recent Transplant:  High Intensity Protocol   - Continue with intensive monitoring of immunosuppression for efficacy and toxicity.   - Historical Changes in Immunosuppression:  Decreased mycophenolate and tacrolimus goal due to BK viremia.   - Changes: Not at this time    # Infection Prevention:      - PJP: Sulfa/TMP (Bactrim)  - CMV: None, prophylaxis completed  - CMV IgG Ab High Risk Discordance (D+/R-): No  CMV Serostatus: Negative  - EBV IgG Ab High Risk Discordance (D+/R-): No  EBV Serostatus: Positive     # Hypertension: Borderline control;  Goal BP: < 130/80   - Changes: Yes - Will change metoprolol to carvedilol 6.25 mg bid.    # Anemia in Chronic Renal Disease: Hgb: Stable      MCKENNA: No   - Iron studies: Low iron saturation, but high ferritin    # Mineral Bone Disorder:    - Secondary renal hyperparathyroidism; PTH level: Minimally elevated ( pg/ml)        On  treatment: Calcitriol; Will stop calcitriol.  - Vitamin D; level: Low normal        On supplement: Yes  - Calcium; level: Low        On supplement: Yes    # Electrolytes:   - Potassium; level: Normal        On binder: Yes; On Lokelma.  - Magnesium; level: Normal        On supplement: Yes  - Bicarbonate; level: Low        On supplement: Yes; Just started on sodium bicarbonate.    # BK Viremia: Trend down, moderate BK PCR at ~ 33K with last check Sep/2024.  IgG level is normal.  Decreased mycophenolate and tacrolimus.   - Will continue to follow BK PCR every 2 weeks.    # Other Significant PMH:   - CAD, s/p CABG: Asymptomatic.   - PAD: No claudication symptoms.   - Peripheral Neuropathy: Appears to have left carpal tunnel syndrome with planned carpal tunnel release this week.     # Skin Cancer: New lesions: none   - Discussed sun protection and recommend regular follow up with Dermatology.    # Transplant History:  Etiology of Kidney Failure: Diabetes mellitus type 2  Tx: SPK  Transplant: 9/14/2023 (Kidney / Pancreas)  Significant transplant-related complications: BK Viremia    Transplant Office Phone Number: [phone]    Assessment and plan was discussed with the patient and he voiced his understanding and agreement.    Return visit: Return in about 6 months (around 3/24/2025).    Jesu Mosquera MD    The longitudinal plan of care for the diagnosis(es)/condition(s) as documented were addressed during this visit. Due to the added complexity in care, I will continue to support Avelina in the subsequent management and with ongoing continuity of care.      Chief Complaint  Mr. Marion is a 69 year old here for kidney transplant, pancreas transplant, and immunosuppression management.     History of Present Illness   Mr. Marion reports feeling good overall.  Since last clinic visit:   Hospitalizations: No   New Medical Issues: Yes; Dealing with neuropathy in his left hand with plan for carpal tunnel release in a  couple of days.  Chest pain or shortness of breath: No  Lower extremity swelling: No  Weight change: Yes; Weight is up ~ 5-10 lbs.  Nausea and vomiting: No  Diarrhea: Yes; Occasional loose stools, but overall improved.  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, with long-term current use of insulin (H)     HTN, kidney transplant related     Proliferative diabetic retinopathy (H)     Anemia in chronic renal disease     Secondary renal hyperparathyroidism (H24)     Hyperlipidemia with target LDL less than 100     Metabolic acidosis     Vitamin D deficiency     CAD (coronary artery disease)     Kidney replaced by transplant     Immunosuppressed status (H24)     Postoperative cardiac arrest following non-cardiac surgery     Kidney transplant rejection     Pancreas replaced by transplant (H)     Aftercare following organ transplant     Need for pneumocystis prophylaxis     Stage 3b chronic kidney disease (H)     Hypomagnesemia     BK viremia       Allergies  No Known Allergies    Medications  Current Outpatient Medications   Medication Sig Dispense Refill     aspirin 81 MG EC tablet Take 1 tablet (81 mg) by mouth daily       atorvastatin (LIPITOR) 20 MG tablet Take 1 tablet (20 mg) by mouth at bedtime 30 tablet 11     Blood Glucose Monitoring Suppl (ACCU-CHEK GUIDE) w/Device KIT        calcium carbonate-vitamin D (OSCAL) 500-5 MG-MCG tablet Take 1 tablet by mouth 2 times daily 60 tablet 11     carvedilol (COREG) 6.25 MG tablet Take 1 tablet (6.25 mg) by mouth 2 times daily (with meals). 180 tablet 3     gabapentin (NEURONTIN) 100 MG capsule Take 300 mg by mouth 3 times daily       magnesium oxide (MAG-OX) 400 MG tablet Take 1 tablet (400 mg) by mouth every 24 hours 30 tablet 11     Misc. Devices (PILL  JEAN-CLAUDE) MISC 1 applicator daily 1 each 0     mycophenolate (GENERIC EQUIVALENT) 250 MG capsule Take 1 capsule (250 mg) by mouth 2 times daily. 60 capsule 11     psyllium (METAMUCIL/KONSYL) capsule Take 1 capsule by mouth daily       sodium bicarbonate 650 MG tablet Take 2 tablets (1,300 mg) by mouth 2 times daily. 120 tablet 11     sodium zirconium cyclosilicate (LOKELMA) 10 g PACK packet Take 1 packet (10 g) by mouth every other day. 15 packet 3     sulfamethoxazole-trimethoprim (BACTRIM) 400-80 MG tablet Take 1 tablet by mouth three times a week 30 tablet 11     tacrolimus (GENERIC EQUIVALENT) 1 MG capsule Take 2 capsules (2 mg) by mouth 2 times daily. 120 capsule 11     tacrolimus (GENERIC EQUIVALENT) 0.5 MG capsule Hold for dose changes (Patient not taking: Reported on 9/24/2024) 60 capsule 11     No current facility-administered medications for this visit.     Medications Discontinued During This Encounter   Medication Reason     calcitRIOL (ROCALTROL) 0.25 MCG capsule      metoprolol tartrate (LOPRESSOR) 25 MG tablet        Physical Exam  Vital Signs: BP (!) 143/77 (BP Location: Right arm, Patient Position: Sitting, Cuff Size: Adult Regular)   Pulse 60   Temp 97.6  F (36.4  C) (Oral)   Wt 98.5 kg (217 lb 1.6 oz)   SpO2 98%   BMI 30.28 kg/m      GENERAL APPEARANCE: alert and no distress  HENT: mouth without ulcers or lesions  RESP: lungs clear to auscultation - no rales, rhonchi or wheezes  CV: regular rhythm, normal rate, no rub, no murmur  EDEMA: no LE edema bilaterally  ABDOMEN: soft, nondistended, nontender, bowel sounds normal  MS: extremities normal - no gross deformities noted, no evidence of inflammation in joints, no muscle tenderness  SKIN: no rash  TX KIDNEY: normal  DIALYSIS ACCESS:  LUE AV fistula with good thrill    Data        Latest Ref Rng & Units 9/24/2024    10:29 AM 9/16/2024    10:28 AM 9/3/2024     9:15 AM   Renal   Sodium 135 - 145 mmol/L 143  139  140    K 3.4 - 5.3 mmol/L 4.8   5.5  6.3    Cl 98 - 107 mmol/L 109  112  109    Cl (external) 98 - 107 mmol/L 109  112  109    CO2 22 - 29 mmol/L 25  18  21    Urea Nitrogen 8.0 - 23.0 mg/dL 31.5  38.5  36.1    Creatinine 0.67 - 1.17 mg/dL 2.30  2.14  2.35    Glucose 70 - 99 mg/dL 91  84  100    Calcium 8.8 - 10.4 mg/dL 8.9  8.4  9.4    Magnesium 1.7 - 2.3 mg/dL 1.7            Latest Ref Rng & Units 9/24/2024    10:29 AM 6/3/2024     9:11 AM 5/6/2024     8:39 AM   Bone Health   Phosphorus 2.5 - 4.5 mg/dL  4.0  3.9    Parathyroid Hormone Intact 15 - 65 pg/mL 88            Latest Ref Rng & Units 9/24/2024    10:29 AM 9/16/2024    10:28 AM 9/3/2024     9:15 AM   Heme   WBC 4.0 - 11.0 10e3/uL 3.8  4.8  4.0    Hgb 13.3 - 17.7 g/dL 11.6  11.3  11.6    Plt 150 - 450 10e3/uL 155  173  195    ABSOLUTE NEUTROPHIL 1.6 - 8.3 10e3/uL  3.1     ABSOLUTE LYMPHOCYTES 0.8 - 5.3 10e3/uL  0.7     ABSOLUTE MONOCYTES 0.0 - 1.3 10e3/uL  0.5     ABSOLUTE EOSINOPHILS 0.0 - 0.7 10e3/uL  0.5           Latest Ref Rng & Units 9/24/2024    10:29 AM 3/25/2024     7:59 AM 10/17/2023     2:57 PM   Liver   AP 40 - 150 U/L 77  89  41    TBili <=1.2 mg/dL 0.5  0.5  0.2    Bilirubin Direct 0.00 - 0.30 mg/dL <0.20  <0.20     ALT 0 - 70 U/L 6  <5  13    AST 0 - 45 U/L 19  15  21    Tot Protein 6.4 - 8.3 g/dL 6.6  6.6  3.6    Albumin 3.5 - 5.2 g/dL 4.0  4.1  2.2          Latest Ref Rng & Units 9/24/2024    10:29 AM 9/3/2024     9:15 AM 8/20/2024     7:31 AM   Pancreas   Amylase 28 - 100 U/L 60  51  58    Lipase (Roche) 13 - 60 U/L 32  29  32          Latest Ref Rng & Units 9/24/2024    10:29 AM 9/29/2023     8:48 AM 9/24/2023     8:35 AM   Iron studies   Iron 61 - 157 ug/dL 57  27  32    Iron Sat Index 15 - 46 % 28  16  21    Ferritin 31 - 409 ng/mL  1,256  1,164          Latest Ref Rng & Units 9/14/2023     3:39 PM 8/9/2022     6:26 AM 8/5/2021     2:23 AM   [facility] Txp Virology   EBV CAPSID ANTIBODY IGG No detectable antibody. Positive  Positive  Positive      Failed to redirect to the  Timeline version of the REVFS SmartLink.  Recent Labs   Lab Test 07/15/24  0854 07/29/24  0831 08/20/24  0731 09/03/24  0915   DOSTAC 7/14/2024 7/28/2024  --  9/2/2024   TACROL 10.2 10.6 12.1 7.5     Recent Labs   Lab Test 03/25/24  0759 04/08/24  0824 04/15/24  0758   DOSMPA 3/24/2024   9:45 PM 4/7/2024   9:45 PM  --    MPACID 2.52 4.02* 4.78*   MPAG 90.2 106.7* 103.0*        Jesu Mosquera MD

## 2024-09-24 NOTE — LETTER
9/24/2024      Avelina Marion  [facility] [place] [address]      Dear Colleague,    Thank you for referring your patient, Avelina Marion, to the [facility] TRANSPLANT [facility]IC. Please see a copy of my visit note below.    TRANSPLANT NEPHROLOGY CLINIC VISIT     Assessment & Plan  # DDKT (SPK): CKD Stage 3b - Stable creatinine, right at upper end of baseline.  Patient with h/o multiple ischemic insults complicated by post kidney transplant biopsy (10/2023) hematoma and AV fistula, s/p embolization with partial loss of renal function post embolization.    - Baseline Creatinine: ~ 1.9-2.3   - Proteinuria: Normal (<0.2 grams)   - DSA Hx: No DSA   - Last cPRA: 23%   - BK Viremia: Yes, moderately elevated (10-100K)   - Kidney Tx Biopsy Hx: No history of acute rejection and no BK nephropathy .    # Pancreas Tx (SPK):    - Pancreatic Exocrine Drainage: Enteric drained     - Blood glucose: Euglycemia      On insulin: No   - HbA1c: Trend down      Latest HbA1c: 4.9%   - Pancreatic enzymes: Stable   - DSA Hx: No DSA   - Pancreas Tx Biopsy Hx: No biopsy history    # Immunosuppression: Tacrolimus immediate release (goal 5-7) and Mycophenolate mofetil (dose 250 mg every 12 hours)   - Induction with Recent Transplant:  High Intensity Protocol   - Continue with intensive monitoring of immunosuppression for efficacy and toxicity.   - Historical Changes in Immunosuppression:  Decreased mycophenolate and tacrolimus goal due to BK viremia.   - Changes: Not at this time    # Infection Prevention:      - PJP: Sulfa/TMP (Bactrim)  - CMV: None, prophylaxis completed  - CMV IgG Ab High Risk Discordance (D+/R-): No  CMV Serostatus: Negative  - EBV IgG Ab High Risk Discordance (D+/R-): No  EBV Serostatus: Positive     # Hypertension: Borderline control;  Goal BP: < 130/80   - Changes: Yes - Will change metoprolol to carvedilol 6.25 mg bid.    # Anemia in Chronic Renal Disease: Hgb: Stable      MCKENNA: No   - Iron studies: Low iron  saturation, but high ferritin    # Mineral Bone Disorder:    - Secondary renal hyperparathyroidism; PTH level: Minimally elevated ( pg/ml)        On treatment: Calcitriol; Will stop calcitriol.  - Vitamin D; level: Low normal        On supplement: Yes  - Calcium; level: Low        On supplement: Yes    # Electrolytes:   - Potassium; level: Normal        On binder: Yes; On Lokelma.  - Magnesium; level: Normal        On supplement: Yes  - Bicarbonate; level: Low        On supplement: Yes; Just started on sodium bicarbonate.    # BK Viremia: Trend down, moderate BK PCR at ~ 33K with last check Sep/2024.  IgG level is normal.  Decreased mycophenolate and tacrolimus.   - Will continue to follow BK PCR every 2 weeks.    # Other Significant PMH:   - CAD, s/p CABG: Asymptomatic.   - PAD: No claudication symptoms.   - Peripheral Neuropathy: Appears to have left carpal tunnel syndrome with planned carpal tunnel release this week.     # Skin Cancer: New lesions: none   - Discussed sun protection and recommend regular follow up with Dermatology.    # Transplant History:  Etiology of Kidney Failure: Diabetes mellitus type 2  Tx: SPK  Transplant: 9/14/2023 (Kidney / Pancreas)  Significant transplant-related complications: BK Viremia    Transplant Office Phone Number: [phone]    Assessment and plan was discussed with the patient and he voiced his understanding and agreement.    Return visit: Return in about 6 months (around 3/24/2025).    Jesu Mosquera MD    The longitudinal plan of care for the diagnosis(es)/condition(s) as documented were addressed during this visit. Due to the added complexity in care, I will continue to support Avelina in the subsequent management and with ongoing continuity of care.      Chief Complaint  Mr. Marion is a 69 year old here for kidney transplant, pancreas transplant, and immunosuppression management.     History of Present Illness   Mr. Marion reports feeling good overall.  Since  last clinic visit:   Hospitalizations: No   New Medical Issues: Yes; Dealing with neuropathy in his left hand with plan for carpal tunnel release in a couple of days.  Chest pain or shortness of breath: No  Lower extremity swelling: No  Weight change: Yes; Weight is up ~ 5-10 lbs.  Nausea and vomiting: No  Diarrhea: Yes; Occasional loose stools, but overall improved.  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, with long-term current use of insulin (H)     HTN, kidney transplant related     Proliferative diabetic retinopathy (H)     Anemia in chronic renal disease     Secondary renal hyperparathyroidism (H24)     Hyperlipidemia with target LDL less than 100     Metabolic acidosis     Vitamin D deficiency     CAD (coronary artery disease)     Kidney replaced by transplant     Immunosuppressed status (H24)     Postoperative cardiac arrest following non-cardiac surgery     Kidney transplant rejection     Pancreas replaced by transplant (H)     Aftercare following organ transplant     Need for pneumocystis prophylaxis     Stage 3b chronic kidney disease (H)     Hypomagnesemia     BK viremia       Allergies  No Known Allergies    Medications  Current Outpatient Medications   Medication Sig Dispense Refill     aspirin 81 MG EC tablet Take 1 tablet (81 mg) by mouth daily       atorvastatin (LIPITOR) 20 MG tablet Take 1 tablet (20 mg) by mouth at bedtime 30 tablet 11     Blood Glucose Monitoring Suppl (ACCU-CHEK GUIDE) w/Device KIT        calcium carbonate-vitamin D (OSCAL) 500-5 MG-MCG tablet Take 1 tablet by mouth 2 times daily 60 tablet 11     carvedilol (COREG) 6.25 MG tablet Take 1 tablet (6.25 mg) by mouth 2 times daily (with meals). 180 tablet 3     gabapentin (NEURONTIN) 100 MG capsule Take 300 mg by  mouth 3 times daily       magnesium oxide (MAG-OX) 400 MG tablet Take 1 tablet (400 mg) by mouth every 24 hours 30 tablet 11     Misc. Devices (PILL SPLITTER) MISC 1 applicator daily 1 each 0     mycophenolate (GENERIC EQUIVALENT) 250 MG capsule Take 1 capsule (250 mg) by mouth 2 times daily. 60 capsule 11     psyllium (METAMUCIL/KONSYL) capsule Take 1 capsule by mouth daily       sodium bicarbonate 650 MG tablet Take 2 tablets (1,300 mg) by mouth 2 times daily. 120 tablet 11     sodium zirconium cyclosilicate (LOKELMA) 10 g PACK packet Take 1 packet (10 g) by mouth every other day. 15 packet 3     sulfamethoxazole-trimethoprim (BACTRIM) 400-80 MG tablet Take 1 tablet by mouth three times a week 30 tablet 11     tacrolimus (GENERIC EQUIVALENT) 1 MG capsule Take 2 capsules (2 mg) by mouth 2 times daily. 120 capsule 11     tacrolimus (GENERIC EQUIVALENT) 0.5 MG capsule Hold for dose changes (Patient not taking: Reported on 9/24/2024) 60 capsule 11     No current facility-administered medications for this visit.     Medications Discontinued During This Encounter   Medication Reason     calcitRIOL (ROCALTROL) 0.25 MCG capsule      metoprolol tartrate (LOPRESSOR) 25 MG tablet        Physical Exam  Vital Signs: BP (!) 143/77 (BP Location: Right arm, Patient Position: Sitting, Cuff Size: Adult Regular)   Pulse 60   Temp 97.6  F (36.4  C) (Oral)   Wt 98.5 kg (217 lb 1.6 oz)   SpO2 98%   BMI 30.28 kg/m      GENERAL APPEARANCE: alert and no distress  HENT: mouth without ulcers or lesions  RESP: lungs clear to auscultation - no rales, rhonchi or wheezes  CV: regular rhythm, normal rate, no rub, no murmur  EDEMA: no LE edema bilaterally  ABDOMEN: soft, nondistended, nontender, bowel sounds normal  MS: extremities normal - no gross deformities noted, no evidence of inflammation in joints, no muscle tenderness  SKIN: no rash  TX KIDNEY: normal  DIALYSIS ACCESS:  LUE AV fistula with good thrill    Data        Latest Ref Rng &  Units 9/24/2024    10:29 AM 9/16/2024    10:28 AM 9/3/2024     9:15 AM   Renal   Sodium 135 - 145 mmol/L 143  139  140    K 3.4 - 5.3 mmol/L 4.8  5.5  6.3    Cl 98 - 107 mmol/L 109  112  109    Cl (external) 98 - 107 mmol/L 109  112  109    CO2 22 - 29 mmol/L 25  18  21    Urea Nitrogen 8.0 - 23.0 mg/dL 31.5  38.5  36.1    Creatinine 0.67 - 1.17 mg/dL 2.30  2.14  2.35    Glucose 70 - 99 mg/dL 91  84  100    Calcium 8.8 - 10.4 mg/dL 8.9  8.4  9.4    Magnesium 1.7 - 2.3 mg/dL 1.7            Latest Ref Rng & Units 9/24/2024    10:29 AM 6/3/2024     9:11 AM 5/6/2024     8:39 AM   Bone Health   Phosphorus 2.5 - 4.5 mg/dL  4.0  3.9    Parathyroid Hormone Intact 15 - 65 pg/mL 88            Latest Ref Rng & Units 9/24/2024    10:29 AM 9/16/2024    10:28 AM 9/3/2024     9:15 AM   Heme   WBC 4.0 - 11.0 10e3/uL 3.8  4.8  4.0    Hgb 13.3 - 17.7 g/dL 11.6  11.3  11.6    Plt 150 - 450 10e3/uL 155  173  195    ABSOLUTE NEUTROPHIL 1.6 - 8.3 10e3/uL  3.1     ABSOLUTE LYMPHOCYTES 0.8 - 5.3 10e3/uL  0.7     ABSOLUTE MONOCYTES 0.0 - 1.3 10e3/uL  0.5     ABSOLUTE EOSINOPHILS 0.0 - 0.7 10e3/uL  0.5           Latest Ref Rng & Units 9/24/2024    10:29 AM 3/25/2024     7:59 AM 10/17/2023     2:57 PM   Liver   AP 40 - 150 U/L 77  89  41    TBili <=1.2 mg/dL 0.5  0.5  0.2    Bilirubin Direct 0.00 - 0.30 mg/dL <0.20  <0.20     ALT 0 - 70 U/L 6  <5  13    AST 0 - 45 U/L 19  15  21    Tot Protein 6.4 - 8.3 g/dL 6.6  6.6  3.6    Albumin 3.5 - 5.2 g/dL 4.0  4.1  2.2          Latest Ref Rng & Units 9/24/2024    10:29 AM 9/3/2024     9:15 AM 8/20/2024     7:31 AM   Pancreas   Amylase 28 - 100 U/L 60  51  58    Lipase (Roche) 13 - 60 U/L 32  29  32          Latest Ref Rng & Units 9/24/2024    10:29 AM 9/29/2023     8:48 AM 9/24/2023     8:35 AM   Iron studies   Iron 61 - 157 ug/dL 57  27  32    Iron Sat Index 15 - 46 % 28  16  21    Ferritin 31 - 409 ng/mL  1,256  1,164          Latest Ref Rng & Units 9/14/2023     3:39 PM 8/9/2022     6:26 AM  8/5/2021     2:23 AM   [facility] Txp Virology   EBV CAPSID ANTIBODY IGG No detectable antibody. Positive  Positive  Positive      Failed to redirect to the Timeline version of the REVFS SmartLink.  Recent Labs   Lab Test 07/15/24  0854 07/29/24  0831 08/20/24  0731 09/03/24  0915   DOSTAC 7/14/2024 7/28/2024  --  9/2/2024   TACROL 10.2 10.6 12.1 7.5     Recent Labs   Lab Test 03/25/24  0759 04/08/24  0824 04/15/24  0758   DOSMPA 3/24/2024   9:45 PM 4/7/2024   9:45 PM  --    MPACID 2.52 4.02* 4.78*   MPAG 90.2 106.7* 103.0*          Again, thank you for allowing me to participate in the care of your patient.        Sincerely,        Jesu Mosquera MD

## 2024-09-24 NOTE — PATIENT INSTRUCTIONS
Patient Recommendations:  - Stop calcitriol.  - Stop metoprolol.  - Start carvedilol 6.25 mg twice daily.  - Continue on low potassium diet.  - Recommend routine follow up with Dr. Mitchell in 3 months or so to resume care with a goal of ongoing co-management between your primary Nephrologist and the Transplant Team.     Transplant Patient Information  Your Post Transplant Coordinator is: Makayla Wu  For non urgent items, we encourage you to contact your coordinator/care team online via [employer]  You and your care team can also contact your transplant coordinator Monday - Friday, 8am - 5pm at [phone] (Option 2 to reach the coordinator or Option 4 to schedule an appointment).  After hours for urgent matters, please call [facility] at [phone].

## 2024-09-24 NOTE — PROGRESS NOTES
TRANSPLANT NEPHROLOGY CLINIC VISIT     Assessment & Plan   # DDKT (SPK): CKD Stage 3b - Stable creatinine, right at upper end of baseline.  Patient with h/o multiple ischemic insults complicated by post kidney transplant biopsy (10/2023) hematoma and AV fistula, s/p embolization with partial loss of renal function post embolization.    - Baseline Creatinine: ~ 1.9-2.3   - Proteinuria: Normal (<0.2 grams)   - DSA Hx: No DSA   - Last cPRA: 23%   - BK Viremia: Yes, moderately elevated (10-100K)   - Kidney Tx Biopsy Hx: No history of acute rejection and no BK nephropathy .    # Pancreas Tx (SPK):    - Pancreatic Exocrine Drainage: Enteric drained     - Blood glucose: Euglycemia      On insulin: No   - HbA1c: Trend down      Latest HbA1c: 4.9%   - Pancreatic enzymes: Stable   - DSA Hx: No DSA   - Pancreas Tx Biopsy Hx: No biopsy history    # Immunosuppression: Tacrolimus immediate release (goal 5-7) and Mycophenolate mofetil (dose 250 mg every 12 hours)   - Induction with Recent Transplant:  High Intensity Protocol   - Continue with intensive monitoring of immunosuppression for efficacy and toxicity.   - Historical Changes in Immunosuppression:  Decreased mycophenolate and tacrolimus goal due to BK viremia.   - Changes: Not at this time    # Infection Prevention:      - PJP: Sulfa/TMP (Bactrim)  - CMV: None, prophylaxis completed  - CMV IgG Ab High Risk Discordance (D+/R-): No  CMV Serostatus: Negative  - EBV IgG Ab High Risk Discordance (D+/R-): No  EBV Serostatus: Positive     # Hypertension: Borderline control;  Goal BP: < 130/80   - Changes: Yes - Will change metoprolol to carvedilol 6.25 mg bid.    # Anemia in Chronic Renal Disease: Hgb: Stable      MCKENNA: No   - Iron studies: Low iron saturation, but high ferritin    # Mineral Bone Disorder:    - Secondary renal hyperparathyroidism; PTH level: Minimally elevated ( pg/ml)        On treatment: Calcitriol; Will stop calcitriol.  - Vitamin D; level: Low normal         On supplement: Yes  - Calcium; level: Low        On supplement: Yes    # Electrolytes:   - Potassium; level: Normal        On binder: Yes; On Lokelma.  - Magnesium; level: Normal        On supplement: Yes  - Bicarbonate; level: Low        On supplement: Yes; Just started on sodium bicarbonate.    # BK Viremia: Trend down, moderate BK PCR at ~ 33K with last check Sep/2024.  IgG level is normal.  Decreased mycophenolate and tacrolimus.   - Will continue to follow BK PCR every 2 weeks.    # Other Significant PMH:   - CAD, s/p CABG: Asymptomatic.   - PAD: No claudication symptoms.   - Peripheral Neuropathy: Appears to have left carpal tunnel syndrome with planned carpal tunnel release this week.     # Skin Cancer: New lesions: none   - Discussed sun protection and recommend regular follow up with Dermatology.    # Transplant History:  Etiology of Kidney Failure: Diabetes mellitus type 2  Tx: SPK  Transplant: 9/14/2023 (Kidney / Pancreas)  Significant transplant-related complications: BK Viremia    Transplant Office Phone Number: [phone]    Assessment and plan was discussed with the patient and he voiced his understanding and agreement.    Return visit: Return in about 6 months (around 3/24/2025).    Jesu Mosquera MD    The longitudinal plan of care for the diagnosis(es)/condition(s) as documented were addressed during this visit. Due to the added complexity in care, I will continue to support Avelina in the subsequent management and with ongoing continuity of care.      Chief Complaint   Mr. Marion is a 69 year old here for kidney transplant, pancreas transplant, and immunosuppression management.     History of Present Illness    Mr. Marion reports feeling good overall.  Since last clinic visit:   Hospitalizations: No   New Medical Issues: Yes; Dealing with neuropathy in his left hand with plan for carpal tunnel release in a couple of days.  Chest pain or shortness of breath: No  Lower extremity swelling:  No  Weight change: Yes; Weight is up ~ 5-10 lbs.  Nausea and vomiting: No  Diarrhea: Yes; Occasional loose stools, but overall improved.  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, with long-term current use of insulin (H)    HTN, kidney transplant related    Proliferative diabetic retinopathy (H)    Anemia in chronic renal disease    Secondary renal hyperparathyroidism (H24)    Hyperlipidemia with target LDL less than 100    Metabolic acidosis    Vitamin D deficiency    CAD (coronary artery disease)    Kidney replaced by transplant    Immunosuppressed status (H24)    Postoperative cardiac arrest following non-cardiac surgery    Kidney transplant rejection    Pancreas replaced by transplant (H)    Aftercare following organ transplant    Need for pneumocystis prophylaxis    Stage 3b chronic kidney disease (H)    Hypomagnesemia    BK viremia       Allergies   No Known Allergies    Medications   Current Outpatient Medications   Medication Sig Dispense Refill    aspirin 81 MG EC tablet Take 1 tablet (81 mg) by mouth daily      atorvastatin (LIPITOR) 20 MG tablet Take 1 tablet (20 mg) by mouth at bedtime 30 tablet 11    Blood Glucose Monitoring Suppl (ACCU-CHEK GUIDE) w/Device KIT       calcium carbonate-vitamin D (OSCAL) 500-5 MG-MCG tablet Take 1 tablet by mouth 2 times daily 60 tablet 11    carvedilol (COREG) 6.25 MG tablet Take 1 tablet (6.25 mg) by mouth 2 times daily (with meals). 180 tablet 3    gabapentin (NEURONTIN) 100 MG capsule Take 300 mg by mouth 3 times daily      magnesium oxide (MAG-OX) 400 MG tablet Take 1 tablet (400 mg) by mouth every 24 hours 30 tablet 11    Misc. Devices (PILL SPLITTER) MISC 1 applicator daily 1 each 0    mycophenolate (GENERIC EQUIVALENT) 250 MG capsule Take 1 capsule  (250 mg) by mouth 2 times daily. 60 capsule 11    psyllium (METAMUCIL/KONSYL) capsule Take 1 capsule by mouth daily      sodium bicarbonate 650 MG tablet Take 2 tablets (1,300 mg) by mouth 2 times daily. 120 tablet 11    sodium zirconium cyclosilicate (LOKELMA) 10 g PACK packet Take 1 packet (10 g) by mouth every other day. 15 packet 3    sulfamethoxazole-trimethoprim (BACTRIM) 400-80 MG tablet Take 1 tablet by mouth three times a week 30 tablet 11    tacrolimus (GENERIC EQUIVALENT) 1 MG capsule Take 2 capsules (2 mg) by mouth 2 times daily. 120 capsule 11    tacrolimus (GENERIC EQUIVALENT) 0.5 MG capsule Hold for dose changes (Patient not taking: Reported on 9/24/2024) 60 capsule 11     No current facility-administered medications for this visit.     Medications Discontinued During This Encounter   Medication Reason    calcitRIOL (ROCALTROL) 0.25 MCG capsule     metoprolol tartrate (LOPRESSOR) 25 MG tablet        Physical Exam   Vital Signs: BP (!) 143/77 (BP Location: Right arm, Patient Position: Sitting, Cuff Size: Adult Regular)   Pulse 60   Temp 97.6  F (36.4  C) (Oral)   Wt 98.5 kg (217 lb 1.6 oz)   SpO2 98%   BMI 30.28 kg/m      GENERAL APPEARANCE: alert and no distress  HENT: mouth without ulcers or lesions  RESP: lungs clear to auscultation - no rales, rhonchi or wheezes  CV: regular rhythm, normal rate, no rub, no murmur  EDEMA: no LE edema bilaterally  ABDOMEN: soft, nondistended, nontender, bowel sounds normal  MS: extremities normal - no gross deformities noted, no evidence of inflammation in joints, no muscle tenderness  SKIN: no rash  TX KIDNEY: normal  DIALYSIS ACCESS:  LUE AV fistula with good thrill    Data         Latest Ref Rng & Units 9/24/2024    10:29 AM 9/16/2024    10:28 AM 9/3/2024     9:15 AM   Renal   Sodium 135 - 145 mmol/L 143  139  140    K 3.4 - 5.3 mmol/L 4.8  5.5  6.3    Cl 98 - 107 mmol/L 109  112  109    Cl (external) 98 - 107 mmol/L 109  112  109    CO2 22 - 29 mmol/L 25  18   21    Urea Nitrogen 8.0 - 23.0 mg/dL 31.5  38.5  36.1    Creatinine 0.67 - 1.17 mg/dL 2.30  2.14  2.35    Glucose 70 - 99 mg/dL 91  84  100    Calcium 8.8 - 10.4 mg/dL 8.9  8.4  9.4    Magnesium 1.7 - 2.3 mg/dL 1.7            Latest Ref Rng & Units 9/24/2024    10:29 AM 6/3/2024     9:11 AM 5/6/2024     8:39 AM   Bone Health   Phosphorus 2.5 - 4.5 mg/dL  4.0  3.9    Parathyroid Hormone Intact 15 - 65 pg/mL 88            Latest Ref Rng & Units 9/24/2024    10:29 AM 9/16/2024    10:28 AM 9/3/2024     9:15 AM   Heme   WBC 4.0 - 11.0 10e3/uL 3.8  4.8  4.0    Hgb 13.3 - 17.7 g/dL 11.6  11.3  11.6    Plt 150 - 450 10e3/uL 155  173  195    ABSOLUTE NEUTROPHIL 1.6 - 8.3 10e3/uL  3.1     ABSOLUTE LYMPHOCYTES 0.8 - 5.3 10e3/uL  0.7     ABSOLUTE MONOCYTES 0.0 - 1.3 10e3/uL  0.5     ABSOLUTE EOSINOPHILS 0.0 - 0.7 10e3/uL  0.5           Latest Ref Rng & Units 9/24/2024    10:29 AM 3/25/2024     7:59 AM 10/17/2023     2:57 PM   Liver   AP 40 - 150 U/L 77  89  41    TBili <=1.2 mg/dL 0.5  0.5  0.2    Bilirubin Direct 0.00 - 0.30 mg/dL <0.20  <0.20     ALT 0 - 70 U/L 6  <5  13    AST 0 - 45 U/L 19  15  21    Tot Protein 6.4 - 8.3 g/dL 6.6  6.6  3.6    Albumin 3.5 - 5.2 g/dL 4.0  4.1  2.2          Latest Ref Rng & Units 9/24/2024    10:29 AM 9/3/2024     9:15 AM 8/20/2024     7:31 AM   Pancreas   Amylase 28 - 100 U/L 60  51  58    Lipase (Roche) 13 - 60 U/L 32  29  32          Latest Ref Rng & Units 9/24/2024    10:29 AM 9/29/2023     8:48 AM 9/24/2023     8:35 AM   Iron studies   Iron 61 - 157 ug/dL 57  27  32    Iron Sat Index 15 - 46 % 28  16  21    Ferritin 31 - 409 ng/mL  1,256  1,164          Latest Ref Rng & Units 9/14/2023     3:39 PM 8/9/2022     6:26 AM 8/5/2021     2:23 AM   [facility] T[facility] Virology   EBV CAPSID ANTIBODY IGG No detectable antibody. Positive  Positive  Positive      Failed to redirect to the Timeline version of the REVFS SmartLink.  Recent Labs   Lab Test 07/15/24  0854 07/29/24  0831 08/20/24  0731  09/03/24  0915   DOSTAC 7/14/2024 7/28/2024  --  9/2/2024   TACROL 10.2 10.6 12.1 7.5     Recent Labs   Lab Test 03/25/24  0759 04/08/24  0824 04/15/24  0758   DOSMPA 3/24/2024   9:45 PM 4/7/2024   9:45 PM  --    MPACID 2.52 4.02* 4.78*   MPAG 90.2 106.7* 103.0*

## 2024-09-25 ENCOUNTER — TELEPHONE (OUTPATIENT)
Dept: LAB | Facility: CLINIC | Age: 69
End: 2024-09-25
Payer: COMMERCIAL

## 2024-09-25 DIAGNOSIS — Z48.298 AFTERCARE FOLLOWING ORGAN TRANSPLANT: Primary | ICD-10-CM

## 2024-09-25 NOTE — TELEPHONE ENCOUNTER
----- Message from Jesu Mosquera sent at 9/25/2024 11:12 AM CDT -----  Regarding: BK viremia  Worsening BK viremia and already on lowest dose of mycophenolate.  Recommend changing mycophenolate to everolimus 1.25 mg bid with goal level 3-5.  Once therapeutic on everolimus, can decrease tacrolimus goal level to 4-6.    Miguel    Discussed above info with patient. He states after reading side effects of Everolimus he is very worried about changing medications. Since the bk has bounced up and down, he asked RNCC If he could wait to make the Everolimus switch until his next lab. RNCC will send message to Dr. Mosquera about this.

## 2024-10-07 ENCOUNTER — LAB (OUTPATIENT)
Dept: LAB | Facility: CLINIC | Age: 69
End: 2024-10-07
Payer: COMMERCIAL

## 2024-10-07 DIAGNOSIS — Z98.890 OTHER SPECIFIED POSTPROCEDURAL STATES: ICD-10-CM

## 2024-10-07 DIAGNOSIS — Z94.0 KIDNEY REPLACED BY TRANSPLANT: ICD-10-CM

## 2024-10-07 DIAGNOSIS — Z94.83 PANCREAS REPLACED BY TRANSPLANT (H): ICD-10-CM

## 2024-10-07 DIAGNOSIS — Z79.899 ENCOUNTER FOR LONG-TERM CURRENT USE OF MEDICATION: ICD-10-CM

## 2024-10-07 DIAGNOSIS — Z48.298 AFTERCARE FOLLOWING ORGAN TRANSPLANT: ICD-10-CM

## 2024-10-07 LAB
AMYLASE SERPL-CCNC: 60 U/L (ref 28–100)
ANION GAP SERPL CALCULATED.3IONS-SCNC: 13 MMOL/L (ref 7–15)
BUN SERPL-MCNC: 31.5 MG/DL (ref 8–23)
CALCIUM SERPL-MCNC: 9 MG/DL (ref 8.8–10.4)
CHLORIDE SERPL-SCNC: 105 MMOL/L (ref 98–107)
CREAT SERPL-MCNC: 2.35 MG/DL (ref 0.67–1.17)
EGFRCR SERPLBLD CKD-EPI 2021: 30 ML/MIN/1.73M2
ERYTHROCYTE [DISTWIDTH] IN BLOOD BY AUTOMATED COUNT: 13.8 % (ref 10–15)
GLUCOSE SERPL-MCNC: 72 MG/DL (ref 70–99)
HCO3 SERPL-SCNC: 24 MMOL/L (ref 22–29)
HCT VFR BLD AUTO: 39.2 % (ref 40–53)
HGB BLD-MCNC: 12.1 G/DL (ref 13.3–17.7)
LIPASE SERPL-CCNC: 33 U/L (ref 13–60)
MCH RBC QN AUTO: 29.5 PG (ref 26.5–33)
MCHC RBC AUTO-ENTMCNC: 30.9 G/DL (ref 31.5–36.5)
MCV RBC AUTO: 96 FL (ref 78–100)
PLATELET # BLD AUTO: 211 10E3/UL (ref 150–450)
POTASSIUM SERPL-SCNC: 4.4 MMOL/L (ref 3.4–5.3)
RBC # BLD AUTO: 4.1 10E6/UL (ref 4.4–5.9)
SODIUM SERPL-SCNC: 142 MMOL/L (ref 135–145)
TACROLIMUS BLD-MCNC: 11.6 UG/L (ref 5–15)
TME LAST DOSE: NORMAL H
TME LAST DOSE: NORMAL H
WBC # BLD AUTO: 4 10E3/UL (ref 4–11)

## 2024-10-07 PROCEDURE — 87799 DETECT AGENT NOS DNA QUANT: CPT

## 2024-10-07 PROCEDURE — 80048 BASIC METABOLIC PNL TOTAL CA: CPT

## 2024-10-07 PROCEDURE — 80197 ASSAY OF TACROLIMUS: CPT

## 2024-10-07 PROCEDURE — 82150 ASSAY OF AMYLASE: CPT

## 2024-10-07 PROCEDURE — 83690 ASSAY OF LIPASE: CPT

## 2024-10-07 PROCEDURE — 85027 COMPLETE CBC AUTOMATED: CPT

## 2024-10-07 PROCEDURE — 36415 COLL VENOUS BLD VENIPUNCTURE: CPT

## 2024-10-08 LAB
BK VIRUS DNA IU/ML, INSTRUMENT (6800): ABNORMAL IU/ML
BK VIRUS SPECIMEN TYPE: ABNORMAL
BKV DNA SPEC NAA+PROBE-LOG#: 5 {LOG_COPIES}/ML

## 2024-10-08 RX ORDER — EVEROLIMUS 0.5 MG/1
0.5 TABLET ORAL 2 TIMES DAILY
Qty: 60 TABLET | Refills: 11 | Status: SHIPPED | OUTPATIENT
Start: 2024-10-08 | End: 2024-10-17

## 2024-10-08 RX ORDER — EVEROLIMUS 0.75 MG/1
0.75 TABLET ORAL 2 TIMES DAILY
Qty: 60 TABLET | Refills: 11 | Status: SHIPPED | OUTPATIENT
Start: 2024-10-08 | End: 2024-10-17

## 2024-10-08 NOTE — TELEPHONE ENCOUNTER
Spoke to patient who will make this med change this week. Patient is agreeable to plan with increase in BK. Patient will pick medication up from [facility] and start tomorrow, continue MMF until Everolimus is in goal range and then discontinue.

## 2024-10-09 ENCOUNTER — TELEPHONE (OUTPATIENT)
Dept: TRANSPLANT | Facility: CLINIC | Age: 69
End: 2024-10-09
Payer: COMMERCIAL

## 2024-10-09 NOTE — TELEPHONE ENCOUNTER
Patient Call: General  Route to LPN    Reason for call: called in regards of patient is having problems with everolimus prescription that was prescribed with two different doses. Pharmacy is only giving patient 0.5 MG and not 0.75. More details with call back.           Call back needed? Yes    Return Call Needed  Same as documented in contacts section  When to return call?: Same day: Route High Priority

## 2024-10-10 NOTE — TELEPHONE ENCOUNTER
Spoke with patient and he will be able to get both strengths of everolimus, pharmacy was just out of the 0.75 and had to order.

## 2024-10-15 ENCOUNTER — LAB (OUTPATIENT)
Dept: LAB | Facility: CLINIC | Age: 69
End: 2024-10-15
Payer: COMMERCIAL

## 2024-10-15 DIAGNOSIS — Z94.0 KIDNEY REPLACED BY TRANSPLANT: ICD-10-CM

## 2024-10-15 DIAGNOSIS — Z48.298 AFTERCARE FOLLOWING ORGAN TRANSPLANT: ICD-10-CM

## 2024-10-15 DIAGNOSIS — Z98.890 OTHER SPECIFIED POSTPROCEDURAL STATES: ICD-10-CM

## 2024-10-15 DIAGNOSIS — Z94.83 PANCREAS REPLACED BY TRANSPLANT (H): ICD-10-CM

## 2024-10-15 DIAGNOSIS — Z79.899 ENCOUNTER FOR LONG-TERM CURRENT USE OF MEDICATION: ICD-10-CM

## 2024-10-15 LAB
AMYLASE SERPL-CCNC: 60 U/L (ref 28–100)
ANION GAP SERPL CALCULATED.3IONS-SCNC: 9 MMOL/L (ref 7–15)
BUN SERPL-MCNC: 44.6 MG/DL (ref 8–23)
CALCIUM SERPL-MCNC: 8.8 MG/DL (ref 8.8–10.4)
CHLORIDE SERPL-SCNC: 109 MMOL/L (ref 98–107)
CREAT SERPL-MCNC: 2.32 MG/DL (ref 0.67–1.17)
EGFRCR SERPLBLD CKD-EPI 2021: 30 ML/MIN/1.73M2
ERYTHROCYTE [DISTWIDTH] IN BLOOD BY AUTOMATED COUNT: 13.9 % (ref 10–15)
GLUCOSE SERPL-MCNC: 96 MG/DL (ref 70–99)
HCO3 SERPL-SCNC: 22 MMOL/L (ref 22–29)
HCT VFR BLD AUTO: 40.4 % (ref 40–53)
HGB BLD-MCNC: 12.4 G/DL (ref 13.3–17.7)
LIPASE SERPL-CCNC: 26 U/L (ref 13–60)
MCH RBC QN AUTO: 29.9 PG (ref 26.5–33)
MCHC RBC AUTO-ENTMCNC: 30.7 G/DL (ref 31.5–36.5)
MCV RBC AUTO: 97 FL (ref 78–100)
PLATELET # BLD AUTO: 232 10E3/UL (ref 150–450)
POTASSIUM SERPL-SCNC: 5.4 MMOL/L (ref 3.4–5.3)
RBC # BLD AUTO: 4.15 10E6/UL (ref 4.4–5.9)
SODIUM SERPL-SCNC: 140 MMOL/L (ref 135–145)
TACROLIMUS BLD-MCNC: 11.9 UG/L (ref 5–15)
TME LAST DOSE: NORMAL H
TME LAST DOSE: NORMAL H
WBC # BLD AUTO: 4.7 10E3/UL (ref 4–11)

## 2024-10-15 PROCEDURE — 82150 ASSAY OF AMYLASE: CPT

## 2024-10-15 PROCEDURE — 36415 COLL VENOUS BLD VENIPUNCTURE: CPT

## 2024-10-15 PROCEDURE — 83690 ASSAY OF LIPASE: CPT

## 2024-10-15 PROCEDURE — 87799 DETECT AGENT NOS DNA QUANT: CPT

## 2024-10-15 PROCEDURE — 80169 DRUG ASSAY EVEROLIMUS: CPT

## 2024-10-15 PROCEDURE — 80197 ASSAY OF TACROLIMUS: CPT

## 2024-10-15 PROCEDURE — 80048 BASIC METABOLIC PNL TOTAL CA: CPT

## 2024-10-15 PROCEDURE — 85027 COMPLETE CBC AUTOMATED: CPT

## 2024-10-16 ENCOUNTER — TELEPHONE (OUTPATIENT)
Dept: TRANSPLANT | Facility: CLINIC | Age: 69
End: 2024-10-16
Payer: COMMERCIAL

## 2024-10-16 DIAGNOSIS — Z94.83 HISTORY OF SIMULTANEOUS KIDNEY AND PANCREAS TRANSPLANT (H): ICD-10-CM

## 2024-10-16 DIAGNOSIS — Z48.298 AFTERCARE FOLLOWING ORGAN TRANSPLANT: ICD-10-CM

## 2024-10-16 DIAGNOSIS — Z94.0 HISTORY OF SIMULTANEOUS KIDNEY AND PANCREAS TRANSPLANT (H): ICD-10-CM

## 2024-10-16 LAB
BK VIRUS DNA IU/ML, INSTRUMENT (6800): ABNORMAL IU/ML
BK VIRUS SPECIMEN TYPE: ABNORMAL
BKV DNA SPEC NAA+PROBE-LOG#: 4.8 {LOG_COPIES}/ML
EVEROLIMUS BLD-MCNC: 7 UG/L (ref 3–8)
TME LAST DOSE: NORMAL H
TME LAST DOSE: NORMAL H

## 2024-10-16 NOTE — TELEPHONE ENCOUNTER
ISSUE:   Everolimus level 7 on 10/15, goal 3-5, dose 1.25 mg BID.  Tacrolimus IR level 11.8 on 10/15, goal 4-6, dose 2 mg BID    Confirmed with Avelina he stopped MMF on Sunday     PLAN:   Call Patient and confirm this was an accurate 12-hour trough.   Verify Everolimus dose 1.25 mg BID.   Verity Tacrolimus IR dose 2 mg BID  Confirm no new medications or or missed doses.   Confirm no new illness / infection / diarrhea.   If accurate trough and accurate dose, decrease Everolimus dose to 0.75 mg BID and decrease Tacrolimus to 1.5 mg BID    Is this more than a 50% increase or decrease in current IS dose: No  If YES, justification: N/A    Repeat labs in 1 weeks.  *If > 50% change in immunosuppression dose, repeat labs in 1 week.     OUTCOME:   Spoke with Patient, they confirm accurate trough level and current Everolimus dose 1.25 mg BID and current Tac dose 2 mg BID  Patient confirmed everolimus dose change to 0.75 mg BID and tacrolimus dose change to 1.5 mg bid  Patient agreed to repeat labs in 1 weeks.   Orders sent to preferred pharmacy for dose change and lab for repeat labs.   Patient voiced understanding of plan.

## 2024-10-17 DIAGNOSIS — Z48.298 AFTERCARE FOLLOWING ORGAN TRANSPLANT: ICD-10-CM

## 2024-10-17 DIAGNOSIS — Z95.1 S/P CABG (CORONARY ARTERY BYPASS GRAFT): ICD-10-CM

## 2024-10-17 DIAGNOSIS — Z94.83 HISTORY OF SIMULTANEOUS KIDNEY AND PANCREAS TRANSPLANT (H): ICD-10-CM

## 2024-10-17 DIAGNOSIS — Z94.0 KIDNEY TRANSPLANTED: ICD-10-CM

## 2024-10-17 DIAGNOSIS — Z94.0 HISTORY OF SIMULTANEOUS KIDNEY AND PANCREAS TRANSPLANT (H): ICD-10-CM

## 2024-10-17 RX ORDER — TACROLIMUS 1 MG/1
1 CAPSULE ORAL 2 TIMES DAILY
Qty: 60 CAPSULE | Refills: 11 | Status: SHIPPED | OUTPATIENT
Start: 2024-10-17 | End: 2024-11-06

## 2024-10-17 RX ORDER — TACROLIMUS 0.5 MG/1
0.5 CAPSULE ORAL 2 TIMES DAILY
Qty: 60 CAPSULE | Refills: 11 | Status: SHIPPED | OUTPATIENT
Start: 2024-10-17 | End: 2024-11-06

## 2024-10-17 RX ORDER — EVEROLIMUS 0.5 MG/1
TABLET ORAL
Status: SHIPPED
Start: 2024-10-17

## 2024-10-17 RX ORDER — MAGNESIUM OXIDE 400 MG/1
400 TABLET ORAL EVERY 24 HOURS
Qty: 30 TABLET | Refills: 11 | Status: SHIPPED | OUTPATIENT
Start: 2024-10-17

## 2024-10-17 RX ORDER — EVEROLIMUS 0.75 MG/1
0.75 TABLET ORAL 2 TIMES DAILY
Qty: 60 TABLET | Refills: 11 | Status: SHIPPED | OUTPATIENT
Start: 2024-10-17 | End: 2024-10-17

## 2024-10-17 RX ORDER — TACROLIMUS 0.5 MG/1
0.5 CAPSULE ORAL 2 TIMES DAILY
Qty: 60 CAPSULE | Refills: 11 | Status: SHIPPED | OUTPATIENT
Start: 2024-10-17 | End: 2024-10-17

## 2024-10-17 RX ORDER — EVEROLIMUS 0.75 MG/1
0.75 TABLET ORAL 2 TIMES DAILY
Qty: 60 TABLET | Refills: 11 | Status: SHIPPED | OUTPATIENT
Start: 2024-10-17

## 2024-10-22 ENCOUNTER — LAB (OUTPATIENT)
Dept: LAB | Facility: CLINIC | Age: 69
End: 2024-10-22
Payer: COMMERCIAL

## 2024-10-22 DIAGNOSIS — Z79.899 ENCOUNTER FOR LONG-TERM CURRENT USE OF MEDICATION: ICD-10-CM

## 2024-10-22 DIAGNOSIS — Z94.0 KIDNEY REPLACED BY TRANSPLANT: ICD-10-CM

## 2024-10-22 DIAGNOSIS — Z48.298 AFTERCARE FOLLOWING ORGAN TRANSPLANT: ICD-10-CM

## 2024-10-22 DIAGNOSIS — Z98.890 OTHER SPECIFIED POSTPROCEDURAL STATES: ICD-10-CM

## 2024-10-22 DIAGNOSIS — Z94.83 PANCREAS REPLACED BY TRANSPLANT (H): ICD-10-CM

## 2024-10-22 LAB
AMYLASE SERPL-CCNC: 66 U/L (ref 28–100)
LIPASE SERPL-CCNC: 44 U/L (ref 13–60)

## 2024-10-22 PROCEDURE — 87799 DETECT AGENT NOS DNA QUANT: CPT

## 2024-10-22 PROCEDURE — 80169 DRUG ASSAY EVEROLIMUS: CPT

## 2024-10-22 PROCEDURE — 36415 COLL VENOUS BLD VENIPUNCTURE: CPT

## 2024-10-22 PROCEDURE — 83690 ASSAY OF LIPASE: CPT

## 2024-10-22 PROCEDURE — 82150 ASSAY OF AMYLASE: CPT

## 2024-10-23 DIAGNOSIS — Z48.298 AFTERCARE FOLLOWING ORGAN TRANSPLANT: Primary | ICD-10-CM

## 2024-10-23 LAB
BK VIRUS DNA IU/ML, INSTRUMENT (6800): ABNORMAL IU/ML
BK VIRUS SPECIMEN TYPE: ABNORMAL
BKV DNA SPEC NAA+PROBE-LOG#: 4.7 {LOG_COPIES}/ML
EVEROLIMUS BLD-MCNC: 4.4 UG/L (ref 3–8)
TME LAST DOSE: NORMAL H
TME LAST DOSE: NORMAL H

## 2024-11-04 ENCOUNTER — LAB (OUTPATIENT)
Dept: LAB | Facility: CLINIC | Age: 69
End: 2024-11-04
Payer: COMMERCIAL

## 2024-11-04 DIAGNOSIS — Z48.298 AFTERCARE FOLLOWING ORGAN TRANSPLANT: ICD-10-CM

## 2024-11-04 LAB
AMYLASE SERPL-CCNC: 72 U/L (ref 28–100)
ANION GAP SERPL CALCULATED.3IONS-SCNC: 11 MMOL/L (ref 7–15)
BUN SERPL-MCNC: 38.6 MG/DL (ref 8–23)
CALCIUM SERPL-MCNC: 8.7 MG/DL (ref 8.8–10.4)
CHLORIDE SERPL-SCNC: 110 MMOL/L (ref 98–107)
CREAT SERPL-MCNC: 2.17 MG/DL (ref 0.67–1.17)
EGFRCR SERPLBLD CKD-EPI 2021: 33 ML/MIN/1.73M2
ERYTHROCYTE [DISTWIDTH] IN BLOOD BY AUTOMATED COUNT: 12.9 % (ref 10–15)
GLUCOSE SERPL-MCNC: 95 MG/DL (ref 70–99)
HCO3 SERPL-SCNC: 23 MMOL/L (ref 22–29)
HCT VFR BLD AUTO: 37.2 % (ref 40–53)
HGB BLD-MCNC: 11.6 G/DL (ref 13.3–17.7)
LIPASE SERPL-CCNC: 44 U/L (ref 13–60)
MCH RBC QN AUTO: 29.7 PG (ref 26.5–33)
MCHC RBC AUTO-ENTMCNC: 31.2 G/DL (ref 31.5–36.5)
MCV RBC AUTO: 95 FL (ref 78–100)
PLATELET # BLD AUTO: 170 10E3/UL (ref 150–450)
POTASSIUM SERPL-SCNC: 4.7 MMOL/L (ref 3.4–5.3)
RBC # BLD AUTO: 3.9 10E6/UL (ref 4.4–5.9)
SODIUM SERPL-SCNC: 144 MMOL/L (ref 135–145)
TACROLIMUS BLD-MCNC: 7.6 UG/L (ref 5–15)
TME LAST DOSE: NORMAL H
TME LAST DOSE: NORMAL H
WBC # BLD AUTO: 3.5 10E3/UL (ref 4–11)

## 2024-11-04 PROCEDURE — 85027 COMPLETE CBC AUTOMATED: CPT

## 2024-11-04 PROCEDURE — 82150 ASSAY OF AMYLASE: CPT

## 2024-11-04 PROCEDURE — 80048 BASIC METABOLIC PNL TOTAL CA: CPT

## 2024-11-04 PROCEDURE — 83690 ASSAY OF LIPASE: CPT

## 2024-11-04 PROCEDURE — 87799 DETECT AGENT NOS DNA QUANT: CPT

## 2024-11-04 PROCEDURE — 36415 COLL VENOUS BLD VENIPUNCTURE: CPT

## 2024-11-04 PROCEDURE — 80169 DRUG ASSAY EVEROLIMUS: CPT

## 2024-11-04 PROCEDURE — 80197 ASSAY OF TACROLIMUS: CPT

## 2024-11-05 ENCOUNTER — TELEPHONE (OUTPATIENT)
Dept: TRANSPLANT | Facility: CLINIC | Age: 69
End: 2024-11-05
Payer: COMMERCIAL

## 2024-11-05 DIAGNOSIS — B34.8 BK VIREMIA: ICD-10-CM

## 2024-11-05 DIAGNOSIS — Z94.0 HTN, KIDNEY TRANSPLANT RELATED: ICD-10-CM

## 2024-11-05 DIAGNOSIS — Z94.83 HISTORY OF SIMULTANEOUS KIDNEY AND PANCREAS TRANSPLANT (H): ICD-10-CM

## 2024-11-05 DIAGNOSIS — I15.1 HTN, KIDNEY TRANSPLANT RELATED: ICD-10-CM

## 2024-11-05 DIAGNOSIS — Z94.0 HISTORY OF SIMULTANEOUS KIDNEY AND PANCREAS TRANSPLANT (H): ICD-10-CM

## 2024-11-05 LAB
BK VIRUS DNA IU/ML, INSTRUMENT (6800): ABNORMAL IU/ML
BK VIRUS SPECIMEN TYPE: ABNORMAL
BKV DNA SPEC NAA+PROBE-LOG#: 5.1 {LOG_COPIES}/ML
EVEROLIMUS BLD-MCNC: 3.7 UG/L (ref 3–8)
TME LAST DOSE: NORMAL H
TME LAST DOSE: NORMAL H

## 2024-11-05 NOTE — TELEPHONE ENCOUNTER
----- Message from Jesu Mosquera sent at 11/5/2024 11:21 AM CST -----  Let's target 3-5 for both tacrolimus and everolimus.  If no improvement on next check, will need to look at changing tacrolimus to cyclosporine.    Miguel  ----- Message -----  From: Makayla Wu RN  Sent: 11/5/2024  11:02 AM CST  To: Jesu Mosquera MD    Increase in BK after switch to Everolimus. Tac in normal goal range. Should we reduce tac goal?

## 2024-11-06 ENCOUNTER — TELEPHONE (OUTPATIENT)
Dept: TRANSPLANT | Facility: CLINIC | Age: 69
End: 2024-11-06
Payer: COMMERCIAL

## 2024-11-06 DIAGNOSIS — I15.1 HTN, KIDNEY TRANSPLANT RELATED: ICD-10-CM

## 2024-11-06 DIAGNOSIS — B34.8 BK VIREMIA: ICD-10-CM

## 2024-11-06 DIAGNOSIS — Z94.0 HTN, KIDNEY TRANSPLANT RELATED: ICD-10-CM

## 2024-11-06 RX ORDER — TACROLIMUS 0.5 MG/1
CAPSULE ORAL
Qty: 60 CAPSULE | Refills: 11 | Status: SHIPPED | OUTPATIENT
Start: 2024-11-06

## 2024-11-06 RX ORDER — TACROLIMUS 1 MG/1
1 CAPSULE ORAL 2 TIMES DAILY
Qty: 60 CAPSULE | Refills: 11 | Status: SHIPPED | OUTPATIENT
Start: 2024-11-06

## 2024-11-18 ENCOUNTER — LAB (OUTPATIENT)
Dept: LAB | Facility: CLINIC | Age: 69
End: 2024-11-18
Payer: COMMERCIAL

## 2024-11-18 DIAGNOSIS — Z48.298 AFTERCARE FOLLOWING ORGAN TRANSPLANT: ICD-10-CM

## 2024-11-18 LAB
AMYLASE SERPL-CCNC: 65 U/L (ref 28–100)
ANION GAP SERPL CALCULATED.3IONS-SCNC: 14 MMOL/L (ref 7–15)
BUN SERPL-MCNC: 23.6 MG/DL (ref 8–23)
CALCIUM SERPL-MCNC: 9 MG/DL (ref 8.8–10.4)
CHLORIDE SERPL-SCNC: 106 MMOL/L (ref 98–107)
CREAT SERPL-MCNC: 2 MG/DL (ref 0.67–1.17)
EGFRCR SERPLBLD CKD-EPI 2021: 36 ML/MIN/1.73M2
ERYTHROCYTE [DISTWIDTH] IN BLOOD BY AUTOMATED COUNT: 12.8 % (ref 10–15)
GLUCOSE SERPL-MCNC: 91 MG/DL (ref 70–99)
HCO3 SERPL-SCNC: 22 MMOL/L (ref 22–29)
HCT VFR BLD AUTO: 38 % (ref 40–53)
HGB BLD-MCNC: 12 G/DL (ref 13.3–17.7)
LIPASE SERPL-CCNC: 34 U/L (ref 13–60)
MCH RBC QN AUTO: 29.7 PG (ref 26.5–33)
MCHC RBC AUTO-ENTMCNC: 31.6 G/DL (ref 31.5–36.5)
MCV RBC AUTO: 94 FL (ref 78–100)
PLATELET # BLD AUTO: 185 10E3/UL (ref 150–450)
POTASSIUM SERPL-SCNC: 4.4 MMOL/L (ref 3.4–5.3)
RBC # BLD AUTO: 4.04 10E6/UL (ref 4.4–5.9)
SODIUM SERPL-SCNC: 142 MMOL/L (ref 135–145)
TACROLIMUS BLD-MCNC: 5.6 UG/L (ref 5–15)
TME LAST DOSE: NORMAL H
TME LAST DOSE: NORMAL H
WBC # BLD AUTO: 4.5 10E3/UL (ref 4–11)

## 2024-11-18 PROCEDURE — 83690 ASSAY OF LIPASE: CPT

## 2024-11-18 PROCEDURE — 85027 COMPLETE CBC AUTOMATED: CPT

## 2024-11-18 PROCEDURE — 80048 BASIC METABOLIC PNL TOTAL CA: CPT

## 2024-11-18 PROCEDURE — 80169 DRUG ASSAY EVEROLIMUS: CPT

## 2024-11-18 PROCEDURE — 36415 COLL VENOUS BLD VENIPUNCTURE: CPT

## 2024-11-18 PROCEDURE — 80197 ASSAY OF TACROLIMUS: CPT

## 2024-11-18 PROCEDURE — 87799 DETECT AGENT NOS DNA QUANT: CPT

## 2024-11-18 PROCEDURE — 82150 ASSAY OF AMYLASE: CPT

## 2024-11-19 LAB
BK VIRUS DNA IU/ML, INSTRUMENT (6800): ABNORMAL IU/ML
BK VIRUS SPECIMEN TYPE: ABNORMAL
BKV DNA SPEC NAA+PROBE-LOG#: 5.2 {LOG_COPIES}/ML
EVEROLIMUS BLD-MCNC: 4.5 UG/L (ref 3–8)
TME LAST DOSE: NORMAL H
TME LAST DOSE: NORMAL H

## 2024-12-09 ENCOUNTER — LAB (OUTPATIENT)
Dept: LAB | Facility: CLINIC | Age: 69
End: 2024-12-09
Payer: COMMERCIAL

## 2024-12-09 DIAGNOSIS — Z48.298 AFTERCARE FOLLOWING ORGAN TRANSPLANT: ICD-10-CM

## 2024-12-09 LAB
AMYLASE SERPL-CCNC: 53 U/L (ref 28–100)
ANION GAP SERPL CALCULATED.3IONS-SCNC: 10 MMOL/L (ref 7–15)
BUN SERPL-MCNC: 25.6 MG/DL (ref 8–23)
CALCIUM SERPL-MCNC: 8.1 MG/DL (ref 8.8–10.4)
CHLORIDE SERPL-SCNC: 108 MMOL/L (ref 98–107)
CREAT SERPL-MCNC: 2.31 MG/DL (ref 0.67–1.17)
EGFRCR SERPLBLD CKD-EPI 2021: 30 ML/MIN/1.73M2
ERYTHROCYTE [DISTWIDTH] IN BLOOD BY AUTOMATED COUNT: 12.4 % (ref 10–15)
GLUCOSE SERPL-MCNC: 92 MG/DL (ref 70–99)
HCO3 SERPL-SCNC: 25 MMOL/L (ref 22–29)
HCT VFR BLD AUTO: 34.2 % (ref 40–53)
HGB BLD-MCNC: 10.8 G/DL (ref 13.3–17.7)
LIPASE SERPL-CCNC: 31 U/L (ref 13–60)
MCH RBC QN AUTO: 29.1 PG (ref 26.5–33)
MCHC RBC AUTO-ENTMCNC: 31.6 G/DL (ref 31.5–36.5)
MCV RBC AUTO: 92 FL (ref 78–100)
PLATELET # BLD AUTO: 195 10E3/UL (ref 150–450)
POTASSIUM SERPL-SCNC: 3.9 MMOL/L (ref 3.4–5.3)
RBC # BLD AUTO: 3.71 10E6/UL (ref 4.4–5.9)
SODIUM SERPL-SCNC: 143 MMOL/L (ref 135–145)
WBC # BLD AUTO: 4 10E3/UL (ref 4–11)

## 2024-12-09 PROCEDURE — 83690 ASSAY OF LIPASE: CPT

## 2024-12-09 PROCEDURE — 87799 DETECT AGENT NOS DNA QUANT: CPT

## 2024-12-09 PROCEDURE — 80048 BASIC METABOLIC PNL TOTAL CA: CPT

## 2024-12-09 PROCEDURE — 82150 ASSAY OF AMYLASE: CPT

## 2024-12-09 PROCEDURE — 36415 COLL VENOUS BLD VENIPUNCTURE: CPT

## 2024-12-09 PROCEDURE — 80169 DRUG ASSAY EVEROLIMUS: CPT

## 2024-12-09 PROCEDURE — 80197 ASSAY OF TACROLIMUS: CPT

## 2024-12-09 PROCEDURE — 85027 COMPLETE CBC AUTOMATED: CPT

## 2024-12-10 LAB
BK VIRUS DNA IU/ML, INSTRUMENT (6800): ABNORMAL IU/ML
BK VIRUS SPECIMEN TYPE: ABNORMAL
BKV DNA SPEC NAA+PROBE-LOG#: 5.7 {LOG_COPIES}/ML
EVEROLIMUS BLD-MCNC: 5.7 UG/L (ref 3–8)
TACROLIMUS BLD-MCNC: 7.4 UG/L (ref 5–15)
TME LAST DOSE: NORMAL H

## 2024-12-11 ENCOUNTER — TELEPHONE (OUTPATIENT)
Dept: TRANSPLANT | Facility: CLINIC | Age: 69
End: 2024-12-11
Payer: COMMERCIAL

## 2024-12-11 DIAGNOSIS — B34.8 BK VIREMIA: ICD-10-CM

## 2024-12-11 DIAGNOSIS — Z94.0 HTN, KIDNEY TRANSPLANT RELATED: ICD-10-CM

## 2024-12-11 DIAGNOSIS — I15.1 HTN, KIDNEY TRANSPLANT RELATED: ICD-10-CM

## 2024-12-11 NOTE — TELEPHONE ENCOUNTER
Post Kidney and Pancreas Transplant Team Conference  Date: 12/11/2024  Transplant Coordinator: Makayla     Attendees:  [x]  Dr. Mosquera [x] Kathi Jaquez, RN [x] Chery Ha LPN     [x]  Dr. Ruiz [] Makayla Wu, RN [] Sarah Chiu LPN    [x] Dr. Crum [x] Savana Lazar, KARINE    [x] Dr. Yee [x] Meena Carlisle, RN [x] Rhiannon Schmitz RN   [] Dr. Lofton [] Farheen Omalley, RN    [x] Dr. Farnsworth [] Suha Charlton RN    []  Dr. Ascencio [] Quin Pineda RN    [] Dr. Reeves [] Handy Ha RN    [] Adia Mccormack, FORREST [] Claudia Jara RN    [x] Carolee Mcdermott NP [x] Dedra Fuentes RN        Verbal Plan Read Back:   Switch from Tac to Cycylosporine 75 mg bid ()  Everolimus (4-6)    Routed to RN Coordinator   Chery Ha LPN

## 2024-12-14 ENCOUNTER — HEALTH MAINTENANCE LETTER (OUTPATIENT)
Age: 69
End: 2024-12-14

## 2024-12-16 ENCOUNTER — LAB (OUTPATIENT)
Dept: LAB | Facility: CLINIC | Age: 69
End: 2024-12-16
Payer: COMMERCIAL

## 2024-12-16 DIAGNOSIS — Z48.298 AFTERCARE FOLLOWING ORGAN TRANSPLANT: ICD-10-CM

## 2024-12-16 LAB
AMYLASE SERPL-CCNC: 62 U/L (ref 28–100)
ANION GAP SERPL CALCULATED.3IONS-SCNC: 10 MMOL/L (ref 7–15)
BUN SERPL-MCNC: 30.3 MG/DL (ref 8–23)
CALCIUM SERPL-MCNC: 8.1 MG/DL (ref 8.8–10.4)
CHLORIDE SERPL-SCNC: 109 MMOL/L (ref 98–107)
CREAT SERPL-MCNC: 2.14 MG/DL (ref 0.67–1.17)
CYCLOSPORINE BLD LC/MS/MS-MCNC: <25 UG/L (ref 50–400)
EGFRCR SERPLBLD CKD-EPI 2021: 33 ML/MIN/1.73M2
ERYTHROCYTE [DISTWIDTH] IN BLOOD BY AUTOMATED COUNT: 12.7 % (ref 10–15)
GLUCOSE SERPL-MCNC: 95 MG/DL (ref 70–99)
HCO3 SERPL-SCNC: 24 MMOL/L (ref 22–29)
HCT VFR BLD AUTO: 33.8 % (ref 40–53)
HGB BLD-MCNC: 10.7 G/DL (ref 13.3–17.7)
LIPASE SERPL-CCNC: 41 U/L (ref 13–60)
MCH RBC QN AUTO: 29 PG (ref 26.5–33)
MCHC RBC AUTO-ENTMCNC: 31.7 G/DL (ref 31.5–36.5)
MCV RBC AUTO: 92 FL (ref 78–100)
PLATELET # BLD AUTO: 196 10E3/UL (ref 150–450)
POTASSIUM SERPL-SCNC: 4 MMOL/L (ref 3.4–5.3)
RBC # BLD AUTO: 3.69 10E6/UL (ref 4.4–5.9)
SODIUM SERPL-SCNC: 143 MMOL/L (ref 135–145)
TME LAST DOSE: ABNORMAL H
TME LAST DOSE: ABNORMAL H
WBC # BLD AUTO: 4.4 10E3/UL (ref 4–11)

## 2024-12-16 PROCEDURE — 87799 DETECT AGENT NOS DNA QUANT: CPT

## 2024-12-16 PROCEDURE — 36415 COLL VENOUS BLD VENIPUNCTURE: CPT

## 2024-12-16 PROCEDURE — 80169 DRUG ASSAY EVEROLIMUS: CPT

## 2024-12-16 PROCEDURE — 80158 DRUG ASSAY CYCLOSPORINE: CPT

## 2024-12-16 PROCEDURE — 80048 BASIC METABOLIC PNL TOTAL CA: CPT

## 2024-12-16 PROCEDURE — 82150 ASSAY OF AMYLASE: CPT

## 2024-12-16 PROCEDURE — 83690 ASSAY OF LIPASE: CPT

## 2024-12-16 PROCEDURE — 85027 COMPLETE CBC AUTOMATED: CPT

## 2024-12-17 ENCOUNTER — TELEPHONE (OUTPATIENT)
Dept: TRANSPLANT | Facility: CLINIC | Age: 69
End: 2024-12-17
Payer: COMMERCIAL

## 2024-12-17 DIAGNOSIS — B34.8 BK VIREMIA: ICD-10-CM

## 2024-12-17 DIAGNOSIS — I15.1 HTN, KIDNEY TRANSPLANT RELATED: ICD-10-CM

## 2024-12-17 DIAGNOSIS — Z94.0 HTN, KIDNEY TRANSPLANT RELATED: ICD-10-CM

## 2024-12-17 LAB
BK VIRUS DNA IU/ML, INSTRUMENT (6800): ABNORMAL IU/ML
BK VIRUS SPECIMEN TYPE: ABNORMAL
BKV DNA SPEC NAA+PROBE-LOG#: 5.5 {LOG_COPIES}/ML
EVEROLIMUS BLD-MCNC: 5.3 UG/L (ref 3–8)
TME LAST DOSE: NORMAL H
TME LAST DOSE: NORMAL H

## 2024-12-17 NOTE — TELEPHONE ENCOUNTER
ISSUE:   Cyclosporine level <25 on 12/17, goal , dose 75 mg BID.    PLAN:   Call Patient and confirm this was an accurate 12-hour trough.   Verify csa IR dose 75 mg BID.   Confirm no new medications or or missed doses.   Confirm no new illness / infection / diarrhea.   If accurate trough and accurate dose, increase csa dose to 125 mg BID     Is this more than a 50% increase or decrease in current IS dose: Yes  If YES, justification: level not detected    Repeat labs in 1 weeks.  *If > 50% change in immunosuppression dose, repeat labs in 1 week.     OUTCOME:

## 2024-12-17 NOTE — TELEPHONE ENCOUNTER
Spoke with patient who states he has not yet switched to csa due to a delay at the pharmacy. He will be getting it today and starting it tomorrow. He will ensure to get labs next week,

## 2024-12-23 ENCOUNTER — LAB (OUTPATIENT)
Dept: LAB | Facility: CLINIC | Age: 69
End: 2024-12-23
Payer: COMMERCIAL

## 2024-12-23 DIAGNOSIS — Z48.298 AFTERCARE FOLLOWING ORGAN TRANSPLANT: ICD-10-CM

## 2024-12-23 LAB
AMYLASE SERPL-CCNC: 52 U/L (ref 28–100)
ANION GAP SERPL CALCULATED.3IONS-SCNC: 13 MMOL/L (ref 7–15)
BUN SERPL-MCNC: 30.8 MG/DL (ref 8–23)
CALCIUM SERPL-MCNC: 8.4 MG/DL (ref 8.8–10.4)
CHLORIDE SERPL-SCNC: 107 MMOL/L (ref 98–107)
CREAT SERPL-MCNC: 2.46 MG/DL (ref 0.67–1.17)
CYCLOSPORINE BLD LC/MS/MS-MCNC: 107 UG/L (ref 50–400)
EGFRCR SERPLBLD CKD-EPI 2021: 28 ML/MIN/1.73M2
ERYTHROCYTE [DISTWIDTH] IN BLOOD BY AUTOMATED COUNT: 13.1 % (ref 10–15)
GLUCOSE SERPL-MCNC: 91 MG/DL (ref 70–99)
HCO3 SERPL-SCNC: 24 MMOL/L (ref 22–29)
HCT VFR BLD AUTO: 35.3 % (ref 40–53)
HGB BLD-MCNC: 11 G/DL (ref 13.3–17.7)
LIPASE SERPL-CCNC: 27 U/L (ref 13–60)
MCH RBC QN AUTO: 28.8 PG (ref 26.5–33)
MCHC RBC AUTO-ENTMCNC: 31.2 G/DL (ref 31.5–36.5)
MCV RBC AUTO: 92 FL (ref 78–100)
PLATELET # BLD AUTO: 196 10E3/UL (ref 150–450)
POTASSIUM SERPL-SCNC: 3.9 MMOL/L (ref 3.4–5.3)
RBC # BLD AUTO: 3.82 10E6/UL (ref 4.4–5.9)
SODIUM SERPL-SCNC: 144 MMOL/L (ref 135–145)
TME LAST DOSE: NORMAL H
TME LAST DOSE: NORMAL H
WBC # BLD AUTO: 4.4 10E3/UL (ref 4–11)

## 2024-12-23 PROCEDURE — 80048 BASIC METABOLIC PNL TOTAL CA: CPT

## 2024-12-23 PROCEDURE — 80169 DRUG ASSAY EVEROLIMUS: CPT

## 2024-12-23 PROCEDURE — 80158 DRUG ASSAY CYCLOSPORINE: CPT

## 2024-12-23 PROCEDURE — 87799 DETECT AGENT NOS DNA QUANT: CPT

## 2024-12-23 PROCEDURE — 36415 COLL VENOUS BLD VENIPUNCTURE: CPT

## 2024-12-23 PROCEDURE — 85027 COMPLETE CBC AUTOMATED: CPT

## 2024-12-23 PROCEDURE — 82150 ASSAY OF AMYLASE: CPT

## 2024-12-23 PROCEDURE — 83690 ASSAY OF LIPASE: CPT

## 2024-12-24 LAB
BK VIRUS DNA IU/ML, INSTRUMENT (6800): ABNORMAL IU/ML
BK VIRUS SPECIMEN TYPE: ABNORMAL
BKV DNA SPEC NAA+PROBE-LOG#: 5.6 {LOG_COPIES}/ML
EVEROLIMUS BLD-MCNC: 8.4 UG/L (ref 3–8)
TME LAST DOSE: ABNORMAL H
TME LAST DOSE: ABNORMAL H

## 2024-12-26 ENCOUNTER — TELEPHONE (OUTPATIENT)
Dept: TRANSPLANT | Facility: CLINIC | Age: 69
End: 2024-12-26

## 2024-12-26 DIAGNOSIS — Z94.0 HTN, KIDNEY TRANSPLANT RELATED: ICD-10-CM

## 2024-12-26 DIAGNOSIS — I15.1 HTN, KIDNEY TRANSPLANT RELATED: ICD-10-CM

## 2024-12-26 DIAGNOSIS — B34.8 BK VIREMIA: ICD-10-CM

## 2024-12-26 NOTE — TELEPHONE ENCOUNTER
ISSUE:   Everolimus level 8.4 on 12/23/24, goal 6, dose 0.75 mg BID.    PLAN:   Call Patient and confirm this was an accurate 12-hour trough.   Verify Everolimus dose 0.75 mg BID.   Confirm no new medications or illness.   Confirm no missed doses.     If accurate trough and accurate dose, decrease Everolimus dose to 0.5 mg BID  *Recommended dose rounded from calculated dose 0.54 mg  BID.      Repeat labs as scheduled.   For any dose change <50%, recheck labs per guideline or within 1 month.  For any dose change of more than 50%, recheck drug level based on timing to reach steady state:   Immediate release tacrolimus: 2-3 days  Extended-release tacrolimus: 7 days  Cyclosporine: 2 days  Sirolimus: 12 days  Everolimus: 8 days    Rhiannon Schmitz RN, BSN, [facility]  Patient Care Supervisor, Post Abdominal Transplant

## 2024-12-30 ENCOUNTER — LAB (OUTPATIENT)
Dept: LAB | Facility: CLINIC | Age: 69
End: 2024-12-30
Payer: COMMERCIAL

## 2024-12-30 DIAGNOSIS — Z48.298 AFTERCARE FOLLOWING ORGAN TRANSPLANT: ICD-10-CM

## 2024-12-30 LAB
AMYLASE SERPL-CCNC: 63 U/L (ref 28–100)
ANION GAP SERPL CALCULATED.3IONS-SCNC: 13 MMOL/L (ref 7–15)
BUN SERPL-MCNC: 26.2 MG/DL (ref 8–23)
CALCIUM SERPL-MCNC: 8.5 MG/DL (ref 8.8–10.4)
CHLORIDE SERPL-SCNC: 107 MMOL/L (ref 98–107)
CREAT SERPL-MCNC: 2.05 MG/DL (ref 0.67–1.17)
CYCLOSPORINE BLD LC/MS/MS-MCNC: 58 UG/L (ref 50–400)
EGFRCR SERPLBLD CKD-EPI 2021: 35 ML/MIN/1.73M2
ERYTHROCYTE [DISTWIDTH] IN BLOOD BY AUTOMATED COUNT: 13.1 % (ref 10–15)
GLUCOSE SERPL-MCNC: 94 MG/DL (ref 70–99)
HCO3 SERPL-SCNC: 22 MMOL/L (ref 22–29)
HCT VFR BLD AUTO: 36 % (ref 40–53)
HGB BLD-MCNC: 11.3 G/DL (ref 13.3–17.7)
LIPASE SERPL-CCNC: 37 U/L (ref 13–60)
MCH RBC QN AUTO: 28.7 PG (ref 26.5–33)
MCHC RBC AUTO-ENTMCNC: 31.4 G/DL (ref 31.5–36.5)
MCV RBC AUTO: 91 FL (ref 78–100)
PLATELET # BLD AUTO: 181 10E3/UL (ref 150–450)
POTASSIUM SERPL-SCNC: 3.7 MMOL/L (ref 3.4–5.3)
RBC # BLD AUTO: 3.94 10E6/UL (ref 4.4–5.9)
SODIUM SERPL-SCNC: 142 MMOL/L (ref 135–145)
TME LAST DOSE: NORMAL H
TME LAST DOSE: NORMAL H
WBC # BLD AUTO: 4.9 10E3/UL (ref 4–11)

## 2024-12-30 PROCEDURE — 83690 ASSAY OF LIPASE: CPT

## 2024-12-30 PROCEDURE — 36415 COLL VENOUS BLD VENIPUNCTURE: CPT

## 2024-12-30 PROCEDURE — 82150 ASSAY OF AMYLASE: CPT

## 2024-12-30 PROCEDURE — 80169 DRUG ASSAY EVEROLIMUS: CPT

## 2024-12-30 PROCEDURE — 80048 BASIC METABOLIC PNL TOTAL CA: CPT

## 2024-12-30 PROCEDURE — 87799 DETECT AGENT NOS DNA QUANT: CPT

## 2024-12-30 PROCEDURE — 85027 COMPLETE CBC AUTOMATED: CPT

## 2024-12-30 PROCEDURE — 80158 DRUG ASSAY CYCLOSPORINE: CPT

## 2024-12-31 LAB
BK VIRUS DNA IU/ML, INSTRUMENT (6800): ABNORMAL IU/ML
BK VIRUS SPECIMEN TYPE: ABNORMAL
BKV DNA SPEC NAA+PROBE-LOG#: 5.6 {LOG_COPIES}/ML
EVEROLIMUS BLD-MCNC: 7.9 UG/L (ref 3–8)
TME LAST DOSE: NORMAL H
TME LAST DOSE: NORMAL H

## 2025-01-02 ENCOUNTER — TELEPHONE (OUTPATIENT)
Dept: TRANSPLANT | Facility: CLINIC | Age: 70
End: 2025-01-02
Payer: COMMERCIAL

## 2025-01-02 DIAGNOSIS — B34.8 BK VIREMIA: ICD-10-CM

## 2025-01-02 DIAGNOSIS — I15.1 HTN, KIDNEY TRANSPLANT RELATED: ICD-10-CM

## 2025-01-02 DIAGNOSIS — Z94.0 HTN, KIDNEY TRANSPLANT RELATED: ICD-10-CM

## 2025-01-02 NOTE — TELEPHONE ENCOUNTER
----- Message from Jesu Mosquera sent at 12/31/2024  5:54 PM CST -----  Okay to target 50-75.  ----- Message -----  From: Makayla Wu RN  Sent: 12/31/2024  11:00 AM CST  To: Jesu Mosquera MD    Continued high BK. Was going to increase CSA but should I leave it here due to rising level?

## 2025-01-03 NOTE — TELEPHONE ENCOUNTER
LVM for patient to discuss Everolimus level and if we need to decrease. CSA goal 50-75 (in goal range)

## 2025-01-05 NOTE — TELEPHONE ENCOUNTER
Spoke to Avelina  regarding his everolimus level  above goal level   Reviewed and discussed accurate 12 hour trough level   Reviewed taking 0.75 mg  twice per day     Lowered everolimus Zortress to 0.5 mg twice per day     Avelina  verbalized understanding  repeating transplant  labs within the next 2 weeks

## 2025-01-06 ENCOUNTER — LAB (OUTPATIENT)
Dept: LAB | Facility: CLINIC | Age: 70
End: 2025-01-06
Payer: COMMERCIAL

## 2025-01-06 DIAGNOSIS — Z48.298 AFTERCARE FOLLOWING ORGAN TRANSPLANT: ICD-10-CM

## 2025-01-06 LAB
AMYLASE SERPL-CCNC: 72 U/L (ref 28–100)
ANION GAP SERPL CALCULATED.3IONS-SCNC: 12 MMOL/L (ref 7–15)
BUN SERPL-MCNC: 25.4 MG/DL (ref 8–23)
CALCIUM SERPL-MCNC: 8.2 MG/DL (ref 8.8–10.4)
CHLORIDE SERPL-SCNC: 105 MMOL/L (ref 98–107)
CREAT SERPL-MCNC: 1.82 MG/DL (ref 0.67–1.17)
CYCLOSPORINE BLD LC/MS/MS-MCNC: 48 UG/L (ref 50–400)
EGFRCR SERPLBLD CKD-EPI 2021: 40 ML/MIN/1.73M2
ERYTHROCYTE [DISTWIDTH] IN BLOOD BY AUTOMATED COUNT: 13.1 % (ref 10–15)
GLUCOSE SERPL-MCNC: 100 MG/DL (ref 70–99)
HCO3 SERPL-SCNC: 25 MMOL/L (ref 22–29)
HCT VFR BLD AUTO: 37.4 % (ref 40–53)
HGB BLD-MCNC: 11.6 G/DL (ref 13.3–17.7)
LIPASE SERPL-CCNC: 48 U/L (ref 13–60)
MCH RBC QN AUTO: 28.3 PG (ref 26.5–33)
MCHC RBC AUTO-ENTMCNC: 31 G/DL (ref 31.5–36.5)
MCV RBC AUTO: 91 FL (ref 78–100)
PLATELET # BLD AUTO: 224 10E3/UL (ref 150–450)
POTASSIUM SERPL-SCNC: 3.4 MMOL/L (ref 3.4–5.3)
RBC # BLD AUTO: 4.1 10E6/UL (ref 4.4–5.9)
SODIUM SERPL-SCNC: 142 MMOL/L (ref 135–145)
TME LAST DOSE: ABNORMAL H
TME LAST DOSE: ABNORMAL H
WBC # BLD AUTO: 4.7 10E3/UL (ref 4–11)

## 2025-01-06 PROCEDURE — 36415 COLL VENOUS BLD VENIPUNCTURE: CPT

## 2025-01-06 PROCEDURE — 80048 BASIC METABOLIC PNL TOTAL CA: CPT

## 2025-01-06 PROCEDURE — 80169 DRUG ASSAY EVEROLIMUS: CPT

## 2025-01-06 PROCEDURE — 82150 ASSAY OF AMYLASE: CPT

## 2025-01-06 PROCEDURE — 83690 ASSAY OF LIPASE: CPT

## 2025-01-06 PROCEDURE — 80158 DRUG ASSAY CYCLOSPORINE: CPT

## 2025-01-06 PROCEDURE — 87799 DETECT AGENT NOS DNA QUANT: CPT

## 2025-01-06 PROCEDURE — 85027 COMPLETE CBC AUTOMATED: CPT

## 2025-01-07 LAB
BK VIRUS DNA IU/ML, INSTRUMENT (6800): ABNORMAL IU/ML
BK VIRUS SPECIMEN TYPE: ABNORMAL
BKV DNA SPEC NAA+PROBE-LOG#: 5.8 {LOG_COPIES}/ML
EVEROLIMUS BLD-MCNC: 5.9 UG/L (ref 3–8)
TME LAST DOSE: NORMAL H
TME LAST DOSE: NORMAL H

## 2025-01-08 ENCOUNTER — TELEPHONE (OUTPATIENT)
Dept: TRANSPLANT | Facility: CLINIC | Age: 70
End: 2025-01-08
Payer: COMMERCIAL

## 2025-01-08 DIAGNOSIS — Z94.0 HTN, KIDNEY TRANSPLANT RELATED: ICD-10-CM

## 2025-01-08 DIAGNOSIS — I15.1 HTN, KIDNEY TRANSPLANT RELATED: ICD-10-CM

## 2025-01-08 DIAGNOSIS — B34.8 BK VIREMIA: ICD-10-CM

## 2025-01-08 NOTE — TELEPHONE ENCOUNTER
CSA = 48, within goal 50-75. Has BK viremia.   Reviewed CSA level with Dr. Mosquera, no changes in immunosuppression for now.

## 2025-01-13 ENCOUNTER — TELEPHONE (OUTPATIENT)
Dept: TRANSPLANT | Facility: CLINIC | Age: 70
End: 2025-01-13

## 2025-01-13 ENCOUNTER — LAB (OUTPATIENT)
Dept: LAB | Facility: CLINIC | Age: 70
End: 2025-01-13
Payer: COMMERCIAL

## 2025-01-13 DIAGNOSIS — I15.1 HTN, KIDNEY TRANSPLANT RELATED: ICD-10-CM

## 2025-01-13 DIAGNOSIS — Z48.298 AFTERCARE FOLLOWING ORGAN TRANSPLANT: ICD-10-CM

## 2025-01-13 DIAGNOSIS — Z94.0 HTN, KIDNEY TRANSPLANT RELATED: ICD-10-CM

## 2025-01-13 DIAGNOSIS — B34.8 BK VIREMIA: ICD-10-CM

## 2025-01-13 LAB
AMYLASE SERPL-CCNC: 66 U/L (ref 28–100)
ANION GAP SERPL CALCULATED.3IONS-SCNC: 13 MMOL/L (ref 7–15)
BUN SERPL-MCNC: 26.7 MG/DL (ref 8–23)
CALCIUM SERPL-MCNC: 8.6 MG/DL (ref 8.8–10.4)
CHLORIDE SERPL-SCNC: 107 MMOL/L (ref 98–107)
CREAT SERPL-MCNC: 2.07 MG/DL (ref 0.67–1.17)
CYCLOSPORINE BLD LC/MS/MS-MCNC: 35 UG/L (ref 50–400)
EGFRCR SERPLBLD CKD-EPI 2021: 34 ML/MIN/1.73M2
ERYTHROCYTE [DISTWIDTH] IN BLOOD BY AUTOMATED COUNT: 13.6 % (ref 10–15)
GLUCOSE SERPL-MCNC: 85 MG/DL (ref 70–99)
HCO3 SERPL-SCNC: 24 MMOL/L (ref 22–29)
HCT VFR BLD AUTO: 35.8 % (ref 40–53)
HGB BLD-MCNC: 11.1 G/DL (ref 13.3–17.7)
LIPASE SERPL-CCNC: 41 U/L (ref 13–60)
MCH RBC QN AUTO: 28.3 PG (ref 26.5–33)
MCHC RBC AUTO-ENTMCNC: 31 G/DL (ref 31.5–36.5)
MCV RBC AUTO: 91 FL (ref 78–100)
PLATELET # BLD AUTO: 226 10E3/UL (ref 150–450)
POTASSIUM SERPL-SCNC: 3.6 MMOL/L (ref 3.4–5.3)
RBC # BLD AUTO: 3.92 10E6/UL (ref 4.4–5.9)
SODIUM SERPL-SCNC: 144 MMOL/L (ref 135–145)
TME LAST DOSE: ABNORMAL H
TME LAST DOSE: ABNORMAL H
WBC # BLD AUTO: 5.8 10E3/UL (ref 4–11)

## 2025-01-13 PROCEDURE — 85027 COMPLETE CBC AUTOMATED: CPT

## 2025-01-13 PROCEDURE — 83690 ASSAY OF LIPASE: CPT

## 2025-01-13 PROCEDURE — 80048 BASIC METABOLIC PNL TOTAL CA: CPT

## 2025-01-13 PROCEDURE — 80169 DRUG ASSAY EVEROLIMUS: CPT

## 2025-01-13 PROCEDURE — 87799 DETECT AGENT NOS DNA QUANT: CPT

## 2025-01-13 PROCEDURE — 36415 COLL VENOUS BLD VENIPUNCTURE: CPT

## 2025-01-13 PROCEDURE — 82150 ASSAY OF AMYLASE: CPT

## 2025-01-13 PROCEDURE — 80158 DRUG ASSAY CYCLOSPORINE: CPT

## 2025-01-13 RX ORDER — EVEROLIMUS 0.5 MG/1
0.5 TABLET ORAL 2 TIMES DAILY
Qty: 60 TABLET | Refills: 11 | Status: SHIPPED | OUTPATIENT
Start: 2025-01-13

## 2025-01-13 NOTE — TELEPHONE ENCOUNTER
General  Route to LPN    Reason for call: Pt said he tried to fill a prescription but it is canceled     Call back needed? Yes    Return Call Needed  Same as documented in contacts section  When to return call?: Greater than one day: Route standard priority

## 2025-01-13 NOTE — TELEPHONE ENCOUNTER
Patient states his Everolimus rx was unable to be refilled. Sent refill for patient to get this at [facility]. Labs for today pending.

## 2025-01-14 ENCOUNTER — TELEPHONE (OUTPATIENT)
Dept: TRANSPLANT | Facility: CLINIC | Age: 70
End: 2025-01-14
Payer: COMMERCIAL

## 2025-01-14 DIAGNOSIS — I15.1 HTN, KIDNEY TRANSPLANT RELATED: ICD-10-CM

## 2025-01-14 DIAGNOSIS — Z48.298 AFTERCARE FOLLOWING ORGAN TRANSPLANT: Primary | ICD-10-CM

## 2025-01-14 DIAGNOSIS — Z94.0 HTN, KIDNEY TRANSPLANT RELATED: ICD-10-CM

## 2025-01-14 DIAGNOSIS — B34.8 BK VIREMIA: ICD-10-CM

## 2025-01-14 LAB
BK VIRUS DNA IU/ML, INSTRUMENT (6800): ABNORMAL IU/ML
BK VIRUS SPECIMEN TYPE: ABNORMAL
BKV DNA SPEC NAA+PROBE-LOG#: 5.6 {LOG_COPIES}/ML
EVEROLIMUS BLD-MCNC: 5.6 UG/L (ref 3–8)
TME LAST DOSE: NORMAL H
TME LAST DOSE: NORMAL H

## 2025-01-14 RX ORDER — CYCLOSPORINE 25 MG/1
100 CAPSULE, LIQUID FILLED ORAL 2 TIMES DAILY
Qty: 240 CAPSULE | Refills: 11 | Status: SHIPPED | OUTPATIENT
Start: 2025-01-14

## 2025-01-14 NOTE — TELEPHONE ENCOUNTER
LVM for patient to increase Cyclosporine to 100mg BID due to low level. Instructed patient to let RNCC know when he received the message to confirm change.

## 2025-01-15 ENCOUNTER — TELEPHONE (OUTPATIENT)
Dept: TRANSPLANT | Facility: CLINIC | Age: 70
End: 2025-01-15
Payer: COMMERCIAL

## 2025-01-15 DIAGNOSIS — B34.8 BK VIREMIA: ICD-10-CM

## 2025-01-15 DIAGNOSIS — I15.1 HTN, KIDNEY TRANSPLANT RELATED: ICD-10-CM

## 2025-01-15 DIAGNOSIS — B34.8 BK VIREMIA: Primary | ICD-10-CM

## 2025-01-15 DIAGNOSIS — Z95.1 S/P CABG (CORONARY ARTERY BYPASS GRAFT): ICD-10-CM

## 2025-01-15 DIAGNOSIS — Z94.0 KIDNEY TRANSPLANTED: ICD-10-CM

## 2025-01-15 DIAGNOSIS — Z94.0 HTN, KIDNEY TRANSPLANT RELATED: ICD-10-CM

## 2025-01-15 DIAGNOSIS — Z48.298 AFTERCARE FOLLOWING ORGAN TRANSPLANT: ICD-10-CM

## 2025-01-15 DIAGNOSIS — Z94.0 HISTORY OF SIMULTANEOUS KIDNEY AND PANCREAS TRANSPLANT (H): ICD-10-CM

## 2025-01-15 DIAGNOSIS — Z94.83 HISTORY OF SIMULTANEOUS KIDNEY AND PANCREAS TRANSPLANT (H): ICD-10-CM

## 2025-01-15 NOTE — TELEPHONE ENCOUNTER
Post Kidney and Pancreas Transplant Team Conference  Date: 1/15/2025  Transplant Coordinator: Makayla Wu, RN     Attendees:  [x]  Dr. Mosquera [] Kathi Jaquez, KARINE [] Chery Ha LPN     [x]  Dr. Ruiz [x] Makayla Wu, RN [] Sarah Chiu LPN    [x] Dr. Crum [x] Savana Lazar RN    [x] Dr. Yee [] Meena Carlisle RN [x] Rhiannon Schmitz RN   [x] Dr. Lofton [x] Farheen Omalley, KARINE    [] Dr. Farnsworth [x] Suha Charlton RN    []  Dr. Ascencio [x] Quin Pineda RN    [] Dr. Reeves [x] Handy Ha RN     [x] Claudia Jara RN    [x] Carolee Mcdermott NP [x] Dedra Fuentes RN        Verbal Plan Read Back:   Persistent BK - reduce BK checks to every other week.  No change in IS at this time (current IS : Everolimus and CSA, goal 50 - 75)  Repeat IgG      Routed to RN Coordinator   Khadijah Schmitz RN

## 2025-01-16 RX ORDER — ATORVASTATIN CALCIUM 20 MG/1
20 TABLET, FILM COATED ORAL AT BEDTIME
Qty: 30 TABLET | Refills: 11 | Status: SHIPPED | OUTPATIENT
Start: 2025-01-16

## 2025-01-19 ENCOUNTER — HEALTH MAINTENANCE LETTER (OUTPATIENT)
Age: 70
End: 2025-01-19

## 2025-01-24 ENCOUNTER — TELEPHONE (OUTPATIENT)
Dept: TRANSPLANT | Facility: CLINIC | Age: 70
End: 2025-01-24
Payer: COMMERCIAL

## 2025-01-24 DIAGNOSIS — I15.1 HTN, KIDNEY TRANSPLANT RELATED: ICD-10-CM

## 2025-01-24 DIAGNOSIS — B34.8 BK VIREMIA: Primary | ICD-10-CM

## 2025-01-24 DIAGNOSIS — Z48.298 AFTERCARE FOLLOWING ORGAN TRANSPLANT: ICD-10-CM

## 2025-01-24 DIAGNOSIS — Z94.0 HTN, KIDNEY TRANSPLANT RELATED: ICD-10-CM

## 2025-01-24 NOTE — TELEPHONE ENCOUNTER
Pt called to touch base with the RNCC regarding a medication that he may be having a reaction to and wanted to discuss it.

## 2025-01-24 NOTE — LETTER
January 30, 2025      Avelina Marion  [address] [place] [address]           Medication Plan            Accurate as of January 24, 2025 11:59 PM. Always use your most recent med list.                Take these medications at their scheduled times        Morning Noon Evening Bedtime   cycloSPORINE modified 25 MG capsule  Dose: 50 mg  Take 2 capsules (50 mg) by mouth 2 times daily.  This medication is very important: It prevents organ rejection.  Commonly known as: GENERIC EQUIVALENT     2 capsules      2 capsules      everolimus 0.5 MG tablet  Dose: 0.5 mg  Take 1 tablet (0.5 mg) by mouth 2 times daily.  This medication is very important: It prevents organ rejection.  Commonly known as: ZORTRESS     1 tablet      1 tablet      sulfamethoxazole-trimethoprim 400-80 MG tablet  For: PCP Prophylaxis  Dose: 1 tablet  Take 1 tablet by mouth three times a week  This medication is very important: It prevents dangerous infection.  Commonly known as: BACTRIM      Take 1 tablet by mouth three times a week     aspirin 81 MG EC tablet  Dose: 81 mg     1 tablet        atorvastatin 20 MG tablet  Dose: 20 mg  Take 1 tablet (20 mg) by mouth at bedtime.  Commonly known as: LIPITOR        1 tablet     calcium carbonate-vitamin D 500-5 MG-MCG tablet  Dose: 1 tablet  Take 1 tablet by mouth 2 times daily  Commonly known as: OSCAL     1 tablet      1 tablet      carvedilol 6.25 MG tablet  Dose: 6.25 mg  Take 1 tablet (6.25 mg) by mouth 2 times daily (with meals).  Commonly known as: COREG     1 tablet      1 tablet      magnesium oxide 400 MG tablet  Dose: 400 mg  Take 1 tablet (400 mg) by mouth every 24 hours.  Commonly known as: MAG-OX      Take 1 tablet (400 mg) by mouth every 24 hours.     Pill Splitter Misc  Dose: 1 applicator  1 applicator daily     1 applicator        sodium bicarbonate 650 MG tablet  Dose: 1,300 mg  Take 2 tablets (1,300 mg) by mouth 2 times daily.     2 tablets      2 tablets      sodium zirconium  cyclosilicate 10 g Pack packet  Dose: 10 g  Take 1 packet (10 g) by mouth every other day.  Commonly known as: Lokelma      Take 1 packet (10 g) by mouth every other day.

## 2025-01-27 ENCOUNTER — LAB (OUTPATIENT)
Dept: LAB | Facility: CLINIC | Age: 70
End: 2025-01-27
Payer: COMMERCIAL

## 2025-01-27 DIAGNOSIS — B34.8 BK VIREMIA: ICD-10-CM

## 2025-01-27 DIAGNOSIS — Z48.298 AFTERCARE FOLLOWING ORGAN TRANSPLANT: ICD-10-CM

## 2025-01-27 LAB
AMYLASE SERPL-CCNC: 66 U/L (ref 28–100)
ANION GAP SERPL CALCULATED.3IONS-SCNC: 9 MMOL/L (ref 7–15)
BUN SERPL-MCNC: 28 MG/DL (ref 8–23)
CALCIUM SERPL-MCNC: 8.9 MG/DL (ref 8.8–10.4)
CHLORIDE SERPL-SCNC: 106 MMOL/L (ref 98–107)
CREAT SERPL-MCNC: 2.21 MG/DL (ref 0.67–1.17)
EGFRCR SERPLBLD CKD-EPI 2021: 32 ML/MIN/1.73M2
ERYTHROCYTE [DISTWIDTH] IN BLOOD BY AUTOMATED COUNT: 13.9 % (ref 10–15)
GLUCOSE SERPL-MCNC: 90 MG/DL (ref 70–99)
HCO3 SERPL-SCNC: 27 MMOL/L (ref 22–29)
HCT VFR BLD AUTO: 36.5 % (ref 40–53)
HGB BLD-MCNC: 11.7 G/DL (ref 13.3–17.7)
LIPASE SERPL-CCNC: 39 U/L (ref 13–60)
MCH RBC QN AUTO: 28.5 PG (ref 26.5–33)
MCHC RBC AUTO-ENTMCNC: 32.1 G/DL (ref 31.5–36.5)
MCV RBC AUTO: 89 FL (ref 78–100)
PLATELET # BLD AUTO: 217 10E3/UL (ref 150–450)
POTASSIUM SERPL-SCNC: 3.7 MMOL/L (ref 3.4–5.3)
RBC # BLD AUTO: 4.1 10E6/UL (ref 4.4–5.9)
SODIUM SERPL-SCNC: 142 MMOL/L (ref 135–145)
WBC # BLD AUTO: 4.6 10E3/UL (ref 4–11)

## 2025-01-27 PROCEDURE — 36415 COLL VENOUS BLD VENIPUNCTURE: CPT

## 2025-01-27 PROCEDURE — 85027 COMPLETE CBC AUTOMATED: CPT

## 2025-01-27 PROCEDURE — 82150 ASSAY OF AMYLASE: CPT

## 2025-01-27 PROCEDURE — 83690 ASSAY OF LIPASE: CPT

## 2025-01-27 PROCEDURE — 80158 DRUG ASSAY CYCLOSPORINE: CPT

## 2025-01-27 PROCEDURE — 87799 DETECT AGENT NOS DNA QUANT: CPT

## 2025-01-27 PROCEDURE — 80169 DRUG ASSAY EVEROLIMUS: CPT

## 2025-01-27 PROCEDURE — 82784 ASSAY IGA/IGD/IGG/IGM EACH: CPT

## 2025-01-27 PROCEDURE — 80048 BASIC METABOLIC PNL TOTAL CA: CPT

## 2025-01-28 LAB
BK VIRUS DNA IU/ML, INSTRUMENT (6800): ABNORMAL IU/ML
BK VIRUS SPECIMEN TYPE: ABNORMAL
BKV DNA SPEC NAA+PROBE-LOG#: 5 {LOG_COPIES}/ML
CYCLOSPORINE BLD LC/MS/MS-MCNC: 137 UG/L (ref 50–400)
EVEROLIMUS BLD-MCNC: 5.9 UG/L (ref 3–8)
IGG SERPL-MCNC: 1015 MG/DL (ref 610–1616)
TME LAST DOSE: NORMAL H

## 2025-01-29 ENCOUNTER — TELEPHONE (OUTPATIENT)
Dept: TRANSPLANT | Facility: CLINIC | Age: 70
End: 2025-01-29
Payer: COMMERCIAL

## 2025-01-29 DIAGNOSIS — B34.8 BK VIREMIA: Primary | ICD-10-CM

## 2025-01-29 DIAGNOSIS — I15.1 HTN, KIDNEY TRANSPLANT RELATED: ICD-10-CM

## 2025-01-29 DIAGNOSIS — Z94.0 HTN, KIDNEY TRANSPLANT RELATED: ICD-10-CM

## 2025-01-30 DIAGNOSIS — Z94.0 HTN, KIDNEY TRANSPLANT RELATED: ICD-10-CM

## 2025-01-30 DIAGNOSIS — I15.1 HTN, KIDNEY TRANSPLANT RELATED: ICD-10-CM

## 2025-01-30 DIAGNOSIS — B34.8 BK VIREMIA: Primary | ICD-10-CM

## 2025-01-30 DIAGNOSIS — Z48.298 AFTERCARE FOLLOWING ORGAN TRANSPLANT: ICD-10-CM

## 2025-01-30 RX ORDER — CYCLOSPORINE 25 MG/1
50 CAPSULE, LIQUID FILLED ORAL 2 TIMES DAILY
Qty: 120 CAPSULE | Refills: 11 | Status: SHIPPED | OUTPATIENT
Start: 2025-01-30

## 2025-01-30 NOTE — TELEPHONE ENCOUNTER
Spoke with patient to decrease cyclosporine dose; patient had dose confused last week and was only taking 25mg BID. We increased to 75mg BID and he states he felt lightheaded. Has not checked bp but will do so. CSA level was 130, so we reduced to 50mg BID and will repeat labs in two weeks.

## 2025-02-10 ENCOUNTER — LAB (OUTPATIENT)
Dept: LAB | Facility: CLINIC | Age: 70
End: 2025-02-10
Payer: COMMERCIAL

## 2025-02-10 DIAGNOSIS — Z48.298 AFTERCARE FOLLOWING ORGAN TRANSPLANT: ICD-10-CM

## 2025-02-10 LAB
AMYLASE SERPL-CCNC: 67 U/L (ref 28–100)
ANION GAP SERPL CALCULATED.3IONS-SCNC: 16 MMOL/L (ref 7–15)
BUN SERPL-MCNC: 26.4 MG/DL (ref 8–23)
CALCIUM SERPL-MCNC: 7.9 MG/DL (ref 8.8–10.4)
CHLORIDE SERPL-SCNC: 106 MMOL/L (ref 98–107)
CREAT SERPL-MCNC: 2.22 MG/DL (ref 0.67–1.17)
EGFRCR SERPLBLD CKD-EPI 2021: 32 ML/MIN/1.73M2
ERYTHROCYTE [DISTWIDTH] IN BLOOD BY AUTOMATED COUNT: 14.7 % (ref 10–15)
GLUCOSE SERPL-MCNC: 93 MG/DL (ref 70–99)
HCO3 SERPL-SCNC: 20 MMOL/L (ref 22–29)
HCT VFR BLD AUTO: 34.5 % (ref 40–53)
HGB BLD-MCNC: 10.8 G/DL (ref 13.3–17.7)
LIPASE SERPL-CCNC: 34 U/L (ref 13–60)
MCH RBC QN AUTO: 28.5 PG (ref 26.5–33)
MCHC RBC AUTO-ENTMCNC: 31.3 G/DL (ref 31.5–36.5)
MCV RBC AUTO: 91 FL (ref 78–100)
PLATELET # BLD AUTO: 211 10E3/UL (ref 150–450)
POTASSIUM SERPL-SCNC: 3.5 MMOL/L (ref 3.4–5.3)
RBC # BLD AUTO: 3.79 10E6/UL (ref 4.4–5.9)
SODIUM SERPL-SCNC: 142 MMOL/L (ref 135–145)
WBC # BLD AUTO: 4.2 10E3/UL (ref 4–11)

## 2025-02-10 PROCEDURE — 83690 ASSAY OF LIPASE: CPT

## 2025-02-10 PROCEDURE — 85027 COMPLETE CBC AUTOMATED: CPT

## 2025-02-10 PROCEDURE — 80158 DRUG ASSAY CYCLOSPORINE: CPT

## 2025-02-10 PROCEDURE — 80048 BASIC METABOLIC PNL TOTAL CA: CPT

## 2025-02-10 PROCEDURE — 36415 COLL VENOUS BLD VENIPUNCTURE: CPT

## 2025-02-10 PROCEDURE — 87799 DETECT AGENT NOS DNA QUANT: CPT

## 2025-02-10 PROCEDURE — 82150 ASSAY OF AMYLASE: CPT

## 2025-02-10 PROCEDURE — 80169 DRUG ASSAY EVEROLIMUS: CPT

## 2025-02-11 LAB
BK VIRUS DNA IU/ML, INSTRUMENT (6800): ABNORMAL IU/ML
BK VIRUS SPECIMEN TYPE: ABNORMAL
BKV DNA SPEC NAA+PROBE-LOG#: 5.1 {LOG_COPIES}/ML
CYCLOSPORINE BLD LC/MS/MS-MCNC: 102 UG/L (ref 50–400)
EVEROLIMUS BLD-MCNC: 5.4 UG/L (ref 3–8)
TME LAST DOSE: NORMAL H

## 2025-02-14 ENCOUNTER — TELEPHONE (OUTPATIENT)
Dept: TRANSPLANT | Facility: CLINIC | Age: 70
End: 2025-02-14
Payer: COMMERCIAL

## 2025-02-14 DIAGNOSIS — I15.1 HTN, KIDNEY TRANSPLANT RELATED: ICD-10-CM

## 2025-02-14 DIAGNOSIS — Z94.0 HTN, KIDNEY TRANSPLANT RELATED: ICD-10-CM

## 2025-02-14 DIAGNOSIS — B34.8 BK VIREMIA: Primary | ICD-10-CM

## 2025-02-14 NOTE — TELEPHONE ENCOUNTER
Patient called stating he hasn't been feeling well. Reports he has been getting dizzy when he goes up and down stairs. On Wednesday, he took the garbage out and fell due to being out of breath, lightheaded. Hit his head he thinks, but doesn't recall the incident.     Has been taking his BP more consistently due to RNCC wanting him to check this more frequently and his numbers have been 85/49, HR 82. I am suspecting he is getting orthostatic when he stands. Do we want to hold his Coreg? He states he is drinking 40+ oz of fluid per day, but I can also offer IVF if recommended.

## 2025-02-17 NOTE — TELEPHONE ENCOUNTER
Jesu Mosquera MD Colianni, Lauren, RN  Yes, should hold his carvedilol.    Would need to look at starting fludrocortisone if BP remains low.  Could give IV fluids in the mean time.    Miguel      Touched base with patient this AM. He states he did some orthostatic pressures and were as follows:    136/71 HR 64 sitting  120/60 HR 70 standing    He states the dizziness comes from when he moves around or exerts energy. Patient will hold Coreg per Dr. Mosquera and discuss how he feels later this week after recording some bp's. Denies swelling, chest pain or nausea. RNCC discussed reasons to present to the ER like chest pain, lightheadedness that doesn't subside, nausea or syncope.

## 2025-02-23 ENCOUNTER — MYC REFILL (OUTPATIENT)
Dept: TRANSPLANT | Facility: CLINIC | Age: 70
End: 2025-02-23
Payer: COMMERCIAL

## 2025-02-23 DIAGNOSIS — Z94.0 HISTORY OF SIMULTANEOUS KIDNEY AND PANCREAS TRANSPLANT (H): ICD-10-CM

## 2025-02-23 DIAGNOSIS — Z94.0 HTN, KIDNEY TRANSPLANT RELATED: ICD-10-CM

## 2025-02-23 DIAGNOSIS — I15.1 HTN, KIDNEY TRANSPLANT RELATED: ICD-10-CM

## 2025-02-23 DIAGNOSIS — B34.8 BK VIREMIA: Primary | ICD-10-CM

## 2025-02-23 DIAGNOSIS — Z94.83 HISTORY OF SIMULTANEOUS KIDNEY AND PANCREAS TRANSPLANT (H): ICD-10-CM

## 2025-02-24 ENCOUNTER — LAB (OUTPATIENT)
Dept: LAB | Facility: CLINIC | Age: 70
End: 2025-02-24
Payer: COMMERCIAL

## 2025-02-24 DIAGNOSIS — Z48.298 AFTERCARE FOLLOWING ORGAN TRANSPLANT: ICD-10-CM

## 2025-02-24 LAB
CYCLOSPORINE BLD LC/MS/MS-MCNC: 88 UG/L (ref 50–400)
ERYTHROCYTE [DISTWIDTH] IN BLOOD BY AUTOMATED COUNT: 15.5 % (ref 10–15)
HCT VFR BLD AUTO: 34.3 % (ref 40–53)
HGB BLD-MCNC: 10.7 G/DL (ref 13.3–17.7)
MCH RBC QN AUTO: 28.5 PG (ref 26.5–33)
MCHC RBC AUTO-ENTMCNC: 31.2 G/DL (ref 31.5–36.5)
MCV RBC AUTO: 91 FL (ref 78–100)
PLATELET # BLD AUTO: 232 10E3/UL (ref 150–450)
RBC # BLD AUTO: 3.76 10E6/UL (ref 4.4–5.9)
TME LAST DOSE: NORMAL H
TME LAST DOSE: NORMAL H
WBC # BLD AUTO: 5.9 10E3/UL (ref 4–11)

## 2025-02-24 PROCEDURE — 80048 BASIC METABOLIC PNL TOTAL CA: CPT

## 2025-02-24 PROCEDURE — 85027 COMPLETE CBC AUTOMATED: CPT

## 2025-02-24 PROCEDURE — 80158 DRUG ASSAY CYCLOSPORINE: CPT

## 2025-02-24 PROCEDURE — 82150 ASSAY OF AMYLASE: CPT

## 2025-02-24 PROCEDURE — 87799 DETECT AGENT NOS DNA QUANT: CPT

## 2025-02-24 PROCEDURE — 36415 COLL VENOUS BLD VENIPUNCTURE: CPT

## 2025-02-24 PROCEDURE — 83690 ASSAY OF LIPASE: CPT

## 2025-02-24 RX ORDER — SULFAMETHOXAZOLE AND TRIMETHOPRIM 400; 80 MG/1; MG/1
1 TABLET ORAL
Qty: 30 TABLET | Refills: 11 | Status: SHIPPED | OUTPATIENT
Start: 2025-02-25

## 2025-02-25 LAB
AMYLASE SERPL-CCNC: 67 U/L (ref 28–100)
ANION GAP SERPL CALCULATED.3IONS-SCNC: 12 MMOL/L (ref 7–15)
BK VIRUS DNA IU/ML, INSTRUMENT (6800): ABNORMAL IU/ML
BK VIRUS SPECIMEN TYPE: ABNORMAL
BKV DNA SPEC NAA+PROBE-LOG#: 5 {LOG_COPIES}/ML
BUN SERPL-MCNC: 18.9 MG/DL (ref 8–23)
CALCIUM SERPL-MCNC: 8.4 MG/DL (ref 8.8–10.4)
CHLORIDE SERPL-SCNC: 107 MMOL/L (ref 98–107)
CREAT SERPL-MCNC: 2.01 MG/DL (ref 0.67–1.17)
EGFRCR SERPLBLD CKD-EPI 2021: 36 ML/MIN/1.73M2
GLUCOSE SERPL-MCNC: 80 MG/DL (ref 70–99)
HCO3 SERPL-SCNC: 23 MMOL/L (ref 22–29)
LIPASE SERPL-CCNC: 41 U/L (ref 13–60)
POTASSIUM SERPL-SCNC: 3.7 MMOL/L (ref 3.4–5.3)
SODIUM SERPL-SCNC: 142 MMOL/L (ref 135–145)

## 2025-03-10 ENCOUNTER — LAB (OUTPATIENT)
Dept: LAB | Facility: CLINIC | Age: 70
End: 2025-03-10
Payer: COMMERCIAL

## 2025-03-10 DIAGNOSIS — Z48.298 AFTERCARE FOLLOWING ORGAN TRANSPLANT: ICD-10-CM

## 2025-03-10 LAB
AMYLASE SERPL-CCNC: 73 U/L (ref 28–100)
ANION GAP SERPL CALCULATED.3IONS-SCNC: 14 MMOL/L (ref 7–15)
BUN SERPL-MCNC: 25.4 MG/DL (ref 8–23)
CALCIUM SERPL-MCNC: 9.7 MG/DL (ref 8.8–10.4)
CHLORIDE SERPL-SCNC: 105 MMOL/L (ref 98–107)
CREAT SERPL-MCNC: 1.99 MG/DL (ref 0.67–1.17)
CYCLOSPORINE BLD LC/MS/MS-MCNC: 71 UG/L (ref 50–400)
EGFRCR SERPLBLD CKD-EPI 2021: 36 ML/MIN/1.73M2
ERYTHROCYTE [DISTWIDTH] IN BLOOD BY AUTOMATED COUNT: 14.8 % (ref 10–15)
GLUCOSE SERPL-MCNC: 92 MG/DL (ref 70–99)
HCO3 SERPL-SCNC: 23 MMOL/L (ref 22–29)
HCT VFR BLD AUTO: 37.8 % (ref 40–53)
HGB BLD-MCNC: 12 G/DL (ref 13.3–17.7)
LIPASE SERPL-CCNC: 40 U/L (ref 13–60)
MCH RBC QN AUTO: 28.4 PG (ref 26.5–33)
MCHC RBC AUTO-ENTMCNC: 31.7 G/DL (ref 31.5–36.5)
MCV RBC AUTO: 89 FL (ref 78–100)
PLATELET # BLD AUTO: 225 10E3/UL (ref 150–450)
POTASSIUM SERPL-SCNC: 3.8 MMOL/L (ref 3.4–5.3)
RBC # BLD AUTO: 4.23 10E6/UL (ref 4.4–5.9)
SODIUM SERPL-SCNC: 142 MMOL/L (ref 135–145)
TME LAST DOSE: NORMAL H
TME LAST DOSE: NORMAL H
WBC # BLD AUTO: 5.7 10E3/UL (ref 4–11)

## 2025-03-10 PROCEDURE — 85027 COMPLETE CBC AUTOMATED: CPT

## 2025-03-10 PROCEDURE — 36415 COLL VENOUS BLD VENIPUNCTURE: CPT

## 2025-03-10 PROCEDURE — 83690 ASSAY OF LIPASE: CPT

## 2025-03-10 PROCEDURE — 80158 DRUG ASSAY CYCLOSPORINE: CPT

## 2025-03-10 PROCEDURE — 82150 ASSAY OF AMYLASE: CPT

## 2025-03-10 PROCEDURE — 80048 BASIC METABOLIC PNL TOTAL CA: CPT

## 2025-03-10 PROCEDURE — 80169 DRUG ASSAY EVEROLIMUS: CPT

## 2025-03-10 PROCEDURE — 87799 DETECT AGENT NOS DNA QUANT: CPT

## 2025-03-11 LAB
BK VIRUS DNA IU/ML, INSTRUMENT (6800): ABNORMAL IU/ML
BK VIRUS SPECIMEN TYPE: ABNORMAL
BKV DNA SPEC NAA+PROBE-LOG#: 4.6 {LOG_COPIES}/ML
EVEROLIMUS BLD-MCNC: 4.3 UG/L (ref 3–8)
TME LAST DOSE: NORMAL H
TME LAST DOSE: NORMAL H

## 2025-03-24 ENCOUNTER — LAB (OUTPATIENT)
Dept: LAB | Facility: CLINIC | Age: 70
End: 2025-03-24
Payer: COMMERCIAL

## 2025-03-24 DIAGNOSIS — Z94.0 KIDNEY REPLACED BY TRANSPLANT: ICD-10-CM

## 2025-03-24 DIAGNOSIS — Z48.298 AFTERCARE FOLLOWING ORGAN TRANSPLANT: ICD-10-CM

## 2025-03-24 DIAGNOSIS — Z94.83 PANCREAS REPLACED BY TRANSPLANT (H): ICD-10-CM

## 2025-03-24 DIAGNOSIS — Z79.899 ENCOUNTER FOR LONG-TERM CURRENT USE OF MEDICATION: ICD-10-CM

## 2025-03-24 DIAGNOSIS — Z98.890 OTHER SPECIFIED POSTPROCEDURAL STATES: ICD-10-CM

## 2025-03-24 LAB
AMYLASE SERPL-CCNC: 68 U/L (ref 28–100)
CYCLOSPORINE BLD LC/MS/MS-MCNC: 97 UG/L (ref 50–400)
LIPASE SERPL-CCNC: 33 U/L (ref 13–60)
TME LAST DOSE: NORMAL H
TME LAST DOSE: NORMAL H

## 2025-03-24 PROCEDURE — 87799 DETECT AGENT NOS DNA QUANT: CPT

## 2025-03-24 PROCEDURE — 36415 COLL VENOUS BLD VENIPUNCTURE: CPT

## 2025-03-24 PROCEDURE — 80158 DRUG ASSAY CYCLOSPORINE: CPT

## 2025-03-24 PROCEDURE — 83690 ASSAY OF LIPASE: CPT

## 2025-03-24 PROCEDURE — 82150 ASSAY OF AMYLASE: CPT

## 2025-03-24 PROCEDURE — 80169 DRUG ASSAY EVEROLIMUS: CPT

## 2025-03-25 LAB
BK VIRUS DNA IU/ML, INSTRUMENT (6800): ABNORMAL IU/ML
BK VIRUS SPECIMEN TYPE: ABNORMAL
BKV DNA SPEC NAA+PROBE-LOG#: 4.3 {LOG_COPIES}/ML
EVEROLIMUS BLD-MCNC: 5.3 UG/L (ref 3–8)
TME LAST DOSE: NORMAL H
TME LAST DOSE: NORMAL H

## 2025-04-07 ENCOUNTER — LAB (OUTPATIENT)
Dept: LAB | Facility: CLINIC | Age: 70
End: 2025-04-07
Payer: COMMERCIAL

## 2025-04-07 DIAGNOSIS — Z98.890 OTHER SPECIFIED POSTPROCEDURAL STATES: ICD-10-CM

## 2025-04-07 DIAGNOSIS — Z79.899 ENCOUNTER FOR LONG-TERM CURRENT USE OF MEDICATION: ICD-10-CM

## 2025-04-07 DIAGNOSIS — Z94.0 HTN, KIDNEY TRANSPLANT RELATED: ICD-10-CM

## 2025-04-07 DIAGNOSIS — Z94.0 KIDNEY REPLACED BY TRANSPLANT: ICD-10-CM

## 2025-04-07 DIAGNOSIS — B34.8 BK VIREMIA: Primary | ICD-10-CM

## 2025-04-07 DIAGNOSIS — Z94.83 PANCREAS REPLACED BY TRANSPLANT (H): ICD-10-CM

## 2025-04-07 DIAGNOSIS — Z48.298 AFTERCARE FOLLOWING ORGAN TRANSPLANT: ICD-10-CM

## 2025-04-07 DIAGNOSIS — I15.1 HTN, KIDNEY TRANSPLANT RELATED: ICD-10-CM

## 2025-04-07 LAB
AMYLASE SERPL-CCNC: 71 U/L (ref 28–100)
CYCLOSPORINE BLD LC/MS/MS-MCNC: 100 UG/L (ref 50–400)
LIPASE SERPL-CCNC: 37 U/L (ref 13–60)
TME LAST DOSE: NORMAL H
TME LAST DOSE: NORMAL H

## 2025-04-07 PROCEDURE — 80169 DRUG ASSAY EVEROLIMUS: CPT

## 2025-04-07 PROCEDURE — 87799 DETECT AGENT NOS DNA QUANT: CPT

## 2025-04-07 PROCEDURE — 80158 DRUG ASSAY CYCLOSPORINE: CPT

## 2025-04-07 PROCEDURE — 83690 ASSAY OF LIPASE: CPT

## 2025-04-07 PROCEDURE — 82150 ASSAY OF AMYLASE: CPT

## 2025-04-07 PROCEDURE — 36415 COLL VENOUS BLD VENIPUNCTURE: CPT

## 2025-04-08 LAB
BK VIRUS DNA IU/ML, INSTRUMENT (6800): ABNORMAL IU/ML
BK VIRUS SPECIMEN TYPE: ABNORMAL
BKV DNA SPEC NAA+PROBE-LOG#: 4.2 {LOG_COPIES}/ML
EVEROLIMUS BLD-MCNC: 5.6 UG/L (ref 3–8)
TME LAST DOSE: NORMAL H
TME LAST DOSE: NORMAL H

## 2025-04-27 ENCOUNTER — HEALTH MAINTENANCE LETTER (OUTPATIENT)
Age: 70
End: 2025-04-27

## 2025-04-28 ENCOUNTER — LAB (OUTPATIENT)
Dept: LAB | Facility: CLINIC | Age: 70
End: 2025-04-28
Payer: COMMERCIAL

## 2025-04-28 DIAGNOSIS — Z48.298 AFTERCARE FOLLOWING ORGAN TRANSPLANT: ICD-10-CM

## 2025-04-28 DIAGNOSIS — Z94.0 KIDNEY REPLACED BY TRANSPLANT: ICD-10-CM

## 2025-04-28 DIAGNOSIS — Z98.890 OTHER SPECIFIED POSTPROCEDURAL STATES: ICD-10-CM

## 2025-04-28 DIAGNOSIS — Z94.83 PANCREAS REPLACED BY TRANSPLANT (H): ICD-10-CM

## 2025-04-28 DIAGNOSIS — Z79.899 ENCOUNTER FOR LONG-TERM CURRENT USE OF MEDICATION: ICD-10-CM

## 2025-04-28 LAB
AMYLASE SERPL-CCNC: 70 U/L (ref 28–100)
CYCLOSPORINE BLD LC/MS/MS-MCNC: 100 UG/L (ref 50–400)
LIPASE SERPL-CCNC: 36 U/L (ref 13–60)
TME LAST DOSE: NORMAL H
TME LAST DOSE: NORMAL H

## 2025-04-28 PROCEDURE — 83690 ASSAY OF LIPASE: CPT

## 2025-04-28 PROCEDURE — 80158 DRUG ASSAY CYCLOSPORINE: CPT

## 2025-04-28 PROCEDURE — 87799 DETECT AGENT NOS DNA QUANT: CPT

## 2025-04-28 PROCEDURE — 80169 DRUG ASSAY EVEROLIMUS: CPT

## 2025-04-28 PROCEDURE — 82150 ASSAY OF AMYLASE: CPT

## 2025-04-28 PROCEDURE — 36415 COLL VENOUS BLD VENIPUNCTURE: CPT

## 2025-04-29 LAB
BK VIRUS DNA IU/ML, INSTRUMENT (6800): ABNORMAL IU/ML
BK VIRUS SPECIMEN TYPE: ABNORMAL
BKV DNA SPEC NAA+PROBE-LOG#: 4.3 {LOG_COPIES}/ML
EVEROLIMUS BLD-MCNC: 5 UG/L (ref 3–8)
TME LAST DOSE: NORMAL H
TME LAST DOSE: NORMAL H

## 2025-05-13 ENCOUNTER — RESULTS FOLLOW-UP (OUTPATIENT)
Dept: TRANSPLANT | Facility: CLINIC | Age: 70
End: 2025-05-13

## 2025-05-13 ENCOUNTER — OFFICE VISIT (OUTPATIENT)
Dept: TRANSPLANT | Facility: CLINIC | Age: 70
End: 2025-05-13
Attending: INTERNAL MEDICINE
Payer: COMMERCIAL

## 2025-05-13 ENCOUNTER — LAB (OUTPATIENT)
Dept: LAB | Facility: CLINIC | Age: 70
End: 2025-05-13
Attending: INTERNAL MEDICINE
Payer: COMMERCIAL

## 2025-05-13 VITALS
SYSTOLIC BLOOD PRESSURE: 159 MMHG | WEIGHT: 217.8 LBS | HEART RATE: 83 BPM | DIASTOLIC BLOOD PRESSURE: 82 MMHG | HEIGHT: 72 IN | OXYGEN SATURATION: 92 % | BODY MASS INDEX: 29.5 KG/M2 | TEMPERATURE: 97.8 F

## 2025-05-13 DIAGNOSIS — B34.8 BK VIREMIA: ICD-10-CM

## 2025-05-13 DIAGNOSIS — Z94.83 PANCREAS REPLACED BY TRANSPLANT (H): ICD-10-CM

## 2025-05-13 DIAGNOSIS — D84.9 IMMUNOSUPPRESSED STATUS: ICD-10-CM

## 2025-05-13 DIAGNOSIS — E83.42 HYPOMAGNESEMIA: ICD-10-CM

## 2025-05-13 DIAGNOSIS — I15.1 HTN, KIDNEY TRANSPLANT RELATED: ICD-10-CM

## 2025-05-13 DIAGNOSIS — Z94.0 HTN, KIDNEY TRANSPLANT RELATED: ICD-10-CM

## 2025-05-13 DIAGNOSIS — Z94.0 KIDNEY REPLACED BY TRANSPLANT: Primary | ICD-10-CM

## 2025-05-13 DIAGNOSIS — Z94.0 KIDNEY REPLACED BY TRANSPLANT: ICD-10-CM

## 2025-05-13 DIAGNOSIS — Z48.298 AFTERCARE FOLLOWING ORGAN TRANSPLANT: ICD-10-CM

## 2025-05-13 DIAGNOSIS — D63.1 ANEMIA IN STAGE 3B CHRONIC KIDNEY DISEASE (H): ICD-10-CM

## 2025-05-13 DIAGNOSIS — N18.32 ANEMIA IN STAGE 3B CHRONIC KIDNEY DISEASE (H): ICD-10-CM

## 2025-05-13 DIAGNOSIS — Z98.890 OTHER SPECIFIED POSTPROCEDURAL STATES: ICD-10-CM

## 2025-05-13 DIAGNOSIS — N18.32 STAGE 3B CHRONIC KIDNEY DISEASE (H): ICD-10-CM

## 2025-05-13 DIAGNOSIS — K59.00 CONSTIPATION, UNSPECIFIED CONSTIPATION TYPE: ICD-10-CM

## 2025-05-13 DIAGNOSIS — Z79.899 ENCOUNTER FOR LONG-TERM CURRENT USE OF MEDICATION: ICD-10-CM

## 2025-05-13 DIAGNOSIS — Z29.89 NEED FOR PNEUMOCYSTIS PROPHYLAXIS: ICD-10-CM

## 2025-05-13 DIAGNOSIS — E55.9 VITAMIN D DEFICIENCY: ICD-10-CM

## 2025-05-13 DIAGNOSIS — E83.42 HYPOMAGNESEMIA: Primary | ICD-10-CM

## 2025-05-13 DIAGNOSIS — E87.20 METABOLIC ACIDOSIS: ICD-10-CM

## 2025-05-13 LAB
ALBUMIN MFR UR ELPH: 30.3 MG/DL
AMYLASE SERPL-CCNC: 66 U/L (ref 28–100)
ANION GAP SERPL CALCULATED.3IONS-SCNC: 10 MMOL/L (ref 7–15)
BUN SERPL-MCNC: 16.4 MG/DL (ref 8–23)
CALCIUM SERPL-MCNC: 8.8 MG/DL (ref 8.8–10.4)
CHLORIDE SERPL-SCNC: 107 MMOL/L (ref 98–107)
CREAT SERPL-MCNC: 1.83 MG/DL (ref 0.67–1.17)
CREAT UR-MCNC: 177 MG/DL
EGFRCR SERPLBLD CKD-EPI 2021: 40 ML/MIN/1.73M2
ERYTHROCYTE [DISTWIDTH] IN BLOOD BY AUTOMATED COUNT: 14.6 % (ref 10–15)
GLUCOSE SERPL-MCNC: 93 MG/DL (ref 70–99)
HCO3 SERPL-SCNC: 23 MMOL/L (ref 22–29)
HCT VFR BLD AUTO: 40.9 % (ref 40–53)
HGB BLD-MCNC: 13.1 G/DL (ref 13.3–17.7)
LIPASE SERPL-CCNC: 28 U/L (ref 13–60)
MCH RBC QN AUTO: 28.9 PG (ref 26.5–33)
MCHC RBC AUTO-ENTMCNC: 32 G/DL (ref 31.5–36.5)
MCV RBC AUTO: 90 FL (ref 78–100)
PLATELET # BLD AUTO: 229 10E3/UL (ref 150–450)
POTASSIUM SERPL-SCNC: 4.3 MMOL/L (ref 3.4–5.3)
PROT/CREAT 24H UR: 0.17 MG/MG CR (ref 0–0.2)
RBC # BLD AUTO: 4.54 10E6/UL (ref 4.4–5.9)
SODIUM SERPL-SCNC: 140 MMOL/L (ref 135–145)
WBC # BLD AUTO: 6.7 10E3/UL (ref 4–11)

## 2025-05-13 PROCEDURE — 36415 COLL VENOUS BLD VENIPUNCTURE: CPT | Performed by: PATHOLOGY

## 2025-05-13 PROCEDURE — 82150 ASSAY OF AMYLASE: CPT | Performed by: PATHOLOGY

## 2025-05-13 PROCEDURE — 3079F DIAST BP 80-89 MM HG: CPT | Performed by: INTERNAL MEDICINE

## 2025-05-13 PROCEDURE — G2211 COMPLEX E/M VISIT ADD ON: HCPCS | Performed by: INTERNAL MEDICINE

## 2025-05-13 PROCEDURE — 80048 BASIC METABOLIC PNL TOTAL CA: CPT | Performed by: PATHOLOGY

## 2025-05-13 PROCEDURE — 87799 DETECT AGENT NOS DNA QUANT: CPT | Performed by: INTERNAL MEDICINE

## 2025-05-13 PROCEDURE — 3077F SYST BP >= 140 MM HG: CPT | Performed by: INTERNAL MEDICINE

## 2025-05-13 PROCEDURE — 99000 SPECIMEN HANDLING OFFICE-LAB: CPT | Performed by: PATHOLOGY

## 2025-05-13 PROCEDURE — 1126F AMNT PAIN NOTED NONE PRSNT: CPT | Performed by: INTERNAL MEDICINE

## 2025-05-13 PROCEDURE — 99214 OFFICE O/P EST MOD 30 MIN: CPT | Performed by: INTERNAL MEDICINE

## 2025-05-13 PROCEDURE — G0463 HOSPITAL OUTPT CLINIC VISIT: HCPCS | Performed by: INTERNAL MEDICINE

## 2025-05-13 PROCEDURE — 86359 T CELLS TOTAL COUNT: CPT | Performed by: INTERNAL MEDICINE

## 2025-05-13 PROCEDURE — 85027 COMPLETE CBC AUTOMATED: CPT | Performed by: PATHOLOGY

## 2025-05-13 PROCEDURE — 84156 ASSAY OF PROTEIN URINE: CPT | Performed by: INTERNAL MEDICINE

## 2025-05-13 PROCEDURE — 83690 ASSAY OF LIPASE: CPT | Performed by: PATHOLOGY

## 2025-05-13 RX ORDER — AMOXICILLIN 250 MG
2 CAPSULE ORAL DAILY PRN
Qty: 60 TABLET | Refills: 2 | Status: SHIPPED | OUTPATIENT
Start: 2025-05-13

## 2025-05-13 ASSESSMENT — PAIN SCALES - GENERAL: PAINLEVEL_OUTOF10: NO PAIN (0)

## 2025-05-13 NOTE — LETTER
5/13/2025      RE: Avelina Marion  [address] [place] [address]       TRANSPLANT NEPHROLOGY CLINIC VISIT     Assessment & Plan  # DDKT (SPK): CKD Stage 3b - Stable creatinine.  Patient with h/o multiple ischemic insults complicated by post kidney transplant biopsy (10/2023) hematoma and AV fistula, s/p embolization with partial loss of renal function post embolization.    - Baseline Creatinine: ~ 1.9-2.3   - Proteinuria: Normal (<0.2 grams)   - DSA Hx: No DSA   - Last cPRA: 23%   - BK Viremia: Yes, moderately elevated (10-100K)   - Kidney Tx Biopsy Hx: No history of acute rejection and no BK nephropathy.    # Pancreas Tx (SPK):    - Pancreatic Exocrine Drainage: Enteric drained     - Blood glucose: Euglycemia      On insulin: No   - HbA1c: Trend up      Latest HbA1c: 5.6%   - Pancreatic enzymes: Stable   - DSA Hx: No DSA   - Pancreas Tx Biopsy Hx: No biopsy history    # Immunosuppression: Cyclosporine (goal 50-75) and Everolimus (goal 4-6)   - Induction with Recent Transplant:  High Intensity Protocol   - Continue with intensive monitoring of immunosuppression for efficacy and toxicity.   - Historical Changes in Immunosuppression: Decreased mycophenolate and tacrolimus goal due to BK viremia.  Then changed mycophenolate to everolimus and eventually changed tacrolimus to cyclosporine due to BK viremia.   - Changes: Not at this time    # Infection Prevention:   Last CD4 Level: 96 (9/2024)  - PJP: Sulfa/TMP (Bactrim)      - CMV IgG Ab High Risk Discordance (D+/R-) at time of transplant: No  Present CMV Serostatus: Negative  - EBV IgG Ab High Risk Discordance (D+/R-) at time of transplant: No  Present EBV Serostatus: Positive    # Hypertension: Controlled;  Goal BP: < 130/80   - Changes: No    # Anemia in Chronic Renal Disease: Hgb: Stable      MCKENNA: No   - Iron studies: Low iron saturation, but high ferritin    # Mineral Bone Disorder:   - Vitamin D; level: Normal        On supplement: Yes  - Calcium; level:  Low        On supplement: Yes    # Electrolytes:   - Potassium; level: Normal        On binder: Yes; On Lokelma.  - Magnesium; level: Not checked recently        On supplement: Yes  - Bicarbonate; level: Normal        On supplement: Yes    # BK Viremia: Stable, moderately elevated BK PCR at ~ 20K with last check 4/2025 and has generally been ~ 15-40K over the last couple of months.  IgG level is normal.  Immunosuppression has been reduced and overall changed.   - Will continue to follow BK PCR every 2 weeks.    # Other Significant PMH:   - CAD, s/p CABG: Some increase in dyspnea on exertion, possible angina equivalent.    - Recommend follow up with Cardiology.   - PAD: No claudication symptoms.   - Peripheral Neuropathy: Appears to have left carpal tunnel syndrome.   - Constipation: Patient with intermittent symptoms.    - Will start senna-docusate 1-2 tablets nightly as needed.     # Skin Cancer: New lesions: none   - Discussed sun protection and recommend regular follow up with Dermatology.    # Transplant History:  Etiology of Kidney Failure: Diabetes mellitus type 2  Tx: SPK  Transplant: 9/14/2023 (Kidney / Pancreas)  Significant transplant-related complications: BK Viremia    Transplant Office Phone Number: [phone]    Assessment and plan was discussed with the patient and he voiced his understanding and agreement.    Return visit: Return in about 6 months (around 11/13/2025).    Jesu Mosquera MD    The longitudinal plan of care for the diagnosis(es)/condition(s) as documented were addressed during this visit. Due to the added complexity in care, I will continue to support Avelina in the subsequent management and with ongoing continuity of care.      Chief Complaint  Mr. Marion is a 69 year old here for kidney transplant, pancreas transplant, and immunosuppression management.     History of Present Illness   Mr. Marion reports feeling good overall.  Since last clinic visit:   Hospitalizations:  No   New Medical Issues: No  Active/Exercise: Yes; Patient is active and does get some exercise.  Chest pain or shortness of breath: Yes; No chest pain, but reports increasing shortness of breath with exertion lately, as well as more fatigue.  Lower extremity swelling: No  Weight change: No   Appetite: Good  Nausea and vomiting: No  Diarrhea: No; More commonly constipation with small, hard stools at times.  Heartburn symptoms: Yes; Rare depending on food he eats.  Mostly resolves with taking Tums.  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, with long-term current use of insulin (H)     HTN, kidney transplant related     Proliferative diabetic retinopathy (H)     Anemia in chronic renal disease     Secondary renal hyperparathyroidism     Hyperlipidemia with target LDL less than 100     Metabolic acidosis     Vitamin D deficiency     CAD (coronary artery disease)     Kidney replaced by transplant     Immunosuppressed status     Postoperative cardiac arrest following non-cardiac surgery     Kidney transplant rejection     Pancreas replaced by transplant (H)     Aftercare following organ transplant     Need for pneumocystis prophylaxis     Stage 3b chronic kidney disease (H)     Hypomagnesemia     BK viremia       Allergies  No Known Allergies    Medications  Current Outpatient Medications   Medication Sig Dispense Refill     atorvastatin (LIPITOR) 20 MG tablet Take 1 tablet (20 mg) by mouth at bedtime. 30 tablet 11     calcium carbonate-vitamin D (OSCAL) 500-5 MG-MCG tablet Take 1 tablet by mouth 2 times daily. 60 tablet 11     carvedilol (COREG) 6.25 MG tablet Take 1 tablet (6.25 mg) by mouth 2 times daily (with meals). 180 tablet 3     cycloSPORINE modified (GENERIC EQUIVALENT) 25 MG capsule Take 2 capsules (50  mg) by mouth 2 times daily. 120 capsule 11     everolimus (ZORTRESS) 0.5 MG tablet Take 1 tablet (0.5 mg) by mouth 2 times daily. 60 tablet 11     magnesium oxide (MAG-OX) 400 MG tablet Take 1 tablet (400 mg) by mouth every 24 hours. 30 tablet 11     Misc. Devices (PILL SPLITTER) MISC 1 applicator daily 1 each 0     senna-docusate (SENOKOT-S/PERICOLACE) 8.6-50 MG tablet Take 2 tablets by mouth daily as needed for constipation. 60 tablet 2     sodium bicarbonate 650 MG tablet Take 2 tablets (1,300 mg) by mouth 2 times daily. 120 tablet 11     sodium zirconium cyclosilicate (LOKELMA) 10 g PACK packet Take 1 packet (10 g) by mouth every other day. 15 packet 3     sulfamethoxazole-trimethoprim (BACTRIM) 400-80 MG tablet Take 1 tablet by mouth three times a week. 30 tablet 11     aspirin 81 MG EC tablet Take 1 tablet (81 mg) by mouth daily       No current facility-administered medications for this visit.     There are no discontinued medications.      Physical Exam  Vital Signs: BP (!) 159/82 (BP Location: Right arm, Patient Position: Sitting, Cuff Size: Adult Regular)   Pulse 83   Temp 97.8  F (36.6  C) (Oral)   Ht 1.829 m (6')   Wt 98.8 kg (217 lb 12.8 oz)   SpO2 92%   BMI 29.54 kg/m      GENERAL APPEARANCE: alert and no distress  HENT: mouth without ulcers or lesions  RESP: lungs clear to auscultation - no rales, rhonchi or wheezes  CV: regular rhythm, normal rate, no rub, no murmur  EDEMA: no LE edema bilaterally  ABDOMEN: soft, nondistended, nontender, bowel sounds normal  MS: extremities normal - no gross deformities noted, no evidence of inflammation in joints, no muscle tenderness  SKIN: no rash  TX KIDNEY: normal  DIALYSIS ACCESS:  LUE AV fistula with good thrill    Data        Latest Ref Rng & Units 5/13/2025     3:06 PM 3/10/2025     9:13 AM 2/24/2025     8:28 AM   Renal   Sodium 135 - 145 mmol/L 140  142  142    K 3.4 - 5.3 mmol/L 4.3  3.8  3.7    Cl 98 - 107 mmol/L 107  105  107    Cl (external) 98 -  107 mmol/L 107  105  107    CO2 22 - 29 mmol/L 23  23  23    Urea Nitrogen 8.0 - 23.0 mg/dL 16.4  25.4  18.9    Creatinine 0.67 - 1.17 mg/dL 1.83  1.99  2.01    Glucose 70 - 99 mg/dL 93  92  80    Calcium 8.8 - 10.4 mg/dL 8.8  9.7  8.4          Latest Ref Rng & Units 9/24/2024    10:29 AM 6/3/2024     9:11 AM 5/6/2024     8:39 AM   Bone Health   Phosphorus 2.5 - 4.5 mg/dL  4.0  3.9    Parathyroid Hormone Intact 15 - 65 pg/mL 88      Vit D Def 20 - 50 ng/mL 34            Latest Ref Rng & Units 5/13/2025     3:06 PM 3/10/2025     9:13 AM 2/24/2025     8:28 AM   Heme   WBC 4.0 - 11.0 10e3/uL 6.7  5.7  5.9    Hgb 13.3 - 17.7 g/dL 13.1  12.0  10.7    Plt 150 - 450 10e3/uL 229  225  232          Latest Ref Rng & Units 9/24/2024    10:29 AM 3/25/2024     7:59 AM 10/17/2023     2:57 PM   Liver   AP 40 - 150 U/L 77  89  41    TBili <=1.2 mg/dL 0.5  0.5  0.2    Bilirubin Direct 0.00 - 0.30 mg/dL <0.20  <0.20     ALT 0 - 70 U/L 6  <5  13    AST 0 - 45 U/L 19  15  21    Tot Protein 6.4 - 8.3 g/dL 6.6  6.6  3.6    Albumin 3.5 - 5.2 g/dL 4.0  4.1  2.2          Latest Ref Rng & Units 5/13/2025     3:06 PM 4/28/2025     8:28 AM 4/7/2025     8:34 AM   Pancreas   Amylase 28 - 100 U/L 66  70  71    Lipase (Roche) 13 - 60 U/L 28  36  37          Latest Ref Rng & Units 9/24/2024    10:29 AM 9/29/2023     8:48 AM 9/24/2023     8:35 AM   Iron studies   Iron 61 - 157 ug/dL 57  27  32    Iron Sat Index 15 - 46 % 28  16  21    Ferritin 31 - 409 ng/mL 833  1,256  1,164          Latest Ref Rng & Units 9/14/2023     3:39 PM 8/9/2022     6:26 AM 8/5/2021     2:23 AM   [facility] T[facility] Virology   EBV CAPSID ANTIBODY IGG No detectable antibody. Positive  Positive  Positive      Failed to redirect to the Timeline version of the REVFS SmartLink.  Recent Labs   Lab Test 10/15/24  0830 11/04/24  0820 11/18/24  0751 12/09/24  0812   DOSTAC 10/14/2024  --  11/17/2024 12/8/2024   TACROL 11.9 7.6 5.6 7.4     Recent Labs   Lab Test 03/25/24  0759 04/08/24  0824  04/15/24  0758   DOSMPA 3/24/2024   9:45 PM 4/7/2024   9:45 PM  --    MPACID 2.52 4.02* 4.78*   MPAG 90.2 106.7* 103.0*        Jesu Mosquera MD

## 2025-05-13 NOTE — PATIENT INSTRUCTIONS
Patient Recommendations:  - Recommend routine follow up with  Dr. Mitchell in 3-4 months or so to resume care with a goal of ongoing co-management between your primary Nephrologist and the Transplant Team.     Transplant Patient Information  Your Post Transplant Coordinator is: Makayla Wu  For non urgent items, we encourage you to contact your coordinator/care team online via [employer]  You and your care team can also contact your transplant coordinator Monday - Friday, 8am - 5pm at [phone] (Option 2 to reach the coordinator or Option 4 to schedule an appointment).  After hours for urgent matters, please call [facility] at [phone].

## 2025-05-13 NOTE — LETTER
5/13/2025      Avelina Marion  [facility] [place] [address]      Dear Colleague,    Thank you for referring your patient, Avelina Marion, to the [facility] TRANSPLANT [facility]. Please see a copy of my visit note below.    TRANSPLANT NEPHROLOGY CLINIC VISIT     Assessment & Plan  # DDKT (SPK): CKD Stage 3b - Stable creatinine.  Patient with h/o multiple ischemic insults complicated by post kidney transplant biopsy (10/2023) hematoma and AV fistula, s/p embolization with partial loss of renal function post embolization.    - Baseline Creatinine: ~ 1.9-2.3   - Proteinuria: Normal (<0.2 grams)   - DSA Hx: No DSA   - Last cPRA: 23%   - BK Viremia: Yes, moderately elevated (10-100K)   - Kidney Tx Biopsy Hx: No history of acute rejection and no BK nephropathy.    # Pancreas Tx (SPK):    - Pancreatic Exocrine Drainage: Enteric drained     - Blood glucose: Euglycemia      On insulin: No   - HbA1c: Trend up      Latest HbA1c: 5.6%   - Pancreatic enzymes: Stable   - DSA Hx: No DSA   - Pancreas Tx Biopsy Hx: No biopsy history    # Immunosuppression: Cyclosporine (goal 50-75) and Everolimus (goal 4-6)   - Induction with Recent Transplant:  High Intensity Protocol   - Continue with intensive monitoring of immunosuppression for efficacy and toxicity.   - Historical Changes in Immunosuppression: Decreased mycophenolate and tacrolimus goal due to BK viremia.  Then changed mycophenolate to everolimus and eventually changed tacrolimus to cyclosporine due to BK viremia.   - Changes: Not at this time    # Infection Prevention:   Last CD4 Level: 96 (9/2024)  - PJP: Sulfa/TMP (Bactrim)      - CMV IgG Ab High Risk Discordance (D+/R-) at time of transplant: No  Present CMV Serostatus: Negative  - EBV IgG Ab High Risk Discordance (D+/R-) at time of transplant: No  Present EBV Serostatus: Positive    # Hypertension: Controlled;  Goal BP: < 130/80   - Changes: No    # Anemia in Chronic Renal Disease: Hgb: Stable      MCKENNA: No   -  Iron studies: Low iron saturation, but high ferritin    # Mineral Bone Disorder:   - Vitamin D; level: Normal        On supplement: Yes  - Calcium; level: Low        On supplement: Yes    # Electrolytes:   - Potassium; level: Normal        On binder: Yes; On Lokelma.  - Magnesium; level: Not checked recently        On supplement: Yes  - Bicarbonate; level: Normal        On supplement: Yes    # BK Viremia: Stable, moderately elevated BK PCR at ~ 20K with last check 4/2025 and has generally been ~ 15-40K over the last couple of months.  IgG level is normal.  Immunosuppression has been reduced and overall changed.   - Will continue to follow BK PCR every 2 weeks.    # Other Significant PMH:   - CAD, s/p CABG: Some increase in dyspnea on exertion, possible angina equivalent.    - Recommend follow up with Cardiology.   - PAD: No claudication symptoms.   - Peripheral Neuropathy: Appears to have left carpal tunnel syndrome.   - Constipation: Patient with intermittent symptoms.    - Will start senna-docusate 1-2 tablets nightly as needed.     # Skin Cancer: New lesions: none   - Discussed sun protection and recommend regular follow up with Dermatology.    # Transplant History:  Etiology of Kidney Failure: Diabetes mellitus type 2  Tx: SPK  Transplant: 9/14/2023 (Kidney / Pancreas)  Significant transplant-related complications: BK Viremia    Transplant Office Phone Number: [phone]    Assessment and plan was discussed with the patient and he voiced his understanding and agreement.    Return visit: Return in about 6 months (around 11/13/2025).    Jesu Mosquera MD    The longitudinal plan of care for the diagnosis(es)/condition(s) as documented were addressed during this visit. Due to the added complexity in care, I will continue to support Avelina in the subsequent management and with ongoing continuity of care.      Chief Complaint  Mr. Marion is a 69 year old here for kidney transplant, pancreas transplant, and  immunosuppression management.     History of Present Illness   Mr. Marion reports feeling good overall.  Since last clinic visit:   Hospitalizations: No   New Medical Issues: No  Active/Exercise: Yes; Patient is active and does get some exercise.  Chest pain or shortness of breath: Yes; No chest pain, but reports increasing shortness of breath with exertion lately, as well as more fatigue.  Lower extremity swelling: No  Weight change: No   Appetite: Good  Nausea and vomiting: No  Diarrhea: No; More commonly constipation with small, hard stools at times.  Heartburn symptoms: Yes; Rare depending on food he eats.  Mostly resolves with taking Tums.  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, with long-term current use of insulin (H)     HTN, kidney transplant related     Proliferative diabetic retinopathy (H)     Anemia in chronic renal disease     Secondary renal hyperparathyroidism     Hyperlipidemia with target LDL less than 100     Metabolic acidosis     Vitamin D deficiency     CAD (coronary artery disease)     Kidney replaced by transplant     Immunosuppressed status     Postoperative cardiac arrest following non-cardiac surgery     Kidney transplant rejection     Pancreas replaced by transplant (H)     Aftercare following organ transplant     Need for pneumocystis prophylaxis     Stage 3b chronic kidney disease (H)     Hypomagnesemia     BK viremia       Allergies  No Known Allergies    Medications  Current Outpatient Medications   Medication Sig Dispense Refill     atorvastatin (LIPITOR) 20 MG tablet Take 1 tablet (20 mg) by mouth at bedtime. 30 tablet 11     calcium carbonate-vitamin D (OSCAL) 500-5 MG-MCG tablet Take 1 tablet by mouth 2 times daily. 60 tablet 11     carvedilol (COREG) 6.25 MG tablet Take 1  tablet (6.25 mg) by mouth 2 times daily (with meals). 180 tablet 3     cycloSPORINE modified (GENERIC EQUIVALENT) 25 MG capsule Take 2 capsules (50 mg) by mouth 2 times daily. 120 capsule 11     everolimus (ZORTRESS) 0.5 MG tablet Take 1 tablet (0.5 mg) by mouth 2 times daily. 60 tablet 11     magnesium oxide (MAG-OX) 400 MG tablet Take 1 tablet (400 mg) by mouth every 24 hours. 30 tablet 11     Misc. Devices (PILL SPLITTER) MISC 1 applicator daily 1 each 0     senna-docusate (SENOKOT-S/PERICOLACE) 8.6-50 MG tablet Take 2 tablets by mouth daily as needed for constipation. 60 tablet 2     sodium bicarbonate 650 MG tablet Take 2 tablets (1,300 mg) by mouth 2 times daily. 120 tablet 11     sodium zirconium cyclosilicate (LOKELMA) 10 g PACK packet Take 1 packet (10 g) by mouth every other day. 15 packet 3     sulfamethoxazole-trimethoprim (BACTRIM) 400-80 MG tablet Take 1 tablet by mouth three times a week. 30 tablet 11     aspirin 81 MG EC tablet Take 1 tablet (81 mg) by mouth daily       No current facility-administered medications for this visit.     There are no discontinued medications.      Physical Exam  Vital Signs: BP (!) 159/82 (BP Location: Right arm, Patient Position: Sitting, Cuff Size: Adult Regular)   Pulse 83   Temp 97.8  F (36.6  C) (Oral)   Ht 1.829 m (6')   Wt 98.8 kg (217 lb 12.8 oz)   SpO2 92%   BMI 29.54 kg/m      GENERAL APPEARANCE: alert and no distress  HENT: mouth without ulcers or lesions  RESP: lungs clear to auscultation - no rales, rhonchi or wheezes  CV: regular rhythm, normal rate, no rub, no murmur  EDEMA: no LE edema bilaterally  ABDOMEN: soft, nondistended, nontender, bowel sounds normal  MS: extremities normal - no gross deformities noted, no evidence of inflammation in joints, no muscle tenderness  SKIN: no rash  TX KIDNEY: normal  DIALYSIS ACCESS:  LUE AV fistula with good thrill    Data        Latest Ref Rng & Units 5/13/2025     3:06 PM 3/10/2025     9:13 AM 2/24/2025      8:28 AM   Renal   Sodium 135 - 145 mmol/L 140  142  142    K 3.4 - 5.3 mmol/L 4.3  3.8  3.7    Cl 98 - 107 mmol/L 107  105  107    Cl (external) 98 - 107 mmol/L 107  105  107    CO2 22 - 29 mmol/L 23  23  23    Urea Nitrogen 8.0 - 23.0 mg/dL 16.4  25.4  18.9    Creatinine 0.67 - 1.17 mg/dL 1.83  1.99  2.01    Glucose 70 - 99 mg/dL 93  92  80    Calcium 8.8 - 10.4 mg/dL 8.8  9.7  8.4          Latest Ref Rng & Units 9/24/2024    10:29 AM 6/3/2024     9:11 AM 5/6/2024     8:39 AM   Bone Health   Phosphorus 2.5 - 4.5 mg/dL  4.0  3.9    Parathyroid Hormone Intact 15 - 65 pg/mL 88      Vit D Def 20 - 50 ng/mL 34            Latest Ref Rng & Units 5/13/2025     3:06 PM 3/10/2025     9:13 AM 2/24/2025     8:28 AM   Heme   WBC 4.0 - 11.0 10e3/uL 6.7  5.7  5.9    Hgb 13.3 - 17.7 g/dL 13.1  12.0  10.7    Plt 150 - 450 10e3/uL 229  225  232          Latest Ref Rng & Units 9/24/2024    10:29 AM 3/25/2024     7:59 AM 10/17/2023     2:57 PM   Liver   AP 40 - 150 U/L 77  89  41    TBili <=1.2 mg/dL 0.5  0.5  0.2    Bilirubin Direct 0.00 - 0.30 mg/dL <0.20  <0.20     ALT 0 - 70 U/L 6  <5  13    AST 0 - 45 U/L 19  15  21    Tot Protein 6.4 - 8.3 g/dL 6.6  6.6  3.6    Albumin 3.5 - 5.2 g/dL 4.0  4.1  2.2          Latest Ref Rng & Units 5/13/2025     3:06 PM 4/28/2025     8:28 AM 4/7/2025     8:34 AM   Pancreas   Amylase 28 - 100 U/L 66  70  71    Lipase (Roche) 13 - 60 U/L 28  36  37          Latest Ref Rng & Units 9/24/2024    10:29 AM 9/29/2023     8:48 AM 9/24/2023     8:35 AM   Iron studies   Iron 61 - 157 ug/dL 57  27  32    Iron Sat Index 15 - 46 % 28  16  21    Ferritin 31 - 409 ng/mL 833  1,256  1,164          Latest Ref Rng & Units 9/14/2023     3:39 PM 8/9/2022     6:26 AM 8/5/2021     2:23 AM   [facility] Txp Virology   EBV CAPSID ANTIBODY IGG No detectable antibody. Positive  Positive  Positive      Failed to redirect to the Timeline version of the REVFS SmartLink.  Recent Labs   Lab Test 10/15/24  0830 11/04/24  0820  11/18/24  0751 12/09/24  0812   DOSTAC 10/14/2024  --  11/17/2024 12/8/2024   TACROL 11.9 7.6 5.6 7.4     Recent Labs   Lab Test 03/25/24  0759 04/08/24  0824 04/15/24  0758   DOSMPA 3/24/2024   9:45 PM 4/7/2024   9:45 PM  --    MPACID 2.52 4.02* 4.78*   MPAG 90.2 106.7* 103.0*        Again, thank you for allowing me to participate in the care of your patient.        Sincerely,        Jesu Mosquera MD    Electronically signed

## 2025-05-13 NOTE — NURSING NOTE
"Chief Complaint   Patient presents with    RECHECK     Follow up. PT reports feeling fatigued since the start of the year. He finds it hard to get up and down stairs in the home he lives in. He fell back in feb when he was taking trash out. Sustained minot injuries. PT reports getting a \"bulge\" every time he eats from his abdomen. Onset about a month.      Vitals:    05/13/25 1504 05/13/25 1505   BP: (!) 159/83 (!) 159/82   BP Location: Right arm Right arm   Patient Position: Sitting Sitting   Cuff Size: Adult Regular Adult Regular   Pulse: 83    Temp: 97.8  F (36.6  C)    TempSrc: Oral    SpO2: 92%    Weight: 98.8 kg (217 lb 12.8 oz)    Height: 1.829 m (6')        BP Readings from Last 3 Encounters:   05/13/25 (!) 159/82   09/24/24 (!) 143/77   09/16/24 134/70       BP (!) 159/82 (BP Location: Right arm, Patient Position: Sitting, Cuff Size: Adult Regular)   Pulse 83   Temp 97.8  F (36.6  C) (Oral)   Ht 1.829 m (6')   Wt 98.8 kg (217 lb 12.8 oz)   SpO2 92%   BMI 29.54 kg/m       Sunshine Sofiya    "

## 2025-05-14 LAB
BK VIRUS DNA IU/ML, INSTRUMENT (6800): ABNORMAL IU/ML (ref ?–1)
BK VIRUS SPECIMEN TYPE: ABNORMAL
BKV DNA SPEC NAA+PROBE-LOG#: 4.3 {LOG_COPIES}/ML
CD3 CELLS # BLD: 331 CELLS/UL (ref 603–2990)
CD3 CELLS NFR BLD: 46 % (ref 49–84)
CD3+CD4+ CELLS # BLD: 134 CELLS/UL (ref 441–2156)
CD3+CD4+ CELLS NFR BLD: 18 % (ref 28–63)
CD3+CD4+ CELLS/CD3+CD8+ CLL BLD: 0.75 % (ref 1.4–2.6)
CD3+CD8+ CELLS # BLD: 177 CELLS/UL (ref 125–1312)
CD3+CD8+ CELLS NFR BLD: 24 % (ref 10–40)
T CELL COMMENT: ABNORMAL

## 2025-05-14 NOTE — PROGRESS NOTES
TRANSPLANT NEPHROLOGY CLINIC VISIT     Assessment & Plan   # DDKT (SPK): CKD Stage 3b - Stable creatinine.  Patient with h/o multiple ischemic insults complicated by post kidney transplant biopsy (10/2023) hematoma and AV fistula, s/p embolization with partial loss of renal function post embolization.    - Baseline Creatinine: ~ 1.9-2.3   - Proteinuria: Normal (<0.2 grams)   - DSA Hx: No DSA   - Last cPRA: 23%   - BK Viremia: Yes, moderately elevated (10-100K)   - Kidney Tx Biopsy Hx: No history of acute rejection and no BK nephropathy.    # Pancreas Tx (SPK):    - Pancreatic Exocrine Drainage: Enteric drained     - Blood glucose: Euglycemia      On insulin: No   - HbA1c: Trend up      Latest HbA1c: 5.6%   - Pancreatic enzymes: Stable   - DSA Hx: No DSA   - Pancreas Tx Biopsy Hx: No biopsy history    # Immunosuppression: Cyclosporine (goal 50-75) and Everolimus (goal 4-6)   - Induction with Recent Transplant:  High Intensity Protocol   - Continue with intensive monitoring of immunosuppression for efficacy and toxicity.   - Historical Changes in Immunosuppression: Decreased mycophenolate and tacrolimus goal due to BK viremia.  Then changed mycophenolate to everolimus and eventually changed tacrolimus to cyclosporine due to BK viremia.   - Changes: Not at this time    # Infection Prevention:   Last CD4 Level: 96 (9/2024)  - PJP: Sulfa/TMP (Bactrim)      - CMV IgG Ab High Risk Discordance (D+/R-) at time of transplant: No  Present CMV Serostatus: Negative  - EBV IgG Ab High Risk Discordance (D+/R-) at time of transplant: No  Present EBV Serostatus: Positive    # Hypertension: Controlled;  Goal BP: < 130/80   - Changes: No    # Anemia in Chronic Renal Disease: Hgb: Stable      MCKENNA: No   - Iron studies: Low iron saturation, but high ferritin    # Mineral Bone Disorder:   - Vitamin D; level: Normal        On supplement: Yes  - Calcium; level: Low        On supplement: Yes    # Electrolytes:   - Potassium; level: Normal         On binder: Yes; On Lokelma.  - Magnesium; level: Not checked recently        On supplement: Yes  - Bicarbonate; level: Normal        On supplement: Yes    # BK Viremia: Stable, moderately elevated BK PCR at ~ 20K with last check 4/2025 and has generally been ~ 15-40K over the last couple of months.  IgG level is normal.  Immunosuppression has been reduced and overall changed.   - Will continue to follow BK PCR every 2 weeks.    # Other Significant PMH:   - CAD, s/p CABG: Some increase in dyspnea on exertion, possible angina equivalent.    - Recommend follow up with Cardiology.   - PAD: No claudication symptoms.   - Peripheral Neuropathy: Appears to have left carpal tunnel syndrome.   - Constipation: Patient with intermittent symptoms.    - Will start senna-docusate 1-2 tablets nightly as needed.     # Skin Cancer: New lesions: none   - Discussed sun protection and recommend regular follow up with Dermatology.    # Transplant History:  Etiology of Kidney Failure: Diabetes mellitus type 2  Tx: SPK  Transplant: 9/14/2023 (Kidney / Pancreas)  Significant transplant-related complications: BK Viremia    Transplant Office Phone Number: [phone]    Assessment and plan was discussed with the patient and he voiced his understanding and agreement.    Return visit: Return in about 6 months (around 11/13/2025).    Jesu Mosquera MD    The longitudinal plan of care for the diagnosis(es)/condition(s) as documented were addressed during this visit. Due to the added complexity in care, I will continue to support Avelina in the subsequent management and with ongoing continuity of care.      Chief Complaint   Mr. Marion is a 69 year old here for kidney transplant, pancreas transplant, and immunosuppression management.     History of Present Illness    Mr. Marion reports feeling good overall.  Since last clinic visit:   Hospitalizations: No   New Medical Issues: No  Active/Exercise: Yes; Patient is active and does get some  exercise.  Chest pain or shortness of breath: Yes; No chest pain, but reports increasing shortness of breath with exertion lately, as well as more fatigue.  Lower extremity swelling: No  Weight change: No   Appetite: Good  Nausea and vomiting: No  Diarrhea: No; More commonly constipation with small, hard stools at times.  Heartburn symptoms: Yes; Rare depending on food he eats.  Mostly resolves with taking Tums.  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, with long-term current use of insulin (H)    HTN, kidney transplant related    Proliferative diabetic retinopathy (H)    Anemia in chronic renal disease    Secondary renal hyperparathyroidism    Hyperlipidemia with target LDL less than 100    Metabolic acidosis    Vitamin D deficiency    CAD (coronary artery disease)    Kidney replaced by transplant    Immunosuppressed status    Postoperative cardiac arrest following non-cardiac surgery    Kidney transplant rejection    Pancreas replaced by transplant (H)    Aftercare following organ transplant    Need for pneumocystis prophylaxis    Stage 3b chronic kidney disease (H)    Hypomagnesemia    BK viremia       Allergies   No Known Allergies    Medications   Current Outpatient Medications   Medication Sig Dispense Refill    atorvastatin (LIPITOR) 20 MG tablet Take 1 tablet (20 mg) by mouth at bedtime. 30 tablet 11    calcium carbonate-vitamin D (OSCAL) 500-5 MG-MCG tablet Take 1 tablet by mouth 2 times daily. 60 tablet 11    carvedilol (COREG) 6.25 MG tablet Take 1 tablet (6.25 mg) by mouth 2 times daily (with meals). 180 tablet 3    cycloSPORINE modified (GENERIC EQUIVALENT) 25 MG capsule Take 2 capsules (50 mg) by mouth 2 times daily. 120 capsule 11    everolimus (ZORTRESS) 0.5 MG tablet Take 1 tablet (0.5 mg) by  mouth 2 times daily. 60 tablet 11    magnesium oxide (MAG-OX) 400 MG tablet Take 1 tablet (400 mg) by mouth every 24 hours. 30 tablet 11    Misc. Devices (PILL SPLITTER) MISC 1 applicator daily 1 each 0    senna-docusate (SENOKOT-S/PERICOLACE) 8.6-50 MG tablet Take 2 tablets by mouth daily as needed for constipation. 60 tablet 2    sodium bicarbonate 650 MG tablet Take 2 tablets (1,300 mg) by mouth 2 times daily. 120 tablet 11    sodium zirconium cyclosilicate (LOKELMA) 10 g PACK packet Take 1 packet (10 g) by mouth every other day. 15 packet 3    sulfamethoxazole-trimethoprim (BACTRIM) 400-80 MG tablet Take 1 tablet by mouth three times a week. 30 tablet 11    aspirin 81 MG EC tablet Take 1 tablet (81 mg) by mouth daily       No current facility-administered medications for this visit.     There are no discontinued medications.      Physical Exam   Vital Signs: BP (!) 159/82 (BP Location: Right arm, Patient Position: Sitting, Cuff Size: Adult Regular)   Pulse 83   Temp 97.8  F (36.6  C) (Oral)   Ht 1.829 m (6')   Wt 98.8 kg (217 lb 12.8 oz)   SpO2 92%   BMI 29.54 kg/m      GENERAL APPEARANCE: alert and no distress  HENT: mouth without ulcers or lesions  RESP: lungs clear to auscultation - no rales, rhonchi or wheezes  CV: regular rhythm, normal rate, no rub, no murmur  EDEMA: no LE edema bilaterally  ABDOMEN: soft, nondistended, nontender, bowel sounds normal  MS: extremities normal - no gross deformities noted, no evidence of inflammation in joints, no muscle tenderness  SKIN: no rash  TX KIDNEY: normal  DIALYSIS ACCESS:  LUE AV fistula with good thrill    Data         Latest Ref Rng & Units 5/13/2025     3:06 PM 3/10/2025     9:13 AM 2/24/2025     8:28 AM   Renal   Sodium 135 - 145 mmol/L 140  142  142    K 3.4 - 5.3 mmol/L 4.3  3.8  3.7    Cl 98 - 107 mmol/L 107  105  107    Cl (external) 98 - 107 mmol/L 107  105  107    CO2 22 - 29 mmol/L 23  23  23    Urea Nitrogen 8.0 - 23.0 mg/dL 16.4  25.4  18.9     Creatinine 0.67 - 1.17 mg/dL 1.83  1.99  2.01    Glucose 70 - 99 mg/dL 93  92  80    Calcium 8.8 - 10.4 mg/dL 8.8  9.7  8.4          Latest Ref Rng & Units 9/24/2024    10:29 AM 6/3/2024     9:11 AM 5/6/2024     8:39 AM   Bone Health   Phosphorus 2.5 - 4.5 mg/dL  4.0  3.9    Parathyroid Hormone Intact 15 - 65 pg/mL 88      Vit D Def 20 - 50 ng/mL 34            Latest Ref Rng & Units 5/13/2025     3:06 PM 3/10/2025     9:13 AM 2/24/2025     8:28 AM   Heme   WBC 4.0 - 11.0 10e3/uL 6.7  5.7  5.9    Hgb 13.3 - 17.7 g/dL 13.1  12.0  10.7    Plt 150 - 450 10e3/uL 229  225  232          Latest Ref Rng & Units 9/24/2024    10:29 AM 3/25/2024     7:59 AM 10/17/2023     2:57 PM   Liver   AP 40 - 150 U/L 77  89  41    TBili <=1.2 mg/dL 0.5  0.5  0.2    Bilirubin Direct 0.00 - 0.30 mg/dL <0.20  <0.20     ALT 0 - 70 U/L 6  <5  13    AST 0 - 45 U/L 19  15  21    Tot Protein 6.4 - 8.3 g/dL 6.6  6.6  3.6    Albumin 3.5 - 5.2 g/dL 4.0  4.1  2.2          Latest Ref Rng & Units 5/13/2025     3:06 PM 4/28/2025     8:28 AM 4/7/2025     8:34 AM   Pancreas   Amylase 28 - 100 U/L 66  70  71    Lipase (Roche) 13 - 60 U/L 28  36  37          Latest Ref Rng & Units 9/24/2024    10:29 AM 9/29/2023     8:48 AM 9/24/2023     8:35 AM   Iron studies   Iron 61 - 157 ug/dL 57  27  32    Iron Sat Index 15 - 46 % 28  16  21    Ferritin 31 - 409 ng/mL 833  1,256  1,164          Latest Ref Rng & Units 9/14/2023     3:39 PM 8/9/2022     6:26 AM 8/5/2021     2:23 AM   [facility] Txp Virology   EBV CAPSID ANTIBODY IGG No detectable antibody. Positive  Positive  Positive      Failed to redirect to the Timeline version of the REVFS SmartLink.  Recent Labs   Lab Test 10/15/24  0830 11/04/24  0820 11/18/24  0751 12/09/24  0812   DOSTAC 10/14/2024  --  11/17/2024 12/8/2024   TACROL 11.9 7.6 5.6 7.4     Recent Labs   Lab Test 03/25/24  0759 04/08/24  0824 04/15/24  0758   DOSMPA 3/24/2024   9:45 PM 4/7/2024   9:45 PM  --    MPACID 2.52 4.02* 4.78*   MPAG 90.2  106.7* 103.0*

## 2025-05-27 ENCOUNTER — LAB (OUTPATIENT)
Dept: LAB | Facility: CLINIC | Age: 70
End: 2025-05-27
Payer: COMMERCIAL

## 2025-05-27 DIAGNOSIS — B34.8 BK VIREMIA: ICD-10-CM

## 2025-05-27 DIAGNOSIS — Z94.0 HTN, KIDNEY TRANSPLANT RELATED: ICD-10-CM

## 2025-05-27 DIAGNOSIS — Z98.890 OTHER SPECIFIED POSTPROCEDURAL STATES: ICD-10-CM

## 2025-05-27 DIAGNOSIS — Z94.83 PANCREAS REPLACED BY TRANSPLANT (H): ICD-10-CM

## 2025-05-27 DIAGNOSIS — Z94.0 KIDNEY REPLACED BY TRANSPLANT: ICD-10-CM

## 2025-05-27 DIAGNOSIS — I15.1 HTN, KIDNEY TRANSPLANT RELATED: ICD-10-CM

## 2025-05-27 LAB
AMYLASE SERPL-CCNC: 68 U/L (ref 28–100)
ANION GAP SERPL CALCULATED.3IONS-SCNC: 12 MMOL/L (ref 7–15)
BUN SERPL-MCNC: 16.7 MG/DL (ref 8–23)
CALCIUM SERPL-MCNC: 8.7 MG/DL (ref 8.8–10.4)
CHLORIDE SERPL-SCNC: 105 MMOL/L (ref 98–107)
CREAT SERPL-MCNC: 1.83 MG/DL (ref 0.67–1.17)
CYCLOSPORINE BLD LC/MS/MS-MCNC: 78 UG/L (ref 50–400)
EGFRCR SERPLBLD CKD-EPI 2021: 40 ML/MIN/1.73M2
ERYTHROCYTE [DISTWIDTH] IN BLOOD BY AUTOMATED COUNT: 14 % (ref 10–15)
GLUCOSE SERPL-MCNC: 92 MG/DL (ref 70–99)
HCO3 SERPL-SCNC: 25 MMOL/L (ref 22–29)
HCT VFR BLD AUTO: 42.9 % (ref 40–53)
HGB BLD-MCNC: 13.7 G/DL (ref 13.3–17.7)
LIPASE SERPL-CCNC: 31 U/L (ref 13–60)
MCH RBC QN AUTO: 28.5 PG (ref 26.5–33)
MCHC RBC AUTO-ENTMCNC: 31.9 G/DL (ref 31.5–36.5)
MCV RBC AUTO: 89 FL (ref 78–100)
PLATELET # BLD AUTO: 249 10E3/UL (ref 150–450)
POTASSIUM SERPL-SCNC: 3.6 MMOL/L (ref 3.4–5.3)
RBC # BLD AUTO: 4.8 10E6/UL (ref 4.4–5.9)
SODIUM SERPL-SCNC: 142 MMOL/L (ref 135–145)
TME LAST DOSE: NORMAL H
TME LAST DOSE: NORMAL H
WBC # BLD AUTO: 7.5 10E3/UL (ref 4–11)

## 2025-05-28 ENCOUNTER — RESULTS FOLLOW-UP (OUTPATIENT)
Dept: TRANSPLANT | Facility: CLINIC | Age: 70
End: 2025-05-28

## 2025-05-28 DIAGNOSIS — I15.1 HTN, KIDNEY TRANSPLANT RELATED: ICD-10-CM

## 2025-05-28 DIAGNOSIS — B34.8 BK VIREMIA: ICD-10-CM

## 2025-05-28 DIAGNOSIS — Z94.0 HTN, KIDNEY TRANSPLANT RELATED: ICD-10-CM

## 2025-05-28 LAB
BK VIRUS DNA IU/ML, INSTRUMENT (6800): 7540 IU/ML (ref ?–1)
BK VIRUS SPECIMEN TYPE: ABNORMAL
BKV DNA SPEC NAA+PROBE-LOG#: 3.9 {LOG_COPIES}/ML

## 2025-06-23 ENCOUNTER — LAB (OUTPATIENT)
Dept: LAB | Facility: CLINIC | Age: 70
End: 2025-06-23
Payer: COMMERCIAL

## 2025-06-23 DIAGNOSIS — Z94.0 HTN, KIDNEY TRANSPLANT RELATED: ICD-10-CM

## 2025-06-23 DIAGNOSIS — Z98.890 OTHER SPECIFIED POSTPROCEDURAL STATES: ICD-10-CM

## 2025-06-23 DIAGNOSIS — I15.1 HTN, KIDNEY TRANSPLANT RELATED: ICD-10-CM

## 2025-06-23 DIAGNOSIS — Z94.0 KIDNEY REPLACED BY TRANSPLANT: ICD-10-CM

## 2025-06-23 DIAGNOSIS — B34.8 BK VIREMIA: ICD-10-CM

## 2025-06-23 DIAGNOSIS — Z94.83 PANCREAS REPLACED BY TRANSPLANT (H): ICD-10-CM

## 2025-06-23 LAB
AMYLASE SERPL-CCNC: 64 U/L (ref 28–100)
ANION GAP SERPL CALCULATED.3IONS-SCNC: 12 MMOL/L (ref 7–15)
BUN SERPL-MCNC: 19.1 MG/DL (ref 8–23)
CALCIUM SERPL-MCNC: 8.5 MG/DL (ref 8.8–10.4)
CHLORIDE SERPL-SCNC: 106 MMOL/L (ref 98–107)
CREAT SERPL-MCNC: 1.91 MG/DL (ref 0.67–1.17)
CYCLOSPORINE BLD LC/MS/MS-MCNC: 79 UG/L (ref 50–400)
EGFRCR SERPLBLD CKD-EPI 2021: 38 ML/MIN/1.73M2
ERYTHROCYTE [DISTWIDTH] IN BLOOD BY AUTOMATED COUNT: 14 % (ref 10–15)
GLUCOSE SERPL-MCNC: 82 MG/DL (ref 70–99)
HCO3 SERPL-SCNC: 23 MMOL/L (ref 22–29)
HCT VFR BLD AUTO: 39.8 % (ref 40–53)
HGB BLD-MCNC: 12.9 G/DL (ref 13.3–17.7)
LIPASE SERPL-CCNC: 32 U/L (ref 13–60)
MCH RBC QN AUTO: 28.9 PG (ref 26.5–33)
MCHC RBC AUTO-ENTMCNC: 32.4 G/DL (ref 31.5–36.5)
MCV RBC AUTO: 89 FL (ref 78–100)
PLATELET # BLD AUTO: 211 10E3/UL (ref 150–450)
POTASSIUM SERPL-SCNC: 3.9 MMOL/L (ref 3.4–5.3)
RBC # BLD AUTO: 4.46 10E6/UL (ref 4.4–5.9)
SODIUM SERPL-SCNC: 141 MMOL/L (ref 135–145)
TME LAST DOSE: NORMAL H
TME LAST DOSE: NORMAL H
WBC # BLD AUTO: 5.5 10E3/UL (ref 4–11)

## 2025-06-24 ENCOUNTER — RESULTS FOLLOW-UP (OUTPATIENT)
Dept: TRANSPLANT | Facility: CLINIC | Age: 70
End: 2025-06-24

## 2025-06-24 DIAGNOSIS — I15.1 HTN, KIDNEY TRANSPLANT RELATED: ICD-10-CM

## 2025-06-24 DIAGNOSIS — B34.8 BK VIREMIA: ICD-10-CM

## 2025-06-24 DIAGNOSIS — Z94.0 HTN, KIDNEY TRANSPLANT RELATED: ICD-10-CM

## 2025-06-24 LAB
BK VIRUS DNA IU/ML, INSTRUMENT (6800): 8150 IU/ML (ref ?–1)
BK VIRUS SPECIMEN TYPE: ABNORMAL
BKV DNA SPEC NAA+PROBE-LOG#: 3.9 {LOG_COPIES}/ML
EVEROLIMUS BLD-MCNC: 4 NG/ML

## 2025-07-03 NOTE — PLAN OF CARE
Admitted after planned angiogram for evaluation for CABG. PMHx of DM2, HTN, HLD, ESRD on dialysis, coronary artery disease, and tobacco use     Code status: Full Code    Team: Cards CSI     Neuro: AOx4, Calm and cooperative and pleasant  Cardiac/Tele: SR, HR 50's-90's, Occasional bradycardia in the 40's. Afebrile and other VSS  Respiratory: Room air. Sats > 92%. LS Clear bilaterally. No SOB or CORTÉS  GI/: Voids sponteneously and is also on HD (MWF). BS audible and normoactive. LBM (2/9)  Diet/Appetite: Cardiac diet (Low fat and Na < 2400 mg). Good appetite  Skin: Groin site WDL. No other overt deficits  Endocrine/Electrolytes: Pt on Insulin with sliding scale coverage. Replace lytes per protocol  LDAs: PIV saline locked and L. arm fistula for HD  Activity: Up independent  Pain: Denies pain     Plan: CABG planned for Monday     02-Jul-2025

## 2025-07-21 ENCOUNTER — LAB (OUTPATIENT)
Dept: LAB | Facility: CLINIC | Age: 70
End: 2025-07-21
Payer: COMMERCIAL

## 2025-07-21 DIAGNOSIS — Z98.890 OTHER SPECIFIED POSTPROCEDURAL STATES: ICD-10-CM

## 2025-07-21 DIAGNOSIS — Z94.0 KIDNEY REPLACED BY TRANSPLANT: ICD-10-CM

## 2025-07-21 DIAGNOSIS — Z94.83 PANCREAS REPLACED BY TRANSPLANT (H): ICD-10-CM

## 2025-07-21 DIAGNOSIS — I15.1 HTN, KIDNEY TRANSPLANT RELATED: ICD-10-CM

## 2025-07-21 DIAGNOSIS — Z94.0 HTN, KIDNEY TRANSPLANT RELATED: ICD-10-CM

## 2025-07-21 DIAGNOSIS — B34.8 BK VIREMIA: ICD-10-CM

## 2025-07-21 LAB
AMYLASE SERPL-CCNC: 68 U/L (ref 28–100)
ANION GAP SERPL CALCULATED.3IONS-SCNC: 12 MMOL/L (ref 7–15)
BUN SERPL-MCNC: 16.7 MG/DL (ref 8–23)
CALCIUM SERPL-MCNC: 8.7 MG/DL (ref 8.8–10.4)
CHLORIDE SERPL-SCNC: 107 MMOL/L (ref 98–107)
CREAT SERPL-MCNC: 1.83 MG/DL (ref 0.67–1.17)
CYCLOSPORINE BLD LC/MS/MS-MCNC: 68 UG/L (ref 50–400)
EGFRCR SERPLBLD CKD-EPI 2021: 40 ML/MIN/1.73M2
ERYTHROCYTE [DISTWIDTH] IN BLOOD BY AUTOMATED COUNT: 14.2 % (ref 10–15)
GLUCOSE SERPL-MCNC: 85 MG/DL (ref 70–99)
HCO3 SERPL-SCNC: 24 MMOL/L (ref 22–29)
HCT VFR BLD AUTO: 43.7 % (ref 40–53)
HGB BLD-MCNC: 13.8 G/DL (ref 13.3–17.7)
LIPASE SERPL-CCNC: 32 U/L (ref 13–60)
MCH RBC QN AUTO: 28.6 PG (ref 26.5–33)
MCHC RBC AUTO-ENTMCNC: 31.6 G/DL (ref 31.5–36.5)
MCV RBC AUTO: 91 FL (ref 78–100)
PLATELET # BLD AUTO: 227 10E3/UL (ref 150–450)
POTASSIUM SERPL-SCNC: 4.2 MMOL/L (ref 3.4–5.3)
RBC # BLD AUTO: 4.82 10E6/UL (ref 4.4–5.9)
SODIUM SERPL-SCNC: 143 MMOL/L (ref 135–145)
TME LAST DOSE: NORMAL H
TME LAST DOSE: NORMAL H
WBC # BLD AUTO: 4.4 10E3/UL (ref 4–11)

## 2025-07-21 PROCEDURE — 83690 ASSAY OF LIPASE: CPT

## 2025-07-21 PROCEDURE — 80169 DRUG ASSAY EVEROLIMUS: CPT | Mod: 90

## 2025-07-21 PROCEDURE — 85027 COMPLETE CBC AUTOMATED: CPT

## 2025-07-21 PROCEDURE — 80048 BASIC METABOLIC PNL TOTAL CA: CPT

## 2025-07-21 PROCEDURE — 99000 SPECIMEN HANDLING OFFICE-LAB: CPT

## 2025-07-21 PROCEDURE — 36415 COLL VENOUS BLD VENIPUNCTURE: CPT

## 2025-07-21 PROCEDURE — 80158 DRUG ASSAY CYCLOSPORINE: CPT

## 2025-07-21 PROCEDURE — 82150 ASSAY OF AMYLASE: CPT

## 2025-07-21 PROCEDURE — 87799 DETECT AGENT NOS DNA QUANT: CPT | Performed by: FAMILY MEDICINE

## 2025-07-22 LAB
BK VIRUS DNA IU/ML, INSTRUMENT (6800): ABNORMAL IU/ML (ref ?–1)
BK VIRUS SPECIMEN TYPE: ABNORMAL
BKV DNA SPEC NAA+PROBE-LOG#: 4 {LOG_COPIES}/ML

## 2025-07-23 LAB — EVEROLIMUS BLD-MCNC: 5.4 NG/ML

## 2025-08-10 ENCOUNTER — HEALTH MAINTENANCE LETTER (OUTPATIENT)
Age: 70
End: 2025-08-10

## 2025-08-18 ENCOUNTER — LAB (OUTPATIENT)
Dept: LAB | Facility: CLINIC | Age: 70
End: 2025-08-18
Payer: COMMERCIAL

## 2025-08-18 DIAGNOSIS — Z94.0 HTN, KIDNEY TRANSPLANT RELATED: ICD-10-CM

## 2025-08-18 DIAGNOSIS — Z94.0 KIDNEY REPLACED BY TRANSPLANT: ICD-10-CM

## 2025-08-18 DIAGNOSIS — B34.8 BK VIREMIA: ICD-10-CM

## 2025-08-18 DIAGNOSIS — Z98.890 OTHER SPECIFIED POSTPROCEDURAL STATES: ICD-10-CM

## 2025-08-18 DIAGNOSIS — Z94.83 PANCREAS REPLACED BY TRANSPLANT (H): ICD-10-CM

## 2025-08-18 DIAGNOSIS — I15.1 HTN, KIDNEY TRANSPLANT RELATED: ICD-10-CM

## 2025-08-18 LAB
AMYLASE SERPL-CCNC: 71 U/L (ref 28–100)
ANION GAP SERPL CALCULATED.3IONS-SCNC: 13 MMOL/L (ref 7–15)
BUN SERPL-MCNC: 21.8 MG/DL (ref 8–23)
CALCIUM SERPL-MCNC: 9.1 MG/DL (ref 8.8–10.4)
CHLORIDE SERPL-SCNC: 108 MMOL/L (ref 98–107)
CREAT SERPL-MCNC: 1.83 MG/DL (ref 0.67–1.17)
CYCLOSPORINE BLD LC/MS/MS-MCNC: 76 UG/L (ref 50–400)
EGFRCR SERPLBLD CKD-EPI 2021: 40 ML/MIN/1.73M2
ERYTHROCYTE [DISTWIDTH] IN BLOOD BY AUTOMATED COUNT: 14.4 % (ref 10–15)
GLUCOSE SERPL-MCNC: 92 MG/DL (ref 70–99)
HCO3 SERPL-SCNC: 23 MMOL/L (ref 22–29)
HCT VFR BLD AUTO: 44.9 % (ref 40–53)
HGB BLD-MCNC: 14.2 G/DL (ref 13.3–17.7)
LIPASE SERPL-CCNC: 37 U/L (ref 13–60)
MCH RBC QN AUTO: 28.7 PG (ref 26.5–33)
MCHC RBC AUTO-ENTMCNC: 31.6 G/DL (ref 31.5–36.5)
MCV RBC AUTO: 90.9 FL (ref 78–100)
PLATELET # BLD AUTO: 205 10E3/UL (ref 150–450)
POTASSIUM SERPL-SCNC: 4.5 MMOL/L (ref 3.4–5.3)
RBC # BLD AUTO: 4.94 10E6/UL (ref 4.4–5.9)
SODIUM SERPL-SCNC: 144 MMOL/L (ref 135–145)
TME LAST DOSE: NORMAL H
TME LAST DOSE: NORMAL H
WBC # BLD AUTO: 6.08 10E3/UL (ref 4–11)

## 2025-08-18 PROCEDURE — 87799 DETECT AGENT NOS DNA QUANT: CPT

## 2025-08-18 PROCEDURE — 83690 ASSAY OF LIPASE: CPT

## 2025-08-18 PROCEDURE — 80158 DRUG ASSAY CYCLOSPORINE: CPT

## 2025-08-18 PROCEDURE — 80169 DRUG ASSAY EVEROLIMUS: CPT | Mod: 90

## 2025-08-18 PROCEDURE — 85027 COMPLETE CBC AUTOMATED: CPT

## 2025-08-18 PROCEDURE — 82150 ASSAY OF AMYLASE: CPT

## 2025-08-18 PROCEDURE — 36415 COLL VENOUS BLD VENIPUNCTURE: CPT

## 2025-08-18 PROCEDURE — 80048 BASIC METABOLIC PNL TOTAL CA: CPT

## 2025-08-18 PROCEDURE — 99000 SPECIMEN HANDLING OFFICE-LAB: CPT

## 2025-08-19 LAB
BK VIRUS DNA IU/ML, INSTRUMENT (6800): 8250 IU/ML (ref ?–1)
BK VIRUS SPECIMEN TYPE: ABNORMAL
BKV DNA SPEC NAA+PROBE-LOG#: 3.9 {LOG_COPIES}/ML

## 2025-08-20 LAB — EVEROLIMUS BLD-MCNC: 4.7 NG/ML

## 2025-08-26 ENCOUNTER — TELEPHONE (OUTPATIENT)
Dept: TRANSPLANT | Facility: CLINIC | Age: 70
End: 2025-08-26
Payer: COMMERCIAL

## 2025-08-26 DIAGNOSIS — Z48.298 AFTERCARE FOLLOWING ORGAN TRANSPLANT: Primary | ICD-10-CM

## 2025-08-26 DIAGNOSIS — Z94.0 HTN, KIDNEY TRANSPLANT RELATED: ICD-10-CM

## 2025-08-26 DIAGNOSIS — R06.09 DYSPNEA ON EXERTION: ICD-10-CM

## 2025-08-26 DIAGNOSIS — I15.1 HTN, KIDNEY TRANSPLANT RELATED: ICD-10-CM

## 2025-08-26 DIAGNOSIS — B34.8 BK VIREMIA: ICD-10-CM

## 2025-09-04 ENCOUNTER — TELEPHONE (OUTPATIENT)
Dept: CARDIOLOGY | Facility: CLINIC | Age: 70
End: 2025-09-04
Payer: COMMERCIAL

## 2025-09-04 DIAGNOSIS — Z94.0 HTN, KIDNEY TRANSPLANT RELATED: ICD-10-CM

## 2025-09-04 DIAGNOSIS — I15.1 HTN, KIDNEY TRANSPLANT RELATED: ICD-10-CM

## 2025-09-04 DIAGNOSIS — B34.8 BK VIREMIA: ICD-10-CM

## (undated) DEVICE — DRAPE FLUID WARMING 52 X 60" ORS-321

## (undated) DEVICE — SU STEEL 6 CCS 4X18" M654G

## (undated) DEVICE — STPL LINEAR 30X2.5MM VASC TX30V

## (undated) DEVICE — LINEN TOWEL PACK X6 WHITE 5487

## (undated) DEVICE — GUIDEWIRE VASC 0.014INX180CM RUNTHROUGH 25-1011

## (undated) DEVICE — SU PDS II 5-0 RB-1 27" Z303H

## (undated) DEVICE — SUCTION CATH AIRLIFE TRI-FLO W/CONTROL PORT 14FR  T60C

## (undated) DEVICE — DRSG TELFA 3X8" 1238

## (undated) DEVICE — WIPES FOLEY CARE SURESTEP PROVON DFC100

## (undated) DEVICE — SU PDS II 6-0 RB-2DA 30" Z149H

## (undated) DEVICE — LINEN GOWN XLG 5407

## (undated) DEVICE — ADH SKIN CLOSURE PREMIERPRO EXOFIN 1.0ML 3470

## (undated) DEVICE — GUIDEWIRE OPTOWIRE 3 W/O GAUGE FACTOR CONNECTOR F1032

## (undated) DEVICE — SYR 01ML 27GA 0.5" NDL TBC 309623

## (undated) DEVICE — FASTENER CATH BALLOON CLAMPX2 STATLOCK 0684-00-493

## (undated) DEVICE — CONNECTOR SIMS TUBING FOR CHEST TUBES 361

## (undated) DEVICE — SU PROLENE 4-0 RB-1DA 36" 8557H

## (undated) DEVICE — SUCTION SLEEVE NEPTUNE 2 165MM 0703-005-165

## (undated) DEVICE — SU PLEDGET SOFT TFE 13MMX7MMX1.5MM D7044

## (undated) DEVICE — CATH ANGIO INFINITI JL4 4FRX100CM 538420

## (undated) DEVICE — ESU ELEC BLADE 2.75" COATED/INSULATED E1455

## (undated) DEVICE — CATH ANGIO INFINITI 3DRC 4FRX100CM 538476

## (undated) DEVICE — BLADE CLIPPER SGL USE 9680

## (undated) DEVICE — PUNCH AORTIC 4.0MMX8" RCB40

## (undated) DEVICE — SU SILK 2-0 TIE 12X30" A305H

## (undated) DEVICE — MANIFOLD KIT ANGIO AUTOMATED 014613

## (undated) DEVICE — ANTIFOG SOLUTION W/FOAM PAD 31142527

## (undated) DEVICE — SU SILK 4-0 TIE 12X30" A303H

## (undated) DEVICE — SU VICRYL 2-0 CT-1 27" UND J259H

## (undated) DEVICE — STPL RELOAD LINEAR CUT 55MM TCR55

## (undated) DEVICE — SU PROLENE 5-0 RB-2DA 30" 8710H

## (undated) DEVICE — PREP CHLORAPREP 26ML TINTED HI-LITE ORANGE 930815

## (undated) DEVICE — DRAPE IOBAN INCISE 23X17" 6650EZ

## (undated) DEVICE — SU STRATAFIX MONOCRYL 3-0 SPIRAL PS-2 45CM SXMP1B107

## (undated) DEVICE — TOURNIQUET VASCULAR KIT ARGYLE 8888-585000

## (undated) DEVICE — SU STRATAFIX PDS PLUS 2-0 SPIRAL CT-1 45CM SXPP1B411

## (undated) DEVICE — SU ETHILON 3-0 PS-1 18" 1663H

## (undated) DEVICE — KIT HAND CONTROL ACIST 014644 AR-P54

## (undated) DEVICE — Device

## (undated) DEVICE — LINEN TOWEL PACK X30 5481

## (undated) DEVICE — SU ETHIBOND 2-0 SHDA 30" X563H

## (undated) DEVICE — SUCTION DRY CHEST DRAIN OASIS 3600-100

## (undated) DEVICE — ESU ELEC BLADE 6" COATED/INSULATED E1455-6

## (undated) DEVICE — BLOWER/MISTER CLEARVIEW 22150

## (undated) DEVICE — SOL NACL 0.9% IRRIG 1000ML BOTTLE 2F7124

## (undated) DEVICE — POSITIONER ASSIST ESSTECH 3S T401210S

## (undated) DEVICE — VALVE HMSTS PHD SM BORE W/ SPR MTL INS TOOL TRQ DVC MAP802

## (undated) DEVICE — PACK HEART LEFT CUSTOM

## (undated) DEVICE — INTRO GLIDESHEATH SLENDER 6FR 10X45CM 60-1060

## (undated) DEVICE — CLIP SPRING FOGARTY SOFTJAW CSOFT6

## (undated) DEVICE — DRAIN JACKSON PRATT CHANNEL 19FR ROUND HUBLESS SIL JP-2230

## (undated) DEVICE — CATH PLUG W/CAP 000076

## (undated) DEVICE — STPL LINEAR CUT 55MM TLC55

## (undated) DEVICE — SU MONOCRYL 4-0 PS-2 27" UND Y426H

## (undated) DEVICE — SU PDS II 4-0 SH 27" Z315H

## (undated) DEVICE — SU PLEDGET SOFT TFE 3/8"X3/26"X1/16" PCP40

## (undated) DEVICE — GW VASC .035IN DIA 260CML 7CML 3 MM RADIUS J CURVE 502455

## (undated) DEVICE — CATH GUIDING BLUE YELLOW PTFE JR4 6FRX100CM 67008200

## (undated) DEVICE — INTRO SHEATH 6FRX25CM PINNACLE RSS606

## (undated) DEVICE — DRAIN JACKSON PRATT RESERVOIR 100ML SU130-1305

## (undated) DEVICE — INSERT FOGARTY 33MM TRACTION HYDRAJAW HYDRA33

## (undated) DEVICE — DRSG DRAIN 4X4" 7086

## (undated) DEVICE — GLOVE BIOGEL PI MICRO INDICATOR UNDERGLOVE SZ 8.0 48980

## (undated) DEVICE — ESU ELEC BLADE E-SEP INSULATED NEPTUNE 70MM 0703-070-002

## (undated) DEVICE — SU PROLENE 7-0 BV-1DA 4X18" M8701

## (undated) DEVICE — SUCTION TIP YANKAUER W/O VENT K86

## (undated) DEVICE — SOL NACL 0.9% IRRIG 3000ML BAG 2B7477

## (undated) DEVICE — SURGICEL ABSORBABLE HEMOSTAT SNOW 4"X4" 2083

## (undated) DEVICE — DRSG TEGADERM 4X4 3/4" 1626W

## (undated) DEVICE — SU PROLENE 6-0 C-1DA 30" 8706H

## (undated) DEVICE — SU SILK 0 TIE 6X30" A306H

## (undated) DEVICE — SU PROLENE 3-0 SHDA 36" 8522H

## (undated) DEVICE — CANNULA VESSEL DLP  30001

## (undated) DEVICE — TUBING INSUFFLATION PNEUMOCLEAR 0620050100

## (undated) DEVICE — SU STRATAFIX PDS PLUS 0 CT 45CM SXPP1A406

## (undated) DEVICE — NDL COUNTER 20CT 31142493

## (undated) DEVICE — SU PROLENE 6-0 C-1DA 4X24" M8726

## (undated) DEVICE — KIT ENDO VASOVIEW HEMOPRO 2 VH-4000

## (undated) DEVICE — STPL LINEAR CUT 75MM TLC75

## (undated) DEVICE — SU PROLENE 5-0 RB-1DA 36"  8556H

## (undated) DEVICE — SU VICRYL 3-0 FS-1 27" J442H

## (undated) DEVICE — TUBING SUCTION 10'X3/16" N510

## (undated) DEVICE — DRSG MEDIPORE 3 1/2X13 3/4" 3573

## (undated) DEVICE — SU PROLENE 7-0 BV-1DA 4X24" M8702

## (undated) DEVICE — TUBING PRESSURE 30"

## (undated) DEVICE — SUCTION MANIFOLD NEPTUNE 2 SYS 4 PORT 0702-020-000

## (undated) DEVICE — SU PROLENE 4-0 SHDA 36" 8521H

## (undated) DEVICE — STPL RELOAD LINEAR CUT 75MM TCR75

## (undated) DEVICE — DRAIN CHEST TUBE 32FR STR 8032

## (undated) DEVICE — INSERT FOGARTY 61MM TRACTION HYDRAJAW HYDRA61

## (undated) DEVICE — SU PROLENE 6-0 RB-2DA 30" 8711H

## (undated) DEVICE — INTRO SHEATH AVANTI 4FRX23CM 504604T

## (undated) DEVICE — DRAPE ISOLATION BAG 1003

## (undated) DEVICE — TAPE MEDIPORE 4"X2YD 2864

## (undated) DEVICE — SPECIMEN CONTAINER 5OZ STERILE 2600SA

## (undated) DEVICE — STPL LINEAR CUT 55MM VASC TVC55

## (undated) DEVICE — SU PDS II 0 TP-1 60" Z991G

## (undated) DEVICE — CLIP HORIZON SM RED WIDE SLOT 001201

## (undated) DEVICE — DRSG PRIMAPORE 03 1/8X6" 66000318

## (undated) DEVICE — ESU PENCIL W/COATED BLADE E2450H

## (undated) DEVICE — CATH US OD 5FR OD SEC 2.9FR EAGLE EYE PLATINUM 0.014 85900P

## (undated) DEVICE — ESU PENCIL W/SMOKE EVAC ROCKER E2350HS

## (undated) DEVICE — SU SILK 3-0 SH CR 8X18" C013D

## (undated) DEVICE — TUBING IRRIG CYSTO/BLADDER SET 81" LF 2C4040

## (undated) DEVICE — SU PROLENE 8-0 BV130-5DA 24" 8732H

## (undated) DEVICE — KIT HAND CONTROL ACIST 016795

## (undated) DEVICE — SPONGE LAP 18X18" X8435

## (undated) DEVICE — SU ETHIBOND 3-0 BBDA 36" X588H

## (undated) DEVICE — SU VICRYL 3-0 SH 27" J316H

## (undated) DEVICE — SU ETHIBOND 0 CT-1 CR 8X18" CX21D

## (undated) DEVICE — SYR 10ML LL W/O NDL 302995

## (undated) DEVICE — SU SILK 1 TIE 6X30" A307H

## (undated) DEVICE — GLOVE BIOGEL PI MICRO INDICATOR UNDERGLOVE SZ 7.5 48975

## (undated) DEVICE — BNDG ELASTIC 6"X5YDS STERILE 6611-6S

## (undated) DEVICE — SU PROLENE 7-0 BV-1DA 24" 8702H

## (undated) DEVICE — SU SILK 3-0 TIE 12X30" A304H

## (undated) DEVICE — NDL COUNTER 40CT  31142311

## (undated) DEVICE — BLADE KNIFE BEAVER MINI STR BEAVER6900

## (undated) DEVICE — CATH FOLEY 3WAY 20FR 30ML LATEX 0167SI20

## (undated) DEVICE — SOL NACL 0.9% 10ML VIAL 0409-4888-02

## (undated) DEVICE — CLIP HORIZON MED BLUE 002200

## (undated) DEVICE — PACK SET-UP STD 9102

## (undated) DEVICE — BNDG ELASTIC 4"X5YDS STERILE 6611-4S

## (undated) DEVICE — BLADE SAW STERNAL 20X30MM KM-32

## (undated) DEVICE — TIES BANDING T50R

## (undated) DEVICE — ESU PENCIL SMOKE EVAC W/ROCKER SWITCH 0703-047-000

## (undated) DEVICE — SOL NACL 0.9% INJ 1000ML BAG 2B1324X

## (undated) DEVICE — PACK ADULT HEART UMMC PV15CG92D

## (undated) DEVICE — DRSG ABDOMINAL 07 1/2X8" 7197D

## (undated) DEVICE — GLOVE BIOGEL PI MICRO SZ 7.5 48575

## (undated) DEVICE — CATH GUIDING BLUE YELLOW PTFE XB3.5 6FRX100CM 67005400

## (undated) DEVICE — PROTECTOR ARM ONE-STEP TRENDELENBURG 40418

## (undated) DEVICE — DRAIN CHEST TUBE RIGHT ANGLED 28FR 8128

## (undated) DEVICE — TUBING SUCTION DRAINAGE PLEURAL DUAL 8884714200

## (undated) DEVICE — SU STEEL MYO/WIRE II STERNOTOMY 8 BE-1 3X14" 048-217

## (undated) DEVICE — BLADE KNIFE BEAVER MICROSHARP GREEN 377515

## (undated) DEVICE — CLIP HORIZON LG ORANGE 004200

## (undated) DEVICE — BASIN SET SINGLE STERILE 13752-624

## (undated) DEVICE — DEVICE CATH STABILIZATION STATLOCK FOLEY 3-WAY FOL0105

## (undated) DEVICE — DEFIB PRO-PADZ LVP LQD GEL ADULT 8900-2105-01

## (undated) DEVICE — ESU HOLSTER PLASTIC DISP E2400

## (undated) RX ORDER — FAMOTIDINE 20 MG/1
TABLET, FILM COATED ORAL
Status: DISPENSED
Start: 2023-02-13

## (undated) RX ORDER — HEPARIN SODIUM 1000 [USP'U]/ML
INJECTION, SOLUTION INTRAVENOUS; SUBCUTANEOUS
Status: DISPENSED
Start: 2021-12-30

## (undated) RX ORDER — NICARDIPINE HCL-0.9% SOD CHLOR 1 MG/10 ML
SYRINGE (ML) INTRAVENOUS
Status: DISPENSED
Start: 2023-02-07

## (undated) RX ORDER — VERAPAMIL HYDROCHLORIDE 2.5 MG/ML
INJECTION, SOLUTION INTRAVENOUS
Status: DISPENSED
Start: 2023-09-14

## (undated) RX ORDER — FENTANYL CITRATE-0.9 % NACL/PF 10 MCG/ML
PLASTIC BAG, INJECTION (ML) INTRAVENOUS
Status: DISPENSED
Start: 2023-02-13

## (undated) RX ORDER — FENTANYL CITRATE 50 UG/ML
INJECTION, SOLUTION INTRAMUSCULAR; INTRAVENOUS
Status: DISPENSED
Start: 2023-02-13

## (undated) RX ORDER — METOPROLOL TARTRATE 50 MG
TABLET ORAL
Status: DISPENSED
Start: 2023-01-26

## (undated) RX ORDER — HEPARIN SODIUM 1000 [USP'U]/ML
INJECTION, SOLUTION INTRAVENOUS; SUBCUTANEOUS
Status: DISPENSED
Start: 2023-09-14

## (undated) RX ORDER — FENTANYL CITRATE 50 UG/ML
INJECTION, SOLUTION INTRAMUSCULAR; INTRAVENOUS
Status: DISPENSED
Start: 2023-02-07

## (undated) RX ORDER — ACETAMINOPHEN 325 MG/1
TABLET ORAL
Status: DISPENSED
Start: 2023-09-14

## (undated) RX ORDER — NITROGLYCERIN 5 MG/ML
VIAL (ML) INTRAVENOUS
Status: DISPENSED
Start: 2023-02-07

## (undated) RX ORDER — DIPHENHYDRAMINE HYDROCHLORIDE 50 MG/ML
INJECTION INTRAMUSCULAR; INTRAVENOUS
Status: DISPENSED
Start: 2023-02-07

## (undated) RX ORDER — LIDOCAINE HYDROCHLORIDE 10 MG/ML
INJECTION, SOLUTION EPIDURAL; INFILTRATION; INTRACAUDAL; PERINEURAL
Status: DISPENSED
Start: 2023-02-07

## (undated) RX ORDER — DEXTROSE, SODIUM CHLORIDE, SODIUM LACTATE, POTASSIUM CHLORIDE, AND CALCIUM CHLORIDE 5; .6; .31; .03; .02 G/100ML; G/100ML; G/100ML; G/100ML; G/100ML
INJECTION, SOLUTION INTRAVENOUS
Status: DISPENSED
Start: 2023-09-15

## (undated) RX ORDER — SODIUM CHLORIDE 9 MG/ML
INJECTION, SOLUTION INTRAVENOUS
Status: DISPENSED
Start: 2023-09-15

## (undated) RX ORDER — CEFAZOLIN SODIUM 2 G/100ML
INJECTION, SOLUTION INTRAVENOUS
Status: DISPENSED
Start: 2023-10-17

## (undated) RX ORDER — HEPARIN SODIUM 1000 [USP'U]/ML
INJECTION, SOLUTION INTRAVENOUS; SUBCUTANEOUS
Status: DISPENSED
Start: 2023-02-07

## (undated) RX ORDER — PAPAVERINE HYDROCHLORIDE 30 MG/ML
INJECTION INTRAMUSCULAR; INTRAVENOUS
Status: DISPENSED
Start: 2023-09-15

## (undated) RX ORDER — HYDROMORPHONE HYDROCHLORIDE 1 MG/ML
INJECTION, SOLUTION INTRAMUSCULAR; INTRAVENOUS; SUBCUTANEOUS
Status: DISPENSED
Start: 2023-02-13

## (undated) RX ORDER — LIDOCAINE HYDROCHLORIDE 10 MG/ML
INJECTION, SOLUTION EPIDURAL; INFILTRATION; INTRACAUDAL; PERINEURAL
Status: DISPENSED
Start: 2023-09-15

## (undated) RX ORDER — CEFAZOLIN SODIUM/WATER 2 G/20 ML
SYRINGE (ML) INTRAVENOUS
Status: DISPENSED
Start: 2023-02-13

## (undated) RX ORDER — CEFAZOLIN SODIUM 1 G/3ML
INJECTION, POWDER, FOR SOLUTION INTRAMUSCULAR; INTRAVENOUS
Status: DISPENSED
Start: 2023-02-13

## (undated) RX ORDER — PAPAVERINE HYDROCHLORIDE 30 MG/ML
INJECTION INTRAMUSCULAR; INTRAVENOUS
Status: DISPENSED
Start: 2023-02-13

## (undated) RX ORDER — PAPAVERINE HYDROCHLORIDE 30 MG/ML
INJECTION INTRAMUSCULAR; INTRAVENOUS
Status: DISPENSED
Start: 2023-09-14

## (undated) RX ORDER — DEXMEDETOMIDINE HYDROCHLORIDE 4 UG/ML
INJECTION, SOLUTION INTRAVENOUS
Status: DISPENSED
Start: 2022-08-09

## (undated) RX ORDER — SODIUM CHLORIDE 9 MG/ML
INJECTION, SOLUTION INTRAVENOUS
Status: DISPENSED
Start: 2021-12-30

## (undated) RX ORDER — SODIUM CHLORIDE 450 MG/100ML
INJECTION, SOLUTION INTRAVENOUS
Status: DISPENSED
Start: 2023-09-15

## (undated) RX ORDER — NITROGLYCERIN 5 MG/ML
VIAL (ML) INTRAVENOUS
Status: DISPENSED
Start: 2021-12-30

## (undated) RX ORDER — SODIUM CHLORIDE 9 MG/ML
INJECTION, SOLUTION INTRAVENOUS
Status: DISPENSED
Start: 2023-02-07

## (undated) RX ORDER — FENTANYL CITRATE 50 UG/ML
INJECTION, SOLUTION INTRAMUSCULAR; INTRAVENOUS
Status: DISPENSED
Start: 2021-12-30

## (undated) RX ORDER — FENTANYL CITRATE 50 UG/ML
INJECTION, SOLUTION INTRAMUSCULAR; INTRAVENOUS
Status: DISPENSED
Start: 2023-10-17

## (undated) RX ORDER — GABAPENTIN 100 MG/1
CAPSULE ORAL
Status: DISPENSED
Start: 2023-02-13

## (undated) RX ORDER — NITROGLYCERIN 0.4 MG/1
TABLET SUBLINGUAL
Status: DISPENSED
Start: 2023-01-26

## (undated) RX ORDER — ACETAMINOPHEN 325 MG/1
TABLET ORAL
Status: DISPENSED
Start: 2023-02-13

## (undated) RX ORDER — ASPIRIN 81 MG/1
TABLET, CHEWABLE ORAL
Status: DISPENSED
Start: 2023-02-13

## (undated) RX ORDER — LIDOCAINE HYDROCHLORIDE 10 MG/ML
INJECTION, SOLUTION EPIDURAL; INFILTRATION; INTRACAUDAL; PERINEURAL
Status: DISPENSED
Start: 2023-10-17

## (undated) RX ORDER — HEPARIN SODIUM 1000 [USP'U]/ML
INJECTION, SOLUTION INTRAVENOUS; SUBCUTANEOUS
Status: DISPENSED
Start: 2023-02-13

## (undated) RX ORDER — ASPIRIN 325 MG
TABLET ORAL
Status: DISPENSED
Start: 2021-12-30

## (undated) RX ORDER — SODIUM CHLORIDE 9 MG/ML
INJECTION, SOLUTION INTRAVENOUS
Status: DISPENSED
Start: 2023-10-17

## (undated) RX ORDER — HYDROMORPHONE HCL IN WATER/PF 6 MG/30 ML
PATIENT CONTROLLED ANALGESIA SYRINGE INTRAVENOUS
Status: DISPENSED
Start: 2023-09-15